# Patient Record
Sex: FEMALE | Race: WHITE | NOT HISPANIC OR LATINO | Employment: UNEMPLOYED | URBAN - METROPOLITAN AREA
[De-identification: names, ages, dates, MRNs, and addresses within clinical notes are randomized per-mention and may not be internally consistent; named-entity substitution may affect disease eponyms.]

---

## 2017-07-17 ENCOUNTER — APPOINTMENT (OUTPATIENT)
Dept: GENERAL RADIOLOGY | Facility: CLINIC | Age: 52
End: 2017-07-17
Attending: NURSE PRACTITIONER
Payer: COMMERCIAL

## 2017-07-17 ENCOUNTER — HOSPITAL ENCOUNTER (EMERGENCY)
Facility: CLINIC | Age: 52
Discharge: HOME | End: 2017-07-17
Attending: NURSE PRACTITIONER | Admitting: NURSE PRACTITIONER
Payer: COMMERCIAL

## 2017-07-17 VITALS
DIASTOLIC BLOOD PRESSURE: 88 MMHG | HEART RATE: 105 BPM | OXYGEN SATURATION: 99 % | SYSTOLIC BLOOD PRESSURE: 157 MMHG | RESPIRATION RATE: 20 BRPM | HEIGHT: 66 IN | BODY MASS INDEX: 20.41 KG/M2 | WEIGHT: 127 LBS | TEMPERATURE: 98.9 F

## 2017-07-17 DIAGNOSIS — T14.8XXA HEMATOMA: ICD-10-CM

## 2017-07-17 DIAGNOSIS — M79.18 BUTTOCK PAIN: ICD-10-CM

## 2017-07-17 PROCEDURE — 25000132 ZZH RX MED GY IP 250 OP 250 PS 637: Performed by: NURSE PRACTITIONER

## 2017-07-17 PROCEDURE — 99283 EMERGENCY DEPT VISIT LOW MDM: CPT

## 2017-07-17 PROCEDURE — 72170 X-RAY EXAM OF PELVIS: CPT

## 2017-07-17 RX ORDER — IBUPROFEN 200 MG
600 TABLET ORAL ONCE
Status: COMPLETED | OUTPATIENT
Start: 2017-07-17 | End: 2017-07-17

## 2017-07-17 RX ADMIN — IBUPROFEN 600 MG: 200 TABLET, FILM COATED ORAL at 16:52

## 2017-07-17 ASSESSMENT — ENCOUNTER SYMPTOMS
WOUND: 1
ARTHRALGIAS: 0
FEVER: 0
COLOR CHANGE: 1
MYALGIAS: 1

## 2017-07-17 NOTE — ED AVS SNAPSHOT
Emergency Department    [address] [place] [address]    Phone:  [phone]    Fax:  [phone]                                       Audelia Comer   MRN: [id number]    Department:   Emergency Department   Date of Visit:  7/17/2017           After Visit Summary Signature Page     I have received my discharge instructions, and my questions have been answered. I have discussed any challenges I see with this plan with the nurse or doctor.    ..........................................................................................................................................  Patient/Patient Representative Signature      ..........................................................................................................................................  Patient Representative Print Name and Relationship to Patient    ..................................................               ................................................  Date                                            Time    ..........................................................................................................................................  Reviewed by Signature/Title    ...................................................              ..............................................  Date                                                            Time

## 2017-07-17 NOTE — ED AVS SNAPSHOT
Emergency Department    [address] [facility] [address]    Phone:  [phone]    Fax:  [phone]                                       Audelia Comer   MRN: [id number]    Department:   Emergency Department   Date of Visit:  7/17/2017           Patient Information     Date Of Birth          1965        Your diagnoses for this visit were:     Buttock pain     Hematoma        You were seen by Evelyn Bautista APRN CNP.      Follow-up Information     Follow up with No Ref-Primary, Physician.    Why:  As needed        Discharge Instructions       If the area becomes red, hot to touch, firm or you develop a fever return to the ER.     Follow up with Surgery as needed if the area does not improve.     Take Ibuprofen as needed.     If you need a clinic, see list below or contact your health insurance for in network providers.       [facility]  2001 [facility] [phone]   [facility]  [address]  [phone]   [facility]     [address] [phone]     [facility]     [address] [phone]      [facility]    Clinic, 1213 [facility]  [phone]       [facility]     [address] [phone]   [facility]    4730 [facility] [phone]   [facility]     2425 [facility] [phone]     [facility]   [address] [phone]     [facility]     4280 [facility] [phone]   [facility]:    3300 [facility] [phone]   [facility]:    1313 [facility] [phone]   [facility]   [address] [place] [address] [phone]     [facility]      860 [facility] [phone]   [facility]     153 [facility] [phone]   [facility]     135 [facility] [phone]   [facility]     409 [facility] [phone]     Family planning and reproductive health centers  Centros de planificación familiar y kahlil reproductiva    [facility] [place]              [phone]  [facility]               [phone]  [facility] [place]   [phone]  [facility] [place]   [phone]  [facility] [place]  [phone]   [facility]                   [phone]      24 Hour Appointment Hotline       To make an appointment at any [facility], call [phone] ([phone]). If you don't have a family doctor or clinic, we will help you find one. [facility] are conveniently located to serve the needs of you and your family.             Review of your medicines      Our records show that you are taking the medicines listed below. If these are incorrect, please call your family doctor or clinic.        Dose / Directions Last dose taken    oxyCODONE-acetaminophen 5-325 MG per tablet   Commonly known as:  PERCOCET   Dose:  1-2 tablet   Quantity:  15 tablet        Take 1-2 tablets by mouth every 4 hours as needed for moderate to severe pain   Refills:  0                Procedures and tests performed during your visit     Pelvis XR, 1-2 views      Orders Needing Specimen Collection     None      Pending Results     Date and Time Order Name Status Description    7/17/2017 1623 Pelvis XR, 1-2 views Preliminary             Pending Culture Results     No orders found from 7/15/2017 to 7/18/2017.            Pending Results Instructions     If you had any lab results that were not finalized at the time of your Discharge, you can call the ED Lab Result RN at [phone]. You will be contacted by this team for any  positive Lab results or changes in treatment. The nurses are available 7 days a week from 10A to 6:30P.  You can leave a message 24 hours per day and they will return your call.        Test Results From Your Hospital Stay        7/17/2017  5:16 PM      Narrative     PELVIS TWO VIEWS   7/17/2017 4:46 PM     HISTORY: Soft tissue, right lower buttock, evaluate for glass.    COMPARISON: None.        Impression     IMPRESSION: No radiopaque foreign body can be identified in the soft  tissues of the right buttock in the regions marked by the arrows on  the radiographs.                Clinical Quality Measure: Blood Pressure Screening     Your blood pressure was checked while you were in the emergency department today. The last reading we obtained was  BP: 157/88 . Please read the guidelines below about what these numbers mean and what you should do about them.  If your systolic blood pressure (the top number) is less than 120 and your diastolic blood pressure (the bottom number) is less than 80, then your blood pressure is normal. There is nothing more that you need to do about it.  If your systolic blood pressure (the top number) is 120-139 or your diastolic blood pressure (the bottom number) is 80-89, your blood pressure may be higher than it should be. You should have your blood pressure rechecked within a year by a primary care provider.  If your systolic blood pressure (the top number) is 140 or greater or your diastolic blood pressure (the bottom number) is 90 or greater, you may have high blood pressure. High blood pressure is treatable, but if left untreated over time it can put you at risk for heart attack, stroke, or kidney failure. You should have your blood pressure rechecked by a primary care provider within the next 4 weeks.  If your provider in the emergency department today gave you specific instructions to follow-up with your doctor or provider even sooner than that, you should follow that instruction and  "not wait for up to 4 weeks for your follow-up visit.        Thank you for choosing [facility]       Thank you for choosing [facility] for your care. Our goal is always to provide you with excellent care. Hearing back from our patients is one way we can continue to improve our services. Please take a few minutes to complete the written survey that you may receive in the mail after you visit with us. Thank you!        [email or web address] Information     [facility] lets you send messages to your doctor, view your test results, renew your prescriptions, schedule appointments and more. To sign up, go to [email or web address] . Click on \"Log in\" on the left side of the screen, which will take you to the Welcome page. Then click on \"Sign up Now\" on the right side of the page.     You will be asked to enter the access code listed below, as well as some personal information. Please follow the directions to create your username and password.     Your access code is: [email or web address]  Expires: 10/15/2017  5:31 PM     Your access code will  in 90 days. If you need help or a new code, please call your [facility] clinic or [phone].        Care EveryWhere ID     This is your Care EveryWhere ID. This could be used by other organizations to access your [facility] medical records  [id number]        Equal Access to Services     JOSSE HART AH: Lonnie Jason, waaxda luqadaha, qaybta kaalmada alexei, samantha wallace . So [facility] [phone].    ATENCIÓN: Si habla español, tiene a harding disposición servicios gratuitos de asistencia lingüística. Llame al [phone].    We comply with applicable federal civil rights laws and [place] laws. We do not discriminate on the basis of race, color, national origin, age, disability sex, sexual orientation or gender identity.            After Visit Summary       This is your record. Keep this with you and show to your community pharmacist(s) and doctor(s) at your next " visit.

## 2017-07-17 NOTE — ED PROVIDER NOTES
"  History     Chief Complaint:  Buttock pain    HPI   Audelia Comer is a 51 year old female who presents to the ED with right buttock pain following a fall 2 weeks ago. The patient states that she fell on a candle and broke the glass that the candle was held in. The area was bleeding profusely and she did not want to call an ambulance so she just controlled the bleeding. With her level of pain still, she is concerned that she may have gotten some glass lodged in the right side of her buttock and thus was seen by Urgent Care earlier today where they started her on antibiotics and referred her to a general physician. She was seen by a general physician just prior to arrival where she was told to come into the ED for further evaluation and workup. She notes the area to be extremely tender and that it used to be extremely bruised. She denies any fever, wounds, or any other pain.    Allergies:  No known drug allergies    Medications:    Percocet     Past Medical History:    SBO  Ileitis    Past Surgical History:    Appendectomy    Breast implants    Family History:    History reviewed. No pertinent family history.     Social History:  Smoking status: Current every day smoker  Alcohol use: Yes  Marital Status:       Review of Systems   Constitutional: Negative for fever.   Musculoskeletal: Positive for myalgias (right buttock). Negative for arthralgias.   Skin: Positive for color change (bruising) and wound (right buttock).   All other systems reviewed and are negative.    Physical Exam   Patient Vitals for the past 24 hrs:   BP Temp Temp src Pulse Resp SpO2 Height Weight   17 1534 157/88 98.9  F (37.2  C) Oral 105 20 99 % 1.676 m (5' 6\") 57.6 kg (127 lb)     Physical Exam  Constitutional:  Sitting in bed, non-toxic appearing.  Head: Atraumatic.  Head moves freely with normal range of motion.   Eyes: Conjunctivae pink. EOMs intact.   Neck: Normal range of motion.   Cardiovascular: Intact distal " pulses: Radial pulse 2+ on the right, 2+ on the left.   Pulmonary/Chest: No respiratory distress.   Musculoskeletal: Distal capillary refill and sensation intact. Tendon function intact.   Neurological: Oriented to person, place, and time. No focal deficits.  Skin: There is a 1cm laceration noted to the right lower medial buttock. Well healed laceration. Skin is warm with some soft tissue edema and tenderness. No erythema or heat to touch.      Emergency Department Course     Imaging:  Radiographic findings were communicated with the patient who voiced understanding of the findings.    X-ray Pelvis, 2 views:  IMPRESSION: No radiopaque foreign body can be identified in the soft  tissues of the right buttock in the regions marked by the arrows on  the radiographs.  Result per radiology.     Interventions:  1652 - Ibuprofen    Emergency Department Course:  Past medical records, nursing notes, and vitals reviewed.  1617: I performed an exam of the patient and obtained history, as documented above.    1729: I rechecked the patient. Findings and plan explained to the Patient. Patient discharged home with instructions regarding supportive care, medications, and reasons to return. The importance of close follow-up was reviewed.     Impression & Plan      Medical Decision Making:  Audelia Comer is a 51 year old female who presents for evaluation of a laceration to the right lower medial buttock from 2 weeks ago, sustained by a large piece of broken glass. She is concerned she has a retained foreign body. We discussed that glass especially a large piece may be seen on XRay, although not seeing a foreign body does not exclude retained glass. Her exam is not concerning for a foreign body as the area is not reddened or firm. XRay is negative. We reviewed reasons to return here and need for follow up. Pt is amenable to plan.      Diagnosis:    ICD-10-CM    1. Buttock pain M79.1    2. Hematoma T14.8         Disposition:  discharged to home    Amanda Sravani  7/17/2017    EMERGENCY DEPARTMENT  I, Amanda Lassiter, am serving as a scribe at 4:17 PM on 7/17/2017 to document services personally performed by Evelyn Bautista CNP based on my observations and the provider's statements to me.        Evelyn Bautista APRN CNP  07/18/17 0011

## 2017-07-17 NOTE — DISCHARGE INSTRUCTIONS
If the area becomes red, hot to touch, firm or you develop a fever return to the ER.     Follow up with Surgery as needed if the area does not improve.     Take Ibuprofen as needed.     If you need a clinic, see list below or contact your health insurance for in network providers.       [facility]  2001 [facility] [phone]   [facility]  [address]  [phone]   [facility]     [address] [phone]     [facility]     [address] [phone]      [facility]    Clinic, 1213 [facility]  [phone]       [facility]     [address] [phone]   [facility]    4730 [facility] [phone]   [facility]     2425 [facility] [phone]     [place] [facility]   [address] [phone]     [facility]     2610 [facility] [phone]   [facility]:    3300 [facility] [phone]   [facility]:    1313 [facility] [phone]   [facility]   [address] [place] [address] [phone]     [facility]     860 [facility] [phone]   [facility]     153 [facility] [phone]   [facility]     135 [facility] [phone]   [facility]     409 [facility] [phone]     Family planning and reproductive health centers  Centros de planificación familiar y kahlil reproductiva    [facility]              [phone]  [facility]               [phone]  [facility] [place]   [phone]  [facility] [place]   [phone]  [facility] [place]  [phone]   [facility]                   [phone]

## 2017-09-28 ENCOUNTER — APPOINTMENT (OUTPATIENT)
Dept: ULTRASOUND IMAGING | Facility: CLINIC | Age: 52
End: 2017-09-28
Attending: EMERGENCY MEDICINE
Payer: COMMERCIAL

## 2017-09-28 ENCOUNTER — HOSPITAL ENCOUNTER (EMERGENCY)
Facility: CLINIC | Age: 52
Discharge: HOME | End: 2017-09-28
Attending: EMERGENCY MEDICINE | Admitting: EMERGENCY MEDICINE
Payer: COMMERCIAL

## 2017-09-28 VITALS
HEART RATE: 107 BPM | TEMPERATURE: 99.1 F | SYSTOLIC BLOOD PRESSURE: 134 MMHG | WEIGHT: 123 LBS | DIASTOLIC BLOOD PRESSURE: 85 MMHG | HEIGHT: 66 IN | OXYGEN SATURATION: 98 % | RESPIRATION RATE: 16 BRPM | BODY MASS INDEX: 19.77 KG/M2

## 2017-09-28 DIAGNOSIS — R79.89 ELEVATED LFTS: ICD-10-CM

## 2017-09-28 DIAGNOSIS — N13.30 HYDRONEPHROSIS, RIGHT: ICD-10-CM

## 2017-09-28 DIAGNOSIS — R11.10 VOMITING AND DIARRHEA: ICD-10-CM

## 2017-09-28 DIAGNOSIS — R19.7 VOMITING AND DIARRHEA: ICD-10-CM

## 2017-09-28 DIAGNOSIS — E86.0 DEHYDRATION: ICD-10-CM

## 2017-09-28 DIAGNOSIS — R10.13 ABDOMINAL PAIN, EPIGASTRIC: ICD-10-CM

## 2017-09-28 LAB
ALBUMIN SERPL-MCNC: 4.2 G/DL (ref 3.4–5)
ALBUMIN UR-MCNC: NEGATIVE MG/DL
ALP SERPL-CCNC: 94 U/L (ref 40–150)
ALT SERPL W P-5'-P-CCNC: 152 U/L (ref 0–50)
ANION GAP SERPL CALCULATED.3IONS-SCNC: 16 MMOL/L (ref 3–14)
APPEARANCE UR: CLEAR
AST SERPL W P-5'-P-CCNC: 182 U/L (ref 0–45)
BASOPHILS # BLD AUTO: 0.1 10E9/L (ref 0–0.2)
BASOPHILS NFR BLD AUTO: 1 %
BILIRUB SERPL-MCNC: 0.8 MG/DL (ref 0.2–1.3)
BILIRUB UR QL STRIP: NEGATIVE
BUN SERPL-MCNC: 6 MG/DL (ref 7–30)
CALCIUM SERPL-MCNC: 9 MG/DL (ref 8.5–10.1)
CHLORIDE SERPL-SCNC: 98 MMOL/L (ref 94–109)
CO2 SERPL-SCNC: 21 MMOL/L (ref 20–32)
COLOR UR AUTO: ABNORMAL
CREAT SERPL-MCNC: 0.47 MG/DL (ref 0.52–1.04)
DIFFERENTIAL METHOD BLD: ABNORMAL
EOSINOPHIL # BLD AUTO: 0.1 10E9/L (ref 0–0.7)
EOSINOPHIL NFR BLD AUTO: 1 %
ERYTHROCYTE [DISTWIDTH] IN BLOOD BY AUTOMATED COUNT: 12.8 % (ref 10–15)
GFR SERPL CREATININE-BSD FRML MDRD: >90 ML/MIN/1.7M2
GLUCOSE SERPL-MCNC: 71 MG/DL (ref 70–99)
GLUCOSE UR STRIP-MCNC: NEGATIVE MG/DL
HCT VFR BLD AUTO: 45.8 % (ref 35–47)
HGB BLD-MCNC: 16.4 G/DL (ref 11.7–15.7)
HGB UR QL STRIP: NEGATIVE
IMM GRANULOCYTES # BLD: 0 10E9/L (ref 0–0.4)
IMM GRANULOCYTES NFR BLD: 0.3 %
KETONES UR STRIP-MCNC: 10 MG/DL
LEUKOCYTE ESTERASE UR QL STRIP: NEGATIVE
LIPASE SERPL-CCNC: 190 U/L (ref 73–393)
LYMPHOCYTES # BLD AUTO: 1.7 10E9/L (ref 0.8–5.3)
LYMPHOCYTES NFR BLD AUTO: 24.1 %
MCH RBC QN AUTO: 34.3 PG (ref 26.5–33)
MCHC RBC AUTO-ENTMCNC: 35.8 G/DL (ref 31.5–36.5)
MCV RBC AUTO: 96 FL (ref 78–100)
MONOCYTES # BLD AUTO: 0.8 10E9/L (ref 0–1.3)
MONOCYTES NFR BLD AUTO: 11.9 %
MUCOUS THREADS #/AREA URNS LPF: PRESENT /LPF
NEUTROPHILS # BLD AUTO: 4.4 10E9/L (ref 1.6–8.3)
NEUTROPHILS NFR BLD AUTO: 61.7 %
NITRATE UR QL: NEGATIVE
NRBC # BLD AUTO: 0 10*3/UL
NRBC BLD AUTO-RTO: 0 /100
PH UR STRIP: 5.5 PH (ref 5–7)
PLATELET # BLD AUTO: 191 10E9/L (ref 150–450)
POTASSIUM SERPL-SCNC: 3.4 MMOL/L (ref 3.4–5.3)
PROT SERPL-MCNC: 8.3 G/DL (ref 6.8–8.8)
RBC # BLD AUTO: 4.78 10E12/L (ref 3.8–5.2)
RBC #/AREA URNS AUTO: <1 /HPF (ref 0–2)
SODIUM SERPL-SCNC: 135 MMOL/L (ref 133–144)
SOURCE: ABNORMAL
SP GR UR STRIP: 1 (ref 1–1.03)
SQUAMOUS #/AREA URNS AUTO: 1 /HPF (ref 0–1)
UROBILINOGEN UR STRIP-MCNC: NORMAL MG/DL (ref 0–2)
WBC # BLD AUTO: 7.1 10E9/L (ref 4–11)
WBC #/AREA URNS AUTO: 1 /HPF (ref 0–2)

## 2017-09-28 PROCEDURE — 25000128 H RX IP 250 OP 636: Performed by: EMERGENCY MEDICINE

## 2017-09-28 PROCEDURE — 96361 HYDRATE IV INFUSION ADD-ON: CPT

## 2017-09-28 PROCEDURE — 96374 THER/PROPH/DIAG INJ IV PUSH: CPT

## 2017-09-28 PROCEDURE — 25000125 ZZHC RX 250: Performed by: EMERGENCY MEDICINE

## 2017-09-28 PROCEDURE — 99285 EMERGENCY DEPT VISIT HI MDM: CPT | Mod: 25

## 2017-09-28 PROCEDURE — 81001 URINALYSIS AUTO W/SCOPE: CPT | Performed by: EMERGENCY MEDICINE

## 2017-09-28 PROCEDURE — 85025 COMPLETE CBC W/AUTO DIFF WBC: CPT | Performed by: EMERGENCY MEDICINE

## 2017-09-28 PROCEDURE — 80053 COMPREHEN METABOLIC PANEL: CPT | Performed by: EMERGENCY MEDICINE

## 2017-09-28 PROCEDURE — 83690 ASSAY OF LIPASE: CPT | Performed by: EMERGENCY MEDICINE

## 2017-09-28 PROCEDURE — 76705 ECHO EXAM OF ABDOMEN: CPT

## 2017-09-28 PROCEDURE — 25000132 ZZH RX MED GY IP 250 OP 250 PS 637: Performed by: EMERGENCY MEDICINE

## 2017-09-28 RX ORDER — ONDANSETRON 4 MG/1
4 TABLET, ORALLY DISINTEGRATING ORAL EVERY 4 HOURS PRN
Qty: 10 TABLET | Refills: 0 | Status: SHIPPED | OUTPATIENT
Start: 2017-09-28 | End: 2019-07-17

## 2017-09-28 RX ORDER — SODIUM CHLORIDE 9 MG/ML
1000 INJECTION, SOLUTION INTRAVENOUS CONTINUOUS
Status: DISCONTINUED | OUTPATIENT
Start: 2017-09-28 | End: 2017-09-28 | Stop reason: HOSPADM

## 2017-09-28 RX ADMIN — SODIUM CHLORIDE 1000 ML: 9 INJECTION, SOLUTION INTRAVENOUS at 10:43

## 2017-09-28 RX ADMIN — LIDOCAINE HYDROCHLORIDE 30 ML: 20 SOLUTION ORAL; TOPICAL at 11:25

## 2017-09-28 RX ADMIN — SODIUM CHLORIDE 1000 ML: 9 INJECTION, SOLUTION INTRAVENOUS at 12:02

## 2017-09-28 RX ADMIN — PANTOPRAZOLE SODIUM 40 MG: 40 INJECTION, POWDER, FOR SOLUTION INTRAVENOUS at 12:02

## 2017-09-28 ASSESSMENT — ENCOUNTER SYMPTOMS
APPETITE CHANGE: 1
DIARRHEA: 1
VOMITING: 1
NAUSEA: 1
ABDOMINAL PAIN: 1

## 2017-09-28 NOTE — ED AVS SNAPSHOT
Emergency Department    [address] [place] [address]    Phone:  [phone]    Fax:  [phone]                                       Audelia Comer   MRN: [id number]    Department:   Emergency Department   Date of Visit:  9/28/2017           After Visit Summary Signature Page     I have received my discharge instructions, and my questions have been answered. I have discussed any challenges I see with this plan with the nurse or doctor.    ..........................................................................................................................................  Patient/Patient Representative Signature      ..........................................................................................................................................  Patient Representative Print Name and Relationship to Patient    ..................................................               ................................................  Date                                            Time    ..........................................................................................................................................  Reviewed by Signature/Title    ...................................................              ..............................................  Date                                                            Time

## 2017-09-28 NOTE — DISCHARGE INSTRUCTIONS
Drink plenty of fluids.    Do not take advil/Ibuprofen/Aleve or other non-steroidal anti-inflammatory medications.    Tylenol is fine to take.

## 2017-09-28 NOTE — ED AVS SNAPSHOT
Emergency Department    [address] [facility] [address]    Phone:  [phone]    Fax:  [phone]                                       Audelia Comer   MRN: [id number]    Department:   Emergency Department   Date of Visit:  9/28/2017           Patient Information     Date Of Birth          1965        Your diagnoses for this visit were:     Abdominal pain, epigastric     Vomiting and diarrhea     Hydronephrosis, right     Elevated LFTs     Dehydration        You were seen by Bridget Gilman MD.      Follow-up Information     Follow up with [facility] [place] [facility].    Why:  for follow-up and an Endoscopy procedure to look in your stomach to check for an ulcer.    Contact information:    [address] [address]  [facility] [address] [phone]          Follow up with  Emergency Department.    Specialty:  EMERGENCY MEDICINE    Why:  As needed, If symptoms worsen    Contact information:    [address] [facility] [address] [phone]        Follow up with No Ref-Primary, Physician.    Why:  Follow-up with your Primary care doctor this week.        Discharge Instructions       Drink plenty of fluids.    Do not take advil/Ibuprofen/Aleve or other non-steroidal anti-inflammatory medications.    Tylenol is fine to take.    Discharge References/Attachments     GASTRITIS OR ULCER (NO ANTIBIOTIC TREATMENT) (ENGLISH)    VOMITING AND DIARRHEA, NONSPECIFIC (ADULT) (ENGLISH)      24 Hour Appointment Hotline       To make an appointment at any [facility], call [phone] ([phone]). If you don't have a family doctor or clinic, we will help you find one. [facility] are conveniently located to serve the needs of you and your family.             Review of your medicines      START taking        Dose / Directions Last dose taken    omeprazole 20 MG CR capsule   Commonly known as:  priLOSEC   Dose:  40 mg   Quantity:  60 capsule        Take 2  capsules (40 mg) by mouth daily   Refills:  0        ondansetron 4 MG ODT tab   Commonly known as:  ZOFRAN ODT   Dose:  4 mg   Quantity:  10 tablet        Take 1 tablet (4 mg) by mouth every 4 hours as needed for nausea   Refills:  0                Prescriptions were sent or printed at these locations (2 Prescriptions)                   Other Prescriptions                Printed at Department/Unit printer (2 of 2)         ondansetron (ZOFRAN ODT) 4 MG ODT tab               omeprazole (PRILOSEC) 20 MG CR capsule                Procedures and tests performed during your visit     Abdomen US, limited (RUQ only)    CBC with platelets differential    Comprehensive metabolic panel    Lipase    Peripheral IV catheter    UA with Microscopic      Orders Needing Specimen Collection     None      Pending Results     No orders found from 9/26/2017 to 9/29/2017.            Pending Culture Results     No orders found from 9/26/2017 to 9/29/2017.            Pending Results Instructions     If you had any lab results that were not finalized at the time of your Discharge, you can call the ED Lab Result RN at [phone]. You will be contacted by this team for any positive Lab results or changes in treatment. The nurses are available 7 days a week from 10A to 6:30P.  You can leave a message 24 hours per day and they will return your call.        Test Results From Your Hospital Stay        9/28/2017 12:14 PM      Component Results     Component Value Ref Range & Units Status    Color Urine Light Yellow  Final    Appearance Urine Clear  Final    Glucose Urine Negative NEG^Negative mg/dL Final    Bilirubin Urine Negative NEG^Negative Final    Ketones Urine 10 (A) NEG^Negative mg/dL Final    Specific Gravity Urine 1.003 1.003 - 1.035 Final    Blood Urine Negative NEG^Negative Final    pH Urine 5.5 5.0 - 7.0 pH Final    Protein Albumin Urine Negative NEG^Negative mg/dL Final    Urobilinogen mg/dL Normal 0.0 - 2.0 mg/dL Final    Nitrite  Urine Negative NEG^Negative Final    Leukocyte Esterase Urine Negative NEG^Negative Final    Source Midstream Urine  Final    WBC Urine 1 0 - 2 /HPF Final    RBC Urine <1 0 - 2 /HPF Final    Squamous Epithelial /HPF Urine 1 0 - 1 /HPF Final    Mucous Urine Present (A) NEG^Negative /LPF Final         9/28/2017 10:51 AM      Component Results     Component Value Ref Range & Units Status    WBC 7.1 4.0 - 11.0 10e9/L Final    RBC Count 4.78 3.8 - 5.2 10e12/L Final    Hemoglobin 16.4 (H) 11.7 - 15.7 g/dL Final    Hematocrit 45.8 35.0 - 47.0 % Final    MCV 96 78 - 100 fl Final    MCH 34.3 (H) 26.5 - 33.0 pg Final    MCHC 35.8 31.5 - 36.5 g/dL Final    RDW 12.8 10.0 - 15.0 % Final    Platelet Count 191 150 - 450 10e9/L Final    Diff Method Automated Method  Final    % Neutrophils 61.7 % Final    % Lymphocytes 24.1 % Final    % Monocytes 11.9 % Final    % Eosinophils 1.0 % Final    % Basophils 1.0 % Final    % Immature Granulocytes 0.3 % Final    Nucleated RBCs 0 0 /100 Final    Absolute Neutrophil 4.4 1.6 - 8.3 10e9/L Final    Absolute Lymphocytes 1.7 0.8 - 5.3 10e9/L Final    Absolute Monocytes 0.8 0.0 - 1.3 10e9/L Final    Absolute Eosinophils 0.1 0.0 - 0.7 10e9/L Final    Absolute Basophils 0.1 0.0 - 0.2 10e9/L Final    Abs Immature Granulocytes 0.0 0 - 0.4 10e9/L Final    Absolute Nucleated RBC 0.0  Final         9/28/2017 11:14 AM      Component Results     Component Value Ref Range & Units Status    Sodium 135 133 - 144 mmol/L Final    Potassium 3.4 3.4 - 5.3 mmol/L Final    Chloride 98 94 - 109 mmol/L Final    Carbon Dioxide 21 20 - 32 mmol/L Final    Anion Gap 16 (H) 3 - 14 mmol/L Final    Glucose 71 70 - 99 mg/dL Final    Urea Nitrogen 6 (L) 7 - 30 mg/dL Final    Creatinine 0.47 (L) 0.52 - 1.04 mg/dL Final    GFR Estimate >90 >60 mL/min/1.7m2 Final    Non  GFR Calc    GFR Estimate If Black >90 >60 mL/min/1.7m2 Final    African American GFR Calc    Calcium 9.0 8.5 - 10.1 mg/dL Final    Bilirubin  Total 0.8 0.2 - 1.3 mg/dL Final    Albumin 4.2 3.4 - 5.0 g/dL Final    Protein Total 8.3 6.8 - 8.8 g/dL Final    Alkaline Phosphatase 94 40 - 150 U/L Final     (H) 0 - 50 U/L Final     (H) 0 - 45 U/L Final         9/28/2017 11:06 AM      Component Results     Component Value Ref Range & Units Status    Lipase 190 73 - 393 U/L Final         9/28/2017 11:55 AM      Narrative     ULTRASOUND ABDOMEN LIMITED  9/28/2017 11:46 AM     HISTORY: Check for cholecystitis or pancreatitis. Upper abdomen pain,  nausea, vomiting, diarrhea.     FINDINGS:  Liver is increased in echogenicity without focal lesions.  The gallbladder is normal without stones or sludge. Common bile duct  is normal in diameter. Pancreas is normal where visualized.  Examination of the right kidney demonstrates mild hydronephrosis. No  obvious renal calculi.        Impression     IMPRESSION:  Mild right hydronephrosis. No gallstones or bile duct  dilatation. Diffuse fatty liver infiltration is noted.    KAITY LOBO MD                Clinical Quality Measure: Blood Pressure Screening     Your blood pressure was checked while you were in the emergency department today. The last reading we obtained was  BP: 134/85 . Please read the guidelines below about what these numbers mean and what you should do about them.  If your systolic blood pressure (the top number) is less than 120 and your diastolic blood pressure (the bottom number) is less than 80, then your blood pressure is normal. There is nothing more that you need to do about it.  If your systolic blood pressure (the top number) is 120-139 or your diastolic blood pressure (the bottom number) is 80-89, your blood pressure may be higher than it should be. You should have your blood pressure rechecked within a year by a primary care provider.  If your systolic blood pressure (the top number) is 140 or greater or your diastolic blood pressure (the bottom number) is 90 or greater, you may have high  "blood pressure. High blood pressure is treatable, but if left untreated over time it can put you at risk for heart attack, stroke, or kidney failure. You should have your blood pressure rechecked by a primary care provider within the next 4 weeks.  If your provider in the emergency department today gave you specific instructions to follow-up with your doctor or provider even sooner than that, you should follow that instruction and not wait for up to 4 weeks for your follow-up visit.        Thank you for choosing [facility]       Thank you for choosing [facility] for your care. Our goal is always to provide you with excellent care. Hearing back from our patients is one way we can continue to improve our services. Please take a few minutes to complete the written survey that you may receive in the mail after you visit with us. Thank you!        [email or web address] Information     [employer] lets you send messages to your doctor, view your test results, renew your prescriptions, schedule appointments and more. To sign up, go to [email or web address] . Click on \"Log in\" on the left side of the screen, which will take you to the Welcome page. Then click on \"Sign up Now\" on the right side of the page.     You will be asked to enter the access code listed below, as well as some personal information. Please follow the directions to create your username and password.     Your access code is: [email or web address]  Expires: 10/15/2017  5:31 PM     Your access code will  in 90 days. If you need help or a new code, please call your [facility] clinic or [phone].        Care EveryWhere ID     This is your Care EveryWhere ID. This could be used by other organizations to access your [facility] medical records  [id number]        Equal Access to Services     JOSSE HART : mani Yoon, samantha santos. So [facility] [phone].    ATENCIÓN: Si jase bello " harding disposición servicios gratuitos de asistencia lingüística. Llame al [phone].    We comply with applicable federal civil rights laws and [place] laws. We do not discriminate on the basis of race, color, national origin, age, disability sex, sexual orientation or gender identity.            After Visit Summary       This is your record. Keep this with you and show to your community pharmacist(s) and doctor(s) at your next visit.

## 2017-09-28 NOTE — LETTER
To Whom it may concern:      Audelia Comer was seen in our Emergency Department today, 09/28/17.  I expect her condition to improve over the next 2 days.  she may return to work/school when improved.    Sincerely,      Bridget Gilman MD

## 2017-09-28 NOTE — ED PROVIDER NOTES
"  History     Chief Complaint:  Abdominal Pain       HPI   Audelia Comer is a 51 year old female with a history of small bowel obstruction who presents to the emergency department today for evaluation of abdominal pain. 5 days ago, the patient developed nausea, appetite loss, diarrhea, and left epigastric pain. She reports history of \"ulcer gastritis\" for many years with occasional flare ups that are triggered by stress. She admits to being stressed out recently. Patient was unable to stay at work today due to increased pain prompting ED visit. Here, she denies any vomiting today. She does not remember when her last bowel movement was. Patient is not sure if eating changes her pain as she has had decreased oral intake. She describes her pain as a pinching sensation. She has used antiacids with some improvement in pain. Pain today is not similar to the pain she had with her previous small bowel obstruction. She has history of kidney stones but no history of gallstones or pancreatitis. She admits to moderate alcohol use.       Allergies:  No Known Drug Allergies    Medications:    Percocet      Past Medical History:    Small Bowel Obstruction   Ulcer gastritis, per pt  Kidney stone    Past Surgical History:    APPENDECTOMY    GYN SURGERY-c section   SOFT TISSUE SURGERY-breast implants     Family History:    History reviewed. No pertinent family history.     Social History:  The patient presents alone.  Smoking Status: Current every day smoker  Smokeless Tobacco: Never used  Alcohol Use: Yes  Marital Status:   [4]     Review of Systems   Constitutional: Positive for appetite change.   Gastrointestinal: Positive for abdominal pain, diarrhea, nausea and vomiting.   All other systems reviewed and are negative.    Physical Exam   First Vitals:  BP: (!) 142/97  Pulse: 107  Temp: 99.1  F (37.3  C)  Resp: 16  Height: 167.6 cm (5' 6\")  Weight: 55.8 kg (123 lb)  SpO2: 95 %    Physical Exam  Nursing note and vitals " reviewed.  Constitutional:  Appears well-developed and well-nourished.   HENT:   Head:    Atraumatic.   Mouth/Throat:   Oropharynx is clear and moist. No oropharyngeal exudate.   Eyes:    Pupils are equal, round, and reactive to light.   Neck:    Normal range of motion. Neck supple.      No tracheal deviation present. No thyromegaly present.   Cardiovascular:  Normal rate, regular rhythm, no murmur   Pulmonary/Chest: Breath sounds are clear and equal without wheezes or crackles.  Abdominal:   Soft. Bowel sounds are normal. Exhibits no distension and      no mass. There is mid to RUQ tenderness with mild guarding but no rebound.      There is no rebound and no guarding.   Musculoskeletal:  Exhibits no edema.   Lymphadenopathy:  No cervical adenopathy.   Neurological:   Alert and oriented to person, place, and time.   Skin:    Skin is warm and dry. No rash noted. No pallor.     Emergency Department Course   Imaging:  Radiology findings were communicated with the patient who voiced understanding of the findings.    Abdomen US, Limited (RUQ only):  Mild right hydronephrosis. No gallstones or bile duct  dilatation. Diffuse fatty liver infiltration is noted.  Reading per radiology      Laboratory:  Laboratory findings were communicated with the patient who voiced understanding of the findings.    CBC: HGB: 16.4(H) o/w WNL. (WBC 7.1, )     CMP: AST: 182(H), BUN: 6(L), Creatinine: 0.47(L), Anion gap: 16(H), ALT: 152(H)    Lipase: 190    UA with Microscopic: Urineketon: 10(A)    Interventions:  1043- NS 1000 mL IV   1125- Mylanta 30 mL Oral  1202- Protonix 40 mg IV  1202- NS 1000 mL IV     Emergency Department Course:  Nursing notes and vitals reviewed.  I performed an exam of the patient as documented above.       IV was inserted and blood was drawn for laboratory testing, results above.    The patient was sent for a US while in the emergency department, results above.     The patient provided a urine sample here in  the emergency department. This was sent for laboratory testing, findings above.    I discussed the treatment plan with the patient. They expressed understanding of this plan and consented to discharge.    I personally reviewed the laboratory results with the Patient and answered all related questions prior to discharge.    Impression & Plan      Medical Decision Making:  I found the patient to have epigastric abdominal pain which mostly likely is due to gastritis or stomach ulcer therefore she was prescribed Prilosec 40 mg daily. Her gallbladder US did not show any signs of gallstones of cholecystitis and her lipase is normal without evidence of pancreatitis. I discussed the incidental finding mild right sided hydronephrosis with her and she was referred to Dr. Cardona for urology follow up for this. She was also told to follow up with [facility] for follow up regarding her elevated liver enzymes and likely to have endoscopy procedure.  She does not have any sign of bleeding ulcer. I discussed with her that her LFTs could be slightly elevated because of the vomiting . I felt she could be safely discharged home since she has benign abdominal exam and I did not feel she had signs of appendicitis or bowel obstruction. She was instructed on signs and symptoms to return for.       Diagnosis:    ICD-10-CM    1. Abdominal pain, epigastric R10.13 UA with Microscopic   2. Vomiting and diarrhea R11.10     R19.7    3. Hydronephrosis, right N13.30    4. Elevated LFTs R79.89    5. Dehydration E86.0          Disposition:   Discharge     Discharge Medications:  New Prescriptions    OMEPRAZOLE (PRILOSEC) 20 MG CR CAPSULE    Take 2 capsules (40 mg) by mouth daily    ONDANSETRON (ZOFRAN ODT) 4 MG ODT TAB    Take 1 tablet (4 mg) by mouth every 4 hours as needed for nausea       Scribe Disclosure:  Yon MADRID, am serving as a scribe at 10:26 AM on 9/28/2017 to document services personally performed by Bridget Gilman  MD Luis, based on my observations and the provider's statements to me.    9/28/2017    EMERGENCY DEPARTMENT       Bridget Gilman MD  09/28/17 [id number]

## 2018-01-26 ENCOUNTER — CARE COORDINATION (OUTPATIENT)
Dept: CARE COORDINATION | Facility: CLINIC | Age: 53
End: 2018-01-26

## 2018-03-14 ENCOUNTER — HOSPITAL ENCOUNTER (INPATIENT)
Facility: CLINIC | Age: 53
LOS: 2 days | Discharge: HOME | DRG: 440 | End: 2018-03-17
Attending: EMERGENCY MEDICINE | Admitting: HOSPITALIST
Payer: COMMERCIAL

## 2018-03-14 DIAGNOSIS — K85.90 ACUTE PANCREATITIS, UNSPECIFIED COMPLICATION STATUS, UNSPECIFIED PANCREATITIS TYPE: ICD-10-CM

## 2018-03-14 DIAGNOSIS — R11.2 INTRACTABLE VOMITING WITH NAUSEA, UNSPECIFIED VOMITING TYPE: ICD-10-CM

## 2018-03-14 DIAGNOSIS — E87.6 HYPOKALEMIA: ICD-10-CM

## 2018-03-14 DIAGNOSIS — F10.939 ALCOHOL WITHDRAWAL SYNDROME WITH COMPLICATION (H): ICD-10-CM

## 2018-03-14 LAB
ALBUMIN SERPL-MCNC: 3.7 G/DL (ref 3.4–5)
ALP SERPL-CCNC: 196 U/L (ref 40–150)
ALT SERPL W P-5'-P-CCNC: 106 U/L (ref 0–50)
ANION GAP SERPL CALCULATED.3IONS-SCNC: 28 MMOL/L (ref 3–14)
AST SERPL W P-5'-P-CCNC: 196 U/L (ref 0–45)
BILIRUB SERPL-MCNC: 1.8 MG/DL (ref 0.2–1.3)
BUN SERPL-MCNC: 7 MG/DL (ref 7–30)
CALCIUM SERPL-MCNC: 9 MG/DL (ref 8.5–10.1)
CHLORIDE SERPL-SCNC: 96 MMOL/L (ref 94–109)
CO2 SERPL-SCNC: 12 MMOL/L (ref 20–32)
CREAT SERPL-MCNC: 0.46 MG/DL (ref 0.52–1.04)
ERYTHROCYTE [DISTWIDTH] IN BLOOD BY AUTOMATED COUNT: 12.7 % (ref 10–15)
GFR SERPL CREATININE-BSD FRML MDRD: >90 ML/MIN/1.7M2
GLUCOSE SERPL-MCNC: 115 MG/DL (ref 70–99)
HCT VFR BLD AUTO: 43.5 % (ref 35–47)
HGB BLD-MCNC: 15.8 G/DL (ref 11.7–15.7)
INTERPRETATION ECG - MUSE: NORMAL
LIPASE SERPL-CCNC: 5647 U/L (ref 73–393)
MAGNESIUM SERPL-MCNC: 1.7 MG/DL (ref 1.6–2.3)
MCH RBC QN AUTO: 35.5 PG (ref 26.5–33)
MCHC RBC AUTO-ENTMCNC: 36.3 G/DL (ref 31.5–36.5)
MCV RBC AUTO: 98 FL (ref 78–100)
PLATELET # BLD AUTO: 157 10E9/L (ref 150–450)
POTASSIUM SERPL-SCNC: 2.7 MMOL/L (ref 3.4–5.3)
PROT SERPL-MCNC: 8.2 G/DL (ref 6.8–8.8)
RBC # BLD AUTO: 4.45 10E12/L (ref 3.8–5.2)
SODIUM SERPL-SCNC: 136 MMOL/L (ref 133–144)
WBC # BLD AUTO: 15 10E9/L (ref 4–11)

## 2018-03-14 PROCEDURE — 96361 HYDRATE IV INFUSION ADD-ON: CPT

## 2018-03-14 PROCEDURE — 25000125 ZZHC RX 250: Performed by: EMERGENCY MEDICINE

## 2018-03-14 PROCEDURE — 96375 TX/PRO/DX INJ NEW DRUG ADDON: CPT

## 2018-03-14 PROCEDURE — 99285 EMERGENCY DEPT VISIT HI MDM: CPT | Mod: 25

## 2018-03-14 PROCEDURE — 83735 ASSAY OF MAGNESIUM: CPT | Performed by: EMERGENCY MEDICINE

## 2018-03-14 PROCEDURE — 85027 COMPLETE CBC AUTOMATED: CPT | Performed by: EMERGENCY MEDICINE

## 2018-03-14 PROCEDURE — 83690 ASSAY OF LIPASE: CPT | Performed by: EMERGENCY MEDICINE

## 2018-03-14 PROCEDURE — 93005 ELECTROCARDIOGRAM TRACING: CPT

## 2018-03-14 PROCEDURE — 25000128 H RX IP 250 OP 636: Performed by: EMERGENCY MEDICINE

## 2018-03-14 PROCEDURE — 25000132 ZZH RX MED GY IP 250 OP 250 PS 637: Performed by: EMERGENCY MEDICINE

## 2018-03-14 PROCEDURE — HZ2ZZZZ DETOXIFICATION SERVICES FOR SUBSTANCE ABUSE TREATMENT: ICD-10-PCS | Performed by: EMERGENCY MEDICINE

## 2018-03-14 PROCEDURE — 96365 THER/PROPH/DIAG IV INF INIT: CPT | Mod: 59

## 2018-03-14 PROCEDURE — 80053 COMPREHEN METABOLIC PANEL: CPT | Performed by: EMERGENCY MEDICINE

## 2018-03-14 PROCEDURE — 96366 THER/PROPH/DIAG IV INF ADDON: CPT

## 2018-03-14 RX ORDER — ONDANSETRON 2 MG/ML
4 INJECTION INTRAMUSCULAR; INTRAVENOUS ONCE
Status: COMPLETED | OUTPATIENT
Start: 2018-03-14 | End: 2018-03-14

## 2018-03-14 RX ORDER — ONDANSETRON 2 MG/ML
4 INJECTION INTRAMUSCULAR; INTRAVENOUS EVERY 30 MIN PRN
Status: DISCONTINUED | OUTPATIENT
Start: 2018-03-14 | End: 2018-03-15

## 2018-03-14 RX ORDER — POTASSIUM CL/LIDO/0.9 % NACL 10MEQ/0.1L
10 INTRAVENOUS SOLUTION, PIGGYBACK (ML) INTRAVENOUS ONCE
Status: COMPLETED | OUTPATIENT
Start: 2018-03-14 | End: 2018-03-15

## 2018-03-14 RX ORDER — HYDROMORPHONE HYDROCHLORIDE 1 MG/ML
0.5 INJECTION, SOLUTION INTRAMUSCULAR; INTRAVENOUS; SUBCUTANEOUS
Status: COMPLETED | OUTPATIENT
Start: 2018-03-14 | End: 2018-03-14

## 2018-03-14 RX ORDER — LORAZEPAM 2 MG/ML
1 INJECTION INTRAMUSCULAR ONCE
Status: COMPLETED | OUTPATIENT
Start: 2018-03-14 | End: 2018-03-14

## 2018-03-14 RX ADMIN — SODIUM CHLORIDE 1000 ML: 9 INJECTION, SOLUTION INTRAVENOUS at 22:34

## 2018-03-14 RX ADMIN — HYDROMORPHONE HYDROCHLORIDE 0.5 MG: 1 INJECTION, SOLUTION INTRAMUSCULAR; INTRAVENOUS; SUBCUTANEOUS at 23:54

## 2018-03-14 RX ADMIN — ONDANSETRON 4 MG: 2 INJECTION INTRAMUSCULAR; INTRAVENOUS at 22:33

## 2018-03-14 RX ADMIN — Medication 10 MEQ: at 23:10

## 2018-03-14 RX ADMIN — LIDOCAINE HYDROCHLORIDE 30 ML: 20 SOLUTION ORAL; TOPICAL at 22:56

## 2018-03-14 RX ADMIN — LORAZEPAM 1 MG: 2 INJECTION INTRAMUSCULAR; INTRAVENOUS at 22:55

## 2018-03-14 RX ADMIN — SODIUM CHLORIDE 1000 ML: 9 INJECTION, SOLUTION INTRAVENOUS at 22:56

## 2018-03-14 ASSESSMENT — ENCOUNTER SYMPTOMS
DIARRHEA: 0
BLOOD IN STOOL: 0
TREMORS: 1
WEAKNESS: 0
CHILLS: 1
ABDOMINAL PAIN: 1
NAUSEA: 1
FEVER: 0
HEMATURIA: 0
HEADACHES: 0
DIZZINESS: 0
VOMITING: 1
DIFFICULTY URINATING: 0
DYSURIA: 0
FATIGUE: 1

## 2018-03-14 NOTE — IP AVS SNAPSHOT
[facility] [address] [facility]    [address] [place] [address]    Phone:  [phone]                                       After Visit Summary   3/14/2018    Audelia Comer    MRN: [id number]           After Visit Summary Signature Page     I have received my discharge instructions, and my questions have been answered. I have discussed any challenges I see with this plan with the nurse or doctor.    ..........................................................................................................................................  Patient/Patient Representative Signature      ..........................................................................................................................................  Patient Representative Print Name and Relationship to Patient    ..................................................               ................................................  Date                                            Time    ..........................................................................................................................................  Reviewed by Signature/Title    ...................................................              ..............................................  Date                                                            Time

## 2018-03-14 NOTE — IP AVS SNAPSHOT
MRN:[id number]                      After Visit Summary   3/14/2018    Audelia Comer    MRN: [id number]           Thank you!     Thank you for choosing [facility] for your care. Our goal is always to provide you with excellent care. Hearing back from our patients is one way we can continue to improve our services. Please take a few minutes to complete the written survey that you may receive in the mail after you visit with us. Thank you!        Patient Information     Date Of Birth          1965        Designated Caregiver       Most Recent Value    Caregiver    Will someone help with your care after discharge? yes    Name of designated caregiver Marilee    Phone number of caregiver [phone]    Caregiver address [place]      About your hospital stay     You were admitted on:  March 15, 2018 You last received care in the:  [facility] [address] [facility]    You were discharged on:  March 17, 2018       Who to Call     For medical emergencies, please call 911.  For non-urgent questions about your medical care, please call your primary care provider or clinic, [phone]          Attending Provider     Provider Specialty    Abby Benz MD Emergency Medicine    Connolly, Yamil Weeks MD Emergency Medicine    [facility] S[facility] Benite[facility] MD Internal Medicine       Primary Care Provider Office Phone # Fax #    Jennifer Lani Schwab, [phone]      After Care Instructions     Activity       Your activity upon discharge: activity as tolerated            Diet       Follow this diet upon discharge: Orders Placed This Encounter      Regular Diet Adult                  Follow-up Appointments     Follow-up and recommended labs and tests        Follow up with primary care provider, Jennifer L. Schwab, within 2 weeks, for hospital follow- up.   F/u ETOH dependence program as recommended                  Pending Results     No orders found from 3/12/2018 to 3/15/2018.  "           Statement of Approval     Ordered          18 1128  I have reviewed and agree with all the recommendations and orders detailed in this document.  EFFECTIVE NOW     Approved and electronically signed by:  Dariela Wilkerson MD             Admission Information     Date & Time Provider Department Dept. Phone    3/14/2018 Sage Springer MD [facility] [address] [facility] [phone]      Your Vitals Were     Blood Pressure Pulse Temperature Respirations Weight Pulse Oximetry    128/92 (BP Location: Left arm) 86 98.5  F (36.9  C) (Oral) 18 53.7 kg (118 lb 6.2 oz) 98%    BMI (Body Mass Index)                   19.11 kg/m2           MyChart Information     [employer] lets you send messages to your doctor, view your test results, renew your prescriptions, schedule appointments and more. To sign up, go to [email or web address] . Click on \"Log in\" on the left side of the screen, which will take you to the Welcome page. Then click on \"Sign up Now\" on the right side of the page.     You will be asked to enter the access code listed below, as well as some personal information. Please follow the directions to create your username and password.     Your access code is: [email or web address]  Expires: 6/15/2018 11:50 AM     Your access code will  in 90 days. If you need help or a new code, please call your [facility] clinic or [phone].        Care EveryWhere ID     This is your Care EveryWhere ID. This could be used by other organizations to access your [facility] medical records  [id number]        Equal Access to Services     [facility]: Hadii nicola lopes Sofely, waaxda luqadaha, qaybta kaalmasamantha whitt. So [facility] [phone].    ATENCIÓN: Si habla español, tiene a harding disposición servicios gratuitos de asistencia lingüística. Llame al [phone].    We comply with applicable federal civil rights laws and [place] laws. We do not discriminate on " the basis of race, color, national origin, age, disability, sex, sexual orientation, or gender identity.               Review of your medicines      CONTINUE these medicines which have NOT CHANGED        Dose / Directions    ondansetron 4 MG ODT tab   Commonly known as:  ZOFRAN ODT        Dose:  4 mg   Take 1 tablet (4 mg) by mouth every 4 hours as needed for nausea   Quantity:  10 tablet   Refills:  0       vitamin D 35955 UNIT capsule   Commonly known as:  ERGOCALCIFEROL        Dose:  94284 Units   Take 50,000 Units by mouth twice a week Tuesday and Thursday   Refills:  0                Protect others around you: Learn how to safely use, store and throw away your medicines at www.disposemymeds.org.             Medication List: This is a list of all your medications and when to take them. Check marks below indicate your daily home schedule. Keep this list as a reference.      Medications           Morning Afternoon Evening Bedtime As Needed    ondansetron 4 MG ODT tab   Commonly known as:  ZOFRAN ODT   Take 1 tablet (4 mg) by mouth every 4 hours as needed for nausea   Last time this was given:  4 mg on 3/15/2018  7:30 AM                                   vitamin D 53916 UNIT capsule   Commonly known as:  ERGOCALCIFEROL   Take 50,000 Units by mouth twice a week Tuesday and Thursday   Next Dose Due:  Resume home schedule

## 2018-03-15 ENCOUNTER — APPOINTMENT (OUTPATIENT)
Dept: ULTRASOUND IMAGING | Facility: CLINIC | Age: 53
DRG: 440 | End: 2018-03-15
Attending: EMERGENCY MEDICINE
Payer: COMMERCIAL

## 2018-03-15 ENCOUNTER — APPOINTMENT (OUTPATIENT)
Dept: CT IMAGING | Facility: CLINIC | Age: 53
DRG: 440 | End: 2018-03-15
Attending: EMERGENCY MEDICINE
Payer: COMMERCIAL

## 2018-03-15 LAB
ALBUMIN SERPL-MCNC: 3 G/DL (ref 3.4–5)
ALP SERPL-CCNC: 145 U/L (ref 40–150)
ALT SERPL W P-5'-P-CCNC: 73 U/L (ref 0–50)
ANION GAP SERPL CALCULATED.3IONS-SCNC: 15 MMOL/L (ref 3–14)
AST SERPL W P-5'-P-CCNC: 120 U/L (ref 0–45)
BILIRUB SERPL-MCNC: 1.4 MG/DL (ref 0.2–1.3)
BUN SERPL-MCNC: 4 MG/DL (ref 7–30)
CALCIUM SERPL-MCNC: 7.8 MG/DL (ref 8.5–10.1)
CHLORIDE SERPL-SCNC: 104 MMOL/L (ref 94–109)
CO2 SERPL-SCNC: 17 MMOL/L (ref 20–32)
CREAT SERPL-MCNC: 0.37 MG/DL (ref 0.52–1.04)
ERYTHROCYTE [DISTWIDTH] IN BLOOD BY AUTOMATED COUNT: 12.7 % (ref 10–15)
GFR SERPL CREATININE-BSD FRML MDRD: >90 ML/MIN/1.7M2
GLUCOSE SERPL-MCNC: 72 MG/DL (ref 70–99)
HCT VFR BLD AUTO: 37.9 % (ref 35–47)
HGB BLD-MCNC: 13.4 G/DL (ref 11.7–15.7)
LACTATE BLD-SCNC: 1.1 MMOL/L (ref 0.7–2)
LIPASE SERPL-CCNC: 2869 U/L (ref 73–393)
MCH RBC QN AUTO: 34.7 PG (ref 26.5–33)
MCHC RBC AUTO-ENTMCNC: 35.4 G/DL (ref 31.5–36.5)
MCV RBC AUTO: 98 FL (ref 78–100)
PLATELET # BLD AUTO: 114 10E9/L (ref 150–450)
POTASSIUM SERPL-SCNC: 2.9 MMOL/L (ref 3.4–5.3)
POTASSIUM SERPL-SCNC: 3.2 MMOL/L (ref 3.4–5.3)
PROT SERPL-MCNC: 6.6 G/DL (ref 6.8–8.8)
RBC # BLD AUTO: 3.86 10E12/L (ref 3.8–5.2)
SODIUM SERPL-SCNC: 136 MMOL/L (ref 133–144)
WBC # BLD AUTO: 10.2 10E9/L (ref 4–11)

## 2018-03-15 PROCEDURE — 25000128 H RX IP 250 OP 636: Performed by: EMERGENCY MEDICINE

## 2018-03-15 PROCEDURE — 25000125 ZZHC RX 250: Performed by: HOSPITALIST

## 2018-03-15 PROCEDURE — 96374 THER/PROPH/DIAG INJ IV PUSH: CPT

## 2018-03-15 PROCEDURE — 83605 ASSAY OF LACTIC ACID: CPT | Performed by: HOSPITALIST

## 2018-03-15 PROCEDURE — 36415 COLL VENOUS BLD VENIPUNCTURE: CPT | Performed by: INTERNAL MEDICINE

## 2018-03-15 PROCEDURE — 83690 ASSAY OF LIPASE: CPT | Performed by: INTERNAL MEDICINE

## 2018-03-15 PROCEDURE — 76705 ECHO EXAM OF ABDOMEN: CPT

## 2018-03-15 PROCEDURE — 25000125 ZZHC RX 250: Performed by: EMERGENCY MEDICINE

## 2018-03-15 PROCEDURE — 80053 COMPREHEN METABOLIC PANEL: CPT | Performed by: INTERNAL MEDICINE

## 2018-03-15 PROCEDURE — 99223 1ST HOSP IP/OBS HIGH 75: CPT | Mod: AI | Performed by: HOSPITALIST

## 2018-03-15 PROCEDURE — 25000132 ZZH RX MED GY IP 250 OP 250 PS 637: Performed by: HOSPITALIST

## 2018-03-15 PROCEDURE — 74177 CT ABD & PELVIS W/CONTRAST: CPT

## 2018-03-15 PROCEDURE — 99222 1ST HOSP IP/OBS MODERATE 55: CPT | Performed by: PSYCHIATRY & NEUROLOGY

## 2018-03-15 PROCEDURE — 84132 ASSAY OF SERUM POTASSIUM: CPT | Performed by: INTERNAL MEDICINE

## 2018-03-15 PROCEDURE — 12000000 ZZH R&B MED SURG/OB

## 2018-03-15 PROCEDURE — 85027 COMPLETE CBC AUTOMATED: CPT | Performed by: INTERNAL MEDICINE

## 2018-03-15 PROCEDURE — 36415 COLL VENOUS BLD VENIPUNCTURE: CPT | Performed by: HOSPITALIST

## 2018-03-15 PROCEDURE — 25000128 H RX IP 250 OP 636: Performed by: HOSPITALIST

## 2018-03-15 RX ORDER — AMOXICILLIN 250 MG
2 CAPSULE ORAL 2 TIMES DAILY PRN
Status: DISCONTINUED | OUTPATIENT
Start: 2018-03-15 | End: 2018-03-17 | Stop reason: HOSPADM

## 2018-03-15 RX ORDER — DIAZEPAM 10 MG/2ML
5-10 INJECTION, SOLUTION INTRAMUSCULAR; INTRAVENOUS EVERY 30 MIN PRN
Status: DISCONTINUED | OUTPATIENT
Start: 2018-03-15 | End: 2018-03-17 | Stop reason: HOSPADM

## 2018-03-15 RX ORDER — FOLIC ACID 1 MG/1
1 TABLET ORAL DAILY
Status: DISCONTINUED | OUTPATIENT
Start: 2018-03-15 | End: 2018-03-17 | Stop reason: HOSPADM

## 2018-03-15 RX ORDER — NICOTINE 21 MG/24HR
1 PATCH, TRANSDERMAL 24 HOURS TRANSDERMAL DAILY
Status: DISCONTINUED | OUTPATIENT
Start: 2018-03-15 | End: 2018-03-15

## 2018-03-15 RX ORDER — POTASSIUM CHLORIDE 1500 MG/1
20-40 TABLET, EXTENDED RELEASE ORAL
Status: DISCONTINUED | OUTPATIENT
Start: 2018-03-15 | End: 2018-03-17 | Stop reason: HOSPADM

## 2018-03-15 RX ORDER — NICOTINE 21 MG/24HR
1 PATCH, TRANSDERMAL 24 HOURS TRANSDERMAL DAILY
Status: DISCONTINUED | OUTPATIENT
Start: 2018-03-15 | End: 2018-03-17 | Stop reason: HOSPADM

## 2018-03-15 RX ORDER — POTASSIUM CHLORIDE 29.8 MG/ML
20 INJECTION INTRAVENOUS
Status: DISCONTINUED | OUTPATIENT
Start: 2018-03-15 | End: 2018-03-17 | Stop reason: HOSPADM

## 2018-03-15 RX ORDER — POTASSIUM CHLORIDE 1.5 G/1.58G
20-40 POWDER, FOR SOLUTION ORAL
Status: DISCONTINUED | OUTPATIENT
Start: 2018-03-15 | End: 2018-03-17 | Stop reason: HOSPADM

## 2018-03-15 RX ORDER — LANOLIN ALCOHOL/MO/W.PET/CERES
100 CREAM (GRAM) TOPICAL DAILY
Status: DISCONTINUED | OUTPATIENT
Start: 2018-03-15 | End: 2018-03-17 | Stop reason: HOSPADM

## 2018-03-15 RX ORDER — IOPAMIDOL 755 MG/ML
62 INJECTION, SOLUTION INTRAVASCULAR ONCE
Status: COMPLETED | OUTPATIENT
Start: 2018-03-15 | End: 2018-03-15

## 2018-03-15 RX ORDER — PROCHLORPERAZINE MALEATE 10 MG
10 TABLET ORAL EVERY 6 HOURS PRN
Status: DISCONTINUED | OUTPATIENT
Start: 2018-03-15 | End: 2018-03-17 | Stop reason: HOSPADM

## 2018-03-15 RX ORDER — NALOXONE HYDROCHLORIDE 0.4 MG/ML
.1-.4 INJECTION, SOLUTION INTRAMUSCULAR; INTRAVENOUS; SUBCUTANEOUS
Status: DISCONTINUED | OUTPATIENT
Start: 2018-03-15 | End: 2018-03-17 | Stop reason: HOSPADM

## 2018-03-15 RX ORDER — MULTIPLE VITAMINS W/ MINERALS TAB 9MG-400MCG
1 TAB ORAL DAILY
Status: DISCONTINUED | OUTPATIENT
Start: 2018-03-15 | End: 2018-03-17 | Stop reason: HOSPADM

## 2018-03-15 RX ORDER — AMOXICILLIN 250 MG
1 CAPSULE ORAL 2 TIMES DAILY PRN
Status: DISCONTINUED | OUTPATIENT
Start: 2018-03-15 | End: 2018-03-17 | Stop reason: HOSPADM

## 2018-03-15 RX ORDER — ERGOCALCIFEROL 1.25 MG/1
50000 CAPSULE, LIQUID FILLED ORAL
COMMUNITY
End: 2019-07-17

## 2018-03-15 RX ORDER — POTASSIUM CL/LIDO/0.9 % NACL 10MEQ/0.1L
10 INTRAVENOUS SOLUTION, PIGGYBACK (ML) INTRAVENOUS
Status: DISCONTINUED | OUTPATIENT
Start: 2018-03-15 | End: 2018-03-17 | Stop reason: HOSPADM

## 2018-03-15 RX ORDER — ONDANSETRON 4 MG/1
4 TABLET, ORALLY DISINTEGRATING ORAL EVERY 6 HOURS PRN
Status: DISCONTINUED | OUTPATIENT
Start: 2018-03-15 | End: 2018-03-17 | Stop reason: HOSPADM

## 2018-03-15 RX ORDER — OXYCODONE HYDROCHLORIDE 5 MG/1
5-10 TABLET ORAL
Status: DISCONTINUED | OUTPATIENT
Start: 2018-03-15 | End: 2018-03-17 | Stop reason: HOSPADM

## 2018-03-15 RX ORDER — SODIUM CHLORIDE 9 MG/ML
INJECTION, SOLUTION INTRAVENOUS CONTINUOUS
Status: DISCONTINUED | OUTPATIENT
Start: 2018-03-15 | End: 2018-03-17 | Stop reason: HOSPADM

## 2018-03-15 RX ORDER — HYDROMORPHONE HYDROCHLORIDE 1 MG/ML
.3-.5 INJECTION, SOLUTION INTRAMUSCULAR; INTRAVENOUS; SUBCUTANEOUS
Status: DISCONTINUED | OUTPATIENT
Start: 2018-03-15 | End: 2018-03-17 | Stop reason: HOSPADM

## 2018-03-15 RX ORDER — PROCHLORPERAZINE 25 MG
25 SUPPOSITORY, RECTAL RECTAL EVERY 12 HOURS PRN
Status: DISCONTINUED | OUTPATIENT
Start: 2018-03-15 | End: 2018-03-17 | Stop reason: HOSPADM

## 2018-03-15 RX ORDER — DIAZEPAM 5 MG
10 TABLET ORAL EVERY 30 MIN PRN
Status: DISCONTINUED | OUTPATIENT
Start: 2018-03-15 | End: 2018-03-17 | Stop reason: HOSPADM

## 2018-03-15 RX ORDER — ONDANSETRON 2 MG/ML
4 INJECTION INTRAMUSCULAR; INTRAVENOUS EVERY 6 HOURS PRN
Status: DISCONTINUED | OUTPATIENT
Start: 2018-03-15 | End: 2018-03-17 | Stop reason: HOSPADM

## 2018-03-15 RX ORDER — POTASSIUM CHLORIDE 7.45 MG/ML
10 INJECTION INTRAVENOUS
Status: DISCONTINUED | OUTPATIENT
Start: 2018-03-15 | End: 2018-03-17 | Stop reason: HOSPADM

## 2018-03-15 RX ORDER — BISACODYL 10 MG
10 SUPPOSITORY, RECTAL RECTAL DAILY PRN
Status: DISCONTINUED | OUTPATIENT
Start: 2018-03-15 | End: 2018-03-17 | Stop reason: HOSPADM

## 2018-03-15 RX ADMIN — OXYCODONE HYDROCHLORIDE 5 MG: 5 TABLET ORAL at 22:43

## 2018-03-15 RX ADMIN — SODIUM CHLORIDE 60 ML: 9 INJECTION, SOLUTION INTRAVENOUS at 01:32

## 2018-03-15 RX ADMIN — MULTIPLE VITAMINS W/ MINERALS TAB 1 TABLET: TAB at 08:59

## 2018-03-15 RX ADMIN — Medication 100 MG: at 08:59

## 2018-03-15 RX ADMIN — FOLIC ACID 1 MG: 1 TABLET ORAL at 08:59

## 2018-03-15 RX ADMIN — OXYCODONE HYDROCHLORIDE 5 MG: 5 TABLET ORAL at 07:30

## 2018-03-15 RX ADMIN — POTASSIUM CHLORIDE 40 MEQ: 1500 TABLET, EXTENDED RELEASE ORAL at 23:53

## 2018-03-15 RX ADMIN — ONDANSETRON 4 MG: 4 TABLET, ORALLY DISINTEGRATING ORAL at 07:30

## 2018-03-15 RX ADMIN — IOPAMIDOL 62 ML: 755 INJECTION, SOLUTION INTRAVENOUS at 01:32

## 2018-03-15 RX ADMIN — Medication 10 MEQ: at 19:37

## 2018-03-15 RX ADMIN — Medication 10 MEQ: at 16:48

## 2018-03-15 RX ADMIN — Medication 10 MEQ: at 14:14

## 2018-03-15 RX ADMIN — SODIUM CHLORIDE 1000 ML: 9 INJECTION, SOLUTION INTRAVENOUS at 02:46

## 2018-03-15 RX ADMIN — OXYCODONE HYDROCHLORIDE 5 MG: 5 TABLET ORAL at 02:45

## 2018-03-15 RX ADMIN — NICOTINE 1 PATCH: 14 PATCH, EXTENDED RELEASE TRANSDERMAL at 21:00

## 2018-03-15 RX ADMIN — ONDANSETRON 4 MG: 2 INJECTION INTRAMUSCULAR; INTRAVENOUS at 01:00

## 2018-03-15 RX ADMIN — Medication 10 MEQ: at 13:13

## 2018-03-15 RX ADMIN — OXYCODONE HYDROCHLORIDE 5 MG: 5 TABLET ORAL at 15:41

## 2018-03-15 RX ADMIN — SODIUM CHLORIDE: 9 INJECTION, SOLUTION INTRAVENOUS at 11:30

## 2018-03-15 RX ADMIN — ONDANSETRON 4 MG: 4 TABLET, ORALLY DISINTEGRATING ORAL at 02:45

## 2018-03-15 RX ADMIN — NICOTINE 1 PATCH: 14 PATCH, EXTENDED RELEASE TRANSDERMAL at 02:46

## 2018-03-15 RX ADMIN — Medication 10 MEQ: at 18:23

## 2018-03-15 RX ADMIN — Medication 10 MEQ: at 15:29

## 2018-03-15 RX ADMIN — SODIUM CHLORIDE: 9 INJECTION, SOLUTION INTRAVENOUS at 02:59

## 2018-03-15 RX ADMIN — OXYCODONE HYDROCHLORIDE 5 MG: 5 TABLET ORAL at 21:00

## 2018-03-15 NOTE — PLAN OF CARE
Problem: Patient Care Overview  Goal: Plan of Care/Patient Progress Review  Outcome: No Change  Pt admitted overnight. VSS on RA, tele NSR. CIWA 5. Zofran used for nausea, heat packs and Roxicodone for abdominal pain- effective. Pain is only in UQs. NPO ex ice chips and meds. + BS. Receiving IVF. K replaced in ED with recheck later this am. Up SBA. To have Psych and CD evals completed while here. Will continue to monitor.

## 2018-03-15 NOTE — PROGRESS NOTES
[facility]    Hospitalist Progress Note    Date of Service (when I saw the patient): 03/15/2018    Assessment & Plan   Ms. Audelia Comer is a 52-year-old female with past medical history significant for alcohol abuse and partial small bowel obstruction who presented to the ER with complaints of pain in abdomen, nausea and vomiting, and noted with acute pancreatitis.     1.  Acute alcoholic pancreatitis.    Vital signs stable. Lipase 5647 on admit. CT abd with mild to moderate pancreatitis, no fluid collections/abscess noted. Notable for fatty liver infiltration. RUQ US without stones.  -  IVF NS at 150 mL per hour  - p.r.n. pain medication and nausea medications.    - WBC normal 3/15 and LFT's improving  - clear liquids for today     2.  Hypokalemia.    2.7 on admission, likely secondary to vomiting and poor p.o. intake.   - K replacement protocol     3.  Active smoker.    - Counseled on smoking cessation.   -Nicotine patch     4.  Alcohol abuse.    No history of withdrawal, but she is at risk for alcohol withdrawal.    - CIWA protocol with Valium p.r.n.   - po thiamine/folate/multivitamin  - Appreciate Psychiatry and Chemical Dependency evaluation.     DVT Prophylaxis: Pneumatic Compression Devices  Code Status: Full Code    Disposition: Expected discharge in 1-2 days once tolerating regular diet, off IVF.    Fanny Candelario MD    Interval History   Feels better today, reduced abd pain, no fevers/chills. Wants to try liquids. Denies hx of alc withdrawal.    -Data reviewed today: I reviewed all new labs and imaging results over the last 24 hours. I personally reviewed no images or EKG's today.    Physical Exam   Temp: 98.6  F (37  C) Temp src: Oral BP: 120/82 Pulse: 97 Heart Rate: 105 Resp: 16 SpO2: 96 % O2 Device: None (Room air)    There were no vitals filed for this visit.  Vital Signs with Ranges  Temp:  [97.7  F (36.5  C)-98.6  F (37  C)] 98.6  F (37  C)  Pulse:  [85-97] 97  Heart Rate:   [105] 105  Resp:  [12-16] 16  BP: (120-135)/(82-87) 120/82  SpO2:  [96 %-98 %] 96 %       Constitutional: Awake, alert, cooperative, no apparent distress  Respiratory: Clear to auscultation bilaterally, no crackles or wheezing  Cardiovascular: Regular rate and rhythm, normal S1 and S2, and no murmur noted  GI: Absent bowel sounds, soft, non-distended, tender epigastric region, no rebound or guarding  Skin/Integumen: No rashes, no cyanosis, no edema  Other: Follows commands.    Medications     sodium chloride 150 mL/hr at 03/15/18 1130       thiamine  100 mg Oral Daily     folic acid  1 mg Oral Daily     multivitamin, therapeutic with minerals  1 tablet Oral Daily     nicotine   Transdermal Q8H     [START ON 3/16/2018] nicotine   Transdermal Daily     nicotine  1 patch Transdermal Daily       Data     Recent Labs  Lab 03/15/18  1153 03/14/18  2225   WBC 10.2 15.0*   HGB 13.4 15.8*   MCV 98 98   * 157    136   POTASSIUM 2.9* 2.7*   CHLORIDE 104 96   CO2 17* 12*   BUN 4* 7   CR 0.37* 0.46*   ANIONGAP 15* 28*   FRANCOISE 7.8* 9.0   GLC 72 115*   ALBUMIN 3.0* 3.7   PROTTOTAL 6.6* 8.2   BILITOTAL 1.4* 1.8*   ALKPHOS 145 196*   ALT 73* 106*   * 196*   LIPASE 2869* 5647*       Recent Results (from the past 24 hour(s))   US Abdomen Limited    Narrative    US ABDOMEN LIMITED  3/15/2018 12:52 AM    CLINICAL INFORMATION: Right upper quadrant pain.    COMPARISON: None.    FINDINGS: Limited right upper quadrant ultrasound demonstrates a  negative gallbladder. No gallstones or gallbladder wall thickening. No  bile duct dilatation. Liver is slightly prominent measuring 20 cm in  length. Increased liver echogenicity consistent with fatty  infiltration. Visualized portions of the pancreas are negative. Mildly  prominent extrarenal pelvis right kidney without dilatation of the  intrarenal collecting system.      Impression    IMPRESSION:  1. No acute sonographic findings in the right upper quadrant.  2. Mildly enlarged  liver with diffuse fatty infiltration.    ERICKA DIAZ MD   CT Abdomen Pelvis w Contrast    Narrative    CT ABDOMEN PELVIS W CONTRAST  3/15/2018 1:34 AM     HISTORY: Pancreatitis and vomiting.    TECHNIQUE: Volumetric acquisition through abdomen and pelvis with IV  contrast. 62 mL Isovue-370. Radiation dose for this scan was reduced  using automated exposure control, adjustment of the mA and/or kV  according to patient size, or iterative reconstruction technique.    COMPARISON: 9/10/2016.    FINDINGS: Prominent, diffuse fatty infiltration of the liver.  Gallbladder and spleen are negative. Moderate fat  stranding/inflammation and a small amount of fluid in the upper  abdomen adjacent to the pancreas, most prominent near the pancreatic  head and body and near the proximal duodenum. This is most consistent  with pancreatitis. No focal areas of pancreatic necrosis. Small amount  of associated fluid extending along the right anterior pararenal  space. No loculated fluid collections. Adrenal glands and kidneys  demonstrate no worrisome findings.    Uterus is present. No suspicious pelvic masses. Slight thick-walled  appearance of the left colon is probably physiologic due to  nondistention. No bowel obstruction, free air or other acute findings.      Impression    IMPRESSION:  1. Mild to moderate pancreatitis. No loculated fluid collections or  abscess.  2. Prominent, diffuse fatty infiltration of the liver.    ERICKA DIAZ MD

## 2018-03-15 NOTE — CONSULTS
"Consult Date:  03/15/2018      REASON FOR CONSULTATION:  Alcohol abuse.      REFERRING PHYSICIAN:  Dr. Springer.       IDENTIFYING DATA:  Audelia is a 52-year-old twice   female admitted with symptoms of alcohol withdrawal, pancreatitis and hepatitis.        CHIEF COMPLAINT:  \"I just started feeling awful.  I could not keep anything down.\"        HISTORY OF PRESENT ILLNESS:  The patient is a 52-year-old twice   female who lives locally.  She is between jobs, has a notable history of alcohol use disorder, 1 DWI, went through treatment years ago at [facility], but really did not maintain much sobriety.  Her last drink was about 2 days ago, and she had been drinking heavily.  She was stressed by the recent death of her brother by heart attack this past weekend which added to her feeling overwhelmed.  She acknowledges heavy use of alcohol on a daily basis.  She started feeling weak, dizzy and lightheaded.  She came to the ED and definitely has signs of alcohol withdrawal, fatty liver, pancreatitis and hepatitis.      On further questioning, the patient describes a history of past depression.  She was treated with multiple medications she cannot remember the names of.  She is situationally depressed and grieving at this point but not reporting thoughts of self-harm.  She denies a history of hypomania, todd, generalized anxiety disorder, panic disorder, obsessive-compulsive disorder, eating disorder, history or trauma history.  She acknowledges she has a drinking problem.  She has had a DWI 10 years ago.  She currently has insurance.  I had a discussion with her this morning about the negative impact of alcohol in her overall physical state.  We discussed options for treatment and the benefits of a chemical dependency assessment.  She tentatively agrees to that plan.      PAST PSYCHIATRIC HISTORY:  As above.      PAST CHEMICAL DEPENDENCY HISTORY:  As above, notable for alcohol use " "disorder with 1 prior treatment at [facility], limited sobriety, one DWI.      PAST MEDICAL HISTORY:  Per chart review includes a history of partial small-bowel obstruction, appendectomy,  and current concerns about alcohol withdrawal, pancreatitis, hepatitis with fatty liver.      MEDICATIONS:  Multivitamin, thiamine, folic acid, Valium per our [facility] protocol, sodium chloride, Zofran, potassium supplements, nicotine patch, p.r.n. Dilaudid, p.r.n. melatonin, p.r.n.   milk of magnesia p.r.n. Roxicodone, potassium supplements, p.r.n. Compazine, p.r.n. Senokot-S.      FAMILY HISTORY:  It sounds like there may be issues with alcohol use within family members.      SOCIAL HISTORY:  The patient is  and  twice, has grown children and lives locally.  She works as a  but recently quit her job.  Sadly, her brother  of a heart attack this past weekend which is adding stress to her life.  She lives by herself locally.      REVIEW OF SYSTEMS:  A 10-point review of systems is unchanged from Dr. Springer's initial H&P dated 3/15/18 at 1:23 a.m. Most recent vital signs:  Temperature 98.6, pulse 97, respiratory rate 16, blood pressure 120/82, oxygen saturation 96%.      MENTAL STATUS EXAMINATION:  Appearance:  The patient is a disheveled woman lying in bed.  She appears somewhat thin, physically ill.  She is a good historian.  Speech is soft in tone, rate and flow.  Normal use of language appropriate.  Motor exam calm.  Coordination, station, gait impaired due to weakness.  Muscle strength and tone are diminished.  Affect is subdued.  Mood is \"stressed.\"  Thought process is logical, coherent and goal directed.  No loosening of associations, no flight of ideas.  No formal thought disorder.  Thought content negative for hallucinations, delusions, paranoia, suicidal or homicidal ideation.  Insight and judgment poor with respect to alcohol use.  Cognitive exam, the patient is alert, " oriented x3.  Concentration fair.  Recent and remote memory intact.  General fund of knowledge average.      IMPRESSION:  The patient is a 52-year-old woman with severe alcohol use disorder and resultant complications, including alcohol withdrawal, pancreatitis and hepatitis.  She needs ongoing medical management and detox.  I ordered a chemical dependency assessment.  There is no basis to force her into treatment, but I certainly talk with her directly about the benefits of treatment.  She would likely do best with an inpatient program, but she resists that idea.  At the very least, she should be offered outpatient treatment.  She has some bereavement, may have some underlying depression, but it is impossible to really assess that fully given the circumstances and I think she needs a period of sobriety before that is looked at.      DIAGNOSES:   1.  Alcohol use disorder, severe.   2.  Alcohol-induced hepatitis.   3.  Alcohol-induced pancreatitis.   4.  Alcohol withdrawal.   5.  Bereavement.   6.  Rule out major depressive disorder versus alcohol-induced mood disorder with depressive features.      PLAN:   1.  Medical detox.   2.  CD assessment.   3.  Could offer outpatient counseling referral.  I do not think I would start antidepressants at this point given the fact she is detoxing and is grieving.  She really needs to move toward sobriety and commit to that before depression can accurately be assessed.         MARSHALL CONDE MD             D: 03/15/2018   T: 03/15/2018   MT: ANDRE      Name:     NICOLA PLAZA   MRN:      [id number]        Account:       [id number]   :      1965           Consult Date:  03/15/2018      Document: [id number]

## 2018-03-15 NOTE — PLAN OF CARE
RECEIVING UNIT ED HANDOFF REVIEW    ED Nurse Handoff Report was reviewed by: Marely Benitez on March 15, 2018 at 1:43 AM

## 2018-03-15 NOTE — ED PROVIDER NOTES
History     Chief Complaint:   Abdominal Pain      HPI   Audelia Comer is a 52 year old female who presents with abdominal pain. The patient reports that she has been feeling ill for the past few weeks and that it worsened approximately 3 days ago. She states that she has been dealing with generalized fatigue and abdominal pain for 3 weeks, and 3 days ago she developed nausea and vomiting. She has not been eating consistently over the past 3 days as well and today her abdominal pain has become worse than before. Her pain is localized to her right side and she describes this as a stabbing pain that pierces through to her back. She admits to daily drinking over the past few years, with consuming approximately 4-5 drinks per day. She is tremulous on exam and states that she has never gone through withdrawal in the past.  Of note, the patient reports that her brother  3 days ago and thus she has been drinking more heavily as well as feeling somewhat down. She denies any stool changes, urinary symptoms, headaches, syncope, lightheadedness, hallucinations, hematemesis, or any other symptoms.    Allergies:  No known drug allergies.    Medications:    ondansetron (ZOFRAN ODT) 4 MG ODT tab     Past Medical History:    Small bowel obstruction     Past Surgical History:    Appendectomy   section  Breast implants    Family History:    No pertinent family history.    Social History:  Smoking status: Current smoker  Alcohol use: Yes  Marital Status:        Review of Systems   Constitutional: Positive for chills and fatigue. Negative for fever.   Eyes: Negative for visual disturbance.   Gastrointestinal: Positive for abdominal pain, nausea and vomiting. Negative for blood in stool and diarrhea.   Genitourinary: Negative for difficulty urinating, dysuria and hematuria.   Neurological: Positive for tremors. Negative for dizziness, syncope, weakness and headaches.   All other systems reviewed and are  negative.    Physical Exam     Patient Vitals for the past 24 hrs:   BP Temp Temp src Heart Rate Resp SpO2   03/14/18 2217 125/87 97.7  F (36.5  C) Oral 105 14 98 %         Physical Exam  General: Patient is alert and uncomfortable appearing.  HEENT: Head atraumatic    Eyes: pupils equal and reactive. Conjunctiva clear   Nares: patent   Oropharynx: no lesions, uvula midline, no palatal draping, normal voice, no trismus  Neck: Supple without lymphadenopathy, no meningismus  Chest: tachycardic but regular   Lungs: Equal clear to auscultation with no wheeze or rales  Abdomen: Soft, epigastric and RUQ abdominal tenderness with voluntary guarding, nondistended, normal bowel sounds  Back: No costovertebral angle tenderness, no midline C, T or L spine tenderness  Neuro: Grossly nonfocal, normal speech, strength equal bilaterally, CN 2-12 intact, tremulous  Extremities: No deformities, equal radial and DP pulses. No clubbing, cyanosis.  No edema  Skin: Warm and dry with no rash.       Emergency Department Course   ECG:  @ 2259  Indication: abdominal pain  Vent. Rate 102 bpm. MS interval 150 ms. QRS duration 82 ms. QT/QTc 396/516 ms. P-R-T axis 71 84 -4.   Sinus tachycardia. ST & T wave abnormality, consider inferior ischemia. Abnormal ECG.    Read @ 2310 by Dr. Benz.    Imaging:  US Abdomen Limited  Pending.     Laboratory:  CBC:  WBC 15.0 (H), HGB 15.8 (H), ,   CMP: Potassium 2.7 (L), Carbon Dioxide 12 (L), ANION Gap 28 (H), Glucose 115 (H), Creatinine 0.46 (L), bilirubin 1.8 (H), ALKPHOS 196 (H),  (H),   (H) otherwise WNL  (2225) Magnesium: 1.7  (2225) Lipase: 5647 (H)    Interventions:  Medications   ondansetron (ZOFRAN) injection 4 mg (not administered)   ondansetron (ZOFRAN) injection 4 mg (4 mg Intravenous Given 3/14/18 2233)   0.9% sodium chloride BOLUS (0 mLs Intravenous Stopped 3/14/18 2311)   0.9% sodium chloride BOLUS (1,000 mLs Intravenous New Bag 3/14/18 2256)   lidocaine (viscous)  (XYLOCAINE) 2 % 15 mL, alum & mag hydroxide-simethicone (MYLANTA ES/MAALOX  ES) 15 mL GI Cocktail (30 mLs Oral Given 3/14/18 2256)   LORazepam (ATIVAN) injection 1 mg (1 mg Intravenous Given 3/14/18 2255)   potassium chloride 10 mEq in 100 mL intermittent infusion with 10 mg lidocaine (10 mEq Intravenous New Bag 3/14/18 2310)   HYDROmorphone (PF) (DILAUDID) injection 0.5 mg (0.5 mg Intravenous Given 3/14/18 2354)   (2333) Zofran, 4 mg, IV injection  () Normal Saline, 1 liter, IV bolus  () GI Cocktail (Maalox/Mylanta and viscous Lidocaine), 30 mL suspension, PO   () Zofran, 4 mg, IV injection  () Normal Saline, 1 liter, IV bolus  () Lorazepam, 1 mg, IV injection  () Potassium chloride 10 mEq intermittent infusion, IV  () Dilaudid, 0.5 mg, IV injection    Emergency Department Course:  Nursing notes and vitals reviewed.  (2344) I performed an exam of the patient as documented above.    Blood was drawn from the patient. This was sent for laboratory testing, findings above.   The patient was sent for a ultrasound while in the emergency department, findings above.   EKG was done, interpretation as above.    Findings and plan explained to the patient who consents to admission.   (1230) I discussed the patient with Dr. Isaac of the hospitalist service, who will admit the patient to a [facility] bed for further monitoring, evaluation, and treatment.    Impression & Plan    Medical Decision Making:  Patient is a 52-year-old female who drinks a significant amount of alcohol on a daily basis who presents the emergency department with complaints of epigastric pain radiating to her back as well as nausea and persistent vomiting.  Patient states she stopped drinking alcohol 2 days ago when her brother was diagnosed with a heart attack and  and since then has developed persistent vomiting and now epigastric pain.  Patient had no history of pancreatitis ever in the past that she tells me of.  She admits  to using some intermittent ibuprofen but no significant use.  She had no history of gallstones ever in the past.  She denies fever or chills.  She denies diarrhea here in the emergency department she is found to have hypokalemia likely related to the degree of her vomiting as well as acute pancreatitis.  Her elevated bilirubin and liver function is likely related to her chronic alcohol intake, but have ordered right upper quadrant ultrasound to ensure there is no choledocholithiasis.  Patient is feeling improved with the treatments provided here in the emergency department.  She is mildly tachycardic and tremulous on arrival and likely has a degree of alcohol withdrawal syndrome although there is no evidence to suggest DTs and certainly no seizure-like activity.  Patient does have elevated white blood cell count is likely related to her pancreatitis, and if her symptoms fail to improve with n.p.o. and pain control CT scan could be obtained to look for complications such as pseudocyst or abscess.  I did consider gastritis, ulcer in the differential as well given the location of her symptoms.  Do not suspect ulcer with perforation or bleeding at this time.  Patient denies any bloody or black stools.  She does use occasional ibuprofen but not to any significant degree.  Patient improvement of symptoms with treatments here and I do not feel CT is warranted initially.  Patient's ultrasound is still pending at this time and I will ask Dr. Connolly on coming [facility] physician to follow-up the results.  Patient is agreeable with plan for admission and all questions and concerns addressed.    Diagnosis:    ICD-10-CM    1. Hypokalemia E87.6    2. Alcohol withdrawal syndrome with complication (H) F10.239    3. Intractable vomiting with nausea, unspecified vomiting type R11.2    4. Acute pancreatitis, unspecified complication status, unspecified pancreatitis type K85.90        Disposition:  Patient is admitted to a med bed under  the care of Dr. Isaac.              I, Otis Sun, am serving as a scribe on 3/14/2018 at 10:44 PM to personally document services performed by Dr. Benz based on my observations and the provider's statements to me.         Otis Sun  3/14/2018    EMERGENCY DEPARTMENT       Abby Benz MD  03/15/18 0036

## 2018-03-15 NOTE — H&P
Admitted:     2018      PRIMARY CARE PHYSICIAN:  Dr. Jennifer Schwab      CHIEF COMPLAINT:  Pain in abdomen, nausea, vomiting.      HISTORY OF PRESENT ILLNESS:  Ms. Audelia Comer a 52-year-old female with past medical history significant for partial small bowel obstruction and history of alcohol abuse who presented to the ER today with complaints of pain in abdomen.  She states she has been undergoing a lot of stress for the past 2 weeks.  Her brother just  of a heart attack and she lost her job about 2 weeks ago.  She states for the past 2 weeks she has been having epigastric abdominal pain along with some nausea and vomiting, which has gotten worse over the past 3-4 days. She drinks daily and for the past 3-4 days has been drinking heavily.  She also reports feeling weak, dizzy and lightheaded, especially when she stands up.  She denies any fever, chills or rigors.  No chest pain or shortness of breath.  Denies any hematemesis or melena, reports normal bowel output but with decreased stool as she has not been taking enough oral intake. Reports normal urine habit.        Here in the ER, she was seen by Dr. Benz. She was noted to be slightly tachycardic and labs with abnormal LFTs and markedly elevated lipase. She was given some IV fluid bolus and Hospitalist was requested for admission for further evaluation.      REVIEW OF SYSTEMS:  A 10-point review of systems was done and negative apart from those mentioned in the history of present illness.      PAST MEDICAL HISTORY:   1.  History of partial small bowel obstruction with ileitis in 2016.   2.  Alcohol abuse.  No history of withdrawal.   3.  History of appendectomy and  section.      MEDICATIONS PRIOR TO ADMISSION:  Prescriptions Prior to Admission   Medication Sig Dispense Refill Last Dose     vitamin D (ERGOCALCIFEROL) 14815 UNIT capsule Take 50,000 Units by mouth twice a week Tuesday and Thursday    3/8/2018     ondansetron (ZOFRAN ODT)  4 MG ODT tab Take 1 tablet (4 mg) by mouth every 4 hours as needed for nausea 10 tablet 0 hasn't had filled yet         ALLERGIES:  NO KNOWN DRUG ALLERGIES.      SOCIAL HISTORY:  She reports smoking 1 pack per day.  She drinks vodka daily.  Denies illicit drug use.      FAMILY HISTORY:  Brother recently  of heart attack.  Family history otherwise not pertinent to current presentation.      PHYSICAL EXAMINATION:   GENERAL:  The patient is conscious, alert, oriented x 3, lying comfortably in bed, does not seem to be in any apparent distress.   VITAL SIGNS:  Temperature 97.7, heart rate of 105, blood pressure 125/87, saturation 98% on room air.   HEENT:  Pupils are equal and reactive to light and accommodation.  Extraocular movements are intact.  Oral mucosa is dry.   NECK:  Supple, no rigidity.    RESPIRATORY:  Lung sounds bilaterally clear to auscultation, no wheezes or crepitation.   CARDIOVASCULAR:  Normal S1 and S2, slightly tachycardic, no murmur.   ABDOMEN:  Soft, moderate epigastric tenderness present.  No guarding, rigidity or rebound tenderness. Bowel sounds present.   LOWER EXTREMITIES:  With no edema.   NEUROLOGIC:  No focal neurological deficits noted.  Cranial nerves II through XII grossly intact.   PSYCHIATRIC:  Normal mood and affect.      LABORATORY AND IMAGING DATA:  CMP notable for potassium of 2.7, anion gap of 28, magnesium 1.7, bilirubin 1.8, AST and ALT at 196 and 106, alkaline phosphatase elevated at 196.  Lipase elevated at 5647. CBC with WBC 15, hemoglobin 15.8, platelets 157.       Ultrasound abdomen and CT abdomen is pending.        EKG reviewed by me shows sinus tachycardia.      ASSESSMENT AND PLAN:  Ms. Audelia Comer is a 52-year-old female with past medical history significant for alcohol abuse and partial small bowel obstruction who presented to the ER today with complaints of pain in abdomen, nausea and vomiting, and noted with acute pancreatitis.     1.  Pain in abdomen, nausea  and vomiting, likely secondary to acute alcoholic pancreatitis.  Will admit her as inpatient, n.p.o., IV hydration with normal saline at 150 mL per hour after another liter fluid bolus. Will have p.r.n. pain medication and nausea medication available.  Will await CT and ultrasound to see if there are any CBD stones.  If that is the case, she will need a GI evaluation for ERCP.  We will repeat LFTs and lipase in a.m.     2.  Hypokalemia.  Likely secondary to vomiting and poor p.o. intake. Will start potassium supplementation protocol.   3.  Active smoker.  Counseled on smoking cessation. Will start her on a nicotine patch.   4.  Alcohol abuse.  No history of withdrawal, but she is at risk for alcohol withdrawal.  We will start her on CIWA protocol with Valium p.r.n. Will have Psychiatry and Chemical Dependency evaluation.   5.  Deep venous thrombosis prophylaxis: Mechanical with PCD boots.      CODE STATUS:  FULL CODE.         DONNA DANG MD             D: 03/15/2018   T: 03/15/2018   MT:       Name:     NICOLA PLAZA   MRN:      [id number]        Account:      [id number]   :      1965        Admitted:     2018                   Document: [id number]

## 2018-03-15 NOTE — PHARMACY-ADMISSION MEDICATION HISTORY
Admission medication history interview status for the 3/14/2018  admission is complete. See EPIC admission navigator for prior to admission medications     Medication history source reliability:Moderate    Actions taken by pharmacist (provider contacted, etc):  Will call [facility] at 9am when they open to verify dose of Vitamin D     Additional medication history information not noted on PTA med list :None    Medication reconciliation/reorder completed by provider prior to medication history? No    Time spent in this activity: 15    Prior to Admission medications    Medication Sig Last Dose Taking? Auth Provider   vitamin D (ERGOCALCIFEROL) 98927 UNIT capsule Take by mouth twice a week Tuesday and Thursday  3/8/2018 Yes Unknown, Entered By History   ondansetron (ZOFRAN ODT) 4 MG ODT tab Take 1 tablet (4 mg) by mouth every 4 hours as needed for nausea hasn't had filled yet Yes Bridget Gilman MD

## 2018-03-15 NOTE — ED NOTES
"[facility]  ED Nurse Handoff Report    ED Chief complaint: Abdominal Pain (feeling ill past few weeks, vomiting 3 days, multiple vomiting today. sharp stabbing epigastric pain, denies diarrhea, denies fever. lots of stress, buried brother yesterday)      ED Diagnosis:   Final diagnoses:   Hypokalemia   Alcohol withdrawal syndrome with complication (H)   Intractable vomiting with nausea, unspecified vomiting type   Acute pancreatitis, unspecified complication status, unspecified pancreatitis type       Code Status: Full Code    Allergies: No Known Allergies    Activity level - Baseline/Home:  Independent    Activity Level - Current:   Independent     Needed?: No    Isolation: No  Infection: Not Applicable    Bariatric?: No    Vital Signs:   Vitals:    18 2217   BP: 125/87   Resp: 14   Temp: 97.7  F (36.5  C)   TempSrc: Oral   SpO2: 98%       Cardiac Rhythm: ,        Pain level: 0-10 Pain Scale: 7    Is this patient confused?: No     Patient Report: Initial Complaint: Abdominal pain  Focused Assessment: Patient comes in with c/o \"feeling ill the past few weeks\", vomiting for the past 3 days with multiple emesis today.  Pt also has c/o sharp stabbing epigastric pain.  Pt has been having a \"rough\" time the past few weeks and had to attend her brothers  yesterday.  Pt has been drinking but for the past two days she has had no alcohol.  Pt appears anxious and has strong abdominal pain.  Pt was given 1mg IV ativan for anxiety.  Tests Performed: labs, abdominal US, UA, EKG  Abnormal Results: see epic lab results tab  Treatments provided: monitoring, IV potassium bump (10meq), IV dilaudid 0.5mg, IV ativan 1mg, IV fluids    Family Comments: no family with patient at this time    OBS brochure/video discussed/provided to patient: N/A    ED Medications:   Medications   ondansetron (ZOFRAN) injection 4 mg (not administered)   ondansetron (ZOFRAN) injection 4 mg (4 mg Intravenous Given " 3/14/18 2233)   0.9% sodium chloride BOLUS (0 mLs Intravenous Stopped 3/14/18 2311)   0.9% sodium chloride BOLUS (1,000 mLs Intravenous New Bag 3/14/18 2256)   lidocaine (viscous) (XYLOCAINE) 2 % 15 mL, alum & mag hydroxide-simethicone (MYLANTA ES/MAALOX  ES) 15 mL GI Cocktail (30 mLs Oral Given 3/14/18 2256)   LORazepam (ATIVAN) injection 1 mg (1 mg Intravenous Given 3/14/18 2255)   potassium chloride 10 mEq in 100 mL intermittent infusion with 10 mg lidocaine (10 mEq Intravenous New Bag 3/14/18 2310)   HYDROmorphone (PF) (DILAUDID) injection 0.5 mg (0.5 mg Intravenous Given 3/14/18 2354)       Drips infusing?:  Yes    For the majority of the shift this patient was Green.   Interventions performed were n/a.    Severe Sepsis OR Septic Shock Diagnosis Present: No      ED NURSE PHONE NUMBER: *[phone]

## 2018-03-15 NOTE — PLAN OF CARE
Problem: Patient Care Overview  Goal: Plan of Care/Patient Progress Review  Outcome: Improving  [03/15/18 1441 Julianne Dubois, RN - Registered Nurse]   Pt alert and oriented. CIWA 2/2. Mild abd discomfort. Lipase improving,starting clear liquids slowly. Up with sba of 1. K+ 2.9,recieving IV replacements. DC 1-2 days pending labs/pain/food tolerance. improvement.    Comments: [03/15/18 1441 Julianne Dubois, RN - Registered Nurse]   Pt alert and oriented. CIWA 2/2. Mild abd discomfort. Lipase improving,starting clear liquids slowly. Up with sba of 1. K+ 2.9,recieving IV replacements. DC 1-2 days pending labs/pain/food tolerance. improvement.

## 2018-03-16 LAB
ALBUMIN SERPL-MCNC: 2.8 G/DL (ref 3.4–5)
ALP SERPL-CCNC: 146 U/L (ref 40–150)
ALT SERPL W P-5'-P-CCNC: 66 U/L (ref 0–50)
ANION GAP SERPL CALCULATED.3IONS-SCNC: 9 MMOL/L (ref 3–14)
AST SERPL W P-5'-P-CCNC: 126 U/L (ref 0–45)
BILIRUB DIRECT SERPL-MCNC: 0.6 MG/DL (ref 0–0.2)
BILIRUB SERPL-MCNC: 1.2 MG/DL (ref 0.2–1.3)
BUN SERPL-MCNC: <1 MG/DL (ref 7–30)
CALCIUM SERPL-MCNC: 8.1 MG/DL (ref 8.5–10.1)
CHLORIDE SERPL-SCNC: 107 MMOL/L (ref 94–109)
CO2 SERPL-SCNC: 23 MMOL/L (ref 20–32)
CREAT SERPL-MCNC: 0.33 MG/DL (ref 0.52–1.04)
ERYTHROCYTE [DISTWIDTH] IN BLOOD BY AUTOMATED COUNT: 12.6 % (ref 10–15)
GFR SERPL CREATININE-BSD FRML MDRD: >90 ML/MIN/1.7M2
GLUCOSE SERPL-MCNC: 112 MG/DL (ref 70–99)
HCT VFR BLD AUTO: 36.9 % (ref 35–47)
HGB BLD-MCNC: 12.9 G/DL (ref 11.7–15.7)
LIPASE SERPL-CCNC: 1444 U/L (ref 73–393)
MCH RBC QN AUTO: 34.4 PG (ref 26.5–33)
MCHC RBC AUTO-ENTMCNC: 35 G/DL (ref 31.5–36.5)
MCV RBC AUTO: 98 FL (ref 78–100)
PLATELET # BLD AUTO: 87 10E9/L (ref 150–450)
POTASSIUM SERPL-SCNC: 3.2 MMOL/L (ref 3.4–5.3)
POTASSIUM SERPL-SCNC: 4 MMOL/L (ref 3.4–5.3)
PROT SERPL-MCNC: 6.2 G/DL (ref 6.8–8.8)
RBC # BLD AUTO: 3.75 10E12/L (ref 3.8–5.2)
SODIUM SERPL-SCNC: 139 MMOL/L (ref 133–144)
WBC # BLD AUTO: 6.6 10E9/L (ref 4–11)

## 2018-03-16 PROCEDURE — 80048 BASIC METABOLIC PNL TOTAL CA: CPT | Performed by: HOSPITALIST

## 2018-03-16 PROCEDURE — 36415 COLL VENOUS BLD VENIPUNCTURE: CPT | Performed by: INTERNAL MEDICINE

## 2018-03-16 PROCEDURE — 25000132 ZZH RX MED GY IP 250 OP 250 PS 637: Performed by: HOSPITALIST

## 2018-03-16 PROCEDURE — 83690 ASSAY OF LIPASE: CPT | Performed by: HOSPITALIST

## 2018-03-16 PROCEDURE — 36415 COLL VENOUS BLD VENIPUNCTURE: CPT | Performed by: HOSPITALIST

## 2018-03-16 PROCEDURE — 85027 COMPLETE CBC AUTOMATED: CPT | Performed by: HOSPITALIST

## 2018-03-16 PROCEDURE — 84132 ASSAY OF SERUM POTASSIUM: CPT | Performed by: INTERNAL MEDICINE

## 2018-03-16 PROCEDURE — 12000000 ZZH R&B MED SURG/OB

## 2018-03-16 PROCEDURE — 25000132 ZZH RX MED GY IP 250 OP 250 PS 637: Performed by: INTERNAL MEDICINE

## 2018-03-16 PROCEDURE — 80076 HEPATIC FUNCTION PANEL: CPT | Performed by: HOSPITALIST

## 2018-03-16 PROCEDURE — 25000128 H RX IP 250 OP 636: Performed by: HOSPITALIST

## 2018-03-16 PROCEDURE — H0001 ALCOHOL AND/OR DRUG ASSESS: HCPCS

## 2018-03-16 PROCEDURE — 99232 SBSQ HOSP IP/OBS MODERATE 35: CPT | Performed by: INTERNAL MEDICINE

## 2018-03-16 RX ORDER — CALCIUM CARBONATE 500 MG/1
500 TABLET, CHEWABLE ORAL DAILY PRN
Status: DISCONTINUED | OUTPATIENT
Start: 2018-03-16 | End: 2018-03-17 | Stop reason: HOSPADM

## 2018-03-16 RX ADMIN — SODIUM CHLORIDE: 9 INJECTION, SOLUTION INTRAVENOUS at 21:44

## 2018-03-16 RX ADMIN — CALCIUM CARBONATE (ANTACID) CHEW TAB 500 MG 500 MG: 500 CHEW TAB at 10:16

## 2018-03-16 RX ADMIN — FOLIC ACID 1 MG: 1 TABLET ORAL at 08:32

## 2018-03-16 RX ADMIN — SODIUM CHLORIDE: 9 INJECTION, SOLUTION INTRAVENOUS at 08:31

## 2018-03-16 RX ADMIN — NICOTINE 1 PATCH: 14 PATCH, EXTENDED RELEASE TRANSDERMAL at 21:44

## 2018-03-16 RX ADMIN — POTASSIUM CHLORIDE 20 MEQ: 1.5 POWDER, FOR SOLUTION ORAL at 11:29

## 2018-03-16 RX ADMIN — SODIUM CHLORIDE: 9 INJECTION, SOLUTION INTRAVENOUS at 15:12

## 2018-03-16 RX ADMIN — POTASSIUM CHLORIDE 40 MEQ: 1500 TABLET, EXTENDED RELEASE ORAL at 09:41

## 2018-03-16 RX ADMIN — Medication 100 MG: at 08:32

## 2018-03-16 RX ADMIN — MULTIPLE VITAMINS W/ MINERALS TAB 1 TABLET: TAB at 08:32

## 2018-03-16 NOTE — CONSULTS
The writer met with the patient introduced herself and her role. The writer discussed the assessment and seeking treatment services which the patient reported she is not interested at this time and reported she was told she could get some resources on other programs or options besides AA/NA sober support group meetings. The patient reported she cannot do an inpatient program at this time and needs to get home as she has a dog at home and also starts a new job on Monday. The writer discussed how she would need to complete an assessment if she was even interested in pursuing an outpatient program and that the assessment would be good for 45 days and she can complete it with the writer today. The patient reported she is not sure as she is starting a new job on Monday and her insurance is supposed to term 3/31/18 and will be getting new insurance through her employer. The writer discussed [facility] outpatient programs (schedule, locations, and assessment). The writer offered the patient the assessment one more time and reiterated it would be good for 45 days to get started in a program. The patient declined stating she was not feeling well and did not want to complete it today. The patient reported she would take a business card and treatment schedule for the [facility] ([facility]) outpatient program at the [place] location. The writer left the patient her business card with central intake number on the back and the [facility] schedule which was reviewed with the patient. The writer told the patient to call central intake if she wanted to complete an assessment and get set up in a treatment program. The writer told the patient to call her if she had questions or wanted more resources.     CLAUDETTE Shaffer, [facility]  [phone]

## 2018-03-16 NOTE — PROGRESS NOTES
[facility]    Hospitalist Progress Note    Date of Service (when I saw the patient): 03/16/2018    Assessment & Plan   Ms. Audelia Comer is a 52-year-old female with past medical history significant for alcohol abuse and partial small bowel obstruction who presented to the ER with complaints of pain in abdomen, nausea and vomiting, and noted with acute pancreatitis.      1.  Acute alcoholic pancreatitis.    Vital signs stable. Lipase 5647 on admit. CT abd with mild to moderate pancreatitis, no fluid collections/abscess noted. Notable for fatty liver infiltration. RUQ US without stones.  - c/w IVF NS at 150 mL per hour  - p.r.n. pain medication and nausea medications.    - WBC normal 3/15 and LFT's improving  - advance to full liquids today  - recheck Lipase and LFT's in AM, if clinically improved advance to regular diet 3/17 morning     2.  Hypokalemia.    2.7 on admission, likely secondary to vomiting and poor p.o. intake.   - K and Mg replacement protocols     3.  Active smoker.    - Counseled on smoking cessation.   - Nicotine patch     4.  Alcohol abuse.    No history of withdrawal, but she is at risk for alcohol withdrawal.    - CIWA protocol with Valium p.r.n.   - po thiamine/folate/multivitamin  - Appreciate Psychiatry and Chemical Dependency evaluation.     5. Thrombocytopenia, likely related to alc abuse.  - no clinical bleeds, monitor  - plts 157 --> 87k    DVT Prophylaxis: Pneumatic Compression Devices and Ambulate every shift  Code Status: Full Code    Disposition: Expected discharge 3/17 once labs continue to improve, on regular diet.    Fanny Candelario MD    Interval History   No new complaints but had difficulty swallowing potassium pills due to size. Abd slightly sore, but tolerating clear liquids. No fevers/chills.    -Data reviewed today: I reviewed all new labs and imaging results over the last 24 hours. I personally reviewed no images or EKG's today.    Physical Exam   Temp:  98.9  F (37.2  C) Temp src: Oral BP: 125/82 Pulse: 97 Heart Rate: 94 Resp: 16 SpO2: 99 % O2 Device: None (Room air)    Vitals:    03/16/18 0617   Weight: 53.7 kg (118 lb 6.2 oz)     Vital Signs with Ranges  Temp:  [97.9  F (36.6  C)-98.9  F (37.2  C)] 98.9  F (37.2  C)  Pulse:  [97] 97  Heart Rate:  [94-97] 94  Resp:  [16] 16  BP: (115-125)/(81-82) 125/82  SpO2:  [97 %-99 %] 99 %  I/O last 3 completed shifts:  In: 3772 [P.O.:700; I.V.:3072]  Out: 3175 [Urine:3175]    Constitutional: Awake, alert, cooperative, no apparent distress  Respiratory: Clear to auscultation bilaterally, no crackles or wheezing  Cardiovascular: Regular rate and rhythm, normal S1 and S2, and no murmur noted  GI: hypoactive bowel sounds, soft, non-distended,tender epigastric region to deep palpation  Skin/Integumen: No rashes, no cyanosis, no edema  Other: Follows commands, moves all 4 ext.    Medications     sodium chloride 150 mL/hr at 03/16/18 0831       thiamine  100 mg Oral Daily     folic acid  1 mg Oral Daily     multivitamin, therapeutic with minerals  1 tablet Oral Daily     nicotine   Transdermal Q8H     nicotine   Transdermal Daily     nicotine  1 patch Transdermal Daily       Data     Recent Labs  Lab 03/16/18  0811 03/15/18  2255 03/15/18  1153 03/14/18  2225   WBC 6.6  --  10.2 15.0*   HGB 12.9  --  13.4 15.8*   MCV 98  --  98 98   PLT 87*  --  114* 157     --  136 136   POTASSIUM 3.2* 3.2* 2.9* 2.7*   CHLORIDE 107  --  104 96   CO2 23  --  17* 12*   BUN <1*  --  4* 7   CR 0.33*  --  0.37* 0.46*   ANIONGAP 9  --  15* 28*   FRANCOISE 8.1*  --  7.8* 9.0   *  --  72 115*   ALBUMIN 2.8*  --  3.0* 3.7   PROTTOTAL 6.2*  --  6.6* 8.2   BILITOTAL 1.2  --  1.4* 1.8*   ALKPHOS 146  --  145 196*   ALT 66*  --  73* 106*   *  --  120* 196*   LIPASE 1444*  --  2869* 5647*       No results found for this or any previous visit (from the past 24 hour(s)).

## 2018-03-16 NOTE — PLAN OF CARE
Problem: Patient Care Overview  Goal: Plan of Care/Patient Progress Review  Outcome: Improving  7p -11p.. A/O, VSS on RA. Endorses HA, CIWA scores 2. Given PRN Oxy for HA. CLD and tolerating well, No N/V, abdomen soft, gas+. Up Independent in room.. Lipase down .. D/C pending      Update 0619..  CIWA scores 0, 0. Still tolerating CLD well. Very pleasant. K+ 3.2 on recheck and given additional PO replacement. Recheck with AM labs today. D/C pending. IVF @150.

## 2018-03-16 NOTE — PLAN OF CARE
"Problem: Patient Care Overview  Goal: Plan of Care/Patient Progress Review  Outcome: Improving  Afebrile, c/o abd tenderness. Declined pain meds. TUMS given once. Lipase 1,444. Tolerating full liquid diet. K 3.2-replaced, recheck was 4.0. Up ad david. CD saw pt today-pt reported to writer she doesn't feel like she needs CD treatment and believes CD only saw her today because \"they seek out the ones who have insurance\"  Patient informed that the MD placed a CD consult and they were required to see her. Tele SR. Possible discharge tomorrow.      "

## 2018-03-16 NOTE — PLAN OF CARE
Problem: Gastrointestinal Condition Comorbidity  Goal: Gastrointestinal Condition  Patient comorbidity will be monitored for signs and symptoms of Gastrointestinal condition.  Problems will be absent, minimized or managed by discharge/transition of care.   Outcome: Improving  Improved

## 2018-03-17 VITALS
RESPIRATION RATE: 18 BRPM | DIASTOLIC BLOOD PRESSURE: 92 MMHG | WEIGHT: 118.39 LBS | TEMPERATURE: 98.5 F | HEART RATE: 86 BPM | OXYGEN SATURATION: 98 % | SYSTOLIC BLOOD PRESSURE: 128 MMHG | BODY MASS INDEX: 19.11 KG/M2

## 2018-03-17 PROCEDURE — 25000132 ZZH RX MED GY IP 250 OP 250 PS 637: Performed by: HOSPITALIST

## 2018-03-17 PROCEDURE — 99239 HOSP IP/OBS DSCHRG MGMT >30: CPT | Performed by: INTERNAL MEDICINE

## 2018-03-17 PROCEDURE — 25000128 H RX IP 250 OP 636: Performed by: HOSPITALIST

## 2018-03-17 RX ADMIN — SODIUM CHLORIDE: 9 INJECTION, SOLUTION INTRAVENOUS at 04:28

## 2018-03-17 RX ADMIN — FOLIC ACID 1 MG: 1 TABLET ORAL at 08:58

## 2018-03-17 RX ADMIN — Medication 100 MG: at 08:59

## 2018-03-17 RX ADMIN — MULTIPLE VITAMINS W/ MINERALS TAB 1 TABLET: TAB at 08:59

## 2018-03-17 NOTE — PLAN OF CARE
Problem: Patient Care Overview  Goal: Plan of Care/Patient Progress Review  Outcome: Adequate for Discharge Date Met: 03/17/18  Discharge instructions reviewed, copy sent with pt. IV removed. Belongings accounted for. No prescriptions needed. Tolerated regular diet.

## 2018-03-17 NOTE — PLAN OF CARE
"Problem: Patient Care Overview  Goal: Plan of Care/Patient Progress Review  Outcome: Improving  7p-11p... A/O, VSS on RA. CIWa 1 for HA, mild, no meds needed. Up Independent in room. , Lipase down to 1,400. Tolerating FLD well, No N/V. K+ 4.0. D/C pending, tomorrow likely..    Update 0552.. CIWa 0,0. C/O slight Lt abdominal/back pain. Relieved with heat, and Pt reports \"feeling relief when moving air\". Voiding in BR. IVF continues at 150. Labs pending. D/C possible today       "

## 2018-03-17 NOTE — DISCHARGE SUMMARY
[place] Hospitalist Discharge Summary    Audelia Comer MRN# [id number]   Age: 52 year old YOB: 1965     Date of Admission:  3/14/2018  Date of Discharge::  3/17/2018  Admitting Physician:  Sage Springer MD  Discharge Physician:  Dariela Wilkerson MD  Primary Physician: Schwab, Jennifer Lani  Transferring Facility: N/A     Home clinic: [address], [place]          Admission Diagnoses:   Hypokalemia [E87.6]  Alcohol withdrawal syndrome with complication (H) [F10.239]  Intractable vomiting with nausea, unspecified vomiting type [R11.2]  Acute pancreatitis, unspecified complication status, unspecified pancreatitis type [K85.90]          Discharge Diagnosis:   Principle diagnosis: acute alcoholic pancreatitis  Secondary diagnoses:  Patient Active Problem List   Diagnosis     Ileitis     Acute pancreatitis          Brief History of Presenting Illness:   As per admit hx  Ms. Audelia Comer a 52-year-old female with past medical history significant for partial small bowel obstruction and history of alcohol abuse who presented to the ER today with complaints of pain in abdomen.  She states she has been undergoing a lot of stress for the past 2 weeks.  Her brother just  of a heart attack and she lost her job about 2 weeks ago.  She states for the past 2 weeks she has been having epigastric abdominal pain along with some nausea and vomiting, which has gotten worse over the past 3-4 days. She drinks daily and for the past 3-4 days has been drinking heavily.  She also reports feeling weak, dizzy and lightheaded, especially when she stands up.  She denies any fever, chills or rigors.  No chest pain or shortness of breath.  Denies any hematemesis or melena, reports normal bowel output but with decreased stool as she has not been taking enough oral intake. Reports normal urine habit.           No results found for this or any previous visit (from the past 24  hour(s)).         Hospital Course:   ACUTE ALCOHOLIC PANCREATITIS  Presented with 3-4 day binge of ETOH with nausea, vomiting and abdominal pain. CT abd as above. Lipase was >5000 on admit. Was kept NPO and hydrated with fluids. Lipase was coming down, pain improved and pt is now tolerating a regular diet.     ALCOHOLIC HEPATITIS  LFTs were elevated consistent with ETOH abuse. Improved with hydration.    ETOH ABUSE  Was on CIWA--did not need any meds. Was seen by psych and CD  -will f/u OP detox    DISPO  Home today            Procedures:   No procedures performed during this admission         Allergies:    No Known Allergies          Medications Prior to Admission:     Prescriptions Prior to Admission   Medication Sig Dispense Refill Last Dose     vitamin D (ERGOCALCIFEROL) 88049 UNIT capsule Take 50,000 Units by mouth twice a week Tuesday and Thursday    3/8/2018     ondansetron (ZOFRAN ODT) 4 MG ODT tab Take 1 tablet (4 mg) by mouth every 4 hours as needed for nausea 10 tablet 0 hasn't had filled yet             Discharge Medications:     Current Discharge Medication List      CONTINUE these medications which have NOT CHANGED    Details   vitamin D (ERGOCALCIFEROL) 18425 UNIT capsule Take 50,000 Units by mouth twice a week Tuesday and Thursday       ondansetron (ZOFRAN ODT) 4 MG ODT tab Take 1 tablet (4 mg) by mouth every 4 hours as needed for nausea  Qty: 10 tablet, Refills: 0                   Consultations:   Consultation during this admission received from psych/ CD            Discharge Exam:   Blood pressure (!) 128/92, pulse 86, temperature 98.5  F (36.9  C), temperature source Oral, resp. rate 18, weight 53.7 kg (118 lb 6.2 oz), SpO2 98 %.  GENERAL APPEARANCE: healthy, alert and no distress  EYES: conjunctiva clear, eyes grossly normal  HENT: external ears and nose normal   NECK: supple, no masses or adenopathy  RESP: lungs clear to auscultation - no rales, rhonchi or wheezes  CV: regular rate and rhythm,  normal S1 S2, no S3 or S4 and no murmur, click or rub   ABDOMEN: soft, nontender, no HSM or masses and bowel sounds normal  MS: no clubbing, cyanosis; no edema  SKIN: clear without significant rashes or lesions  NEURO: Normal strength and tone, sensory exam grossly normal, mentation intact and speech normal    Unresulted Labs Ordered in the Past 30 Days of this Admission     No orders found from 1/13/2018 to 3/15/2018.          No results found for this or any previous visit (from the past 24 hour(s)).         Pending Tests at Discharge:   None         Discharge Instructions and Follow-Up:   Discharge diet: Regular   Discharge activity: Activity as tolerated   Discharge follow-up: Follow up with primary care provider in 2 weeks           Discharge Disposition:   Discharged to home      Attestation:  I have reviewed today's vital signs, notes, medications, labs and imaging.    Time Spent on this Encounter   I, Dariela Wilkerson, personally saw the patient today and spent greater than 30 minutes discharging this patient.    Dariela Wilkerson MD

## 2018-09-06 ENCOUNTER — OFFICE VISIT - HEALTHEAST (OUTPATIENT)
Dept: FAMILY MEDICINE | Facility: CLINIC | Age: 53
End: 2018-09-06

## 2018-09-06 ENCOUNTER — RECORDS - HEALTHEAST (OUTPATIENT)
Dept: GENERAL RADIOLOGY | Facility: CLINIC | Age: 53
End: 2018-09-06

## 2018-09-06 DIAGNOSIS — M54.41 ACUTE RIGHT-SIDED LOW BACK PAIN WITH RIGHT-SIDED SCIATICA: ICD-10-CM

## 2018-09-06 DIAGNOSIS — M54.41 LUMBAGO WITH SCIATICA, RIGHT SIDE: ICD-10-CM

## 2018-09-07 ENCOUNTER — COMMUNICATION - HEALTHEAST (OUTPATIENT)
Dept: FAMILY MEDICINE | Facility: CLINIC | Age: 53
End: 2018-09-07

## 2018-09-16 ENCOUNTER — COMMUNICATION - HEALTHEAST (OUTPATIENT)
Dept: FAMILY MEDICINE | Facility: CLINIC | Age: 53
End: 2018-09-16

## 2018-10-10 ENCOUNTER — COMMUNICATION - HEALTHEAST (OUTPATIENT)
Dept: FAMILY MEDICINE | Facility: CLINIC | Age: 53
End: 2018-10-10

## 2018-10-10 DIAGNOSIS — M54.41 ACUTE RIGHT-SIDED LOW BACK PAIN WITH RIGHT-SIDED SCIATICA: ICD-10-CM

## 2018-10-12 ENCOUNTER — COMMUNICATION - HEALTHEAST (OUTPATIENT)
Dept: FAMILY MEDICINE | Facility: CLINIC | Age: 53
End: 2018-10-12

## 2018-10-12 DIAGNOSIS — M54.41 ACUTE RIGHT-SIDED LOW BACK PAIN WITH RIGHT-SIDED SCIATICA: ICD-10-CM

## 2018-10-25 ENCOUNTER — OFFICE VISIT - HEALTHEAST (OUTPATIENT)
Dept: PHYSICAL THERAPY | Facility: REHABILITATION | Age: 53
End: 2018-10-25

## 2018-10-25 DIAGNOSIS — M62.81 GENERALIZED MUSCLE WEAKNESS: ICD-10-CM

## 2018-10-25 DIAGNOSIS — M54.41 ACUTE MIDLINE LOW BACK PAIN WITH RIGHT-SIDED SCIATICA: ICD-10-CM

## 2018-11-14 ENCOUNTER — COMMUNICATION - HEALTHEAST (OUTPATIENT)
Dept: FAMILY MEDICINE | Facility: CLINIC | Age: 53
End: 2018-11-14

## 2018-11-14 DIAGNOSIS — M54.41 ACUTE RIGHT-SIDED LOW BACK PAIN WITH RIGHT-SIDED SCIATICA: ICD-10-CM

## 2018-11-29 ENCOUNTER — COMMUNICATION - HEALTHEAST (OUTPATIENT)
Dept: FAMILY MEDICINE | Facility: CLINIC | Age: 53
End: 2018-11-29

## 2018-12-04 ENCOUNTER — OFFICE VISIT - HEALTHEAST (OUTPATIENT)
Dept: FAMILY MEDICINE | Facility: CLINIC | Age: 53
End: 2018-12-04

## 2018-12-04 ENCOUNTER — COMMUNICATION - HEALTHEAST (OUTPATIENT)
Dept: SCHEDULING | Facility: CLINIC | Age: 53
End: 2018-12-04

## 2018-12-04 DIAGNOSIS — J01.00 ACUTE MAXILLARY SINUSITIS, RECURRENCE NOT SPECIFIED: ICD-10-CM

## 2018-12-04 DIAGNOSIS — R03.0 ELEVATED BLOOD PRESSURE READING WITHOUT DIAGNOSIS OF HYPERTENSION: ICD-10-CM

## 2018-12-04 DIAGNOSIS — Z12.11 SCREEN FOR COLON CANCER: ICD-10-CM

## 2018-12-18 ENCOUNTER — COMMUNICATION - HEALTHEAST (OUTPATIENT)
Dept: FAMILY MEDICINE | Facility: CLINIC | Age: 53
End: 2018-12-18

## 2018-12-18 DIAGNOSIS — M54.41 ACUTE RIGHT-SIDED LOW BACK PAIN WITH RIGHT-SIDED SCIATICA: ICD-10-CM

## 2018-12-26 ENCOUNTER — COMMUNICATION - HEALTHEAST (OUTPATIENT)
Dept: FAMILY MEDICINE | Facility: CLINIC | Age: 53
End: 2018-12-26

## 2018-12-27 ENCOUNTER — OFFICE VISIT - HEALTHEAST (OUTPATIENT)
Dept: FAMILY MEDICINE | Facility: CLINIC | Age: 53
End: 2018-12-27

## 2018-12-27 DIAGNOSIS — Z12.11 SPECIAL SCREENING FOR MALIGNANT NEOPLASMS, COLON: ICD-10-CM

## 2018-12-27 DIAGNOSIS — J01.00 ACUTE MAXILLARY SINUSITIS, RECURRENCE NOT SPECIFIED: ICD-10-CM

## 2018-12-27 DIAGNOSIS — Z12.31 VISIT FOR SCREENING MAMMOGRAM: ICD-10-CM

## 2018-12-30 ENCOUNTER — COMMUNICATION - HEALTHEAST (OUTPATIENT)
Dept: FAMILY MEDICINE | Facility: CLINIC | Age: 53
End: 2018-12-30

## 2018-12-30 DIAGNOSIS — M54.41 ACUTE RIGHT-SIDED LOW BACK PAIN WITH RIGHT-SIDED SCIATICA: ICD-10-CM

## 2019-07-17 ENCOUNTER — HOSPITAL ENCOUNTER (EMERGENCY)
Facility: CLINIC | Age: 54
Discharge: HOME | End: 2019-07-17
Attending: EMERGENCY MEDICINE | Admitting: EMERGENCY MEDICINE
Payer: COMMERCIAL

## 2019-07-17 VITALS
HEART RATE: 79 BPM | SYSTOLIC BLOOD PRESSURE: 130 MMHG | OXYGEN SATURATION: 99 % | DIASTOLIC BLOOD PRESSURE: 74 MMHG | TEMPERATURE: 97.7 F | RESPIRATION RATE: 16 BRPM

## 2019-07-17 DIAGNOSIS — F10.920 ALCOHOLIC INTOXICATION WITHOUT COMPLICATION (H): ICD-10-CM

## 2019-07-17 DIAGNOSIS — R45.851 SUICIDAL IDEATION: ICD-10-CM

## 2019-07-17 LAB — ALCOHOL BREATH TEST: 0.23 (ref 0–0.01)

## 2019-07-17 PROCEDURE — 99285 EMERGENCY DEPT VISIT HI MDM: CPT | Mod: 25

## 2019-07-17 PROCEDURE — 90791 PSYCH DIAGNOSTIC EVALUATION: CPT

## 2019-07-17 RX ORDER — OMEPRAZOLE 10 MG/1
20 CAPSULE, DELAYED RELEASE ORAL DAILY
Status: ON HOLD | COMMUNITY
End: 2020-08-17

## 2019-07-17 RX ORDER — LORAZEPAM 1 MG/1
1 TABLET ORAL ONCE
Status: DISCONTINUED | OUTPATIENT
Start: 2019-07-17 | End: 2019-07-17

## 2019-07-17 ASSESSMENT — ENCOUNTER SYMPTOMS
HEADACHES: 0
NERVOUS/ANXIOUS: 1
AGITATION: 0

## 2019-07-17 NOTE — ED PROVIDER NOTES
History     Chief Complaint:  Suicidal    HPI   Audelia Comer is a 53 year old female with a history of anxiety and major depression, who presents via EMS with suicidal ideation.  Patient reportedly called EMS herself as she was experiencing some bilateral hand tingling and was concerned that she was having a stroke.  She states that she had a panic attack and the officer who responded first helped her through it.  She states that he informed her that she was hyperventilating at the time.  She states she admitted to him that she has been quite depressed and has contemplated suicide in the form of jumping from the fifth floor of her building.  Now that patient is here, she states that she is not suicidal and that she would never harm herself as she loves her daughter and likes her job.  Patient does admit to some alcohol use tonight.  She denies other complaints.    Allergies:  NKDA     Medications:    Zofran  Vitamin D    Past Medical History:    SBO  Ileitis  Acute pancreatitis  Major depression  Anxiety  Allergic rhinitis    Past Surgical History:    Appendectomy  C section   Breast implants    Family History:    Colon cancer  Breast cancer  Lung cancer    Social History:  Smoking status: current everyday smoker  Alcohol use: yes  Drug use: no  PCP: Jennifer L. Schwab  Marital Status:        Review of Systems   Neurological: Negative for headaches.   Psychiatric/Behavioral: Positive for self-injury and suicidal ideas. Negative for agitation. The patient is nervous/anxious.    All other systems reviewed and are negative.        Physical Exam     Patient Vitals for the past 24 hrs:   BP Temp Temp src Heart Rate Resp SpO2   07/17/19 0400 135/84 -- -- 89 16 98 %   07/17/19 0219 (!) 137/91 97.7  F (36.5  C) Oral 99 -- 94 %        Physical Exam    Nursing note and vitals reviewed.  Constitutional: Cooperative.   HENT:   Mouth/Throat: Moist mucous membranes.   Eyes: EOMI, nonicteric sclera  Cardiovascular:  Normal rate, regular rhythm, no murmurs, rubs, or gallops  Pulmonary/Chest: Effort normal and breath sounds normal. No respiratory distress. No wheezes. No rales.   Abdominal: Soft. Nontender, nondistended, no guarding or rigidity. BS present.   Musculoskeletal: Normal range of motion.   Neurological: Alert. Moves all extremities spontaneously.   Skin: Skin is warm and dry. No rash noted.   Psychiatric: Depressed mood, congruent affect.    Emergency Department Course     Laboratory:  Alcohol breath test POCT: 0.227    Emergency Department Course:  0221: Nursing notes and vitals reviewed. I performed an exam of the patient as documented above.     Alcohol breath test administered. See results above.     I rechecked the patient and discussed the results of her workup thus far.     The patient was placed on an BRITTANY.     0600: The patient was signed out to my colleague, Dr. Reyes.     Impression & Plan      Medical Decision Making:  Patient presents via EMS with depression and suicidal thoughts.  She denies current suicidal ideation, but did explain to police who responded that she has been increasingly depressed and does have a plan for harming herself.  Patient is insistent that she wants to go home on arrival, however it is it is explained that we need to make sure that she is safe first.  An BRITTANY is placed.  I do not believe patient needed a one-to-one sitter.  Patient's alcohol level was found to be elevated.  Patient continues to downplay alcohol use and abuse.  She is not very forthcoming with that history.  I explained to patient that she cannot be cleared from a mental health standpoint until she is sober and given her alcohol level, that is not expected to be until at least 10 AM.  Plan is to have virtual DEC see her at that time.  Notably, patient states that she wants to go to work at 9 AM, and is quite frustrated that she will be unable to.  I explained to patient that she should not be driving and going to  work intoxicated.  She will be signed out to my partner pending sobriety and DEC evaluation.    Diagnosis:    ICD-10-CM    1. Suicidal ideation R45.851        Disposition:  Signed out to my colleague, Dr. Reyes.    I, Britney Nowak, am serving as a scribe at 2:21 AM on 7/17/2019 to document services personally performed by Josh Borrego MD based on my observations and the provider's statements to me.       Britney Nowak  7/17/2019   [facility] EMERGENCY DEPARTMENT       Josh Borrego MD  07/17/19 0706

## 2019-07-17 NOTE — ED TRIAGE NOTES
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

## 2019-07-17 NOTE — ED NOTES
Bed: ED04  Expected date: 7/17/19  Expected time: 2:08 AM  Means of arrival:   Comments:   2: 53 F Suicidal

## 2019-07-17 NOTE — DISCHARGE INSTRUCTIONS
Please stop drinking alcohol.  This is harmful to your health.  Return to the ED if you want help with your alcohol use.  Follow up with your PCP in 1 week.      Discharge Instructions  Alcohol Intoxication    You have been seen today with alcohol intoxication. This means that you have enough alcohol in your system to impair your ability to mentally and physically function, perhaps to the extent that you were unable to care for yourself.    Generally, every Emergency Department visit should have a follow-up clinic visit with either a primary or a specialty clinic/provider. Please follow-up as instructed by your emergency provider today.    You may have come to the Emergency Department because of your intoxication, or for another reason, such as because of an injury. No matter what the case is, this visit is a  red flag  regarding alcohol use, and you should consider whether your drinking pattern is a problem for you.     You may be at risk for alcohol-related problems if:    Men: you drink more than 14 drinks per week, or more than 4 drinks per occasion.    Women: you drink more than 7 drinks per week or more than 3 drinks per occasion.    You have black-outs.  You do things you regret while drinking.  You have legal problems because of drinking.  You have job problems because of drinking (you call in sick to work because of drinking).    CAGE Questions  Have you ever felt you should cut down on your drinking?  Have people annoyed you by criticizing your drinking?  Have you ever felt bad or guilty about your drinking?  Have you ever had a drink first thing in the morning to steady your nerves or get rid of a hangover (eye opener)?    If you answer yes to any of the CAGE questions, you may have a problem with alcohol.      Return to the Emergency Department if:  You become shaky or tremble when you try to stop drinking.   You have severe abdominal pain (belly pain).   You have a seizure or pass out.    You vomit  (throw up) blood or have blood in your stool. This may be bright red or it may look like black coffee grounds.  You become lightheaded or faint.      For further help, contact:   Your caregiver.    Alcoholics Anonymous (AA).    [facility]: [phone]  [facility] Central Office: [phone]   A drug or alcohol rehabilitation program.    You can get information on alcohol resources and groups by calling the number 211 or 1-475.480.9673 on any phone.     Seek medical care if:  You have persistent vomiting.   You have persistent pain in any part of your body.    You do not feel better after a few days.    If you were given a prescription for medicine here today, be sure to read all of the information (including the package insert) that comes with your prescription.  This will include important information about the medicine, its side effects, and any warnings that you need to know about.  The pharmacist who fills the prescription can provide more information and answer questions you may have about the medicine.  If you have questions or concerns that the pharmacist cannot address, please call or return to the Emergency Department.   Remember that you can always come back to the Emergency Department if you are not able to see your regular doctor in the amount of time listed above, if you get any new symptoms, or if there is anything that worries you.

## 2019-07-17 NOTE — ED AVS SNAPSHOT
[facility] Emergency Department  [address]  [facility] [address]  Phone:  [phone]  Fax:  [phone]                                    Audelia Comer   MRN: [id number]    Department:  [facility] Emergency Department   Date of Visit:  7/17/2019           After Visit Summary Signature Page    I have received my discharge instructions, and my questions have been answered. I have discussed any challenges I see with this plan with the nurse or doctor.    ..........................................................................................................................................  Patient/Patient Representative Signature      ..........................................................................................................................................  Patient Representative Print Name and Relationship to Patient    ..................................................               ................................................  Date                                   Time    ..........................................................................................................................................  Reviewed by Signature/Title    ...................................................              ..............................................  Date                                               Time          22EPIC Rev 08/18

## 2019-07-17 NOTE — ED PROVIDER NOTES
53-year-old female signed out to me pending sober reassessment.  Patient was having a panic attack last night.  She had vague thoughts of suicide jumping off a building.  She was quite intoxicated with a low effort below 0.227.  Patient was watched throughout the day.  She finally reached a clinically sober point where she was able to be seen eval by dec.  Please see their note for further details.  Patient was safe for discharge to home.  She is sober ride home.  She was given resources and felt comfortable going home at that time     Kenna Reyes MD  07/17/19 1957

## 2019-10-16 ENCOUNTER — COMMUNICATION - HEALTHEAST (OUTPATIENT)
Dept: SCHEDULING | Facility: CLINIC | Age: 54
End: 2019-10-16

## 2020-04-09 ENCOUNTER — COMMUNICATION - HEALTHEAST (OUTPATIENT)
Dept: FAMILY MEDICINE | Facility: CLINIC | Age: 55
End: 2020-04-09

## 2020-04-09 DIAGNOSIS — M54.41 ACUTE RIGHT-SIDED LOW BACK PAIN WITH RIGHT-SIDED SCIATICA: ICD-10-CM

## 2020-04-10 ENCOUNTER — COMMUNICATION - HEALTHEAST (OUTPATIENT)
Dept: FAMILY MEDICINE | Facility: CLINIC | Age: 55
End: 2020-04-10

## 2020-04-10 DIAGNOSIS — M54.41 ACUTE RIGHT-SIDED LOW BACK PAIN WITH RIGHT-SIDED SCIATICA: ICD-10-CM

## 2020-04-16 ENCOUNTER — APPOINTMENT (OUTPATIENT)
Dept: CT IMAGING | Facility: CLINIC | Age: 55
End: 2020-04-16
Attending: EMERGENCY MEDICINE
Payer: COMMERCIAL

## 2020-04-16 ENCOUNTER — APPOINTMENT (OUTPATIENT)
Dept: GENERAL RADIOLOGY | Facility: CLINIC | Age: 55
End: 2020-04-16
Attending: EMERGENCY MEDICINE
Payer: COMMERCIAL

## 2020-04-16 ENCOUNTER — HOSPITAL ENCOUNTER (EMERGENCY)
Facility: CLINIC | Age: 55
Discharge: HOME | End: 2020-04-16
Attending: EMERGENCY MEDICINE | Admitting: EMERGENCY MEDICINE
Payer: COMMERCIAL

## 2020-04-16 VITALS
RESPIRATION RATE: 18 BRPM | OXYGEN SATURATION: 97 % | TEMPERATURE: 98.8 F | DIASTOLIC BLOOD PRESSURE: 91 MMHG | HEART RATE: 94 BPM | SYSTOLIC BLOOD PRESSURE: 140 MMHG

## 2020-04-16 DIAGNOSIS — S20.212A CHEST WALL CONTUSION, LEFT, INITIAL ENCOUNTER: ICD-10-CM

## 2020-04-16 LAB
ANION GAP SERPL CALCULATED.3IONS-SCNC: 7 MMOL/L (ref 3–14)
BASOPHILS # BLD AUTO: 0.1 10E9/L (ref 0–0.2)
BASOPHILS NFR BLD AUTO: 0.9 %
BUN SERPL-MCNC: 3 MG/DL (ref 7–30)
CALCIUM SERPL-MCNC: 8.6 MG/DL (ref 8.5–10.1)
CHLORIDE SERPL-SCNC: 98 MMOL/L (ref 94–109)
CO2 SERPL-SCNC: 26 MMOL/L (ref 20–32)
CREAT SERPL-MCNC: 0.45 MG/DL (ref 0.52–1.04)
D DIMER PPP FEU-MCNC: 0.8 UG/ML FEU (ref 0–0.5)
DIFFERENTIAL METHOD BLD: ABNORMAL
EOSINOPHIL # BLD AUTO: 0.1 10E9/L (ref 0–0.7)
EOSINOPHIL NFR BLD AUTO: 1 %
ERYTHROCYTE [DISTWIDTH] IN BLOOD BY AUTOMATED COUNT: 12.6 % (ref 10–15)
GFR SERPL CREATININE-BSD FRML MDRD: >90 ML/MIN/{1.73_M2}
GLUCOSE SERPL-MCNC: 86 MG/DL (ref 70–99)
HCT VFR BLD AUTO: 44.6 % (ref 35–47)
HGB BLD-MCNC: 15 G/DL (ref 11.7–15.7)
IMM GRANULOCYTES # BLD: 0 10E9/L (ref 0–0.4)
IMM GRANULOCYTES NFR BLD: 0.3 %
INTERPRETATION ECG - MUSE: NORMAL
LYMPHOCYTES # BLD AUTO: 2.3 10E9/L (ref 0.8–5.3)
LYMPHOCYTES NFR BLD AUTO: 20.7 %
MCH RBC QN AUTO: 33.9 PG (ref 26.5–33)
MCHC RBC AUTO-ENTMCNC: 33.6 G/DL (ref 31.5–36.5)
MCV RBC AUTO: 101 FL (ref 78–100)
MONOCYTES # BLD AUTO: 1.4 10E9/L (ref 0–1.3)
MONOCYTES NFR BLD AUTO: 12.2 %
NEUTROPHILS # BLD AUTO: 7.3 10E9/L (ref 1.6–8.3)
NEUTROPHILS NFR BLD AUTO: 64.9 %
NRBC # BLD AUTO: 0 10*3/UL
NRBC BLD AUTO-RTO: 0 /100
PLATELET # BLD AUTO: 313 10E9/L (ref 150–450)
POTASSIUM SERPL-SCNC: 4.1 MMOL/L (ref 3.4–5.3)
RBC # BLD AUTO: 4.42 10E12/L (ref 3.8–5.2)
SODIUM SERPL-SCNC: 131 MMOL/L (ref 133–144)
TROPONIN I SERPL-MCNC: <0.015 UG/L (ref 0–0.04)
WBC # BLD AUTO: 11.3 10E9/L (ref 4–11)

## 2020-04-16 PROCEDURE — 80048 BASIC METABOLIC PNL TOTAL CA: CPT | Performed by: EMERGENCY MEDICINE

## 2020-04-16 PROCEDURE — 25000128 H RX IP 250 OP 636: Performed by: EMERGENCY MEDICINE

## 2020-04-16 PROCEDURE — 25000125 ZZHC RX 250: Performed by: EMERGENCY MEDICINE

## 2020-04-16 PROCEDURE — 85025 COMPLETE CBC W/AUTO DIFF WBC: CPT | Performed by: EMERGENCY MEDICINE

## 2020-04-16 PROCEDURE — 71045 X-RAY EXAM CHEST 1 VIEW: CPT

## 2020-04-16 PROCEDURE — 93005 ELECTROCARDIOGRAM TRACING: CPT

## 2020-04-16 PROCEDURE — 99285 EMERGENCY DEPT VISIT HI MDM: CPT | Mod: 25

## 2020-04-16 PROCEDURE — 71275 CT ANGIOGRAPHY CHEST: CPT

## 2020-04-16 PROCEDURE — 85379 FIBRIN DEGRADATION QUANT: CPT | Performed by: EMERGENCY MEDICINE

## 2020-04-16 PROCEDURE — 25000132 ZZH RX MED GY IP 250 OP 250 PS 637: Performed by: EMERGENCY MEDICINE

## 2020-04-16 PROCEDURE — 84484 ASSAY OF TROPONIN QUANT: CPT | Performed by: EMERGENCY MEDICINE

## 2020-04-16 RX ORDER — IBUPROFEN 600 MG/1
600 TABLET, FILM COATED ORAL ONCE
Status: COMPLETED | OUTPATIENT
Start: 2020-04-16 | End: 2020-04-16

## 2020-04-16 RX ORDER — IOPAMIDOL 755 MG/ML
500 INJECTION, SOLUTION INTRAVASCULAR ONCE
Status: COMPLETED | OUTPATIENT
Start: 2020-04-16 | End: 2020-04-16

## 2020-04-16 RX ORDER — HYDROCODONE BITARTRATE AND ACETAMINOPHEN 5; 325 MG/1; MG/1
1 TABLET ORAL ONCE
Status: COMPLETED | OUTPATIENT
Start: 2020-04-16 | End: 2020-04-16

## 2020-04-16 RX ADMIN — IBUPROFEN 600 MG: 600 TABLET, FILM COATED ORAL at 12:33

## 2020-04-16 RX ADMIN — HYDROCODONE BITARTRATE AND ACETAMINOPHEN 1 TABLET: 5; 325 TABLET ORAL at 12:33

## 2020-04-16 RX ADMIN — SODIUM CHLORIDE 83 ML: 9 INJECTION, SOLUTION INTRAVENOUS at 13:51

## 2020-04-16 RX ADMIN — IOPAMIDOL 60 ML: 755 INJECTION, SOLUTION INTRAVENOUS at 13:51

## 2020-04-16 ASSESSMENT — ENCOUNTER SYMPTOMS
WHEEZING: 1
COUGH: 0
FEVER: 0
DIZZINESS: 0
ABDOMINAL PAIN: 0
HEADACHES: 0

## 2020-04-16 NOTE — ED AVS SNAPSHOT
[facility] Emergency Department  [address]  [facility] [address]  Phone:  [phone]  Fax:  [phone]                                    Audelia Comer   MRN: [id number]    Department:  [facility] Emergency Department   Date of Visit:  4/16/2020           After Visit Summary Signature Page    I have received my discharge instructions, and my questions have been answered. I have discussed any challenges I see with this plan with the nurse or doctor.    ..........................................................................................................................................  Patient/Patient Representative Signature      ..........................................................................................................................................  Patient Representative Print Name and Relationship to Patient    ..................................................               ................................................  Date                                   Time    ..........................................................................................................................................  Reviewed by Signature/Title    ...................................................              ..............................................  Date                                               Time          22EPIC Rev 08/18
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
## 2020-04-16 NOTE — DISCHARGE INSTRUCTIONS
Your EKG, blood tests, and imaging studies (chest xray and CT scan) are normal.  There are no signs of a collapsed lung or broken ribs or lung infection.    You can alternate Tylenol/ibuprofen every 4-6 hours as needed for pain. Apply ice or heat to the painful area for 20 minutes on, 1 hour off, 4-5 times a day.    Follow up with your primary care doctor in 1 week if symptoms continue.    Return to the ER for any new or worsening symptoms.

## 2020-07-17 ENCOUNTER — HOSPITAL ENCOUNTER (EMERGENCY)
Facility: CLINIC | Age: 55
Discharge: HOME | End: 2020-07-17
Attending: EMERGENCY MEDICINE | Admitting: EMERGENCY MEDICINE
Payer: COMMERCIAL

## 2020-07-17 VITALS
OXYGEN SATURATION: 98 % | TEMPERATURE: 98.5 F | SYSTOLIC BLOOD PRESSURE: 131 MMHG | DIASTOLIC BLOOD PRESSURE: 80 MMHG | HEART RATE: 88 BPM

## 2020-07-17 DIAGNOSIS — E86.0 DEHYDRATION: ICD-10-CM

## 2020-07-17 DIAGNOSIS — R11.10 VOMITING, INTRACTABILITY OF VOMITING NOT SPECIFIED, PRESENCE OF NAUSEA NOT SPECIFIED, UNSPECIFIED VOMITING TYPE: ICD-10-CM

## 2020-07-17 DIAGNOSIS — F41.9 ANXIETY: ICD-10-CM

## 2020-07-17 LAB
ALBUMIN SERPL-MCNC: 3.9 G/DL (ref 3.4–5)
ALP SERPL-CCNC: 174 U/L (ref 40–150)
ALT SERPL W P-5'-P-CCNC: 84 U/L (ref 0–50)
ANION GAP SERPL CALCULATED.3IONS-SCNC: 10 MMOL/L (ref 3–14)
AST SERPL W P-5'-P-CCNC: 165 U/L (ref 0–45)
BASOPHILS # BLD AUTO: 0.1 10E9/L (ref 0–0.2)
BASOPHILS NFR BLD AUTO: 0.8 %
BILIRUB SERPL-MCNC: 0.6 MG/DL (ref 0.2–1.3)
BUN SERPL-MCNC: 2 MG/DL (ref 7–30)
CALCIUM SERPL-MCNC: 9.1 MG/DL (ref 8.5–10.1)
CHLORIDE SERPL-SCNC: 99 MMOL/L (ref 94–109)
CO2 SERPL-SCNC: 24 MMOL/L (ref 20–32)
CREAT SERPL-MCNC: 0.47 MG/DL (ref 0.52–1.04)
DIFFERENTIAL METHOD BLD: ABNORMAL
EOSINOPHIL # BLD AUTO: 0.1 10E9/L (ref 0–0.7)
EOSINOPHIL NFR BLD AUTO: 1 %
ERYTHROCYTE [DISTWIDTH] IN BLOOD BY AUTOMATED COUNT: 13.1 % (ref 10–15)
GFR SERPL CREATININE-BSD FRML MDRD: >90 ML/MIN/{1.73_M2}
GLUCOSE SERPL-MCNC: 85 MG/DL (ref 70–99)
HCT VFR BLD AUTO: 47.5 % (ref 35–47)
HGB BLD-MCNC: 16.2 G/DL (ref 11.7–15.7)
IMM GRANULOCYTES # BLD: 0.1 10E9/L (ref 0–0.4)
IMM GRANULOCYTES NFR BLD: 0.4 %
LYMPHOCYTES # BLD AUTO: 2.3 10E9/L (ref 0.8–5.3)
LYMPHOCYTES NFR BLD AUTO: 20.5 %
MCH RBC QN AUTO: 33.7 PG (ref 26.5–33)
MCHC RBC AUTO-ENTMCNC: 34.1 G/DL (ref 31.5–36.5)
MCV RBC AUTO: 99 FL (ref 78–100)
MONOCYTES # BLD AUTO: 1 10E9/L (ref 0–1.3)
MONOCYTES NFR BLD AUTO: 9 %
NEUTROPHILS # BLD AUTO: 7.6 10E9/L (ref 1.6–8.3)
NEUTROPHILS NFR BLD AUTO: 68.3 %
NRBC # BLD AUTO: 0 10*3/UL
NRBC BLD AUTO-RTO: 0 /100
PLATELET # BLD AUTO: 256 10E9/L (ref 150–450)
POTASSIUM SERPL-SCNC: 3.5 MMOL/L (ref 3.4–5.3)
PROT SERPL-MCNC: 8.4 G/DL (ref 6.8–8.8)
RBC # BLD AUTO: 4.81 10E12/L (ref 3.8–5.2)
SODIUM SERPL-SCNC: 133 MMOL/L (ref 133–144)
WBC # BLD AUTO: 11.1 10E9/L (ref 4–11)

## 2020-07-17 PROCEDURE — 96374 THER/PROPH/DIAG INJ IV PUSH: CPT

## 2020-07-17 PROCEDURE — 96361 HYDRATE IV INFUSION ADD-ON: CPT

## 2020-07-17 PROCEDURE — 25000128 H RX IP 250 OP 636: Performed by: EMERGENCY MEDICINE

## 2020-07-17 PROCEDURE — 85025 COMPLETE CBC W/AUTO DIFF WBC: CPT | Performed by: EMERGENCY MEDICINE

## 2020-07-17 PROCEDURE — 80053 COMPREHEN METABOLIC PANEL: CPT | Performed by: EMERGENCY MEDICINE

## 2020-07-17 PROCEDURE — 25800030 ZZH RX IP 258 OP 636: Performed by: EMERGENCY MEDICINE

## 2020-07-17 PROCEDURE — 99284 EMERGENCY DEPT VISIT MOD MDM: CPT | Mod: 25

## 2020-07-17 RX ORDER — ONDANSETRON 4 MG/1
4 TABLET, ORALLY DISINTEGRATING ORAL EVERY 8 HOURS PRN
Qty: 9 TABLET | Refills: 0 | Status: ON HOLD | OUTPATIENT
Start: 2020-07-17 | End: 2020-08-21

## 2020-07-17 RX ORDER — ONDANSETRON 2 MG/ML
4 INJECTION INTRAMUSCULAR; INTRAVENOUS EVERY 30 MIN PRN
Status: DISCONTINUED | OUTPATIENT
Start: 2020-07-17 | End: 2020-07-17 | Stop reason: HOSPADM

## 2020-07-17 RX ORDER — SODIUM CHLORIDE 9 MG/ML
INJECTION, SOLUTION INTRAVENOUS CONTINUOUS
Status: DISCONTINUED | OUTPATIENT
Start: 2020-07-17 | End: 2020-07-17 | Stop reason: HOSPADM

## 2020-07-17 RX ADMIN — SODIUM CHLORIDE 1000 ML: 9 INJECTION, SOLUTION INTRAVENOUS at 14:42

## 2020-07-17 RX ADMIN — ONDANSETRON 4 MG: 2 INJECTION INTRAMUSCULAR; INTRAVENOUS at 14:56

## 2020-07-17 ASSESSMENT — ENCOUNTER SYMPTOMS
NUMBNESS: 1
DIARRHEA: 1
APPETITE CHANGE: 1
NAUSEA: 1
VOMITING: 1
NERVOUS/ANXIOUS: 1

## 2020-07-17 NOTE — ED TRIAGE NOTES
A&O x4, ABCs intact. Pt presents with nausea, chills, and tingly fingers and toes. Pt states that she hasnt been feeling well for the past 3 weeks. Pt states she had a family crisis and she hasnt felt well since. Pt reports not taking her anxiety meds today.

## 2020-07-17 NOTE — ED NOTES
Patient reports feeling well and would like to go home. MD in to see patient and discuss diagnosis, test results, and discharge plan. Patient meets discharge criteria. Discussed AVS with patient. Questions answered. Patient verbalized understanding. Patient reports being ready to go home. Patient discharged home by car with all necessary information.

## 2020-07-17 NOTE — ED AVS SNAPSHOT
[facility] Emergency Department  [address]  [facility] [address]  Phone:  [phone]  Fax:  [phone]                                    Audelia Comer   MRN: [id number]    Department:  [facility] Emergency Department   Date of Visit:  7/17/2020           After Visit Summary Signature Page    I have received my discharge instructions, and my questions have been answered. I have discussed any challenges I see with this plan with the nurse or doctor.    ..........................................................................................................................................  Patient/Patient Representative Signature      ..........................................................................................................................................  Patient Representative Print Name and Relationship to Patient    ..................................................               ................................................  Date                                   Time    ..........................................................................................................................................  Reviewed by Signature/Title    ...................................................              ..............................................  Date                                               Time          22EPIC Rev 08/18

## 2020-07-17 NOTE — ED PROVIDER NOTES
History     Chief Complaint:  Anxiety     HPI   Audelia Comer is a 54 year old female, with a history of anxiety, who presents to the ED for evaluation of anxiety. The patient reports she began having worsening anxiety 3.5 weeks ago after a family emergency. She would not like to elaborate on what the family crisis is.  Patient states she has been having nausea, vomiting, nonbloody diarrhea, and decreased appetite since. She has lost 8-9lbs over this period. She developed all 4 extremities tingling over the last few days. She has been missing work due to her symptoms, including today. The patient notes she had a telehealth visit 3 days ago, and her Buspar was increased from 7mg to 10mg. She does not have a psychiatrist/psychologist. She did not take her anxiety medications today. No other symptoms/complaints.     Allergies:  No known drug allergies    Medications:    Buspar  Omeprazole     Past Medical History:    Anxiety  Depression  Small bowel obstruction  Ileitis   Pancreatitis     Past Surgical History:    Appendectomy    Breast implants     Family History:    History reviewed. No pertinent family history.     Social History:  Smoking status: Current every day smoker, 1.00ppd  Substance use: No  Alcohol use: Yes    Review of Systems   Constitutional: Positive for appetite change.   Gastrointestinal: Positive for diarrhea, nausea and vomiting.   Neurological: Positive for numbness.   Psychiatric/Behavioral: The patient is nervous/anxious.    All other systems reviewed and are negative.    Physical Exam     Patient Vitals for the past 24 hrs:   BP Temp Temp src Heart Rate SpO2   20 1308 (!) 135/95 98.5  F (36.9  C) Oral 102 98 %     Physical Exam  General: Patient is alert and cooperative.  HENT:  Normal nose, oropharynx. Moist oral mucosa.  Eyes: EOMI. Normal conjunctiva.  Neck:  Normal range of motion and appearance.   Cardiovascular:  Normal rate, regular rhythm and normal heart sounds.    Pulmonary/Chest:  Effort normal. No wheezing or crackles.  Abdominal: Soft. No distension or tenderness.     Musculoskeletal: Normal range of motion. No edema or tenderness.   Neurological: oriented, normal strength, sensation, and coordination.   Skin: Warm and dry. No rash or bruising.   Psychiatric: anxious mood and affect. Normal behavior and judgement.    Emergency Department Course     Laboratory:  Laboratory findings were communicated with the patient who voiced understanding of the findings.    CBC: WBC 11.1(H), HGB 16.2(H), o/w WNL ()  CMP: BUN 2(L), Alkphos 174(H), ALT 84(H), (H), Creatinine 0.47(L), o/w WNL     Interventions:  1442: NS 1L Bolus IV  1456: Zofran 4mg IV    Emergency Department Course:  Past medical records, nursing notes, and vitals reviewed.    1409: I performed an exam of the patient as documented above.     1439: IV was inserted and blood was drawn for laboratory testing, results above.    1603: I rechecked the patient and discussed the results of her workup thus far.     Findings and plan explained to the patient. Patient discharged home with instructions regarding supportive care, medications, and reasons to return. The importance of close follow-up was reviewed. The patient was prescribed Zofran-ODT.     I personally reviewed the laboratory results with the patient and answered all related questions prior to discharge.     Impression & Plan     Medical Decision Makin-year-old afebrile female with a history of anxiety has presented with 3+ week history of feeling generally unwell.  She is describing nausea, early satiety and anorexia, vomiting and episodic loose stools.  She notes that this is related to a family emergency.  Later she concedes that her son has begun reusing heroin which is of course greatly distressing to her.  She is also experiencing extremity paresthesias and has been having difficulty going to work given the symptoms.  She is compliant with  her current medical regimen which does include BuSpar.  She has a normal physical examination.  Screening laboratory tests include a normal CBC and unremarkable CMP.  She has slight elevations of her alk phos ALT and AST of uncertain clinical significance.  There is no right upper quadrant tenderness.  An IV was established she was treated with IV Zofran and fluids with improvement in her symptoms.  I offered her a dec assessment as it seems like the root of her symptoms is in part of her understandable escalated anxiety.  However she declines this at this time.  She has the ability to confer with a psychologist through her work and plans to do so.  She was given a prescription for PRN Zofran and feels comfortable with early outpatient follow-up.    Diagnosis:    ICD-10-CM   1. Dehydration  E86.0   2. Vomiting, intractability of vomiting not specified, presence of nausea not specified, unspecified vomiting type  R11.10   3. Anxiety  F41.9     Disposition: Patient discharged to home     Discharge Medications:  New Prescriptions    ONDANSETRON (ZOFRAN ODT) 4 MG ODT TAB    Take 1 tablet (4 mg) by mouth every 8 hours as needed for nausea     Rosaura Arriaza  7/17/2020   [facility] EMERGENCY DEPARTMENT    Scribe Disclosure:  I, Rosaura Arriaza, am serving as a scribe at 2:09 PM on 7/17/2020 to document services personally performed by Yamil Lewis MD based on my observations and the provider's statements to me.        Yamil Lewis MD  07/18/20 6313

## 2020-07-28 ENCOUNTER — APPOINTMENT (OUTPATIENT)
Dept: GENERAL RADIOLOGY | Facility: CLINIC | Age: 55
End: 2020-07-28
Attending: EMERGENCY MEDICINE
Payer: COMMERCIAL

## 2020-07-28 ENCOUNTER — HOSPITAL ENCOUNTER (EMERGENCY)
Facility: CLINIC | Age: 55
Discharge: HOME | End: 2020-07-28
Attending: EMERGENCY MEDICINE | Admitting: EMERGENCY MEDICINE
Payer: COMMERCIAL

## 2020-07-28 ENCOUNTER — APPOINTMENT (OUTPATIENT)
Dept: ULTRASOUND IMAGING | Facility: CLINIC | Age: 55
End: 2020-07-28
Attending: EMERGENCY MEDICINE
Payer: COMMERCIAL

## 2020-07-28 VITALS
BODY MASS INDEX: 19.32 KG/M2 | HEART RATE: 90 BPM | SYSTOLIC BLOOD PRESSURE: 111 MMHG | RESPIRATION RATE: 18 BRPM | OXYGEN SATURATION: 97 % | WEIGHT: 119.71 LBS | DIASTOLIC BLOOD PRESSURE: 76 MMHG | TEMPERATURE: 98.7 F

## 2020-07-28 DIAGNOSIS — R74.8 ELEVATED LIPASE: ICD-10-CM

## 2020-07-28 DIAGNOSIS — E87.6 HYPOKALEMIA: ICD-10-CM

## 2020-07-28 DIAGNOSIS — R11.2 NAUSEA AND VOMITING, INTRACTABILITY OF VOMITING NOT SPECIFIED, UNSPECIFIED VOMITING TYPE: ICD-10-CM

## 2020-07-28 DIAGNOSIS — R79.89 ABNORMAL LIVER FUNCTION TEST: ICD-10-CM

## 2020-07-28 DIAGNOSIS — F10.920 ALCOHOLIC INTOXICATION WITHOUT COMPLICATION (H): ICD-10-CM

## 2020-07-28 DIAGNOSIS — E87.1 HYPONATREMIA: ICD-10-CM

## 2020-07-28 LAB
ALBUMIN SERPL-MCNC: 4.3 G/DL (ref 3.4–5)
ALBUMIN UR-MCNC: 70 MG/DL
ALP SERPL-CCNC: 187 U/L (ref 40–150)
ALT SERPL W P-5'-P-CCNC: 111 U/L (ref 0–50)
ANION GAP SERPL CALCULATED.3IONS-SCNC: 19 MMOL/L (ref 3–14)
APPEARANCE UR: ABNORMAL
AST SERPL W P-5'-P-CCNC: 212 U/L (ref 0–45)
BASOPHILS # BLD AUTO: 0.1 10E9/L (ref 0–0.2)
BASOPHILS NFR BLD AUTO: 0.4 %
BILIRUB DIRECT SERPL-MCNC: 0.5 MG/DL (ref 0–0.2)
BILIRUB SERPL-MCNC: 1.3 MG/DL (ref 0.2–1.3)
BILIRUB UR QL STRIP: NEGATIVE
BUN SERPL-MCNC: 5 MG/DL (ref 7–30)
CALCIUM SERPL-MCNC: 9.4 MG/DL (ref 8.5–10.1)
CHLORIDE SERPL-SCNC: 92 MMOL/L (ref 94–109)
CO2 SERPL-SCNC: 17 MMOL/L (ref 20–32)
COLOR UR AUTO: YELLOW
CREAT SERPL-MCNC: 0.48 MG/DL (ref 0.52–1.04)
DIFFERENTIAL METHOD BLD: ABNORMAL
EOSINOPHIL # BLD AUTO: 0.1 10E9/L (ref 0–0.7)
EOSINOPHIL NFR BLD AUTO: 0.6 %
ERYTHROCYTE [DISTWIDTH] IN BLOOD BY AUTOMATED COUNT: 12.7 % (ref 10–15)
ETHANOL SERPL-MCNC: 0.07 G/DL
GFR SERPL CREATININE-BSD FRML MDRD: >90 ML/MIN/{1.73_M2}
GLUCOSE BLDC GLUCOMTR-MCNC: 107 MG/DL (ref 70–99)
GLUCOSE SERPL-MCNC: 67 MG/DL (ref 70–99)
GLUCOSE UR STRIP-MCNC: NEGATIVE MG/DL
HCT VFR BLD AUTO: 46.6 % (ref 35–47)
HGB BLD-MCNC: 16 G/DL (ref 11.7–15.7)
HGB UR QL STRIP: NEGATIVE
HYALINE CASTS #/AREA URNS LPF: 6 /LPF (ref 0–2)
IMM GRANULOCYTES # BLD: 0.1 10E9/L (ref 0–0.4)
IMM GRANULOCYTES NFR BLD: 0.4 %
KETONES UR STRIP-MCNC: >150 MG/DL
LACTATE BLD-SCNC: 1.9 MMOL/L (ref 0.7–2)
LEUKOCYTE ESTERASE UR QL STRIP: ABNORMAL
LIPASE SERPL-CCNC: 584 U/L (ref 73–393)
LYMPHOCYTES # BLD AUTO: 1.8 10E9/L (ref 0.8–5.3)
LYMPHOCYTES NFR BLD AUTO: 14 %
MCH RBC QN AUTO: 33.4 PG (ref 26.5–33)
MCHC RBC AUTO-ENTMCNC: 34.3 G/DL (ref 31.5–36.5)
MCV RBC AUTO: 97 FL (ref 78–100)
MONOCYTES # BLD AUTO: 1.3 10E9/L (ref 0–1.3)
MONOCYTES NFR BLD AUTO: 10.4 %
MUCOUS THREADS #/AREA URNS LPF: PRESENT /LPF
NEUTROPHILS # BLD AUTO: 9.4 10E9/L (ref 1.6–8.3)
NEUTROPHILS NFR BLD AUTO: 74.2 %
NITRATE UR QL: NEGATIVE
NRBC # BLD AUTO: 0 10*3/UL
NRBC BLD AUTO-RTO: 0 /100
PH UR STRIP: 6 PH (ref 5–7)
PLATELET # BLD AUTO: 217 10E9/L (ref 150–450)
POTASSIUM SERPL-SCNC: 3 MMOL/L (ref 3.4–5.3)
PROT SERPL-MCNC: 9.1 G/DL (ref 6.8–8.8)
RBC # BLD AUTO: 4.79 10E12/L (ref 3.8–5.2)
RBC #/AREA URNS AUTO: 2 /HPF (ref 0–2)
SODIUM SERPL-SCNC: 128 MMOL/L (ref 133–144)
SOURCE: ABNORMAL
SP GR UR STRIP: 1.02 (ref 1–1.03)
SQUAMOUS #/AREA URNS AUTO: 4 /HPF (ref 0–1)
TRANS CELLS #/AREA URNS HPF: 1 /HPF (ref 0–1)
UROBILINOGEN UR STRIP-MCNC: 2 MG/DL (ref 0–2)
WBC # BLD AUTO: 12.6 10E9/L (ref 4–11)
WBC #/AREA URNS AUTO: 19 /HPF (ref 0–5)

## 2020-07-28 PROCEDURE — 71045 X-RAY EXAM CHEST 1 VIEW: CPT

## 2020-07-28 PROCEDURE — 80320 DRUG SCREEN QUANTALCOHOLS: CPT | Performed by: EMERGENCY MEDICINE

## 2020-07-28 PROCEDURE — 87086 URINE CULTURE/COLONY COUNT: CPT | Performed by: EMERGENCY MEDICINE

## 2020-07-28 PROCEDURE — 99285 EMERGENCY DEPT VISIT HI MDM: CPT | Mod: 25

## 2020-07-28 PROCEDURE — 81001 URINALYSIS AUTO W/SCOPE: CPT | Performed by: EMERGENCY MEDICINE

## 2020-07-28 PROCEDURE — 83605 ASSAY OF LACTIC ACID: CPT | Performed by: EMERGENCY MEDICINE

## 2020-07-28 PROCEDURE — 80048 BASIC METABOLIC PNL TOTAL CA: CPT | Performed by: EMERGENCY MEDICINE

## 2020-07-28 PROCEDURE — 80076 HEPATIC FUNCTION PANEL: CPT | Performed by: EMERGENCY MEDICINE

## 2020-07-28 PROCEDURE — 00000146 ZZHCL STATISTIC GLUCOSE BY METER IP

## 2020-07-28 PROCEDURE — 96361 HYDRATE IV INFUSION ADD-ON: CPT

## 2020-07-28 PROCEDURE — 76705 ECHO EXAM OF ABDOMEN: CPT

## 2020-07-28 PROCEDURE — 96375 TX/PRO/DX INJ NEW DRUG ADDON: CPT

## 2020-07-28 PROCEDURE — 85025 COMPLETE CBC W/AUTO DIFF WBC: CPT | Performed by: EMERGENCY MEDICINE

## 2020-07-28 PROCEDURE — 83690 ASSAY OF LIPASE: CPT | Performed by: EMERGENCY MEDICINE

## 2020-07-28 PROCEDURE — C9113 INJ PANTOPRAZOLE SODIUM, VIA: HCPCS | Performed by: EMERGENCY MEDICINE

## 2020-07-28 PROCEDURE — 25000132 ZZH RX MED GY IP 250 OP 250 PS 637: Performed by: EMERGENCY MEDICINE

## 2020-07-28 PROCEDURE — 25800030 ZZH RX IP 258 OP 636: Performed by: EMERGENCY MEDICINE

## 2020-07-28 PROCEDURE — 25000128 H RX IP 250 OP 636: Performed by: EMERGENCY MEDICINE

## 2020-07-28 PROCEDURE — 96374 THER/PROPH/DIAG INJ IV PUSH: CPT

## 2020-07-28 RX ORDER — ONDANSETRON 2 MG/ML
4 INJECTION INTRAMUSCULAR; INTRAVENOUS EVERY 30 MIN PRN
Status: DISCONTINUED | OUTPATIENT
Start: 2020-07-28 | End: 2020-07-28 | Stop reason: HOSPADM

## 2020-07-28 RX ORDER — METOCLOPRAMIDE 5 MG/1
5 TABLET ORAL 3 TIMES DAILY PRN
Qty: 10 TABLET | Refills: 0 | Status: SHIPPED | OUTPATIENT
Start: 2020-07-28 | End: 2021-10-24

## 2020-07-28 RX ORDER — PANTOPRAZOLE SODIUM 40 MG/1
40 TABLET, DELAYED RELEASE ORAL DAILY
Qty: 30 TABLET | Refills: 0 | Status: ON HOLD | OUTPATIENT
Start: 2020-07-28 | End: 2020-08-17

## 2020-07-28 RX ORDER — POTASSIUM CHLORIDE 1500 MG/1
40 TABLET, EXTENDED RELEASE ORAL ONCE
Status: COMPLETED | OUTPATIENT
Start: 2020-07-28 | End: 2020-07-28

## 2020-07-28 RX ORDER — METOCLOPRAMIDE HYDROCHLORIDE 5 MG/ML
10 INJECTION INTRAMUSCULAR; INTRAVENOUS ONCE
Status: COMPLETED | OUTPATIENT
Start: 2020-07-28 | End: 2020-07-28

## 2020-07-28 RX ORDER — KETOROLAC TROMETHAMINE 15 MG/ML
15 INJECTION, SOLUTION INTRAMUSCULAR; INTRAVENOUS ONCE
Status: COMPLETED | OUTPATIENT
Start: 2020-07-28 | End: 2020-07-28

## 2020-07-28 RX ORDER — ONDANSETRON 2 MG/ML
INJECTION INTRAMUSCULAR; INTRAVENOUS
Status: DISCONTINUED
Start: 2020-07-28 | End: 2020-07-28 | Stop reason: HOSPADM

## 2020-07-28 RX ADMIN — POTASSIUM CHLORIDE 40 MEQ: 1500 TABLET, EXTENDED RELEASE ORAL at 17:42

## 2020-07-28 RX ADMIN — DEXTROSE AND SODIUM CHLORIDE: 5; 900 INJECTION, SOLUTION INTRAVENOUS at 17:35

## 2020-07-28 RX ADMIN — ONDANSETRON 4 MG: 2 INJECTION INTRAMUSCULAR; INTRAVENOUS at 17:41

## 2020-07-28 RX ADMIN — KETOROLAC TROMETHAMINE 15 MG: 15 INJECTION, SOLUTION INTRAMUSCULAR; INTRAVENOUS at 19:20

## 2020-07-28 RX ADMIN — PANTOPRAZOLE SODIUM 40 MG: 40 INJECTION, POWDER, FOR SOLUTION INTRAVENOUS at 17:35

## 2020-07-28 RX ADMIN — SODIUM CHLORIDE 1000 ML: 9 INJECTION, SOLUTION INTRAVENOUS at 16:11

## 2020-07-28 RX ADMIN — METOCLOPRAMIDE HYDROCHLORIDE 10 MG: 5 INJECTION INTRAMUSCULAR; INTRAVENOUS at 19:20

## 2020-07-28 ASSESSMENT — ENCOUNTER SYMPTOMS
FEVER: 0
COUGH: 1
VOMITING: 1
NAUSEA: 1
ABDOMINAL PAIN: 1
SHORTNESS OF BREATH: 0

## 2020-07-28 NOTE — LETTER
July 28, 2020      To Whom It May Concern:      Audelia Comer was seen in our Emergency Department today, 07/28/20.  I expect her condition to improve over the next 2 days.  She may return to work when improved.      Sincerely,        Shantanu Mcwilliams RN

## 2020-07-28 NOTE — ED PROVIDER NOTES
History     Chief Complaint:  Abdominal Pain    HPI   Audelia Comer is a 54 year old female who presents with abdominal pain. For the past 3 weeks the patient reports that she has been experiencing nausea, vomiting, epigastric pain and cough. At the beginning of the 3 weeks she had diarrhea but it has resolved. The main complain is epigastric pain. She states her pain is worse in the AM with gagging and vomiting. She reports personal history of stomach ulcers and occasionally takes PPI's. She denies any fever or shortness of breath. She states that since COVID began, she has been drinking more alcohol in the evening.     Allergies:  No Known Drug Allergies    Medications:    Prilosec     Past Medical History:    Anxiety   Depression   Small bowel obstruction   Acute pancreatitis  Ileitis   Stomach ulcer    Past Surgical History:    Appendectomy   section   Breast implants     Family History:    No past pertinent family history.    Social History:  Smoking Status: Current Every Day Smoker  Smokeless Tobacco: Never Used  Alcohol Use: Positive  Marital Status:        Review of Systems   Constitutional: Negative for fever.   Respiratory: Positive for cough. Negative for shortness of breath.    Gastrointestinal: Positive for abdominal pain, nausea and vomiting.   All other systems reviewed and are negative.    Physical Exam     Patient Vitals for the past 24 hrs:   BP Temp Temp src Pulse Heart Rate Resp SpO2 Weight   20 1930 111/76 -- -- 90 -- -- 97 % --   20 1900 125/81 -- -- 89 -- -- 98 % --   20 1800 -- -- -- -- -- -- 98 % --   20 1745 -- -- -- -- -- -- 98 % --   20 1730 -- -- -- -- -- -- 98 % --   20 1715 -- -- -- -- -- -- 98 % --   20 1700 -- -- -- -- -- -- 98 % --   20 1503 (!) 144/92 98.7  F (37.1  C) Oral -- 103 18 99 % 54.3 kg (119 lb 11.4 oz)     Physical Exam    General: Resting comfortably on the gurney  Eyes:  The pupils are equal and  round    Conjunctivae and sclerae are normal  ENT:    Moist mucous membranes  Neck:  Normal range of motion  CV:  Tachycardic rate, regular rhythm    Skin warm and well perfused   Resp:  Non labored breathing on room air  GI:  Abdomen is soft, there is no rigidity    No distension    No rebound tenderness     Epigastric tenderness  MS:  Normal muscular tone  Skin:  No rash or acute skin lesions noted  Neuro:   Awake, alert.      Speech is normal and fluent.    Face is symmetric.     Moves all extremities equally  Psych: Normal affect.  Appropriate interactions.    Emergency Department Course     Imaging:  Radiology findings were communicated with the patient who voiced understanding of the findings.    XR Chest Port   Old right rib fractures. Breast prostheses. Prominent right nipple shadow. Otherwise negative.     Abdomen US, limited RUQ only   Fatty liver. No gallstones.     Reading per radiology.    Laboratory:  Laboratory findings were communicated with the patient who voiced understanding of the findings.    CBC: WBC 12.6 (H), HGB 16.0 (H),     BMP:  (L), Potassium 3.0 (L), Chloride 92 (L), CO2 17 (L), Anion Gap 19 (H), Glucose 67 (L), BUN 5 (L) o/w WNL (Creatinine 0.48)    Hepatic panel: Bilirubin direct 0.5 (H), Protein total 9.1 (H), AlkPhos 187 (H),  (H),  (H)    Lipase: 584 (H)    EtOH: 0.07    Glucose by meter: 107 (H)    Lactic Acid (Collected 1735): 1.9     UA w Micro: Ketones >150 (A), Albumin 70 (A), Leukocyte esterase moderate (A), WBC/HPF 19 (H), Squamous epithelial/HPF 4 (H), Mucous present (A), Hyaline casts 6 (H)    Urine culture: pending    Interventions:  1611 NS, 1 L, IV   1735 Dextrose 5% and 0.9% NaCl, 1L, IV   1735 Protonix, 40 mg, IV  1741 Zofran, 4 mg, IV   1742 Potassium Chloride, 40 mEq, Oral   1920 Toradol, 15 mg, IV   1920 Reglan, 10 mg, IV     Emergency Department Course:     Nursing notes and vitals reviewed.     I performed an exam of the patient as  documented above.     1610 IV was inserted and blood was drawn for laboratory testing, results above.    1654 The patient provided a urine sample here in the emergency department. This was sent for laboratory testing, findings above.    1735 IV was inserted and blood was drawn for laboratory testing, results above.    1841 The patient was sent for a US while in the emergency department, results above.     1856 The patient was sent for a XR while in the emergency department, results above.     1936 Findings and plan explained to the Patient. Patient discharged home with instructions regarding supportive care, medications, and reasons to return. The importance of close follow-up was reviewed. The patient was prescribed Reglan and Protonix.     Impression & Plan      Medical Decision Making:  Audelia Comer is a 54 year old female who presents to the emergency department today for evaluation of abdominal pain. Patient with epigastric abdominal pain and vomiting. Labs with findings of suspected alcoholic ketoacidosis. Still has alcohol level of 0.07 and she reports last drink was last night. Labs consistent with heavy alcohol use. Has mild elevation of lipase which is likely contributing to her vomiting, epigastric pain. Also may have gastritis/PUD causing symptoms. But I do think her symptoms are likely all worsened from heavy alcohol use. No gallbladder stone and doubt biliary colic, cholecystis as cause of her symptoms. Discussed hospitalization but she declines.Tolerated oral intake in ED. Would like to go home. I recommended liquid diet for next 1-2 days, follow-up with PCP for recheck on labs and to stop alcohol use. Declines resources, detox.     Diagnosis:    ICD-10-CM    1. Hyponatremia  E87.1    2. Hypokalemia  E87.6    3. Elevated lipase  R74.8    4. Abnormal liver function test  R94.5    5. Alcoholic intoxication without complication (H)  F10.920    6. Nausea and vomiting, intractability of vomiting not  specified, unspecified vomiting type  R11.2      Disposition:   Patient is discharged home.     Discharge Medications:  START taking      Dose / Directions   metoclopramide 5 MG tablet  Commonly known as:  REGLAN      Dose:  5 mg  Take 1 tablet (5 mg) by mouth 3 times daily as needed  Quantity:  10 tablet  Refills:  0     pantoprazole 40 MG EC tablet  Commonly known as:  PROTONIX      Dose:  40 mg  Take 1 tablet (40 mg) by mouth daily  Quantity:  30 tablet  Refills:  0       Scribe Disclosure:  I, Olive León, am serving as a scribe at 5:35 PM on 7/28/2020 to document services personally performed by Abby Gonzalez MD based on my observations and the provider's statements to me.  [facility] EMERGENCY DEPARTMENT       Abby Gonzalez MD  07/29/20 0059

## 2020-07-28 NOTE — ED TRIAGE NOTES
"C/O RUQ abdominal pain for \"A few weeks\" Pt reports sx worse in the am with gagging and vomiting. Denies diarrhea currently but reports 3 weeks ago when sx began. ABC in tact. A/OX4  "

## 2020-07-28 NOTE — ED AVS SNAPSHOT
[facility] Emergency Department  [address]  [facility] [address]  Phone:  [phone]  Fax:  [phone]                                    Audelia Comer   MRN: [id number]    Department:  [facility] Emergency Department   Date of Visit:  7/28/2020           After Visit Summary Signature Page    I have received my discharge instructions, and my questions have been answered. I have discussed any challenges I see with this plan with the nurse or doctor.    ..........................................................................................................................................  Patient/Patient Representative Signature      ..........................................................................................................................................  Patient Representative Print Name and Relationship to Patient    ..................................................               ................................................  Date                                   Time    ..........................................................................................................................................  Reviewed by Signature/Title    ...................................................              ..............................................  Date                                               Time          22EPIC Rev 08/18

## 2020-07-29 LAB
BACTERIA SPEC CULT: NORMAL
BACTERIA SPEC CULT: NORMAL
SPECIMEN SOURCE: NORMAL

## 2020-07-29 NOTE — DISCHARGE INSTRUCTIONS
Liquid diet for next 1-2 days   Your pancreas is inflamed  Follow up with primary care provider for repeat labs  Try to eat some potassium rich foods since your potassium was low today  Make sure to eat and drink to stay hydrated

## 2020-07-30 NOTE — RESULT ENCOUNTER NOTE
Final urine culture report is NEGATIVE per [place] ED Lab Result protocol.    If NEGATIVE result, no change in treatment, per [place] ED Lab Result protocol.

## 2020-08-16 ENCOUNTER — APPOINTMENT (OUTPATIENT)
Dept: CT IMAGING | Facility: CLINIC | Age: 55
End: 2020-08-16
Attending: PHYSICIAN ASSISTANT
Payer: COMMERCIAL

## 2020-08-16 ENCOUNTER — HOSPITAL ENCOUNTER (INPATIENT)
Facility: CLINIC | Age: 55
LOS: 5 days | Discharge: HOME | End: 2020-08-21
Attending: PHYSICIAN ASSISTANT | Admitting: INTERNAL MEDICINE
Payer: COMMERCIAL

## 2020-08-16 DIAGNOSIS — K85.20 ALCOHOL-INDUCED ACUTE PANCREATITIS WITHOUT INFECTION OR NECROSIS: Primary | ICD-10-CM

## 2020-08-16 DIAGNOSIS — K85.90 ACUTE PANCREATITIS WITHOUT INFECTION OR NECROSIS, UNSPECIFIED PANCREATITIS TYPE: ICD-10-CM

## 2020-08-16 DIAGNOSIS — R79.89 ELEVATED LFTS: ICD-10-CM

## 2020-08-16 DIAGNOSIS — E87.6 HYPOKALEMIA: ICD-10-CM

## 2020-08-16 DIAGNOSIS — D72.829 LEUKOCYTOSIS: ICD-10-CM

## 2020-08-16 DIAGNOSIS — E87.29 KETOACIDOSIS: ICD-10-CM

## 2020-08-16 DIAGNOSIS — E87.1 HYPONATREMIA: ICD-10-CM

## 2020-08-16 LAB
ALBUMIN SERPL-MCNC: 4.1 G/DL (ref 3.4–5)
ALP SERPL-CCNC: 212 U/L (ref 40–150)
ALT SERPL W P-5'-P-CCNC: 85 U/L (ref 0–50)
ANION GAP SERPL CALCULATED.3IONS-SCNC: 26 MMOL/L (ref 3–14)
AST SERPL W P-5'-P-CCNC: 116 U/L (ref 0–45)
AST SERPL W P-5'-P-CCNC: ABNORMAL U/L (ref 0–45)
BASE DEFICIT BLDV-SCNC: 12.1 MMOL/L
BASOPHILS # BLD AUTO: 0.1 10E9/L (ref 0–0.2)
BASOPHILS NFR BLD AUTO: 0.2 %
BILIRUB SERPL-MCNC: 1.1 MG/DL (ref 0.2–1.3)
BUN SERPL-MCNC: 9 MG/DL (ref 7–30)
CALCIUM SERPL-MCNC: 9.6 MG/DL (ref 8.5–10.1)
CHLORIDE SERPL-SCNC: 93 MMOL/L (ref 94–109)
CO2 SERPL-SCNC: 10 MMOL/L (ref 20–32)
CREAT SERPL-MCNC: 0.57 MG/DL (ref 0.52–1.04)
DIFFERENTIAL METHOD BLD: ABNORMAL
EOSINOPHIL # BLD AUTO: 0 10E9/L (ref 0–0.7)
EOSINOPHIL NFR BLD AUTO: 0 %
ERYTHROCYTE [DISTWIDTH] IN BLOOD BY AUTOMATED COUNT: 12.5 % (ref 10–15)
GFR SERPL CREATININE-BSD FRML MDRD: >90 ML/MIN/{1.73_M2}
GLUCOSE SERPL-MCNC: 122 MG/DL (ref 70–99)
HCO3 BLDV-SCNC: 13 MMOL/L (ref 21–28)
HCT VFR BLD AUTO: 49.3 % (ref 35–47)
HGB BLD-MCNC: 17.2 G/DL (ref 11.7–15.7)
IMM GRANULOCYTES # BLD: 0.2 10E9/L (ref 0–0.4)
IMM GRANULOCYTES NFR BLD: 0.7 %
KETONES BLD-SCNC: 6.1 MMOL/L (ref 0–0.6)
LACTATE BLD-SCNC: 1.3 MMOL/L (ref 0.7–2)
LIPASE SERPL-CCNC: 5013 U/L (ref 73–393)
LYMPHOCYTES # BLD AUTO: 0.6 10E9/L (ref 0.8–5.3)
LYMPHOCYTES NFR BLD AUTO: 2.6 %
MAGNESIUM SERPL-MCNC: 2.1 MG/DL (ref 1.6–2.3)
MCH RBC QN AUTO: 33.9 PG (ref 26.5–33)
MCHC RBC AUTO-ENTMCNC: 34.9 G/DL (ref 31.5–36.5)
MCV RBC AUTO: 97 FL (ref 78–100)
MONOCYTES # BLD AUTO: 1.3 10E9/L (ref 0–1.3)
MONOCYTES NFR BLD AUTO: 5.9 %
NEUTROPHILS # BLD AUTO: 19.8 10E9/L (ref 1.6–8.3)
NEUTROPHILS NFR BLD AUTO: 90.6 %
NRBC # BLD AUTO: 0 10*3/UL
NRBC BLD AUTO-RTO: 0 /100
O2/TOTAL GAS SETTING VFR VENT: ABNORMAL %
PCO2 BLDV: 28 MM HG (ref 40–50)
PH BLDV: 7.27 PH (ref 7.32–7.43)
PHOSPHATE SERPL-MCNC: 2.4 MG/DL (ref 2.5–4.5)
PLATELET # BLD AUTO: 276 10E9/L (ref 150–450)
PO2 BLDV: 39 MM HG (ref 25–47)
POTASSIUM SERPL-SCNC: 2.9 MMOL/L (ref 3.4–5.3)
PROT SERPL-MCNC: 9.6 G/DL (ref 6.8–8.8)
RBC # BLD AUTO: 5.07 10E12/L (ref 3.8–5.2)
SODIUM SERPL-SCNC: 129 MMOL/L (ref 133–144)
WBC # BLD AUTO: 21.9 10E9/L (ref 4–11)

## 2020-08-16 PROCEDURE — 12000000 ZZH R&B MED SURG/OB

## 2020-08-16 PROCEDURE — U0003 INFECTIOUS AGENT DETECTION BY NUCLEIC ACID (DNA OR RNA); SEVERE ACUTE RESPIRATORY SYNDROME CORONAVIRUS 2 (SARS-COV-2) (CORONAVIRUS DISEASE [COVID-19]), AMPLIFIED PROBE TECHNIQUE, MAKING USE OF HIGH THROUGHPUT TECHNOLOGIES AS DESCRIBED BY CMS-2020-01-R: HCPCS | Performed by: PHYSICIAN ASSISTANT

## 2020-08-16 PROCEDURE — 96375 TX/PRO/DX INJ NEW DRUG ADDON: CPT

## 2020-08-16 PROCEDURE — 82803 BLOOD GASES ANY COMBINATION: CPT | Performed by: PHYSICIAN ASSISTANT

## 2020-08-16 PROCEDURE — HZ2ZZZZ DETOXIFICATION SERVICES FOR SUBSTANCE ABUSE TREATMENT: ICD-10-PCS | Performed by: INTERNAL MEDICINE

## 2020-08-16 PROCEDURE — 99223 1ST HOSP IP/OBS HIGH 75: CPT | Mod: AI | Performed by: INTERNAL MEDICINE

## 2020-08-16 PROCEDURE — 25000132 ZZH RX MED GY IP 250 OP 250 PS 637: Performed by: PHYSICIAN ASSISTANT

## 2020-08-16 PROCEDURE — 25000128 H RX IP 250 OP 636: Performed by: PHYSICIAN ASSISTANT

## 2020-08-16 PROCEDURE — 93005 ELECTROCARDIOGRAM TRACING: CPT

## 2020-08-16 PROCEDURE — 96361 HYDRATE IV INFUSION ADD-ON: CPT

## 2020-08-16 PROCEDURE — 36415 COLL VENOUS BLD VENIPUNCTURE: CPT | Performed by: PHYSICIAN ASSISTANT

## 2020-08-16 PROCEDURE — 85025 COMPLETE CBC W/AUTO DIFF WBC: CPT | Performed by: PHYSICIAN ASSISTANT

## 2020-08-16 PROCEDURE — 83690 ASSAY OF LIPASE: CPT | Performed by: PHYSICIAN ASSISTANT

## 2020-08-16 PROCEDURE — 25800030 ZZH RX IP 258 OP 636: Performed by: PHYSICIAN ASSISTANT

## 2020-08-16 PROCEDURE — 25000125 ZZHC RX 250: Performed by: PHYSICIAN ASSISTANT

## 2020-08-16 PROCEDURE — 80053 COMPREHEN METABOLIC PANEL: CPT | Performed by: PHYSICIAN ASSISTANT

## 2020-08-16 PROCEDURE — 25000128 H RX IP 250 OP 636: Performed by: INTERNAL MEDICINE

## 2020-08-16 PROCEDURE — 96365 THER/PROPH/DIAG IV INF INIT: CPT | Mod: 59

## 2020-08-16 PROCEDURE — 74177 CT ABD & PELVIS W/CONTRAST: CPT

## 2020-08-16 PROCEDURE — 83605 ASSAY OF LACTIC ACID: CPT | Performed by: PHYSICIAN ASSISTANT

## 2020-08-16 PROCEDURE — 82010 KETONE BODYS QUAN: CPT | Performed by: PHYSICIAN ASSISTANT

## 2020-08-16 PROCEDURE — 25000132 ZZH RX MED GY IP 250 OP 250 PS 637: Performed by: INTERNAL MEDICINE

## 2020-08-16 PROCEDURE — 84100 ASSAY OF PHOSPHORUS: CPT | Performed by: PHYSICIAN ASSISTANT

## 2020-08-16 PROCEDURE — C9803 HOPD COVID-19 SPEC COLLECT: HCPCS

## 2020-08-16 PROCEDURE — 84450 TRANSFERASE (AST) (SGOT): CPT | Performed by: PHYSICIAN ASSISTANT

## 2020-08-16 PROCEDURE — 99285 EMERGENCY DEPT VISIT HI MDM: CPT | Mod: 25

## 2020-08-16 PROCEDURE — 83735 ASSAY OF MAGNESIUM: CPT | Performed by: PHYSICIAN ASSISTANT

## 2020-08-16 RX ORDER — LORAZEPAM 1 MG/1
1-2 TABLET ORAL EVERY 30 MIN PRN
Status: DISCONTINUED | OUTPATIENT
Start: 2020-08-16 | End: 2020-08-19

## 2020-08-16 RX ORDER — LANOLIN ALCOHOL/MO/W.PET/CERES
100 CREAM (GRAM) TOPICAL DAILY
Status: DISCONTINUED | OUTPATIENT
Start: 2020-08-24 | End: 2020-08-21 | Stop reason: HOSPADM

## 2020-08-16 RX ORDER — IOPAMIDOL 755 MG/ML
500 INJECTION, SOLUTION INTRAVASCULAR ONCE
Status: COMPLETED | OUTPATIENT
Start: 2020-08-16 | End: 2020-08-16

## 2020-08-16 RX ORDER — LORAZEPAM 2 MG/ML
1-2 INJECTION INTRAMUSCULAR EVERY 30 MIN PRN
Status: DISCONTINUED | OUTPATIENT
Start: 2020-08-16 | End: 2020-08-19

## 2020-08-16 RX ORDER — POTASSIUM CHLORIDE 1.5 G/1.58G
20-40 POWDER, FOR SOLUTION ORAL
Status: DISCONTINUED | OUTPATIENT
Start: 2020-08-16 | End: 2020-08-21 | Stop reason: HOSPADM

## 2020-08-16 RX ORDER — POTASSIUM CHLORIDE 7.45 MG/ML
10 INJECTION INTRAVENOUS
Status: DISCONTINUED | OUTPATIENT
Start: 2020-08-16 | End: 2020-08-21 | Stop reason: HOSPADM

## 2020-08-16 RX ORDER — ONDANSETRON 2 MG/ML
4 INJECTION INTRAMUSCULAR; INTRAVENOUS EVERY 6 HOURS PRN
Status: DISCONTINUED | OUTPATIENT
Start: 2020-08-16 | End: 2020-08-21 | Stop reason: HOSPADM

## 2020-08-16 RX ORDER — PROCHLORPERAZINE 25 MG
25 SUPPOSITORY, RECTAL RECTAL EVERY 12 HOURS PRN
Status: DISCONTINUED | OUTPATIENT
Start: 2020-08-16 | End: 2020-08-21 | Stop reason: HOSPADM

## 2020-08-16 RX ORDER — OXYCODONE HYDROCHLORIDE 5 MG/1
5-10 TABLET ORAL
Status: DISCONTINUED | OUTPATIENT
Start: 2020-08-16 | End: 2020-08-19

## 2020-08-16 RX ORDER — ACETAMINOPHEN 325 MG/1
650 TABLET ORAL EVERY 4 HOURS PRN
Status: DISCONTINUED | OUTPATIENT
Start: 2020-08-16 | End: 2020-08-19

## 2020-08-16 RX ORDER — LANOLIN ALCOHOL/MO/W.PET/CERES
100 CREAM (GRAM) TOPICAL 3 TIMES DAILY
Status: DISCONTINUED | OUTPATIENT
Start: 2020-08-19 | End: 2020-08-21 | Stop reason: HOSPADM

## 2020-08-16 RX ORDER — HYDROMORPHONE HYDROCHLORIDE 1 MG/ML
.3-.5 INJECTION, SOLUTION INTRAMUSCULAR; INTRAVENOUS; SUBCUTANEOUS
Status: DISCONTINUED | OUTPATIENT
Start: 2020-08-16 | End: 2020-08-18

## 2020-08-16 RX ORDER — AMOXICILLIN 250 MG
1 CAPSULE ORAL 2 TIMES DAILY PRN
Status: DISCONTINUED | OUTPATIENT
Start: 2020-08-16 | End: 2020-08-21 | Stop reason: HOSPADM

## 2020-08-16 RX ORDER — POTASSIUM CHLORIDE 1500 MG/1
20-40 TABLET, EXTENDED RELEASE ORAL
Status: DISCONTINUED | OUTPATIENT
Start: 2020-08-16 | End: 2020-08-21 | Stop reason: HOSPADM

## 2020-08-16 RX ORDER — POLYETHYLENE GLYCOL 3350 17 G/17G
17 POWDER, FOR SOLUTION ORAL DAILY PRN
Status: DISCONTINUED | OUTPATIENT
Start: 2020-08-16 | End: 2020-08-21 | Stop reason: HOSPADM

## 2020-08-16 RX ORDER — FLUMAZENIL 0.1 MG/ML
0.2 INJECTION, SOLUTION INTRAVENOUS
Status: DISCONTINUED | OUTPATIENT
Start: 2020-08-16 | End: 2020-08-21 | Stop reason: HOSPADM

## 2020-08-16 RX ORDER — POTASSIUM CL/LIDO/0.9 % NACL 10MEQ/0.1L
10 INTRAVENOUS SOLUTION, PIGGYBACK (ML) INTRAVENOUS
Status: COMPLETED | OUTPATIENT
Start: 2020-08-16 | End: 2020-08-16

## 2020-08-16 RX ORDER — MULTIPLE VITAMINS W/ MINERALS TAB 9MG-400MCG
1 TAB ORAL DAILY
Status: DISCONTINUED | OUTPATIENT
Start: 2020-08-17 | End: 2020-08-21 | Stop reason: HOSPADM

## 2020-08-16 RX ORDER — TRAZODONE HYDROCHLORIDE 50 MG/1
50 TABLET, FILM COATED ORAL
Status: DISCONTINUED | OUTPATIENT
Start: 2020-08-16 | End: 2020-08-21 | Stop reason: HOSPADM

## 2020-08-16 RX ORDER — NICOTINE 21 MG/24HR
1 PATCH, TRANSDERMAL 24 HOURS TRANSDERMAL DAILY
Status: DISCONTINUED | OUTPATIENT
Start: 2020-08-16 | End: 2020-08-21 | Stop reason: HOSPADM

## 2020-08-16 RX ORDER — POTASSIUM CL/LIDO/0.9 % NACL 10MEQ/0.1L
10 INTRAVENOUS SOLUTION, PIGGYBACK (ML) INTRAVENOUS
Status: DISCONTINUED | OUTPATIENT
Start: 2020-08-16 | End: 2020-08-21 | Stop reason: HOSPADM

## 2020-08-16 RX ORDER — OLANZAPINE 5 MG/1
5-10 TABLET, ORALLY DISINTEGRATING ORAL EVERY 6 HOURS PRN
Status: DISCONTINUED | OUTPATIENT
Start: 2020-08-16 | End: 2020-08-19

## 2020-08-16 RX ORDER — SUCRALFATE 1 G/1
1 TABLET ORAL ONCE
Status: COMPLETED | OUTPATIENT
Start: 2020-08-16 | End: 2020-08-16

## 2020-08-16 RX ORDER — SODIUM CHLORIDE 9 MG/ML
INJECTION, SOLUTION INTRAVENOUS CONTINUOUS
Status: DISCONTINUED | OUTPATIENT
Start: 2020-08-16 | End: 2020-08-16

## 2020-08-16 RX ORDER — METOCLOPRAMIDE HYDROCHLORIDE 5 MG/ML
10 INJECTION INTRAMUSCULAR; INTRAVENOUS ONCE
Status: COMPLETED | OUTPATIENT
Start: 2020-08-16 | End: 2020-08-16

## 2020-08-16 RX ORDER — ONDANSETRON 2 MG/ML
4 INJECTION INTRAMUSCULAR; INTRAVENOUS EVERY 30 MIN PRN
Status: DISCONTINUED | OUTPATIENT
Start: 2020-08-16 | End: 2020-08-16

## 2020-08-16 RX ORDER — DIPHENHYDRAMINE HYDROCHLORIDE 50 MG/ML
25 INJECTION INTRAMUSCULAR; INTRAVENOUS ONCE
Status: COMPLETED | OUTPATIENT
Start: 2020-08-16 | End: 2020-08-16

## 2020-08-16 RX ORDER — HALOPERIDOL 5 MG/ML
2.5-5 INJECTION INTRAMUSCULAR EVERY 6 HOURS PRN
Status: DISCONTINUED | OUTPATIENT
Start: 2020-08-16 | End: 2020-08-19

## 2020-08-16 RX ORDER — ONDANSETRON 4 MG/1
4 TABLET, ORALLY DISINTEGRATING ORAL EVERY 6 HOURS PRN
Status: DISCONTINUED | OUTPATIENT
Start: 2020-08-16 | End: 2020-08-21 | Stop reason: HOSPADM

## 2020-08-16 RX ORDER — FOLIC ACID 1 MG/1
1 TABLET ORAL DAILY
Status: DISCONTINUED | OUTPATIENT
Start: 2020-08-17 | End: 2020-08-19

## 2020-08-16 RX ORDER — SODIUM CHLORIDE AND POTASSIUM CHLORIDE 150; 900 MG/100ML; MG/100ML
INJECTION, SOLUTION INTRAVENOUS CONTINUOUS
Status: DISCONTINUED | OUTPATIENT
Start: 2020-08-16 | End: 2020-08-19

## 2020-08-16 RX ORDER — LIDOCAINE 40 MG/G
CREAM TOPICAL
Status: DISCONTINUED | OUTPATIENT
Start: 2020-08-16 | End: 2020-08-21 | Stop reason: HOSPADM

## 2020-08-16 RX ORDER — POTASSIUM CHLORIDE 29.8 MG/ML
20 INJECTION INTRAVENOUS
Status: DISCONTINUED | OUTPATIENT
Start: 2020-08-16 | End: 2020-08-16

## 2020-08-16 RX ORDER — MAGNESIUM SULFATE HEPTAHYDRATE 40 MG/ML
4 INJECTION, SOLUTION INTRAVENOUS EVERY 4 HOURS PRN
Status: DISCONTINUED | OUTPATIENT
Start: 2020-08-16 | End: 2020-08-21 | Stop reason: HOSPADM

## 2020-08-16 RX ORDER — LANOLIN ALCOHOL/MO/W.PET/CERES
200 CREAM (GRAM) TOPICAL 3 TIMES DAILY
Status: COMPLETED | OUTPATIENT
Start: 2020-08-17 | End: 2020-08-18

## 2020-08-16 RX ORDER — PROCHLORPERAZINE MALEATE 5 MG
10 TABLET ORAL EVERY 6 HOURS PRN
Status: DISCONTINUED | OUTPATIENT
Start: 2020-08-16 | End: 2020-08-21 | Stop reason: HOSPADM

## 2020-08-16 RX ORDER — AMOXICILLIN 250 MG
2 CAPSULE ORAL 2 TIMES DAILY PRN
Status: DISCONTINUED | OUTPATIENT
Start: 2020-08-16 | End: 2020-08-21 | Stop reason: HOSPADM

## 2020-08-16 RX ORDER — HYDROMORPHONE HYDROCHLORIDE 1 MG/ML
0.5 INJECTION, SOLUTION INTRAMUSCULAR; INTRAVENOUS; SUBCUTANEOUS
Status: DISCONTINUED | OUTPATIENT
Start: 2020-08-16 | End: 2020-08-16

## 2020-08-16 RX ORDER — NALOXONE HYDROCHLORIDE 0.4 MG/ML
.1-.4 INJECTION, SOLUTION INTRAMUSCULAR; INTRAVENOUS; SUBCUTANEOUS
Status: DISCONTINUED | OUTPATIENT
Start: 2020-08-16 | End: 2020-08-21 | Stop reason: HOSPADM

## 2020-08-16 RX ADMIN — ENOXAPARIN SODIUM 40 MG: 40 INJECTION SUBCUTANEOUS at 19:41

## 2020-08-16 RX ADMIN — METOCLOPRAMIDE HYDROCHLORIDE 10 MG: 5 INJECTION INTRAMUSCULAR; INTRAVENOUS at 14:50

## 2020-08-16 RX ADMIN — OXYCODONE HYDROCHLORIDE 5 MG: 5 TABLET ORAL at 21:53

## 2020-08-16 RX ADMIN — SODIUM CHLORIDE 1000 ML: 9 INJECTION, SOLUTION INTRAVENOUS at 14:48

## 2020-08-16 RX ADMIN — NICOTINE 1 PATCH: 21 PATCH, EXTENDED RELEASE TRANSDERMAL at 19:41

## 2020-08-16 RX ADMIN — SODIUM CHLORIDE 55 ML: 9 INJECTION, SOLUTION INTRAVENOUS at 16:35

## 2020-08-16 RX ADMIN — IOPAMIDOL 60 ML: 755 INJECTION, SOLUTION INTRAVENOUS at 16:35

## 2020-08-16 RX ADMIN — HYDROMORPHONE HYDROCHLORIDE 0.5 MG: 1 INJECTION, SOLUTION INTRAMUSCULAR; INTRAVENOUS; SUBCUTANEOUS at 17:19

## 2020-08-16 RX ADMIN — DIPHENHYDRAMINE HYDROCHLORIDE 25 MG: 50 INJECTION, SOLUTION INTRAMUSCULAR; INTRAVENOUS at 14:50

## 2020-08-16 RX ADMIN — ONDANSETRON 4 MG: 4 TABLET, ORALLY DISINTEGRATING ORAL at 19:59

## 2020-08-16 RX ADMIN — Medication 10 MEQ: at 19:33

## 2020-08-16 RX ADMIN — HYDROMORPHONE HYDROCHLORIDE 0.5 MG: 1 INJECTION, SOLUTION INTRAMUSCULAR; INTRAVENOUS; SUBCUTANEOUS at 19:59

## 2020-08-16 RX ADMIN — Medication 10 MEQ: at 18:04

## 2020-08-16 RX ADMIN — Medication 10 MEQ: at 19:32

## 2020-08-16 RX ADMIN — TRAZODONE HYDROCHLORIDE 50 MG: 50 TABLET ORAL at 21:57

## 2020-08-16 RX ADMIN — SUCRALFATE 1 G: 1 TABLET ORAL at 15:13

## 2020-08-16 RX ADMIN — ONDANSETRON 4 MG: 2 INJECTION INTRAMUSCULAR; INTRAVENOUS at 14:50

## 2020-08-16 RX ADMIN — POTASSIUM CHLORIDE: 2 INJECTION, SOLUTION, CONCENTRATE INTRAVENOUS at 17:08

## 2020-08-16 ASSESSMENT — ENCOUNTER SYMPTOMS
DIARRHEA: 0
COUGH: 0
FEVER: 0
ABDOMINAL PAIN: 1
CHILLS: 1
APPETITE CHANGE: 1
NAUSEA: 1
WEAKNESS: 1
SHORTNESS OF BREATH: 0
VOMITING: 1

## 2020-08-16 ASSESSMENT — ACTIVITIES OF DAILY LIVING (ADL): ADLS_ACUITY_SCORE: 13

## 2020-08-16 ASSESSMENT — MIFFLIN-ST. JEOR: SCORE: 1122.04

## 2020-08-16 NOTE — ED TRIAGE NOTES
"Pt presents with epigastric pain and N/V \"for a long time but really bad that last week\". Pt dry heaving in triage. Also c/o of weakness and fatigue. Tachycardic in triage.   " No

## 2020-08-16 NOTE — ED NOTES
[facility]  ED Nurse Handoff Report    Audelia Comer is a 54 year old female   ED Chief complaint: Abdominal Pain and Nausea & Vomiting  . ED Diagnosis:   Final diagnoses:   Acute pancreatitis without infection or necrosis, unspecified pancreatitis type     Allergies: No Known Allergies    Code Status: Full Code  Activity level - Baseline/Home:  Independent. Activity Level - Current:   Stand by Assist. Lift room needed: No. Bariatric: No   Needed: No   Isolation: No. Infection: Not Applicable.     Vital Signs:   Vitals:    08/16/20 1545 08/16/20 1600 08/16/20 1615 08/16/20 1708   BP: (!) 157/102 (!) 140/95 (!) 147/93 (!) 140/91   Pulse: 88 91 88 89   Resp:       Temp:       TempSrc:       SpO2: 94% 92% 93% 100%       Cardiac Rhythm:  ,      Pain level: 0-10 Pain Scale: 10  Patient confused: No. Patient Falls Risk: No.   Elimination Status: Denies need to void during ER stay   Patient Report - Initial Complaint: N/V. Focused Assessment: Pt presents with three weeks of abd pain with nausea and vomiting. Denies urinary issues or issues with bowel movements.    Tests Performed: Labs, CT. Abnormal Results: Lipase elevated, pancreatitis on CT.   Treatments provided: Meds  Family Comments: NA  OBS brochure/video discussed/provided to patient:  N/A  ED Medications:   Medications   ondansetron (ZOFRAN) injection 4 mg (4 mg Intravenous Given 8/16/20 1450)   0.9% sodium chloride BOLUS (0 mLs Intravenous Stopped 8/16/20 1614)     Followed by   sodium chloride 0.9% infusion (has no administration in time range)   sucralfate (CARAFATE) tablet 1 g (1 g Oral Given 8/16/20 1513)   metoclopramide (REGLAN) injection 10 mg (10 mg Intravenous Given 8/16/20 1450)   diphenhydrAMINE (BENADRYL) injection 25 mg (25 mg Intravenous Given 8/16/20 1450)   dextrose 5% and 0.9% NaCl 1,000 mL with Infuvite Adult 10 mL, thiamine 100 mg, folic acid 1 mg, potassium chloride 20 mEq infusion ( Intravenous New Bag 8/16/20  1708)   CT Scan Flush (55 mLs Intravenous Given 8/16/20 1635)   iopamidol (ISOVUE-370) solution 500 mL (60 mLs Intravenous Given 8/16/20 1635)     Drips infusing:  No  For the majority of the shift, the patient's behavior Green. Interventions performed were NA.    Sepsis treatment initiated: No       ED Nurse Name/Phone Number: Berenice Smith RN,   5:11 PM    RECEIVING UNIT ED HANDOFF REVIEW    Above ED Nurse Handoff Report was reviewed: Yes  Reviewed by: Kim Huizar RN on August 16, 2020 at 6:27 PM

## 2020-08-16 NOTE — ED PROVIDER NOTES
History     Chief Complaint:  Abdominal Pain and Nausea & Vomiting       HPI   Audelia Comer is a 54 year old female who presents with abdominal pain, nausea, and vomiting. Per chart review the patient was here on 7/28 for 3 weeks of nausea, vomiting, cough, and epigastric pain. The patient had noted at the time that she had been drinking more since quarantine had started. Imaging and laboratory work obtained, result below. The patient declined admission and was discharged on Reglan and Protonix. The patient says that her nausea and abdominal pain have been worsening over the past week. She says that she is unable to eat anything due to the nausea. The patient says that since yesterday she has been feeling fatigued and weak. She also endorses chills. She denies any fever, cough, chest pain, shortness of breath, or diarrhea.       Imaging and laboratory results from 7/28/2020  XR Chest Port   Old right rib fractures. Breast prostheses. Prominent right nipple shadow. Otherwise negative.      Abdomen US, limited RUQ only   Fatty liver. No gallstones.      CBC: WBC 12.6 (H), HGB 16.0 (H),   BMP:  (L), Potassium 3.0 (L), Chloride 92 (L), CO2 17 (L), Anion Gap 19 (H), Glucose 67 (L), BUN 5 (L) o/w WNL (Creatinine 0.48)  Hepatic panel: Bilirubin direct 0.5 (H), Protein total 9.1 (H), AlkPhos 187 (H),  (H),  (H)  Lipase: 584 (H)  EtOH: 0.07  Glucose by meter: 107 (H)  Lactic Acid (Collected 1735): 1.9      UA w Micro: Ketones >150 (A), Albumin 70 (A), Leukocyte esterase moderate (A), WBC/HPF 19 (H), Squamous epithelial/HPF 4 (H), Mucous present (A), Hyaline casts 6 (H)      Allergies:  No Known Drug Allergies      Medications:    Reglan  Prilosec  Protonix     Past Medical History:    Anxiety  Depressive  disorder  SBO    Past Surgical History:    Appendectomy  C section  Breast implants      Family History:    Family history reviewed. No pertinent family history.      Social  History:  Smoking Status: Current Smoker  Smokeless Tobacco: Never Used  Alcohol Use: Yes  Drug Use: No  PCP: Randee Woods     Review of Systems   Constitutional: Positive for appetite change and chills. Negative for fever.   Respiratory: Negative for cough and shortness of breath.    Cardiovascular: Negative for chest pain.   Gastrointestinal: Positive for abdominal pain, nausea and vomiting. Negative for diarrhea.   Neurological: Positive for weakness.   All other systems reviewed and are negative.      Physical Exam     Patient Vitals for the past 24 hrs:   BP Temp Temp src Pulse Heart Rate Resp SpO2   08/16/20 1800 (!) 145/104 -- -- 86 -- -- 98 %   08/16/20 1745 (!) 145/92 -- -- 86 -- -- 100 %   08/16/20 1730 (!) 146/93 -- -- 88 -- -- 100 %   08/16/20 1715 132/86 -- -- 90 -- -- 100 %   08/16/20 1708 (!) 140/91 -- -- 89 -- -- 100 %   08/16/20 1615 (!) 147/93 -- -- 88 -- -- 93 %   08/16/20 1600 (!) 140/95 -- -- 91 -- -- 92 %   08/16/20 1545 (!) 157/102 -- -- 88 -- -- 94 %   08/16/20 1530 (!) 152/103 -- -- 88 -- -- 92 %   08/16/20 1515 (!) 166/97 -- -- 93 -- -- 93 %   08/16/20 1500 (!) 153/105 -- -- 91 -- -- 91 %   08/16/20 1430 (!) 156/110 -- -- 102 -- -- 96 %   08/16/20 1415 (!) 155/109 -- -- 100 -- -- 97 %   08/16/20 1400 (!) 138/103 -- -- 101 -- -- 98 %   08/16/20 1345 (!) 149/110 -- -- 120 -- -- 98 %   08/16/20 1334 (!) 141/107 97.9  F (36.6  C) Temporal -- 120 18 98 %        Physical Exam  Constitutional: Pleasant. Cooperative.   Eyes: Pupils equally round and reactive  HENT: Head is normal in appearance. Dry mucous membranes.  Cardiovascular: Tachycardic. Regular rhythm, and without murmurs.  Respiratory: Normal respiratory effort, lungs are clear bilaterally.  GI: TTP of upper abdomen. Otherwise non-tender, soft, non-distended. No guarding, rebound, or rigidity.  Musculoskeletal: No asymmetry of the lower extremities, no tenderness to palpation.   Skin: Normal, without rash.  Neurologic: Cranial nerves  grossly intact, normal cognition, no focal deficits. Alert and oriented x 3.   Psychiatric: Normal affect.  Nursing notes and vital signs reviewed.      Emergency Department Course   ECG:  ECG taken at 1803, ECG read at 1810  Normal sinus rhythm  Nonspecific ST and T wave abnormality  Prolonged QT  Abnormal ECG  Rate 84 bpm. NH interval 148 ms. QRS duration 80 ms. QT/QTc 426/503 ms. P-R-T axes 70 75 63.     Imaging:  Radiology findings were communicated with the patient who voiced understanding of the findings.    CT Abdomen/Pelvis w contrast:   1. Acute uncomplicated pancreatitis.   2. Severe hepatic steatosis, question alcoholic steatohepatitis.     Reading per radiology    Laboratory:  Laboratory findings were communicated with the patient who voiced understanding of the findings.    AST: 116(H)  Blood Gas venous: pH 7.27 (L), PCO2 28 (L), PO2 39, Bicarbonate 13 (L), O2 Sat 98%  Ketone quantitative: 6.1 (HH)   CBC: WBC 21.9 (H), HGB 17.2 (H),   CMP:  (L), potasium 2.0 (L), chloride 93 (L), carbon dioxide 10 (LL), anion gap 26 (H),  (H), protein total 9.6 (H), ALKPHOS 212 (H), ALT 85 (H) o/w WNL (Creatinine 0.57)  Lipase: 5013 (H)  Lactic Acid (Resulted: 1458): 1.3     Interventions:  Medications   ondansetron (ZOFRAN) injection 4 mg (4 mg Intravenous Given 8/16/20 1450)   0.9% sodium chloride BOLUS (0 mLs Intravenous Stopped 8/16/20 1614)     Followed by   sodium chloride 0.9% infusion ( Intravenous Not Given 8/16/20 1746)   HYDROmorphone (PF) (DILAUDID) injection 0.5 mg (0.5 mg Intravenous Given 8/16/20 1719)   potassium chloride 10 mEq in 100 mL intermittent infusion with 10 mg lidocaine (10 mEq Intravenous New Bag 8/16/20 1804)   sucralfate (CARAFATE) tablet 1 g (1 g Oral Given 8/16/20 1513)   metoclopramide (REGLAN) injection 10 mg (10 mg Intravenous Given 8/16/20 1450)   diphenhydrAMINE (BENADRYL) injection 25 mg (25 mg Intravenous Given 8/16/20 0850)   dextrose 5% and 0.9% NaCl 1,000 mL  with Infuvite Adult 10 mL, thiamine 100 mg, folic acid 1 mg, potassium chloride 20 mEq infusion ( Intravenous New Bag 8/16/20 1708)   CT Scan Flush (55 mLs Intravenous Given 8/16/20 1635)   iopamidol (ISOVUE-370) solution 500 mL (60 mLs Intravenous Given 8/16/20 1635)       Emergency Department Course:    1358 Nursing notes and vitals reviewed.    1423 I performed an exam of the patient as documented above.     1447 IV was inserted and blood was drawn for laboratory testing, results above.     1543 Patient rechecked and updated.      1554 IV was inserted and blood was drawn for laboratory testing, results above.     1627 The patient was sent for a CT while in the emergency department, results above.      1650 I staffed the patient with Dr. Huang.    1710 Findings and plan explained to the Patient who consents to admission. Discussed the patient with Dr. Deng, who will admit the patient to a medical bed for further monitoring, evaluation, and treatment.      Impression & Plan      Medical Decision Making:  Audelia Comer is a 54 year old female who presents to the emergency department today for evaluation of abdominal pain, nausea, and vomiting. Patient has a history of alcoholic pancreatitis and has been consuming alcohol recently, last drink on Tuesday. Her pain has been present over the past 3 weeks and has been worsening. See HPI as above for additional details. Vitals and physical exam as above. DDx was broad and included pancreatitis, hepatitis, gallbladder pathology, PUD, atypical MI, among others. The above work up was obtained. Patient has evidence for acute uncomplicated pancreatitis. Suspect her ketosis is secondary to alcohol vs starvation. Discussed admission for pain control, electrolyte repletion, resolution of her ketoacidosis. Patient made NPO. Patient in agreement for admission. Patient discussed with hospitalist. Patient admitted in stable condition.    Diagnosis:    ICD-10-CM    1. Acute  pancreatitis without infection or necrosis, unspecified pancreatitis type  K85.90 AST     Asymptomatic COVID-19 Virus (Coronavirus) by PCR   2. Ketoacidosis  E87.2    3. Leukocytosis  D72.829    4. Hyponatremia  E87.1    5. Hypokalemia  E87.6    6. Elevated LFTs  R79.89      Disposition:   The patient is admitted into the care of Dr. Deng.     Scribe Disclosure:  I, Marco A Peterson, am serving as a scribe at 2:05 PM on 8/16/2020 to document services personally performed by Carlos Atkins PA-C based on my observations and the provider's statements to me.     Scribe Disclosure:  I, Fatimah Stout, am serving as a scribe at 5:21 PM on 8/16/2020 to document services personally performed by Carlos Atkins PA-C based on my observations and the provider's statements to me.     This record was created at least in part using electronic voice recognition software, so please excuse any typographical errors.       Carlos Atkins PA-C  08/16/20 [id number]

## 2020-08-16 NOTE — H&P
[facility]  Hospitalist Admission Note  Name: Audelia Comer    MRN: [id number]  YOB: 1965    Age: 54 year old  Date of admission: 8/16/2020  Primary care provider: Randee Woods    Chief Complaint:  Abdominal pain, nausea, emesis, ETOH    Assessment and Plan:     Audelia Comer is a 54 year old female with PMH including anxiety, GERD and alcohol use disorder who was admitted on 8/16/2020 with several week history of abdominal pain, nausea, vomiting and poor oral intake in the setting of daily alcohol use and was found to have acute alcohol induced pancreatitis.    1.  Acute alcohol induced pancreatitis: Patient has been drinking daily, alcohol intake has increased with the stress started COVID-19 pandemic.  She has been dealing with abdominal pain for at least the past 2 weeks which is been progressive in nature and associated with nausea and vomiting.  She has been eating and drinking very little due to her symptoms.  Lipase is elevated approximately 5000.  She has transaminitis which is likely due to her alcohol use.  She did have an abdominal ultrasound on 7/28 when she was seen in the emergency room for similar symptoms which showed fatty liver but no gallstones.  CT today showed acute uncomplicated pancreatitis with severe hepatic steatosis, potential alcohol steatosis.  She will be treated symptomatically for acute pancreatitis.  This is certainly alcohol induced and she is aware of this.  - Normal saline with potassium at 175 cc/h  -CMP tomorrow  -Pain control with IV Dilaudid, oral oxycodone or oral Tylenol  -Antiemetics as needed  -Needs to avoid alcohol use    2.  Acute alcohol transaminitis: Transaminitis consistent with alcohol use pattern.  Patient is aware that she needs to cut back on her drinking.  Repeat tomorrow.    3.  Leukocytosis: Patient has significant leukocytosis with white count of 21.9.  She has not had fevers.  CT showed acute uncomplicated  pancreatitis no other source of infection.  I suspect she is quite dehydrated.  Antibiotics not indicated at this time.  IV fluids for treatment of acute pancreatitis and repeat CBC tomorrow.    4.  Starvation/alcoholic ketosis: Patient has been drinking alcohol daily.  She has been eating very little due to abdominal pain and nausea from her acute pancreatitis.  Ketones are quite elevated 6.1.  For now she is n.p.o. due to her acute pancreatitis but will have IV fluids.  Will check ketones tomorrow.    5.  Anxiety: Restart medications once verified by pharmacy.    6.  GERD: Continue PPI.    7.  Tobacco use disorder: Patient currently smokes approximately 1.5 packs/day.  She is interested in nicotine patch.    8.  Alcohol use disorder: Patient admits that she has been drinking more since a COVID-19 pandemic.  She drinks nightly in order to calm her self down so she go to bed.  She cannot tell me exactly how much she drinks but reports that she drinks less than a bottle of hard alcohol a week.  She reports that she has not experienced alcohol withdrawal in the past.  She did go to outpatient alcohol treatment approximate 14 years ago.  She is aware that she needs to cut back on her drinking.  She declines meeting with  or comp dependency at this time.  No evidence of withdrawal currently but will have CIWA protocol ordered with PRN Ativan.  -Vitamins  -CIWA protocol  -Discussed that she needs to abstain from alcohol  -Patient declines meeting with chemical dependency or     Diet: NPO for Medical/Clinical Reasons Except for: Meds    DVT Prophylaxis: Enoxaparin (Lovenox) SQ  Patino Catheter: not present  Code Status: Full code    Disposition Plan   Expected discharge: Admit inpatient status, recommended to prior living arrangement once Acute pancreatitis resolved.  Entered: Analia Deng MD 08/16/2020, 5:17 PM     The patient's care was discussed with the Patient.    Analia Deng,  MD  [facility]    History of Present Illness:  Audelia Comer is a 54 year old female with PMH including anxiety, GERD and alcohol use disorder who was admitted on 8/16/2020 with several week history of abdominal pain, nausea, vomiting and poor oral intake in the setting of daily alcohol use and was found to have acute alcohol induced pancreatitis. History was obtained through patient interview, chart review and discussion with TIANNA Griffith in the ER.    The patient reports that she has been extremely nervous since her COVID-19 pandemic.  Because of this and her work and difficulty managing her stress she start drinking too much in addition to not eating enough food and not eating the correct foods.  She reports that her supervisor at work recognized that she was suffering from her anxiety.  She reports that she needs to get back on track and cut back on her drinking.  She has been dealing with abdominal pain for at least the past 2 weeks.  The pain is consistent.  Over the past week it has become more intense.    She states that every time she tries to eat or drink she gets horrible epigastric pain.  At times the pain will radiate to her back, right upper quadrant her left upper quadrant.  She can tell she has lost weight because she really is not been able to eat or drink much at all over the past week.  Things have become worse and she knew that she was dehydrated and needed help.  She hardly stand today on her own.  She called her daughter who had to help her to the car and she could barely do that.  She has felt dizzy and lightheaded when she tries to move around but has not had syncope.  She has had chills but no fevers.  She has not had hematemesis, mostly sees bile or dry heaves that she is really not been able eat much or drink anything.    She has not had chest pain, cough, shortness of breath, diarrhea, constipation.    She states that she drinks every night after work.  She hoped  "drinks as much as I need to in order to calm down \"bad\".  She cannot tell me exactly how much she drinks but she drinks less than 1 bottle of hard alcohol per week.  She denies ever having alcohol withdrawal before.  She did go to outpatient alcohol treatment about 14 years ago.  She does not want meet with , dependency.     Past Medical History:  Past Medical History:   Diagnosis Date     Anxiety      Depressive disorder      SBO (small bowel obstruction) (H)      Past Surgical History:  Past Surgical History:   Procedure Laterality Date     APPENDECTOMY       GYN SURGERY      c section      SOFT TISSUE SURGERY      breast implants     Social History:  Social History     Tobacco Use     Smoking status: Current Every Day Smoker     Packs/day: 1.00     Smokeless tobacco: Never Used   Substance Use Topics     Alcohol use: Yes     Comment: daily      Social History     Social History Narrative     Not on file     Family History:  History reviewed. No pertinent family history.  Allergies:  No Known Allergies  Medications:  Current Facility-Administered Medications   Medication     HYDROmorphone (PF) (DILAUDID) injection 0.5 mg     ondansetron (ZOFRAN) injection 4 mg     potassium chloride 10 mEq in 100 mL intermittent infusion with 10 mg lidocaine     sodium chloride 0.9% infusion      Review of Systems:  A Comprehensive greater than 10 system review of systems was carried out.  Pertinent positives and negatives are noted above.  Otherwise negative for contributory information.     Physical Exam:  Blood pressure 132/86, pulse 90, temperature 97.9  F (36.6  C), temperature source Temporal, resp. rate 18, SpO2 100 %.  Wt Readings from Last 1 Encounters:   07/28/20 54.3 kg (119 lb 11.4 oz)     Exam:   General: Alert, awake, no acute distress  HEENT: NC/AT, eyes anicteric and without injection, EOMI, face symmetric.  Dentition WNL, MMM.  Cardiac: RRR, normal S1, S2.  No murmurs/g/r.  No LE edema  Pulmonary: " Normal chest rise, normal work of breathing.  Lungs CTAB  Abdomen: soft, tender in epigatsric region, non-distended.  Normoactive BS.  No guarding or rebound tenderness.  Extremities: no deformities.  Warm, well perfused.  Skin: no rashes or lesions noted.  Warm and Dry.  Neuro: No focal deficits noted.  Speech clear.  Coordination and strength grossly normal.  Psych: Appropriate affect. Alert and oriented x3    Data Reviewed Today:  Imaging:  Results for orders placed or performed during the hospital encounter of 08/16/20   CT Abdomen Pelvis w Contrast    Narrative    CT ABDOMEN PELVIS WITH CONTRAST 8/16/2020 4:44 PM    CLINICAL HISTORY: Upper abdominal pain.    TECHNIQUE: CT scan of the abdomen and pelvis was performed following  injection of IV contrast. Multiplanar reformats were obtained. Dose  reduction techniques were used.  CONTRAST: 60mL Isovue-370    COMPARISON: March 15, 2018    FINDINGS:   LOWER CHEST: Normal.    HEPATOBILIARY: Marked heterogeneous hepatic steatosis. Gallbladder  unremarkable.    PANCREAS: There is inflammatory change and enlargement of the pancreas  consistent with acute pancreatitis. The pancreas enhances throughout.  No definite encapsulated fluid collections to suggest abscess. Free  layering peripancreatic fluid is present.     SPLEEN: Normal size.    ADRENAL GLANDS: No significant nodules.    KIDNEYS/BLADDER: No significant mass, stones, or hydronephrosis.    BOWEL: No obstruction or inflammatory change.    PELVIC ORGANS: No pelvic masses.    ADDITIONAL FINDINGS: No ascites.    MUSCULOSKELETAL: Unremarkable.      Impression    IMPRESSION:  1. Acute uncomplicated pancreatitis.  2. Severe hepatic steatosis, question alcoholic steatohepatitis.    OCTAVIO MOMIN MD     Labs:  Recent Labs   Lab 08/16/20  1447   WBC 21.9*   HGB 17.2*   HCT 49.3*   MCV 97        Recent Labs   Lab 08/16/20  1645 08/16/20  1447   NA  --  129*   POTASSIUM  --  2.9*   CHLORIDE  --  93*   CO2  --  10*    ANIONGAP  --  26*   GLC  --  122*   BUN  --  9   CR  --  0.57   GFRESTIMATED  --  >90   GFRESTBLACK  --  >90   FRANCOISE  --  9.6   PROTTOTAL  --  9.6*   ALBUMIN  --  4.1   BILITOTAL  --  1.1   ALKPHOS  --  212*   * Canceled, Test credited   ALT  --  85*     Recent Labs   Lab 08/16/20  1447   LIPASE 5,013*       Analia Deng MD  Hospitalist  [facility]

## 2020-08-16 NOTE — ED PROVIDER NOTES
Emergency Department Attending Supervision Note  8/16/2020  4:10 PM      I evaluated this patient in conjunction with Carlos Atkins PA-C , please see primary note for full details      Briefly, the patient presented with moderate to severe abdominal pain nausea ongoing for approximately 3 weeks and gradually worsening.  The patient reports that she continues to use alcohol but has been unable to eat or drink significant amounts of fluid due to her abdominal pain and nausea.  She denies fever, changes in urination, chest pain or shortness of breath.  She denies specific alleviating factors.      On my exam, patient is forlorn and nontoxic-appearing, normal respiratory effort, 2+ bilateral radial pulses, RRR, significant epigastric tenderness upon palpation without guarding or rebound    Results:  CT Abdomen Pelvis w Contrast   Final Result   IMPRESSION:   1. Acute uncomplicated pancreatitis.   2. Severe hepatic steatosis, question alcoholic steatohepatitis.      OCTAVIO MOMIN MD        Labs Ordered and Resulted from Time of ED Arrival Up to the Time of Departure from the ED   CBC WITH PLATELETS DIFFERENTIAL - Abnormal; Notable for the following components:       Result Value    WBC 21.9 (*)     Hemoglobin 17.2 (*)     Hematocrit 49.3 (*)     MCH 33.9 (*)     Absolute Neutrophil 19.8 (*)     Absolute Lymphocytes 0.6 (*)     All other components within normal limits   COMPREHENSIVE METABOLIC PANEL - Abnormal; Notable for the following components:    Sodium 129 (*)     Potassium 2.9 (*)     Chloride 93 (*)     Carbon Dioxide 10 (*)     Anion Gap 26 (*)     Glucose 122 (*)     Protein Total 9.6 (*)     Alkaline Phosphatase 212 (*)     ALT 85 (*)     All other components within normal limits   LIPASE - Abnormal; Notable for the following components:    Lipase 5,013 (*)     All other components within normal limits   BLOOD GAS VENOUS - Abnormal; Notable for the following components:    Ph Venous 7.27 (*)     PCO2 Venous 28  (*)     Bicarbonate Venous 13 (*)     All other components within normal limits   KETONE BETA-HYDROXYBUTYRATE QUANTITATIVE - Abnormal; Notable for the following components:    Ketone Quantitative 6.1 (*)     All other components within normal limits   AST - Abnormal; Notable for the following components:     (*)     All other components within normal limits   LACTIC ACID WHOLE BLOOD   COVID-19 VIRUS (CORONAVIRUS) BY PCR       MDM  54-year-old female presenting with abdominal pain, nausea, vomiting with known history of alcohol abuse    Labs as noted above significant for mild metabolic disarray with findings consistent with pancreatitis, likely alcoholic ketoacidosis, leukocytosis, hyponatremia and hypokalemia.  CT demonstrates uncomplicated pancreatitis.  Presentation seems consistent w/ alcoholic ketoacidosis, pancreatitis, hypokalemia.  Pt subsequently admitted to the hospitalist service for further evaluation and mgmt.  Pt counseled on results, diagnosis and disposition.  She is understanding and agreeable to plan.        Diagnosis    ICD-10-CM    1. Acute pancreatitis without infection or necrosis, unspecified pancreatitis type  K85.90 AST     Asymptomatic COVID-19 Virus (Coronavirus) by PCR     Magnesium     Magnesium     Phosphorus     Phosphorus     CANCELED: Magnesium     CANCELED: Phosphorus   2. Ketoacidosis  E87.2    3. Leukocytosis  D72.829    4. Hyponatremia  E87.1    5. Hypokalemia  E87.6    6. Elevated LFTs  R79.89          MD Sal Gan Christopher E, MD  08/17/20 0604

## 2020-08-17 LAB
ALBUMIN SERPL-MCNC: 2.8 G/DL (ref 3.4–5)
ALP SERPL-CCNC: 134 U/L (ref 40–150)
ALT SERPL W P-5'-P-CCNC: 46 U/L (ref 0–50)
ANION GAP SERPL CALCULATED.3IONS-SCNC: 6 MMOL/L (ref 3–14)
AST SERPL W P-5'-P-CCNC: 67 U/L (ref 0–45)
BILIRUB SERPL-MCNC: 0.8 MG/DL (ref 0.2–1.3)
BUN SERPL-MCNC: 4 MG/DL (ref 7–30)
CALCIUM SERPL-MCNC: 7.6 MG/DL (ref 8.5–10.1)
CHLORIDE SERPL-SCNC: 108 MMOL/L (ref 94–109)
CO2 SERPL-SCNC: 20 MMOL/L (ref 20–32)
CREAT SERPL-MCNC: 0.38 MG/DL (ref 0.52–1.04)
ERYTHROCYTE [DISTWIDTH] IN BLOOD BY AUTOMATED COUNT: 12.8 % (ref 10–15)
GFR SERPL CREATININE-BSD FRML MDRD: >90 ML/MIN/{1.73_M2}
GLUCOSE SERPL-MCNC: 90 MG/DL (ref 70–99)
HCT VFR BLD AUTO: 39.7 % (ref 35–47)
HGB BLD-MCNC: 13.3 G/DL (ref 11.7–15.7)
INTERPRETATION ECG - MUSE: NORMAL
KETONES BLD-SCNC: 1.7 MMOL/L (ref 0–0.6)
LACTATE BLD-SCNC: 1 MMOL/L (ref 0.7–2)
MAGNESIUM SERPL-MCNC: 1.9 MG/DL (ref 1.6–2.3)
MCH RBC QN AUTO: 33.3 PG (ref 26.5–33)
MCHC RBC AUTO-ENTMCNC: 33.5 G/DL (ref 31.5–36.5)
MCV RBC AUTO: 99 FL (ref 78–100)
PHOSPHATE SERPL-MCNC: 0.6 MG/DL (ref 2.5–4.5)
PLATELET # BLD AUTO: 192 10E9/L (ref 150–450)
POTASSIUM SERPL-SCNC: 3.2 MMOL/L (ref 3.4–5.3)
POTASSIUM SERPL-SCNC: 3.7 MMOL/L (ref 3.4–5.3)
PROT SERPL-MCNC: 6.5 G/DL (ref 6.8–8.8)
RBC # BLD AUTO: 4 10E12/L (ref 3.8–5.2)
SARS-COV-2 RNA SPEC QL NAA+PROBE: NOT DETECTED
SODIUM SERPL-SCNC: 134 MMOL/L (ref 133–144)
SPECIMEN SOURCE: NORMAL
WBC # BLD AUTO: 14.3 10E9/L (ref 4–11)

## 2020-08-17 PROCEDURE — 83605 ASSAY OF LACTIC ACID: CPT | Performed by: INTERNAL MEDICINE

## 2020-08-17 PROCEDURE — 84132 ASSAY OF SERUM POTASSIUM: CPT | Performed by: INTERNAL MEDICINE

## 2020-08-17 PROCEDURE — 25000125 ZZHC RX 250: Performed by: INTERNAL MEDICINE

## 2020-08-17 PROCEDURE — 12000000 ZZH R&B MED SURG/OB

## 2020-08-17 PROCEDURE — 85027 COMPLETE CBC AUTOMATED: CPT | Performed by: INTERNAL MEDICINE

## 2020-08-17 PROCEDURE — 84100 ASSAY OF PHOSPHORUS: CPT | Performed by: INTERNAL MEDICINE

## 2020-08-17 PROCEDURE — 25000128 H RX IP 250 OP 636: Performed by: INTERNAL MEDICINE

## 2020-08-17 PROCEDURE — 82010 KETONE BODYS QUAN: CPT | Performed by: INTERNAL MEDICINE

## 2020-08-17 PROCEDURE — 99232 SBSQ HOSP IP/OBS MODERATE 35: CPT | Performed by: INTERNAL MEDICINE

## 2020-08-17 PROCEDURE — 25000132 ZZH RX MED GY IP 250 OP 250 PS 637: Performed by: INTERNAL MEDICINE

## 2020-08-17 PROCEDURE — 83735 ASSAY OF MAGNESIUM: CPT | Performed by: INTERNAL MEDICINE

## 2020-08-17 PROCEDURE — 25800030 ZZH RX IP 258 OP 636: Performed by: INTERNAL MEDICINE

## 2020-08-17 PROCEDURE — 36415 COLL VENOUS BLD VENIPUNCTURE: CPT | Performed by: INTERNAL MEDICINE

## 2020-08-17 PROCEDURE — 80053 COMPREHEN METABOLIC PANEL: CPT | Performed by: INTERNAL MEDICINE

## 2020-08-17 RX ORDER — DOCUSATE SODIUM 100 MG/1
100 CAPSULE, LIQUID FILLED ORAL 2 TIMES DAILY
Status: DISCONTINUED | OUTPATIENT
Start: 2020-08-17 | End: 2020-08-21 | Stop reason: HOSPADM

## 2020-08-17 RX ORDER — PANTOPRAZOLE SODIUM 40 MG/1
40 TABLET, DELAYED RELEASE ORAL DAILY PRN
Status: ON HOLD | COMMUNITY
End: 2021-01-19

## 2020-08-17 RX ORDER — AZELASTINE 1 MG/ML
1 SPRAY, METERED NASAL DAILY PRN
Status: DISCONTINUED | OUTPATIENT
Start: 2020-08-17 | End: 2020-08-21 | Stop reason: HOSPADM

## 2020-08-17 RX ORDER — FLUTICASONE PROPIONATE 50 MCG
1 SPRAY, SUSPENSION (ML) NASAL DAILY PRN
COMMUNITY

## 2020-08-17 RX ORDER — HYDROXYZINE HYDROCHLORIDE 25 MG/1
25 TABLET, FILM COATED ORAL 3 TIMES DAILY PRN
Status: DISCONTINUED | OUTPATIENT
Start: 2020-08-17 | End: 2020-08-21 | Stop reason: HOSPADM

## 2020-08-17 RX ORDER — AZELASTINE 1 MG/ML
1 SPRAY, METERED NASAL DAILY PRN
COMMUNITY

## 2020-08-17 RX ORDER — BUSPIRONE HYDROCHLORIDE 10 MG/1
10 TABLET ORAL 2 TIMES DAILY
Status: ON HOLD | COMMUNITY
End: 2021-01-18

## 2020-08-17 RX ORDER — OMEPRAZOLE 40 MG/1
40 CAPSULE, DELAYED RELEASE ORAL DAILY PRN
Status: ON HOLD | COMMUNITY
End: 2021-01-19

## 2020-08-17 RX ORDER — FLUTICASONE PROPIONATE 50 MCG
1 SPRAY, SUSPENSION (ML) NASAL DAILY
Status: DISCONTINUED | OUTPATIENT
Start: 2020-08-17 | End: 2020-08-21 | Stop reason: HOSPADM

## 2020-08-17 RX ORDER — CETIRIZINE HYDROCHLORIDE 10 MG/1
10 TABLET ORAL DAILY PRN
COMMUNITY

## 2020-08-17 RX ORDER — MONTELUKAST SODIUM 10 MG/1
10 TABLET ORAL
COMMUNITY
End: 2021-11-19

## 2020-08-17 RX ORDER — CETIRIZINE HYDROCHLORIDE 10 MG/1
10 TABLET ORAL DAILY
Status: DISCONTINUED | OUTPATIENT
Start: 2020-08-18 | End: 2020-08-19

## 2020-08-17 RX ORDER — BUSPIRONE HYDROCHLORIDE 5 MG/1
10 TABLET ORAL 2 TIMES DAILY
Status: DISCONTINUED | OUTPATIENT
Start: 2020-08-17 | End: 2020-08-21 | Stop reason: HOSPADM

## 2020-08-17 RX ORDER — HYDROXYZINE HYDROCHLORIDE 25 MG/1
25 TABLET, FILM COATED ORAL 3 TIMES DAILY PRN
Status: ON HOLD | COMMUNITY
End: 2021-01-19

## 2020-08-17 RX ADMIN — OXYCODONE HYDROCHLORIDE 10 MG: 5 TABLET ORAL at 08:12

## 2020-08-17 RX ADMIN — OXYCODONE HYDROCHLORIDE 10 MG: 5 TABLET ORAL at 01:03

## 2020-08-17 RX ADMIN — DOCUSATE SODIUM 100 MG: 100 CAPSULE, LIQUID FILLED ORAL at 11:23

## 2020-08-17 RX ADMIN — POTASSIUM CHLORIDE AND SODIUM CHLORIDE: 900; 150 INJECTION, SOLUTION INTRAVENOUS at 00:29

## 2020-08-17 RX ADMIN — OXYCODONE HYDROCHLORIDE 10 MG: 5 TABLET ORAL at 14:56

## 2020-08-17 RX ADMIN — FOLIC ACID 1 MG: 1 TABLET ORAL at 08:12

## 2020-08-17 RX ADMIN — POTASSIUM CHLORIDE 20 MEQ: 1500 TABLET, EXTENDED RELEASE ORAL at 03:11

## 2020-08-17 RX ADMIN — DOCUSATE SODIUM 100 MG: 100 CAPSULE, LIQUID FILLED ORAL at 21:05

## 2020-08-17 RX ADMIN — OXYCODONE HYDROCHLORIDE 10 MG: 5 TABLET ORAL at 11:23

## 2020-08-17 RX ADMIN — POTASSIUM CHLORIDE 40 MEQ: 1500 TABLET, EXTENDED RELEASE ORAL at 00:34

## 2020-08-17 RX ADMIN — THIAMINE HCL TAB 100 MG 200 MG: 100 TAB at 16:20

## 2020-08-17 RX ADMIN — OXYCODONE HYDROCHLORIDE 10 MG: 5 TABLET ORAL at 04:55

## 2020-08-17 RX ADMIN — POTASSIUM CHLORIDE AND SODIUM CHLORIDE: 900; 150 INJECTION, SOLUTION INTRAVENOUS at 11:23

## 2020-08-17 RX ADMIN — THIAMINE HCL TAB 100 MG 200 MG: 100 TAB at 21:05

## 2020-08-17 RX ADMIN — OXYCODONE HYDROCHLORIDE 10 MG: 5 TABLET ORAL at 18:20

## 2020-08-17 RX ADMIN — OXYCODONE HYDROCHLORIDE 10 MG: 5 TABLET ORAL at 21:29

## 2020-08-17 RX ADMIN — POTASSIUM CHLORIDE AND SODIUM CHLORIDE: 900; 150 INJECTION, SOLUTION INTRAVENOUS at 05:32

## 2020-08-17 RX ADMIN — POTASSIUM CHLORIDE AND SODIUM CHLORIDE: 900; 150 INJECTION, SOLUTION INTRAVENOUS at 18:09

## 2020-08-17 RX ADMIN — BUSPIRONE HYDROCHLORIDE 10 MG: 5 TABLET ORAL at 21:05

## 2020-08-17 RX ADMIN — THIAMINE HCL TAB 100 MG 200 MG: 100 TAB at 08:12

## 2020-08-17 RX ADMIN — NICOTINE 1 PATCH: 21 PATCH, EXTENDED RELEASE TRANSDERMAL at 08:14

## 2020-08-17 RX ADMIN — POTASSIUM PHOSPHATE, MONOBASIC AND POTASSIUM PHOSPHATE, DIBASIC 25 MMOL: 224; 236 INJECTION, SOLUTION, CONCENTRATE INTRAVENOUS at 18:10

## 2020-08-17 RX ADMIN — ENOXAPARIN SODIUM 40 MG: 40 INJECTION SUBCUTANEOUS at 21:05

## 2020-08-17 RX ADMIN — MULTIPLE VITAMINS W/ MINERALS TAB 1 TABLET: TAB at 08:12

## 2020-08-17 ASSESSMENT — ACTIVITIES OF DAILY LIVING (ADL)
ADLS_ACUITY_SCORE: 13

## 2020-08-17 NOTE — PHARMACY-ADMISSION MEDICATION HISTORY
Admission medication history interview status for this patient is complete. See [facility] admission navigator for allergy information, prior to admission medications and immunization status.     Medication history interview done via telephone during Covid-19 pandemic, indicate source(s): Patient  Medication history resources (including written lists, pill bottles, clinic record): Ryan fill history  Pharmacy: [facility] [address]    Changes made to PTA medication list:  Added: azelastine, cetirizine, fluticasone, montelukast, sertraline, buspirone, hydroxyzine  Deleted: N/A  Changed: omeprazole dose from 20 mg --> 40 mg, pantoprazole dose from daily --> daily PRN     Actions taken by pharmacist (provider contacted, etc): None     Additional medication history information: Patient is a fair historian, able to recognize names/indications of medications, unsure of frequency/doses. Called [facility] to verify buspirone dose/instructions, as patient was unsure of either. Patient states taking either omeprazole or pantoprazole on a given day (not taking both on the same day).     Medication reconciliation/reorder completed by provider prior to medication history?  Y     For patients on insulin therapy: N      Prior to Admission medications    Medication Sig Last Dose Taking? Auth Provider   azelastine (ASTELIN) 0.1 % nasal spray Spray 1 spray into both nostrils daily Past Month at 2 weeks ago Yes Unknown, Entered By History   busPIRone (BUSPAR) 10 MG tablet Take 10 mg by mouth 2 times daily 8/15/2020 at AM Yes Unknown, Entered By History   cetirizine (ZYRTEC) 10 MG tablet Take 10 mg by mouth daily Past Month at 2 weeks ago Yes Unknown, Entered By History   fluticasone (FLONASE) 50 MCG/ACT nasal spray Spray 1 spray into both nostrils daily Past Week at Unknown time Yes Unknown, Entered By History   hydrOXYzine (ATARAX) 25 MG tablet Take 25 mg by mouth 3 times daily as needed for itching or anxiety Past Month at  Unknown time Yes Unknown, Entered By History   metoclopramide (REGLAN) 5 MG tablet Take 1 tablet (5 mg) by mouth 3 times daily as needed 8/15/2020 at Unknown time Yes Abby Gonzalez MD   montelukast (SINGULAIR) 10 MG tablet Take 10 mg by mouth nightly as needed Past Month at Unknown time Yes Unknown, Entered By History   omeprazole (PRILOSEC) 40 MG DR capsule Take 40 mg by mouth daily as needed 8/13/2020 at Unknown time Yes Unknown, Entered By History   ondansetron (ZOFRAN ODT) 4 MG ODT tab Take 1 tablet (4 mg) by mouth every 8 hours as needed for nausea 8/16/2020 at Unknown time Yes Yamil Lewis MD   pantoprazole (PROTONIX) 40 MG EC tablet Take 40 mg by mouth daily as needed for heartburn 8/14/2020 at Unknown time Yes Unknown, Entered By History   sertraline (ZOLOFT) 50 MG tablet Take 50 mg by mouth every morning 8/15/2020 at AM Yes Unknown, Entered By History       Mini Vernon  PGY-1 Pharmacy Practice Resident

## 2020-08-17 NOTE — PLAN OF CARE
Presentation/Diagnosis: Pancreatitis r/t alcohol use  History: GERD, anxiety, smoker  Labs/Protocols: Ketones 1.7 (improving), K 3.7, ALT/AST elevated, WBC 14.3, COVID pending  Vitals: Stable  Cardiac: WDL.  Telemetry: NSR  Respiratory: WDL  Neuro: CIWAs q 4 hours <6, No ativan required at this time. Minor tremor.   GI/: Adequate I/O. Abdominal pain r/t pancreatitis. PRN 10mg oxycodone given with effect. Stool softener started today d/t concerns of narcotic induced constipation. BS active and audible. Passing gas.  Skin: Intact.   LDAs: PIV L AC with IVF infusing @ 175/hr.  Diet: Clears  Activity: SBA.   Plan: Estimate 2 days til discharge. Need to advance diet, decrease pain meds and not require IVF. Per MD note, declined Chem Dep.

## 2020-08-17 NOTE — PLAN OF CARE
Pt A&Ox4, Bps elevated, has abdominal pain give oxy 10 x1 with relief.  RA, LS CTA, K 3.2 replaced recheck in morning. NPO ex meds and ice chips, SBA, Covid pending.  NS w 20K going at 175.  Sepsis triggered LA 1.0.  CIWA 6.  Continue to monitor    [place]tones critical came back at 1.7  Improvement from 6.1

## 2020-08-17 NOTE — PROGRESS NOTES
[facility]  Hospitalist Progress Note  Naga Landin MD 08/17/20    Reason for Stay (Diagnosis): pancreatitis         Assessment and Plan:      Summary of Stay: Audelia Comer is a 54 year old female with PMH including anxiety, GERD and alcohol use disorder who was admitted on 8/16/2020 with several week history of abdominal pain, nausea, vomiting and poor oral intake in the setting of daily heavy alcohol use and was found to have acute alcohol induced pancreatitis.  Initial lab work-up here was notable for lipase of 5K, mild transaminitis, leukocytosis and CT scan showing acute uncomplicated pancreatitis with severe hepatic steatosis.  She was admitted for aggressive IV hydration and pain control and has clinically improved.  Slowly advancing her diet.     1.  Acute alcohol induced pancreatitis:   - Normal saline with potassium at 175 cc/h, still has relatively dry mucous membranes and feels thirsty.  -Pain control with IV Dilaudid, oral oxycodone or oral Tylenol  -Antiemetics as needed  -Needs to avoid alcohol use, counseled  --Patient declines meeting with CD counselor  for alcohol use.  --Schedule bowel regimen  --Advance to clear liquids.     2.  Acute alcohol transaminitis: Transaminitis consistent with alcohol use pattern.  Patient is aware that she needs to cut back on her drinking.  Repeat shows improvement.     3.  Leukocytosis: Patient has significant leukocytosis with white count of 21.9.  She has not had fevers.  CT showed acute uncomplicated pancreatitis no other source of infection.  Improved on recheck.  Monitor off antibiotics.     4.  Starvation/alcoholic ketosis: Patient had been drinking alcohol daily.  She has been eating very little due to abdominal pain and nausea from her acute pancreatitis.  Ketones were quite elevated at 6.1.  --Recheck shows improvement  --Advancing diet.     5.  Anxiety: Restart medications once verified by pharmacy.     6.  GERD:  "Continue PPI.     7.  Tobacco use disorder: Patient currently smokes approximately 1.5 packs/day. nicotine patch.     8.  Alcohol use disorder: Patient admits that she has been drinking more since a COVID-19 pandemic.  She drinks nightly in order to calm her self down so she go to bed.  She cannot tell me exactly how much she drinks but reports that she drinks less than a bottle of hard alcohol a week.  She reports that she has not experienced alcohol withdrawal in the past.  She did go to outpatient alcohol treatment approximate 14 years ago.  She is aware that she needs to cut back on her drinking.  She declines meeting with  or comp dependency at this time.  No evidence of withdrawal currently but will have CIWA protocol ordered with PRN Ativan.  -Vitamins  -CIWA protocol  -Discussed that she needs to abstain from alcohol  -Patient declines meeting with chemical dependency or      Diet: NPO for Medical/Clinical Reasons Except for: Meds    DVT Prophylaxis: Enoxaparin (Lovenox) SQ  Patino Catheter: not present  Code Status: Full code        Interval History (Subjective):      Admitted last night, feels better today  Feels thirsty, wants to try some small sips of clears  White blood count and transaminases better  No cardiopulmonary complaints  Still has significant epigastric pain which is improved with pain meds                  Physical Exam:      Last Vital Signs:  /83 (BP Location: Left arm)   Pulse 88   Temp 98.5  F (36.9  C) (Oral)   Resp 18   Ht 1.676 m (5' 5.98\")   Wt 51.5 kg (113 lb 9.6 oz)   SpO2 98%   BMI 18.34 kg/m        Intake/Output Summary (Last 24 hours) at 8/17/2020 1035  Last data filed at 8/16/2020 1614  Gross per 24 hour   Intake 1000 ml   Output --   Net 1000 ml       General: Alert, awake, no acute distress.  HEENT: NC/AT, eyes anicteric, external occular movements intact, face symmetric.  Dentition WNL, MM moist.  Cardiac: RRR, S1, S2.  No murmurs " appreciated.  Pulmonary: Normal chest rise, normal work of breathing.  Lungs CTA BL  Abdomen: soft, non-tender, non-distended.  Bowel Sounds Present.  No guarding.  Extremities: no deformities.  Warm, well perfused.  Skin: no rashes or lesions noted.  Warm and Dry.  Neuro: No focal deficits noted.  Speech clear.  Coordination and strength grossly normal.  Psych: Appropriate affect.         Medications:      All current medications were reviewed with changes reflected in problem list.         Data:      All new lab and imaging data was reviewed.   Labs:  Recent Labs   Lab 08/17/20  0622 08/16/20  1447   WBC 14.3* 21.9*   HGB 13.3 17.2*   HCT 39.7 49.3*   MCV 99 97    276     Recent Labs   Lab 08/17/20  0622 08/17/20  0003 08/16/20  1645 08/16/20  1447     --   --  129*   POTASSIUM 3.7 3.2*  --  2.9*   CHLORIDE 108  --   --  93*   CO2 20  --   --  10*   ANIONGAP 6  --   --  26*   GLC 90  --   --  122*   BUN 4*  --   --  9   CR 0.38*  --   --  0.57   GFRESTIMATED >90  --   --  >90   GFRESTBLACK >90  --   --  >90   FRANCOISE 7.6*  --   --  9.6   MAG  --   --  2.1  --    PHOS  --   --  2.4*  --    PROTTOTAL 6.5*  --   --  9.6*   ALBUMIN 2.8*  --   --  4.1   BILITOTAL 0.8  --   --  1.1   ALKPHOS 134  --   --  212*   AST 67*  --  116* Canceled, Test credited   ALT 46  --   --  85*      Imaging:   Results for orders placed or performed during the hospital encounter of 08/16/20   CT Abdomen Pelvis w Contrast    Narrative    CT ABDOMEN PELVIS WITH CONTRAST 8/16/2020 4:44 PM    CLINICAL HISTORY: Upper abdominal pain.    TECHNIQUE: CT scan of the abdomen and pelvis was performed following  injection of IV contrast. Multiplanar reformats were obtained. Dose  reduction techniques were used.  CONTRAST: 60mL Isovue-370    COMPARISON: March 15, 2018    FINDINGS:   LOWER CHEST: Normal.    HEPATOBILIARY: Marked heterogeneous hepatic steatosis. Gallbladder  unremarkable.    PANCREAS: There is inflammatory change and enlargement  of the pancreas  consistent with acute pancreatitis. The pancreas enhances throughout.  No definite encapsulated fluid collections to suggest abscess. Free  layering peripancreatic fluid is present.     SPLEEN: Normal size.    ADRENAL GLANDS: No significant nodules.    KIDNEYS/BLADDER: No significant mass, stones, or hydronephrosis.    BOWEL: No obstruction or inflammatory change.    PELVIC ORGANS: No pelvic masses.    ADDITIONAL FINDINGS: No ascites.    MUSCULOSKELETAL: Unremarkable.      Impression    IMPRESSION:  1. Acute uncomplicated pancreatitis.  2. Severe hepatic steatosis, question alcoholic steatohepatitis.    MD Naga CATHERINE MD , MD.

## 2020-08-17 NOTE — PLAN OF CARE
Presentation/Diagnosis:Acute pancreatitis, CIWA, Alcohol withdrawl  History:alcohol abuse, GERD, anxiety   Labs/Protocols:K 2.9- replaced via IV, Mag 2.1, Phos 2.4, ketoens 6.1,plt 217 CIWA 4 and 7  Vitals:vss complained of 7/10 upper abd,. Pain, PRN IV dilaudid given with relief. Later this shift pt complained of 8/10 abd pain. PRN oxy 5mg given  Respiratory:RA sating 98%  Neuro:A&O  GI/:WNL last BM today   Skin:intact   LDAs:PIV infusing banana bag, once that is done pt will be started on NS with 20k   Diet:NPO except ice chips and meds   Activity:SBA   Teaching:pt educated on plan of care   Plan:monitor alcohol with drawl, monitor and replace electrolytes as needed. Monitor and control pain. PRN Trazadone given for sleep.   Will cont with POC.

## 2020-08-18 LAB
ALBUMIN SERPL-MCNC: 2.5 G/DL (ref 3.4–5)
ALP SERPL-CCNC: 119 U/L (ref 40–150)
ALT SERPL W P-5'-P-CCNC: 47 U/L (ref 0–50)
ANION GAP SERPL CALCULATED.3IONS-SCNC: 5 MMOL/L (ref 3–14)
AST SERPL W P-5'-P-CCNC: 77 U/L (ref 0–45)
BASOPHILS # BLD AUTO: 0.1 10E9/L (ref 0–0.2)
BASOPHILS NFR BLD AUTO: 0.7 %
BILIRUB SERPL-MCNC: 0.6 MG/DL (ref 0.2–1.3)
BUN SERPL-MCNC: 2 MG/DL (ref 7–30)
CALCIUM SERPL-MCNC: 7.6 MG/DL (ref 8.5–10.1)
CHLORIDE SERPL-SCNC: 107 MMOL/L (ref 94–109)
CO2 SERPL-SCNC: 24 MMOL/L (ref 20–32)
CREAT SERPL-MCNC: 0.33 MG/DL (ref 0.52–1.04)
DIFFERENTIAL METHOD BLD: ABNORMAL
EOSINOPHIL # BLD AUTO: 0.1 10E9/L (ref 0–0.7)
EOSINOPHIL NFR BLD AUTO: 1.3 %
ERYTHROCYTE [DISTWIDTH] IN BLOOD BY AUTOMATED COUNT: 12.8 % (ref 10–15)
GFR SERPL CREATININE-BSD FRML MDRD: >90 ML/MIN/{1.73_M2}
GLUCOSE SERPL-MCNC: 98 MG/DL (ref 70–99)
HCT VFR BLD AUTO: 37 % (ref 35–47)
HGB BLD-MCNC: 12.6 G/DL (ref 11.7–15.7)
IMM GRANULOCYTES # BLD: 0 10E9/L (ref 0–0.4)
IMM GRANULOCYTES NFR BLD: 0.5 %
LYMPHOCYTES # BLD AUTO: 1.2 10E9/L (ref 0.8–5.3)
LYMPHOCYTES NFR BLD AUTO: 14 %
MAGNESIUM SERPL-MCNC: 1.6 MG/DL (ref 1.6–2.3)
MCH RBC QN AUTO: 33.2 PG (ref 26.5–33)
MCHC RBC AUTO-ENTMCNC: 34.1 G/DL (ref 31.5–36.5)
MCV RBC AUTO: 98 FL (ref 78–100)
MONOCYTES # BLD AUTO: 0.9 10E9/L (ref 0–1.3)
MONOCYTES NFR BLD AUTO: 11.2 %
NEUTROPHILS # BLD AUTO: 5.9 10E9/L (ref 1.6–8.3)
NEUTROPHILS NFR BLD AUTO: 72.3 %
NRBC # BLD AUTO: 0 10*3/UL
NRBC BLD AUTO-RTO: 0 /100
PHOSPHATE SERPL-MCNC: 1.2 MG/DL (ref 2.5–4.5)
PHOSPHATE SERPL-MCNC: 1.9 MG/DL (ref 2.5–4.5)
PLATELET # BLD AUTO: 177 10E9/L (ref 150–450)
POTASSIUM SERPL-SCNC: 3.6 MMOL/L (ref 3.4–5.3)
PROT SERPL-MCNC: 6.1 G/DL (ref 6.8–8.8)
RBC # BLD AUTO: 3.79 10E12/L (ref 3.8–5.2)
SODIUM SERPL-SCNC: 136 MMOL/L (ref 133–144)
WBC # BLD AUTO: 8.2 10E9/L (ref 4–11)

## 2020-08-18 PROCEDURE — 25800030 ZZH RX IP 258 OP 636: Performed by: INTERNAL MEDICINE

## 2020-08-18 PROCEDURE — 99232 SBSQ HOSP IP/OBS MODERATE 35: CPT | Performed by: INTERNAL MEDICINE

## 2020-08-18 PROCEDURE — 80053 COMPREHEN METABOLIC PANEL: CPT | Performed by: INTERNAL MEDICINE

## 2020-08-18 PROCEDURE — 85025 COMPLETE CBC W/AUTO DIFF WBC: CPT | Performed by: INTERNAL MEDICINE

## 2020-08-18 PROCEDURE — 36415 COLL VENOUS BLD VENIPUNCTURE: CPT | Performed by: INTERNAL MEDICINE

## 2020-08-18 PROCEDURE — 25000132 ZZH RX MED GY IP 250 OP 250 PS 637: Performed by: INTERNAL MEDICINE

## 2020-08-18 PROCEDURE — 12000000 ZZH R&B MED SURG/OB

## 2020-08-18 PROCEDURE — 84100 ASSAY OF PHOSPHORUS: CPT | Performed by: INTERNAL MEDICINE

## 2020-08-18 PROCEDURE — 25000125 ZZHC RX 250: Performed by: INTERNAL MEDICINE

## 2020-08-18 PROCEDURE — 83735 ASSAY OF MAGNESIUM: CPT | Performed by: INTERNAL MEDICINE

## 2020-08-18 PROCEDURE — 25000128 H RX IP 250 OP 636: Performed by: INTERNAL MEDICINE

## 2020-08-18 RX ORDER — BISACODYL 10 MG
10 SUPPOSITORY, RECTAL RECTAL DAILY PRN
Status: DISCONTINUED | OUTPATIENT
Start: 2020-08-18 | End: 2020-08-21 | Stop reason: HOSPADM

## 2020-08-18 RX ADMIN — MULTIPLE VITAMINS W/ MINERALS TAB 1 TABLET: TAB at 09:00

## 2020-08-18 RX ADMIN — TRAZODONE HYDROCHLORIDE 50 MG: 50 TABLET ORAL at 22:34

## 2020-08-18 RX ADMIN — OXYCODONE HYDROCHLORIDE 10 MG: 5 TABLET ORAL at 00:29

## 2020-08-18 RX ADMIN — OXYCODONE HYDROCHLORIDE 10 MG: 5 TABLET ORAL at 09:02

## 2020-08-18 RX ADMIN — OXYCODONE HYDROCHLORIDE 10 MG: 5 TABLET ORAL at 22:34

## 2020-08-18 RX ADMIN — OXYCODONE HYDROCHLORIDE 10 MG: 5 TABLET ORAL at 19:26

## 2020-08-18 RX ADMIN — POTASSIUM CHLORIDE AND SODIUM CHLORIDE: 900; 150 INJECTION, SOLUTION INTRAVENOUS at 09:39

## 2020-08-18 RX ADMIN — DOCUSATE SODIUM 100 MG: 100 CAPSULE, LIQUID FILLED ORAL at 09:01

## 2020-08-18 RX ADMIN — ENOXAPARIN SODIUM 40 MG: 40 INJECTION SUBCUTANEOUS at 19:27

## 2020-08-18 RX ADMIN — BUSPIRONE HYDROCHLORIDE 10 MG: 5 TABLET ORAL at 09:01

## 2020-08-18 RX ADMIN — NICOTINE 1 PATCH: 21 PATCH, EXTENDED RELEASE TRANSDERMAL at 09:00

## 2020-08-18 RX ADMIN — OXYCODONE HYDROCHLORIDE 10 MG: 5 TABLET ORAL at 12:30

## 2020-08-18 RX ADMIN — FLUTICASONE PROPIONATE 1 SPRAY: 50 SPRAY, METERED NASAL at 09:04

## 2020-08-18 RX ADMIN — THIAMINE HCL TAB 100 MG 200 MG: 100 TAB at 20:53

## 2020-08-18 RX ADMIN — HYDROXYZINE HYDROCHLORIDE 25 MG: 25 TABLET ORAL at 22:34

## 2020-08-18 RX ADMIN — DOCUSATE SODIUM AND SENNOSIDES 2 TABLET: 8.6; 5 TABLET, FILM COATED ORAL at 04:44

## 2020-08-18 RX ADMIN — CETIRIZINE HYDROCHLORIDE 10 MG: 10 TABLET, FILM COATED ORAL at 09:00

## 2020-08-18 RX ADMIN — Medication 5 MG: at 00:37

## 2020-08-18 RX ADMIN — BUSPIRONE HYDROCHLORIDE 10 MG: 5 TABLET ORAL at 20:52

## 2020-08-18 RX ADMIN — THIAMINE HCL TAB 100 MG 200 MG: 100 TAB at 09:01

## 2020-08-18 RX ADMIN — ONDANSETRON 4 MG: 4 TABLET, ORALLY DISINTEGRATING ORAL at 04:45

## 2020-08-18 RX ADMIN — DOCUSATE SODIUM 100 MG: 100 CAPSULE, LIQUID FILLED ORAL at 20:52

## 2020-08-18 RX ADMIN — OXYCODONE HYDROCHLORIDE 10 MG: 5 TABLET ORAL at 16:00

## 2020-08-18 RX ADMIN — THIAMINE HCL TAB 100 MG 200 MG: 100 TAB at 16:01

## 2020-08-18 RX ADMIN — FOLIC ACID 1 MG: 1 TABLET ORAL at 09:00

## 2020-08-18 RX ADMIN — OMEPRAZOLE 40 MG: 20 CAPSULE, DELAYED RELEASE ORAL at 09:00

## 2020-08-18 RX ADMIN — HYDROXYZINE HYDROCHLORIDE 25 MG: 25 TABLET ORAL at 16:00

## 2020-08-18 RX ADMIN — OXYCODONE HYDROCHLORIDE 10 MG: 5 TABLET ORAL at 04:44

## 2020-08-18 RX ADMIN — POTASSIUM PHOSPHATE, MONOBASIC AND POTASSIUM PHOSPHATE, DIBASIC 20 MMOL: 224; 236 INJECTION, SOLUTION, CONCENTRATE INTRAVENOUS at 09:54

## 2020-08-18 ASSESSMENT — ACTIVITIES OF DAILY LIVING (ADL)
ADLS_ACUITY_SCORE: 16
ADLS_ACUITY_SCORE: 13
ADLS_ACUITY_SCORE: 15
ADLS_ACUITY_SCORE: 13

## 2020-08-18 NOTE — PLAN OF CARE
"A/O x 4. VSS on RA. C/O 6/10 abd pain, PRN PO oxycodone 10 mg given x 2 per pt request w/ slight relief, states \"it takes the edge off, the burning sensation.\" CIWA 1, 0. Tele SR, denied CP . LS clear/dim, denied SOB. PIV to right intact, infusing w/ phosph replacement now for value of 1.2 (had to slow rate d/t c/o burning sensation, improved w/ slowed rate). Phosph recheck scheduled for 2100. NS + K maint fluid should be resumed after phosph complete @ 100 ml/hr. Up SBA, good UOP. Tolerating full liq diet, denies nausea. Writer encouraged pt to ambulate halls to promote BM, pt agreeable to idea but states, \"I just feel so tired today, I just feel like sleeping.\" PRN enema available if needed. Will continue POC.   "

## 2020-08-18 NOTE — PROGRESS NOTES
[facility]  Hospitalist Progress Note  Naga Landin MD 08/18/2020      Reason for Stay (Diagnosis): pancreatitis         Assessment and Plan:      Summary of Stay: Audelia Comer is a 54 year old female with PMH including anxiety, GERD and alcohol use disorder who was admitted on 8/16/2020 with several week history of abdominal pain, nausea, vomiting and poor oral intake in the setting of daily heavy alcohol use and was found to have acute alcohol induced pancreatitis.  Initial lab work-up here was notable for lipase of 5K, mild transaminitis, leukocytosis and CT scan showing acute uncomplicated pancreatitis with severe hepatic steatosis.  She was admitted for aggressive IV hydration and pain control and has clinically improved.  Slowly advancing her diet.     1.  Acute alcohol induced pancreatitis:   - Normal saline with potassium at 100 cc/h.  Might be able to stop soon.  -Pain control with oral oxycodone, Tylenol  -Antiemetics as needed  -Needs to avoid alcohol use, counseled  --Patient declines meeting with CD counselor  for alcohol use.  --Schedule bowel regimen, prn suppository/enema.  --Advance to low fat full liquids.     2.  Acute alcohol transaminitis: Transaminitis consistent with alcohol use pattern.  Patient is aware that she needs to cut back on her drinking.  Repeat shows improvement.     3.  Leukocytosis: Patient has significant leukocytosis with white count of 21.9.  She has not had fevers.  CT showed acute uncomplicated pancreatitis no other source of infection.  Resolved on recheck.  Monitor off antibiotics.     4.  Starvation/alcoholic ketosis: Patient had been drinking alcohol daily.  She has been eating very little due to abdominal pain and nausea from her acute pancreatitis.  Ketones were quite elevated at 6.1.  --Recheck shows improvement  --Advancing diet.     5.  Anxiety: Restart home meds.     6.  GERD: Continue PPI.     7.  Tobacco use disorder:  "Patient currently smokes approximately 1.5 packs/day. nicotine patch.     8.  Alcohol use disorder: Patient admits that she has been drinking more since a COVID-19 pandemic.  She drinks nightly in order to calm her self down so she go to bed.  She cannot tell me exactly how much she drinks but reports that she drinks less than a bottle of hard alcohol a week.  She reports that she has not experienced alcohol withdrawal in the past.  She did go to outpatient alcohol treatment approximate 14 years ago.  She is aware that she needs to cut back on her drinking.  She declines meeting with  or comp dependency at this time.  No evidence of withdrawal currently but will have CIWA protocol ordered with PRN Ativan.  -Vitamins  -CIWA protocol  -Discussed that she needs to abstain from alcohol  -Patient declines meeting with chemical dependency or      Diet: NPO for Medical/Clinical Reasons Except for: Meds    DVT Prophylaxis: Enoxaparin (Lovenox) SQ  Patino Catheter: not present  Code Status: Full code        Interval History (Subjective):      Tolerated clears, advancing to full liquids (low fat)  Pain better but still requiring some oral narcotics  Increasing bowel regimen for constipation  Reducing IVF rate           Physical Exam:      Last Vital Signs:  /80 (BP Location: Left arm)   Pulse 88   Temp 97.2  F (36.2  C) (Oral)   Resp 20   Ht 1.676 m (5' 5.98\")   Wt 51.5 kg (113 lb 9.6 oz)   SpO2 100%   BMI 18.34 kg/m        Intake/Output Summary (Last 24 hours) at 8/17/2020 1035  Last data filed at 8/16/2020 1614  Gross per 24 hour   Intake 1000 ml   Output --   Net 1000 ml       General: Alert, awake, no acute distress.  HEENT: NC/AT, eyes anicteric, external occular movements intact, face symmetric.  Dentition WNL, MM moist.  Cardiac: RRR, S1, S2.  No murmurs appreciated.  Pulmonary: Normal chest rise, normal work of breathing.  Lungs CTA BL  Abdomen: soft, non-tender, non-distended.  " Bowel Sounds Present.  No guarding.  Extremities: no deformities.  Warm, well perfused.  Skin: no rashes or lesions noted.  Warm and Dry.  Neuro: No focal deficits noted.  Speech clear.  Coordination and strength grossly normal.  Psych: Appropriate affect.         Medications:      All current medications were reviewed with changes reflected in problem list.         Data:      All new lab and imaging data was reviewed.   Labs:  Recent Labs   Lab 08/18/20  0650 08/17/20  0622 08/16/20  1447   WBC 8.2 14.3* 21.9*   HGB 12.6 13.3 17.2*   HCT 37.0 39.7 49.3*   MCV 98 99 97    192 276     Recent Labs   Lab 08/18/20  0650 08/17/20  0622 08/17/20  0003 08/16/20  1645 08/16/20  1447    134  --   --  129*   POTASSIUM 3.6 3.7 3.2*  --  2.9*   CHLORIDE 107 108  --   --  93*   CO2 24 20  --   --  10*   ANIONGAP 5 6  --   --  26*   GLC 98 90  --   --  122*   BUN 2* 4*  --   --  9   CR 0.33* 0.38*  --   --  0.57   GFRESTIMATED >90 >90  --   --  >90   GFRESTBLACK >90 >90  --   --  >90   FRANCOISE 7.6* 7.6*  --   --  9.6   MAG 1.6 1.9  --  2.1  --    PHOS 1.2* 0.6*  --  2.4*  --    PROTTOTAL 6.1* 6.5*  --   --  9.6*   ALBUMIN 2.5* 2.8*  --   --  4.1   BILITOTAL 0.6 0.8  --   --  1.1   ALKPHOS 119 134  --   --  212*   AST 77* 67*  --  116* Canceled, Test credited   ALT 47 46  --   --  85*      Imaging:   Results for orders placed or performed during the hospital encounter of 08/16/20   CT Abdomen Pelvis w Contrast    Narrative    CT ABDOMEN PELVIS WITH CONTRAST 8/16/2020 4:44 PM    CLINICAL HISTORY: Upper abdominal pain.    TECHNIQUE: CT scan of the abdomen and pelvis was performed following  injection of IV contrast. Multiplanar reformats were obtained. Dose  reduction techniques were used.  CONTRAST: 60mL Isovue-370    COMPARISON: March 15, 2018    FINDINGS:   LOWER CHEST: Normal.    HEPATOBILIARY: Marked heterogeneous hepatic steatosis. Gallbladder  unremarkable.    PANCREAS: There is inflammatory change and enlargement of  the pancreas  consistent with acute pancreatitis. The pancreas enhances throughout.  No definite encapsulated fluid collections to suggest abscess. Free  layering peripancreatic fluid is present.     SPLEEN: Normal size.    ADRENAL GLANDS: No significant nodules.    KIDNEYS/BLADDER: No significant mass, stones, or hydronephrosis.    BOWEL: No obstruction or inflammatory change.    PELVIC ORGANS: No pelvic masses.    ADDITIONAL FINDINGS: No ascites.    MUSCULOSKELETAL: Unremarkable.      Impression    IMPRESSION:  1. Acute uncomplicated pancreatitis.  2. Severe hepatic steatosis, question alcoholic steatohepatitis.    MD Naga CATHERINE MD , MD.

## 2020-08-18 NOTE — PLAN OF CARE
Assumed cares [id number]  Pt A/Ox4, up with assist of standby. Pt denies N/V, SOB, lightheadedness, and dizziness. Pt c/o abd pain po oxy given. Pt on CIWA protocol socred 1- no ativan given. Pt on K+, Mag, and Phos protocol - Phos 0.6 currently infusing at slower rate as pt c/o of slight burning. Recheck 2 hr after infusion completed and in AM.  VSS. TELE SR/ST (90-100s). Pt tolerating Clear liquid diet. Continue with POC.

## 2020-08-18 NOTE — PROGRESS NOTES
Cared for pt from [id number]. Abdominal pain controlled with PRN oxycodone 10 and heating pad. Ambulating to the bathroom with SB assist. Scoring low on CIWA did not need interventions. Will keep monitoring.

## 2020-08-18 NOTE — PLAN OF CARE
VSS. Tele: SR. PIV: infusing 0.9% sodium chloride + KCI mEq/L 150ml/hr. Pt c/o pain/tightness in upper abd, oxy given x2 with relief. Pt c/o of nausea, Zofran given with relief. Pt was c/o constipation, Ducolax given on eves, Senna given on noc. Pt was passing gas, but did not have a BM. CIWAs were neg. Diet: Clears, tolerating and having an appetite, hoping to try a bland diet today. Will cont plan of care.

## 2020-08-19 LAB
CAPILLARY BLOOD COLLECTION: NORMAL
CREAT SERPL-MCNC: 0.37 MG/DL (ref 0.52–1.04)
GFR SERPL CREATININE-BSD FRML MDRD: >90 ML/MIN/{1.73_M2}
MAGNESIUM SERPL-MCNC: 1.7 MG/DL (ref 1.6–2.3)
PHOSPHATE SERPL-MCNC: 3 MG/DL (ref 2.5–4.5)
PLATELET # BLD AUTO: 206 10E9/L (ref 150–450)
POTASSIUM SERPL-SCNC: 3.7 MMOL/L (ref 3.4–5.3)

## 2020-08-19 PROCEDURE — 25000125 ZZHC RX 250: Performed by: INTERNAL MEDICINE

## 2020-08-19 PROCEDURE — 25800030 ZZH RX IP 258 OP 636: Performed by: INTERNAL MEDICINE

## 2020-08-19 PROCEDURE — 84100 ASSAY OF PHOSPHORUS: CPT | Performed by: INTERNAL MEDICINE

## 2020-08-19 PROCEDURE — 83735 ASSAY OF MAGNESIUM: CPT | Performed by: INTERNAL MEDICINE

## 2020-08-19 PROCEDURE — 36416 COLLJ CAPILLARY BLOOD SPEC: CPT | Performed by: INTERNAL MEDICINE

## 2020-08-19 PROCEDURE — 36415 COLL VENOUS BLD VENIPUNCTURE: CPT | Performed by: INTERNAL MEDICINE

## 2020-08-19 PROCEDURE — 85049 AUTOMATED PLATELET COUNT: CPT | Performed by: INTERNAL MEDICINE

## 2020-08-19 PROCEDURE — 99232 SBSQ HOSP IP/OBS MODERATE 35: CPT | Performed by: INTERNAL MEDICINE

## 2020-08-19 PROCEDURE — 25000128 H RX IP 250 OP 636: Performed by: INTERNAL MEDICINE

## 2020-08-19 PROCEDURE — 99207 ZZC CDG-MDM COMPONENT: MEETS LOW - DOWN CODED: CPT | Performed by: INTERNAL MEDICINE

## 2020-08-19 PROCEDURE — 25000132 ZZH RX MED GY IP 250 OP 250 PS 637: Performed by: INTERNAL MEDICINE

## 2020-08-19 PROCEDURE — 84132 ASSAY OF SERUM POTASSIUM: CPT | Performed by: INTERNAL MEDICINE

## 2020-08-19 PROCEDURE — 82565 ASSAY OF CREATININE: CPT | Performed by: INTERNAL MEDICINE

## 2020-08-19 PROCEDURE — 12000000 ZZH R&B MED SURG/OB

## 2020-08-19 RX ORDER — CETIRIZINE HYDROCHLORIDE 10 MG/1
10 TABLET ORAL DAILY PRN
Status: DISCONTINUED | OUTPATIENT
Start: 2020-08-19 | End: 2020-08-21 | Stop reason: HOSPADM

## 2020-08-19 RX ORDER — OXYCODONE HYDROCHLORIDE 5 MG/1
5-10 TABLET ORAL
Status: DISCONTINUED | OUTPATIENT
Start: 2020-08-19 | End: 2020-08-21 | Stop reason: HOSPADM

## 2020-08-19 RX ORDER — SODIUM CHLORIDE 9 MG/ML
INJECTION, SOLUTION INTRAVENOUS CONTINUOUS
Status: DISCONTINUED | OUTPATIENT
Start: 2020-08-19 | End: 2020-08-20

## 2020-08-19 RX ORDER — ACETAMINOPHEN 325 MG/1
650 TABLET ORAL 3 TIMES DAILY
Status: DISCONTINUED | OUTPATIENT
Start: 2020-08-19 | End: 2020-08-21 | Stop reason: HOSPADM

## 2020-08-19 RX ORDER — OXYCODONE HYDROCHLORIDE 5 MG/1
5 TABLET ORAL
Status: DISCONTINUED | OUTPATIENT
Start: 2020-08-19 | End: 2020-08-19

## 2020-08-19 RX ORDER — OXYCODONE HYDROCHLORIDE 5 MG/1
10 TABLET ORAL ONCE
Status: DISCONTINUED | OUTPATIENT
Start: 2020-08-19 | End: 2020-08-21 | Stop reason: HOSPADM

## 2020-08-19 RX ADMIN — POTASSIUM PHOSPHATE, MONOBASIC AND POTASSIUM PHOSPHATE, DIBASIC 20 MMOL: 224; 236 INJECTION, SOLUTION, CONCENTRATE INTRAVENOUS at 01:07

## 2020-08-19 RX ADMIN — OXYCODONE HYDROCHLORIDE 10 MG: 5 TABLET ORAL at 01:17

## 2020-08-19 RX ADMIN — OXYCODONE HYDROCHLORIDE 10 MG: 5 TABLET ORAL at 23:50

## 2020-08-19 RX ADMIN — ACETAMINOPHEN 650 MG: 325 TABLET, FILM COATED ORAL at 16:27

## 2020-08-19 RX ADMIN — ENOXAPARIN SODIUM 40 MG: 40 INJECTION SUBCUTANEOUS at 20:48

## 2020-08-19 RX ADMIN — OXYCODONE HYDROCHLORIDE 5 MG: 5 TABLET ORAL at 17:35

## 2020-08-19 RX ADMIN — THIAMINE HCL TAB 100 MG 100 MG: 100 TAB at 16:27

## 2020-08-19 RX ADMIN — NICOTINE 1 PATCH: 21 PATCH, EXTENDED RELEASE TRANSDERMAL at 08:22

## 2020-08-19 RX ADMIN — OXYCODONE HYDROCHLORIDE 10 MG: 5 TABLET ORAL at 05:29

## 2020-08-19 RX ADMIN — OXYCODONE HYDROCHLORIDE 10 MG: 5 TABLET ORAL at 20:54

## 2020-08-19 RX ADMIN — THIAMINE HCL TAB 100 MG 100 MG: 100 TAB at 22:26

## 2020-08-19 RX ADMIN — DOCUSATE SODIUM 100 MG: 100 CAPSULE, LIQUID FILLED ORAL at 20:49

## 2020-08-19 RX ADMIN — CETIRIZINE HYDROCHLORIDE 10 MG: 10 TABLET, FILM COATED ORAL at 08:21

## 2020-08-19 RX ADMIN — ONDANSETRON 4 MG: 4 TABLET, ORALLY DISINTEGRATING ORAL at 08:53

## 2020-08-19 RX ADMIN — FLUTICASONE PROPIONATE 1 SPRAY: 50 SPRAY, METERED NASAL at 08:23

## 2020-08-19 RX ADMIN — ACETAMINOPHEN 650 MG: 325 TABLET, FILM COATED ORAL at 22:26

## 2020-08-19 RX ADMIN — SODIUM CHLORIDE: 9 INJECTION, SOLUTION INTRAVENOUS at 11:23

## 2020-08-19 RX ADMIN — DOCUSATE SODIUM 100 MG: 100 CAPSULE, LIQUID FILLED ORAL at 08:22

## 2020-08-19 RX ADMIN — ACETAMINOPHEN 650 MG: 325 TABLET, FILM COATED ORAL at 12:41

## 2020-08-19 RX ADMIN — OXYCODONE HYDROCHLORIDE 5 MG: 5 TABLET ORAL at 16:32

## 2020-08-19 RX ADMIN — OXYCODONE HYDROCHLORIDE 5 MG: 5 TABLET ORAL at 12:41

## 2020-08-19 RX ADMIN — OMEPRAZOLE 40 MG: 20 CAPSULE, DELAYED RELEASE ORAL at 08:21

## 2020-08-19 RX ADMIN — SODIUM CHLORIDE: 9 INJECTION, SOLUTION INTRAVENOUS at 18:35

## 2020-08-19 RX ADMIN — MULTIPLE VITAMINS W/ MINERALS TAB 1 TABLET: TAB at 08:21

## 2020-08-19 RX ADMIN — BUSPIRONE HYDROCHLORIDE 10 MG: 5 TABLET ORAL at 08:21

## 2020-08-19 RX ADMIN — OXYCODONE HYDROCHLORIDE 10 MG: 5 TABLET ORAL at 08:24

## 2020-08-19 RX ADMIN — THIAMINE HCL TAB 100 MG 100 MG: 100 TAB at 08:22

## 2020-08-19 RX ADMIN — BUSPIRONE HYDROCHLORIDE 10 MG: 5 TABLET ORAL at 20:49

## 2020-08-19 RX ADMIN — FOLIC ACID 1 MG: 1 TABLET ORAL at 08:21

## 2020-08-19 ASSESSMENT — ACTIVITIES OF DAILY LIVING (ADL)
ADLS_ACUITY_SCORE: 15
ADLS_ACUITY_SCORE: 13
ADLS_ACUITY_SCORE: 15
ADLS_ACUITY_SCORE: 13
ADLS_ACUITY_SCORE: 13
ADLS_ACUITY_SCORE: 15

## 2020-08-19 NOTE — PROGRESS NOTES
[facility]  Hospitalist Progress Note  Naga Landin MD 08/19/2020      Reason for Stay (Diagnosis): pancreatitis         Assessment and Plan:      Summary of Stay: Audelia Comer is a 54 year old female with PMH including anxiety, GERD and alcohol use disorder who was admitted on 8/16/2020 with several week history of abdominal pain, nausea, vomiting and poor oral intake in the setting of daily heavy alcohol use and was found to have acute alcohol induced pancreatitis.  Initial lab work-up here was notable for lipase of 5K, mild transaminitis, leukocytosis and CT scan showing acute uncomplicated pancreatitis with severe hepatic steatosis.  She was admitted for aggressive IV hydration and pain control and has clinically improved.  Slowly advancing her diet though she continues to require IV fluids.  Still requiring narcotic pain medications though have transition to orals.     1.  Acute alcohol induced pancreatitis:   - Normal saline with potassium at 100 cc/h.    -Pain control with oral oxycodone, Tylenol  -Antiemetics as needed  -Needs to avoid alcohol use, counseled  --Patient declines meeting with CD counselor  for alcohol use.  --Schedule bowel regimen, prn suppository/enema.  --Backing off to clear liquids as she had increased pain with falls.     2.  Acute alcohol transaminitis: Transaminitis consistent with alcohol use pattern.  Patient is aware that she needs to cut back on her drinking.  Repeat shows improvement.     3.  Leukocytosis: Patient has significant leukocytosis with white count of 21.9.  She has not had fevers.  CT showed acute uncomplicated pancreatitis no other source of infection.  Resolved on recheck.  Monitor off antibiotics.     4.  Starvation/alcoholic ketosis: Patient had been drinking alcohol daily.  She has been eating very little due to abdominal pain and nausea from her acute pancreatitis.  Ketones were quite elevated at 6.1.  --Recheck shows  "improvement  --Advancing diet.     5.  Anxiety: Restart home meds.     6.  GERD: Continue PPI.     7.  Tobacco use disorder: Patient currently smokes approximately 1.5 packs/day. nicotine patch.     8.  Alcohol use disorder: Patient admits that she has been drinking more since a COVID-19 pandemic.  She drinks nightly in order to calm her self down so she go to bed.    -Discontinue CIWA protocol given absence of withdrawal.  -Discussed that she needs to abstain from alcohol  -Patient declines meeting with chemical dependency or      Diet:  Clear liquid diet  DVT Prophylaxis: Enoxaparin (Lovenox) SQ  Patino Catheter: not present  Code Status: Full code        Interval History (Subjective):      Had some increase in pain after advancing to full's, backing off to clears today  Resuming IV fluids  Trying to taper/space out oral narcotics  Stopping CIWA protocol given absence of any significant alcohol withdrawal  Some nausea this morning after taking pills, did vomit once.         Physical Exam:      Last Vital Signs:  /82 (BP Location: Left arm)   Pulse 94   Temp 97.9  F (36.6  C) (Oral)   Resp 18   Ht 1.676 m (5' 5.98\")   Wt 51.5 kg (113 lb 9.6 oz)   SpO2 98%   BMI 18.34 kg/m        Intake/Output Summary (Last 24 hours) at 8/17/2020 1035  Last data filed at 8/16/2020 1614  Gross per 24 hour   Intake 1000 ml   Output --   Net 1000 ml       General: Alert, awake, no acute distress.  HEENT: NC/AT, eyes anicteric, external occular movements intact, face symmetric.   Cardiac: RRR, S1, S2.  No murmurs appreciated.  Pulmonary: Normal chest rise, normal work of breathing.  Lungs CTA BL  Abdomen: Some guarding of her epigastric area.  Extremities: no deformities.  Warm, well perfused.  Skin: no rashes or lesions noted.  Warm and Dry.  Neuro: No focal deficits noted.  Speech clear.  Coordination and strength grossly normal.  Psych: Appropriate affect.         Medications:      All current medications " were reviewed with changes reflected in problem list.         Data:      All new lab and imaging data was reviewed.   Labs:  Recent Labs   Lab 08/19/20 0622 08/18/20  0650 08/17/20 0622 08/16/20  1447   WBC  --  8.2 14.3* 21.9*   HGB  --  12.6 13.3 17.2*   HCT  --  37.0 39.7 49.3*   MCV  --  98 99 97    177 192 276     Recent Labs   Lab 08/19/20 0622 08/18/20  2046 08/18/20  0650 08/17/20 0622 08/17/20  0003 08/16/20  1645  08/16/20  1447   NA  --   --  136 134  --   --   --  129*   POTASSIUM  --   --  3.6 3.7 3.2*  --   --  2.9*   CHLORIDE  --   --  107 108  --   --   --  93*   CO2  --   --  24 20  --   --   --  10*   ANIONGAP  --   --  5 6  --   --   --  26*   GLC  --   --  98 90  --   --   --  122*   BUN  --   --  2* 4*  --   --   --  9   CR 0.37*  --  0.33* 0.38*  --   --   --  0.57   GFRESTIMATED >90  --  >90 >90  --   --   --  >90   GFRESTBLACK >90  --  >90 >90  --   --   --  >90   FRANCOISE  --   --  7.6* 7.6*  --   --   --  9.6   MAG  --   --  1.6 1.9  --  2.1  --   --    PHOS  --  1.9* 1.2* 0.6*  --  2.4*   < >  --    PROTTOTAL  --   --  6.1* 6.5*  --   --   --  9.6*   ALBUMIN  --   --  2.5* 2.8*  --   --   --  4.1   BILITOTAL  --   --  0.6 0.8  --   --   --  1.1   ALKPHOS  --   --  119 134  --   --   --  212*   AST  --   --  77* 67*  --  116*  --  Canceled, Test credited   ALT  --   --  47 46  --   --   --  85*    < > = values in this interval not displayed.      Imaging:   Results for orders placed or performed during the hospital encounter of 08/16/20   CT Abdomen Pelvis w Contrast    Narrative    CT ABDOMEN PELVIS WITH CONTRAST 8/16/2020 4:44 PM    CLINICAL HISTORY: Upper abdominal pain.    TECHNIQUE: CT scan of the abdomen and pelvis was performed following  injection of IV contrast. Multiplanar reformats were obtained. Dose  reduction techniques were used.  CONTRAST: 60mL Isovue-370    COMPARISON: March 15, 2018    FINDINGS:   LOWER CHEST: Normal.    HEPATOBILIARY: Marked heterogeneous hepatic  steatosis. Gallbladder  unremarkable.    PANCREAS: There is inflammatory change and enlargement of the pancreas  consistent with acute pancreatitis. The pancreas enhances throughout.  No definite encapsulated fluid collections to suggest abscess. Free  layering peripancreatic fluid is present.     SPLEEN: Normal size.    ADRENAL GLANDS: No significant nodules.    KIDNEYS/BLADDER: No significant mass, stones, or hydronephrosis.    BOWEL: No obstruction or inflammatory change.    PELVIC ORGANS: No pelvic masses.    ADDITIONAL FINDINGS: No ascites.    MUSCULOSKELETAL: Unremarkable.      Impression    IMPRESSION:  1. Acute uncomplicated pancreatitis.  2. Severe hepatic steatosis, question alcoholic steatohepatitis.    MD Naga CATHERINE MD , MD.

## 2020-08-19 NOTE — PLAN OF CARE
VSS on RA A/O. SBA. Patient complained of nausea and vomiting. Emesis x1, oral Zofan given see MAR. Diet regressed to clear liquid d/t N/V and abdominal pain. Pt c/o epigastric pain radiating to the back, oral Oxycodone given, see MAR. Phos replaced per protocol, rechecks ordered. NS at 125 mL/hr. Will continue to monitor and POC.

## 2020-08-19 NOTE — PLAN OF CARE
Pt alert and oriented.  Not scoring on CIWA.  Ambulates with stand by assist. Lungs are clear.  100%RA.  Tele SR.  Tolerating full liquid diet. Voiding without difficulty.  PIV infusing per orders.  Phos replaced.  Re-check this evening was 1.9  Will continue with replacement.  Continue with pain control, vitamins per orders.

## 2020-08-19 NOTE — PLAN OF CARE
DX: Acute Pancreatitis  Tele: SR  A&O x4  Activity: SBA/Ind  Diet: Full liquid pt tolerating well  VSS LS clear  O2: 96% RA  BG: na  PIV: NS and 20K 100mL/hr paused Phos is infusing 50mL/hr due to burning sensation   Pain: Oxy x2 for abd pain of 5 and 6 out of 10  GI/: continent voiding wo difficulty  Labs: Phos 1.9 currently infusing replacement  Plan: MALLORY's  1,1, Nicotine patch on R-deltoid   Discharge: TBD continue plan of care

## 2020-08-20 LAB
CAPILLARY BLOOD COLLECTION: NORMAL
PHOSPHATE SERPL-MCNC: 2.6 MG/DL (ref 2.5–4.5)

## 2020-08-20 PROCEDURE — 25000132 ZZH RX MED GY IP 250 OP 250 PS 637: Performed by: INTERNAL MEDICINE

## 2020-08-20 PROCEDURE — 36416 COLLJ CAPILLARY BLOOD SPEC: CPT | Performed by: INTERNAL MEDICINE

## 2020-08-20 PROCEDURE — 12000000 ZZH R&B MED SURG/OB

## 2020-08-20 PROCEDURE — 84100 ASSAY OF PHOSPHORUS: CPT | Performed by: INTERNAL MEDICINE

## 2020-08-20 PROCEDURE — 99232 SBSQ HOSP IP/OBS MODERATE 35: CPT | Performed by: INTERNAL MEDICINE

## 2020-08-20 PROCEDURE — 25800030 ZZH RX IP 258 OP 636: Performed by: INTERNAL MEDICINE

## 2020-08-20 PROCEDURE — 25000128 H RX IP 250 OP 636: Performed by: INTERNAL MEDICINE

## 2020-08-20 RX ORDER — POLYETHYLENE GLYCOL 3350 17 G/17G
17 POWDER, FOR SOLUTION ORAL DAILY
Status: DISCONTINUED | OUTPATIENT
Start: 2020-08-20 | End: 2020-08-21 | Stop reason: HOSPADM

## 2020-08-20 RX ADMIN — POLYETHYLENE GLYCOL 3350 17 G: 17 POWDER, FOR SOLUTION ORAL at 09:26

## 2020-08-20 RX ADMIN — DOCUSATE SODIUM 100 MG: 100 CAPSULE, LIQUID FILLED ORAL at 20:38

## 2020-08-20 RX ADMIN — OXYCODONE HYDROCHLORIDE 5 MG: 5 TABLET ORAL at 09:23

## 2020-08-20 RX ADMIN — OXYCODONE HYDROCHLORIDE 5 MG: 5 TABLET ORAL at 13:57

## 2020-08-20 RX ADMIN — ACETAMINOPHEN 650 MG: 325 TABLET, FILM COATED ORAL at 22:19

## 2020-08-20 RX ADMIN — SODIUM CHLORIDE: 9 INJECTION, SOLUTION INTRAVENOUS at 09:22

## 2020-08-20 RX ADMIN — THIAMINE HCL TAB 100 MG 100 MG: 100 TAB at 16:45

## 2020-08-20 RX ADMIN — NICOTINE 1 PATCH: 21 PATCH, EXTENDED RELEASE TRANSDERMAL at 09:28

## 2020-08-20 RX ADMIN — OMEPRAZOLE 40 MG: 20 CAPSULE, DELAYED RELEASE ORAL at 09:33

## 2020-08-20 RX ADMIN — ACETAMINOPHEN 650 MG: 325 TABLET, FILM COATED ORAL at 16:46

## 2020-08-20 RX ADMIN — BUSPIRONE HYDROCHLORIDE 10 MG: 5 TABLET ORAL at 20:38

## 2020-08-20 RX ADMIN — MULTIPLE VITAMINS W/ MINERALS TAB 1 TABLET: TAB at 09:33

## 2020-08-20 RX ADMIN — OXYCODONE HYDROCHLORIDE 5 MG: 5 TABLET ORAL at 20:37

## 2020-08-20 RX ADMIN — OXYCODONE HYDROCHLORIDE 10 MG: 5 TABLET ORAL at 03:01

## 2020-08-20 RX ADMIN — ACETAMINOPHEN 650 MG: 325 TABLET, FILM COATED ORAL at 09:33

## 2020-08-20 RX ADMIN — TRAZODONE HYDROCHLORIDE 50 MG: 50 TABLET ORAL at 22:19

## 2020-08-20 RX ADMIN — THIAMINE HCL TAB 100 MG 100 MG: 100 TAB at 09:33

## 2020-08-20 RX ADMIN — SODIUM CHLORIDE: 9 INJECTION, SOLUTION INTRAVENOUS at 01:53

## 2020-08-20 RX ADMIN — OXYCODONE HYDROCHLORIDE 5 MG: 5 TABLET ORAL at 17:28

## 2020-08-20 RX ADMIN — OXYCODONE HYDROCHLORIDE 5 MG: 5 TABLET ORAL at 06:19

## 2020-08-20 RX ADMIN — THIAMINE HCL TAB 100 MG 100 MG: 100 TAB at 22:19

## 2020-08-20 RX ADMIN — ENOXAPARIN SODIUM 40 MG: 40 INJECTION SUBCUTANEOUS at 20:38

## 2020-08-20 RX ADMIN — ONDANSETRON 4 MG: 4 TABLET, ORALLY DISINTEGRATING ORAL at 03:01

## 2020-08-20 ASSESSMENT — ACTIVITIES OF DAILY LIVING (ADL)
ADLS_ACUITY_SCORE: 13

## 2020-08-20 NOTE — PLAN OF CARE
VSS. Alert and oriented x 4. Up SBA. Ambulated in veras x 1. Pt received PRN oxycodone x 2 for pain which was effective. Tolerating clear liquid diet, fair appetite. Denies n/v. Flatus positive. PIV running NS at 125 ml/ hr. Discharge plan pending.

## 2020-08-20 NOTE — PLAN OF CARE
A&O x4. VSS. LS CTA all fields. BS active, passing flatus. Reports upper abdominal pain on clear liquids, requiring 10mg of oxycodone. Discussed with patient that diet cannot be advanced until able to tolerate pain on lower dose of pain medication and took 5mg this AM. Reports nausea, SL zofran given. Up with SBA. IVF at 125mL/hr. Voiding. Phos recheck in AM. Discharge home when able to tolerated regular diet.

## 2020-08-20 NOTE — PHARMACY - DISCHARGE MEDICATION RECONCILIATION
Pharmacy Discharge Medication Reconciliation  This patient is labeled as  Highly Likely for Discharge  through the [employer] pathfinder engine and the SOC Discharge Team has discussed and formulated a preliminary discharge medication reconciliation assistance summary for this patient under Dr.Matthew Wan. The following includes a summary of medication proposals that can be considered for discharge and that still require final clinical decision by the provider.    Discharge medication orders were written before this note was completed.  ?  Current discharge plan: {dischargeplansocdc: home  Approximate/estimated discharge date: 8/21/20  Patient's preferred pharmacy: [id number]-[place], [place]   ?  Medications still requiring physician discharge medication reconciliation/clinical decision    1. Azelastine 1 spray into both nostrils daily as needed for allergies. Patient's azelastine dose was changed from 1 spray into both nostrils daily to 1 spray into both nostrils as needed for allergies. Please review azelastine dosing upon discharge    2. Cetirizine 10 mg by mouth daily: The patient's cetirizine dosing was changed from cetirizine 10 mg by mouth daily prior to admission to cetirizine 10 mg by mouth as needed for allergies in this hospitalization. Please assess dosing for cetirizine upon discharge.    3. Please assess pain management for this patient. She has received 3 doses of oxycodone 5 mg by mouth every 3 hours as needed for pain on 8/18/20 at 0619, 0923, and 1357.  Patient has received 4 doses of 5mg doses between 0003 and 1138 on 8/21/20.    4. Please assess continued need for acetaminophen 650 mg by mouth 3 times daily: Taking acetaminophen with alcohol increases the risk of liver injury.    5. The patient was on pantoprazole 40 mg by mouth daily as needed for heartburn and omeprazole 40 mg by mouth daily as needed for GERD. The dosing was changed from omeprazole 40 mg by mouth daily  "as needed for GERD prior to admission to scheduled omeprazole 40 mg by mouth every morning in this hospitalization. Please review dosing for PPI upon discharge.    6. Metoclopramide (5 mg) 1 tablet by mouth 3 times daily as needed was dispensed to the patient on 7/28/20. The patient did not take metoclopramide in this hospitalization. Please assess the need to continue metoclopramide upon discharge.    7. Sertraline 50 mg by mouth every morning: sertraline was not administered during this hospitalization. Please assess the continuation of sertraline upon discharge.    8. Ondansetron ( 4 mg) 1 tablet by mouth every 8 hours as needed for nausea prior to admission. The patient has received 3 doses with this hospitalization on 8/18, 8/19, and 8/20. Please assess the need for antiemetic therapy upon discharge.    Medications reconciled as \"PRESCRIBE\" that are NEW or MODIFIED this hospitalization    1. Multivitamins with minerals 1 tablet by mouth daily  2. Thiamine 100 mg by mouth daily.  ?  Medications reconciled as \"RESUME\" at discharge  - All PTA medications were resumed at the same doses and directions unless otherwise noted     Medications reconciled as \"DISCONTINUE\" at discharge  - All protocol medications, IV medications, and PRN medications not in use were discontinued unless otherwise noted    ?  Please note:  *All OTC medications are not E-prescribed unless otherwise noted. Please review and E-prescribe if patient requests.  *Patient's orders were routed to preferred pharmacy (see above). If unable to determine, will route to Hospital Discharge Pharmacy.?  *Inpatient medication use was evaluated for discharge medication reconciliation. PRN medications with infrequent use or no use within the last 48 hours or medications ordered through an orderset or protocol that are not appropriate for discharge were not prescribed.  ?  Thank you for the opportunity to care for this patient.  ?  [facility]  Pharmacy " Student  [phone]  [facility]

## 2020-08-20 NOTE — PROGRESS NOTES
[facility]  Hospitalist Progress Note  Naga Landin MD 08/20/2020      Reason for Stay (Diagnosis): pancreatitis         Assessment and Plan:      Summary of Stay: Audelia Comer is a 54 year old female with PMH including anxiety, GERD and alcohol use disorder who was admitted on 8/16/2020 with several week history of abdominal pain, nausea, vomiting and poor oral intake in the setting of daily heavy alcohol use and was found to have acute alcohol induced pancreatitis.  Initial lab work-up here was notable for lipase of 5K, mild transaminitis, leukocytosis and CT scan showing acute uncomplicated pancreatitis with severe hepatic steatosis.  She was admitted for aggressive IV hydration and pain control and has clinically improved.  Slowly advancing her diet.  Still requiring narcotic pain medications though have transition to orals and encouraging her to start to taper off of these.    At this point she is likely still a day or 2 from discharge.  Trying to space out narcotics further/reduced dose and hold off on IV fluids for a day.     1.  Acute alcohol induced pancreatitis:   - Holding IV fluids for now, encouraging hydration by mouth.  -Pain control with oral oxycodone, Tylenol.  Reduce dose from 5-10 mg every 3 hours as needed to just 5 mg every 3 hours as needed.  -Antiemetics as needed  -Needs to avoid alcohol use, counseled  --Patient declines meeting with CD counselor  for alcohol use.  --Schedule bowel regimen, prn suppository/enema.     2.  Acute alcohol transaminitis: Transaminitis consistent with alcohol use pattern.  Patient is aware that she needs to cut back on her drinking.  Repeat shows improvement.  --Given slow clinical improvement we will repeat CMP tomorrow.     3.  Leukocytosis: Patient had significant leukocytosis with white count of 21.9.  She has not had fevers.  CT showed acute uncomplicated pancreatitis no other source of infection.  Resolved on  "recheck.  Monitor off antibiotics.     4.  Starvation/alcoholic ketosis: Patient had been drinking alcohol daily.  She has been eating very little due to abdominal pain and nausea from her acute pancreatitis.  Ketones were quite elevated at 6.1.  --Recheck shows improvement  --Advancing diet.     5.  Anxiety: Restart home meds.     6.  GERD: Continue PPI.     7.  Tobacco use disorder: Patient currently smokes approximately 1.5 packs/day. nicotine patch.     8.  Alcohol use disorder: Patient admits that she has been drinking more since a COVID-19 pandemic.  She drinks nightly in order to calm her self down so she go to bed.    -Discontinue CIWA protocol given absence of withdrawal.  -Discussed that she needs to abstain from alcohol  -Patient declines meeting with chemical dependency or      Diet:  Advancing to full liquids  DVT Prophylaxis: Enoxaparin (Lovenox) SQ  Patino Catheter: not present  Code Status: Full code  Disposition: Likely home in 1 to 2 days pending ability to advance diet and stay hydrated without fluids.  Encouraging more ambulation and reduction in use of narcotics.        Interval History (Subjective):      Trying to space out narcotics/reduced dose to 5 mg oxycodone from 10 mg  She would like to try to advance her diet to full liquids today  Still not feeling well enough to discharge  She is urinating frequently, will try to stop IV fluids today and repeat labs tomorrow  No bowel movement yet, giving MiraLAX today  Encouraging ambulation as she is only really gone for 1 walk since admission         Physical Exam:      Last Vital Signs:  /84 (BP Location: Left arm)   Pulse 77   Temp 98.4  F (36.9  C) (Oral)   Resp 16   Ht 1.676 m (5' 5.98\")   Wt 51.5 kg (113 lb 9.6 oz)   SpO2 99%   BMI 18.34 kg/m        Intake/Output Summary (Last 24 hours) at 8/17/2020 1035  Last data filed at 8/16/2020 1614  Gross per 24 hour   Intake 1000 ml   Output --   Net 1000 ml       General: " Alert, awake, no acute distress.  HEENT: NC/AT, eyes anicteric, external occular movements intact, face symmetric.   Cardiac: RRR, S1, S2.  No murmurs appreciated.  Pulmonary: Normal chest rise, normal work of breathing.  Lungs CTA BL  Abdomen: Some guarding of her epigastric area, but much less so than before.  Nondistended.  Soft.  Extremities: no deformities.  Warm, well perfused.  Skin: no rashes or lesions noted.  Warm and Dry.  Neuro: No focal deficits noted.  Speech clear.  Coordination and strength grossly normal.  Psych: Appropriate affect.         Medications:      All current medications were reviewed with changes reflected in problem list.         Data:      All new lab and imaging data was reviewed.   Labs:  Recent Labs   Lab 08/19/20  0622 08/18/20  0650 08/17/20 0622 08/16/20  1447   WBC  --  8.2 14.3* 21.9*   HGB  --  12.6 13.3 17.2*   HCT  --  37.0 39.7 49.3*   MCV  --  98 99 97    177 192 276     Recent Labs   Lab 08/20/20  0644 08/19/20  1323 08/19/20  0622 08/18/20  2046 08/18/20  0650 08/17/20  0622  08/16/20  1645  08/16/20  1447   NA  --   --   --   --  136 134  --   --   --  129*   POTASSIUM  --  3.7  --   --  3.6 3.7   < >  --   --  2.9*   CHLORIDE  --   --   --   --  107 108  --   --   --  93*   CO2  --   --   --   --  24 20  --   --   --  10*   ANIONGAP  --   --   --   --  5 6  --   --   --  26*   GLC  --   --   --   --  98 90  --   --   --  122*   BUN  --   --   --   --  2* 4*  --   --   --  9   CR  --   --  0.37*  --  0.33* 0.38*  --   --   --  0.57   GFRESTIMATED  --   --  >90  --  >90 >90  --   --   --  >90   GFRESTBLACK  --   --  >90  --  >90 >90  --   --   --  >90   FRANCOISE  --   --   --   --  7.6* 7.6*  --   --   --  9.6   MAG  --  1.7  --   --  1.6 1.9  --  2.1   < >  --    PHOS 2.6 3.0  --  1.9* 1.2* 0.6*  --  2.4*   < >  --    PROTTOTAL  --   --   --   --  6.1* 6.5*  --   --   --  9.6*   ALBUMIN  --   --   --   --  2.5* 2.8*  --   --   --  4.1   BILITOTAL  --   --   --   --   0.6 0.8  --   --   --  1.1   ALKPHOS  --   --   --   --  119 134  --   --   --  212*   AST  --   --   --   --  77* 67*  --  116*  --  Canceled, Test credited   ALT  --   --   --   --  47 46  --   --   --  85*    < > = values in this interval not displayed.      Imaging:   Results for orders placed or performed during the hospital encounter of 08/16/20   CT Abdomen Pelvis w Contrast    Narrative    CT ABDOMEN PELVIS WITH CONTRAST 8/16/2020 4:44 PM    CLINICAL HISTORY: Upper abdominal pain.    TECHNIQUE: CT scan of the abdomen and pelvis was performed following  injection of IV contrast. Multiplanar reformats were obtained. Dose  reduction techniques were used.  CONTRAST: 60mL Isovue-370    COMPARISON: March 15, 2018    FINDINGS:   LOWER CHEST: Normal.    HEPATOBILIARY: Marked heterogeneous hepatic steatosis. Gallbladder  unremarkable.    PANCREAS: There is inflammatory change and enlargement of the pancreas  consistent with acute pancreatitis. The pancreas enhances throughout.  No definite encapsulated fluid collections to suggest abscess. Free  layering peripancreatic fluid is present.     SPLEEN: Normal size.    ADRENAL GLANDS: No significant nodules.    KIDNEYS/BLADDER: No significant mass, stones, or hydronephrosis.    BOWEL: No obstruction or inflammatory change.    PELVIC ORGANS: No pelvic masses.    ADDITIONAL FINDINGS: No ascites.    MUSCULOSKELETAL: Unremarkable.      Impression    IMPRESSION:  1. Acute uncomplicated pancreatitis.  2. Severe hepatic steatosis, question alcoholic steatohepatitis.    MD Naga CATHERINE MD , MD.

## 2020-08-21 VITALS
BODY MASS INDEX: 18.26 KG/M2 | HEIGHT: 66 IN | HEART RATE: 100 BPM | DIASTOLIC BLOOD PRESSURE: 83 MMHG | TEMPERATURE: 97.4 F | RESPIRATION RATE: 20 BRPM | SYSTOLIC BLOOD PRESSURE: 119 MMHG | WEIGHT: 113.6 LBS | OXYGEN SATURATION: 96 %

## 2020-08-21 LAB
ALBUMIN SERPL-MCNC: 2.7 G/DL (ref 3.4–5)
ALP SERPL-CCNC: 136 U/L (ref 40–150)
ALT SERPL W P-5'-P-CCNC: 59 U/L (ref 0–50)
ANION GAP SERPL CALCULATED.3IONS-SCNC: 4 MMOL/L (ref 3–14)
AST SERPL W P-5'-P-CCNC: 69 U/L (ref 0–45)
BASOPHILS # BLD AUTO: 0 10E9/L (ref 0–0.2)
BASOPHILS NFR BLD AUTO: 0.5 %
BILIRUB SERPL-MCNC: 0.5 MG/DL (ref 0.2–1.3)
BUN SERPL-MCNC: 3 MG/DL (ref 7–30)
CALCIUM SERPL-MCNC: 8.8 MG/DL (ref 8.5–10.1)
CHLORIDE SERPL-SCNC: 106 MMOL/L (ref 94–109)
CO2 SERPL-SCNC: 26 MMOL/L (ref 20–32)
CREAT SERPL-MCNC: 0.41 MG/DL (ref 0.52–1.04)
DIFFERENTIAL METHOD BLD: NORMAL
EOSINOPHIL # BLD AUTO: 0.1 10E9/L (ref 0–0.7)
EOSINOPHIL NFR BLD AUTO: 0.9 %
ERYTHROCYTE [DISTWIDTH] IN BLOOD BY AUTOMATED COUNT: 13.1 % (ref 10–15)
GFR SERPL CREATININE-BSD FRML MDRD: >90 ML/MIN/{1.73_M2}
GLUCOSE SERPL-MCNC: 104 MG/DL (ref 70–99)
HCT VFR BLD AUTO: 39.9 % (ref 35–47)
HGB BLD-MCNC: 13.3 G/DL (ref 11.7–15.7)
IMM GRANULOCYTES # BLD: 0 10E9/L (ref 0–0.4)
IMM GRANULOCYTES NFR BLD: 0.3 %
LIPASE SERPL-CCNC: 872 U/L (ref 73–393)
LYMPHOCYTES # BLD AUTO: 1.6 10E9/L (ref 0.8–5.3)
LYMPHOCYTES NFR BLD AUTO: 18.8 %
MCH RBC QN AUTO: 32.8 PG (ref 26.5–33)
MCHC RBC AUTO-ENTMCNC: 33.3 G/DL (ref 31.5–36.5)
MCV RBC AUTO: 98 FL (ref 78–100)
MONOCYTES # BLD AUTO: 1.1 10E9/L (ref 0–1.3)
MONOCYTES NFR BLD AUTO: 12.6 %
NEUTROPHILS # BLD AUTO: 5.8 10E9/L (ref 1.6–8.3)
NEUTROPHILS NFR BLD AUTO: 66.9 %
NRBC # BLD AUTO: 0 10*3/UL
NRBC BLD AUTO-RTO: 0 /100
PLATELET # BLD AUTO: 292 10E9/L (ref 150–450)
POTASSIUM SERPL-SCNC: 3.4 MMOL/L (ref 3.4–5.3)
PROT SERPL-MCNC: 6.6 G/DL (ref 6.8–8.8)
RBC # BLD AUTO: 4.06 10E12/L (ref 3.8–5.2)
SODIUM SERPL-SCNC: 136 MMOL/L (ref 133–144)
WBC # BLD AUTO: 8.7 10E9/L (ref 4–11)

## 2020-08-21 PROCEDURE — 80053 COMPREHEN METABOLIC PANEL: CPT | Performed by: INTERNAL MEDICINE

## 2020-08-21 PROCEDURE — 25000132 ZZH RX MED GY IP 250 OP 250 PS 637: Performed by: INTERNAL MEDICINE

## 2020-08-21 PROCEDURE — 25000128 H RX IP 250 OP 636: Performed by: INTERNAL MEDICINE

## 2020-08-21 PROCEDURE — 83690 ASSAY OF LIPASE: CPT | Performed by: INTERNAL MEDICINE

## 2020-08-21 PROCEDURE — 99239 HOSP IP/OBS DSCHRG MGMT >30: CPT | Performed by: INTERNAL MEDICINE

## 2020-08-21 PROCEDURE — 36415 COLL VENOUS BLD VENIPUNCTURE: CPT | Performed by: INTERNAL MEDICINE

## 2020-08-21 PROCEDURE — 85025 COMPLETE CBC W/AUTO DIFF WBC: CPT | Performed by: INTERNAL MEDICINE

## 2020-08-21 RX ORDER — ACETAMINOPHEN 325 MG/1
650 TABLET ORAL EVERY 6 HOURS PRN
Status: ON HOLD | COMMUNITY
Start: 2020-08-21 | End: 2021-01-18

## 2020-08-21 RX ORDER — ONDANSETRON 4 MG/1
4 TABLET, ORALLY DISINTEGRATING ORAL EVERY 6 HOURS PRN
Qty: 20 TABLET | Refills: 0 | Status: ON HOLD | OUTPATIENT
Start: 2020-08-21 | End: 2021-11-24

## 2020-08-21 RX ORDER — LANOLIN ALCOHOL/MO/W.PET/CERES
100 CREAM (GRAM) TOPICAL DAILY
Qty: 30 TABLET | Refills: 0 | Status: ON HOLD | OUTPATIENT
Start: 2020-08-24 | End: 2021-01-18

## 2020-08-21 RX ORDER — MULTIPLE VITAMINS W/ MINERALS TAB 9MG-400MCG
1 TAB ORAL DAILY
COMMUNITY
Start: 2020-08-22 | End: 2023-01-27

## 2020-08-21 RX ORDER — LANOLIN ALCOHOL/MO/W.PET/CERES
100 CREAM (GRAM) TOPICAL 3 TIMES DAILY
Qty: 6 TABLET | Refills: 0 | Status: SHIPPED | OUTPATIENT
Start: 2020-08-21 | End: 2020-08-23

## 2020-08-21 RX ADMIN — DOCUSATE SODIUM 100 MG: 100 CAPSULE, LIQUID FILLED ORAL at 10:12

## 2020-08-21 RX ADMIN — NICOTINE 1 PATCH: 21 PATCH, EXTENDED RELEASE TRANSDERMAL at 07:37

## 2020-08-21 RX ADMIN — OXYCODONE HYDROCHLORIDE 5 MG: 5 TABLET ORAL at 15:18

## 2020-08-21 RX ADMIN — POLYETHYLENE GLYCOL 3350 17 G: 17 POWDER, FOR SOLUTION ORAL at 10:13

## 2020-08-21 RX ADMIN — OMEPRAZOLE 40 MG: 20 CAPSULE, DELAYED RELEASE ORAL at 10:12

## 2020-08-21 RX ADMIN — OXYCODONE HYDROCHLORIDE 5 MG: 5 TABLET ORAL at 07:28

## 2020-08-21 RX ADMIN — ONDANSETRON 4 MG: 2 INJECTION INTRAMUSCULAR; INTRAVENOUS at 00:55

## 2020-08-21 RX ADMIN — OXYCODONE HYDROCHLORIDE 5 MG: 5 TABLET ORAL at 04:22

## 2020-08-21 RX ADMIN — ACETAMINOPHEN 650 MG: 325 TABLET, FILM COATED ORAL at 10:13

## 2020-08-21 RX ADMIN — OXYCODONE HYDROCHLORIDE 5 MG: 5 TABLET ORAL at 00:03

## 2020-08-21 RX ADMIN — MULTIPLE VITAMINS W/ MINERALS TAB 1 TABLET: TAB at 12:57

## 2020-08-21 RX ADMIN — OXYCODONE HYDROCHLORIDE 5 MG: 5 TABLET ORAL at 11:38

## 2020-08-21 RX ADMIN — THIAMINE HCL TAB 100 MG 100 MG: 100 TAB at 12:57

## 2020-08-21 ASSESSMENT — ACTIVITIES OF DAILY LIVING (ADL)
ADLS_ACUITY_SCORE: 13

## 2020-08-21 NOTE — PLAN OF CARE
Lipase down. Pain upper abdomen and into back. Controlled with Tylenol scheduled and prn Oxycodone. Advanced to regular diet. At baseline doesn't eat 3 meals per day. Enc to go slow. Anticipate home soon. Reflecting on the COVID restrictions causing some increase in anxiety and seclusion that contributed to alcohol intake.

## 2020-08-21 NOTE — PLAN OF CARE
"/87 (BP Location: Left arm)   Pulse 94   Temp 98.4  F (36.9  C) (Oral)   Resp 16   Ht 1.676 m (5' 5.98\")   Wt 51.5 kg (113 lb 9.6 oz)   SpO2 99%   BMI 18.34 kg/m    ORIENTATION:A/Ox4  PAIN: C/O 6-8/10 LUQ pain- PRN Oxy given x2 and scheduled tylenol given  IV:SL   RESPIRATORY:LS- CTA  GI:+BS, denies nausea  : Voids appropriately  SKIN:Intact  ACTIVITY:Up independently  DIET:Tolerating fulls  PLAN: Pain management, encourage PO intake, discharge plan 1-2 days,continue POC.   "

## 2020-08-21 NOTE — PLAN OF CARE
Discharging to home with daughter picking her up. Will pickup meds at [facility]. Enc to quit smoking and drinking to minimize reoccurrence of Pancreatitis. Handouts on Low fat diet given.

## 2020-08-21 NOTE — PROGRESS NOTES
[facility]    Hospitalist Progress Note      Assessment & Plan   Audelia Comer is a 54 year old female who was admitted on 8/16/2020.    Summary of Stay:   Audelia Comer is a 54 year old female with PMH including anxiety, GERD and alcohol use disorder who was admitted on 8/16/2020 with several week history of abdominal pain, nausea, vomiting and poor oral intake in the setting of daily heavy alcohol use and was found to have acute alcohol induced pancreatitis.  Initial lab work-up here was notable for lipase of 5K, mild transaminitis, leukocytosis and CT scan showing acute uncomplicated pancreatitis with severe hepatic steatosis.  She was admitted for aggressive IV hydration and pain control and has clinically improved.  Slowly advancing her diet.     Was started on a low-fat diet this morning.  Patient is very apprehensive in terms of advancing the diet.  Complains of nausea and stomach bloating after eating small amount of fluid.  Pain is better controlled but still has some upper abdominal pain.    Plan:    Acute alcoholic pancreatitis.  - Slowly advance the diet.  -Pain management.  -Complains of abdominal bloating and nausea after eating.  Recommended small and frequent amounts of eating.  May have underlying alcoholic gastritis contributing towards the symptoms.  On PPI.  - When able to tolerate the diet reasonably well then plan for discharge.  Possibly tomorrow.    Elevated LFTs  -Related to alcohol use.    Anxiety  -BuSpar.    Smoking  - On nicotine patch.    Alcohol use disorder  - Stable now.  No withdrawal symptoms.  Declined chemical dependency consult.      DVT Prophylaxis: Enoxaparin (Lovenox) SQ  Code Status: Full Code  Expected discharge: 1 days    Hong Benitez MD  Text Page (7am - 6pm, M-F)    Interval History   Patient was evaluated with nursing staff. Overnight issues discussed.    Review of systems:  No chest pain/palpitations.  No new cough/shortness of breath.  No  headache/visual disturbance/new weakness.    -Data reviewed today: Labs and medications.    Physical Exam   Temp: 97.4  F (36.3  C) Temp src: Oral BP: 119/83 Pulse: 100   Resp: 20 SpO2: 96 % O2 Device: None (Room air)    Vitals:    08/16/20 1914   Weight: 51.5 kg (113 lb 9.6 oz)     Vital Signs with Ranges  Temp:  [97.4  F (36.3  C)-98.8  F (37.1  C)] 97.4  F (36.3  C)  Pulse:  [] 100  Resp:  [16-20] 20  BP: (117-139)/(74-87) 119/83  SpO2:  [96 %-99 %] 96 %  I/O last 3 completed shifts:  In: 760 [P.O.:300; I.V.:460]  Out: -     Constitutional: Awake, alert, cooperative, no apparent distress  HEENT: Trachea midline, sclera is clear   Respiratory: No crackles. No wheezing. Equal breath sounds bilaterally.  Cardiovascular: Regular rate and rhythm, normal S1 and S2, and no murmur noted  GI: Normal bowel sounds, soft, non-distended, non-tender      Medications       acetaminophen  650 mg Oral TID     busPIRone  10 mg Oral BID     docusate sodium  100 mg Oral BID     enoxaparin ANTICOAGULANT  40 mg Subcutaneous Q24H     fluticasone  1 spray Both Nostrils Daily     multivitamin w/minerals  1 tablet Oral Daily     nicotine  1 patch Transdermal Daily     nicotine   Transdermal Q8H     omeprazole  40 mg Oral QAM     oxyCODONE  10 mg Oral Once     polyethylene glycol  17 g Oral Daily     sodium chloride (PF)  3 mL Intracatheter Q8H     thiamine  100 mg Oral TID    Followed by     [START ON 8/24/2020] thiamine  100 mg Oral Daily       Data   Recent Labs   Lab 08/21/20  0658 08/19/20  1323 08/19/20  0622 08/18/20  0650 08/17/20  0622  08/16/20  1447   WBC 8.7  --   --  8.2 14.3*  --  21.9*   HGB 13.3  --   --  12.6 13.3  --  17.2*   MCV 98  --   --  98 99  --  97     --  206 177 192  --  276     --   --  136 134  --  129*   POTASSIUM 3.4 3.7  --  3.6 3.7   < > 2.9*   CHLORIDE 106  --   --  107 108  --  93*   CO2 26  --   --  24 20  --  10*   BUN 3*  --   --  2* 4*  --  9   CR 0.41*  --  0.37* 0.33* 0.38*  --   0.57   ANIONGAP 4  --   --  5 6  --  26*   FRANCOISE 8.8  --   --  7.6* 7.6*  --  9.6   *  --   --  98 90  --  122*   ALBUMIN 2.7*  --   --  2.5* 2.8*  --  4.1   PROTTOTAL 6.6*  --   --  6.1* 6.5*  --  9.6*   BILITOTAL 0.5  --   --  0.6 0.8  --  1.1   ALKPHOS 136  --   --  119 134  --  212*   ALT 59*  --   --  47 46  --  85*   AST 69*  --   --  77* 67*   < > Canceled, Test credited   LIPASE 872*  --   --   --   --   --  5,013*    < > = values in this interval not displayed.       No results found for this or any previous visit (from the past 24 hour(s)).

## 2020-08-21 NOTE — PLAN OF CARE
Please see flowsheets for detailed vital signs and assessments.   Neuro: AxOx4  Vital Signs: stable  Pain: 5-6/10 in left upper abdomen, described as constant and sharp. Oxycodone given x2  O2: mid 90s RA.  Respiratory: LS clear.  GI: WDL. Last BM 8/17. Full liquid diet maintained. Pt experienced some nausea overnight. IV Zofran given, relief of nausea noted.  : voiding w/o difficulty  Skin: WDL.  Activity: Up ad david.  IVF: saline locked  Plan: Will continue with plan of care.   Discharge: 1-2 days when able to tolerate advanced diet.

## 2020-08-22 NOTE — DISCHARGE SUMMARY
[facility]    Discharge Summary  Hospitalist    Date of Admission:  8/16/2020  Date of Discharge:  8/21/2020  3:30 PM  Discharging Provider: Hong Benitez MD  Date of Service (when I saw the patient): 8/21/20    Discharge Diagnoses   Acute alcoholic pancreatitis.  Alcohol use disorder.  Alcoholic hepatitis.  Anxiety.  Cigarette smoking.  Leukocytosis at admission, no evidence of infection.  Resolved.  Starvation/alcoholic ketosis.  GERD/alcoholic gastritis.      History of Present Illness   Audelia Comer is an 54 year old female who presented with abdominal pain, nausea, vomiting.    She presented with several week history of abdominal pain, nausea, vomiting and poor oral intake in the setting of daily heavy alcohol use and was found to have acute alcohol induced pancreatitis.  Initial lab work-up here was notable for lipase of 5K, mild transaminitis, leukocytosis and CT scan showing acute uncomplicated pancreatitis with severe hepatic steatosis.    Hospital Course     Acute alcoholic pancreatitis.  Treated with IV hydration, pain management and n.p.o. status in the beginning.  Her diet was slowly advanced once the pain was adequately controlled.  Currently she is on a low-fat diet which she has tolerated well this morning and then at noon.  No recurrence of the pain.  Wanting to go home.    I have discussed with her about underlying etiology and the importance of alcohol cessation.  Patient was offered to have a discussion about her alcohol history with  and/CD counselor but patient declined that.  She is planning to quit alcohol altogether.    Patient does have evidence of transaminitis consistent with mild alcoholic hepatitis.    There is also an evidence of her malnutrition.  At admission her ketones were elevated suggestive of starvation ketosis along with alcoholic ketosis.    She also gives a history of symptoms suggestive of GERD/alcoholic gastritis.  Poor appetite with bloating  after eating.  Recommended to have small meals on frequent occasions.  Recommended to talk to her primary care physician if her symptoms are persistent, after staying away from alcohol.  May need an EGD.  Continued on omeprazole.  Once again emphasized importance of quitting drinking.      I personally evaluated and examined the patient on the day of discharge.    Hong Benitez MD      Pending Results   These results will be followed up by PCP  Unresulted Labs Ordered in the Past 30 Days of this Admission     No orders found from 7/17/2020 to 8/17/2020.               Primary Care Physician   Randee Woods        Discharge Disposition   Discharged to home  Condition at discharge: Stable    Consultations This Hospital Stay   None    Time Spent on this Encounter   Discharge time: greater than 30 minutes.    Discharge Orders      Reason for your hospital stay    Acute alcoholic pancreatitis.     Follow-up and recommended labs and tests     Follow up with primary care provider, Randee Woods, within 7 days for hospital follow- up.     Activity    Your activity upon discharge: activity as tolerated     Diet    Follow this diet upon discharge: low fat diet     Discharge Medications   Discharge Medication List as of 8/21/2020  3:04 PM      START taking these medications    Details   acetaminophen (TYLENOL) 325 MG tablet Take 2 tablets (650 mg) by mouth every 6 hours as needed for mild pain, OTC      multivitamin w/minerals (THERA-VIT-M) tablet Take 1 tablet by mouth daily, OTC      !! thiamine (B-1) 100 MG tablet Take 1 tablet (100 mg) by mouth daily, Disp-30 tablet,R-0, E-Prescribe      !! thiamine (B-1) 100 MG tablet Take 1 tablet (100 mg) by mouth 3 times daily for 2 days, Disp-6 tablet,R-0, E-Prescribe       !! - Potential duplicate medications found. Please discuss with provider.      CONTINUE these medications which have CHANGED    Details   ondansetron (ZOFRAN ODT) 4 MG ODT tab Take 1 tablet (4 mg) by mouth every 6  hours as needed for nausea, Disp-20 tablet,R-0, E-Prescribe         CONTINUE these medications which have NOT CHANGED    Details   azelastine (ASTELIN) 0.1 % nasal spray Spray 1 spray into both nostrils daily, Historical      busPIRone (BUSPAR) 10 MG tablet Take 10 mg by mouth 2 times daily, Historical      cetirizine (ZYRTEC) 10 MG tablet Take 10 mg by mouth daily, Historical      fluticasone (FLONASE) 50 MCG/ACT nasal spray Spray 1 spray into both nostrils daily, Historical      hydrOXYzine (ATARAX) 25 MG tablet Take 25 mg by mouth 3 times daily as needed for itching or anxiety, Historical      metoclopramide (REGLAN) 5 MG tablet Take 1 tablet (5 mg) by mouth 3 times daily as needed, Disp-10 tablet,R-0, E-Prescribe      montelukast (SINGULAIR) 10 MG tablet Take 10 mg by mouth nightly as needed, Historical      omeprazole (PRILOSEC) 40 MG DR capsule Take 40 mg by mouth daily as needed, Historical      pantoprazole (PROTONIX) 40 MG EC tablet Take 40 mg by mouth daily as needed for heartburn, Historical      sertraline (ZOLOFT) 50 MG tablet Take 50 mg by mouth every morning, Historical           Allergies   No Known Allergies  Data   Most Recent 3 CBC's:  Recent Labs   Lab Test 08/21/20  0658 08/19/20  0622 08/18/20  0650 08/17/20  0622   WBC 8.7  --  8.2 14.3*   HGB 13.3  --  12.6 13.3   MCV 98  --  98 99    206 177 192      Most Recent 3 BMP's:  Recent Labs   Lab Test 08/21/20  0658 08/19/20  1323 08/19/20  0622 08/18/20  0650 08/17/20  0622     --   --  136 134   POTASSIUM 3.4 3.7  --  3.6 3.7   CHLORIDE 106  --   --  107 108   CO2 26  --   --  24 20   BUN 3*  --   --  2* 4*   CR 0.41*  --  0.37* 0.33* 0.38*   ANIONGAP 4  --   --  5 6   FRANCOISE 8.8  --   --  7.6* 7.6*   *  --   --  98 90     Most Recent 2 LFT's:  Recent Labs   Lab Test 08/21/20  0658 08/18/20  0650   AST 69* 77*   ALT 59* 47   ALKPHOS 136 119   BILITOTAL 0.5 0.6     Most Recent INR's and Anticoagulation Dosing  History:  Anticoagulation Dose History     Recent Dosing and Labs Latest Ref Rng & Units 3/26/2009 6/25/2009 6/26/2009    INR 0.86 - 1.14 1.14 1.14 1.05        Most Recent 3 Troponin's:  Recent Labs   Lab Test 04/16/20  1224   TROPI <0.015     Most Recent Cholesterol Panel:No lab results found.  Most Recent 6 Bacteria Isolates From Any Culture (See EPIC Reports for Culture Details):  Recent Labs   Lab Test 07/28/20  1654   CULT >100,000 colonies/mL  mixed urogenital shaka  Susceptibility testing not routinely done    Multiple morphotypes present with no predominant organism.  Growth consistent with   probable contamination during collection.  Suggest repeat specimen if clinically   indicated.       Most Recent TSH, T4 and A1c Labs:No lab results found.

## 2021-01-18 ENCOUNTER — HOSPITAL ENCOUNTER (OUTPATIENT)
Facility: CLINIC | Age: 56
Setting detail: OBSERVATION
Discharge: HOME | End: 2021-01-19
Attending: EMERGENCY MEDICINE | Admitting: INTERNAL MEDICINE
Payer: COMMERCIAL

## 2021-01-18 ENCOUNTER — APPOINTMENT (OUTPATIENT)
Dept: ULTRASOUND IMAGING | Facility: CLINIC | Age: 56
End: 2021-01-18
Attending: EMERGENCY MEDICINE
Payer: COMMERCIAL

## 2021-01-18 DIAGNOSIS — K75.9 HEPATITIS: ICD-10-CM

## 2021-01-18 DIAGNOSIS — K29.00 ACUTE GASTRITIS WITHOUT HEMORRHAGE, UNSPECIFIED GASTRITIS TYPE: ICD-10-CM

## 2021-01-18 DIAGNOSIS — K85.90 ACUTE PANCREATITIS, UNSPECIFIED COMPLICATION STATUS, UNSPECIFIED PANCREATITIS TYPE: ICD-10-CM

## 2021-01-18 DIAGNOSIS — E87.6 HYPOKALEMIA: ICD-10-CM

## 2021-01-18 LAB
ALBUMIN SERPL-MCNC: 3.4 G/DL (ref 3.4–5)
ALP SERPL-CCNC: 234 U/L (ref 40–150)
ALT SERPL W P-5'-P-CCNC: 127 U/L (ref 0–50)
ANION GAP SERPL CALCULATED.3IONS-SCNC: 14 MMOL/L (ref 3–14)
AST SERPL W P-5'-P-CCNC: 288 U/L (ref 0–45)
BASOPHILS # BLD AUTO: 0.1 10E9/L (ref 0–0.2)
BASOPHILS NFR BLD AUTO: 0.4 %
BILIRUB SERPL-MCNC: 0.8 MG/DL (ref 0.2–1.3)
BUN SERPL-MCNC: 6 MG/DL (ref 7–30)
CALCIUM SERPL-MCNC: 9.4 MG/DL (ref 8.5–10.1)
CHLORIDE SERPL-SCNC: 90 MMOL/L (ref 94–109)
CO2 SERPL-SCNC: 24 MMOL/L (ref 20–32)
CREAT SERPL-MCNC: 0.46 MG/DL (ref 0.52–1.04)
DIFFERENTIAL METHOD BLD: ABNORMAL
EOSINOPHIL # BLD AUTO: 0.1 10E9/L (ref 0–0.7)
EOSINOPHIL NFR BLD AUTO: 0.6 %
ERYTHROCYTE [DISTWIDTH] IN BLOOD BY AUTOMATED COUNT: 11.9 % (ref 10–15)
ETHANOL SERPL-MCNC: 0.02 G/DL
GFR SERPL CREATININE-BSD FRML MDRD: >90 ML/MIN/{1.73_M2}
GLUCOSE SERPL-MCNC: 79 MG/DL (ref 70–99)
HCT VFR BLD AUTO: 40.5 % (ref 35–47)
HGB BLD-MCNC: 14.4 G/DL (ref 11.7–15.7)
IMM GRANULOCYTES # BLD: 0 10E9/L (ref 0–0.4)
IMM GRANULOCYTES NFR BLD: 0.3 %
LABORATORY COMMENT REPORT: NORMAL
LIPASE SERPL-CCNC: 435 U/L (ref 73–393)
LYMPHOCYTES # BLD AUTO: 1.5 10E9/L (ref 0.8–5.3)
LYMPHOCYTES NFR BLD AUTO: 12.7 %
MAGNESIUM SERPL-MCNC: 1.7 MG/DL (ref 1.6–2.3)
MCH RBC QN AUTO: 35 PG (ref 26.5–33)
MCHC RBC AUTO-ENTMCNC: 35.6 G/DL (ref 31.5–36.5)
MCV RBC AUTO: 98 FL (ref 78–100)
MONOCYTES # BLD AUTO: 1.3 10E9/L (ref 0–1.3)
MONOCYTES NFR BLD AUTO: 11.1 %
NEUTROPHILS # BLD AUTO: 8.6 10E9/L (ref 1.6–8.3)
NEUTROPHILS NFR BLD AUTO: 74.9 %
NRBC # BLD AUTO: 0 10*3/UL
NRBC BLD AUTO-RTO: 0 /100
PHOSPHATE SERPL-MCNC: 3 MG/DL (ref 2.5–4.5)
PLATELET # BLD AUTO: 210 10E9/L (ref 150–450)
POTASSIUM SERPL-SCNC: 2.5 MMOL/L (ref 3.4–5.3)
POTASSIUM SERPL-SCNC: 2.6 MMOL/L (ref 3.4–5.3)
PROT SERPL-MCNC: 7.8 G/DL (ref 6.8–8.8)
RBC # BLD AUTO: 4.12 10E12/L (ref 3.8–5.2)
SARS-COV-2 RNA RESP QL NAA+PROBE: NEGATIVE
SODIUM SERPL-SCNC: 128 MMOL/L (ref 133–144)
SPECIMEN SOURCE: NORMAL
TROPONIN I SERPL-MCNC: <0.015 UG/L (ref 0–0.04)
WBC # BLD AUTO: 11.5 10E9/L (ref 4–11)

## 2021-01-18 PROCEDURE — 82077 ASSAY SPEC XCP UR&BREATH IA: CPT | Performed by: EMERGENCY MEDICINE

## 2021-01-18 PROCEDURE — 84132 ASSAY OF SERUM POTASSIUM: CPT | Mod: 91 | Performed by: INTERNAL MEDICINE

## 2021-01-18 PROCEDURE — G0378 HOSPITAL OBSERVATION PER HR: HCPCS

## 2021-01-18 PROCEDURE — 96366 THER/PROPH/DIAG IV INF ADDON: CPT

## 2021-01-18 PROCEDURE — 80053 COMPREHEN METABOLIC PANEL: CPT | Performed by: EMERGENCY MEDICINE

## 2021-01-18 PROCEDURE — 258N000003 HC RX IP 258 OP 636: Performed by: EMERGENCY MEDICINE

## 2021-01-18 PROCEDURE — 76705 ECHO EXAM OF ABDOMEN: CPT

## 2021-01-18 PROCEDURE — 83690 ASSAY OF LIPASE: CPT | Performed by: EMERGENCY MEDICINE

## 2021-01-18 PROCEDURE — 84100 ASSAY OF PHOSPHORUS: CPT | Performed by: EMERGENCY MEDICINE

## 2021-01-18 PROCEDURE — 36415 COLL VENOUS BLD VENIPUNCTURE: CPT | Performed by: INTERNAL MEDICINE

## 2021-01-18 PROCEDURE — 250N000009 HC RX 250: Performed by: EMERGENCY MEDICINE

## 2021-01-18 PROCEDURE — 83735 ASSAY OF MAGNESIUM: CPT | Performed by: EMERGENCY MEDICINE

## 2021-01-18 PROCEDURE — 96375 TX/PRO/DX INJ NEW DRUG ADDON: CPT

## 2021-01-18 PROCEDURE — 85025 COMPLETE CBC W/AUTO DIFF WBC: CPT | Performed by: EMERGENCY MEDICINE

## 2021-01-18 PROCEDURE — 99220 PR INITIAL OBSERVATION CARE,LEVEL III: CPT | Performed by: INTERNAL MEDICINE

## 2021-01-18 PROCEDURE — C9803 HOPD COVID-19 SPEC COLLECT: HCPCS

## 2021-01-18 PROCEDURE — 96365 THER/PROPH/DIAG IV INF INIT: CPT

## 2021-01-18 PROCEDURE — 96361 HYDRATE IV INFUSION ADD-ON: CPT

## 2021-01-18 PROCEDURE — 96376 TX/PRO/DX INJ SAME DRUG ADON: CPT

## 2021-01-18 PROCEDURE — 93005 ELECTROCARDIOGRAM TRACING: CPT

## 2021-01-18 PROCEDURE — 250N000011 HC RX IP 250 OP 636: Performed by: EMERGENCY MEDICINE

## 2021-01-18 PROCEDURE — 87635 SARS-COV-2 COVID-19 AMP PRB: CPT | Performed by: EMERGENCY MEDICINE

## 2021-01-18 PROCEDURE — 83735 ASSAY OF MAGNESIUM: CPT | Mod: 91 | Performed by: INTERNAL MEDICINE

## 2021-01-18 PROCEDURE — 250N000013 HC RX MED GY IP 250 OP 250 PS 637: Performed by: EMERGENCY MEDICINE

## 2021-01-18 PROCEDURE — 84484 ASSAY OF TROPONIN QUANT: CPT | Performed by: EMERGENCY MEDICINE

## 2021-01-18 PROCEDURE — 99285 EMERGENCY DEPT VISIT HI MDM: CPT | Mod: 25

## 2021-01-18 RX ORDER — LANOLIN ALCOHOL/MO/W.PET/CERES
200 CREAM (GRAM) TOPICAL 3 TIMES DAILY
Status: DISCONTINUED | OUTPATIENT
Start: 2021-01-19 | End: 2021-01-19 | Stop reason: HOSPADM

## 2021-01-18 RX ORDER — POLYETHYLENE GLYCOL 3350 17 G/17G
17 POWDER, FOR SOLUTION ORAL DAILY PRN
Status: DISCONTINUED | OUTPATIENT
Start: 2021-01-18 | End: 2021-01-19 | Stop reason: HOSPADM

## 2021-01-18 RX ORDER — NALOXONE HYDROCHLORIDE 0.4 MG/ML
0.4 INJECTION, SOLUTION INTRAMUSCULAR; INTRAVENOUS; SUBCUTANEOUS
Status: DISCONTINUED | OUTPATIENT
Start: 2021-01-18 | End: 2021-01-19 | Stop reason: HOSPADM

## 2021-01-18 RX ORDER — MULTIPLE VITAMINS W/ MINERALS TAB 9MG-400MCG
1 TAB ORAL DAILY
Status: DISCONTINUED | OUTPATIENT
Start: 2021-01-19 | End: 2021-01-19 | Stop reason: HOSPADM

## 2021-01-18 RX ORDER — LANOLIN ALCOHOL/MO/W.PET/CERES
100 CREAM (GRAM) TOPICAL DAILY
Status: DISCONTINUED | OUTPATIENT
Start: 2021-01-26 | End: 2021-01-19 | Stop reason: HOSPADM

## 2021-01-18 RX ORDER — ONDANSETRON 2 MG/ML
4 INJECTION INTRAMUSCULAR; INTRAVENOUS EVERY 30 MIN PRN
Status: DISCONTINUED | OUTPATIENT
Start: 2021-01-18 | End: 2021-01-18

## 2021-01-18 RX ORDER — POTASSIUM CHLORIDE 1500 MG/1
40 TABLET, EXTENDED RELEASE ORAL ONCE
Status: COMPLETED | OUTPATIENT
Start: 2021-01-18 | End: 2021-01-18

## 2021-01-18 RX ORDER — SODIUM CHLORIDE, SODIUM LACTATE, POTASSIUM CHLORIDE, CALCIUM CHLORIDE 600; 310; 30; 20 MG/100ML; MG/100ML; MG/100ML; MG/100ML
INJECTION, SOLUTION INTRAVENOUS CONTINUOUS
Status: DISCONTINUED | OUTPATIENT
Start: 2021-01-18 | End: 2021-01-19 | Stop reason: HOSPADM

## 2021-01-18 RX ORDER — ONDANSETRON 4 MG/1
4 TABLET, ORALLY DISINTEGRATING ORAL EVERY 6 HOURS PRN
Status: DISCONTINUED | OUTPATIENT
Start: 2021-01-18 | End: 2021-01-19 | Stop reason: HOSPADM

## 2021-01-18 RX ORDER — ACETAMINOPHEN 650 MG/1
650 SUPPOSITORY RECTAL EVERY 4 HOURS PRN
Status: DISCONTINUED | OUTPATIENT
Start: 2021-01-18 | End: 2021-01-19 | Stop reason: HOSPADM

## 2021-01-18 RX ORDER — PROCHLORPERAZINE 25 MG
25 SUPPOSITORY, RECTAL RECTAL EVERY 12 HOURS PRN
Status: DISCONTINUED | OUTPATIENT
Start: 2021-01-18 | End: 2021-01-19 | Stop reason: HOSPADM

## 2021-01-18 RX ORDER — OXYCODONE AND ACETAMINOPHEN 5; 325 MG/1; MG/1
1-2 TABLET ORAL EVERY 4 HOURS PRN
Status: DISCONTINUED | OUTPATIENT
Start: 2021-01-18 | End: 2021-01-19 | Stop reason: HOSPADM

## 2021-01-18 RX ORDER — PROCHLORPERAZINE MALEATE 5 MG
10 TABLET ORAL EVERY 6 HOURS PRN
Status: DISCONTINUED | OUTPATIENT
Start: 2021-01-18 | End: 2021-01-19 | Stop reason: HOSPADM

## 2021-01-18 RX ORDER — FOLIC ACID 1 MG/1
1 TABLET ORAL DAILY
Status: DISCONTINUED | OUTPATIENT
Start: 2021-01-19 | End: 2021-01-19 | Stop reason: HOSPADM

## 2021-01-18 RX ORDER — HYDROMORPHONE HYDROCHLORIDE 1 MG/ML
0.5 INJECTION, SOLUTION INTRAMUSCULAR; INTRAVENOUS; SUBCUTANEOUS
Status: COMPLETED | OUTPATIENT
Start: 2021-01-18 | End: 2021-01-18

## 2021-01-18 RX ORDER — ACETAMINOPHEN 325 MG/1
650 TABLET ORAL EVERY 4 HOURS PRN
Status: DISCONTINUED | OUTPATIENT
Start: 2021-01-18 | End: 2021-01-19 | Stop reason: HOSPADM

## 2021-01-18 RX ORDER — LANOLIN ALCOHOL/MO/W.PET/CERES
100 CREAM (GRAM) TOPICAL 3 TIMES DAILY
Status: DISCONTINUED | OUTPATIENT
Start: 2021-01-21 | End: 2021-01-19 | Stop reason: HOSPADM

## 2021-01-18 RX ORDER — HYDROMORPHONE HYDROCHLORIDE 1 MG/ML
0.3 INJECTION, SOLUTION INTRAMUSCULAR; INTRAVENOUS; SUBCUTANEOUS
Status: DISCONTINUED | OUTPATIENT
Start: 2021-01-18 | End: 2021-01-19 | Stop reason: HOSPADM

## 2021-01-18 RX ORDER — NALOXONE HYDROCHLORIDE 0.4 MG/ML
0.2 INJECTION, SOLUTION INTRAMUSCULAR; INTRAVENOUS; SUBCUTANEOUS
Status: DISCONTINUED | OUTPATIENT
Start: 2021-01-18 | End: 2021-01-19 | Stop reason: HOSPADM

## 2021-01-18 RX ORDER — SODIUM CHLORIDE 9 MG/ML
INJECTION, SOLUTION INTRAVENOUS CONTINUOUS
Status: DISCONTINUED | OUTPATIENT
Start: 2021-01-18 | End: 2021-01-18

## 2021-01-18 RX ORDER — ONDANSETRON 2 MG/ML
4 INJECTION INTRAMUSCULAR; INTRAVENOUS EVERY 6 HOURS PRN
Status: DISCONTINUED | OUTPATIENT
Start: 2021-01-18 | End: 2021-01-19 | Stop reason: HOSPADM

## 2021-01-18 RX ORDER — HYDROXYZINE HYDROCHLORIDE 25 MG/1
25 TABLET, FILM COATED ORAL EVERY 6 HOURS PRN
Status: DISCONTINUED | OUTPATIENT
Start: 2021-01-18 | End: 2021-01-19 | Stop reason: HOSPADM

## 2021-01-18 RX ADMIN — SODIUM CHLORIDE 1000 ML: 9 INJECTION, SOLUTION INTRAVENOUS at 19:49

## 2021-01-18 RX ADMIN — ONDANSETRON 4 MG: 2 INJECTION INTRAMUSCULAR; INTRAVENOUS at 19:50

## 2021-01-18 RX ADMIN — THIAMINE HYDROCHLORIDE: 100 INJECTION, SOLUTION INTRAMUSCULAR; INTRAVENOUS at 20:59

## 2021-01-18 RX ADMIN — HYDROMORPHONE HYDROCHLORIDE 0.5 MG: 1 INJECTION, SOLUTION INTRAMUSCULAR; INTRAVENOUS; SUBCUTANEOUS at 20:46

## 2021-01-18 RX ADMIN — HYDROMORPHONE HYDROCHLORIDE 0.5 MG: 1 INJECTION, SOLUTION INTRAMUSCULAR; INTRAVENOUS; SUBCUTANEOUS at 19:52

## 2021-01-18 RX ADMIN — LIDOCAINE HYDROCHLORIDE 30 ML: 20 SOLUTION ORAL; TOPICAL at 19:51

## 2021-01-18 RX ADMIN — HYDROMORPHONE HYDROCHLORIDE 0.5 MG: 1 INJECTION, SOLUTION INTRAMUSCULAR; INTRAVENOUS; SUBCUTANEOUS at 22:49

## 2021-01-18 RX ADMIN — POTASSIUM CHLORIDE 40 MEQ: 1500 TABLET, EXTENDED RELEASE ORAL at 20:50

## 2021-01-18 ASSESSMENT — MIFFLIN-ST. JEOR: SCORE: 1111.14

## 2021-01-18 NOTE — ED TRIAGE NOTES
Pt presents to ED with c/o nausea, vomiting, weakness, abdominal pain that radiates into her back since Thursday. Pt reports that she has lost at least 5 lbs in the last few weeks. ABC intact. A/O x4. Pt also has a cough in triage.

## 2021-01-19 VITALS
DIASTOLIC BLOOD PRESSURE: 71 MMHG | HEIGHT: 66 IN | SYSTOLIC BLOOD PRESSURE: 109 MMHG | HEART RATE: 85 BPM | TEMPERATURE: 97.8 F | OXYGEN SATURATION: 97 % | BODY MASS INDEX: 18.05 KG/M2 | RESPIRATION RATE: 18 BRPM | WEIGHT: 112.3 LBS

## 2021-01-19 LAB
ALBUMIN SERPL-MCNC: 2.7 G/DL (ref 3.4–5)
ALP SERPL-CCNC: 166 U/L (ref 40–150)
ALT SERPL W P-5'-P-CCNC: 85 U/L (ref 0–50)
ANION GAP SERPL CALCULATED.3IONS-SCNC: 6 MMOL/L (ref 3–14)
AST SERPL W P-5'-P-CCNC: 155 U/L (ref 0–45)
BILIRUB SERPL-MCNC: 0.7 MG/DL (ref 0.2–1.3)
BUN SERPL-MCNC: 5 MG/DL (ref 7–30)
CALCIUM SERPL-MCNC: 7.6 MG/DL (ref 8.5–10.1)
CHLORIDE SERPL-SCNC: 107 MMOL/L (ref 94–109)
CO2 SERPL-SCNC: 23 MMOL/L (ref 20–32)
CREAT SERPL-MCNC: 0.38 MG/DL (ref 0.52–1.04)
ERYTHROCYTE [DISTWIDTH] IN BLOOD BY AUTOMATED COUNT: 11.9 % (ref 10–15)
GFR SERPL CREATININE-BSD FRML MDRD: >90 ML/MIN/{1.73_M2}
GLUCOSE SERPL-MCNC: 103 MG/DL (ref 70–99)
HCT VFR BLD AUTO: 33.9 % (ref 35–47)
HGB BLD-MCNC: 11.6 G/DL (ref 11.7–15.7)
INTERPRETATION ECG - MUSE: NORMAL
MAGNESIUM SERPL-MCNC: 1.5 MG/DL (ref 1.6–2.3)
MCH RBC QN AUTO: 35.4 PG (ref 26.5–33)
MCHC RBC AUTO-ENTMCNC: 34.2 G/DL (ref 31.5–36.5)
MCV RBC AUTO: 103 FL (ref 78–100)
PLATELET # BLD AUTO: 171 10E9/L (ref 150–450)
POTASSIUM SERPL-SCNC: 3.2 MMOL/L (ref 3.4–5.3)
POTASSIUM SERPL-SCNC: 3.4 MMOL/L (ref 3.4–5.3)
PROT SERPL-MCNC: 5.9 G/DL (ref 6.8–8.8)
RBC # BLD AUTO: 3.28 10E12/L (ref 3.8–5.2)
SODIUM SERPL-SCNC: 136 MMOL/L (ref 133–144)
WBC # BLD AUTO: 9.7 10E9/L (ref 4–11)

## 2021-01-19 PROCEDURE — 250N000013 HC RX MED GY IP 250 OP 250 PS 637: Performed by: PHYSICIAN ASSISTANT

## 2021-01-19 PROCEDURE — 250N000013 HC RX MED GY IP 250 OP 250 PS 637: Performed by: INTERNAL MEDICINE

## 2021-01-19 PROCEDURE — 99217 PR OBSERVATION CARE DISCHARGE: CPT | Performed by: PHYSICIAN ASSISTANT

## 2021-01-19 PROCEDURE — G0378 HOSPITAL OBSERVATION PER HR: HCPCS

## 2021-01-19 PROCEDURE — 84132 ASSAY OF SERUM POTASSIUM: CPT | Mod: 91 | Performed by: INTERNAL MEDICINE

## 2021-01-19 PROCEDURE — 80053 COMPREHEN METABOLIC PANEL: CPT | Performed by: INTERNAL MEDICINE

## 2021-01-19 PROCEDURE — 36415 COLL VENOUS BLD VENIPUNCTURE: CPT | Performed by: INTERNAL MEDICINE

## 2021-01-19 PROCEDURE — 250N000011 HC RX IP 250 OP 636: Performed by: INTERNAL MEDICINE

## 2021-01-19 PROCEDURE — 96375 TX/PRO/DX INJ NEW DRUG ADDON: CPT

## 2021-01-19 PROCEDURE — 85027 COMPLETE CBC AUTOMATED: CPT | Performed by: INTERNAL MEDICINE

## 2021-01-19 PROCEDURE — 258N000003 HC RX IP 258 OP 636: Performed by: INTERNAL MEDICINE

## 2021-01-19 PROCEDURE — 96376 TX/PRO/DX INJ SAME DRUG ADON: CPT

## 2021-01-19 RX ORDER — SUCRALFATE 1 G/1
1 TABLET ORAL
Qty: 28 TABLET | Refills: 0 | Status: SHIPPED | OUTPATIENT
Start: 2021-01-19 | End: 2021-01-26

## 2021-01-19 RX ORDER — MAGNESIUM OXIDE 400 MG/1
400 TABLET ORAL 2 TIMES DAILY
Status: DISCONTINUED | OUTPATIENT
Start: 2021-01-19 | End: 2021-01-19 | Stop reason: HOSPADM

## 2021-01-19 RX ORDER — PANTOPRAZOLE SODIUM 40 MG/1
40 TABLET, DELAYED RELEASE ORAL DAILY
Qty: 30 TABLET | Refills: 0 | Status: SHIPPED | OUTPATIENT
Start: 2021-01-19 | End: 2021-11-19

## 2021-01-19 RX ORDER — POTASSIUM CHLORIDE 1500 MG/1
20 TABLET, EXTENDED RELEASE ORAL ONCE
Status: COMPLETED | OUTPATIENT
Start: 2021-01-19 | End: 2021-01-19

## 2021-01-19 RX ORDER — HYDROXYZINE HYDROCHLORIDE 25 MG/1
25 TABLET, FILM COATED ORAL 3 TIMES DAILY PRN
Qty: 30 TABLET | Refills: 0 | Status: SHIPPED | OUTPATIENT
Start: 2021-01-19 | End: 2021-10-24

## 2021-01-19 RX ORDER — HYDROXYZINE HYDROCHLORIDE 25 MG/1
25 TABLET, FILM COATED ORAL 3 TIMES DAILY PRN
Status: DISCONTINUED | OUTPATIENT
Start: 2021-01-19 | End: 2021-01-19

## 2021-01-19 RX ORDER — POTASSIUM CHLORIDE 1500 MG/1
40 TABLET, EXTENDED RELEASE ORAL ONCE
Status: COMPLETED | OUTPATIENT
Start: 2021-01-19 | End: 2021-01-19

## 2021-01-19 RX ORDER — SUCRALFATE 1 G/1
1 TABLET ORAL
Status: DISCONTINUED | OUTPATIENT
Start: 2021-01-19 | End: 2021-01-19 | Stop reason: HOSPADM

## 2021-01-19 RX ORDER — MAGNESIUM OXIDE 400 MG/1
400 TABLET ORAL 2 TIMES DAILY
Status: DISCONTINUED | OUTPATIENT
Start: 2021-01-19 | End: 2021-01-19

## 2021-01-19 RX ADMIN — SUCRALFATE 1 G: 1 TABLET ORAL at 16:17

## 2021-01-19 RX ADMIN — PROCHLORPERAZINE EDISYLATE 10 MG: 5 INJECTION INTRAMUSCULAR; INTRAVENOUS at 00:46

## 2021-01-19 RX ADMIN — THIAMINE HCL TAB 100 MG 200 MG: 100 TAB at 08:21

## 2021-01-19 RX ADMIN — Medication 400 MG: at 08:21

## 2021-01-19 RX ADMIN — MULTIPLE VITAMINS W/ MINERALS TAB 1 TABLET: TAB at 08:21

## 2021-01-19 RX ADMIN — OXYCODONE HYDROCHLORIDE AND ACETAMINOPHEN 1 TABLET: 5; 325 TABLET ORAL at 03:15

## 2021-01-19 RX ADMIN — OMEPRAZOLE 20 MG: 20 CAPSULE, DELAYED RELEASE ORAL at 09:42

## 2021-01-19 RX ADMIN — HYDROMORPHONE HYDROCHLORIDE 0.3 MG: 1 INJECTION, SOLUTION INTRAMUSCULAR; INTRAVENOUS; SUBCUTANEOUS at 00:46

## 2021-01-19 RX ADMIN — THIAMINE HCL TAB 100 MG 200 MG: 100 TAB at 13:09

## 2021-01-19 RX ADMIN — POTASSIUM CHLORIDE 20 MEQ: 1500 TABLET, EXTENDED RELEASE ORAL at 05:18

## 2021-01-19 RX ADMIN — SUCRALFATE 1 G: 1 TABLET ORAL at 09:42

## 2021-01-19 RX ADMIN — FOLIC ACID 1 MG: 1 TABLET ORAL at 08:21

## 2021-01-19 RX ADMIN — OXYCODONE HYDROCHLORIDE AND ACETAMINOPHEN 1 TABLET: 5; 325 TABLET ORAL at 08:24

## 2021-01-19 RX ADMIN — SERTRALINE HYDROCHLORIDE 50 MG: 50 TABLET ORAL at 09:42

## 2021-01-19 RX ADMIN — SODIUM CHLORIDE, POTASSIUM CHLORIDE, SODIUM LACTATE AND CALCIUM CHLORIDE: 600; 310; 30; 20 INJECTION, SOLUTION INTRAVENOUS at 00:00

## 2021-01-19 RX ADMIN — POTASSIUM CHLORIDE 40 MEQ: 1500 TABLET, EXTENDED RELEASE ORAL at 00:21

## 2021-01-19 RX ADMIN — SUCRALFATE 1 G: 1 TABLET ORAL at 13:09

## 2021-01-19 RX ADMIN — HYDROXYZINE HYDROCHLORIDE 25 MG: 25 TABLET, FILM COATED ORAL at 00:21

## 2021-01-19 RX ADMIN — Medication 400 MG: at 00:21

## 2021-01-19 RX ADMIN — POTASSIUM CHLORIDE 20 MEQ: 1500 TABLET, EXTENDED RELEASE ORAL at 13:09

## 2021-01-19 NOTE — ED NOTES
DATE:  1/18/2021   TIME OF RECEIPT FROM LAB:  1957  LAB TEST:  potassium  LAB VALUE:  2.5  RESULTS GIVEN WITH READ-BACK TO (PROVIDER):  Carmelo Huang MD  TIME LAB VALUE REPORTED TO PROVIDER:   1957    Awaiting orders

## 2021-01-19 NOTE — ED PROVIDER NOTES
History   Chief Complaint:  Vomiting, abdominal pain     HPI   Audelia Comer is a 55-year-old female with past medical history significant for ileitis, acute pancreatitis who presents to the emergency department with a chief complaint of moderate to severe epigastric and right upper quadrant abdominal pain onset approximately 4 days ago and gradually worsening, worse with eating, alleviated by nothing, radiating to back at times and into chest.  Patient reports that she has had abdominal issues for years.  Patient also reports multiple episodes of nonbloody vomiting.  She denies shortness of breath, fevers, chills, changes in urination, changes in bowel movements, rashes, lower extremity edema.     Patient reports that she smokes approximately 1 pack of cigarettes per day.  She reports approximately 2 EtOH drinks per night.  She denies illegal drugs.    Review of Systems  Full ROS completed and negative other than pertinent positives and negatives noted in HPI    Allergies:  The patient has no known allergies.     Medications:  Buspar  Zyrtec  Flonase  Atarax  Reglan  Singulair  Multivitamin  Prilosec  Zoloft  Thiamine    Past Medical History:    Anxiety  Depressive disorder  SBO  Pancreatitis  Ileitis   Tobacco abuse  Hyperlipidemia  Allergic rhinitis  Alcohol abuse    Eating disorder    Past Surgical History:    Appendectomy  C section  Mammaplasty with prosthetic implants  Unilateral breast augmentation     Family History:    Liver tumor   MI    Social History:  Smoking status: 1 pack of cigarettes per day.  She   Alcohol status: 2 drinks per night.    She denies illegal drugs.    Physical Exam     Patient Vitals for the past 24 hrs:   BP Temp Pulse Resp SpO2   01/18/21 2100 -- -- 89 -- --   01/18/21 2045 115/82 -- -- -- 100 %   01/18/21 1950 117/89 -- 84 -- 100 %   01/18/21 1940 -- -- -- -- 100 %   01/18/21 1755 100/82 97.6  F (36.4  C) 115 16 100 %       Physical Exam  Constitutional: Well developed,  nontox appearance  Head: Atraumatic.   Mouth/Throat: Oropharynx is clear and tacky.   Neck:  no stridor  Eyes: no scleral icterus  Cardiovascular: Reg tachycardia, 2+ bilat radial pulses  Pulmonary/Chest: nml resp effort, Clear BS bilat  Abdominal: ND, soft, epigastric abdominal tenderness, no rebound or guarding   Ext: Warm, well perfused, no edema  Neurological: A&O, symmetric facies, moves ext x4  Skin: Skin is warm and dry.   Psychiatric: Behavior is normal. Thought content normal.   Nursing note and vitals reviewed.    Emergency Department Course   ECG:  ECG taken at 1846, ECG read at 1846  Normal sinus rhythm  Normal ECG  Rate 90 bpm. WY interval 142 ms. QRS duration 80 ms. QT/QTc 388/474 ms. P-R-T axes 70 80 75.    Imaging:  US Abdomen limited RUQ  Pending upon admission    Laboratory:  CBC: WBC 11.5 (H),  o/w WNL. (HGB 14.4, )   CMP: Glucose 79,  (L), potassium 2.5 (L!), BUN 6 (L), ALKPHOS 234 (H),  (H),  (H), o/w WNL (Creatinine: 0.46 (L))    Troponin (Collected 1902): <0.015    Lipase: 435 (H)      Alcohol ethyl: 0.02 (H)    Asymptomatic SARS-CoV-2 COVID-19 Virus by PCR: negative    Emergency Department Course:  Reviewed:  I reviewed nursing notes, vitals, past medical history and care everywhere    Assessments:  1849: I obtained history and examined the patient as noted above.   2026: I rechecked the patient and explained findings.   2034:   I rechecked the patient.     Consults:   2112: I discussed the patient with the admitting hospitalist, Dr. Haji    Interventions:    1949 NS 1L IV Bolus  1950 Zofran 4mg IV injection   1951 GI Cocktail - Maalox 15 mL, Viscous Lidocaine 15 mL, 30 mL suspension PO  1952 Dilaudid 0.5mg IV injection  2046 Dilaudid 0.5mg IV injection  2050 Klor-con, 40 mEq, PO  2059 sodium chloride 0.9% 1,000 mL with infuvite adult 10 mL, thiamine 100 mg, folic acid 1 mg infusion, IV    Disposition:  The patient was admitted to the hospital under the care of  Dr. Haji.     Impression & Plan   Medical Decision Makin year old female presenting w/ abdominal pain, vomiting      DDx includes gastritis, peptic ulcer disease, hepatitis, pancreatitis, biliary pathology such as cholecystitis, choledocholithiasis, irritable bowel syndrome, alcoholic gastritis, electrolyte abnormality, dehydration, atypical ACS although less likely given tenderness on exam..  EKG demonstrated no acute ischemic findings.  Doubt vascular catastrophe given history and physical exam.  Labs significant for elevated LFTs, elevated lipase, hypokalemia.  Right upper quadrant ultrasound ordered for further evaluation pending upon admission.  Potassium repletion ordered per protocol in the emergency department.  Given severe hypokalemia, I think it would be appropriate to admit the patient for repletion and further evaluation.  Pt counseled on all results, disposition and diagnosis.  They are understanding and agreeable to plan. Patient admitted in stable condition.      Covid-19  Audelia Comer was evaluated during a global COVID-19 pandemic, which necessitated consideration that the patient might be at risk for infection with the SARS-CoV-2 virus that causes COVID-19.   Applicable protocols for evaluation were followed during the patient's care.   COVID-19 was considered as part of the patient's evaluation. The plan for testing is:  a test was obtained during this visit.    Diagnosis:    ICD-10-CM    1. Acute gastritis without hemorrhage, unspecified gastritis type  K29.00 Alcohol ethyl     Alcohol ethyl     CANCELED: Alcohol ethyl   2. Acute pancreatitis, unspecified complication status, unspecified pancreatitis type  K85.90    3. Hepatitis  K75.9    4. Hypokalemia  E87.6      Scribe Disclosure:  I, Ana Lilia Miller, am serving as a scribe at 6:28 PM on 2021 to document services personally performed by Carmelo Huang MD based on my observations and the provider's statements to me.               Carmelo Huang MD  01/19/21 0055

## 2021-01-19 NOTE — CONSULTS
"CLINICAL NUTRITION SERVICES  -  ASSESSMENT NOTE    Recommendations Ordered by Registered Dietitian (RD):   Continue diet as ordered   Ordered Boost Plus (chocolate/vanilla) BID   Continue MVI+M as ordered    Malnutrition:   % Weight Loss:  Weight loss does not meet criteria for malnutrition - 6% in 6 months   % Intake:  <75% for >/= 3 months (non-severe malnutrition)  Subcutaneous Fat Loss:  Orbital region moderate depletion and Upper arm region moderate-severe depletion  Muscle Loss:  Temporal region moderate depletion, Clavicle bone region severe depletion, Acromion bone region moderate-severe depletion, Scapular bone region moderate-severe depletion, Anterior thigh region moderate depletion and Posterior calf region moderate-severe depletion  Fluid Retention:  None noted    Malnutrition Diagnosis: Severe malnutrition  In Context of:  Acute illness or injury  Chronic illness or disease  Environmental or social circumstances     REASON FOR ASSESSMENT  Audelia Comer is a 55 year old female seen by Registered Dietitian for Provider Order - \"malnutrition evaluation\"     NUTRITION HISTORY  - Information obtained from patient and chart   - Patient admitted for abdominal pain   - History of alcohol use disorder, alcoholic pancreatitis, anxiety, malnutrition, tobacco use, GERD/alcoholic gastritis  - Typical food/fluid intake PTA: pt states that since COVID started in March of last year her appetite and PO intake have wavered. Poor PO intake is also impacted by increased nausea and vomiting. Mentions that she has never been a \"big eater\". Typically consumes a breakfast bar, lunch meal and sometimes dinner (only if before 6pm, if after she will skip the meal).   - Supplements: none, however is very interested   - Chewing/swallowing difficulty: none   - Food allergies: NKFA    CURRENT NUTRITION ORDERS  Diet Order:     ADAT - regular diet     Current Intake/Tolerance:  No information recorded in the flowsheets to comment " "on     NUTRITION FOCUSED PHYSICAL ASSESSMENT FOR DIAGNOSING MALNUTRITION)  Yes           Observed:    Muscle wasting (refer to documentation in Malnutrition section) and Subcutaneous fat loss (refer to documentation in Malnutrition section)    Obtained from Chart/Interdisciplinary Team:  - Last BM not recorded in the I/O     ANTHROPOMETRICS  Height: 5' 6\"  Weight: 50.9 kg ( 112 lbs 4.8 oz)   Body mass index is 18.13 kg/m .  Weight Status:  Underweight BMI <18.5  Weight History: limited weight history to comment on, weight is down 3.4 kg over the past ~6 months. This equates to a weight loss of 6%. Pt states that her normal body weight is around 129-130 lbs, slowly declining since March. She believes she has lost about 20 lbs during this time.   Wt Readings from Last 10 Encounters:   01/18/21 50.9 kg (112 lb 4.8 oz)   08/16/20 51.5 kg (113 lb 9.6 oz)   07/28/20 54.3 kg (119 lb 11.4 oz)   03/16/18 53.7 kg (118 lb 6.2 oz)   09/28/17 55.8 kg (123 lb)   07/17/17 57.6 kg (127 lb)   09/10/16 52.8 kg (116 lb 6.5 oz)   08/02/16 54.7 kg (120 lb 9.6 oz)     LABS  Labs reviewed    Electrolytes  Potassium (mmol/L)   Date Value   01/19/2021 3.2 (L)   01/18/2021 2.6 (LL)   01/18/2021 2.5 (LL)     Phosphorus (mg/dL)   Date Value   01/18/2021 3.0   08/20/2020 2.6   08/19/2020 3.0   08/18/2020 1.9 (L)   08/18/2020 1.2 (L)    Blood Glucose  Glucose (mg/dL)   Date Value   01/19/2021 103 (H)   01/18/2021 79   08/21/2020 104 (H)   08/18/2020 98   08/17/2020 90    Inflammatory Markers  CRP Inflammation (mg/L)   Date Value   08/03/2016 11.5 (H)     WBC (10e9/L)   Date Value   01/19/2021 9.7   01/18/2021 11.5 (H)   08/21/2020 8.7     Albumin (g/dL)   Date Value   01/19/2021 2.7 (L)   01/18/2021 3.4   08/21/2020 2.7 (L)      Magnesium (mg/dL)   Date Value   01/18/2021 1.5 (L)   01/18/2021 1.7   08/19/2020 1.7     Sodium (mmol/L)   Date Value   01/19/2021 136   01/18/2021 128 (L)   08/21/2020 136    Renal  Urea Nitrogen (mg/dL)   Date Value "   01/19/2021 5 (L)   01/18/2021 6 (L)   08/21/2020 3 (L)     Creatinine (mg/dL)   Date Value   01/19/2021 0.38 (L)   01/18/2021 0.46 (L)   08/21/2020 0.41 (L)     Additional  Ketones Urine (mg/dL)   Date Value   07/28/2020 >150 (A)          MEDICATIONS  Medications reviewed    folic acid  1 mg Oral Daily     magnesium oxide  400 mg Oral BID     multivitamin w/minerals  1 tablet Oral Daily     thiamine  200 mg Oral TID    Followed by     [START ON 1/21/2021] thiamine  100 mg Oral TID    Followed by     [START ON 1/26/2021] thiamine  100 mg Oral Daily        lactated ringers 100 mL/hr at 01/19/21 0000      acetaminophen, acetaminophen, HYDROmorphone, hydrOXYzine, melatonin, naloxone **OR** naloxone **OR** naloxone **OR** naloxone, ondansetron **OR** ondansetron, oxyCODONE-acetaminophen, polyethylene glycol, prochlorperazine **OR** prochlorperazine **OR** prochlorperazine     ASSESSED NUTRITION NEEDS PER APPROVED PRACTICE GUIDELINES:    Dosing Weight 50.9 kg   Estimated Energy Needs:3924-9140+ kcals (30-35+ Kcal/Kg)  Justification: underweight  Estimated Protein Needs: 61-76+ grams protein (1.2-1.5+ g pro/Kg)  Justification: Repletion and preservation of lean body mass  Estimated Fluid Needs: per MD     MALNUTRITION:  % Weight Loss:  Weight loss does not meet criteria for malnutrition - 6% in 6 months   % Intake:  <75% for >/= 3 months (non-severe malnutrition)  Subcutaneous Fat Loss:  Orbital region moderate depletion and Upper arm region moderate-severe depletion  Muscle Loss:  Temporal region moderate depletion, Clavicle bone region severe depletion, Acromion bone region moderate-severe depletion, Scapular bone region moderate-severe depletion, Anterior thigh region moderate depletion and Posterior calf region moderate-severe depletion  Fluid Retention:  None noted    Malnutrition Diagnosis: Severe malnutrition  In Context of:  Acute illness or injury  Chronic illness or disease  Environmental or social  circumstances    NUTRITION DIAGNOSIS:  Inadequate oral intake related to poor appetite and increased nausea/vomiting evidenced by pt likely consuming <75% or less of nutritional needs over the past >3 months, noted severe muscle/fat loss.     NUTRITION INTERVENTIONS  Recommendations / Nutrition Prescription  Continue diet as ordered   Ordered Boost Plus (chocolate/vanilla) BID   Continue MVI+M as ordered     Implementation  Nutrition education: Provided education on the importance of consuming meals TID focusing on protein and calorie consumption. Included examples of high calorie/high protein items. Also discussed the different oral nutritional supplements available here during admission.   Medical Food Supplement: as above   Multivitamin/Mineral: as above     Nutrition Goals  Pt to consume >/=75% of meals ordered TID     MONITORING AND EVALUATION:  Progress towards goals will be monitored and evaluated per protocol and Practice Guidelines    Sharon Horton RD, LD  Clinical Dietitian

## 2021-01-19 NOTE — DISCHARGE SUMMARY
[facility] [place]  Hospitalist Discharge Summary      Date of Admission:  1/18/2021  Date of Discharge:  1/19/2021  Discharging Provider: Jackie Ball PA-C      Discharge Diagnoses   Acute abdominal pain likely related to acute alcohol gastritis  Elevated LFTs - improved   Hyponatremia - resolved   Hypokalemia - resolved     Follow-ups Needed After Discharge   Follow-up Appointments     Follow-up and recommended labs and tests       Follow up with primary care provider, Randee Woods, within 7 days for   hospital follow- up.  No follow up labs or test are needed.             Unresulted Labs Ordered in the Past 30 Days of this Admission     No orders found for last 31 day(s).      These results will be followed up by PCP    Discharge Disposition   Discharged to home  Condition at discharge: Stable      Hospital Course   Audelia Comer is a 55 year old female with a history of alcohol use disorder, alcoholic pancreatitis, anxiety, malnutrition, tobacco use, GERD/alcoholic gastritis, who presented to the ED for evaluation of abdominal pain and vomiting. Patient describes severe discomfort in the right upper quadrant which radiates to most of the abdomen as well as to her back and to her right shoulder blade.  Denies exacerbating or alleviating factors.  Also reported the associated nonbloody emesis.  Labs in the ED--Alk phos 234, ,  and lipase 435.  White count mildly elevated at 11.5. Patient's discomfort improved significantly with IV Dilaudid. RUQ US done which showed gallbladder sludge without stones, negative Ribera's sign, visualized pancreas appears normal. Symptoms could be some low-level pancreatic inflammation, but the lipase is not elevated to criteria for pancreatitis.  She does have a history of alcoholic pancreatitis and admission for that in August and states her sx are somewhat similar but not the same. She does endorse daily alcohol use, denies hx of withdrawal. Pt  was admitted for further symptom management. She was started on IVFs, PPI and carafate, as well as prn pain medications. Potassium replaced per protocol. Her symptoms improved and diet was advanced without worsening of sx. Medication changes discussed and follow up with PCP.    Consultations This Hospital Stay   NUTRITION SERVICES ADULT IP CONSULT    Code Status   Full Code    Time Spent on this Encounter   I, Jackie Ball PA-C, personally saw the patient today and spent less than or equal to 30 minutes discharging this patient.       Jackie Ball PA-C  [facility] OBSERVATION DEPT  [address] [place] [address]  Phone: [phone]  ______________________________________________________________________    Physical Exam   Vital Signs: Temp: 96.5  F (35.8  C) Temp src: Oral BP: 99/68 Pulse: 82   Resp: 18 SpO2: 98 % O2 Device: None (Room air)    Weight: 112 lbs 4.8 oz    GENERAL:  Comfortable.  PSYCH: pleasant, oriented, No acute distress.  HEART:  Normal S1, S2 with no murmur, no pericardial rub, gallops or S3 or S4.  LUNGS:  Clear to auscultation, normal Respiratory effort. No wheezing, rales or ronchi.  GI:  Soft, normal bowel sounds. Epigastric tenderness, non distended.   EXTREMITIES:  No pedal edema, +2 pulses bilateral and equal.  SKIN:  Dry to touch, No rash, wound or ulcerations.  NEUROLOGIC:  CN 2-12 intact, BL 5/5 symmetric upper and lower extremity strength, sensation is intact with no focal deficits.         Primary Care Physician   Randee Woods    Discharge Orders      Reason for your hospital stay    Epigastric abdominal pain with vomiting. LFTs were elevated on admission and improved overnight with IV fluids. Lipase was mildly elevated but pancreas appeared normal on US so favor alcohol gastritis over acute pancreatitis. Symptoms improved with conservative management and you were able to tolerate oral intake without worsening of symptoms. You were encouraged  to abstain from alcohol. You should take Protonix daily and carafate prior to eating for symptom control.     Follow-up and recommended labs and tests     Follow up with primary care provider, Randee Woods, within 7 days for hospital follow- up.  No follow up labs or test are needed.     Activity    Your activity upon discharge: activity as tolerated     When to contact your care team    Call your primary doctor if you have any of the following: temperature greater than 101, increased pain or intractable nausea and/or vomiting.     Full Code     Diet    Follow this diet upon discharge: Regular       Significant Results and Procedures   Most Recent 3 CBC's:  Recent Labs   Lab Test 01/19/21  0415 01/18/21 1902 08/21/20  0658   WBC 9.7 11.5* 8.7   HGB 11.6* 14.4 13.3   * 98 98    210 292     Most Recent 3 BMP's:  Recent Labs   Lab Test 01/19/21  1011 01/19/21  0415 01/18/21  2315 01/18/21 1902 08/21/20  0658   NA  --  136  --  128* 136   POTASSIUM 3.4 3.2* 2.6* 2.5* 3.4   CHLORIDE  --  107  --  90* 106   CO2  --  23  --  24 26   BUN  --  5*  --  6* 3*   CR  --  0.38*  --  0.46* 0.41*   ANIONGAP  --  6  --  14 4   FRANCOISE  --  7.6*  --  9.4 8.8   GLC  --  103*  --  79 104*     Most Recent 2 LFT's:  Recent Labs   Lab Test 01/19/21 0415 01/18/21 1902   * 288*   ALT 85* 127*   ALKPHOS 166* 234*   BILITOTAL 0.7 0.8     Most Recent 3 INR's:No lab results found.  Most Recent 3 Troponin's:  Recent Labs   Lab Test 01/18/21 1902 04/16/20  1224   TROPI <0.015 <0.015   ,   Results for orders placed or performed during the hospital encounter of 01/18/21   US Abdomen Limited (RUQ)    Narrative    EXAM: US ABDOMEN LIMITED  LOCATION: [facility]  DATE/TIME: 1/18/2021 9:46 PM    INDICATION: Abdominal pain. Elevated liver function tests.  COMPARISON: None.  TECHNIQUE: Limited abdominal ultrasound.    FINDINGS:    GALLBLADDER: Small amount of sludge in the gallbladder. No gallstones. No sonographic  Ribera sign.    BILE DUCTS: No biliary dilatation. The common duct measures 5 mm.    LIVER: Diffuse fatty infiltration. No focal mass.    RIGHT KIDNEY: No hydronephrosis.    PANCREAS: The visualized portions are normal.    No ascites.      Impression    IMPRESSION:  1.  Small amount of sludge in the gallbladder. No gallstone or biliary dilatation.  2.  Fatty infiltration of the liver.       Discharge Medications   Current Discharge Medication List      START taking these medications    Details   sucralfate (CARAFATE) 1 GM tablet Take 1 tablet (1 g) by mouth 4 times daily (before meals and nightly) for 7 days  Qty: 28 tablet, Refills: 0    Associated Diagnoses: Acute gastritis without hemorrhage, unspecified gastritis type         CONTINUE these medications which have CHANGED    Details   hydrOXYzine (ATARAX) 25 MG tablet Take 1 tablet (25 mg) by mouth 3 times daily as needed for itching or anxiety  Qty: 30 tablet, Refills: 0    Associated Diagnoses: Acute gastritis without hemorrhage, unspecified gastritis type      pantoprazole (PROTONIX) 40 MG EC tablet Take 1 tablet (40 mg) by mouth daily  Qty: 30 tablet, Refills: 0    Associated Diagnoses: Acute gastritis without hemorrhage, unspecified gastritis type         CONTINUE these medications which have NOT CHANGED    Details   azelastine (ASTELIN) 0.1 % nasal spray Spray 1 spray into both nostrils daily as needed       cetirizine (ZYRTEC) 10 MG tablet Take 10 mg by mouth daily as needed       fluticasone (FLONASE) 50 MCG/ACT nasal spray Spray 1 spray into both nostrils daily as needed       metoclopramide (REGLAN) 5 MG tablet Take 1 tablet (5 mg) by mouth 3 times daily as needed  Qty: 10 tablet, Refills: 0      montelukast (SINGULAIR) 10 MG tablet Take 10 mg by mouth nightly as needed      multivitamin w/minerals (THERA-VIT-M) tablet Take 1 tablet by mouth daily  Qty:      Associated Diagnoses: Alcohol-induced acute pancreatitis without infection or necrosis       ondansetron (ZOFRAN ODT) 4 MG ODT tab Take 1 tablet (4 mg) by mouth every 6 hours as needed for nausea  Qty: 20 tablet, Refills: 0    Associated Diagnoses: Alcohol-induced acute pancreatitis without infection or necrosis      sertraline (ZOLOFT) 50 MG tablet Take 50 mg by mouth every morning         STOP taking these medications       omeprazole (PRILOSEC) 40 MG DR capsule Comments:   Reason for Stopping:             Allergies   No Known Allergies

## 2021-01-19 NOTE — PHARMACY-ADMISSION MEDICATION HISTORY
Admission medication history interview status for this patient is complete. See [facility] admission navigator for allergy information, prior to admission medications and immunization status.     Medication history interview done via telephone during Covid-19 pandemic, indicate source(s): Patient  Medication history resources (including written lists, pill bottles, clinic record):None    Changes made to PTA medication list:  Added: none  Deleted: APAP, thiamine, buspar  Changed: astelin to prn, zyrtec to prn, flonase to prn    Actions taken by pharmacist (provider contacted, etc):None     Additional medication history information:None    Medication reconciliation/reorder completed by provider prior to medication history?  n   (Y/N)          Prior to Admission medications    Medication Sig Last Dose Taking? Auth Provider   azelastine (ASTELIN) 0.1 % nasal spray Spray 1 spray into both nostrils daily as needed  Past Week at Unknown time Yes Unknown, Entered By History   cetirizine (ZYRTEC) 10 MG tablet Take 10 mg by mouth daily as needed  prn Yes Unknown, Entered By History   fluticasone (FLONASE) 50 MCG/ACT nasal spray Spray 1 spray into both nostrils daily as needed  prn Yes Unknown, Entered By History   hydrOXYzine (ATARAX) 25 MG tablet Take 25 mg by mouth 3 times daily as needed for itching or anxiety Past Week at Unknown time Yes Unknown, Entered By History   metoclopramide (REGLAN) 5 MG tablet Take 1 tablet (5 mg) by mouth 3 times daily as needed 1/17/2021 at Unknown time Yes Abby Gonzalez MD   montelukast (SINGULAIR) 10 MG tablet Take 10 mg by mouth nightly as needed Past Week at Unknown time Yes Unknown, Entered By History   multivitamin w/minerals (THERA-VIT-M) tablet Take 1 tablet by mouth daily Past Week at Unknown time Yes Hong Benitez MD   omeprazole (PRILOSEC) 40 MG DR capsule Take 40 mg by mouth daily as needed 1/18/2021 at Unknown time Yes Unknown, Entered By History   ondansetron (ZOFRAN ODT) 4  MG ODT tab Take 1 tablet (4 mg) by mouth every 6 hours as needed for nausea Past Week at Unknown time Yes Hong Benitez MD   pantoprazole (PROTONIX) 40 MG EC tablet Take 40 mg by mouth daily as needed for heartburn prn Yes Unknown, Entered By History   sertraline (ZOLOFT) 50 MG tablet Take 50 mg by mouth every morning 1/17/2021 at Unknown time Yes Unknown, Entered By History

## 2021-01-19 NOTE — H&P
[facility]    History and Physical - Hospitalist Service       Date of Admission:  1/18/2021     Assessment & Plan   Audelia Comer is a 55 year old female with a history of alcohol use disorder, alcoholic pancreatitis, anxiety, malnutrition, tobacco use, GERD/alcoholic gastritis, who presented to the ED for evaluation of abdominal pain and vomiting.     Abdominal pain  - Patient describes severe discomfort in the right upper quadrant which radiates to most of the abdomen as well as to her back and to her right shoulder blade.  Denies exacerbating or alleviating factors.  Also reported the associated nonbloody emesis.  Labs in the ED--Alk phos 234, ,  and lipase 435.  White count mildly elevated at 11.5. Patient's discomfort improved significantly with IV Dilaudid.  - Differential diagnosis includes alcoholic gastritis, though patient says that she has been taking PPI.  Could be gallbladder pathology, though ultrasound in July showed no gallstones.  Could be some low-level pancreatic inflammation, as the lipase is not elevated to criteria for pancreatitis.  She does have a history of alcoholic pancreatitis and admission for that in August.   - Overall most likely gastritis, as she has had that before.  - Treat symptomatically for now with IV analgesia and antiemetics.  Patient is allowed to eat starting with clears and ADAT.  - Await final right upper quadrant ultrasound report.    Alcohol use disorder  - Patient reports drinking 2 or more drinks per night to treat her anxiety.  She denies history of alcohol withdrawal here.  When she was admitted here in August, she did not have any significant clinical withdrawal.  - Supplement thiamine/folate.  Check CIWA scale but have not ordered any gabapentin or Ativan as she denied hx of withdrawal  - Patient declined chemical dependency consult.  - She was instructed to avoid alcohol entirely.    Elevated transaminases  - Most likely due to  alcohol use, AST elevated greater than ALT.  - Right upper quadrant ultrasound pending.    Hypokalemia  - Likely related to vomiting.  Potassium was critically low in the ED at 2.5.  Recheck and will replace per protocol.    Probable severe malnutrition  - Patient reports 10 pound weight loss recently. Has had malnutrition in the past.  RD to assess, appreciate assistance.    Anxiety  - Recently exacerbated by COVID-19 pandemic and stresses at work.  Patient requested to be discharged with some hydroxyzine, as she has not been able to fill this at her normal pharmacy.     Tobacco abuse  - Currently smoking 1-1.5 ppd  - Encourage tobacco cessation    Diet: Clears and ADAT  DVT Prophylaxis: Low Risk/Ambulatory with no VTE prophylaxis indicated  Patino Catheter: No  Code Status: Full Code    Disposition Plan   Admitted to observation.  Can discharge to home when potassium is replete and able to take p.o. consistently.    Lucio Hsieh MD  [facility]    ______________________________________________________________________    Chief Complaint   Vomiting; abdominal pain    History of Present Illness   Audelia Comer is a 55 year old female with a history of alcohol use disorder, alcoholic pancreatitis, anxiety, malnutrition, tobacco use, GERD/alcoholic gastritis, who presented to the ED for evaluation of abdominal pain and vomiting.  Patient relates that she has been under increased stress lately related to the COVID-19 pandemic.  She had to miss a couple weeks of work recently and this increased her stress.  She says when she gets stressed out she tends to drink more.  She says she usually does not drink all that much, just a couple of drinks at a time.  Since she has been more anxious lately, she has tended to drink a bit more than that. She says that over the past 4 days she has been having basically constant severe right upper quadrant pain.  She describes a severe pain that radiates to her  back into her right shoulder blade.  It is always present but sometimes gets worse.  She does not point any exacerbating or alleviating factors.  She also reports persistent nausea/vomiting over the past 4 days and for these reasons decided to present to the ED.    In the ED, her vital signs were markable.  Her labs showed hyponatremia of 128, potassium 2.5.  Alk phos 234, ,  and lipase 435.  White count mildly elevated 11.5.  Alcohol level was detectable at 0.02.  SARS-CoV-2 PCR was negative.    Patient says that her abdominal pain is much better after receiving IV Dilaudid.  She said she has felt a little bit down the dumps with all the stress that has been going on.  She takes sertraline for depression and has a doctor that she follows with for that.  She says that she had an episode of constipation but that it resolved with stool softeners.  She also complains that she has been losing weight.    Review of Systems    The 10 point Review of Systems is negative other than noted in the HPI or here.     Physical Exam   /79   Pulse 84   Temp 97.6  F (36.4  C)   Resp 16   SpO2 93%        General: No acute distress.    HEENT: No scleral icterus. Oropharynx moist.     Neck: Supple.    Pulmonary: Normal work of breathing. Clear to auscultation bilaterally.    Cardiovascular: Regular rate and rhythm without murmur or extra heart sounds.    Abdomen: Soft and non-tender.    Extremities: No peripheral edema. No clubbing or cyanosis.     Neurologic: Awake, alert, appropriate.    Skin: Warm and dry.    Psychiatric: Normal affect and mood.     Data   Data reviewed today: I have reviewed all labs and imaging results.    Recent Labs   Lab 01/18/21  1902   WBC 11.5*   HGB 14.4   MCV 98      *   POTASSIUM 2.5*   CHLORIDE 90*   CO2 24   BUN 6*   CR 0.46*   ANIONGAP 14   FRANCOISE 9.4   GLC 79   ALBUMIN 3.4   PROTTOTAL 7.8   BILITOTAL 0.8   ALKPHOS 234*   *   *   LIPASE 435*   TROPI <0.015      No results found for this or any previous visit (from the past 24 hour(s)).    Past Medical History    I have reviewed this patient's medical history and updated it with pertinent information if needed.   Past Medical History:   Diagnosis Date     Anxiety      Depressive disorder      SBO (small bowel obstruction) (H)         Past Surgical History    I have reviewed this patient's surgical history and updated it with pertinent information if needed.  Past Surgical History:   Procedure Laterality Date     APPENDECTOMY       GYN SURGERY      c section      SOFT TISSUE SURGERY      breast implants        Social History    I have reviewed this patient's social history and updated it with pertinent information if needed.  Social History     Tobacco Use     Smoking status: Current Every Day Smoker     Packs/day: 1.00     Smokeless tobacco: Never Used   Substance Use Topics     Alcohol use: Yes     Comment: daily      Drug use: No          Family History    I have reviewed this patient's family history and updated it with pertinent information if needed.   No family history on file.       Prior to Admission Medications    Prior to Admission Medications   Prescriptions Last Dose Informant Patient Reported? Taking?   acetaminophen (TYLENOL) 325 MG tablet   No No   Sig: Take 2 tablets (650 mg) by mouth every 6 hours as needed for mild pain   azelastine (ASTELIN) 0.1 % nasal spray   Yes No   Sig: Spray 1 spray into both nostrils daily   busPIRone (BUSPAR) 10 MG tablet   Yes No   Sig: Take 10 mg by mouth 2 times daily   cetirizine (ZYRTEC) 10 MG tablet   Yes No   Sig: Take 10 mg by mouth daily   fluticasone (FLONASE) 50 MCG/ACT nasal spray   Yes No   Sig: Spray 1 spray into both nostrils daily   hydrOXYzine (ATARAX) 25 MG tablet   Yes No   Sig: Take 25 mg by mouth 3 times daily as needed for itching or anxiety   metoclopramide (REGLAN) 5 MG tablet   No No   Sig: Take 1 tablet (5 mg) by mouth 3 times daily as needed    montelukast (SINGULAIR) 10 MG tablet   Yes No   Sig: Take 10 mg by mouth nightly as needed   multivitamin w/minerals (THERA-VIT-M) tablet   No No   Sig: Take 1 tablet by mouth daily   omeprazole (PRILOSEC) 40 MG DR capsule   Yes No   Sig: Take 40 mg by mouth daily as needed   ondansetron (ZOFRAN ODT) 4 MG ODT tab   No No   Sig: Take 1 tablet (4 mg) by mouth every 6 hours as needed for nausea   pantoprazole (PROTONIX) 40 MG EC tablet   Yes No   Sig: Take 40 mg by mouth daily as needed for heartburn   sertraline (ZOLOFT) 50 MG tablet   Yes No   Sig: Take 50 mg by mouth every morning   thiamine (B-1) 100 MG tablet   No No   Sig: Take 1 tablet (100 mg) by mouth daily      Facility-Administered Medications: None        Allergies    No Known Allergies

## 2021-01-19 NOTE — ED NOTES
[facility]  ED Nurse Handoff Report    Audelia Comer is a 55 year old female   ED Chief complaint: No chief complaint on file.  . ED Diagnosis:   Final diagnoses:   Acute gastritis without hemorrhage, unspecified gastritis type   Acute pancreatitis, unspecified complication status, unspecified pancreatitis type   Hepatitis   Hypokalemia     Allergies: No Known Allergies    Code Status: full  Activity level - Baseline/Home:  Independent. Activity Level - Current:   Stand by Assist. Lift room needed: No. Bariatric: No   Needed: No   Isolation: No. Infection: Not Applicable.     Vital Signs:   Vitals:    01/18/21 2100 01/18/21 2115 01/18/21 2130 01/18/21 2145   BP: 110/80  113/79    Pulse: 89 90 87 84   Resp:       Temp:       SpO2: 93%          Cardiac Rhythm:  ,      Pain level: 0-10 Pain Scale: 2  Patient confused: No. Patient Falls Risk: Yes.   Elimination Status: Has voided   Patient Report - Initial Complaint: Pt presents to ED with c/o nausea, vomiting, weakness, abdominal pain that radiates into her back since Thursday. Pt reports that she has lost at least 5 lbs in the last few weeks. ABC intact. A/O x4. Pt also has a cough in triage.  . Focused Assessment: generalized complaints, abdominal pain with nausea and vomiting. Has been drinking for her stress but denies depression or thoughts of suicide. Has been out of her anxiety medication   Tests Performed:   US Abdomen Limited (RUQ)    (Results Pending)     . Abnormal Results:   Labs Ordered and Resulted from Time of ED Arrival Up to the Time of Departure from the ED   CBC WITH PLATELETS DIFFERENTIAL - Abnormal; Notable for the following components:       Result Value    WBC 11.5 (*)     MCH 35.0 (*)     Absolute Neutrophil 8.6 (*)     All other components within normal limits   COMPREHENSIVE METABOLIC PANEL - Abnormal; Notable for the following components:    Sodium 128 (*)     Potassium 2.5 (*)     Chloride 90 (*)     Urea Nitrogen  6 (*)     Creatinine 0.46 (*)     Alkaline Phosphatase 234 (*)      (*)      (*)     All other components within normal limits   LIPASE - Abnormal; Notable for the following components:    Lipase 435 (*)     All other components within normal limits   ALCOHOL ETHYL - Abnormal; Notable for the following components:    Ethanol g/dL 0.02 (*)     All other components within normal limits   TROPONIN I   SARS-COV-2 (COVID-19) VIRUS RT-PCR   POTASSIUM   MAGNESIUM   PHOSPHORUS     Treatments provided: see MAR  Family Comments: none  OBS brochure/video discussed/provided to patient:  YES  ED Medications:   Medications   0.9% sodium chloride BOLUS (0 mLs Intravenous Stopped 1/18/21 2101)     Followed by   sodium chloride 0.9% infusion (has no administration in time range)   ondansetron (ZOFRAN) injection 4 mg (4 mg Intravenous Given 1/18/21 1950)   HYDROmorphone (PF) (DILAUDID) injection 0.5 mg (0.5 mg Intravenous Given 1/18/21 2046)   lidocaine (XYLOCAINE) 2 % 15 mL, alum & mag hydroxide-simethicone (MAALOX) 15 mL GI Cocktail (30 mLs Oral Given 1/18/21 1951)   potassium chloride ER (KLOR-CON M) CR tablet 40 mEq (40 mEq Oral Given 1/18/21 2050)   sodium chloride 0.9 % 1,000 mL with Infuvite Adult 10 mL, thiamine 100 mg, folic acid 1 mg infusion ( Intravenous New Bag 1/18/21 2059)     Drips infusing:  No  For the majority of the shift, the patient's behavior Green. Interventions performed were none.    Sepsis treatment initiated: No     Patient tested for COVID 19 prior to admission: YES    ED Nurse Name/Phone Number: Tanika Kay RN,   10:17 PM    RECEIVING UNIT ED HANDOFF REVIEW    Above ED Nurse Handoff Report was reviewed: Yes  Reviewed by: Rafael Martinez on January 18, 2021 at 10:34 PM

## 2021-01-20 NOTE — PLAN OF CARE
DATE:  1/18/2021   TIME OF RECEIPT FROM LAB:  6723  LAB TEST:  Potassium  LAB VALUE: 2.6  RESULTS GIVEN WITH READ-BACK TO (PROVIDER):  Hospitalist paged   TIME LAB VALUE REPORTED TO PROVIDER:  0229    
PRIMARY DIAGNOSIS: ABDOMINAL PAIN, NAUSEA, VOMITTING    OUTPATIENT/OBSERVATION GOALS TO BE MET BEFORE DISCHARGE  1. Orthostatic performed: N/A    2. Tolerating PO fluid and/or antibiotics (if applicable):  Tolerating clears.    3. Nausea/Vomiting/Diarrhea symptoms improved: Yes    4. Pain status: Improved-controlled with oral pain medications.    5. Return to near baseline physical activity: Yes    Discharge Planner Nurse   Safe discharge environment identified: Yes  Barriers to discharge: Yes; ADAT       Entered by: Traci Webster 01/19/2021      Please review provider order for any additional goals.   Nurse to notify provider when observation goals have been met and patient is ready for discharge.  
PRIMARY DIAGNOSIS: ABDOMINAL PAIN, NAUSEA, VOMITTING    OUTPATIENT/OBSERVATION GOALS TO BE MET BEFORE DISCHARGE  1. Orthostatic performed: N/A    2. Tolerating PO fluid and/or antibiotics (if applicable): Yes    3. Nausea/Vomiting/Diarrhea symptoms improved: Yes    4. Pain status: Improved-controlled with oral pain medications.    5. Return to near baseline physical activity: Yes    Discharge Planner Nurse   Safe discharge environment identified: Yes  Barriers to discharge: Yes; ADAT       Entered by: Traci Webster 01/19/2021          VSS. Pain controlled with percocet. Denies nausea. Tolerating regular diet. SL. Up ind; steady gait observed. K+ 3.4, 20mEq given. PO Magnesium replacement in progress. Will continue to monitor and assess.     Please review provider order for any additional goals.   Nurse to notify provider when observation goals have been met and patient is ready for discharge.  
PRIMARY DIAGNOSIS: Abdominal Pain, N/V  OUTPATIENT/OBSERVATION GOALS TO BE MET BEFORE DISCHARGE:  1. Pain Status: Improved but still requiring IV narcotics.    2. Return to near baseline physical activity: Yes    3. Cleared for discharge by consultants (if involved): No    Discharge Planner Nurse   Safe discharge environment identified: Yes  Barriers to discharge: Yes       Entered by: Rafael Martinez 01/19/2021 5:10 AM    Vitals are Temp: 97.4  F (36.3  C) Temp src: Oral BP: 92/56 Pulse: 87   Resp: 18 SpO2: 98 %.  Patient is A/Ox4. Up SBA. Tolerating clears. Had chicken broth x2 and popsicle x2. Dilaudid x1 and percocet x1 for abdominal pain. PIV infusing LR at 100 mL/hr. Denies dizziness. Compazine x1 for nausea with improvement. On tele- SR  per tele tech. Replacing magnesium and potassium. CIWA protocol. Nutrition consulted.        Please review provider order for any additional goals.   Nurse to notify provider when observation goals have been met and patient is ready for discharge.  
Patient's After Visit Summary was reviewed with patient.  Patient verbalized understanding of After Visit Summary, recommended follow up and was given an opportunity to ask questions.   Discharge medications sent home with patient/family: YES.    Discharged with other: [employer].       OBSERVATION patient END time: 18:00.   
ROOM # 207    Living Situation (if not independent, order SW consult): Alone in apt  Facility name:  : Marilee, daughter  Verenice, friend    Activity level at baseline: Ind  Activity level on admit: SBA      Patient registered to observation; given Patient Bill of Rights; given the opportunity to ask questions about observation status and their plan of care.  Patient has been oriented to the observation room, bathroom and call light is in place.    Discussed discharge goals and expectations with patient/family.           
soft/nontender/no distention

## 2021-01-25 ENCOUNTER — TELEPHONE (OUTPATIENT)
Dept: EMERGENCY MEDICINE | Facility: CLINIC | Age: 56
End: 2021-01-25

## 2021-01-25 NOTE — TELEPHONE ENCOUNTER
Coronavirus (COVID-19) Notification    Lab Result   Lab test 2019-nCoV rRt-PCR OR SARS-COV-2 PCR    Nasopharyngeal AND/OR Oropharyngeal swab is NEGATIVE for 2019-nCoV RNA [OR] SARS-COV-2 RNA (COVID-19) RNA    Your result was negative. This means that we didn't find the virus that causes COVID-19 in your sample. A test may show negative when you do actually have the virus. This can happen when the virus is in the early stages of infection, before you feel illness symptoms.    If you have symptoms   Stay home and away from others (self-isolate) until you meet ALL of the guidelines below:    You've had no fever--and no medicine that reduces fever--for 1 full day (24 hours). And      Your other symptoms have gotten better. For example, your cough or breathing has improved. And   ; At least 10 days have passed since your symptoms started. (If you've been told by a doctor that you have a weak immune system, wait 20 days.)         During this time:    Stay home. Don't go to work, school or anywhere else.     Stay in your own room, including for meals. Use your own bathroom if you can.    Stay away from others in your home. No hugging, kissing or shaking hands. No visitors.    Clean  high touch  surfaces often (doorknobs, counters, handles, etc.). Use a household cleaning spray or wipes. You can find a full list on the EPA website at www.epa.gov/pesticide-registration/list-n-disinfectants-use-against-sars-cov-2.    Cover your mouth and nose with a mask, tissue or other face covering to avoid spreading germs.    Wash your hands and face often with soap and water.    Going back to work  Check with your employer for any guidelines to follow for going back to work.  You are sent a letter for your Employer which will serve as formal document notice that you, the employee, tested negative for COVID-19, as of the testing date shown above.    If your symptoms worsen or other concerning symptoms, contact PCP, oncare or consider  returning to Emergency Dept.    Where can I get more information?    [facility]: [email or web address]    Coronavirus Basics: [email or web address]/coronavirus/basics.html    [facility] Hotline ([phone])    Ana Cristina Callaway RN

## 2021-06-02 VITALS — BODY MASS INDEX: 20.98 KG/M2 | WEIGHT: 130 LBS

## 2021-06-02 VITALS — BODY MASS INDEX: 20.34 KG/M2 | WEIGHT: 126 LBS

## 2021-06-02 VITALS — WEIGHT: 121.25 LBS | BODY MASS INDEX: 19.57 KG/M2

## 2021-06-02 NOTE — TELEPHONE ENCOUNTER
"Patient calling - says she saw Dr. Davila for sciatica last year.  Was prescribed three different medications. Then went to a chiropractor neurologist.  Has support belt prescribed by that provider.  Her problems with sciatica are not as severe now.  Support belt does work but \"it acts up if she stands too long, sit too long or walk too long.\"    Patient is requesting documentation of diagnosis, related medications and restrictions for her job.  Requesting note to be faxed or emailed to her.    Email:  [email or web address]   Fax:  [phone]    Emeli Kan RN  Triage Nurse Advisor        "

## 2021-06-02 NOTE — TELEPHONE ENCOUNTER
Called and spoke with patient she stated this is old new it was 2 weeks ago she does not need this.

## 2021-06-02 NOTE — TELEPHONE ENCOUNTER
Pt will need to be seen to update restrictions for her job.  If she just wants medical records she can request this.

## 2021-06-07 NOTE — TELEPHONE ENCOUNTER
Patient Returning Call  Reason for call:  Patient returning call from clinic staff  Information relayed to patient:  Writer relayed provider encounter message as written  Patient has additional questions:  Yes  If YES, what are your questions/concerns:  Patient states understanding. Patient then states she has moved and now see a provider near her current home. That provider is now prescribing her medications.  Patient advised to contact pharmacy to have pharmacy send request to correct provider  Okay to leave a detailed message?: No need to call back

## 2021-06-07 NOTE — TELEPHONE ENCOUNTER
"Called and left message for pt to return call.# 1  \" Okay to relay message\" Upon return call, please schedule patient for a phone visit.    "

## 2021-06-07 NOTE — TELEPHONE ENCOUNTER
FYI---Patient declined to schedule an appointment, per the message below patient had already change clinic and no longer coming to [facility]. Completing task.

## 2021-06-20 NOTE — PROGRESS NOTES
[facility] Office Visit    Chief Complaint:  Chief Complaint   Patient presents with     Back Pain     x1 week     Leg Pain     right side         Assessment/Plan:  1. Acute right-sided low back pain with right-sided sciatica  Patient is over 48 and a smoker but x-ray does not show any evidence of red flags or concerning pathology.  She certainly does have degenerative changes.  Likely related to a pinched nerve at this time.  With her history of GI issues will start Prilosec 40 mg daily as outlined below.  5 days of 40 mg prednisone daily.  30 days of Celebrex 200 mg daily to help with inflammation.  PT OT to start in order to help with strengthening, stretching and prevention of future problems.  Flexeril short-term for sleep and comfort at night.  - XR Lumbar Spine 2 or 3 VWS; Future  - predniSONE (DELTASONE) 20 MG tablet; Take 2 tablets (40 mg total) by mouth daily for 5 days.  Dispense: 10 tablet; Refill: 0  - celecoxib (CELEBREX) 200 MG capsule; Take 1 capsule (200 mg total) by mouth daily.  Dispense: 30 capsule; Refill: 0  - omeprazole (PRILOSEC) 40 MG capsule; Take 1 capsule (40 mg total) by mouth daily.  Dispense: 90 capsule; Refill: 0  - cyclobenzaprine (FLEXERIL) 10 MG tablet; Take 1 tablet (10 mg total) by mouth at bedtime as needed for muscle spasms.  Dispense: 10 tablet; Refill: 0  - Ambulatory referral to PT/OT    Return if symptoms worsen or fail to improve.    Patient Education/AVS:  Patient Instructions   Omeprazole 30 minutes before you eat    Eat some food    Prednisone 2 tablets every morning after eating    Celebrex in evening with food    Flexeril at bedtime       HPI:   Audelia Comer is a 52 y.o. female c/o sharp pain in right butt that raidates down groin and inner thigh. Worse with sitting.  Never had this pain before.  Works at sitting job.  Hurts to walk and lay down.  Started 8/25/18 while she was sitting and got up off the couch and suddenly had this pain. rosendo  not helping.  Tried icy/hot cream and sprays.  Has chiropractic care set up next week.  Not able to work yesterday and sent home early today.  Wondering what she can do to help and not make things worse.  Icy hot patches seems to increase the sensitivity and feels worse.  Exhausted and no appetite with constant pain.      Does drink alcohol and has had times where she drinks too much.  Okay now.  Tends to have sensitive stomach.  H/o ulcer as a child.      Did run  report and no h/o of controlled rx in past 12 months found.     ROS:  Constitutional, CV, Resp, GI, , MSK, skin, neuro, psych all negative except as outlined in the HPI above.    History summarized from1-2: Reviewed recent hospitalization March of this year for acute alcohol withdrawal and pancreatitis.  Patient notes this is related to the death of her brother and an alcohol binge.  Old Records-1:Care Everywhere consent signed and records obtained  Radiology tests reviewed-1: 2018  Lab tests reviewed-1: 2018  Medicine tests reviewed-1: EKG from 2018    Physical Exam:  /74 (Patient Site: Right Arm, Patient Position: Sitting, Cuff Size: Adult Regular)  Pulse (!) 108  Wt 121 lb 4 oz (55 kg)  SpO2 96%  Breastfeeding? No There is no height or weight on file to calculate BMI. No LMP recorded. Patient is postmenopausal.  Vital signs reviewed  Wt Readings from Last 3 Encounters:   09/06/18 121 lb 4 oz (55 kg)     History   Smoking Status     Current Every Day Smoker     Packs/day: 1.00     Types: Cigarettes   Smokeless Tobacco     Never Used     History   Sexual Activity     Sexual activity: No     No Data Recorded  No Data Recorded  No Data Recorded  No Data Recorded    All normal as below except abnormalities include: Patient appears uncomfortable.  She is difficulty sitting and is frequently shifting in her chair.  She does occasionally stand up holding her right buttocks.  Straight leg raise seems apparent on the right side compared to the  left.  Normal strength in both lower extremities.  Normal reflexes in both lower extremities.  Able to heel and toe walk without difficulty no apparent weakness in the lower extremities noted.  Equal range of motion and the hip and knee distribution bilaterally.  No skin changes in the right lower back.  There is tenderness over the right SI joint more than the left.  No point tenderness over the spinal processes of the lumbar spine.  The right lower back perimuscular is diffusely tender and sensitive.  General is a  52 y.o. female sitting comfortably in no apparent distress.   CV: Regular rate and rhythm S1S2 without rubs, murmurs or gallops,   Lungs: Clear to auscultation bilaterally  Abd:  +BS, soft NT/ND,  No masses or organomegally  Extremities: Warm, No Edema, 2+ Pedal and radial pulses bilaterally  Skin: No lesions or rashes noted  Neuro/MSK: Able to ambulate around the exam room with equal movement, strength and normal coordination of the upper and lower extremeties symmetrically    No results found for this or any previous visit.    Med list and active problem list reviewed and updated as part of this encounter    No current outpatient prescriptions on file prior to visit.     No current facility-administered medications on file prior to visit.          Angie Davila MD

## 2021-06-21 NOTE — PROGRESS NOTES
[facility] Discharge Summary  Patient Name: Audelia Comer  Date: 1/14/2019  Referral Diagnosis: Lower back pain  Referring provider: Angie Davila MD  Visit Diagnosis:   1. Acute midline low back pain with right-sided sciatica     2. Generalized muscle weakness         Goals:  Pt. will demonstrate/verbalize independence in self-management of condition in : 2 weeks  Pt. will be independent with home exercise program in : 2 weeks    Pt will: stand >15 minutes before breaking due to pain to improve work duties in 8 weeks  Pt will: sit >60 minutes with breaks as needed without increased LBP to [place] driving and sitting tolerance in 8 weeks  Pt will: ascend/descend 1 flight of stairs without increased pain to improve stair climbing in 8 weeks      Patient was seen for initial evaluation and did not return.     Therapy will be discontinued at this time.  The patient will need a new referral to resume.    Thank you for your referral.  Jessica Kraig  1/14/2019  9:14 AM    [facility]   [facility]-Pelvic Initial Evaluation    Patient Name: Audelia Comer  Date of evaluation: 10/25/2018  Referral Diagnosis: Low back pain   Referring provider: Angie Davila MD  Visit Diagnosis:     ICD-10-CM    1. Acute midline low back pain with right-sided sciatica M54.41    2. Generalized muscle weakness M62.81        Assessment:   Audelia Comer is a 53 y.o. female who presents to therapy today with chief complaints of acute lower back pain with R LE radiation since August 2018. Patient noticed onset of pain in right buttock after standing up from the couch. She estimates her symptoms have improved 80% since first occurring but continues to c/o R sided gluteal pain with anterior R thigh numbness/tingling. Sx are aggravated by standing and sitting for long periods, stairs, transfers. Evaluation today reveals: restrictions in lumbar AROM, neural tension R LE, weakness, postural deficits. S/s appear  consistent with lumbar radiculopathy likely due to disc origin. Patient will benefit from 1:1 skilled physical therapy services to address the above limitations.       Goals: (goals established in collaboration with patient today):  Pt. will demonstrate/verbalize independence in self-management of condition in : 2 weeks  Pt. will be independent with home exercise program in : 2 weeks  Pt will: stand >15 minutes before breaking due to pain to improve work duties in 8 weeks  Pt will: sit >60 minutes with breaks as needed without increased LBP to [place] driving and sitting tolerance in 8 weeks  Pt will: ascend/descend 1 flight of stairs without increased pain to improve stair climbing in 8 weeks    Patient's expectations/goals are realistic.    Barriers to Learning or Achieving Goals:  No Barriers.       Plan / Patient Instructions:        Plan of Care:   Authorization / Certification Start Date: 10/25/18  Authorization / Certification Number of Visits: up to 6-8 visits   Communication with: Referral Source  Patient Related Instruction: Nature of Condition;Treatment plan and rationale;Basis of treatment;Body mechanics;Posture;Precautions;Next steps;Expected outcome  Times per Week: 1-2x/week  Number of Weeks: up to 12 weeks  Number of Visits: up to 6-8 visits   Therapeutic Exercise: ROM;Stretching;Strengthening  Neuromuscular Reeducation: kinesio tape;posture;balance/proprioception;TNE;postural restoration;core  Manual Therapy: soft tissue mobilization;myofascial release;joint mobilization;muscle energy  Other Plan #1: therapeutic activity     Plan for next visit: MMR next. Progress HEP. Consider manual therapy- lumbar mobilizations, etc. Gluteal strengthening.      Subjective:          Audelia Comer is a 54 y/o female who presents today with chief c/o acute lower back pain with R LE radiation since August 2018. Patient noticed onset of pain in right buttock after standing up from the couch. She then noticed  "radiation into anterior groin and anterior right thigh develop. This has has improved, but she continues to feel anterior thigh \"achy\", and tingling on the top and bottom of her foot. She noticed R foot weakness but this is also improving. Pt saw a chiropractor for 3 weeks and she was given a back support brace. She reports her brace is uncomfortable. 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 She does feel increased anterior thigh pain. Estimates she can sit >30 minutes before pain worsens. Estimates she can tolerate up to 10 minutes of standing. Walking does not aggravate pain. Pt is avoiding stairs.  Overall, Sx have improved 80%.      Patient goals: \"I want to know what I can and cannot do\".       Social information:   Living Situation:single family home   Occupation:     Pain Rating:3  Pain rating at best: 2  Pain rating at worst: 10  Pain description: aching and sharp    Patient reports benefit from:  rest           Objective:      Note: Items left blank indicates the item was not performed or not indicated at the time of the evaluation.    Patient Outcome Measures :    Modified Oswestry Low Back Pain Disablity Questionnaire  in %: 36   Scores range from 0-100%, where a score of 0% represents minimal pain and maximal function. The minimal clinically important difference is a score reduction of 12%.    Examination  1. Acute midline low back pain with right-sided sciatica     2. Generalized muscle weakness         Precautions/Restrictions: None  Involved side: Right    Posture Observation:   Standing: PSIS level non tender   Sitting:        Lumbar ROM:    Date:      *Indicate scale AROM AROM AROM   Lumbar Flexion To mid shin no increased pain      Lumbar Extension Full no increased pain       Right Left Right Left Right Left   Lumbar Sidebending Full no pain Full no pain        Lumbar Rotation 20% loss R thigh pulling 30% loss increased LBP        Thoracic Flexion    "   Thoracic Extension      Thoracic Sidebending         Thoracic Rotation           Lower Extremity Strength:     Date:      LE strength/5 Right Left Right Left Right Left   Hip Flexion (L1-3)         Hip Extension (L5-S1)         Hip Abduction (L4-5)         Hip Adduction (L2-3)         Hip External Rotation         Hip Internal Rotation         Knee Extension (L3-4)         Knee Flexion         Ankle Dorsiflexion (L4-5)         Great Toe Extension (L5)         Ankle Plantar flexion (S1)         Abdominals                       Reflex Testing:  Patellar (L3-4):  Achilles (S1-2)     Sensation:      Hip PROM with overpressure:     L Hip:  IR/ER WNL no pain  Flexion: no pain WNL  + VAN R sided pulling, tightness present   SLR to 80 mm tightness         R Hip:  IR/ER WNL slight pain with OP  Flexion: slight tightness no increased pain   SLR to approx 70 + cross over response  + VAN tightness no pain       Lumbar Special Tests:    Lumbar Special Tests Right Left SI Tests Right  Left   Quadrant test   SI Compression - -   Straight leg raise   SI Distraction - -   Crossover response   Thigh Thrust + -   Slump   Sacral Thrust + +     VAN     Trunk extensor endurance test  Resisted Abduction     Prone instability test  Other:         Palpation:   R sided gluteal pain and piriformis pain       Repeated Motion Testing:      Passive Mobility - Joint Integrity:  PA's: pain with PA's to L5.>L4>L3 R TP      Treatment Today     TREATMENT MINUTES COMMENTS   Evaluation 30 Lumbar evaluation      Self-care/ Home management     Manual therapy     Neuromuscular Re-education     Therapeutic Activity     Therapeutic Exercises 25 Exercises added to HEP:   Exercise #1: Prone  Comment #1: HEP AGNIESZKA 2 minutes/day   Exercise #2: Prone femoral nerve sliders  Comment #2: HEP x 15- reps   Exercise #3: Piriformis stretch  Comment #3: HEP x 30 second x 2-3 reps B  Exercise #4: TA   Comment #4: HEP x 10 seconds x 20 reps       Educated patient on  general exercise recommendations from [employer] includin minutes of moderate intensity exercise per week including at least 30 minutes of exercise 5 days/week.   OR  75 minutes of vigorous intensity exercise per week including at least 25 minutes of exercise 3 days/week.     Educated pt on walking outside of work for exercise as tolerated.    Gait training     Modality__________________                Total 55    Blank areas are intentional and mean the treatment did not include these items.            PT Evaluation Code: (Please list factors)  Patient History/Comorbidities: none reported by pt   Examination: see above  Clinical Presentation: stabl  Clinical Decision Making: low    Patient History/  Comorbidities Examination  (body structures and functions, activity limitations, and/or participation restrictions) Clinical Presentation Clinical Decision Making (Complexity)   No documented Comorbidities or personal factors 1-2 Elements Stable and/or uncomplicated Low   1-2 documented comorbidities or personal factor 3 Elements Evolving clinical presentation with changing characteristics Moderate   3-4 documented comorbidities or personal factors 4 or more Unstable and unpredictable High         Jessica Cornell  10/25/2018  2:03 PM

## 2021-06-22 NOTE — PROGRESS NOTES
Subjective:  53 y.o. female here with concerns of possible sinus infection.  Appears like she has been sick for 2 months.  Notes green drainage and facial pain and fever over the last week.  She had improved a little bit, but then had somewhat of a second worsening phenomenon..  Is using antihistamine but not decongestants.  Denies any dyspnea.  Cough is somewhat productive of white to clear sputum.  Has some headache associated with the sinus pressure.  Denies tooth pain.  Had nausea and vomiting last week and feels tired.  Nausea and vomiting has resolved.  Now has some decrease in appetite.    Objective:  /80 (Patient Site: Right Arm, Patient Position: Sitting, Cuff Size: Adult Small)   Pulse (!) 112   Temp 99.7  F (37.6  C) (Oral)   Wt 126 lb (57.2 kg)   SpO2 97%   GENERAL: alert, not distressed  EARS: tympanic membranes normal, external auditory canals normal  NOSE: clear to yellow rhinorrhea, nasal mucosa irritated  PHARYNX: no erythema or exudates  FRONTAL and MAXILLARY SINUSES: mild maxillary tenderness  NECK: no lymphadenopathy, nodules, or rigidity.  CHEST: clear, no rales, rhonchi, or wheezes  CARDIAC: regular without murmur    Assessment and Plan:  1. Acute maxillary sinusitis, recurrence not specified  Discussed the uncertainty of treating sinus infections with antibiotics.  With second worsening phenomenon and length of illness we discussed that it would be reasonable to attempt treatment.  She wanted to pursue that.  We discussed risks of antibiotics.  - amoxicillin (AMOXIL) 500 MG tablet; Take 1 tablet (500 mg total) by mouth 2 (two) times a day for 10 days.  Dispense: 20 tablet; Refill: 0    2. Screen for colon cancer  - Nasir    3.  Elevated blood pressure reading.  Discussed the need to follow-up on this.  Hopefully after treatment for her current illness this will improve.  A 2-week follow-up was suggested.    Breast cancer screening was also discussed.  She was not willing to  commit to that yet today.

## 2021-06-22 NOTE — PROGRESS NOTES
Subjective:  53 y.o. female with concerns of follow up on sinusitis.  Thought she got a little bit better with amoxicillin but then slid backward again.  Wondered about allergies of started using Allegra-D.  Had some relief from that.  Notices a puffiness medial zygoma on both sides, near the nasal fold.  Has some headache.  Has some dental tenderness.    Blood pressure has been better.  She feels less stressed than in her previous visit.    Outpatient Medications Prior to Visit   Medication Sig Dispense Refill     multivitamin therapeutic tablet Take 1 tablet by mouth daily.       omeprazole (PRILOSEC) 40 MG capsule TAKE ONE CAPSULE BY MOUTH DAILY 90 capsule 2     celecoxib (CELEBREX) 200 MG capsule TAKE 1 CAPSULE BY MOUTH DAILY 30 capsule 0     No facility-administered medications prior to visit.       Social History     Tobacco Use   Smoking Status Current Every Day Smoker     Packs/day: 1.00     Types: Cigarettes   Smokeless Tobacco Never Used      Objective:  /78 (Patient Site: Left Arm, Patient Position: Sitting, Cuff Size: Adult Regular)   Pulse 92   Temp 98.6  F (37  C) (Oral)   Wt 130 lb (59 kg)   GENERAL: alert, not distressed  EYES: PERRL/EOMI, no scleral icterus, no conjunctival injection   EARS: normal tympanic membranes and external auditory canals bilaterally  PHARYNX: no erythema or exudates  SINUSES: Some tenderness to percussion over maxillary and frontal.  NOSE: Mildly inflamed and edematous mucosa.  MOUTH: well hydrated mucosa, no lesions  NECK: no lymphadenopathy or thyroid nodules  CHEST: clear, no rales, rhonchi, or wheezes  CARDIAC: regular without murmur, gallop, or rub    Assessment and Plan:   Acute maxillary sinusitis, recurrence not specified  Still think there is little value to be gained with further antibiotic treatment.  Patient willing to try topical corticosteroids and see if she can have some improvement from that can continue antihistamine and decongestant now the blood  pressure is better.    Visit for screening mammogram  - Mammo Screening Bilateral; Future    Special screening for malignant neoplasms, colon  - Cologuard

## 2021-10-24 ENCOUNTER — APPOINTMENT (OUTPATIENT)
Dept: ULTRASOUND IMAGING | Facility: CLINIC | Age: 56
End: 2021-10-24
Attending: EMERGENCY MEDICINE
Payer: COMMERCIAL

## 2021-10-24 ENCOUNTER — HOSPITAL ENCOUNTER (INPATIENT)
Facility: CLINIC | Age: 56
LOS: 5 days | Discharge: HOME | End: 2021-10-29
Attending: EMERGENCY MEDICINE | Admitting: INTERNAL MEDICINE
Payer: COMMERCIAL

## 2021-10-24 DIAGNOSIS — K85.90 ACUTE PANCREATITIS, UNSPECIFIED COMPLICATION STATUS, UNSPECIFIED PANCREATITIS TYPE: ICD-10-CM

## 2021-10-24 DIAGNOSIS — F32.1 CURRENT MODERATE EPISODE OF MAJOR DEPRESSIVE DISORDER WITHOUT PRIOR EPISODE (H): Primary | ICD-10-CM

## 2021-10-24 LAB
ALBUMIN SERPL-MCNC: 2.8 G/DL (ref 3.4–5)
ALP SERPL-CCNC: 156 U/L (ref 40–150)
ALT SERPL W P-5'-P-CCNC: 46 U/L (ref 0–50)
ANION GAP SERPL CALCULATED.3IONS-SCNC: 14 MMOL/L (ref 3–14)
AST SERPL W P-5'-P-CCNC: 57 U/L (ref 0–45)
BASOPHILS # BLD AUTO: 0 10E3/UL (ref 0–0.2)
BASOPHILS NFR BLD AUTO: 0 %
BILIRUB SERPL-MCNC: 0.6 MG/DL (ref 0.2–1.3)
BUN SERPL-MCNC: 3 MG/DL (ref 7–30)
CALCIUM SERPL-MCNC: 7.1 MG/DL (ref 8.5–10.1)
CHLORIDE BLD-SCNC: 100 MMOL/L (ref 94–109)
CO2 SERPL-SCNC: 19 MMOL/L (ref 20–32)
CREAT SERPL-MCNC: 0.39 MG/DL (ref 0.52–1.04)
EOSINOPHIL # BLD AUTO: 0 10E3/UL (ref 0–0.7)
EOSINOPHIL NFR BLD AUTO: 0 %
ERYTHROCYTE [DISTWIDTH] IN BLOOD BY AUTOMATED COUNT: 12.3 % (ref 10–15)
GFR SERPL CREATININE-BSD FRML MDRD: >90 ML/MIN/1.73M2
GLUCOSE BLD-MCNC: 63 MG/DL (ref 70–99)
HCT VFR BLD AUTO: 35.4 % (ref 35–47)
HGB BLD-MCNC: 12.2 G/DL (ref 11.7–15.7)
IMM GRANULOCYTES # BLD: 0 10E3/UL
IMM GRANULOCYTES NFR BLD: 0 %
LACTATE SERPL-SCNC: 1.6 MMOL/L (ref 0.7–2)
LIPASE SERPL-CCNC: 1015 U/L (ref 73–393)
LYMPHOCYTES # BLD AUTO: 1.2 10E3/UL (ref 0.8–5.3)
LYMPHOCYTES NFR BLD AUTO: 11 %
MAGNESIUM SERPL-MCNC: 1.3 MG/DL (ref 1.6–2.3)
MAGNESIUM SERPL-MCNC: 1.5 MG/DL (ref 1.6–2.3)
MCH RBC QN AUTO: 33.1 PG (ref 26.5–33)
MCHC RBC AUTO-ENTMCNC: 34.5 G/DL (ref 31.5–36.5)
MCV RBC AUTO: 96 FL (ref 78–100)
MONOCYTES # BLD AUTO: 1.1 10E3/UL (ref 0–1.3)
MONOCYTES NFR BLD AUTO: 10 %
NEUTROPHILS # BLD AUTO: 8.7 10E3/UL (ref 1.6–8.3)
NEUTROPHILS NFR BLD AUTO: 79 %
NRBC # BLD AUTO: 0 10E3/UL
NRBC BLD AUTO-RTO: 0 /100
PHOSPHATE SERPL-MCNC: 2.4 MG/DL (ref 2.5–4.5)
PLATELET # BLD AUTO: 162 10E3/UL (ref 150–450)
POTASSIUM BLD-SCNC: 2.4 MMOL/L (ref 3.4–5.3)
POTASSIUM BLD-SCNC: 3 MMOL/L (ref 3.4–5.3)
POTASSIUM BLD-SCNC: 3.2 MMOL/L (ref 3.4–5.3)
PROT SERPL-MCNC: 5.9 G/DL (ref 6.8–8.8)
RBC # BLD AUTO: 3.69 10E6/UL (ref 3.8–5.2)
SARS-COV-2 RNA RESP QL NAA+PROBE: NEGATIVE
SODIUM SERPL-SCNC: 133 MMOL/L (ref 133–144)
TROPONIN I SERPL-MCNC: <0.015 UG/L (ref 0–0.04)
WBC # BLD AUTO: 11 10E3/UL (ref 4–11)

## 2021-10-24 PROCEDURE — 83735 ASSAY OF MAGNESIUM: CPT | Performed by: EMERGENCY MEDICINE

## 2021-10-24 PROCEDURE — 250N000009 HC RX 250: Performed by: EMERGENCY MEDICINE

## 2021-10-24 PROCEDURE — 85025 COMPLETE CBC W/AUTO DIFF WBC: CPT | Performed by: EMERGENCY MEDICINE

## 2021-10-24 PROCEDURE — 87635 SARS-COV-2 COVID-19 AMP PRB: CPT | Performed by: EMERGENCY MEDICINE

## 2021-10-24 PROCEDURE — 250N000011 HC RX IP 250 OP 636: Performed by: EMERGENCY MEDICINE

## 2021-10-24 PROCEDURE — 84484 ASSAY OF TROPONIN QUANT: CPT | Performed by: EMERGENCY MEDICINE

## 2021-10-24 PROCEDURE — 84132 ASSAY OF SERUM POTASSIUM: CPT | Performed by: INTERNAL MEDICINE

## 2021-10-24 PROCEDURE — 96375 TX/PRO/DX INJ NEW DRUG ADDON: CPT

## 2021-10-24 PROCEDURE — 84100 ASSAY OF PHOSPHORUS: CPT | Performed by: INTERNAL MEDICINE

## 2021-10-24 PROCEDURE — 258N000003 HC RX IP 258 OP 636: Performed by: INTERNAL MEDICINE

## 2021-10-24 PROCEDURE — 99285 EMERGENCY DEPT VISIT HI MDM: CPT | Mod: 25

## 2021-10-24 PROCEDURE — 99223 1ST HOSP IP/OBS HIGH 75: CPT | Mod: AI | Performed by: INTERNAL MEDICINE

## 2021-10-24 PROCEDURE — 250N000009 HC RX 250: Performed by: INTERNAL MEDICINE

## 2021-10-24 PROCEDURE — 83605 ASSAY OF LACTIC ACID: CPT | Performed by: EMERGENCY MEDICINE

## 2021-10-24 PROCEDURE — 93005 ELECTROCARDIOGRAM TRACING: CPT

## 2021-10-24 PROCEDURE — 120N000001 HC R&B MED SURG/OB

## 2021-10-24 PROCEDURE — 258N000003 HC RX IP 258 OP 636: Performed by: EMERGENCY MEDICINE

## 2021-10-24 PROCEDURE — 83690 ASSAY OF LIPASE: CPT | Performed by: EMERGENCY MEDICINE

## 2021-10-24 PROCEDURE — 96365 THER/PROPH/DIAG IV INF INIT: CPT

## 2021-10-24 PROCEDURE — 83735 ASSAY OF MAGNESIUM: CPT | Performed by: INTERNAL MEDICINE

## 2021-10-24 PROCEDURE — 36415 COLL VENOUS BLD VENIPUNCTURE: CPT | Performed by: EMERGENCY MEDICINE

## 2021-10-24 PROCEDURE — 96361 HYDRATE IV INFUSION ADD-ON: CPT

## 2021-10-24 PROCEDURE — 80053 COMPREHEN METABOLIC PANEL: CPT | Performed by: EMERGENCY MEDICINE

## 2021-10-24 PROCEDURE — 250N000013 HC RX MED GY IP 250 OP 250 PS 637: Performed by: INTERNAL MEDICINE

## 2021-10-24 PROCEDURE — 76705 ECHO EXAM OF ABDOMEN: CPT

## 2021-10-24 PROCEDURE — C9803 HOPD COVID-19 SPEC COLLECT: HCPCS

## 2021-10-24 PROCEDURE — 36415 COLL VENOUS BLD VENIPUNCTURE: CPT | Performed by: INTERNAL MEDICINE

## 2021-10-24 PROCEDURE — 96376 TX/PRO/DX INJ SAME DRUG ADON: CPT

## 2021-10-24 PROCEDURE — 250N000011 HC RX IP 250 OP 636: Performed by: INTERNAL MEDICINE

## 2021-10-24 RX ORDER — MAGNESIUM SULFATE HEPTAHYDRATE 40 MG/ML
2 INJECTION, SOLUTION INTRAVENOUS ONCE
Status: DISCONTINUED | OUTPATIENT
Start: 2021-10-24 | End: 2021-10-24 | Stop reason: CLARIF

## 2021-10-24 RX ORDER — LORAZEPAM 0.5 MG/1
0.5 TABLET ORAL EVERY 4 HOURS PRN
Status: DISCONTINUED | OUTPATIENT
Start: 2021-10-24 | End: 2021-10-29 | Stop reason: HOSPADM

## 2021-10-24 RX ORDER — LORAZEPAM 2 MG/ML
1-2 INJECTION INTRAMUSCULAR EVERY 30 MIN PRN
Status: DISCONTINUED | OUTPATIENT
Start: 2021-10-24 | End: 2021-10-26

## 2021-10-24 RX ORDER — ONDANSETRON 4 MG/1
4 TABLET, ORALLY DISINTEGRATING ORAL EVERY 6 HOURS PRN
Status: DISCONTINUED | OUTPATIENT
Start: 2021-10-24 | End: 2021-10-29 | Stop reason: HOSPADM

## 2021-10-24 RX ORDER — POTASSIUM CHLORIDE 1500 MG/1
20 TABLET, EXTENDED RELEASE ORAL
COMMUNITY
End: 2023-01-27

## 2021-10-24 RX ORDER — CLONIDINE HYDROCHLORIDE 0.1 MG/1
0.1 TABLET ORAL EVERY 8 HOURS
Status: DISCONTINUED | OUTPATIENT
Start: 2021-10-24 | End: 2021-10-25

## 2021-10-24 RX ORDER — ESCITALOPRAM OXALATE 5 MG/1
5 TABLET ORAL DAILY
Status: DISCONTINUED | OUTPATIENT
Start: 2021-10-24 | End: 2021-10-29 | Stop reason: HOSPADM

## 2021-10-24 RX ORDER — LIDOCAINE 40 MG/G
CREAM TOPICAL
Status: DISCONTINUED | OUTPATIENT
Start: 2021-10-24 | End: 2021-10-29 | Stop reason: HOSPADM

## 2021-10-24 RX ORDER — FOLIC ACID 1 MG/1
1 TABLET ORAL DAILY
Status: DISCONTINUED | OUTPATIENT
Start: 2021-10-25 | End: 2021-10-29 | Stop reason: HOSPADM

## 2021-10-24 RX ORDER — ONDANSETRON 2 MG/ML
4 INJECTION INTRAMUSCULAR; INTRAVENOUS EVERY 6 HOURS PRN
Status: DISCONTINUED | OUTPATIENT
Start: 2021-10-24 | End: 2021-10-29 | Stop reason: HOSPADM

## 2021-10-24 RX ORDER — THIAMINE HYDROCHLORIDE 100 MG/ML
100 INJECTION, SOLUTION INTRAMUSCULAR; INTRAVENOUS ONCE
Status: DISCONTINUED | OUTPATIENT
Start: 2021-10-24 | End: 2021-10-24 | Stop reason: CLARIF

## 2021-10-24 RX ORDER — OMEPRAZOLE 40 MG/1
40 CAPSULE, DELAYED RELEASE ORAL
COMMUNITY
Start: 2020-04-10 | End: 2021-10-24

## 2021-10-24 RX ORDER — ONDANSETRON 4 MG/1
4 TABLET, FILM COATED ORAL EVERY 8 HOURS PRN
COMMUNITY
End: 2021-10-24

## 2021-10-24 RX ORDER — MAGNESIUM OXIDE 400 MG/1
400 TABLET ORAL 2 TIMES DAILY
Status: COMPLETED | OUTPATIENT
Start: 2021-10-24 | End: 2021-10-26

## 2021-10-24 RX ORDER — SODIUM CHLORIDE 9 MG/ML
INJECTION, SOLUTION INTRAVENOUS CONTINUOUS
Status: DISCONTINUED | OUTPATIENT
Start: 2021-10-24 | End: 2021-10-25

## 2021-10-24 RX ORDER — GABAPENTIN 100 MG/1
100 CAPSULE ORAL EVERY 8 HOURS
Status: DISCONTINUED | OUTPATIENT
Start: 2021-10-26 | End: 2021-10-29 | Stop reason: HOSPADM

## 2021-10-24 RX ORDER — GABAPENTIN 300 MG/1
300 CAPSULE ORAL EVERY 8 HOURS
Status: COMPLETED | OUTPATIENT
Start: 2021-10-24 | End: 2021-10-26

## 2021-10-24 RX ORDER — ONDANSETRON 4 MG/1
TABLET, FILM COATED ORAL
COMMUNITY
Start: 2021-10-19 | End: 2021-10-24

## 2021-10-24 RX ORDER — NALOXONE HYDROCHLORIDE 0.4 MG/ML
0.2 INJECTION, SOLUTION INTRAMUSCULAR; INTRAVENOUS; SUBCUTANEOUS
Status: DISCONTINUED | OUTPATIENT
Start: 2021-10-24 | End: 2021-10-29 | Stop reason: HOSPADM

## 2021-10-24 RX ORDER — POTASSIUM CHLORIDE 1.5 G/1.58G
20 POWDER, FOR SOLUTION ORAL ONCE
Status: DISCONTINUED | OUTPATIENT
Start: 2021-10-24 | End: 2021-10-24

## 2021-10-24 RX ORDER — METOCLOPRAMIDE HYDROCHLORIDE 5 MG/ML
10 INJECTION INTRAMUSCULAR; INTRAVENOUS ONCE
Status: COMPLETED | OUTPATIENT
Start: 2021-10-24 | End: 2021-10-24

## 2021-10-24 RX ORDER — LORAZEPAM 0.5 MG/1
.5-1 TABLET ORAL 2 TIMES DAILY PRN
Status: ON HOLD | COMMUNITY
End: 2022-06-28

## 2021-10-24 RX ORDER — NALOXONE HYDROCHLORIDE 0.4 MG/ML
0.4 INJECTION, SOLUTION INTRAMUSCULAR; INTRAVENOUS; SUBCUTANEOUS
Status: DISCONTINUED | OUTPATIENT
Start: 2021-10-24 | End: 2021-10-29 | Stop reason: HOSPADM

## 2021-10-24 RX ORDER — HALOPERIDOL 5 MG/ML
2.5-5 INJECTION INTRAMUSCULAR EVERY 6 HOURS PRN
Status: DISCONTINUED | OUTPATIENT
Start: 2021-10-24 | End: 2021-10-26

## 2021-10-24 RX ORDER — HYDROMORPHONE HYDROCHLORIDE 1 MG/ML
0.5 INJECTION, SOLUTION INTRAMUSCULAR; INTRAVENOUS; SUBCUTANEOUS
Status: DISCONTINUED | OUTPATIENT
Start: 2021-10-24 | End: 2021-10-25

## 2021-10-24 RX ORDER — LORAZEPAM 1 MG/1
1-2 TABLET ORAL EVERY 30 MIN PRN
Status: DISCONTINUED | OUTPATIENT
Start: 2021-10-24 | End: 2021-10-26

## 2021-10-24 RX ORDER — CETIRIZINE HYDROCHLORIDE 10 MG/1
10 TABLET ORAL DAILY
Status: DISCONTINUED | OUTPATIENT
Start: 2021-10-24 | End: 2021-10-29 | Stop reason: HOSPADM

## 2021-10-24 RX ORDER — LANOLIN ALCOHOL/MO/W.PET/CERES
100 CREAM (GRAM) TOPICAL DAILY
Status: COMPLETED | OUTPATIENT
Start: 2021-10-25 | End: 2021-10-28

## 2021-10-24 RX ORDER — OLANZAPINE 5 MG/1
5-10 TABLET, ORALLY DISINTEGRATING ORAL EVERY 6 HOURS PRN
Status: DISCONTINUED | OUTPATIENT
Start: 2021-10-24 | End: 2021-10-26

## 2021-10-24 RX ORDER — POTASSIUM CHLORIDE 7.45 MG/ML
10 INJECTION INTRAVENOUS ONCE
Status: COMPLETED | OUTPATIENT
Start: 2021-10-24 | End: 2021-10-24

## 2021-10-24 RX ORDER — NICOTINE 21 MG/24HR
1 PATCH, TRANSDERMAL 24 HOURS TRANSDERMAL DAILY
Status: DISCONTINUED | OUTPATIENT
Start: 2021-10-24 | End: 2021-10-29 | Stop reason: HOSPADM

## 2021-10-24 RX ORDER — POTASSIUM CHLORIDE 1.5 G/1.58G
40 POWDER, FOR SOLUTION ORAL ONCE
Status: COMPLETED | OUTPATIENT
Start: 2021-10-24 | End: 2021-10-24

## 2021-10-24 RX ORDER — FLUMAZENIL 0.1 MG/ML
0.2 INJECTION, SOLUTION INTRAVENOUS
Status: DISCONTINUED | OUTPATIENT
Start: 2021-10-24 | End: 2021-10-29 | Stop reason: HOSPADM

## 2021-10-24 RX ORDER — DEXTROSE MONOHYDRATE, SODIUM CHLORIDE, AND POTASSIUM CHLORIDE 50; 1.49; 4.5 G/1000ML; G/1000ML; G/1000ML
INJECTION, SOLUTION INTRAVENOUS CONTINUOUS
Status: DISCONTINUED | OUTPATIENT
Start: 2021-10-24 | End: 2021-10-27

## 2021-10-24 RX ORDER — MULTIPLE VITAMINS W/ MINERALS TAB 9MG-400MCG
1 TAB ORAL DAILY
Status: DISCONTINUED | OUTPATIENT
Start: 2021-10-25 | End: 2021-10-29 | Stop reason: HOSPADM

## 2021-10-24 RX ORDER — ONDANSETRON 4 MG/1
4 TABLET, FILM COATED ORAL
COMMUNITY
Start: 2021-10-19 | End: 2021-10-24

## 2021-10-24 RX ORDER — HYDROMORPHONE HYDROCHLORIDE 1 MG/ML
0.5 INJECTION, SOLUTION INTRAMUSCULAR; INTRAVENOUS; SUBCUTANEOUS
Status: DISCONTINUED | OUTPATIENT
Start: 2021-10-24 | End: 2021-10-24 | Stop reason: CLARIF

## 2021-10-24 RX ADMIN — HYDROMORPHONE HYDROCHLORIDE 0.5 MG: 1 INJECTION, SOLUTION INTRAMUSCULAR; INTRAVENOUS; SUBCUTANEOUS at 12:36

## 2021-10-24 RX ADMIN — HYDROMORPHONE HYDROCHLORIDE 0.5 MG: 1 INJECTION, SOLUTION INTRAMUSCULAR; INTRAVENOUS; SUBCUTANEOUS at 13:45

## 2021-10-24 RX ADMIN — POTASSIUM & SODIUM PHOSPHATES POWDER PACK 280-160-250 MG 1 PACKET: 280-160-250 PACK at 18:34

## 2021-10-24 RX ADMIN — NICOTINE 1 PATCH: 21 PATCH, EXTENDED RELEASE TRANSDERMAL at 18:00

## 2021-10-24 RX ADMIN — SODIUM CHLORIDE 1000 ML: 9 INJECTION, SOLUTION INTRAVENOUS at 12:34

## 2021-10-24 RX ADMIN — FAMOTIDINE 20 MG: 10 INJECTION, SOLUTION INTRAVENOUS at 12:37

## 2021-10-24 RX ADMIN — SODIUM CHLORIDE 1000 ML: 9 INJECTION, SOLUTION INTRAVENOUS at 15:33

## 2021-10-24 RX ADMIN — POTASSIUM & SODIUM PHOSPHATES POWDER PACK 280-160-250 MG 1 PACKET: 280-160-250 PACK at 21:08

## 2021-10-24 RX ADMIN — POTASSIUM CHLORIDE 10 MEQ: 7.46 INJECTION, SOLUTION INTRAVENOUS at 15:33

## 2021-10-24 RX ADMIN — FOLIC ACID: 5 INJECTION, SOLUTION INTRAMUSCULAR; INTRAVENOUS; SUBCUTANEOUS at 18:30

## 2021-10-24 RX ADMIN — ONDANSETRON 4 MG: 4 TABLET, ORALLY DISINTEGRATING ORAL at 17:52

## 2021-10-24 RX ADMIN — METOCLOPRAMIDE HYDROCHLORIDE 10 MG: 5 INJECTION INTRAMUSCULAR; INTRAVENOUS at 12:37

## 2021-10-24 RX ADMIN — POTASSIUM CHLORIDE 40 MEQ: 1.5 POWDER, FOR SOLUTION ORAL at 21:08

## 2021-10-24 RX ADMIN — HYDROMORPHONE HYDROCHLORIDE 0.5 MG: 1 INJECTION, SOLUTION INTRAMUSCULAR; INTRAVENOUS; SUBCUTANEOUS at 17:53

## 2021-10-24 RX ADMIN — CLONIDINE HYDROCHLORIDE 0.1 MG: 0.1 TABLET ORAL at 18:31

## 2021-10-24 RX ADMIN — Medication 400 MG: at 21:08

## 2021-10-24 RX ADMIN — HYDROMORPHONE HYDROCHLORIDE 0.5 MG: 1 INJECTION, SOLUTION INTRAMUSCULAR; INTRAVENOUS; SUBCUTANEOUS at 21:08

## 2021-10-24 RX ADMIN — GABAPENTIN 300 MG: 300 CAPSULE ORAL at 18:31

## 2021-10-24 RX ADMIN — CETIRIZINE HYDROCHLORIDE 10 MG: 10 TABLET, FILM COATED ORAL at 18:31

## 2021-10-24 RX ADMIN — ESCITALOPRAM 5 MG: 5 TABLET, FILM COATED ORAL at 17:53

## 2021-10-24 ASSESSMENT — ENCOUNTER SYMPTOMS
VOMITING: 1
NAUSEA: 1
APPETITE CHANGE: 1
DIARRHEA: 1
ABDOMINAL PAIN: 1

## 2021-10-24 ASSESSMENT — ACTIVITIES OF DAILY LIVING (ADL)
ADLS_ACUITY_SCORE: 8
ADLS_ACUITY_SCORE: 9
ADLS_ACUITY_SCORE: 8
ADLS_ACUITY_SCORE: 7
ADLS_ACUITY_SCORE: 9

## 2021-10-24 ASSESSMENT — MIFFLIN-ST. JEOR: SCORE: 1092.87

## 2021-10-24 NOTE — ED TRIAGE NOTES
Endorses nausea and vomiting since dx of breast cancer but much worse the last month since her daughter passed away. Pt endorsing abdominal pain and anorexia since. Hx double mastectomy in August. Received 4 mg Zofran in route. Reports nausea at this time as well. Hypertensive upon arrival otherwise VSS on RA.

## 2021-10-24 NOTE — PHARMACY-ADMISSION MEDICATION HISTORY
Admission medication history interview status for this patient is complete. See [facility] admission navigator for allergy information, prior to admission medications and immunization status.     Medication history interview done, indicate source(s): Patient  Medication history resources (including written lists, pill bottles, clinic record):SureScripts and Care Everywhere  Pharmacy: [facility]    Changes made to PTA medication list:  Added: lorazepam  Changed: None  Reported as Not Taking: zofran ODT 4mg; pantoprazole, zoloft (last filled: Sept 2021 for 90 days supply)  Removed: reglan    Actions taken by pharmacist (provider contacted, etc): Spoke with pt to verify med list.     Additional medication history information: Per pt anastrozole is on hold until she gets a bone density test.  Pt did not seem to know her meds very well. Per pt taking two heartburn meds. She thought she was taking prevacid that was prescribed by her doctor, but recent fill history is omeprazole. Pt also was unsure of which anti-nausea medication she was taking. Per records zofran tablet was most recently prescribed and filled.     Medication reconciliation/reorder completed by provider prior to medication history?  Y   (Y/N)     For patients on insulin therapy: N  (Y/N)    Prior to Admission medications    Medication Sig Last Dose Taking? Auth Provider   azelastine (ASTELIN) 0.1 % nasal spray Spray 1 spray into both nostrils daily as needed  Unknown at Unknown time Yes Unknown, Entered By History   cetirizine (ZYRTEC) 10 MG tablet Take 10 mg by mouth daily as needed  More than a month at Unknown time Yes Unknown, Entered By History   fluticasone (FLONASE) 50 MCG/ACT nasal spray Spray 1 spray into both nostrils daily as needed  Unknown at Unknown time Yes Unknown, Entered By History   LORazepam (ATIVAN) 0.5 MG tablet Take 0.5-1 mg by mouth 2 times daily as needed for anxiety 10/24/2021 at Unknown time Yes Unknown, Entered By History    montelukast (SINGULAIR) 10 MG tablet Take 10 mg by mouth nightly as needed Unknown at Unknown time Yes Unknown, Entered By History   multivitamin w/minerals (THERA-VIT-M) tablet Take 1 tablet by mouth daily Past Week at Unknown time Yes Hong Benitez MD   omeprazole (PRILOSEC) 20 MG DR capsule Take 20 mg by mouth daily  10/24/2021 at am Yes Reported, Patient   ondansetron (ZOFRAN) 4 MG tablet Take 4 mg by mouth every 8 hours as needed for nausea 10/24/2021 at 0900 Yes Unknown, Entered By History   potassium chloride ER (K-TAB) 20 MEQ CR tablet Take 20 mEq by mouth daily Past Week at Unknown time Yes Unknown, Entered By History   pantoprazole (PROTONIX) 40 MG EC tablet Take 1 tablet (40 mg) by mouth daily  Patient not taking: Reported on 10/24/2021 Not Taking  No Jackie Ball, PA-C

## 2021-10-24 NOTE — H&P
[facility]    History and Physical  Hospitalist       Date of Admission:  10/24/2021    Assessment & Plan   Audelia Comer is a 56 year old female who came to attention today in the emergency department with nausea, vomiting and abdominal pain.    Recent medical psychosocial history has been extremely complex for Ms. Comer, who reports that her daughter  suddenly couple of months ago.  She also has been having a hard time through the pandemic, lost her job last year and was diagnosed with breast cancer.  She underwent bilateral mastectomy and breast reconstruction surgery in 2020 for what is thought to have been a curative procedure.  She follows closely with her oncologist, Dr. Hendricks from [facility].      Review of the PDMP in the past couple of months indicates a relative increase in lorazepam use.  She tells me that she drinks but is very vague about how much she drinks.  She chart review indicates that she clearly has had alcoholic pancreatitis in the past.  Additionally, chart review indicates significant weight loss over the course of about the last 2 years when she drifted down from 1 3212 as of 2021.  I suspect she is much lower than that now.    On presentation the emergency department, VS: /124, , RR unlabored and oxygen saturation 99% breathing room air.  She is afebrile.  Most notably, Ms. Comer has markedly diminished muscular bulk universally as well as significant decrease in subcutaneous fat compatible with malnutrition.  She is complaining of some back pain more than abdominal pain.  Abdominal exam reveals tenderness without rebound.  Labs: Creatinine 0.39 with BUN of 3.  Sodium 133, potassium 2.4, chloride 100, carbon dioxide 19 magnesium 1.3, calcium 7.1, albumin 2.8.  Alkaline phosphatase 156, AST 57/ALT 46, lipase 1015.  Glucose 63.  WBC 11,000 with a left shift, Hgb 12.2, .    Diagnoses:   1.  Acute recurrent  alcoholic pancreatitis, possible gastritis contributing to abdominal pain and vomiting.  Patient has previously had what was suspected to be alcoholic pancreatitis and it makes sense that this would be alcohol related again.  2.  Malnutrition: Patient describes decreased intake related to anorexia, nausea and vomiting.  She also is drinking alcohol rather than eating more nutrient rich foods.  It sounds as though this discomfort has been going on for months at least.    - Level of malnutrition: Severe   - Based on: weight loss, reduced intake, moderate/severe subcutaneous fat loss, moderate/severe muscle loss   - I believe the patient is at risk for refeeding syndrome.  3.  Anion gap metabolic acidosis.  I suspect the patient has alcoholic ketosis though labs in the emergency department were not completed to confirm that.  She certainly seems to have electrolyte disturbances compatible with that.  4.  Ongoing alcohol abuse.  This is undoubtedly connected to her overall stress and poor coping but suspect there is a component of true anxiety disorder here as well.  5.  Breast cancer.  As far as I understand, the patient is not currently on any type of chemotherapy (even anastrozole).  Her oncologist was planning on obtaining a bone density scan before initiating this therapy.    Plan:  1.  Admit to inpatient.  2.  I have asked for the patient be on telemetry while she has significant electrolyte disturbances.  That can be stopped in about 48 hours.  3.  N.p.o. at this time.  I asked that the patient be given thiamine in the emergency department if we can start her on dextrose containing fluids.  Potassium, magnesium and phosphorus replacement protocols were ordered.  Monitor for refeeding syndrome.  4.  CIWA protocol.  In view of her being underweight by substantial degree, I reduced the dosing of gabapentin.  Lorazepam is available through the CIWA protocol as well as at the nurses discretion for anxiety.  5.  I will  empirically request initiation of Lexapro.  Consider psychiatric consultation inpatient if needed.  The patient should have psychiatric follow-up.  Chemical dependency also needs to be involved.  6.  Radiology is recommending CT follow-up of the hypoechoic area identified in the liver on ultrasound.    DVT Prophylaxis: Pneumatic Compression Devices  Code Status: Full Code  Expected discharge: Anticipate more than 3days for this hospitalization.  She will need psychiatric follow-up and probably some type of chemical treatment program if 1 can be identified.    Matheus Kemp MD    Primary Care Physician   Randee Woods    Chief Complaint   Abdominal pain, nausea/vomiting that has been going on for weeks if not months.    History is obtained from the patient emergency room physician and electronic medical record.    History of Present Illness   Audelia Comer is an obviously chronically ill woman who is severely underweight.  She indicates that she really has become so weak at this point that she is unable to remain stable, getting around her home.  She came to attention today due to worsening back and abdominal pain.  She endorses significant vomiting as well.    Review of systems indicates she has not had fevers, sweats or chills.  She does not have any difficulty with breathing.  She does complain of pain that is worst on the left mid back as well as the abdominal left upper quadrant.  She indicates she has been having diarrhea recently and really has not been eating solids well.  Denies urinary change.    Past Medical History    I have reviewed this patient's medical history and updated it with pertinent information if needed.   Past Medical History:   Diagnosis Date     Anxiety      Depressive disorder      Invasive ductal carcinoma of breast, right (H)      SBO (small bowel obstruction) (H)        Past Surgical History   I have reviewed this patient's surgical history and updated it with pertinent information  if needed.  Past Surgical History:   Procedure Laterality Date     APPENDECTOMY       APPENDECTOMY  1989      SECTION  1984     GYN SURGERY      c section      MASTECTOMY, BILATERAL Bilateral     With reconstructive surgery     SOFT TISSUE SURGERY      breast implants       Prior to Admission Medications   Prior to Admission Medications   Prescriptions Last Dose Informant Patient Reported? Taking?   LORazepam (ATIVAN) 0.5 MG tablet 10/24/2021 at Unknown time  Yes Yes   Sig: Take 0.5-1 mg by mouth 2 times daily as needed for anxiety   azelastine (ASTELIN) 0.1 % nasal spray Unknown at Unknown time  Yes Yes   Sig: Spray 1 spray into both nostrils daily as needed    cetirizine (ZYRTEC) 10 MG tablet More than a month at Unknown time  Yes Yes   Sig: Take 10 mg by mouth daily as needed    fluticasone (FLONASE) 50 MCG/ACT nasal spray Unknown at Unknown time  Yes Yes   Sig: Spray 1 spray into both nostrils daily as needed    montelukast (SINGULAIR) 10 MG tablet Unknown at Unknown time  Yes Yes   Sig: Take 10 mg by mouth nightly as needed   multivitamin w/minerals (THERA-VIT-M) tablet Past Week at Unknown time  No Yes   Sig: Take 1 tablet by mouth daily   omeprazole (PRILOSEC) 20 MG DR capsule 10/24/2021 at am  Yes Yes   Sig: Take 20 mg by mouth daily    ondansetron (ZOFRAN ODT) 4 MG ODT tab 10/24/2021 at 9am  No Yes   Sig: Take 1 tablet (4 mg) by mouth every 6 hours as needed for nausea   pantoprazole (PROTONIX) 40 MG EC tablet Not Taking at Unknown time  No No   Sig: Take 1 tablet (40 mg) by mouth daily   Patient not taking: Reported on 10/24/2021   potassium chloride ER (K-TAB) 20 MEQ CR tablet Past Week at Unknown time  Yes Yes   Sig: Take 20 mEq by mouth daily      Facility-Administered Medications: None     Allergies   No Known Allergies    Social History   I have reviewed this patient's social history and updated it with pertinent information if needed. Audelia Comer  reports that she has been  smoking. She has been smoking about 1.00 pack per day. She has never used smokeless tobacco. She reports current alcohol use. She reports that she does not use drugs.    Family History   I have reviewed this patient's family history and updated it with pertinent information if needed.   Family History   Problem Relation Age of Onset     Colon Cancer Maternal Grandmother      Lung Cancer Paternal Grandmother      Breast Cancer Paternal Grandmother        Review of Systems   The 10 point Review of Systems is negative other than noted in the HPI or here.     Physical Exam   Temp: 98.7  F (37.1  C) Temp src: Oral BP: (!) 154/124 Pulse: 103   Resp: 19 SpO2: 99 % O2 Device: None (Room air)    Vital Signs with Ranges  Temp:  [98.7  F (37.1  C)] 98.7  F (37.1  C)  Pulse:  [103] 103  Resp:  [19] 19  BP: (154)/(124) 154/124  SpO2:  [99 %] 99 %  0 lbs 0 oz    Constitutional: Severely underweight with decreased muscle mass, bitemporal muscle wasting and a marked decrease in subcutaneous fat.  He otherwise is comfortable and appropriate.  Eyes: Pupils are equal and round.  Extraocular motions are conjugate.  No scleral icterus or conjunctival injection.  HEENT: No facial muscular asymmetry.  Oropharynx is very dry without lesions.  Respiratory: Clear to auscultation all fields.  Cardiovascular: Regular rate and rhythm, tachycardic, no murmur.  GI: Soft, very tender in the left upper quadrant.  Less tender in the right upper quadrant and nontender in the lower abdomen.  Bowel sounds are normal and active.  Lymph/Hematologic: No cervical or supraclavicular lymphadenopathy.  Genitourinary: Deferred by me  Skin: No obvious lesions noted.  I did not identify spider hemangiomas or other stigmata of severe liver disease or portal hypertension.  Musculoskeletal: As noted, decreased subcutaneous fat, decreased muscle mass.  No obvious asymmetry.  Neurologic: No lateralizing weakness.  Psychiatric: Appears appropriate to the situation  though seems to have very poor insight into her drinking.      Data   Data reviewed today:  I personally reviewed the right upper quadrant ultrasound image(s) showing evidence of pancreatitis, fatty liver and possible area of fatty change in the right hepatic lobe.  Follow-up is recommended..  Recent Labs   Lab 10/24/21  1231   WBC 11.0   HGB 12.2   MCV 96         POTASSIUM 2.4*   CHLORIDE 100   CO2 19*   BUN 3*   CR 0.39*   ANIONGAP 14   FRANCOISE 7.1*   GLC 63*   ALBUMIN 2.8*   PROTTOTAL 5.9*   BILITOTAL 0.6   ALKPHOS 156*   ALT 46   AST 57*   LIPASE 1,015*   TROPONIN <0.015       Recent Results (from the past 24 hour(s))   Abdomen US, limited (RUQ only)    Narrative    EXAM: US ABDOMEN LIMITED  LOCATION: [facility]  DATE/TIME: 10/24/2021 2:19 PM    INDICATION: pancreatitis, abd pain, eval biliary tree  COMPARISON: Ultrasound 1/18/2021  TECHNIQUE: Limited abdominal ultrasound.    FINDINGS:    GALLBLADDER: Normal. No gallstones, wall thickening, or pericholecystic fluid. Negative sonographic Ribera's sign.    BILE DUCTS: No biliary dilatation. The common duct measures 4 mm.    LIVER: Persistent diffusely coarsened echotexture. Mildly enlarged with a normal surface contour. Geographic hyperechoic area in the right lobe measuring 9.4 cm. No focal mass.    RIGHT KIDNEY: No hydronephrosis.    PANCREAS: Slightly heterogeneous. No epigastric fluid collection.    No ascites.      Impression    IMPRESSION:  1.  Slightly heterogeneous pancreas likely correlating with known pancreatitis. No perinephric fluid collection.  2.  Mildly enlarged fatty liver.  3.  Geographic 9.4 cm hypoechoic area in the anterior right hepatic lobe, likely related to fatty change. As a precaution, recommend attention on short-term follow-up versus nonemergent contrast-enhanced CT correlation.  4.  No cholelithiasis or biliary ductal dilatation.

## 2021-10-24 NOTE — ED NOTES
"[facility]  ED Nurse Handoff Report    Audelia Comer is a 56 year old female   ED Chief complaint: Nausea & Vomiting and Poor Appetite  . ED Diagnosis:   Final diagnoses:   Acute pancreatitis, unspecified complication status, unspecified pancreatitis type     Allergies: No Known Allergies    Code Status: Full Code  Activity level - Baseline/Home:  Independent. Activity Level - Current:   Stand by Assist. Lift room needed: No. Bariatric: No   Needed: No   Isolation: No. Infection: Not Applicable.     Vital Signs:   Vitals:    10/24/21 1219 10/24/21 1313   BP: (!) 154/124    Pulse: 103    Resp: 19    Temp:  98.7  F (37.1  C)   TempSrc:  Oral   SpO2: 99%        Cardiac Rhythm:  ,      Pain level:    Patient confused: Yes. Patient Falls Risk: No.   Elimination Status: Has voided   Patient Report - Initial Complaint: Abdominal Pain/Poor Appetite. Focused Assessment: 18g lt AC, A&Ox4, Responds to pain management with Dilauded.  Pleasant and compliant with cares.      HPI   Audelia Comer is a 56 year old female with history of hyperlipidemia, pancreatitis, and breast cancer who presents with abdominal pain, nausea and vomit. A few months ago, patient was diagnosed with breast cancer and had a double mastectomy. Then, one month ago she lost her daughter. Since her cancer diagnosis, patient has had intermittent nausea, loss of appetite, and abdominal pain. She says that pain \"jumps around\" to different regions of her abdomen. She also reports feeling constipated very often but ends up having to rush to the bathroom and has diarrhea. She had tried taking anti-nausea medications at home but is unable to keep them down. Today, her abdominal pain became much more severe so she called EMS. They found her to be hypertensive but other wise vitals were stable and her blood sugar was normal. She was given 4 mg of Zofran en route.  Tests Performed: Labs/US. Abnormal Results:   Abnormal Labs Reviewed "   COMPREHENSIVE METABOLIC PANEL - Abnormal; Notable for the following components:       Result Value    Potassium 2.4 (*)     Carbon Dioxide (CO2) 19 (*)     Urea Nitrogen 3 (*)     Creatinine 0.39 (*)     Calcium 7.1 (*)     Glucose 63 (*)     Alkaline Phosphatase 156 (*)     AST 57 (*)     Protein Total 5.9 (*)     Albumin 2.8 (*)     All other components within normal limits   LIPASE - Abnormal; Notable for the following components:    Lipase 1,015 (*)     All other components within normal limits   CBC WITH PLATELETS AND DIFFERENTIAL - Abnormal; Notable for the following components:    RBC Count 3.69 (*)     MCH 33.1 (*)     Absolute Neutrophils 8.7 (*)     All other components within normal limits   MAGNESIUM - Abnormal; Notable for the following components:    Magnesium 1.3 (*)     All other components within normal limits      Treatments provided: Dilaudid, Pepcid, Reglan, and Potassium   Family Comments: Pt has cell phone  OBS brochure/video discussed/provided to patient:  Yes  ED Medications:   Medications   0.9% sodium chloride BOLUS (0 mLs Intravenous Stopped 10/24/21 1352)     Followed by   sodium chloride 0.9% infusion (has no administration in time range)   HYDROmorphone (PF) (DILAUDID) injection 0.5 mg (0.5 mg Intravenous Given 10/24/21 1345)   0.9% sodium chloride BOLUS (has no administration in time range)   potassium chloride 10 mEq in 100 mL sterile water intermittent infusion (premix) (has no administration in time range)   magnesium sulfate 2 g in water intermittent infusion (has no administration in time range)   thiamine (B-1) injection 100 mg (has no administration in time range)   metoclopramide (REGLAN) injection 10 mg (10 mg Intravenous Given 10/24/21 1237)   famotidine (PEPCID) injection 20 mg (20 mg Intravenous Given 10/24/21 1237)     Drips infusing:  Yes  For the majority of the shift, the patient's behavior Green. Interventions performed were N/A.    Sepsis treatment initiated: No      Patient tested for COVID 19 prior to admission: YES    ED Nurse Name/Phone Number: Charles Garcia RN,   3:12 PM    RECEIVING UNIT ED HANDOFF REVIEW    Above ED Nurse Handoff Report was reviewed: Yes  Reviewed by: Lady Padilla RN on October 24, 2021 at 3:58 PM

## 2021-10-24 NOTE — ED PROVIDER NOTES
"  History   Chief Complaint:  Nausea & Vomiting and Poor Appetite       HPI   Audelia Comer is a 56 year old female with history of hyperlipidemia, pancreatitis, and breast cancer who presents with abdominal pain, nausea and vomiting. A few months ago, patient was diagnosed with breast cancer and had a double mastectomy. Then, one month ago she lost her daughter. Since her cancer diagnosis, patient has had intermittent nausea, loss of appetite, and abdominal pain. She says that pain \"jumps around\" to different regions of her abdomen. She also reports feeling constipated very often but ends up having to rush to the bathroom and has diarrhea. She had tried taking anti-nausea medications at home but is unable to keep them down. Today, her abdominal pain became much more severe so she called EMS. She was given 4 mg of Zofran en route.      Review of Systems   Constitutional: Positive for appetite change.   Gastrointestinal: Positive for abdominal pain, diarrhea, nausea and vomiting.   All other systems reviewed and are negative.    Allergies:  The patient has no known allergies.      Medications:  Buspar  Zyrtec  Flonase  Atarax  Reglan  Singulair  Multivitamin  Prilosec  Zoloft  Thiamine     Past Medical History:    Anxiety  Depressive disorder  SBO  Pancreatitis  Ileitis   Tobacco abuse  Hyperlipidemia  Breast cancer  Allergic rhinitis  Alcohol abuse    Eating disorder     Past Surgical History:    Appendectomy  C section  Double mastectomy  Mammaplasty with prosthetic implants  Unilateral breast augmentation      Social History:  Patient presents to the ED alone.  Smoking status: 1 pack of cigarettes per day.     Physical Exam     Patient Vitals for the past 24 hrs:   BP Temp Temp src Pulse Resp SpO2   10/24/21 1313 -- 98.7  F (37.1  C) Oral -- -- --   10/24/21 1219 (!) 154/124 -- -- 103 19 99 %       Physical Exam  VS: Reviewed per above  HENT: Mucous membranes dry  EYES: sclera anicteric  CV: Rate as noted, " regular rhythm.   RESP: Effort normal. Breath sounds are normal bilaterally.  GI: moderate tenderness in the epigastrium without rebound/guarding, not distended.  NEURO: Alert, moving all extremities  MSK: No deformity of the extremities  SKIN: Warm and dry    Emergency Department Course     ECG:  ECG taken at 1353, ECG read at 1402  Sinus tachycardia.  No significant change as compared to prior, dated 1/18/21.  Rate 103 bpm. ME interval 154 ms. QRS duration 78 ms. QT/QTc 370/484 ms. P-R-T axes 74 79 80.     Imaging:  Abdomen US, limited (RUQ only)   Final Result   IMPRESSION:   1.  Slightly heterogeneous pancreas likely correlating with known pancreatitis. No perinephric fluid collection.   2.  Mildly enlarged fatty liver.   3.  Geographic 9.4 cm hypoechoic area in the anterior right hepatic lobe, likely related to fatty change. As a precaution, recommend attention on short-term follow-up versus nonemergent contrast-enhanced CT correlation.   4.  No cholelithiasis or biliary ductal dilatation.              Laboratory:  CBC: WBC: 11.0, HGB: 12.2, PLT: 162  CMP: Glucose 63 (low), Potassium: 2.4 (low), Carbon Dioxide: 19 (low), Urea Nitrogen: 3 (low), Calcium: 7.1 (low), Albumin: 2.8 (low), Protein Total: 5.9 (low), Alkaline Phosphatase: 156 (H), AST: 57 (H), Creatinine: 0.39 (low) o/w WNL   Troponin (Collected 1231): pending  Lactic acid (Resulted 1238): 1.6  Lipase: 1015 (H)    Asymptomatic COVID-19 PCR: Pending     Emergency Department Course:  Reviewed:  I reviewed nursing notes, vitals, past medical history and Care Everywhere    Assessments:    1220 I obtained history and examined the patient as noted above.     1430 I rechecked the patient and explained findings.     Consults:    1402 I spoke with the hospitalist Dr. Kemp, who accepts the patient for admission.    Interventions:  1234 NS 1L IV Bolus   1236 Dilaudid 0.5mg IV   1237 Pepcid 20 mg IV  1234 Reglan 10 mg IV  1345 Dilaudid 0.5mg IV      Disposition:  The patient was admitted to the hospital under the care of Dr. Kemp.     Impression & Plan     Medical Decision Making:  Patient presents to the ER for evaluation of intractable nausea and vomiting, epigastric pain.  On arrival vital signs are reassuring.  On exam she has moderate epigastric tenderness without peritoneal signs.  Labs show evidence of pancreatitis, mild transaminitis without obstructive pattern.  No evidence of gallstone pancreatitis on ultrasound.  Patient has history per chart review of alcoholic pancreatitis in the past.  No leukocytosis or lactic acidosis to suggest other sinister intra-abdominal process.  She does have evidence of hypokalemia and hypomagnesemia and electrolyte replacement was ordered.  IV fluids were ordered as well.  Patient was given antiemetics and pain medicines and admitted to the hospital for further symptom control.    Diagnosis:    ICD-10-CM    1. Acute pancreatitis, unspecified complication status, unspecified pancreatitis type  K85.90      Scribe Disclosure:  I, Charles Adair, am serving as a scribe at 12:16 PM on 10/24/2021 to document services personally performed by Bradley Morgan MD based on my observations and the provider's statements to me.            Bradley Morgan MD  10/24/21 8911

## 2021-10-25 LAB
ALBUMIN SERPL-MCNC: 3.2 G/DL (ref 3.4–5)
ALP SERPL-CCNC: 164 U/L (ref 40–150)
ALT SERPL W P-5'-P-CCNC: 51 U/L (ref 0–50)
ANION GAP SERPL CALCULATED.3IONS-SCNC: 9 MMOL/L (ref 3–14)
AST SERPL W P-5'-P-CCNC: 53 U/L (ref 0–45)
ATRIAL RATE - MUSE: 103 BPM
BILIRUB SERPL-MCNC: 0.9 MG/DL (ref 0.2–1.3)
BUN SERPL-MCNC: 1 MG/DL (ref 7–30)
CALCIUM SERPL-MCNC: 8.4 MG/DL (ref 8.5–10.1)
CHLORIDE BLD-SCNC: 96 MMOL/L (ref 94–109)
CO2 SERPL-SCNC: 25 MMOL/L (ref 20–32)
CREAT SERPL-MCNC: 0.39 MG/DL (ref 0.52–1.04)
DIASTOLIC BLOOD PRESSURE - MUSE: NORMAL MMHG
ERYTHROCYTE [DISTWIDTH] IN BLOOD BY AUTOMATED COUNT: 12.2 % (ref 10–15)
GFR SERPL CREATININE-BSD FRML MDRD: >90 ML/MIN/1.73M2
GLUCOSE BLD-MCNC: 101 MG/DL (ref 70–99)
HCT VFR BLD AUTO: 36 % (ref 35–47)
HGB BLD-MCNC: 12.5 G/DL (ref 11.7–15.7)
INTERPRETATION ECG - MUSE: NORMAL
MAGNESIUM SERPL-MCNC: 1.6 MG/DL (ref 1.6–2.3)
MCH RBC QN AUTO: 34 PG (ref 26.5–33)
MCHC RBC AUTO-ENTMCNC: 34.7 G/DL (ref 31.5–36.5)
MCV RBC AUTO: 98 FL (ref 78–100)
P AXIS - MUSE: 74 DEGREES
PHOSPHATE SERPL-MCNC: 1.6 MG/DL (ref 2.5–4.5)
PLATELET # BLD AUTO: 156 10E3/UL (ref 150–450)
POTASSIUM BLD-SCNC: 3.2 MMOL/L (ref 3.4–5.3)
POTASSIUM BLD-SCNC: 3.7 MMOL/L (ref 3.4–5.3)
PR INTERVAL - MUSE: 154 MS
PROT SERPL-MCNC: 6.8 G/DL (ref 6.8–8.8)
QRS DURATION - MUSE: 78 MS
QT - MUSE: 370 MS
QTC - MUSE: 484 MS
R AXIS - MUSE: 79 DEGREES
RBC # BLD AUTO: 3.68 10E6/UL (ref 3.8–5.2)
SODIUM SERPL-SCNC: 130 MMOL/L (ref 133–144)
SYSTOLIC BLOOD PRESSURE - MUSE: NORMAL MMHG
T AXIS - MUSE: 80 DEGREES
VENTRICULAR RATE- MUSE: 103 BPM
WBC # BLD AUTO: 11.6 10E3/UL (ref 4–11)

## 2021-10-25 PROCEDURE — 120N000001 HC R&B MED SURG/OB

## 2021-10-25 PROCEDURE — 250N000013 HC RX MED GY IP 250 OP 250 PS 637: Performed by: INTERNAL MEDICINE

## 2021-10-25 PROCEDURE — 250N000011 HC RX IP 250 OP 636: Performed by: INTERNAL MEDICINE

## 2021-10-25 PROCEDURE — 84100 ASSAY OF PHOSPHORUS: CPT | Performed by: INTERNAL MEDICINE

## 2021-10-25 PROCEDURE — 84132 ASSAY OF SERUM POTASSIUM: CPT | Performed by: INTERNAL MEDICINE

## 2021-10-25 PROCEDURE — 85027 COMPLETE CBC AUTOMATED: CPT | Performed by: INTERNAL MEDICINE

## 2021-10-25 PROCEDURE — 36415 COLL VENOUS BLD VENIPUNCTURE: CPT | Performed by: INTERNAL MEDICINE

## 2021-10-25 PROCEDURE — 83735 ASSAY OF MAGNESIUM: CPT | Performed by: INTERNAL MEDICINE

## 2021-10-25 PROCEDURE — 99233 SBSQ HOSP IP/OBS HIGH 50: CPT | Performed by: INTERNAL MEDICINE

## 2021-10-25 PROCEDURE — 258N000003 HC RX IP 258 OP 636: Performed by: INTERNAL MEDICINE

## 2021-10-25 PROCEDURE — 80053 COMPREHEN METABOLIC PANEL: CPT | Performed by: INTERNAL MEDICINE

## 2021-10-25 RX ORDER — HYDROMORPHONE HCL IN WATER/PF 6 MG/30 ML
.2-.4 PATIENT CONTROLLED ANALGESIA SYRINGE INTRAVENOUS
Status: DISCONTINUED | OUTPATIENT
Start: 2021-10-25 | End: 2021-10-27

## 2021-10-25 RX ORDER — OXYCODONE HYDROCHLORIDE 5 MG/1
5 TABLET ORAL EVERY 4 HOURS PRN
Status: DISCONTINUED | OUTPATIENT
Start: 2021-10-25 | End: 2021-10-27

## 2021-10-25 RX ORDER — POTASSIUM CHLORIDE 1500 MG/1
20 TABLET, EXTENDED RELEASE ORAL ONCE
Status: COMPLETED | OUTPATIENT
Start: 2021-10-25 | End: 2021-10-25

## 2021-10-25 RX ADMIN — THIAMINE HCL TAB 100 MG 100 MG: 100 TAB at 12:45

## 2021-10-25 RX ADMIN — POTASSIUM CHLORIDE, DEXTROSE MONOHYDRATE AND SODIUM CHLORIDE: 150; 5; 450 INJECTION, SOLUTION INTRAVENOUS at 23:16

## 2021-10-25 RX ADMIN — HYDROMORPHONE HYDROCHLORIDE 0.5 MG: 1 INJECTION, SOLUTION INTRAMUSCULAR; INTRAVENOUS; SUBCUTANEOUS at 06:35

## 2021-10-25 RX ADMIN — FOLIC ACID 1 MG: 1 TABLET ORAL at 12:45

## 2021-10-25 RX ADMIN — POTASSIUM CHLORIDE, DEXTROSE MONOHYDRATE AND SODIUM CHLORIDE: 150; 5; 450 INJECTION, SOLUTION INTRAVENOUS at 15:00

## 2021-10-25 RX ADMIN — GABAPENTIN 300 MG: 300 CAPSULE ORAL at 00:30

## 2021-10-25 RX ADMIN — CLONIDINE HYDROCHLORIDE 0.1 MG: 0.1 TABLET ORAL at 08:22

## 2021-10-25 RX ADMIN — Medication 400 MG: at 19:51

## 2021-10-25 RX ADMIN — POTASSIUM CHLORIDE 20 MEQ: 1500 TABLET, EXTENDED RELEASE ORAL at 03:15

## 2021-10-25 RX ADMIN — HYDROMORPHONE HYDROCHLORIDE 0.5 MG: 1 INJECTION, SOLUTION INTRAMUSCULAR; INTRAVENOUS; SUBCUTANEOUS at 00:30

## 2021-10-25 RX ADMIN — NICOTINE 1 PATCH: 21 PATCH, EXTENDED RELEASE TRANSDERMAL at 08:22

## 2021-10-25 RX ADMIN — HYDROMORPHONE HYDROCHLORIDE 0.4 MG: 0.2 INJECTION, SOLUTION INTRAMUSCULAR; INTRAVENOUS; SUBCUTANEOUS at 23:13

## 2021-10-25 RX ADMIN — GABAPENTIN 300 MG: 300 CAPSULE ORAL at 16:24

## 2021-10-25 RX ADMIN — ONDANSETRON 4 MG: 2 INJECTION INTRAMUSCULAR; INTRAVENOUS at 00:42

## 2021-10-25 RX ADMIN — OMEPRAZOLE 20 MG: 20 CAPSULE, DELAYED RELEASE ORAL at 08:20

## 2021-10-25 RX ADMIN — HYDROMORPHONE HYDROCHLORIDE 0.4 MG: 0.2 INJECTION, SOLUTION INTRAMUSCULAR; INTRAVENOUS; SUBCUTANEOUS at 13:22

## 2021-10-25 RX ADMIN — ESCITALOPRAM 5 MG: 5 TABLET, FILM COATED ORAL at 08:19

## 2021-10-25 RX ADMIN — Medication 5 MG: at 23:18

## 2021-10-25 RX ADMIN — HYDROMORPHONE HYDROCHLORIDE 0.4 MG: 0.2 INJECTION, SOLUTION INTRAMUSCULAR; INTRAVENOUS; SUBCUTANEOUS at 17:00

## 2021-10-25 RX ADMIN — POTASSIUM & SODIUM PHOSPHATES POWDER PACK 280-160-250 MG 1 PACKET: 280-160-250 PACK at 08:22

## 2021-10-25 RX ADMIN — CETIRIZINE HYDROCHLORIDE 10 MG: 10 TABLET, FILM COATED ORAL at 08:21

## 2021-10-25 RX ADMIN — MULTIPLE VITAMINS W/ MINERALS TAB 1 TABLET: TAB at 12:44

## 2021-10-25 RX ADMIN — GABAPENTIN 300 MG: 300 CAPSULE ORAL at 23:14

## 2021-10-25 RX ADMIN — Medication 400 MG: at 08:20

## 2021-10-25 RX ADMIN — HYDROMORPHONE HYDROCHLORIDE 0.4 MG: 0.2 INJECTION, SOLUTION INTRAMUSCULAR; INTRAVENOUS; SUBCUTANEOUS at 19:51

## 2021-10-25 RX ADMIN — LORAZEPAM 1 MG: 1 TABLET ORAL at 00:41

## 2021-10-25 RX ADMIN — HYDROMORPHONE HYDROCHLORIDE 0.5 MG: 1 INJECTION, SOLUTION INTRAMUSCULAR; INTRAVENOUS; SUBCUTANEOUS at 08:30

## 2021-10-25 RX ADMIN — GABAPENTIN 300 MG: 300 CAPSULE ORAL at 08:21

## 2021-10-25 RX ADMIN — HYDROMORPHONE HYDROCHLORIDE 0.5 MG: 1 INJECTION, SOLUTION INTRAMUSCULAR; INTRAVENOUS; SUBCUTANEOUS at 03:20

## 2021-10-25 RX ADMIN — POTASSIUM CHLORIDE, DEXTROSE MONOHYDRATE AND SODIUM CHLORIDE: 150; 5; 450 INJECTION, SOLUTION INTRAVENOUS at 05:01

## 2021-10-25 RX ADMIN — CLONIDINE HYDROCHLORIDE 0.1 MG: 0.1 TABLET ORAL at 00:30

## 2021-10-25 RX ADMIN — LORAZEPAM 0.5 MG: 0.5 TABLET ORAL at 19:51

## 2021-10-25 ASSESSMENT — ACTIVITIES OF DAILY LIVING (ADL)
ADLS_ACUITY_SCORE: 9

## 2021-10-25 NOTE — CONSULTS
CLINICAL NUTRITION SERVICES  -  ASSESSMENT NOTE      MALNUTRITION:  % Weight Loss:  None noted --> underweight for months  % Intake:  </= 50% for >/= 1 month (severe malnutrition)  Subcutaneous Fat Loss:  Orbital region moderate depletion and Upper arm region severe depletion   Muscle Loss:  Temporal region moderate depletion, Clavicle bone region severe depletion, Acromion bone region severe depletion, Patellar region severe depletion, Anterior thigh region severe depletion and Posterior calf region severe depletion  Fluid Retention:  None noted or documented    Malnutrition Diagnosis: Severe malnutrition  In Context of:  Acute illness or injury with underlying chronic illness or disease and environmental or social circumstances        REASON FOR ASSESSMENT  Audelia Comer is a 56 year old female seen by Registered Dietitian for Admission Nutrition Risk Screen for positive.    PMH of: Breast CA, etoh intake, severe malnutrition.    Admit 2/2: N/V, abdominal pain, found to have alcoholic pancreatitis.    NUTRITION HISTORY  - Information obtained from patient and chart.    - Known to this service with RD assessment completed in 1/2021.  Met malnutrition criteria at that time with decreased appetite/intake, likely in the setting of etoh abuse and admitted for pancreatitis.  She was underweight/had a low BMI at that time as well.    - Diet at home: Regular, does not verbalize etoh intake with this writer.    - Usual intakes: 1 meal/day is baseline per report.    - Barriers to PO intakes: Overall decreased appetite.  Describes abdominal pain and nausea at times.  Obviously a component of etoh continuing to impact nutritional status.  Patient does mention her recent loss of daughter, impact of COVID on her life in general.  Per review of H+P and spiritual health notes, patient has many social/environmental stressors including loss of daughter and loss of job w/ COVID.  - Use of oral supplements: Has Ensure at home,  "uses sometimes.    - Allergies: NKFA.      CURRENT NUTRITION ORDERS  Diet Order:     Clears    Current Intake/Tolerance:  Abdominal pain, nausea with vomiting yesterday.  Reports clears are going well today, no N/V, wants diet advanced to fulls.        NUTRITION FOCUSED PHYSICAL ASSESSMENT FOR DIAGNOSING MALNUTRITION)  Yes     Obtained from Chart/Interdisciplinary Team:  - No documentation of PI  - Stooling patterns reviewed    ANTHROPOMETRICS  Height: 5' 6\"  Weight: 109 lbs 6 oz  Body mass index is 17.65 kg/m .  Weight Status:  Underweight BMI <18.5  Weight History:  Wt Readings from Last 10 Encounters:   10/24/21 49.6 kg (109 lb 6 oz)   01/18/21 50.9 kg (112 lb 4.8 oz)   08/16/20 51.5 kg (113 lb 9.6 oz)   07/28/20 54.3 kg (119 lb 11.4 oz)   12/27/18 59 kg (130 lb)   12/04/18 57.2 kg (126 lb)   09/06/18 55 kg (121 lb 4 oz)   03/16/18 53.7 kg (118 lb 6.2 oz)   09/28/17 55.8 kg (123 lb)   07/17/17 57.6 kg (127 lb)     - Low BMI and underweight for the past year.      LABS  Labs reviewed:  Electrolytes  Potassium (mmol/L)   Date Value   10/25/2021 3.7   10/25/2021 3.2 (L)   10/24/2021 3.0 (L)   01/19/2021 3.4   01/19/2021 3.2 (L)   01/18/2021 2.6 (LL)     Phosphorus (mg/dL)   Date Value   10/25/2021 1.6 (L)   10/24/2021 2.4 (L)   01/18/2021 3.0   08/20/2020 2.6   08/19/2020 3.0   08/18/2020 1.9 (L)   08/18/2020 1.2 (L)    Blood Glucose  Glucose (mg/dL)   Date Value   10/25/2021 101 (H)   10/24/2021 63 (L)   01/19/2021 103 (H)   01/18/2021 79   08/21/2020 104 (H)   08/18/2020 98   08/17/2020 90    Inflammatory Markers  CRP Inflammation (mg/L)   Date Value   08/03/2016 11.5 (H)     WBC (10e9/L)   Date Value   01/19/2021 9.7   01/18/2021 11.5 (H)   08/21/2020 8.7     WBC Count (10e3/uL)   Date Value   10/25/2021 11.6 (H)   10/24/2021 11.0     Albumin (g/dL)   Date Value   10/25/2021 3.2 (L)   10/24/2021 2.8 (L)   01/19/2021 2.7 (L)   01/18/2021 3.4   08/21/2020 2.7 (L)      Magnesium (mg/dL)   Date Value   10/25/2021 1.6 "   10/24/2021 1.5 (L)   10/24/2021 1.3 (L)   01/18/2021 1.5 (L)   01/18/2021 1.7   08/19/2020 1.7     Sodium (mmol/L)   Date Value   10/25/2021 130 (L)   10/24/2021 133   01/19/2021 136   01/18/2021 128 (L)   08/21/2020 136    Renal  Urea Nitrogen (mg/dL)   Date Value   10/25/2021 1 (L)   10/24/2021 3 (L)   01/19/2021 5 (L)   01/18/2021 6 (L)   08/21/2020 3 (L)     Creatinine (mg/dL)   Date Value   10/25/2021 0.39 (L)   10/24/2021 0.39 (L)   01/19/2021 0.38 (L)   01/18/2021 0.46 (L)   08/21/2020 0.41 (L)     Additional  Ketones Urine (mg/dL)   Date Value   07/28/2020 >150 (A)        B/P: 111/71, T: 97.7, P: 104, R: 18      MEDICATIONS  Medications reviewed:    cetirizine  10 mg Oral Daily     escitalopram  5 mg Oral Daily     folic acid  1 mg Oral Daily     [START ON 10/26/2021] gabapentin  100 mg Oral Q8H     gabapentin  300 mg Oral Q8H     magnesium oxide  400 mg Oral BID     multivitamin w/minerals  1 tablet Oral Daily     nicotine  1 patch Transdermal Daily     nicotine   Transdermal Q8H     omeprazole  20 mg Oral QAM     sodium chloride (PF)  3 mL Intracatheter Q8H     thiamine  100 mg Oral Daily        dextrose 5% and 0.45% NaCl + KCl 20 mEq/L 100 mL/hr at 10/25/21 0501          ASSESSED NUTRITION NEEDS PER APPROVED PRACTICE GUIDELINES:    Dosing Weight 50 kg   Estimated Energy Needs: 30-35+ Kcal/Kg  Justification: underweight  Estimated Protein Needs: 1.5-2 g pro/Kg  Justification: Repletion and preservation of lean body mass  Estimated Fluid Needs: per MD      NUTRITION DIAGNOSIS:  Malnutrition related to chronic etoh abuse leading to decreased intake, recently exacerbated by socia/environmental circumstances as evidenced by BMI <18 for period of months, <50% needs met during this time, overt fat/muscle loss.    NUTRITION INTERVENTIONS  Recommendations / Nutrition Prescription  Diet per MD.  Will eventually need etoh cessation to improve nutritional status and high calorie/high protein diet.     Ok for any  diet appropriate supplement prn.    Continue daily MVI/M.      Implementation  Nutrition education: Provided education on above.  Reviewed supplements available and how to order.    Medical Food Supplement: As above.     Collaboration and Referral of Nutrition care: Discussed POC with team during rounds.      Nutrition Goals  Diet advancement to solids w/in 48 hrs.       MONITORING AND EVALUATION:  Progress towards goals will be monitored and evaluated per protocol and Practice Guidelines          Rosaura Valladares RDN, LD  Clinical Dietitian  3rd floor/ICU: [phone]  All other floors: [phone]  Weekend/holiday: [phone]

## 2021-10-25 NOTE — PROGRESS NOTES
"SPIRITUAL HEALTH SERVICES Progress Note  [facility] Med. Surg. 5    Saw pt Audelia Comer per staff referral, requesting emotional support for pt.  Offered reflective conversation to facilitate the processing of thoughts and feelings.      Distress - Audelia shared the following distresses:  o She lost her job of six years in March  o Audelia underwent treatment for breast cancer this summer, including bilateral mastectomy in August.  o Her daughter Marilee  suddenly and unexpectedly in September.  Provided emotional and grief support.  Audelia narrated the events surrounding her daughter's death and talked about her cremation and celebration of life.  She reported that she has had good support from family and friends, including a couple of friends who have lost a child.  However, Audelia acknowledged \"I've not been eating... I've just been surviving.\"  She acknowledged that she has been angry with God and reflected later, \"I don't like being angry with anyone.\"  Audelia expressed interest in the [employer] support network.        Coping -   o Audelia named her surviving son and daughter, mother, sister, son-in-law, and a [place] of friends among her support network.  Audelia has a 5-year old granddaughter (Marilee's daughter) who brings her much delores and comfort.    o Audelia's [employer] evan plays an important role in her self-care.  She welcomed prayer during our conversation and engaged in a few moments of guided contemplative breathing before praying.       Meaning-Making - Audelia processed aspects of her grief, named significant people and resources within her support system, and reflected briefly on her evan in the context of her loss.    Provided emotional support through reflective listening, validation of feelings, and affirmation of her support network.      Plan - Will see pt 1-2 times per week, during her admission, to provide ongoing emotional/spiritual support.    Neal Willoughby, " [place], [facility]  Staff   Phone [phone]

## 2021-10-25 NOTE — PLAN OF CARE
Pertinent assessments: A&Ox4, tachycardic. Up SBA. C/o abd pain, dilaudid given x2. C/o nausea, zofran given with no relief. Vomited x2. CIWAs 4, and 4. Nicotine patch to L shoulder    Major Shift Events: admitted to floor around 1615    Treatment Plan: CIWAs, electrolyte replacement

## 2021-10-25 NOTE — PLAN OF CARE
Pertinent assessments: A&Ox4. VSS on room air, HR slightly tachy. Pt c/o abdominal pain that radiates to back. IV dilaudid given x3. Reports intermittent nausea, IV zofran given with relief. No emesis. CIWA 7 and 3. 1mg ativan given. 0200 potassium recheck 3.2, PO potassium chloride given, recheck in am. Up SBA.     Treatment Plan: CIWAs, electrolyte replacement

## 2021-10-25 NOTE — PLAN OF CARE
To Do:  End of Shift Summary  For vital signs and complete assessments, please see documentation flowsheets.     Pertinent assessments: A&O---up in room--co abd pain radiating to back dilaudid given x3--- denies nausea --- tolerating small amounts of clear liquid ---CIWA  4---   Major Shift Events Dilaudid 0.4mg  Treatment Plan: monitor  Bedside Nurse: Jeanie Hameed

## 2021-10-25 NOTE — PROGRESS NOTES
[facility]  Hospitalist Progress Note  Naga Landin MD 10/25/2021    Reason for Stay (Diagnosis): Alcohol abuse, pancreatitis.         Assessment and Plan:      Summary of Stay: Audelia Comer is a 56 year old female with past medical history including alcohol abuse with prior alcoholic pancreatitis, breast cancer with history of bilateral mastectomy and reconstruction in August 2020 felt to have been curative though not currently yet started on any adjuvant treatments, extensive recent psychosocial stressors admitted on 10/24/2021 with abdominal pain and vomiting found to have suspected recurrent alcohol induced pancreatitis and gastritis as well as severe malnutrition and anion gap metabolic acidosis felt to be ketotic/alcohol induced.    She was admitted for symptom management and IV hydration.    Problem List/Assessment and Plan:   1. Acute recurrent alcohol induced pancreatitis and gastritis: Continue IV hydration, symptom control with narcotics as needed today, reassess tomorrow.    2.   Severe protein calorie malnutrition, starvation ketosis, alcohol-induced ketosis: Monitor for evidence of refeeding syndrome.  Continue IV hydration, needs to abstain from alcohol.    3.   Alcohol abuse, possible withdrawal: Anticipate CD consultation when clinically appropriate.  --Continue CIWA protocol today    4.   History of breast cancer status post bilateral mastectomy and reconstruction in August 2020: Follows in the [facility] [employer] system.  Needs to follow-up for a bone scan prior to starting anastrozole.    5.   Incidental liver finding: Radiology recommending CT follow-up of a hypoechoic region in the liver noted on ultrasound.    6.  Severe hypokalemia: improving.  Recheck.  Monitor for refeeding syndrome.    DVT Prophylaxis: Pneumatic Compression Devices  Code Status: Full Code  Discharge Dispo/Date: Likely 2 or 3 more days        Interval History (Subjective):      Patient admitted  "last night, I assumed care today  Feeling a bit better, still needing narcotics  Keeping on clears until she has less pain                    Physical Exam:      Last Vital Signs:  /71 (BP Location: Right arm)   Pulse 104   Temp 97.7  F (36.5  C) (Axillary)   Resp 18   Ht 1.676 m (5' 6\")   Wt 49.6 kg (109 lb 6 oz)   SpO2 98%   BMI 17.65 kg/m      No intake/output data recorded.    General: Alert, awake, no acute distress.  HEENT: NC/AT, eyes anicteric, external occular movements intact, face symmetric.    Cardiac: RRR, S1, S2.    Pulmonary: Normal chest rise, normal work of breathing.  Lungs CTA BL  Abdomen: ttp in epigastrium and luq.  Otherwise soft, non-tender, non-distended.  Bowel Sounds Present.  No guarding.  Extremities: no deformities.  Warm, well perfused.  Skin: no rashes or lesions noted.  Warm and Dry.  Neuro: No focal deficits noted.  Speech clear.  Coordination and strength grossly normal.  Psych: Appropriate affect.         Medications:      All current medications were reviewed with changes reflected in problem list.         Data:      All new lab and imaging data was reviewed.   Labs:  Recent Labs   Lab 10/25/21  0211 10/24/21  1635 10/24/21  1231   NA  --   --  133   POTASSIUM 3.2*   < > 2.4*   CHLORIDE  --   --  100   CO2  --   --  19*   ANIONGAP  --   --  14   GLC  --   --  63*   BUN  --   --  3*   CR  --   --  0.39*   GFRESTIMATED  --   --  >90   FRANCOISE  --   --  7.1*    < > = values in this interval not displayed.     Recent Labs   Lab 10/24/21  1231   WBC 11.0   HGB 12.2   HCT 35.4   MCV 96         Imaging:   Recent Results (from the past 48 hour(s))   Abdomen US, limited (RUQ only)    Narrative    EXAM: US ABDOMEN LIMITED  LOCATION: [facility]  DATE/TIME: 10/24/2021 2:19 PM    INDICATION: pancreatitis, abd pain, eval biliary tree  COMPARISON: Ultrasound 1/18/2021  TECHNIQUE: Limited abdominal ultrasound.    FINDINGS:    GALLBLADDER: Normal. No " gallstones, wall thickening, or pericholecystic fluid. Negative sonographic Ribera's sign.    BILE DUCTS: No biliary dilatation. The common duct measures 4 mm.    LIVER: Persistent diffusely coarsened echotexture. Mildly enlarged with a normal surface contour. Geographic hyperechoic area in the right lobe measuring 9.4 cm. No focal mass.    RIGHT KIDNEY: No hydronephrosis.    PANCREAS: Slightly heterogeneous. No epigastric fluid collection.    No ascites.      Impression    IMPRESSION:  1.  Slightly heterogeneous pancreas likely correlating with known pancreatitis. No perinephric fluid collection.  2.  Mildly enlarged fatty liver.  3.  Geographic 9.4 cm hypoechoic area in the anterior right hepatic lobe, likely related to fatty change. As a precaution, recommend attention on short-term follow-up versus nonemergent contrast-enhanced CT correlation.  4.  No cholelithiasis or biliary ductal dilatation.               Naga Landin MD , MD.

## 2021-10-26 LAB
ALBUMIN SERPL-MCNC: 2.7 G/DL (ref 3.4–5)
ALP SERPL-CCNC: 163 U/L (ref 40–150)
ALT SERPL W P-5'-P-CCNC: 50 U/L (ref 0–50)
ANION GAP SERPL CALCULATED.3IONS-SCNC: 6 MMOL/L (ref 3–14)
AST SERPL W P-5'-P-CCNC: 81 U/L (ref 0–45)
BILIRUB SERPL-MCNC: 0.7 MG/DL (ref 0.2–1.3)
BUN SERPL-MCNC: 1 MG/DL (ref 7–30)
CALCIUM SERPL-MCNC: 8.6 MG/DL (ref 8.5–10.1)
CHLORIDE BLD-SCNC: 102 MMOL/L (ref 94–109)
CO2 SERPL-SCNC: 26 MMOL/L (ref 20–32)
CREAT SERPL-MCNC: 0.32 MG/DL (ref 0.52–1.04)
GFR SERPL CREATININE-BSD FRML MDRD: >90 ML/MIN/1.73M2
GLUCOSE BLD-MCNC: 120 MG/DL (ref 70–99)
POTASSIUM BLD-SCNC: 3.6 MMOL/L (ref 3.4–5.3)
PROT SERPL-MCNC: 6.2 G/DL (ref 6.8–8.8)
SODIUM SERPL-SCNC: 134 MMOL/L (ref 133–144)

## 2021-10-26 PROCEDURE — 99233 SBSQ HOSP IP/OBS HIGH 50: CPT | Performed by: INTERNAL MEDICINE

## 2021-10-26 PROCEDURE — 250N000013 HC RX MED GY IP 250 OP 250 PS 637: Performed by: INTERNAL MEDICINE

## 2021-10-26 PROCEDURE — 258N000003 HC RX IP 258 OP 636: Performed by: INTERNAL MEDICINE

## 2021-10-26 PROCEDURE — 36415 COLL VENOUS BLD VENIPUNCTURE: CPT | Performed by: INTERNAL MEDICINE

## 2021-10-26 PROCEDURE — 82040 ASSAY OF SERUM ALBUMIN: CPT | Performed by: INTERNAL MEDICINE

## 2021-10-26 PROCEDURE — C9113 INJ PANTOPRAZOLE SODIUM, VIA: HCPCS | Performed by: INTERNAL MEDICINE

## 2021-10-26 PROCEDURE — 250N000011 HC RX IP 250 OP 636: Performed by: INTERNAL MEDICINE

## 2021-10-26 PROCEDURE — 120N000001 HC R&B MED SURG/OB

## 2021-10-26 PROCEDURE — 82247 BILIRUBIN TOTAL: CPT | Performed by: INTERNAL MEDICINE

## 2021-10-26 RX ORDER — DOCUSATE SODIUM 100 MG/1
100 CAPSULE, LIQUID FILLED ORAL 2 TIMES DAILY
Status: DISCONTINUED | OUTPATIENT
Start: 2021-10-26 | End: 2021-10-29 | Stop reason: HOSPADM

## 2021-10-26 RX ORDER — HYDROXYZINE HYDROCHLORIDE 25 MG/1
25 TABLET, FILM COATED ORAL EVERY 6 HOURS PRN
Status: DISCONTINUED | OUTPATIENT
Start: 2021-10-26 | End: 2021-10-29 | Stop reason: HOSPADM

## 2021-10-26 RX ORDER — HYDROXYZINE HYDROCHLORIDE 50 MG/1
50 TABLET, FILM COATED ORAL EVERY 6 HOURS PRN
Status: DISCONTINUED | OUTPATIENT
Start: 2021-10-26 | End: 2021-10-29 | Stop reason: HOSPADM

## 2021-10-26 RX ORDER — SUCRALFATE ORAL 1 G/10ML
1 SUSPENSION ORAL
Status: DISCONTINUED | OUTPATIENT
Start: 2021-10-26 | End: 2021-10-29 | Stop reason: HOSPADM

## 2021-10-26 RX ORDER — KETOROLAC TROMETHAMINE 15 MG/ML
15 INJECTION, SOLUTION INTRAMUSCULAR; INTRAVENOUS EVERY 6 HOURS PRN
Status: DISCONTINUED | OUTPATIENT
Start: 2021-10-26 | End: 2021-10-29 | Stop reason: HOSPADM

## 2021-10-26 RX ORDER — POLYETHYLENE GLYCOL 3350 17 G/17G
17 POWDER, FOR SOLUTION ORAL DAILY
Status: DISCONTINUED | OUTPATIENT
Start: 2021-10-26 | End: 2021-10-29 | Stop reason: HOSPADM

## 2021-10-26 RX ORDER — LIDOCAINE 4 G/G
1 PATCH TOPICAL
Status: DISCONTINUED | OUTPATIENT
Start: 2021-10-26 | End: 2021-10-29 | Stop reason: HOSPADM

## 2021-10-26 RX ORDER — POLYETHYLENE GLYCOL 3350 17 G/17G
17 POWDER, FOR SOLUTION ORAL 2 TIMES DAILY PRN
Status: DISCONTINUED | OUTPATIENT
Start: 2021-10-26 | End: 2021-10-29 | Stop reason: HOSPADM

## 2021-10-26 RX ADMIN — ESCITALOPRAM 5 MG: 5 TABLET, FILM COATED ORAL at 07:45

## 2021-10-26 RX ADMIN — POTASSIUM CHLORIDE, DEXTROSE MONOHYDRATE AND SODIUM CHLORIDE: 150; 5; 450 INJECTION, SOLUTION INTRAVENOUS at 20:17

## 2021-10-26 RX ADMIN — NICOTINE 1 PATCH: 21 PATCH, EXTENDED RELEASE TRANSDERMAL at 10:14

## 2021-10-26 RX ADMIN — DOCUSATE SODIUM 100 MG: 100 CAPSULE, LIQUID FILLED ORAL at 10:12

## 2021-10-26 RX ADMIN — GABAPENTIN 100 MG: 100 CAPSULE ORAL at 15:56

## 2021-10-26 RX ADMIN — LORAZEPAM 0.5 MG: 0.5 TABLET ORAL at 05:08

## 2021-10-26 RX ADMIN — HYDROMORPHONE HYDROCHLORIDE 0.2 MG: 0.2 INJECTION, SOLUTION INTRAMUSCULAR; INTRAVENOUS; SUBCUTANEOUS at 07:46

## 2021-10-26 RX ADMIN — KETOROLAC TROMETHAMINE 15 MG: 15 INJECTION, SOLUTION INTRAMUSCULAR; INTRAVENOUS at 17:15

## 2021-10-26 RX ADMIN — LIDOCAINE 1 PATCH: 246 PATCH TOPICAL at 20:14

## 2021-10-26 RX ADMIN — Medication 400 MG: at 10:14

## 2021-10-26 RX ADMIN — SUCRALFATE 1 G: 1 SUSPENSION ORAL at 15:57

## 2021-10-26 RX ADMIN — DOCUSATE SODIUM 100 MG: 100 CAPSULE, LIQUID FILLED ORAL at 20:15

## 2021-10-26 RX ADMIN — THIAMINE HCL TAB 100 MG 100 MG: 100 TAB at 10:13

## 2021-10-26 RX ADMIN — OXYCODONE HYDROCHLORIDE 5 MG: 5 TABLET ORAL at 21:34

## 2021-10-26 RX ADMIN — LORAZEPAM 0.5 MG: 0.5 TABLET ORAL at 10:20

## 2021-10-26 RX ADMIN — OXYCODONE HYDROCHLORIDE 5 MG: 5 TABLET ORAL at 14:52

## 2021-10-26 RX ADMIN — SUCRALFATE 1 G: 1 SUSPENSION ORAL at 21:34

## 2021-10-26 RX ADMIN — GABAPENTIN 300 MG: 300 CAPSULE ORAL at 07:45

## 2021-10-26 RX ADMIN — POTASSIUM CHLORIDE, DEXTROSE MONOHYDRATE AND SODIUM CHLORIDE: 150; 5; 450 INJECTION, SOLUTION INTRAVENOUS at 20:14

## 2021-10-26 RX ADMIN — PANTOPRAZOLE SODIUM 40 MG: 40 INJECTION, POWDER, FOR SOLUTION INTRAVENOUS at 10:09

## 2021-10-26 RX ADMIN — POTASSIUM CHLORIDE, DEXTROSE MONOHYDRATE AND SODIUM CHLORIDE: 150; 5; 450 INJECTION, SOLUTION INTRAVENOUS at 10:08

## 2021-10-26 RX ADMIN — MULTIPLE VITAMINS W/ MINERALS TAB 1 TABLET: TAB at 10:12

## 2021-10-26 RX ADMIN — FOLIC ACID 1 MG: 1 TABLET ORAL at 10:12

## 2021-10-26 RX ADMIN — HYDROMORPHONE HYDROCHLORIDE 0.4 MG: 0.2 INJECTION, SOLUTION INTRAMUSCULAR; INTRAVENOUS; SUBCUTANEOUS at 04:41

## 2021-10-26 RX ADMIN — POLYETHYLENE GLYCOL 3350 17 G: 17 POWDER, FOR SOLUTION ORAL at 10:13

## 2021-10-26 RX ADMIN — CETIRIZINE HYDROCHLORIDE 10 MG: 10 TABLET, FILM COATED ORAL at 10:12

## 2021-10-26 RX ADMIN — LORAZEPAM 0.5 MG: 0.5 TABLET ORAL at 21:34

## 2021-10-26 RX ADMIN — HYDROXYZINE HYDROCHLORIDE 25 MG: 25 TABLET, FILM COATED ORAL at 15:57

## 2021-10-26 ASSESSMENT — ACTIVITIES OF DAILY LIVING (ADL)
ADLS_ACUITY_SCORE: 9

## 2021-10-26 NOTE — PLAN OF CARE
End of Shift Summary  For vital signs and complete assessments, please see documentation flowsheets.     Pertinent assessments: A&O, dilaudid given for abdominal pain, also using heat pad, c/o anxiety due to stress at home, ativan given, CIWA score 2 and 1. Up independent in room.     Major Shift Events: Uneventful    Treatment Plan:  MALLORY, pain management    Bedside Nurse: Soraya CAT RN

## 2021-10-26 NOTE — PLAN OF CARE
To Do:  End of Shift Summary  For vital signs and complete assessments, please see documentation flowsheets.     Pertinent assessments: up in room and halls --- A&O--  co pain mid upper abd radiating to back   meds given per MAR--- tolerating small amounts of clear liquid -- no BM meds given voiding without problems   Major Shift Events  showered   Treatment Plan:  monitor  Bedside Nurse: Jeanie Hameed RN

## 2021-10-26 NOTE — PROGRESS NOTES
[facility]  Hospitalist Progress Note  Naga Landin MD 10/26/2021    Reason for Stay (Diagnosis): Alcohol abuse, pancreatitis.         Assessment and Plan:      Summary of Stay: Audelia Comer is a 56 year old female with past medical history including alcohol abuse with prior alcoholic pancreatitis, breast cancer with history of bilateral mastectomy and reconstruction in August 2020 felt to have been curative though not currently yet started on any adjuvant treatments, extensive recent psychosocial stressors admitted on 10/24/2021 with abdominal pain and vomiting found to have suspected recurrent alcohol induced pancreatitis and gastritis as well as severe malnutrition and anion gap metabolic acidosis felt to be ketotic/alcohol induced.    She was admitted for symptom management and IV hydration.    Problem List/Assessment and Plan:   1. Acute recurrent alcohol induced pancreatitis and gastritis:   --Continue IV hydration, symptom control with narcotics as needed today, reassess tomorrow.    --Still requesting dilaudid frequently for pain control, encouraging non-narcotics/PO options  Adding prn toradol, hydroxyzine, scheduled carafate    2.   Severe protein calorie malnutrition, starvation ketosis, alcohol-induced ketosis: Monitor for evidence of refeeding syndrome.  Continue IV hydration, needs to abstain from alcohol.    3.   Alcohol abuse, possible withdrawal: Anticipate CD consultation when clinically appropriate.  Mild withdrawal seems resolved.  --Discontinued CIWA protocol     4.   History of breast cancer status post bilateral mastectomy and reconstruction in August 2020: Follows in the [facility] system.  Needs to follow-up for a bone scan prior to starting anastrozole.    5.   Incidental liver finding: Radiology recommending CT follow-up of a hypoechoic region in the liver noted on ultrasound.    6.  Severe hypokalemia: improving.  Recheck.  Monitor for refeeding  "syndrome.    DVT Prophylaxis: Pneumatic Compression Devices  Code Status: Full Code  Discharge Dispo/Date: Likely 1-3 more days then to home.        Interval History (Subjective):      Stopping ciwa, primary symptoms are anxiety and abd pain  Still requesting dilaudid frequently for pain control, encouraging non-narcotics/PO options  Adding prn toradol, hydroxyzine, scheduled carafate                  Physical Exam:      Last Vital Signs:  BP (!) 134/90 (BP Location: Right arm)   Pulse 97   Temp 99.1  F (37.3  C) (Oral)   Resp 16   Ht 1.676 m (5' 6\")   Wt 49.6 kg (109 lb 6 oz)   SpO2 98%   BMI 17.65 kg/m      I/O last 3 completed shifts:  In: 480 [P.O.:480]  Out: -     General: Alert, awake, no acute distress.  HEENT: NC/AT, eyes anicteric, external occular movements intact, face symmetric.    Cardiac: RRR, S1, S2.    Pulmonary: Normal chest rise, normal work of breathing.  Lungs CTA BL  Abdomen: ttp in epigastrium and luq.  Otherwise soft, non-tender, non-distended.  Bowel Sounds Present.  No guarding.  Extremities: no deformities.  Warm, well perfused.  Skin: no rashes or lesions noted.  Warm and Dry.  Neuro: No focal deficits noted.  Speech clear.  Coordination and strength grossly normal.  Psych: Appropriate affect.         Medications:      All current medications were reviewed with changes reflected in problem list.         Data:      All new lab and imaging data was reviewed.   Labs:  Recent Labs   Lab 10/26/21  0714      POTASSIUM 3.6   CHLORIDE 102   CO2 26   ANIONGAP 6   *   BUN 1*   CR 0.32*   GFRESTIMATED >90   FRANCOISE 8.6     Recent Labs   Lab 10/25/21  0805   WBC 11.6*   HGB 12.5   HCT 36.0   MCV 98         Imaging:   Recent Results (from the past 48 hour(s))   Abdomen US, limited (RUQ only)    Narrative    EXAM: US ABDOMEN LIMITED  LOCATION: [facility]  DATE/TIME: 10/24/2021 2:19 PM    INDICATION: pancreatitis, abd pain, eval biliary tree  COMPARISON: " Ultrasound 1/18/2021  TECHNIQUE: Limited abdominal ultrasound.    FINDINGS:    GALLBLADDER: Normal. No gallstones, wall thickening, or pericholecystic fluid. Negative sonographic Ribera's sign.    BILE DUCTS: No biliary dilatation. The common duct measures 4 mm.    LIVER: Persistent diffusely coarsened echotexture. Mildly enlarged with a normal surface contour. Geographic hyperechoic area in the right lobe measuring 9.4 cm. No focal mass.    RIGHT KIDNEY: No hydronephrosis.    PANCREAS: Slightly heterogeneous. No epigastric fluid collection.    No ascites.      Impression    IMPRESSION:  1.  Slightly heterogeneous pancreas likely correlating with known pancreatitis. No perinephric fluid collection.  2.  Mildly enlarged fatty liver.  3.  Geographic 9.4 cm hypoechoic area in the anterior right hepatic lobe, likely related to fatty change. As a precaution, recommend attention on short-term follow-up versus nonemergent contrast-enhanced CT correlation.  4.  No cholelithiasis or biliary ductal dilatation.               Naga Landin MD , MD.

## 2021-10-27 PROCEDURE — 250N000013 HC RX MED GY IP 250 OP 250 PS 637: Performed by: INTERNAL MEDICINE

## 2021-10-27 PROCEDURE — 99233 SBSQ HOSP IP/OBS HIGH 50: CPT | Performed by: INTERNAL MEDICINE

## 2021-10-27 PROCEDURE — 250N000011 HC RX IP 250 OP 636: Performed by: INTERNAL MEDICINE

## 2021-10-27 PROCEDURE — 120N000001 HC R&B MED SURG/OB

## 2021-10-27 RX ORDER — OXYCODONE HYDROCHLORIDE 5 MG/1
5-10 TABLET ORAL EVERY 4 HOURS PRN
Status: DISCONTINUED | OUTPATIENT
Start: 2021-10-27 | End: 2021-10-29 | Stop reason: HOSPADM

## 2021-10-27 RX ORDER — SENNOSIDES 8.6 MG
1 TABLET ORAL DAILY
Status: DISCONTINUED | OUTPATIENT
Start: 2021-10-27 | End: 2021-10-29 | Stop reason: HOSPADM

## 2021-10-27 RX ADMIN — ESCITALOPRAM 5 MG: 5 TABLET, FILM COATED ORAL at 08:10

## 2021-10-27 RX ADMIN — SUCRALFATE 1 G: 1 SUSPENSION ORAL at 12:17

## 2021-10-27 RX ADMIN — HYDROXYZINE HYDROCHLORIDE 50 MG: 50 TABLET, FILM COATED ORAL at 00:24

## 2021-10-27 RX ADMIN — SUCRALFATE 1 G: 1 SUSPENSION ORAL at 21:01

## 2021-10-27 RX ADMIN — LORAZEPAM 0.5 MG: 0.5 TABLET ORAL at 08:07

## 2021-10-27 RX ADMIN — FOLIC ACID 1 MG: 1 TABLET ORAL at 08:10

## 2021-10-27 RX ADMIN — LIDOCAINE 1 PATCH: 246 PATCH TOPICAL at 21:02

## 2021-10-27 RX ADMIN — POLYETHYLENE GLYCOL 3350 17 G: 17 POWDER, FOR SOLUTION ORAL at 08:08

## 2021-10-27 RX ADMIN — SUCRALFATE 1 G: 1 SUSPENSION ORAL at 15:54

## 2021-10-27 RX ADMIN — GABAPENTIN 100 MG: 100 CAPSULE ORAL at 00:19

## 2021-10-27 RX ADMIN — DOCUSATE SODIUM 100 MG: 100 CAPSULE, LIQUID FILLED ORAL at 21:02

## 2021-10-27 RX ADMIN — HYDROXYZINE HYDROCHLORIDE 50 MG: 50 TABLET, FILM COATED ORAL at 21:05

## 2021-10-27 RX ADMIN — OXYCODONE HYDROCHLORIDE 10 MG: 5 TABLET ORAL at 15:54

## 2021-10-27 RX ADMIN — THIAMINE HCL TAB 100 MG 100 MG: 100 TAB at 08:10

## 2021-10-27 RX ADMIN — STANDARDIZED SENNA CONCENTRATE 1 TABLET: 8.6 TABLET ORAL at 12:16

## 2021-10-27 RX ADMIN — CETIRIZINE HYDROCHLORIDE 10 MG: 10 TABLET, FILM COATED ORAL at 08:10

## 2021-10-27 RX ADMIN — SUCRALFATE 1 G: 1 SUSPENSION ORAL at 08:09

## 2021-10-27 RX ADMIN — MULTIPLE VITAMINS W/ MINERALS TAB 1 TABLET: TAB at 08:11

## 2021-10-27 RX ADMIN — DOCUSATE SODIUM 100 MG: 100 CAPSULE, LIQUID FILLED ORAL at 08:10

## 2021-10-27 RX ADMIN — OXYCODONE HYDROCHLORIDE 10 MG: 5 TABLET ORAL at 12:16

## 2021-10-27 RX ADMIN — OXYCODONE HYDROCHLORIDE 5 MG: 5 TABLET ORAL at 03:52

## 2021-10-27 RX ADMIN — GABAPENTIN 100 MG: 100 CAPSULE ORAL at 08:10

## 2021-10-27 RX ADMIN — GABAPENTIN 100 MG: 100 CAPSULE ORAL at 15:53

## 2021-10-27 RX ADMIN — OXYCODONE HYDROCHLORIDE 10 MG: 5 TABLET ORAL at 21:02

## 2021-10-27 RX ADMIN — KETOROLAC TROMETHAMINE 15 MG: 15 INJECTION, SOLUTION INTRAMUSCULAR; INTRAVENOUS at 00:24

## 2021-10-27 ASSESSMENT — ACTIVITIES OF DAILY LIVING (ADL)
ADLS_ACUITY_SCORE: 9

## 2021-10-27 ASSESSMENT — MIFFLIN-ST. JEOR: SCORE: 1085.27

## 2021-10-27 NOTE — PLAN OF CARE
End of Shift Summary  For vital signs and complete assessments, please see documentation flowsheets.     Pertinent assessments: Pt C/O left side abdominal pain, multiple meds given with some relief, heat applied. Up independent in room. IV failed, MD discontinued IVF, pt tolerating po well.      Major Shift Events : Uneventful    Treatment Plan: Pain management.     Bedside Nurse: Soraya GOSS

## 2021-10-27 NOTE — PLAN OF CARE
To Do:  End of Shift Summary  For vital signs and complete assessments, please see documentation flowsheets.     Pertinent assessments:  A&O-- Up in room --- co pain in middle abd radiating to back -- medicated per MAR-- tolerating clear liquids with co of increased nausea   Major Shift Events rested well  Treatment Plan:  encourage activity  Bedside Nurse: Jeanie Hameed RN

## 2021-10-27 NOTE — PROGRESS NOTES
SPIRITUAL HEALTH SERVICES Progress Note  [facility] Med. Surg. 5    Saw pt Audelia for an emotional support check-in.  Provided contact information for [employer].  Audelia reported having physical pain and confirmed that her RN is aware of the pain.  She denied having any concerns that she wanted to talk about but welcomed prayer.      Plan: Pt anticipated that she might discharge in a day or two.  Reviewed how she can request further  support.  [facility] remains available per pt's request.      Neal Willoughby M.Div., [facility]  Staff   Phone [phone]

## 2021-10-27 NOTE — PROGRESS NOTES
[facility]  Hospitalist Progress Note  Naga Landin MD 10/27/2021    Reason for Stay (Diagnosis): Alcohol abuse, pancreatitis.         Assessment and Plan:      Summary of Stay: Audelia Comer is a 56 year old female with past medical history including alcohol abuse with prior alcoholic pancreatitis, breast cancer with history of bilateral mastectomy and reconstruction in August 2020 felt to have been curative though not currently yet started on any adjuvant treatments, extensive recent psychosocial stressors admitted on 10/24/2021 with abdominal pain and vomiting found to have suspected recurrent alcohol induced pancreatitis and gastritis as well as severe malnutrition and anion gap metabolic acidosis felt to be ketotic/alcohol induced.    She was admitted for symptom management and IV hydration.    At this point she outwardly appears to be improving.  IV access was lost and she prefers not to have this replaced.  Will need to monitor oral intake, encourage hydration and transitioned to PO medications.    Now gradually advancing her diet.    Problem List/Assessment and Plan:   1. Acute recurrent alcohol induced pancreatitis and gastritis:   --Continue oral hydration, symptom control with PO narcotics as needed today, reassess tomorrow.    --encouraging non-narcotics/PO options  --Added hydroxyzine, scheduled carafate    2.   Severe protein calorie malnutrition, starvation ketosis, alcohol-induced ketosis: Monitor for evidence of refeeding syndrome.  --monitor electrolytes  --dietician consult    3.   Alcohol abuse, possible withdrawal: Anticipate CD consultation when clinically appropriate.  Mild withdrawal seems resolved.  --Discontinued CIWA protocol     4.   History of breast cancer status post bilateral mastectomy and reconstruction in August 2020: Follows in the [facility] system.  Needs to follow-up for a bone scan prior to starting anastrozole.    5.   Incidental liver  "finding: Radiology recommending CT follow-up of a hypoechoic region in the liver noted on ultrasound.    6.  Severe hypokalemia: improving.  Recheck.  Monitor for refeeding syndrome.    7.  Hypomagnesemia: due to poor oral intake.  Replacing.      8 numerous psychosocial stressors including the loss of her daughter in the last year, loss of her job etc.  --Recommended that she establish care with a psychologist in the outpatient setting.    DVT Prophylaxis: Pneumatic Compression Devices  Code Status: Full Code  Discharge Dispo/Date: Likely 1-3 more days then to home.        Interval History (Subjective):      PO intake improving, stopped IV dilaudid.  Continue to encourage non-narcotic therapies  At this point she outwardly appears to be improving.  IV access was lost and she prefers not to have this replaced.  Will need to monitor oral intake, encourage hydration and transitioned to PO medications.  Increasing bowel regimen as constipation is playing a role in her pain complex.                  Physical Exam:      Last Vital Signs:  /84   Pulse 101   Temp 99  F (37.2  C) (Oral)   Resp 20   Ht 1.676 m (5' 6\")   Wt 48.9 kg (107 lb 11.2 oz)   SpO2 98%   BMI 17.38 kg/m      I/O last 3 completed shifts:  In: 1300 [P.O.:300; I.V.:1000]  Out: -     General: Alert, awake, looks relatively comfortable and calm.  HEENT: NC/AT, eyes anicteric, external occular movements intact, face symmetric.    Cardiac: RRR, S1, S2.    Pulmonary: Normal chest rise, normal work of breathing.  Lungs CTA BL  Abdomen: ttp in epigastrium and luq.  Otherwise soft, non-tender, non-distended.  Bowel Sounds Present.  No guarding.  Extremities: no deformities.  Warm, well perfused.  Skin: no rashes or lesions noted.  Warm and Dry.  Neuro: No focal deficits noted.  Speech clear.  Coordination and strength grossly normal.  Psych: Appropriate affect.         Medications:      All current medications were reviewed with changes reflected in " problem list.         Data:      All new lab and imaging data was reviewed.   Labs:  Recent Labs   Lab 10/26/21  0714      POTASSIUM 3.6   CHLORIDE 102   CO2 26   ANIONGAP 6   *   BUN 1*   CR 0.32*   GFRESTIMATED >90   FRANCOISE 8.6     Recent Labs   Lab 10/25/21  0805   WBC 11.6*   HGB 12.5   HCT 36.0   MCV 98         Imaging:   Recent Results (from the past 48 hour(s))   Abdomen US, limited (RUQ only)    Narrative    EXAM: US ABDOMEN LIMITED  LOCATION: [facility]  DATE/TIME: 10/24/2021 2:19 PM    INDICATION: pancreatitis, abd pain, eval biliary tree  COMPARISON: Ultrasound 1/18/2021  TECHNIQUE: Limited abdominal ultrasound.    FINDINGS:    GALLBLADDER: Normal. No gallstones, wall thickening, or pericholecystic fluid. Negative sonographic Ribera's sign.    BILE DUCTS: No biliary dilatation. The common duct measures 4 mm.    LIVER: Persistent diffusely coarsened echotexture. Mildly enlarged with a normal surface contour. Geographic hyperechoic area in the right lobe measuring 9.4 cm. No focal mass.    RIGHT KIDNEY: No hydronephrosis.    PANCREAS: Slightly heterogeneous. No epigastric fluid collection.    No ascites.      Impression    IMPRESSION:  1.  Slightly heterogeneous pancreas likely correlating with known pancreatitis. No perinephric fluid collection.  2.  Mildly enlarged fatty liver.  3.  Geographic 9.4 cm hypoechoic area in the anterior right hepatic lobe, likely related to fatty change. As a precaution, recommend attention on short-term follow-up versus nonemergent contrast-enhanced CT correlation.  4.  No cholelithiasis or biliary ductal dilatation.               Naga Landin MD , MD.

## 2021-10-28 ENCOUNTER — APPOINTMENT (OUTPATIENT)
Dept: CT IMAGING | Facility: CLINIC | Age: 56
End: 2021-10-28
Attending: INTERNAL MEDICINE
Payer: COMMERCIAL

## 2021-10-28 PROCEDURE — 99232 SBSQ HOSP IP/OBS MODERATE 35: CPT | Performed by: INTERNAL MEDICINE

## 2021-10-28 PROCEDURE — 250N000011 HC RX IP 250 OP 636: Performed by: INTERNAL MEDICINE

## 2021-10-28 PROCEDURE — 250N000013 HC RX MED GY IP 250 OP 250 PS 637: Performed by: INTERNAL MEDICINE

## 2021-10-28 PROCEDURE — 74178 CT ABD&PLV WO CNTR FLWD CNTR: CPT

## 2021-10-28 PROCEDURE — 120N000001 HC R&B MED SURG/OB

## 2021-10-28 PROCEDURE — 250N000009 HC RX 250: Performed by: INTERNAL MEDICINE

## 2021-10-28 RX ORDER — IOPAMIDOL 755 MG/ML
500 INJECTION, SOLUTION INTRAVASCULAR ONCE
Status: COMPLETED | OUTPATIENT
Start: 2021-10-28 | End: 2021-10-28

## 2021-10-28 RX ORDER — PANTOPRAZOLE SODIUM 40 MG/1
40 TABLET, DELAYED RELEASE ORAL
Status: DISCONTINUED | OUTPATIENT
Start: 2021-10-28 | End: 2021-10-29 | Stop reason: HOSPADM

## 2021-10-28 RX ADMIN — FOLIC ACID 1 MG: 1 TABLET ORAL at 09:20

## 2021-10-28 RX ADMIN — OXYCODONE HYDROCHLORIDE 10 MG: 5 TABLET ORAL at 09:31

## 2021-10-28 RX ADMIN — SUCRALFATE 1 G: 1 SUSPENSION ORAL at 11:20

## 2021-10-28 RX ADMIN — SUCRALFATE 1 G: 1 SUSPENSION ORAL at 09:18

## 2021-10-28 RX ADMIN — OXYCODONE HYDROCHLORIDE 10 MG: 5 TABLET ORAL at 19:43

## 2021-10-28 RX ADMIN — SUCRALFATE 1 G: 1 SUSPENSION ORAL at 21:40

## 2021-10-28 RX ADMIN — DOCUSATE SODIUM 100 MG: 100 CAPSULE, LIQUID FILLED ORAL at 20:05

## 2021-10-28 RX ADMIN — SODIUM CHLORIDE 59 ML: 9 INJECTION, SOLUTION INTRAVENOUS at 18:22

## 2021-10-28 RX ADMIN — STANDARDIZED SENNA CONCENTRATE 1 TABLET: 8.6 TABLET ORAL at 09:20

## 2021-10-28 RX ADMIN — CETIRIZINE HYDROCHLORIDE 10 MG: 10 TABLET, FILM COATED ORAL at 09:19

## 2021-10-28 RX ADMIN — NICOTINE 1 PATCH: 21 PATCH, EXTENDED RELEASE TRANSDERMAL at 09:23

## 2021-10-28 RX ADMIN — MULTIPLE VITAMINS W/ MINERALS TAB 1 TABLET: TAB at 09:19

## 2021-10-28 RX ADMIN — GABAPENTIN 100 MG: 100 CAPSULE ORAL at 17:18

## 2021-10-28 RX ADMIN — LORAZEPAM 0.5 MG: 0.5 TABLET ORAL at 17:18

## 2021-10-28 RX ADMIN — LIDOCAINE 1 PATCH: 246 PATCH TOPICAL at 20:06

## 2021-10-28 RX ADMIN — POLYETHYLENE GLYCOL 3350 17 G: 17 POWDER, FOR SOLUTION ORAL at 09:20

## 2021-10-28 RX ADMIN — OXYCODONE HYDROCHLORIDE 10 MG: 5 TABLET ORAL at 01:15

## 2021-10-28 RX ADMIN — HYDROXYZINE HYDROCHLORIDE 25 MG: 25 TABLET, FILM COATED ORAL at 05:20

## 2021-10-28 RX ADMIN — ESCITALOPRAM 5 MG: 5 TABLET, FILM COATED ORAL at 09:20

## 2021-10-28 RX ADMIN — DOCUSATE SODIUM 100 MG: 100 CAPSULE, LIQUID FILLED ORAL at 09:19

## 2021-10-28 RX ADMIN — OXYCODONE HYDROCHLORIDE 10 MG: 5 TABLET ORAL at 14:46

## 2021-10-28 RX ADMIN — SUCRALFATE 1 G: 1 SUSPENSION ORAL at 17:19

## 2021-10-28 RX ADMIN — THIAMINE HCL TAB 100 MG 100 MG: 100 TAB at 09:20

## 2021-10-28 RX ADMIN — PANTOPRAZOLE SODIUM 40 MG: 40 TABLET, DELAYED RELEASE ORAL at 11:20

## 2021-10-28 RX ADMIN — HYDROXYZINE HYDROCHLORIDE 50 MG: 50 TABLET, FILM COATED ORAL at 20:18

## 2021-10-28 RX ADMIN — HYDROXYZINE HYDROCHLORIDE 50 MG: 50 TABLET, FILM COATED ORAL at 12:30

## 2021-10-28 RX ADMIN — OXYCODONE HYDROCHLORIDE 10 MG: 5 TABLET ORAL at 05:20

## 2021-10-28 RX ADMIN — ONDANSETRON 4 MG: 4 TABLET, ORALLY DISINTEGRATING ORAL at 15:35

## 2021-10-28 RX ADMIN — IOPAMIDOL 55 ML: 755 INJECTION, SOLUTION INTRAVENOUS at 18:22

## 2021-10-28 RX ADMIN — GABAPENTIN 100 MG: 100 CAPSULE ORAL at 00:15

## 2021-10-28 RX ADMIN — GABAPENTIN 100 MG: 100 CAPSULE ORAL at 09:19

## 2021-10-28 ASSESSMENT — ACTIVITIES OF DAILY LIVING (ADL)
ADLS_ACUITY_SCORE: 9

## 2021-10-28 ASSESSMENT — MIFFLIN-ST. JEOR: SCORE: 1100.24

## 2021-10-28 NOTE — PLAN OF CARE
End of Shift Summary  For vital signs and complete assessments, please see documentation flowsheets.     Pertinent assessments:  A&O, abdominal pain managed with po oxycodone, atarax given for C/O anxiety. Ambulated in halls independently.     Major Shift Events: Uneventful    Treatment Plan:  encourage activity    Bedside Nurse: Soraya CAT RN

## 2021-10-28 NOTE — PROGRESS NOTES
[facility]    Medicine Progress Note - Hospitalist Service       Date of Admission:  10/24/2021    Assessment & Plan           Audelia Comer is a 56 year old female with past medical history including alcohol abuse with prior alcoholic pancreatitis, breast cancer with history of bilateral mastectomy and reconstruction in August 2020 felt to have been curative though not currently yet started on any adjuvant treatments, extensive recent psychosocial stressors admitted on 10/24/2021 with abdominal pain and vomiting found to have suspected recurrent alcohol induced pancreatitis and gastritis as well as severe malnutrition and anion gap metabolic acidosis felt to be ketotic/alcohol induced.     She was admitted for symptom management and IV hydration.    1.  Acute pancreatitis.  Symptoms slowly improving.  Did advance diet to low-fat earlier today.  She did not tolerate her first low-fat diet very well.  -Continue low-fat diet.  -Pain medications as needed.  -Needs long-term alcohol cessation.    2.  Severe malnutrition.  -Registered dietitian following.  -Advance diet to low-fat diet.  -Continue diet supplements.    3.  Alcohol dependence with ongoing abuse.  -Needs long-term alcohol cessation.  -Continue multivitamin, folic acid, thiamine.    4.  Depression.    -Continue Lexapro.  -Can follow-up with psychiatrist in the outpatient setting.    5.  GERD.  -Pantoprazole 40 mg a day.    6.  Hypokalemia.  Potassium replacement protocol.    7.  Hypomagnesemia.  Magnesium replacement protocol.    8.  Hypophosphatemia.  Phosphorus replacement protocol.    9.  Incidental liver finding.  Hypoechoic region noted on ultrasound.  -Check abdominal CT.    10.  Hyponatremia.  Resolved.     Diet: Snacks/Supplements Pediatric: Ensure Enlive; With Meals  Low Fat Diet    DVT Prophylaxis: Pneumatic Compression Devices  Patino Catheter: Not present  Central Lines: None  Code Status: Full Code          Angel CHARLES  DO Adithya  Hospitalist Service  [facility]  Securely message with the [employer] Web Console (learn more here)  Text page via [email or web address] Paging/Directory        Clinically Significant Risk Factors Present on Admission                ______________________________________________________________________    Interval History   Continues having some abdominal pain.  Improved from previous.  Some nausea.  Denies chest pain, shortness of breath, fevers, chills.  Did have a loose bowel movement this morning.    Data reviewed today: I reviewed all medications, new labs and imaging results over the last 24 hours.    Physical Exam   Vital Signs: Temp: 98.4  F (36.9  C) Temp src: Oral BP: 112/79 Pulse: 105   Resp: 16 SpO2: 97 % O2 Device: None (Room air)    Weight: 111 lbs 0 oz  Gen:  NAD, A&Ox3.  Eyes:  PERRL, sclera anicteric.  OP:  MMM, no lesions.  Neck:  Supple.  CV:  Regular, no murmurs.  Lung:  CTA b/l, normal effort.  Ab:  +BS, soft.  Skin:  Warm, dry to touch.  No rash.  Ext:  No pitting edema LE b/l.      Data   Recent Labs   Lab 10/26/21  0714 10/25/21  0805 10/25/21  0211 10/24/21  1635 10/24/21  1231 10/24/21  1231   WBC  --  11.6*  --   --   --  11.0   HGB  --  12.5  --   --   --  12.2   MCV  --  98  --   --   --  96   PLT  --  156  --   --   --  162    130*  --   --   --  133   POTASSIUM 3.6 3.7 3.2*   < >  --  2.4*   CHLORIDE 102 96  --   --   --  100   CO2 26 25  --   --   --  19*   BUN 1* 1*  --   --   --  3*   CR 0.32* 0.39*  --   --   --  0.39*   ANIONGAP 6 9  --   --   --  14   FRANCOISE 8.6 8.4*  --   --   --  7.1*   * 101*  --   --   --  63*   ALBUMIN 2.7* 3.2*  --   --    < > 2.8*   PROTTOTAL 6.2* 6.8  --   --    < > 5.9*   BILITOTAL 0.7 0.9  --   --    < > 0.6   ALKPHOS 163* 164*  --   --    < > 156*   ALT 50 51*  --   --    < > 46   AST 81* 53*  --   --    < > 57*   LIPASE  --   --   --   --   --  1,015*   TROPONIN  --   --   --   --   --  <0.015    < > = values in this  interval not displayed.

## 2021-10-29 VITALS
RESPIRATION RATE: 12 BRPM | BODY MASS INDEX: 16.95 KG/M2 | DIASTOLIC BLOOD PRESSURE: 78 MMHG | HEIGHT: 66 IN | HEART RATE: 92 BPM | TEMPERATURE: 98.2 F | SYSTOLIC BLOOD PRESSURE: 109 MMHG | WEIGHT: 105.5 LBS | OXYGEN SATURATION: 97 %

## 2021-10-29 LAB
ALBUMIN SERPL-MCNC: 3 G/DL (ref 3.4–5)
ALP SERPL-CCNC: 163 U/L (ref 40–150)
ALT SERPL W P-5'-P-CCNC: 132 U/L (ref 0–50)
ANION GAP SERPL CALCULATED.3IONS-SCNC: 7 MMOL/L (ref 3–14)
AST SERPL W P-5'-P-CCNC: 180 U/L (ref 0–45)
BILIRUB SERPL-MCNC: 0.4 MG/DL (ref 0.2–1.3)
BUN SERPL-MCNC: 3 MG/DL (ref 7–30)
CALCIUM SERPL-MCNC: 9.1 MG/DL (ref 8.5–10.1)
CHLORIDE BLD-SCNC: 98 MMOL/L (ref 94–109)
CO2 SERPL-SCNC: 28 MMOL/L (ref 20–32)
CREAT SERPL-MCNC: 0.55 MG/DL (ref 0.52–1.04)
GFR SERPL CREATININE-BSD FRML MDRD: >90 ML/MIN/1.73M2
GLUCOSE BLD-MCNC: 127 MG/DL (ref 70–99)
LIPASE SERPL-CCNC: 217 U/L (ref 73–393)
MAGNESIUM SERPL-MCNC: 1.7 MG/DL (ref 1.6–2.3)
PHOSPHATE SERPL-MCNC: 2.1 MG/DL (ref 2.5–4.5)
POTASSIUM BLD-SCNC: 3.2 MMOL/L (ref 3.4–5.3)
PROT SERPL-MCNC: 7 G/DL (ref 6.8–8.8)
SODIUM SERPL-SCNC: 133 MMOL/L (ref 133–144)

## 2021-10-29 PROCEDURE — 250N000013 HC RX MED GY IP 250 OP 250 PS 637: Performed by: INTERNAL MEDICINE

## 2021-10-29 PROCEDURE — 250N000009 HC RX 250: Performed by: INTERNAL MEDICINE

## 2021-10-29 PROCEDURE — 99239 HOSP IP/OBS DSCHRG MGMT >30: CPT | Performed by: INTERNAL MEDICINE

## 2021-10-29 PROCEDURE — 36415 COLL VENOUS BLD VENIPUNCTURE: CPT | Performed by: INTERNAL MEDICINE

## 2021-10-29 PROCEDURE — 84100 ASSAY OF PHOSPHORUS: CPT | Performed by: INTERNAL MEDICINE

## 2021-10-29 PROCEDURE — 83735 ASSAY OF MAGNESIUM: CPT | Performed by: INTERNAL MEDICINE

## 2021-10-29 PROCEDURE — 258N000003 HC RX IP 258 OP 636: Performed by: INTERNAL MEDICINE

## 2021-10-29 PROCEDURE — 83690 ASSAY OF LIPASE: CPT | Performed by: INTERNAL MEDICINE

## 2021-10-29 PROCEDURE — 84075 ASSAY ALKALINE PHOSPHATASE: CPT | Performed by: INTERNAL MEDICINE

## 2021-10-29 RX ORDER — POTASSIUM CHLORIDE 1500 MG/1
20 TABLET, EXTENDED RELEASE ORAL ONCE
Status: COMPLETED | OUTPATIENT
Start: 2021-10-29 | End: 2021-10-29

## 2021-10-29 RX ORDER — ESCITALOPRAM OXALATE 5 MG/1
5 TABLET ORAL DAILY
Qty: 30 TABLET | Refills: 1 | Status: ON HOLD | OUTPATIENT
Start: 2021-10-30 | End: 2022-06-28

## 2021-10-29 RX ADMIN — PANTOPRAZOLE SODIUM 40 MG: 40 TABLET, DELAYED RELEASE ORAL at 07:00

## 2021-10-29 RX ADMIN — CETIRIZINE HYDROCHLORIDE 10 MG: 10 TABLET, FILM COATED ORAL at 09:19

## 2021-10-29 RX ADMIN — LORAZEPAM 0.5 MG: 0.5 TABLET ORAL at 04:53

## 2021-10-29 RX ADMIN — MULTIPLE VITAMINS W/ MINERALS TAB 1 TABLET: TAB at 09:19

## 2021-10-29 RX ADMIN — STANDARDIZED SENNA CONCENTRATE 1 TABLET: 8.6 TABLET ORAL at 09:18

## 2021-10-29 RX ADMIN — GABAPENTIN 100 MG: 100 CAPSULE ORAL at 00:13

## 2021-10-29 RX ADMIN — OXYCODONE HYDROCHLORIDE 10 MG: 5 TABLET ORAL at 00:15

## 2021-10-29 RX ADMIN — SUCRALFATE 1 G: 1 SUSPENSION ORAL at 09:18

## 2021-10-29 RX ADMIN — GABAPENTIN 100 MG: 100 CAPSULE ORAL at 09:18

## 2021-10-29 RX ADMIN — OXYCODONE HYDROCHLORIDE 10 MG: 5 TABLET ORAL at 04:18

## 2021-10-29 RX ADMIN — OXYCODONE HYDROCHLORIDE 10 MG: 5 TABLET ORAL at 09:18

## 2021-10-29 RX ADMIN — POTASSIUM CHLORIDE 20 MEQ: 1500 TABLET, EXTENDED RELEASE ORAL at 11:41

## 2021-10-29 RX ADMIN — OXYCODONE HYDROCHLORIDE 10 MG: 5 TABLET ORAL at 13:57

## 2021-10-29 RX ADMIN — SODIUM PHOSPHATE, MONOBASIC, MONOHYDRATE 9 MMOL: 276; 142 INJECTION, SOLUTION INTRAVENOUS at 11:40

## 2021-10-29 RX ADMIN — SUCRALFATE 1 G: 1 SUSPENSION ORAL at 11:41

## 2021-10-29 RX ADMIN — NICOTINE 1 PATCH: 21 PATCH, EXTENDED RELEASE TRANSDERMAL at 09:20

## 2021-10-29 RX ADMIN — FOLIC ACID 1 MG: 1 TABLET ORAL at 09:19

## 2021-10-29 RX ADMIN — DOCUSATE SODIUM 100 MG: 100 CAPSULE, LIQUID FILLED ORAL at 09:18

## 2021-10-29 RX ADMIN — ESCITALOPRAM 5 MG: 5 TABLET, FILM COATED ORAL at 09:19

## 2021-10-29 ASSESSMENT — ACTIVITIES OF DAILY LIVING (ADL)
ADLS_ACUITY_SCORE: 9

## 2021-10-29 ASSESSMENT — MIFFLIN-ST. JEOR: SCORE: 1075.29

## 2021-10-29 NOTE — PLAN OF CARE
End of Shift Summary  For vital signs and complete assessments, please see documentation flowsheets.     Pertinent assessments: Reports LUQ pain & L lower back pain, oxy given. Reports feeling anxious, atrax & ativan given. Denies nausea. BS hypo, reports small stool, bowel med given. Up independently.     Treatment Plan: Symptom management. Encourage activity

## 2021-10-29 NOTE — PROGRESS NOTES
Pt to D/C to home.  Pt provided with d/c instructions, including new medications, when medications were last given, and when to take them again.  Pt also informed to f/u with PCP in 7 days as well as GI.  Pt verbalized understanding of all d/c and f/u instructions.  All questions were answered at this time.  Copy of paperwork sent with pt. Friend to provide transport.  All personal belongings sent with pt.

## 2021-10-29 NOTE — DISCHARGE SUMMARY
[facility]    Discharge Summary  Hospitalist    Date of Admission:  10/24/2021  Date of Discharge:  10/29/2021  Discharging Provider: Hong Benitez MD  Date of Service (when I saw the patient): 10/29/21    Discharge Diagnoses     Recurrent pancreatitis.  Likely alcoholic in nature.  Possible small cyst in the pancreatic head.  IPMN not ruled out.  Outpatient GI follow-up.  Severe malnutrition.  Alcohol use disorder.  Major depression.  Anxiety.  History of breast cancer, status post bilateral mastectomy and reconstruction in 2020.  Incidental hypodensity in the liver, outpatient follow-up.  Electrolyte abnormalities, in the setting of malnutrition.      History of Present Illness     Audelia Comer is a 56 year old female with past medical history including alcohol abuse with prior alcoholic pancreatitis, breast cancer with history of bilateral mastectomy and reconstruction in August 2020 felt to have been curative though not currently yet started on any adjuvant treatments, extensive recent psychosocial stressors admitted on 10/24/2021 with abdominal pain and vomiting found to have suspected recurrent alcohol induced pancreatitis and gastritis as well as severe malnutrition and anion gap metabolic acidosis felt to be ketotic/alcohol induced.     She was admitted for symptom management and IV hydration.    Hospital Course     Acute alcoholic pancreatitis.  Treated conservatively with IV fluids, pain meds, n.p.o. status.  Improving every day.  Since yesterday, tolerating a low-fat diet.  Denies any significant active pain.  Eager to go home.    I discussed with her at length about the discharge plan.  We discussed the importance of alcohol cessation.    Also recommended outpatient GI follow-up for her recurrent pancreatitis.  The CT scan also shows a small cyst in the pancreatic head, significance unclear.  IPMN not ruled out.  For that reason recommended outpatient GI follow-up.    Major  depression, anxiety.  Significant psychosocial stressors including loss of her daughter, loss of her job, breast cancer diagnosis.  Patient was started on escitalopram at the time of admission.  Continue 5 mg daily.  Slowly increase as tolerated.    Otherwise, patient's home medications continued as before.    I personally evaluated and examined the patient on the day of discharge.    Hong Benitez MD      Pending Results   These results will be followed up by PCP  Unresulted Labs Ordered in the Past 30 Days of this Admission     No orders found from 9/24/2021 to 10/25/2021.               Primary Care Physician   Randee Woods        Discharge Disposition   Discharged to home  Condition at discharge: Stable    Consultations This Hospital Stay   None    Time Spent on this Encounter   Discharge time: greater than 30 minutes.    Discharge Orders      Reason for your hospital stay    Acute recurrent pancreatitis- likely due to alcohol use.  small cyst in the pancreatic head.   Depression/anxiety.     Follow-up and recommended labs and tests     Follow up with primary care provider, Randee Woods, within 7 days for hospital follow- up.    Follow up with gastroenterology recommended for acute pancreatitis, pancreatic cyst, possible IPMN.     Activity    Your activity upon discharge: activity as tolerated     Diet    Follow this diet upon discharge: Orders Placed This Encounter      Snacks/Supplements Pediatric: Ensure Enlive; With Meals      Low Fat Diet     Discharge Medications   Current Discharge Medication List      START taking these medications    Details   escitalopram (LEXAPRO) 5 MG tablet Take 1 tablet (5 mg) by mouth daily  Qty: 30 tablet, Refills: 1    Associated Diagnoses: Current moderate episode of major depressive disorder without prior episode (H)         CONTINUE these medications which have NOT CHANGED    Details   azelastine (ASTELIN) 0.1 % nasal spray Spray 1 spray into both nostrils daily as needed        cetirizine (ZYRTEC) 10 MG tablet Take 10 mg by mouth daily as needed       fluticasone (FLONASE) 50 MCG/ACT nasal spray Spray 1 spray into both nostrils daily as needed       LORazepam (ATIVAN) 0.5 MG tablet Take 0.5-1 mg by mouth 2 times daily as needed for anxiety      montelukast (SINGULAIR) 10 MG tablet Take 10 mg by mouth nightly as needed      multivitamin w/minerals (THERA-VIT-M) tablet Take 1 tablet by mouth daily  Qty:      Associated Diagnoses: Alcohol-induced acute pancreatitis without infection or necrosis      omeprazole (PRILOSEC) 20 MG DR capsule Take 20 mg by mouth daily       ondansetron (ZOFRAN ODT) 4 MG ODT tab Take 1 tablet (4 mg) by mouth every 6 hours as needed for nausea  Qty: 20 tablet, Refills: 0    Associated Diagnoses: Alcohol-induced acute pancreatitis without infection or necrosis      potassium chloride ER (K-TAB) 20 MEQ CR tablet Take 20 mEq by mouth daily      pantoprazole (PROTONIX) 40 MG EC tablet Take 1 tablet (40 mg) by mouth daily  Qty: 30 tablet, Refills: 0    Associated Diagnoses: Acute gastritis without hemorrhage, unspecified gastritis type           Allergies   No Known Allergies  Data   Most Recent 3 CBC's:Recent Labs   Lab Test 10/25/21  0805 10/24/21  1231 01/19/21  0415   WBC 11.6* 11.0 9.7   HGB 12.5 12.2 11.6*   MCV 98 96 103*    162 171      Most Recent 3 BMP's:  Recent Labs   Lab Test 10/29/21  0754 10/26/21  0714 10/25/21  0805    134 130*   POTASSIUM 3.2* 3.6 3.7   CHLORIDE 98 102 96   CO2 28 26 25   BUN 3* 1* 1*   CR 0.55 0.32* 0.39*   ANIONGAP 7 6 9   FRANCOISE 9.1 8.6 8.4*   * 120* 101*     Most Recent 2 LFT's:  Recent Labs   Lab Test 10/29/21  0754 10/26/21  0714   * 81*   * 50   ALKPHOS 163* 163*   BILITOTAL 0.4 0.7     Most Recent INR's and Anticoagulation Dosing History:  Anticoagulation Dose History     Recent Dosing and Labs Latest Ref Rng & Units 3/26/2009 6/25/2009 6/26/2009    INR 0.86 - 1.14 1.14 1.14 1.05        Most  Recent 3 Troponin's:  Recent Labs   Lab Test 10/24/21  1231 01/18/21  1902 04/16/20  1224   TROPI  --  <0.015 <0.015   TROPONIN <0.015  --   --      Most Recent Cholesterol Panel:No lab results found.  Most Recent 6 Bacteria Isolates From Any Culture (See EPIC Reports for Culture Details):  Recent Labs   Lab Test 07/28/20  1654   CULT >100,000 colonies/mL  mixed urogenital shaka  Susceptibility testing not routinely done    Multiple morphotypes present with no predominant organism.  Growth consistent with   probable contamination during collection.  Suggest repeat specimen if clinically   indicated.       Most Recent TSH, T4 and A1c Labs:No lab results found.

## 2021-10-29 NOTE — PLAN OF CARE
End of Shift Summary  For vital signs and complete assessments, please see documentation flowsheets.     Pertinent assessments:  A&O, abdominal pain managed with po oxycodone, atarax, lorazepam given for C/O anxiety. Ambulated in halls independently.     Major Shift Events: Diet advanced, emesis x1, CT scan    Treatment Plan:  encourage activity    Bedside Nurse: Cordelia SEE RN

## 2021-11-19 ENCOUNTER — HOSPITAL ENCOUNTER (INPATIENT)
Facility: CLINIC | Age: 56
LOS: 5 days | Discharge: HOME | End: 2021-11-24
Attending: EMERGENCY MEDICINE | Admitting: INTERNAL MEDICINE
Payer: COMMERCIAL

## 2021-11-19 DIAGNOSIS — R11.2 NAUSEA AND VOMITING, INTRACTABILITY OF VOMITING NOT SPECIFIED, UNSPECIFIED VOMITING TYPE: ICD-10-CM

## 2021-11-19 DIAGNOSIS — K85.90 ACUTE PANCREATITIS, UNSPECIFIED COMPLICATION STATUS, UNSPECIFIED PANCREATITIS TYPE: ICD-10-CM

## 2021-11-19 DIAGNOSIS — K85.20 ALCOHOL-INDUCED ACUTE PANCREATITIS WITHOUT INFECTION OR NECROSIS: ICD-10-CM

## 2021-11-19 DIAGNOSIS — E83.42 HYPOMAGNESEMIA: ICD-10-CM

## 2021-11-19 DIAGNOSIS — E87.6 HYPOKALEMIA: ICD-10-CM

## 2021-11-19 LAB
ALBUMIN SERPL-MCNC: 3.1 G/DL (ref 3.4–5)
ALP SERPL-CCNC: 201 U/L (ref 40–150)
ALT SERPL W P-5'-P-CCNC: 83 U/L (ref 0–50)
ANION GAP SERPL CALCULATED.3IONS-SCNC: 16 MMOL/L (ref 3–14)
AST SERPL W P-5'-P-CCNC: 157 U/L (ref 0–45)
ATRIAL RATE - MUSE: 94 BPM
BASOPHILS # BLD AUTO: 0 10E3/UL (ref 0–0.2)
BASOPHILS NFR BLD AUTO: 0 %
BILIRUB SERPL-MCNC: 1 MG/DL (ref 0.2–1.3)
BUN SERPL-MCNC: 4 MG/DL (ref 7–30)
CALCIUM SERPL-MCNC: 8.2 MG/DL (ref 8.5–10.1)
CHLORIDE BLD-SCNC: 97 MMOL/L (ref 94–109)
CO2 SERPL-SCNC: 18 MMOL/L (ref 20–32)
CREAT SERPL-MCNC: 0.46 MG/DL (ref 0.52–1.04)
DIASTOLIC BLOOD PRESSURE - MUSE: NORMAL MMHG
EOSINOPHIL # BLD AUTO: 0.1 10E3/UL (ref 0–0.7)
EOSINOPHIL NFR BLD AUTO: 1 %
ERYTHROCYTE [DISTWIDTH] IN BLOOD BY AUTOMATED COUNT: 13.4 % (ref 10–15)
ETHANOL SERPL-MCNC: 0.06 G/DL
GFR SERPL CREATININE-BSD FRML MDRD: >90 ML/MIN/1.73M2
GLUCOSE BLD-MCNC: 57 MG/DL (ref 70–99)
GLUCOSE BLDC GLUCOMTR-MCNC: 160 MG/DL (ref 70–99)
GLUCOSE BLDC GLUCOMTR-MCNC: 51 MG/DL (ref 70–99)
HCT VFR BLD AUTO: 33.5 % (ref 35–47)
HGB BLD-MCNC: 11.9 G/DL (ref 11.7–15.7)
HOLD SPECIMEN: NORMAL
HOLD SPECIMEN: NORMAL
IMM GRANULOCYTES # BLD: 0 10E3/UL
IMM GRANULOCYTES NFR BLD: 0 %
INTERPRETATION ECG - MUSE: NORMAL
LIPASE SERPL-CCNC: 1369 U/L (ref 73–393)
LYMPHOCYTES # BLD AUTO: 1 10E3/UL (ref 0.8–5.3)
LYMPHOCYTES NFR BLD AUTO: 11 %
MAGNESIUM SERPL-MCNC: 1.5 MG/DL (ref 1.6–2.3)
MCH RBC QN AUTO: 33.4 PG (ref 26.5–33)
MCHC RBC AUTO-ENTMCNC: 35.5 G/DL (ref 31.5–36.5)
MCV RBC AUTO: 94 FL (ref 78–100)
MONOCYTES # BLD AUTO: 1 10E3/UL (ref 0–1.3)
MONOCYTES NFR BLD AUTO: 10 %
NEUTROPHILS # BLD AUTO: 7.3 10E3/UL (ref 1.6–8.3)
NEUTROPHILS NFR BLD AUTO: 78 %
NRBC # BLD AUTO: 0 10E3/UL
NRBC BLD AUTO-RTO: 0 /100
P AXIS - MUSE: 76 DEGREES
PLATELET # BLD AUTO: 143 10E3/UL (ref 150–450)
POTASSIUM BLD-SCNC: 2.7 MMOL/L (ref 3.4–5.3)
PR INTERVAL - MUSE: 138 MS
PROT SERPL-MCNC: 6.9 G/DL (ref 6.8–8.8)
QRS DURATION - MUSE: 82 MS
QT - MUSE: 400 MS
QTC - MUSE: 500 MS
R AXIS - MUSE: 82 DEGREES
RBC # BLD AUTO: 3.56 10E6/UL (ref 3.8–5.2)
SARS-COV-2 RNA RESP QL NAA+PROBE: NEGATIVE
SODIUM SERPL-SCNC: 131 MMOL/L (ref 133–144)
SYSTOLIC BLOOD PRESSURE - MUSE: NORMAL MMHG
T AXIS - MUSE: 78 DEGREES
VENTRICULAR RATE- MUSE: 94 BPM
WBC # BLD AUTO: 9.4 10E3/UL (ref 4–11)

## 2021-11-19 PROCEDURE — 83690 ASSAY OF LIPASE: CPT | Performed by: EMERGENCY MEDICINE

## 2021-11-19 PROCEDURE — 250N000011 HC RX IP 250 OP 636: Performed by: INTERNAL MEDICINE

## 2021-11-19 PROCEDURE — 90791 PSYCH DIAGNOSTIC EVALUATION: CPT

## 2021-11-19 PROCEDURE — 82040 ASSAY OF SERUM ALBUMIN: CPT | Performed by: EMERGENCY MEDICINE

## 2021-11-19 PROCEDURE — C9113 INJ PANTOPRAZOLE SODIUM, VIA: HCPCS | Performed by: INTERNAL MEDICINE

## 2021-11-19 PROCEDURE — 120N000001 HC R&B MED SURG/OB

## 2021-11-19 PROCEDURE — 36415 COLL VENOUS BLD VENIPUNCTURE: CPT | Performed by: EMERGENCY MEDICINE

## 2021-11-19 PROCEDURE — 84132 ASSAY OF SERUM POTASSIUM: CPT | Performed by: INTERNAL MEDICINE

## 2021-11-19 PROCEDURE — 258N000003 HC RX IP 258 OP 636: Performed by: EMERGENCY MEDICINE

## 2021-11-19 PROCEDURE — C9803 HOPD COVID-19 SPEC COLLECT: HCPCS

## 2021-11-19 PROCEDURE — 96375 TX/PRO/DX INJ NEW DRUG ADDON: CPT

## 2021-11-19 PROCEDURE — 96367 TX/PROPH/DG ADDL SEQ IV INF: CPT

## 2021-11-19 PROCEDURE — 84100 ASSAY OF PHOSPHORUS: CPT | Performed by: INTERNAL MEDICINE

## 2021-11-19 PROCEDURE — 87635 SARS-COV-2 COVID-19 AMP PRB: CPT | Performed by: EMERGENCY MEDICINE

## 2021-11-19 PROCEDURE — 250N000011 HC RX IP 250 OP 636: Performed by: EMERGENCY MEDICINE

## 2021-11-19 PROCEDURE — 85025 COMPLETE CBC W/AUTO DIFF WBC: CPT | Performed by: EMERGENCY MEDICINE

## 2021-11-19 PROCEDURE — 82077 ASSAY SPEC XCP UR&BREATH IA: CPT | Performed by: EMERGENCY MEDICINE

## 2021-11-19 PROCEDURE — 83735 ASSAY OF MAGNESIUM: CPT | Performed by: EMERGENCY MEDICINE

## 2021-11-19 PROCEDURE — 258N000003 HC RX IP 258 OP 636: Performed by: INTERNAL MEDICINE

## 2021-11-19 PROCEDURE — 258N000001 HC RX 258

## 2021-11-19 PROCEDURE — 36415 COLL VENOUS BLD VENIPUNCTURE: CPT | Performed by: INTERNAL MEDICINE

## 2021-11-19 PROCEDURE — 83735 ASSAY OF MAGNESIUM: CPT | Performed by: INTERNAL MEDICINE

## 2021-11-19 PROCEDURE — 96365 THER/PROPH/DIAG IV INF INIT: CPT

## 2021-11-19 PROCEDURE — 99285 EMERGENCY DEPT VISIT HI MDM: CPT | Mod: 25

## 2021-11-19 PROCEDURE — 93005 ELECTROCARDIOGRAM TRACING: CPT

## 2021-11-19 PROCEDURE — 99223 1ST HOSP IP/OBS HIGH 75: CPT | Mod: AI | Performed by: INTERNAL MEDICINE

## 2021-11-19 PROCEDURE — 250N000009 HC RX 250: Performed by: INTERNAL MEDICINE

## 2021-11-19 RX ORDER — MAGNESIUM SULFATE HEPTAHYDRATE 40 MG/ML
2 INJECTION, SOLUTION INTRAVENOUS ONCE
Status: COMPLETED | OUTPATIENT
Start: 2021-11-19 | End: 2021-11-19

## 2021-11-19 RX ORDER — OLANZAPINE 5 MG/1
5-10 TABLET, ORALLY DISINTEGRATING ORAL EVERY 6 HOURS PRN
Status: DISCONTINUED | OUTPATIENT
Start: 2021-11-19 | End: 2021-11-20

## 2021-11-19 RX ORDER — NALOXONE HYDROCHLORIDE 0.4 MG/ML
0.2 INJECTION, SOLUTION INTRAMUSCULAR; INTRAVENOUS; SUBCUTANEOUS
Status: DISCONTINUED | OUTPATIENT
Start: 2021-11-19 | End: 2021-11-24 | Stop reason: HOSPADM

## 2021-11-19 RX ORDER — PROCHLORPERAZINE 25 MG
25 SUPPOSITORY, RECTAL RECTAL EVERY 12 HOURS PRN
Status: DISCONTINUED | OUTPATIENT
Start: 2021-11-19 | End: 2021-11-24 | Stop reason: HOSPADM

## 2021-11-19 RX ORDER — FOLIC ACID 5 MG/ML
1 INJECTION, SOLUTION INTRAMUSCULAR; INTRAVENOUS; SUBCUTANEOUS DAILY
Status: DISCONTINUED | OUTPATIENT
Start: 2021-11-20 | End: 2021-11-21

## 2021-11-19 RX ORDER — PROCHLORPERAZINE MALEATE 10 MG
10 TABLET ORAL EVERY 6 HOURS PRN
Status: DISCONTINUED | OUTPATIENT
Start: 2021-11-19 | End: 2021-11-24 | Stop reason: HOSPADM

## 2021-11-19 RX ORDER — LIDOCAINE 40 MG/G
CREAM TOPICAL
Status: DISCONTINUED | OUTPATIENT
Start: 2021-11-19 | End: 2021-11-24 | Stop reason: HOSPADM

## 2021-11-19 RX ORDER — FLUMAZENIL 0.1 MG/ML
0.2 INJECTION, SOLUTION INTRAVENOUS
Status: DISCONTINUED | OUTPATIENT
Start: 2021-11-19 | End: 2021-11-22

## 2021-11-19 RX ORDER — POTASSIUM CHLORIDE 7.45 MG/ML
10 INJECTION INTRAVENOUS ONCE
Status: COMPLETED | OUTPATIENT
Start: 2021-11-19 | End: 2021-11-19

## 2021-11-19 RX ORDER — DEXTROSE MONOHYDRATE 25 G/50ML
25-50 INJECTION, SOLUTION INTRAVENOUS
Status: DISCONTINUED | OUTPATIENT
Start: 2021-11-19 | End: 2021-11-24 | Stop reason: HOSPADM

## 2021-11-19 RX ORDER — NICOTINE POLACRILEX 4 MG
15-30 LOZENGE BUCCAL
Status: DISCONTINUED | OUTPATIENT
Start: 2021-11-19 | End: 2021-11-24 | Stop reason: HOSPADM

## 2021-11-19 RX ORDER — NALOXONE HYDROCHLORIDE 0.4 MG/ML
0.4 INJECTION, SOLUTION INTRAMUSCULAR; INTRAVENOUS; SUBCUTANEOUS
Status: DISCONTINUED | OUTPATIENT
Start: 2021-11-19 | End: 2021-11-24 | Stop reason: HOSPADM

## 2021-11-19 RX ORDER — AMOXICILLIN 250 MG
2 CAPSULE ORAL 2 TIMES DAILY PRN
Status: DISCONTINUED | OUTPATIENT
Start: 2021-11-19 | End: 2021-11-24 | Stop reason: HOSPADM

## 2021-11-19 RX ORDER — HYDROMORPHONE HYDROCHLORIDE 1 MG/ML
0.5 INJECTION, SOLUTION INTRAMUSCULAR; INTRAVENOUS; SUBCUTANEOUS
Status: DISCONTINUED | OUTPATIENT
Start: 2021-11-19 | End: 2021-11-24 | Stop reason: HOSPADM

## 2021-11-19 RX ORDER — NICOTINE 21 MG/24HR
1 PATCH, TRANSDERMAL 24 HOURS TRANSDERMAL DAILY
Status: DISCONTINUED | OUTPATIENT
Start: 2021-11-20 | End: 2021-11-24 | Stop reason: HOSPADM

## 2021-11-19 RX ORDER — AMOXICILLIN 250 MG
1 CAPSULE ORAL 2 TIMES DAILY PRN
Status: DISCONTINUED | OUTPATIENT
Start: 2021-11-19 | End: 2021-11-24 | Stop reason: HOSPADM

## 2021-11-19 RX ORDER — HYDROMORPHONE HYDROCHLORIDE 1 MG/ML
0.5 INJECTION, SOLUTION INTRAMUSCULAR; INTRAVENOUS; SUBCUTANEOUS ONCE
Status: COMPLETED | OUTPATIENT
Start: 2021-11-19 | End: 2021-11-19

## 2021-11-19 RX ORDER — ONDANSETRON 2 MG/ML
4 INJECTION INTRAMUSCULAR; INTRAVENOUS ONCE
Status: COMPLETED | OUTPATIENT
Start: 2021-11-19 | End: 2021-11-19

## 2021-11-19 RX ORDER — DEXTROSE MONOHYDRATE 25 G/50ML
50 INJECTION, SOLUTION INTRAVENOUS ONCE
Status: COMPLETED | OUTPATIENT
Start: 2021-11-19 | End: 2021-11-19

## 2021-11-19 RX ORDER — LORAZEPAM 1 MG/1
1-2 TABLET ORAL EVERY 30 MIN PRN
Status: DISCONTINUED | OUTPATIENT
Start: 2021-11-19 | End: 2021-11-22

## 2021-11-19 RX ORDER — HALOPERIDOL 5 MG/ML
2.5-5 INJECTION INTRAMUSCULAR EVERY 6 HOURS PRN
Status: DISCONTINUED | OUTPATIENT
Start: 2021-11-19 | End: 2021-11-20

## 2021-11-19 RX ORDER — LORAZEPAM 0.5 MG/1
.5-1 TABLET ORAL EVERY 6 HOURS
Status: DISCONTINUED | OUTPATIENT
Start: 2021-11-20 | End: 2021-11-20

## 2021-11-19 RX ORDER — LORAZEPAM 2 MG/ML
1-2 INJECTION INTRAMUSCULAR EVERY 30 MIN PRN
Status: DISCONTINUED | OUTPATIENT
Start: 2021-11-19 | End: 2021-11-22

## 2021-11-19 RX ORDER — HYDROXYZINE HYDROCHLORIDE 25 MG/1
25 TABLET, FILM COATED ORAL EVERY 6 HOURS PRN
Status: ON HOLD | COMMUNITY
End: 2022-06-28

## 2021-11-19 RX ORDER — THIAMINE HYDROCHLORIDE 100 MG/ML
100 INJECTION, SOLUTION INTRAMUSCULAR; INTRAVENOUS DAILY
Status: DISCONTINUED | OUTPATIENT
Start: 2021-11-20 | End: 2021-11-21

## 2021-11-19 RX ORDER — DEXTROSE MONOHYDRATE 25 G/50ML
INJECTION, SOLUTION INTRAVENOUS
Status: COMPLETED
Start: 2021-11-19 | End: 2021-11-19

## 2021-11-19 RX ORDER — DEXTROSE MONOHYDRATE, SODIUM CHLORIDE, AND POTASSIUM CHLORIDE 50; 1.49; 9 G/1000ML; G/1000ML; G/1000ML
100 INJECTION, SOLUTION INTRAVENOUS CONTINUOUS
Status: DISCONTINUED | OUTPATIENT
Start: 2021-11-20 | End: 2021-11-20

## 2021-11-19 RX ADMIN — PANTOPRAZOLE SODIUM 40 MG: 40 INJECTION, POWDER, FOR SOLUTION INTRAVENOUS at 23:57

## 2021-11-19 RX ADMIN — FOLIC ACID: 5 INJECTION, SOLUTION INTRAMUSCULAR; INTRAVENOUS; SUBCUTANEOUS at 22:56

## 2021-11-19 RX ADMIN — SODIUM CHLORIDE 1000 ML: 9 INJECTION, SOLUTION INTRAVENOUS at 20:57

## 2021-11-19 RX ADMIN — DEXTROSE MONOHYDRATE 50 ML: 25 INJECTION, SOLUTION INTRAVENOUS at 22:25

## 2021-11-19 RX ADMIN — MAGNESIUM SULFATE HEPTAHYDRATE 2 G: 40 INJECTION, SOLUTION INTRAVENOUS at 22:15

## 2021-11-19 RX ADMIN — POTASSIUM CHLORIDE 10 MEQ: 7.46 INJECTION, SOLUTION INTRAVENOUS at 20:59

## 2021-11-19 RX ADMIN — HYDROMORPHONE HYDROCHLORIDE 0.5 MG: 1 INJECTION, SOLUTION INTRAMUSCULAR; INTRAVENOUS; SUBCUTANEOUS at 20:58

## 2021-11-19 RX ADMIN — ONDANSETRON 4 MG: 2 INJECTION INTRAMUSCULAR; INTRAVENOUS at 20:57

## 2021-11-19 RX ADMIN — SODIUM CHLORIDE 1000 ML: 9 INJECTION, SOLUTION INTRAVENOUS at 22:15

## 2021-11-19 ASSESSMENT — ENCOUNTER SYMPTOMS
COUGH: 0
FEVER: 0
NAUSEA: 1
VOMITING: 1
ABDOMINAL PAIN: 1
DIARRHEA: 1
DYSURIA: 0

## 2021-11-19 ASSESSMENT — ACTIVITIES OF DAILY LIVING (ADL)
ADLS_ACUITY_SCORE: 5
ADLS_ACUITY_SCORE: 5

## 2021-11-20 LAB
ALBUMIN SERPL-MCNC: 2.9 G/DL (ref 3.4–5)
ALBUMIN UR-MCNC: NEGATIVE MG/DL
ALP SERPL-CCNC: 185 U/L (ref 40–150)
ALT SERPL W P-5'-P-CCNC: 72 U/L (ref 0–50)
ANION GAP SERPL CALCULATED.3IONS-SCNC: 9 MMOL/L (ref 3–14)
APPEARANCE UR: CLEAR
AST SERPL W P-5'-P-CCNC: 121 U/L (ref 0–45)
BILIRUB SERPL-MCNC: 1 MG/DL (ref 0.2–1.3)
BILIRUB UR QL STRIP: NEGATIVE
BUN SERPL-MCNC: 3 MG/DL (ref 7–30)
CA-I BLD-MCNC: 4.1 MG/DL (ref 4.4–5.2)
CALCIUM SERPL-MCNC: 7.1 MG/DL (ref 8.5–10.1)
CHLORIDE BLD-SCNC: 107 MMOL/L (ref 94–109)
CO2 SERPL-SCNC: 20 MMOL/L (ref 20–32)
COLOR UR AUTO: ABNORMAL
CREAT SERPL-MCNC: 0.43 MG/DL (ref 0.52–1.04)
ERYTHROCYTE [DISTWIDTH] IN BLOOD BY AUTOMATED COUNT: 13.9 % (ref 10–15)
GFR SERPL CREATININE-BSD FRML MDRD: >90 ML/MIN/1.73M2
GLUCOSE BLD-MCNC: 87 MG/DL (ref 70–99)
GLUCOSE BLDC GLUCOMTR-MCNC: 108 MG/DL (ref 70–99)
GLUCOSE BLDC GLUCOMTR-MCNC: 108 MG/DL (ref 70–99)
GLUCOSE BLDC GLUCOMTR-MCNC: 112 MG/DL (ref 70–99)
GLUCOSE BLDC GLUCOMTR-MCNC: 84 MG/DL (ref 70–99)
GLUCOSE BLDC GLUCOMTR-MCNC: 92 MG/DL (ref 70–99)
GLUCOSE BLDC GLUCOMTR-MCNC: 96 MG/DL (ref 70–99)
GLUCOSE UR STRIP-MCNC: NEGATIVE MG/DL
HCT VFR BLD AUTO: 33.6 % (ref 35–47)
HGB BLD-MCNC: 11.7 G/DL (ref 11.7–15.7)
HGB UR QL STRIP: NEGATIVE
KETONES UR STRIP-MCNC: 40 MG/DL
LEUKOCYTE ESTERASE UR QL STRIP: ABNORMAL
MAGNESIUM SERPL-MCNC: 2.4 MG/DL (ref 1.6–2.3)
MCH RBC QN AUTO: 33.4 PG (ref 26.5–33)
MCHC RBC AUTO-ENTMCNC: 34.8 G/DL (ref 31.5–36.5)
MCV RBC AUTO: 96 FL (ref 78–100)
NITRATE UR QL: NEGATIVE
PH UR STRIP: 5.5 [PH] (ref 5–7)
PHOSPHATE SERPL-MCNC: 1.1 MG/DL (ref 2.5–4.5)
PHOSPHATE SERPL-MCNC: 1.2 MG/DL (ref 2.5–4.5)
PLATELET # BLD AUTO: 155 10E3/UL (ref 150–450)
POTASSIUM BLD-SCNC: 2.9 MMOL/L (ref 3.4–5.3)
POTASSIUM BLD-SCNC: 3.5 MMOL/L (ref 3.4–5.3)
PROT SERPL-MCNC: 6.7 G/DL (ref 6.8–8.8)
RBC # BLD AUTO: 3.5 10E6/UL (ref 3.8–5.2)
RBC URINE: 0 /HPF
SODIUM SERPL-SCNC: 136 MMOL/L (ref 133–144)
SP GR UR STRIP: 1.01 (ref 1–1.03)
SQUAMOUS EPITHELIAL: <1 /HPF
UROBILINOGEN UR STRIP-MCNC: NORMAL MG/DL
WBC # BLD AUTO: 10.5 10E3/UL (ref 4–11)
WBC URINE: 3 /HPF

## 2021-11-20 PROCEDURE — 120N000001 HC R&B MED SURG/OB

## 2021-11-20 PROCEDURE — 80053 COMPREHEN METABOLIC PANEL: CPT | Performed by: INTERNAL MEDICINE

## 2021-11-20 PROCEDURE — 250N000011 HC RX IP 250 OP 636: Performed by: INTERNAL MEDICINE

## 2021-11-20 PROCEDURE — HZ2ZZZZ DETOXIFICATION SERVICES FOR SUBSTANCE ABUSE TREATMENT: ICD-10-PCS | Performed by: HOSPITALIST

## 2021-11-20 PROCEDURE — 81003 URINALYSIS AUTO W/O SCOPE: CPT | Performed by: INTERNAL MEDICINE

## 2021-11-20 PROCEDURE — 36415 COLL VENOUS BLD VENIPUNCTURE: CPT | Performed by: HOSPITALIST

## 2021-11-20 PROCEDURE — 258N000003 HC RX IP 258 OP 636: Performed by: INTERNAL MEDICINE

## 2021-11-20 PROCEDURE — 82330 ASSAY OF CALCIUM: CPT | Performed by: HOSPITALIST

## 2021-11-20 PROCEDURE — 99233 SBSQ HOSP IP/OBS HIGH 50: CPT | Performed by: HOSPITALIST

## 2021-11-20 PROCEDURE — 85048 AUTOMATED LEUKOCYTE COUNT: CPT | Performed by: INTERNAL MEDICINE

## 2021-11-20 PROCEDURE — C9113 INJ PANTOPRAZOLE SODIUM, VIA: HCPCS | Performed by: INTERNAL MEDICINE

## 2021-11-20 PROCEDURE — 250N000009 HC RX 250: Performed by: INTERNAL MEDICINE

## 2021-11-20 PROCEDURE — 84100 ASSAY OF PHOSPHORUS: CPT | Performed by: STUDENT IN AN ORGANIZED HEALTH CARE EDUCATION/TRAINING PROGRAM

## 2021-11-20 PROCEDURE — 258N000001 HC RX 258: Performed by: INTERNAL MEDICINE

## 2021-11-20 PROCEDURE — 250N000013 HC RX MED GY IP 250 OP 250 PS 637: Performed by: INTERNAL MEDICINE

## 2021-11-20 PROCEDURE — 250N000013 HC RX MED GY IP 250 OP 250 PS 637: Performed by: STUDENT IN AN ORGANIZED HEALTH CARE EDUCATION/TRAINING PROGRAM

## 2021-11-20 PROCEDURE — 36415 COLL VENOUS BLD VENIPUNCTURE: CPT | Performed by: INTERNAL MEDICINE

## 2021-11-20 PROCEDURE — 250N000013 HC RX MED GY IP 250 OP 250 PS 637: Performed by: HOSPITALIST

## 2021-11-20 RX ORDER — QUETIAPINE FUMARATE 25 MG/1
25 TABLET, FILM COATED ORAL 2 TIMES DAILY PRN
Status: DISCONTINUED | OUTPATIENT
Start: 2021-11-20 | End: 2021-11-22

## 2021-11-20 RX ORDER — POTASSIUM CHLORIDE 1.5 G/1.58G
40 POWDER, FOR SOLUTION ORAL ONCE
Status: COMPLETED | OUTPATIENT
Start: 2021-11-20 | End: 2021-11-20

## 2021-11-20 RX ORDER — SODIUM CHLORIDE, SODIUM LACTATE, POTASSIUM CHLORIDE, CALCIUM CHLORIDE 600; 310; 30; 20 MG/100ML; MG/100ML; MG/100ML; MG/100ML
INJECTION, SOLUTION INTRAVENOUS CONTINUOUS
Status: DISCONTINUED | OUTPATIENT
Start: 2021-11-20 | End: 2021-11-24 | Stop reason: HOSPADM

## 2021-11-20 RX ORDER — NICOTINE 21 MG/24HR
1 PATCH, TRANSDERMAL 24 HOURS TRANSDERMAL ONCE
Status: COMPLETED | OUTPATIENT
Start: 2021-11-20 | End: 2021-11-21

## 2021-11-20 RX ORDER — MIRTAZAPINE 15 MG/1
15 TABLET, FILM COATED ORAL AT BEDTIME
Status: DISCONTINUED | OUTPATIENT
Start: 2021-11-20 | End: 2021-11-24 | Stop reason: HOSPADM

## 2021-11-20 RX ORDER — ESCITALOPRAM OXALATE 5 MG/1
5 TABLET ORAL DAILY
Status: DISCONTINUED | OUTPATIENT
Start: 2021-11-20 | End: 2021-11-22

## 2021-11-20 RX ADMIN — HYDROMORPHONE HYDROCHLORIDE 0.5 MG: 1 INJECTION, SOLUTION INTRAMUSCULAR; INTRAVENOUS; SUBCUTANEOUS at 23:10

## 2021-11-20 RX ADMIN — HYDROMORPHONE HYDROCHLORIDE 0.5 MG: 1 INJECTION, SOLUTION INTRAMUSCULAR; INTRAVENOUS; SUBCUTANEOUS at 02:14

## 2021-11-20 RX ADMIN — HYDROMORPHONE HYDROCHLORIDE 0.5 MG: 1 INJECTION, SOLUTION INTRAMUSCULAR; INTRAVENOUS; SUBCUTANEOUS at 20:57

## 2021-11-20 RX ADMIN — SODIUM CHLORIDE, POTASSIUM CHLORIDE, SODIUM LACTATE AND CALCIUM CHLORIDE: 600; 310; 30; 20 INJECTION, SOLUTION INTRAVENOUS at 20:56

## 2021-11-20 RX ADMIN — SODIUM CHLORIDE, POTASSIUM CHLORIDE, SODIUM LACTATE AND CALCIUM CHLORIDE: 600; 310; 30; 20 INJECTION, SOLUTION INTRAVENOUS at 16:01

## 2021-11-20 RX ADMIN — FOLIC ACID 1 MG: 5 INJECTION, SOLUTION INTRAMUSCULAR; INTRAVENOUS; SUBCUTANEOUS at 09:43

## 2021-11-20 RX ADMIN — QUETIAPINE FUMARATE 25 MG: 25 TABLET ORAL at 22:22

## 2021-11-20 RX ADMIN — PROCHLORPERAZINE MALEATE 10 MG: 10 TABLET ORAL at 20:57

## 2021-11-20 RX ADMIN — HYDROMORPHONE HYDROCHLORIDE 0.5 MG: 1 INJECTION, SOLUTION INTRAMUSCULAR; INTRAVENOUS; SUBCUTANEOUS at 14:50

## 2021-11-20 RX ADMIN — POTASSIUM & SODIUM PHOSPHATES POWDER PACK 280-160-250 MG 2 PACKET: 280-160-250 PACK at 09:47

## 2021-11-20 RX ADMIN — ESCITALOPRAM 5 MG: 5 TABLET, FILM COATED ORAL at 17:56

## 2021-11-20 RX ADMIN — POTASSIUM CHLORIDE 40 MEQ: 1.5 POWDER, FOR SOLUTION ORAL at 02:06

## 2021-11-20 RX ADMIN — POTASSIUM CHLORIDE, DEXTROSE MONOHYDRATE AND SODIUM CHLORIDE 100 ML/HR: 150; 5; 900 INJECTION, SOLUTION INTRAVENOUS at 00:11

## 2021-11-20 RX ADMIN — HYDROMORPHONE HYDROCHLORIDE 0.5 MG: 1 INJECTION, SOLUTION INTRAMUSCULAR; INTRAVENOUS; SUBCUTANEOUS at 00:06

## 2021-11-20 RX ADMIN — PROCHLORPERAZINE MALEATE 10 MG: 10 TABLET ORAL at 12:36

## 2021-11-20 RX ADMIN — PANTOPRAZOLE SODIUM 40 MG: 40 INJECTION, POWDER, FOR SOLUTION INTRAVENOUS at 20:58

## 2021-11-20 RX ADMIN — HYDROMORPHONE HYDROCHLORIDE 0.5 MG: 1 INJECTION, SOLUTION INTRAMUSCULAR; INTRAVENOUS; SUBCUTANEOUS at 06:29

## 2021-11-20 RX ADMIN — LORAZEPAM 0.5 MG: 0.5 TABLET ORAL at 12:36

## 2021-11-20 RX ADMIN — PANTOPRAZOLE SODIUM 40 MG: 40 INJECTION, POWDER, FOR SOLUTION INTRAVENOUS at 09:27

## 2021-11-20 RX ADMIN — PROCHLORPERAZINE EDISYLATE 10 MG: 5 INJECTION INTRAMUSCULAR; INTRAVENOUS at 06:29

## 2021-11-20 RX ADMIN — POTASSIUM & SODIUM PHOSPHATES POWDER PACK 280-160-250 MG 2 PACKET: 280-160-250 PACK at 16:12

## 2021-11-20 RX ADMIN — LORAZEPAM 0.5 MG: 0.5 TABLET ORAL at 00:07

## 2021-11-20 RX ADMIN — POTASSIUM CHLORIDE, DEXTROSE MONOHYDRATE AND SODIUM CHLORIDE 100 ML/HR: 150; 5; 900 INJECTION, SOLUTION INTRAVENOUS at 09:33

## 2021-11-20 RX ADMIN — NICOTINE 1 PATCH: 21 PATCH, EXTENDED RELEASE TRANSDERMAL at 09:31

## 2021-11-20 RX ADMIN — HYDROMORPHONE HYDROCHLORIDE 0.5 MG: 1 INJECTION, SOLUTION INTRAMUSCULAR; INTRAVENOUS; SUBCUTANEOUS at 04:51

## 2021-11-20 RX ADMIN — NICOTINE 1 PATCH: 21 PATCH, EXTENDED RELEASE TRANSDERMAL at 02:06

## 2021-11-20 RX ADMIN — LORAZEPAM 0.5 MG: 0.5 TABLET ORAL at 06:27

## 2021-11-20 RX ADMIN — DOCUSATE SODIUM 50 MG AND SENNOSIDES 8.6 MG 1 TABLET: 8.6; 5 TABLET, FILM COATED ORAL at 12:36

## 2021-11-20 RX ADMIN — THIAMINE HYDROCHLORIDE 100 MG: 100 INJECTION, SOLUTION INTRAMUSCULAR; INTRAVENOUS at 09:15

## 2021-11-20 RX ADMIN — HYDROMORPHONE HYDROCHLORIDE 0.5 MG: 1 INJECTION, SOLUTION INTRAMUSCULAR; INTRAVENOUS; SUBCUTANEOUS at 09:09

## 2021-11-20 RX ADMIN — HYDROMORPHONE HYDROCHLORIDE 0.5 MG: 1 INJECTION, SOLUTION INTRAMUSCULAR; INTRAVENOUS; SUBCUTANEOUS at 12:27

## 2021-11-20 RX ADMIN — SODIUM CHLORIDE, POTASSIUM CHLORIDE, SODIUM LACTATE AND CALCIUM CHLORIDE: 600; 310; 30; 20 INJECTION, SOLUTION INTRAVENOUS at 10:54

## 2021-11-20 RX ADMIN — MIRTAZAPINE 15 MG: 15 TABLET, FILM COATED ORAL at 21:11

## 2021-11-20 RX ADMIN — HYDROMORPHONE HYDROCHLORIDE 0.5 MG: 1 INJECTION, SOLUTION INTRAMUSCULAR; INTRAVENOUS; SUBCUTANEOUS at 17:46

## 2021-11-20 ASSESSMENT — ACTIVITIES OF DAILY LIVING (ADL)
ADLS_ACUITY_SCORE: 9
ADLS_ACUITY_SCORE: 7
ADLS_ACUITY_SCORE: 5
ADLS_ACUITY_SCORE: 9
ADLS_ACUITY_SCORE: 7
ADLS_ACUITY_SCORE: 9
ADLS_ACUITY_SCORE: 7
ADLS_ACUITY_SCORE: 5
ADLS_ACUITY_SCORE: 7
ADLS_ACUITY_SCORE: 9
ADLS_ACUITY_SCORE: 7
ADLS_ACUITY_SCORE: 7
ADLS_ACUITY_SCORE: 9
ADLS_ACUITY_SCORE: 7
ADLS_ACUITY_SCORE: 7

## 2021-11-20 NOTE — ED NOTES
[facility]  ED Nurse Handoff Report    ED Chief complaint: Nausea & Vomiting (n/v/d x couple weeks. unable to keep much down. lost 10-15 pounds within past couple weeks. )      ED Diagnosis:   Final diagnoses:   Hypokalemia   Nausea and vomiting, intractability of vomiting not specified, unspecified vomiting type   Acute pancreatitis, unspecified complication status, unspecified pancreatitis type       Code Status: Full Code    Allergies: No Known Allergies    Patient Story: Patient comes in with several weeks of nausea and vomiting.  She reports drinking alcohol daily, her 28yo daughter recently unexpectedly passed away.  She also reports having some falls at home.    Focused Assessment:  Alert and oriented.  Lipase elevated as well as some electrolyte imbalances.  Patient complains of abdominal pain.      Treatments and/or interventions provided: IV, labs, meds   Patient's response to treatments and/or interventions: tolerated well     To be done/followed up on inpatient unit:  see in patient orders     Does this patient have any cognitive concerns?: none     Activity level - Baseline/Home:  Independent  Activity Level - Current:   Independent    Patient's Preferred language: English   Needed?: No    Isolation: None  Infection: Not Applicable  Patient tested for COVID 19 prior to admission: YES  Bariatric?: No    Vital Signs:   Vitals:    11/19/21 2005 11/19/21 2011 11/19/21 2100   BP:  (!) 143/91 (!) 123/96   BP Location:  Right arm    Pulse: 113  103   Resp: 16     Temp: 98.4  F (36.9  C)     TempSrc: Oral     SpO2: 99%  100%   Weight: 44.5 kg (98 lb)         Cardiac Rhythm:     Was the PSS-3 completed:   Yes  What interventions are required if any?               Family Comments: none here   OBS brochure/video discussed/provided to patient/family: N/A              Name of person given brochure if not patient:               Relationship to patient:     For the majority of the shift  this patient's behavior was Green.   Behavioral interventions performed were .    ED NURSE PHONE NUMBER: *[phone]

## 2021-11-20 NOTE — DISCHARGE INSTRUCTIONS
Highly recommend Chemical Health Assessment, Consider Dual Intensive Outpatient program, work with psychiatry on medication and weekly therapy, consider grief and loss support along with breast cancer support groups.  Audelia could benefit from having a  and someone to assist with services in the home like a home health aid.        If I am feeling unsafe or I am in a crisis, I will:  Contact my established care providers  Call the National Suicide Prevention Lifeline: 606.108.2408  Go to the nearest emergency room  Call [phone]     Warning signs that I or other people might notice when a crisis is developing for me: Helplessness, not good at asking for help - needs to work on allowing others to help, doesn't want to be a burden  Things I am able to do on my own to cope or help me feel better:  Watch TV, wake up and feel hungry, and that sounds good right now, walk, cards on phone, reading books, inspiring books, journal, taking notes and checklist   Things that I am able to do with others to cope or help me better:  Go out to lunch, walk, start singing again, get a part-time job, commit to something and something to look forward    Things I can use or do for distraction:  Singing, hanging out with friends and family,   Changes I can make to support my mental health and wellness: Nutritionist, talking with a therapist, being on medication and taking as prescribed, help with things around the house, reduce or eliminate drinking   People in my life that I can ask for help:  Friends and family   Your [facility] has a mental health crisis team you can call 24/7: [facility] ([facility])            [phone], Fax:  [phone]  Other things that are important when I m in crisis:   Additional resources and information: Grief and loss, [facility], [facility] and Transition Adult Loss Therapy Groups   [facility] offers therapy groups designed to provide a safe and supportive setting for people  "to share their loss journey with others. These groups are facilitated by professional grief therapists and provide an opportunity for participants to explore the impact that the loss has had and continues to have on their lives.   Groups available include such topics as these:  Professional Caregiver Group   When: Call for availability.  Where: [facility], [address] [place] [address]  Contact: Call [phone]. Email [email or web address].    Crisis Lines  Crisis Text Line  Text 163962  You will be connected with a trained live crisis counselor to provide support.    Gambling Hotline  1.800.333.hope [4673]    National Hope Line  1.800.SUICIDE [1421160]    National Suicide Prevention Lifeline  Free and confidential support  1.800.085.TALK [1897]  http://suicidepreventionlifeline.org    The [employer] Project (LGBTQ Youth Crisis Line)  9.221.007.6647  text START to 967-622    's Crisis Line  9.038.812.0923 (Press 1)  or text 454254    [facility] Mental Health Crisis Response  Within [place], call **CRISIS [**833043] to be connected to a mental health professional who can assist you.      [facility] Crisis  [phone]    [facility] Mobile Crisis  [phone]    [facility] Crisis  [phone]    [facility] Mobile Crisis  [phone] (adults)  [phone] (children)    [facility] Mobile Crisis  [phone] (adults)  [phone] (children)    [facility] Mobile Crisis  [phone]    [facility] Mobile Crisis  [phone]    Community Resources  Fast Tracker  Linking people to mental health and substance use disorder resources  fasttrackermn.org     [place] Mental Health Warm Line  Peer to peer support  Monday thru Saturday, 12 pm to 10 pm  551.380.3197 or 9.774.875.3453  Text \"Support\" to 32284    National Amb[employer] on Mental Illness (SHERRELL)  276.871.4458 or 1.888.SHERRELL.HELPS    [facility] for Adult Mental Health  [facility] " residents only   [address] [facility]  [phone]    Walk-in Counseling Center  Free mental health counseling  [address] [place]  [phone]    Mental Health Apps  My3  https://myRetailTowerpp.org/    VirtualHopeBox  https://"[employer]/apps/virtual-hope-box/    Suicide Safety Plan ([employer])    Calm Harm    For Free DBT Skills go to https://dialecticalbehaviortheraBrainloop/      After Your Surgery  Preoperative Assessment Center  Pain control  After surgery, you may have pain. Our goal is to help you manage your pain.    We may give you a pain control pump that will:  ? Deliver the medicine through a tube placed in your vein.  ? Control the amount of medicine you receive.  ? Allow you to push a button to deliver a dose of pain medicine.    Pain medicine will help you feel comfortable enough to do activities that will help you heal. These may include breathing exercises, walking and physical therapy.  Pneumo boots  You may need to wear pneumo boots on your legs and feet. These are wraps connected to a machine that pumps in air and releases it. The repeated pumping helps prevent blood clots from forming.  Breathing exercises  Breathing exercises help you recover faster. Take deep breaths and let the air out slowly. This will:    Help you relax.    Fill your lungs with air and get them working.    Help prevent complications like pneumonia.  We will give you a breathing device (incentive spirometer) to help you breathe deeply. Please see the handout on the incentive spirometer.  Nausea and vomiting  You may feel sick to your stomach from the surgery or from the medicine that put you to sleep. If so, let your nurse know. We can give you medicine to help you feel better.  For informational purposes only. Not to replace the advice of your health care provider.  Copyright   June 2010 [employer] [facility]. All rights reserved. [employer]ighbor[employer] 892186 - REV 03/16.      Discharge  Instructions for Acute Pancreatitis   You have been diagnosed with acute pancreatitis. The pancreas is an organ that makes digestive juices and hormones. Yours is inflamed or swollen. Gallstones are a common cause of pancreatitis. These hard stones form in the gallbladder. The gallbladder shares a tube with the pancreas into the small intestine. If gallstones block this tube, fluid can t leave the pancreas. The fluid backs up and causes redness and swelling (inflammation). Alcohol use is another very common cause of pancreatitis. There are other causes. Make sure you understand the cause of your pancreatitis. Then you can try to stop it from happening again.  Immediate home care    Find someone to drive you to appointments. Acute pancreatitis is a serious condition, and you should never drive if you have symptoms.    Stop drinking if your illness was caused by alcohol.  ? Ask your healthcare provider about alcohol abuse programs and support groups such as Alcoholics Anonymous.  ? Ask your provider about prescription medicines that can help you stop drinking.  ? Tell your provider about the alcohol withdrawal symptoms you have when you stop drinking. This is very important. You may need close medical supervision and special medicines when you stop drinking. This will depend on your alcohol withdrawal history.     Take your medicines exactly as directed. Don t skip doses.    Eat a low-fat diet. Ask your provider for menus and other diet information.    Learn to take your own pulse. Keep a record of your results. Ask your provider which readings mean that you need medical attention.    Ongoing care    Tell your provider about any medicines you are taking. Some medicines can cause this condition.    Before starting any new medicine, ask your provider if it will harm your pancreas. This includes any new over-the-counter medicines, vitamins, or herbal supplements.      Tell your provider if you lose weight without  dieting.    Be aware of symptoms that may mean your pancreatitis has come back. These symptoms include belly pain, nausea and vomiting, and fever.    Keep all follow-up appointments with your provider. Problems can often show up later.    If your pancreatitis was caused by gallstones, gallbladder removal will likely be advised.    Follow-up  Follow up with your healthcare provider, or as advised.  When to call your healthcare provider  Call your healthcare provider right away if you have any of the following:    Fever of  100.4  F ( 38.0 C) or higher, or as advised by your provider    Chills    Severe pain from your upper belly to your back    Nausea and vomiting    Feely dizzy or lightheaded    Yellowing of your skin or eyes (jaundice)    Bruises on your belly or back    Belly swelling and tenderness    Rapid pulse    Shallow, fast breathing  Germin[employer] last reviewed this educational content on 6/1/2019 2000-2021 [employer]Well [employer]. All rights reserved. This information is not intended as a substitute for professional medical care. Always follow your healthcare professional's instructions.          Controlling Your Potassium Intake  For Patients with Kidney Disease  Potassium  Potassium is a mineral that helps your heart and muscles work properly. It is found in your body and in the foods you eat.  Healthy kidneys control the amount of potassium in your body. They remove any extra potassium that is not needed. If you have kidney disease, this extra potassium can build up in your blood, causing an irregular heartbeat (your heart does not beat the way it should).  You can limit the amount of potassium you eat to prevent stress to your heart and muscles.  Most doctors and dietitians will recommend that you try to eat less than 2000 to 3000 mg potassium a day. To stay in this range, you may eat one serving of high-potassium food. It may be better to choose low-potassium foods instead. (See the following  charts.)  Salt substitutes  Salt substitutes are sold in the grocery store. In these products, the salt (sodium) is often replaced with potassium salt.  You should avoid all salt substitutes unless your doctor says they are safe for you to eat. Examples include Kerr Salt Substitute, No Salt, Salt Substitute, Cardia and Lite Salt.  High-potassium foods (over 200 mg per serving)--limit to one serving per day  Unless listed differently below, one serving is the same as a 1/2 cup, or one medium piece.  Vegetables    Artichoke    Bamboo shoots    Beets, cooked    Bok tram    Broccoli, cooked    [place] sprouts (1 cup)    Carrots, raw    Mushrooms, canned    Potato, sweet potato or yam    Potato chips or french fries (10)    Squash or pumpkin    Spinach and other greens, cooked    Tomatoes and tomato products    Vegetable juice (1 cup or 8 ounces)  Fruits    Apricots, (2 medium fresh or 5 1/2 dried)    Avocado    Banana    Cantaloupe or honeydew (1 cup cubed)    Dates and figs (5 whole)    Dried fruit    Grapefruit juice    Kiwi (1 large)    Luis    Nectarine, fresh    Orange, orange juice    Papaya (1/2 of a whole)    Pomegranate juice    Prunes or prune juice    Raisins, seedless  Low-potassium foods  Eating more than one serving can make a low-potassium food into a high-potassium food. Unless listed differently below, one serving is the same as a 1/2 cup, or one medium piece.  Fruits    Apricot, canned ( 1/2 cup, drained)    Apple (medium), applesauce, apple juice    Blueberries, fresh    Cherries, fresh    Cranberries, fresh    Fruit cocktail, canned (1 cup, drained)    Grapefruit    Grapes, grape juice    Mandarin orange    Peach (1 medium fresh or 1 cup halves, drained)    Pear (1 medium fresh or 1 cup halves, drained)    Plums    Pineapple (canned) or pineapple juice    Raspberries, fresh    Rhubarb    Strawberries, fresh    Tangerine    Watermelon (1 cup)  Vegetables    Asparagus (6 barraza)    Beets (canned or  pickled)    Broccoli, raw    Cabbage    Carrot, cooked    Cauliflower    Celery (1 stalk)    Corn, fresh    Cucumber    Eggplant    Green beans or waxed beans    Lettuce, iceberg    Mixed vegetables    Mushrooms, fresh    Onion    Peas    Peppers    Spinach, raw ( 1/2 cup)    Zucchini  For informational purposes only. Not to replace the advice of your health care provider.  Copyright   2004 [facility]. All rights reserved. [employer] [id number] - REV 12/15.        Controlling Your Potassium Intake  For Patients with Kidney Disease  What is potassium?  Potassium is a mineral that helps your heart and muscles work properly. It is found in your body and in the foods you eat.  Healthy kidneys control the amount of potassium in your body. They remove any extra potassium that is not needed. If you have kidney disease, this extra potassium can build up in your blood, causing an irregular heartbeat (your heart does not beat the way it should).  Food choices can help lower your potassium and prevent stress on your heart and muscles.  Most doctors and dietitians will recommend that you try to eat less than 2000 to 3000 mg potassium a day. To stay in this range, you may eat one serving of high-potassium food. It may be better to choose low-potassium foods instead. (See the following charts.)  Salt substitutes  Salt substitutes are sold in the grocery store. In these products, the salt (sodium) is often replaced with potassium salt.  You should avoid all salt substitutes unless your doctor says they are safe for you to eat. Examples include Kerr Salt Substitute, No Salt, Salt Substitute, Cardia and Lite Salt  Potassium chloride is used to relpace salt in many packaged foods, such as in canned soups and tomato products. Check the ingredients list and limit foods that have potassium chloride.  High-potassium foods (over 200 mg per serving)--limit to one serving per day  Unless listed differently below, one serving is  the same as a 1/2 cup, or one medium piece.  Vegetables    [place] or butternut squash    Artichoke    Beans (except green or waxed)    Beets, cooked    Bok tram    Broccoli, cooked    [place] sprouts (1 cup)    Carrots, raw    Chard    Chili peppers    Lentils    Mushrooms, cooked    Potato, sweet potato or yam    Potato chips or french fries (10)    Pumpkin    Spinach and other greens, cooked    Split peas    Tomatoes and tomato products    Vegetable juice (1 cup or 8 ounces)  Fruits    Apricots, (2 medium fresh or 5 1/2 dried)    Avocado    Banana    Cantaloupe or honeydew (1 cup cubed)    Dates and figs (5 whole)    Dried fruit    Grapefruit juice    Kiwi (1 large)    Benitez    Nectarine, fresh    Orange, orange juice    Papaya ( 1/2 of a whole)    Pomegranate juice    Prunes or prune juice    Raisins, seedless  Low-potassium foods  Eating more than one serving can make a low-potassium food into a high-potassium food. Unless listed differently below, one serving is the same as a  1/2 cup, or one medium piece.  Vegetables    Asparagus (6 barraza)    Bamboo shoots, canned    Beans, green or waxed    Beets, canned or pickled    Bell peppers    Broccoli, raw    Cabbage    Carrot, cooked    Cauliflower    Celery (1 stalk)    Corn, fresh    Cucumber    Eggplant    Kale    Lettuce, iceberg    Mixed vegetables    Mushrooms, fresh    Onion    Peas    Peppers    Spinach, raw ( 1/2 cup)    Summer squash, cooked    Zucchini  Fruits    Apricots, canned   ( 1/2 cup, drained)    Apple (medium), applesauce,   apple juice    Blueberries, fresh    Cherries, fresh    Cranberries, fresh    Fruit cocktail, canned (1 cup, drained)    Grapefruit    Honeydew melon    Cindy    Limes    Peach (1 medium fresh or 1 cup halves, drained)    Pear (1 medium fresh or 1 cup halves, drained)    Pineapple (canned) or pineapple juice    Plums    Raspberries, fresh    Rhubarb    Strawberries, fresh    Tangerine    Watermelon (1 cup)  For informational  purposes only. Not to replace the advice of your health care provider.  Copyright   2004 [place] [employer] [facility]. All rights reserved. Clinically reviewed by [employer].  En[employer] [id number] - REV 02/19      Cancer Care: Controlling Nausea and Vomiting  Nausea and vomiting are common side effects of chemotherapy and radiation therapy. Side effects occur when treatment changes some normal cells as well as cancer cells. In this case, it affects the cells lining your stomach. And it affects the part of your brain that controls vomiting.   Nausea is feeling that you need to throw up. Vomiting is when you do throw up. This is when your body forces food that is in your stomach out through your mouth.   Nausea and vomiting are common. They can be caused by many things, such as:     Stomach virus    Food poisoning    Stomach pain    Blockages in the digestive system    Constipation    Infection    Anxiety and stress  Other common causes are:    Head injury    Infection in the brain or inside the ear    Migraines    Brain tumor    Brain bruise or injury    Motion sickness    Alcohol    Pain medicines such as morphine    Certain treatments, such as chemotherapy and radiation therapy    Poisonous things (toxins), such as plants or liquids that you swallow by accident    Advanced types of cancer    Movement problems    Extra pressure in the fluid that surrounds the brain and the spinal cord  Sometimes belly pain and cramps happen along with nausea and vomiting. The symptoms can be mild and go away on their own. Other times they can be severe. They may need to be treated.   Nausea and vomiting with cancer  Nausea and vomiting can happen before, during, or after cancer treatments. But it can be controlled. Don t think it is a normal part of cancer and cancer treatment. If not treated, it can become serious. It can change the fluid and chemical balance in your body. It could even keep you from getting cancer treatment. Call  your healthcare provider right away if any of these occur:     You have nausea or vomiting that lasts 24 hours or more.    You can t take your antiemetics or they are not working. These medicines help ease or stop nausea or vomiting.    You have trouble keeping fluids down.    You become dizzy, lightheaded, or confused.    You have very dark urine or you stop urinating.  Talk with your healthcare provider about your treatment and the best way to handle any nausea and vomiting. Be sure you know how and when to use antiemetics. Also know when to call your healthcare provider.   Medicines can help  Nausea or vomiting can often be treated with medicines called antiemetics. You may take these before or after your cancer treatment. You may have to try different kinds or combinations of these medicines to feel better. But in most cases, nausea and vomiting can be eased.      Taken before meals, medicines can help ease nausea.      Eating tips    If you have medicines to control nausea, take them before meals as directed.    Don't eat fatty or greasy foods if you have nausea.    Eat small meals throughout the day.    Ask someone to sit with you while you eat. This can help to keep you from thinking about nausea.    Eat foods at room temperature or colder to limit strong smells.    Eat dry foods, such as toast, crackers, or pretzels. Also eat cool, light foods, such as applesauce. [place] foods, such as oatmeal or skinned chicken, are good, too.    Try to keep taking in clear fluids in small sips, or as ice chips, gelatin, or ice pops.    Other ways to feel better    Get a little fresh air. Take a short walk.    Talk to a friend, listen to music, or watch TV.    Take a few deep, slow breaths.    Eat by candlelight or in surroundings that you find relaxing.    Use a method to help you relax, such as guided imagery. Imagine yourself in a beautiful, restful scene. Or daydream about the place you d most like to be.    StayWell  last reviewed this educational content on 5/1/2019 2000-2021 The StayWell Company, [employer]. All rights reserved. This information is not intended as a substitute for professional medical care. Always follow your healthcare professional's instructions.          Hypokalemia  Hypokalemia means a low level of potassium in the blood. This most often occurs in people who take water pills (diuretics). It can also result from severe vomiting or diarrhea. You may also have it if you take laxatives for long periods of time. It sometimes happens if you have low magnesium (hypomagnesemia). If you have this, your healthcare provider will treat the low magnesium first.   A mild case of hypokalemia often causes no symptoms. It is only found with blood testing. More severe potassium loss causes:     Overall weakness    Muscle or stomach cramps    Rapid or irregular heartbeats (heart palpitations)    Low blood pressure    Muscle weakness    Short-term paralysis in some people  Home care    Take any potassium supplements as prescribed.    Eat foods rich in potassium. High amounts of potassium are found in baked potatoes, baked sweet potatoes, spinach, cantaloupe, cod, halibut, salmon, and scallops. White, red, or fernandez beans are also very good sources. So, too, are avocados, orange juice, bananas, carrots, and tomato juice.    If you take certain types of diuretics, you will also need to take potassium supplements. Talk with your healthcare provider.    Follow-up care  Follow up with your healthcare provider for a repeat blood test within the next week, or as advised by our staff.   When to seek medical advice  Call your healthcare provider right away if you have:     Increased weakness, fatigue, or muscle cramps    Dizziness  Call 911  Call 911 if you have:     Irregular heartbeat, extra beats, or very fast heart rate    Loss of consciousness  CheckBonus last reviewed this educational content on 6/1/2020 2000-2021 [employer]  [employer]. All rights reserved. This information is not intended as a substitute for professional medical care. Always follow your healthcare professional's instructions.

## 2021-11-20 NOTE — CONSULTS
[facility]    Gastroenterology Consultation    Date of Admission:  11/19/2021    History of Present Illness   Audelia Comer is a 56 year old female who presents with N/V and abd pain. She reports that she has had a very stressful couple of years with the Covid pandemic, losing her job, breast cancer diagnosis and more recently the loss of her daughter from unknown cardiac condition. She reports that she has been unable to sleep and has had increased anxiety which result in alcohol abuse to help with her anxiety and sleep.      She states she has had minimal p.o. intake and has had issues with nausea and vomiting.  She also has abdominal pain in her epigastric, left flank and back pain as well.  She says this comes and goes unclear if it is any worse recently. She estimates her alcohol use at about 3 drinks of vodka cranberry a night.  She recently was admitted to [facility] for the same reason which she was found to have possible IPMN during CT scan.      Assessment & Plan   Audelia Comer is a 56 year old female who was admitted on 11/19/2021. I was asked to see the patient for Etoh induced pancreatitis.  -Tbili normal w/ most elevation w/ AST>ALT which makes Etoh hepatitis and pancreatitis most likely  -recommend NPO today and possible clear liquid diet tmr unless pt has good appetite then can try clear liquid diet for dinner  -recommend IV PPI BID while NPO and PO BID once diet starts  -daily CRP, no need to follow up lipase  -large fluid resuscitation w/  ml/hr  -?IPMN 10 mm, no reported PD duct size likely due to limitation of CT, pt likely will need 1 baseline MRI to get all the characters and follow up per guideline afterwards, this is non-urgent given even if worst case as IPMN still relatively small size    [address] level 2 consult       Active Problems:    Acute pancreatitis    Hypokalemia    Nausea and vomiting, intractability of vomiting not specified, unspecified vomiting  type      Spencer Ospina MD  (Marco A)  [facility]  Office: [phone]  Cell: [phone], please feel free to call the cell    Code Status    Full Code      Primary Care Physician   Randee Ospina MD  (Marco A)  [facility]  Office: [phone]  Cell: [phone], please feel free to call the cell      Past Medical History   I have reviewed this patient's medical history and updated it with pertinent information if needed.   Past Medical History:   Diagnosis Date     Anxiety      Depressive disorder      Invasive ductal carcinoma of breast, right (H)      SBO (small bowel obstruction) (H)        Past Surgical History   I have reviewed this patient's surgical history and updated it with pertinent information if needed.  Past Surgical History:   Procedure Laterality Date     APPENDECTOMY       APPENDECTOMY  1989      SECTION  1984     GYN SURGERY      c section      MASTECTOMY, BILATERAL Bilateral     With reconstructive surgery     SOFT TISSUE SURGERY      breast implants       Prior to Admission Medications   Prior to Admission Medications   Prescriptions Last Dose Informant Patient Reported? Taking?   LORazepam (ATIVAN) 0.5 MG tablet 2021 at Unknown time  Yes Yes   Sig: Take 0.5-1 mg by mouth 2 times daily as needed for anxiety   azelastine (ASTELIN) 0.1 % nasal spray  at PRN  Yes Yes   Sig: Spray 1 spray into both nostrils daily as needed    cetirizine (ZYRTEC) 10 MG tablet  at PRN  Yes Yes   Sig: Take 10 mg by mouth daily as needed    escitalopram (LEXAPRO) 5 MG tablet  at PRN  No Yes   Sig: Take 1 tablet (5 mg) by mouth daily   fluticasone (FLONASE) 50 MCG/ACT nasal spray  at PRN  Yes Yes   Sig: Spray 1 spray into both nostrils daily as needed    hydrOXYzine (ATARAX) 25 MG tablet  at PRN  Yes Yes   Sig: Take 25 mg by mouth every 6 hours as needed for itching   multivitamin w/minerals (THERA-VIT-M) tablet Past  Week at Unknown time  No Yes   Sig: Take 1 tablet by mouth daily   omeprazole (PRILOSEC) 20 MG DR capsule  at PRN  Yes Yes   Sig: Take 20 mg by mouth daily    ondansetron (ZOFRAN ODT) 4 MG ODT tab  at PRN  No Yes   Sig: Take 1 tablet (4 mg) by mouth every 6 hours as needed for nausea   potassium chloride ER (K-TAB) 20 MEQ CR tablet Past Week at Unknown time  Yes Yes   Sig: Take 20 mEq by mouth daily      Facility-Administered Medications: None     Allergies   No Known Allergies    Social History   I have reviewed this patient's social history and updated it with pertinent information if needed. Audelia Comer  reports that she has been smoking. She has been smoking about 1.00 pack per day. She has never used smokeless tobacco. She reports current alcohol use. She reports that she does not use drugs.    Family History   I have reviewed this patient's family history and updated it with pertinent information if needed.   Family History   Problem Relation Age of Onset     Colon Cancer Maternal Grandmother      Lung Cancer Paternal Grandmother      Breast Cancer Paternal Grandmother        Review of Systems   The 10 point Review of Systems is negative other than noted in the HPI or here.     Physical Exam   Temp: 97.8  F (36.6  C) Temp src: Oral BP: 120/82 Pulse: 96   Resp: 16 SpO2: 96 % O2 Device: None (Room air)    Vital Signs with Ranges  Temp:  [97.8  F (36.6  C)-98.4  F (36.9  C)] 97.8  F (36.6  C)  Pulse:  [] 96  Resp:  [16-18] 16  BP: (116-143)/(82-96) 120/82  SpO2:  [96 %-100 %] 96 %  98 lbs 0 oz    Exam:  Constitutional: healthy, alert and no distress  Head: Normocephalic. No masses, lesions, tenderness or abnormalities  Neck: Neck supple. No adenopathy. Thyroid symmetric, normal size,, Carotids without bruits.  ENT: ENT exam normal, no neck nodes or sinus tenderness  Cardiovascular: negative, PMI normal. No lifts, heaves, or thrills. RRR. No murmurs, clicks gallops or rub  Respiratory: negative,  Percussion normal. Good diaphragmatic excursion. Lungs clear  Gastrointestinal: Abdomen soft, tender at epigastric area and LUQ. BS normal. No masses, organomegaly  : Deferred  Musculoskeletal: extremities normal- no gross deformities noted, gait normal and normal muscle tone  Skin: no suspicious lesions or rashes  Neurologic: Gait normal. Reflexes normal and symmetric. Sensation grossly WNL.  Psychiatric: mentation appears normal and affect normal/bright  Hematologic/Lymphatic/Immunologic: Normal cervical lymph nodes     Data   Results for orders placed or performed during the hospital encounter of 11/19/21 (from the past 24 hour(s))   CBC with platelets + differential    Narrative    The following orders were created for panel order CBC with platelets + differential.  Procedure                               Abnormality         Status                     ---------                               -----------         ------                     CBC with platelets and d...[[id number]]  Abnormal            Final result                 Please view results for these tests on the individual orders.   Comprehensive metabolic panel   Result Value Ref Range    Sodium 131 (L) 133 - 144 mmol/L    Potassium 2.7 (L) 3.4 - 5.3 mmol/L    Chloride 97 94 - 109 mmol/L    Carbon Dioxide (CO2) 18 (L) 20 - 32 mmol/L    Anion Gap 16 (H) 3 - 14 mmol/L    Urea Nitrogen 4 (L) 7 - 30 mg/dL    Creatinine 0.46 (L) 0.52 - 1.04 mg/dL    Calcium 8.2 (L) 8.5 - 10.1 mg/dL    Glucose 57 (L) 70 - 99 mg/dL    Alkaline Phosphatase 201 (H) 40 - 150 U/L     (H) 0 - 45 U/L    ALT 83 (H) 0 - 50 U/L    Protein Total 6.9 6.8 - 8.8 g/dL    Albumin 3.1 (L) 3.4 - 5.0 g/dL    Bilirubin Total 1.0 0.2 - 1.3 mg/dL    GFR Estimate >90 >60 mL/min/1.73m2   Lipase   Result Value Ref Range    Lipase 1,369 (H) 73 - 393 U/L   Alcohol level blood   Result Value Ref Range    Alcohol ethyl 0.06 (H) <=0.01 g/dL   CBC with platelets and differential   Result Value Ref  Range    WBC Count 9.4 4.0 - 11.0 10e3/uL    RBC Count 3.56 (L) 3.80 - 5.20 10e6/uL    Hemoglobin 11.9 11.7 - 15.7 g/dL    Hematocrit 33.5 (L) 35.0 - 47.0 %    MCV 94 78 - 100 fL    MCH 33.4 (H) 26.5 - 33.0 pg    MCHC 35.5 31.5 - 36.5 g/dL    RDW 13.4 10.0 - 15.0 %    Platelet Count 143 (L) 150 - 450 10e3/uL    % Neutrophils 78 %    % Lymphocytes 11 %    % Monocytes 10 %    % Eosinophils 1 %    % Basophils 0 %    % Immature Granulocytes 0 %    NRBCs per 100 WBC 0 <1 /100    Absolute Neutrophils 7.3 1.6 - 8.3 10e3/uL    Absolute Lymphocytes 1.0 0.8 - 5.3 10e3/uL    Absolute Monocytes 1.0 0.0 - 1.3 10e3/uL    Absolute Eosinophils 0.1 0.0 - 0.7 10e3/uL    Absolute Basophils 0.0 0.0 - 0.2 10e3/uL    Absolute Immature Granulocytes 0.0 <=0.0 10e3/uL    Absolute NRBCs 0.0 10e3/uL   [place] Draw    Narrative    The following orders were created for panel order [place] Draw.  Procedure                               Abnormality         Status                     ---------                               -----------         ------                     Extra Red Top Tube[[id number]]                               Final result               Extra Green Top (Lithium...[[id number]]                      Final result                 Please view results for these tests on the individual orders.   Extra Red Top Tube   Result Value Ref Range    Hold Specimen JIC    Extra Green Top (Lithium Heparin) Tube   Result Value Ref Range    Hold Specimen JIC    Magnesium   Result Value Ref Range    Magnesium 1.5 (L) 1.6 - 2.3 mg/dL   EKG 12 lead   Result Value Ref Range    Systolic Blood Pressure  mmHg    Diastolic Blood Pressure  mmHg    Ventricular Rate 94 BPM    Atrial Rate 94 BPM    CA Interval 138 ms    QRS Duration 82 ms     ms    QTc 500 ms    P Axis 76 degrees    R AXIS 82 degrees    T Axis 78 degrees    Interpretation ECG       Sinus rhythm  Prolonged QT  Abnormal ECG  When compared with ECG of 24-OCT-2021 13:53,  No significant change  was found  Confirmed by GENERATED REPORT, COMPUTER ([id number]),  JUANY BATEMAN ([id number]) on 11/19/2021 8:50:46 PM     Asymptomatic COVID-19 Virus (Coronavirus) by PCR Nasopharyngeal    Specimen: Nasopharyngeal; Swab   Result Value Ref Range    SARS CoV2 PCR Negative Negative    Narrative    Testing was performed using the xiao  SARS-CoV-2 & Influenza A/B Assay on the xiao  Renay  System.  This test should be ordered for the detection of SARS-COV-2 in individuals who meet SARS-CoV-2 clinical and/or epidemiological criteria. Test performance is unknown in asymptomatic patients.  This test is for in vitro diagnostic use under the FDA EUA for laboratories certified under CLIA to perform moderate and/or high complexity testing. This test has not been FDA cleared or approved.  A negative test does not rule out the presence of PCR inhibitors in the specimen or target RNA in concentration below the limit of detection for the assay. The possibility of a false negative should be considered if the patient's recent exposure or clinical presentation suggests COVID-19.  [facility] are certified under the Clinical Laboratory Improvement Amendments of 1988 (CLIA-88) as qualified to perform moderate and/or high complexity laboratory testing.   Glucose by meter   Result Value Ref Range    GLUCOSE BY METER POCT 51 (L) 70 - 99 mg/dL   Glucose by meter   Result Value Ref Range    GLUCOSE BY METER POCT 160 (H) 70 - 99 mg/dL   Potassium   Result Value Ref Range    Potassium 2.9 (L) 3.4 - 5.3 mmol/L   Magnesium   Result Value Ref Range    Magnesium 2.4 (H) 1.6 - 2.3 mg/dL   Phosphorus   Result Value Ref Range    Phosphorus 1.2 (L) 2.5 - 4.5 mg/dL   Glucose by meter   Result Value Ref Range    GLUCOSE BY METER POCT 84 70 - 99 mg/dL   Comprehensive metabolic panel   Result Value Ref Range    Sodium 136 133 - 144 mmol/L    Potassium 3.5 3.4 - 5.3 mmol/L    Chloride 107 94 - 109 mmol/L    Carbon Dioxide (CO2) 20 20 - 32 mmol/L     Anion Gap 9 3 - 14 mmol/L    Urea Nitrogen 3 (L) 7 - 30 mg/dL    Creatinine 0.43 (L) 0.52 - 1.04 mg/dL    Calcium 7.1 (L) 8.5 - 10.1 mg/dL    Glucose 87 70 - 99 mg/dL    Alkaline Phosphatase 185 (H) 40 - 150 U/L     (H) 0 - 45 U/L    ALT 72 (H) 0 - 50 U/L    Protein Total 6.7 (L) 6.8 - 8.8 g/dL    Albumin 2.9 (L) 3.4 - 5.0 g/dL    Bilirubin Total 1.0 0.2 - 1.3 mg/dL    GFR Estimate >90 >60 mL/min/1.73m2   CBC with platelets   Result Value Ref Range    WBC Count 10.5 4.0 - 11.0 10e3/uL    RBC Count 3.50 (L) 3.80 - 5.20 10e6/uL    Hemoglobin 11.7 11.7 - 15.7 g/dL    Hematocrit 33.6 (L) 35.0 - 47.0 %    MCV 96 78 - 100 fL    MCH 33.4 (H) 26.5 - 33.0 pg    MCHC 34.8 31.5 - 36.5 g/dL    RDW 13.9 10.0 - 15.0 %    Platelet Count 155 150 - 450 10e3/uL   Phosphorus   Result Value Ref Range    Phosphorus 1.1 (L) 2.5 - 4.5 mg/dL   UA with Microscopic reflex to Culture    Specimen: Urine, Clean Catch   Result Value Ref Range    Color Urine Light Yellow Colorless, Straw, Light Yellow, Yellow    Appearance Urine Clear Clear    Glucose Urine Negative Negative mg/dL    Bilirubin Urine Negative Negative    Ketones Urine 40  (A) Negative mg/dL    Specific Gravity Urine 1.007 1.003 - 1.035    Blood Urine Negative Negative    pH Urine 5.5 5.0 - 7.0    Protein Albumin Urine Negative Negative mg/dL    Urobilinogen Urine Normal Normal, 2.0 mg/dL    Nitrite Urine Negative Negative    Leukocyte Esterase Urine Trace (A) Negative    RBC Urine 0 <=2 /HPF    WBC Urine 3 <=5 /HPF    Squamous Epithelials Urine <1 <=1 /HPF    Narrative    Urine Culture not indicated   Glucose by meter   Result Value Ref Range    GLUCOSE BY METER POCT 96 70 - 99 mg/dL   Glucose by meter   Result Value Ref Range    GLUCOSE BY METER POCT 112 (H) 70 - 99 mg/dL

## 2021-11-20 NOTE — CONSULTS
Care Transitions SW note  Received a referral from MH professional Aida Farfan, who completed a consult with patient today.  Aida reports patient is requesting home care assistance as she is so weak from weight loss and lack of activity.  Aida would also like a case management referral made.  Aida believes patient should qualify due to patient's major depression and anxiety.    Patient's insurance is [employer].  On Monday, writer can make calls to [facility] services and also ask [facility] to determine if patient will qualify for  case management thru either agency.    Aida also has recommended a CD evaluation and recommended to patient enrollment into an Intensive Out Patient Dual program, though patient did not agree to this.  Aida is hopeful if CD counselor recommends this plan patient will start to consider.   The CD consult can be completed on Monday.  Aida confired with a  MH NP who made medication recommendations for treatment of patient's depression and anxiety. Aida passed on these recommendations for CD evaluation and medications to Dr Boudreaux  It is noted patient is on Ativan which is not recommended for use of anxiety in a patient with alcohol abuse.   Plan;  Will meet with patient and assist with discharge planning.

## 2021-11-20 NOTE — PROGRESS NOTES
[facility]    Medicine Progress Note - Hospitalist Service       Date of Admission:  2021    Assessment & Plan         Acute alcoholic pancreatitis  Alcoholic hepatitis  Possible small pancreatic head cyst  Recent admit 10/24- with pancreatitis thought 2/2 etoh use. US without biliary pathology. With ongoing poor PO, n/v, abdominal pain. Called 911 when she weighed herself and was 98.8 lbs (previous hospital at 105 lbs). Has had some falls, lightheadedness. Mildly tachy in ED, other vitals stable. Lipase 1,369. AST//83. Alk phos 201, bili ok.     Continue intravenous fluid with LR    Analgesics as needed     Clear liquids     Proton pump inhibitor     GI following      Hypoglycemia  Recent Labs   Lab 21  0913 21  0603 21  0324 21  0152 21  2249 21  2217   * 96 87 84 160* 51*   Glucose 57 on presentation. Suspect related to malnutrition. Not on antihyperglycemics.     Monitor      AG acidosis  Suspect starvation, alcoholic ketoacidosis.   No serum ketones or lactic acid but serum HCO3 is improving so will not pursue at this time.  The patient is clinically stable.    Monitor AG    Prolonged QT  Noted on EKG on admission at 500. Likely 2/2 electrolyte disorders.     Repeat EKG tomorrow     Avoid QT prolonging agents as able      Alcohol use disorder  History  alcohol use, admitted several times for abdominal pain and Etoh use. Etoh level 0.06 in ED.   - CIWA  - Vitamins  - She had a mental health evaluation today- see below     Hypokalemia  Hypomagnesemia  Hyponatremia  Hypocalcemia   Hypophosphatemia    Hypokalemia severe, 2.7 on admission. HypoMg at 1.5. Hyponatremia mild at 131, likely 2/2 dehydration.     Monitor electrolytes and replace as needed     Depression   Anxiety  Alcohol use disorder  [escitalopram 5 mg daily, lorazepam 0.5-1.0 BID prn]  Hx depression. Daughter  a couple of months ago. States uses Etoh and lorazepam  at HS so she can sleep.   She was seen by MH today and Aida Farfan discussed the case with the psychiatric DANIELLA on-call.   Recommendations:    Increase escitalopram     Add mirtazapine at bedtime and quetiapine bid prn       Breast cancer  S/p bilateral mastectomy 8/2021, T1bN0 disease. Follows with oncology through [facility].       Malnutrition, severe  Dx recent admission 10/2021. Albumin 3.1 on presentation.     Nutrition consult      Tobacco use disorder  Hx tobacco use 40+ years at 1 ppd. Has cut back to 1/2 ppd as has been largely bedbound.      Possible small pancreatic head cyst     Diet: NPO for Medical/Clinical Reasons Except for: Ice Chips    DVT Prophylaxis: Pneumatic Compression Devices  Patino Catheter: Not present  Central Lines  : None  Code Status: Full Code      Disposition Plan   Expected discharge: 11/22/2021   recommended to prior living arrangement once pancreatitis improves.     The patient's care was discussed with the Patient.    Carmelo Boudreaux MD  Hospitalist Service  [facility]  Securely message with the Vocera Web Console (learn more here)  Text page via [employer] Paging/Directory    Clinically Significant Risk Factors Present on Admission                   ______________________________________________________________________    Interval History   Still having pain but no nausea or vomiting- she is hungry.      Data reviewed today: I reviewed all medications, new labs and imaging results over the last 24 hours. I personally reviewed no images or EKG's today.    Physical Exam   Vital Signs: Temp: 97.8  F (36.6  C) Temp src: Oral BP: 120/82 Pulse: 96   Resp: 16 SpO2: 96 % O2 Device: None (Room air)    Weight: 98 lbs 0 oz  Constitutional: awake, alert, cooperative, no apparent distress  Respiratory: No increased work of breathing, good air exchange, clear to auscultation bilaterally, no crackles or wheezing  Cardiovascular: regular rate and rhythm, normal  S1 and S2, no S3 or S4, and no murmur noted  GI:normal bowel sounds, soft, non-distended, mildly tender throughout  Skin: no rashes  Neuropsychiatric: General: normal, calm and normal eye contact    Data   Recent Labs   Lab 11/20/21  0913 11/20/21  0603 11/20/21  0324 11/20/21  0152 11/19/21  2354 11/19/21  2217 11/19/21 2021   WBC  --   --  10.5  --   --   --  9.4   HGB  --   --  11.7  --   --   --  11.9   MCV  --   --  96  --   --   --  94   PLT  --   --  155  --   --   --  143*   NA  --   --  136  --   --   --  131*   POTASSIUM  --   --  3.5  --  2.9*  --  2.7*   CHLORIDE  --   --  107  --   --   --  97   CO2  --   --  20  --   --   --  18*   BUN  --   --  3*  --   --   --  4*   CR  --   --  0.43*  --   --   --  0.46*   ANIONGAP  --   --  9  --   --   --  16*   FRANCOISE  --   --  7.1*  --   --   --  8.2*   * 96 87   < >  --    < > 57*   ALBUMIN  --   --  2.9*  --   --   --  3.1*   PROTTOTAL  --   --  6.7*  --   --   --  6.9   BILITOTAL  --   --  1.0  --   --   --  1.0   ALKPHOS  --   --  185*  --   --   --  201*   ALT  --   --  72*  --   --   --  83*   AST  --   --  121*  --   --   --  157*   LIPASE  --   --   --   --   --   --  1,369*    < > = values in this interval not displayed.     No results found for this or any previous visit (from the past 24 hour(s)).  Medications     lactated ringers 200 mL/hr at 11/20/21 1054       folic acid  1 mg Intravenous Daily     LORazepam  0.5-1 mg Oral Q6H     nicotine  1 patch Transdermal Once     nicotine  1 patch Transdermal Daily     nicotine   Transdermal Q8H     pantoprazole (PROTONIX) IV  40 mg Intravenous BID     potassium & sodium phosphates  2 packet Oral or Feeding Tube TID     sodium chloride (PF)  3 mL Intracatheter Q8H     thiamine  100 mg Intravenous Daily

## 2021-11-20 NOTE — PLAN OF CARE
RECEIVING UNIT ED HANDOFF REVIEW    ED Nurse Handoff Report was reviewed by: Hamlet Hansen RN on November 19, 2021 at 11:01 PM

## 2021-11-20 NOTE — PHARMACY-ADMISSION MEDICATION HISTORY
Pharmacy Medication History  Admission medication history interview status for the 11/19/2021 admission is complete. See EPIC admission navigator for prior to admission medications     Location of Interview: Patient room  Medication history sources: Patient and Surescripts    Significant changes made to the medication list:  None    In the past week, patient estimated taking medication this percent of the time: Unknown - patient reports taking almost all of her medications PRN    Additional medication history information:   Patient does not know her medications well and does not seem to take all of them daily except lorazepam and Zofran.    Medication reconciliation completed by provider prior to medication history? No    Time spent in this activity: 10 minutes    Prior to Admission medications    Medication Sig Last Dose Taking? Auth Provider   azelastine (ASTELIN) 0.1 % nasal spray Spray 1 spray into both nostrils daily as needed   at PRN Yes Unknown, Entered By History   cetirizine (ZYRTEC) 10 MG tablet Take 10 mg by mouth daily as needed   at PRN Yes Unknown, Entered By History   escitalopram (LEXAPRO) 5 MG tablet Take 1 tablet (5 mg) by mouth daily  at PRN Yes Hong Benitez MD   fluticasone (FLONASE) 50 MCG/ACT nasal spray Spray 1 spray into both nostrils daily as needed   at PRN Yes Unknown, Entered By History   hydrOXYzine (ATARAX) 25 MG tablet Take 25 mg by mouth every 6 hours as needed for itching  at PRN Yes Unknown, Entered By History   LORazepam (ATIVAN) 0.5 MG tablet Take 0.5-1 mg by mouth 2 times daily as needed for anxiety 11/18/2021 at Unknown time Yes Unknown, Entered By History   multivitamin w/minerals (THERA-VIT-M) tablet Take 1 tablet by mouth daily Past Week at Unknown time Yes Hong Benitez MD   omeprazole (PRILOSEC) 20 MG DR capsule Take 20 mg by mouth daily   at PRN Yes Reported, Patient   ondansetron (ZOFRAN ODT) 4 MG ODT tab Take 1 tablet (4 mg) by mouth every 6 hours as needed for nausea   at PRN Yes Hong Benitez MD   potassium chloride ER (K-TAB) 20 MEQ CR tablet Take 20 mEq by mouth daily Past Week at Unknown time Yes Unknown, Entered By History       The information provided in this note is only as accurate as the sources available at the time of update(s)

## 2021-11-20 NOTE — ED NOTES
Pt reports her daughter passed away two months ago - has been drinking nightly just to be able to sleep. Also reporting two falls within the last week - bruising noted to right inner wrist and right medial hand.

## 2021-11-20 NOTE — PROGRESS NOTES
MD Notification    Notified Person: MD    Notified Person Name: Janusz     Notification Date/Time: 11/20/2021, 0900, 1130     Notification Interaction: web-based paging     Purpose of Notification:    Hello, pt. NPO except ice chips. Needs phos replacement. Is PO okay or would you prefer IV replacement? Thank you!    Hi, pt. has two continuous IV fluid orders. Lactated ringers just added. Wondering which one you want to have running. Thanks!    Orders Received:  -can have replacement PO   -Stop first order of IV fluids, continue LR       Comments:

## 2021-11-20 NOTE — H&P
[facility]    History and Physical  Hospitalist       Date of Admission:  2021  Date of Service (when I saw the patient): 21    Assessment & Plan   Audelia Comer is a 56 year old female who presents with n/v, weight loss    Acute alcoholic pancreatitis  Alcoholic hepatitis  Recent admit 10/24- with pancreatitis thought 2/2 etoh use. US without biliary pathology. With ongoing poor PO, n/v, abdominal pain. Called 911 when she weighed herself and was 98.8 lbs (previous hospital at 105 lbs). Has had some falls, lightheadedness. Mildly tachy in ED, other vitals stable. Lipase 1,369. AST//83. Alk phos 201, bili ok.   - NPO, IV fluids  - GI consult  - prn analgesics, antiemetics  - BID pantoprazole 40 mg IV    Hypoglycemia  Glucose 57 on presentation. Suspect related to malnutrition. Not on antihyperglycemics.   - D5 in IV fluids  - prn D50 injections  - q4 hour blood sugars x 48 hours    AG acidosis  Suspect starvation, alcoholic ketoacidosis.   - D5 in fluids  - monitor    Prolonged QT  Noted on EKG on admission at 500. Likely 2/2 electrolyte disorders.   - repeat EKG when 'lytes corrected  - avoid QT prolonging agents    Alcohol use disorder  Hx alcohol use, admitted several times for abdominal pain and Etoh use. Etoh level 0.06 in ED.   - CIWA  - vitamins  - prn lorazepam    Hypokalemia  Hypomagnesemia  Hyponatremia  Hypokalemia severe, 2.7 on admission. HypoMg at 1.5. Hyponatremia mild at 131, likely 2/2 dehydration.   - check Phos  - replace K, Mg per protocol  - monitor Na with hydration    Depression   Anxiety  [escitalopram 5 mg daily, lorazepam 0.5-1.0 BID prn]  Hx depression. Daughter  a couple of months ago. States uses Etoh and lorazepam at HS so she can sleep.   - psychiatry consult  - prn lorazepam (in addition to above) to avoid withdrawal    Breast cancer  S/p bilateral mastectomy 2021, T1bN0 disease. Follows with oncology through [facility].    -  defer to outpatient    Malnutrition, severe  Dx recent admission 10/2021. Albumin 3.1 on presentation.   - nutrition consult once taking PO    Tobacco use disorder  Hx tobacco use 40+ years at 1 ppd. Has cut back to 1/2 ppd as has been largely bedbound.     Possible small pancreatic head cyst  Previous admission 10/2021, IPMN not ruled out  - recommended outpt GI follow up    COVID-19 negative    DVT Prophylaxis: Pneumatic Compression Devices and Ambulate every shift  Code Status: Full Code    Disposition: Expected discharge in 3-4 days     Mt Kelley MD  [phone] (P)  Text Page     Primary Care Physician   Randee Woods    Chief Complaint   Abdominal pain, poor po, n/v    History is obtained from the patient and medical records    History of Present Illness   Audelia Comer is a 56 year old female who presents with nausea vomiting, poor p.o. intake and abdominal pain.  She was recently hospitalized at [facility] for pancreatitis likely secondary to alcohol, severe malnutrition, depression, and anxiety.  She reports that she has had a very stressful couple of years with the Covid pandemic, losing her job, breast cancer diagnosis and more recently the loss of her daughter from unknown cardiac condition.  She reports that she has been unable to sleep and has had increased anxiety.  She admits that she uses alcohol to help with her anxiety and sleep.  She notes that her mind keeps racing making it difficult to sleep.  She states she has had minimal p.o. intake and has had issues with nausea and vomiting.  She also has abdominal pain in her epigastric, left flank and back pain as well.  She says this comes and goes unclear if it is any worse recently.  She denies fevers but does have chills and hot flashes.  She does endorse diarrhea.  Denies any chest pain or shortness of breath.  She estimates her alcohol use at about 3 drinks of vodka cranberry a night.  She started seeing counseling for  the loss of her daughter.    Past Medical History    I have reviewed this patient's medical history and updated it with pertinent information if needed.   Past Medical History:   Diagnosis Date     Anxiety      Depressive disorder      Invasive ductal carcinoma of breast, right (H)      SBO (small bowel obstruction) (H)        Past Surgical History   I have reviewed this patient's surgical history and updated it with pertinent information if needed.  Past Surgical History:   Procedure Laterality Date     APPENDECTOMY       APPENDECTOMY  1989      SECTION  1984     GYN SURGERY      c section      MASTECTOMY, BILATERAL Bilateral     With reconstructive surgery     SOFT TISSUE SURGERY      breast implants       Prior to Admission Medications   Prior to Admission Medications   Prescriptions Last Dose Informant Patient Reported? Taking?   LORazepam (ATIVAN) 0.5 MG tablet 2021 at Unknown time  Yes Yes   Sig: Take 0.5-1 mg by mouth 2 times daily as needed for anxiety   azelastine (ASTELIN) 0.1 % nasal spray  at PRN  Yes Yes   Sig: Spray 1 spray into both nostrils daily as needed    cetirizine (ZYRTEC) 10 MG tablet  at PRN  Yes Yes   Sig: Take 10 mg by mouth daily as needed    escitalopram (LEXAPRO) 5 MG tablet  at PRN  No Yes   Sig: Take 1 tablet (5 mg) by mouth daily   fluticasone (FLONASE) 50 MCG/ACT nasal spray  at PRN  Yes Yes   Sig: Spray 1 spray into both nostrils daily as needed    hydrOXYzine (ATARAX) 25 MG tablet  at PRN  Yes Yes   Sig: Take 25 mg by mouth every 6 hours as needed for itching   multivitamin w/minerals (THERA-VIT-M) tablet   No Yes   Sig: Take 1 tablet by mouth daily   omeprazole (PRILOSEC) 20 MG DR capsule  at PRN  Yes Yes   Sig: Take 20 mg by mouth daily    ondansetron (ZOFRAN ODT) 4 MG ODT tab  at PRN  No Yes   Sig: Take 1 tablet (4 mg) by mouth every 6 hours as needed for nausea   potassium chloride ER (K-TAB) 20 MEQ CR tablet   Yes Yes   Sig: Take 20 mEq by mouth daily       Facility-Administered Medications: None     Allergies   No Known Allergies    Social History   I have reviewed this patient's social history and updated it with pertinent information if needed. Audelia Comer  reports that she has been smoking. She has been smoking about 1.00 pack per day. She has never used smokeless tobacco. She reports current alcohol use. She reports that she does not use drugs.    Family History   I have reviewed this patient's family history and updated it with pertinent information if needed.   Family History   Problem Relation Age of Onset     Colon Cancer Maternal Grandmother      Lung Cancer Paternal Grandmother      Breast Cancer Paternal Grandmother        Review of Systems   The 10 point Review of Systems is negative other than noted in the HPI or here.     Physical Exam   Temp: 98.4  F (36.9  C) Temp src: Oral BP: (!) 123/96 Pulse: 103   Resp: 16 SpO2: 100 % O2 Device: None (Room air)    Vital Signs with Ranges  98 lbs 0 oz    Constitutional: alert, oriented. cachectic  Eyes: EOMI, PERRL  HEENT: OP clear  Respiratory: CTA B without w/c  Cardiovascular: RRR no murmur. no edema.  GI: soft, tenderness to light exam in upper abdomen. BS present  Lymph/Hematologic: no cervical LAD  Genitourinary: deferred  Skin: no rashes or lesions grossly  Musculoskeletal: no deformities or arthritis  Neurologic: CN II-XII, CARTAGENA  Psychiatric: anxious, tearful    Data   Data reviewed today:  I personally reviewed the EKG tracing showing prolonged QT.  Recent Labs   Lab 11/19/21 2021   WBC 9.4   HGB 11.9   MCV 94   *   *   POTASSIUM 2.7*   CHLORIDE 97   CO2 18*   BUN 4*   CR 0.46*   ANIONGAP 16*   FRANCOISE 8.2*   GLC 57*   ALBUMIN 3.1*   PROTTOTAL 6.9   BILITOTAL 1.0   ALKPHOS 201*   ALT 83*   *   LIPASE 1,369*       No results found for this or any previous visit (from the past 24 hour(s)).

## 2021-11-20 NOTE — PROVIDER NOTIFICATION
MD Notification    Notified Person: MD    Notified Person Name: Dr. Wells    Notification Date/Time:11/20/21 1am    Notification Interaction: [employer]com    Purpose of Notification:  Phosphorous 1.2  Patient does not have replacement protocol or orders for nurse to replace.  Requesting order to replace.    Orders Received:    Comments: order received

## 2021-11-20 NOTE — ED NOTES
Bed: ED21  Expected date:   Expected time:   Means of arrival:   Comments:  Alla 56F nausea/vomiting- to triage eta 2001

## 2021-11-20 NOTE — ED PROVIDER NOTES
History     Chief Complaint:  Nausea & Vomiting (n/v/d x couple weeks. unable to keep much down. lost 10-15 pounds within past couple weeks. )       SARA Comer is a 56 year old female who was admitted to hospital from 10 24-10 29 with pancreatitis that was thought to be due to alcohol use.  She also had abdominal pain.  The patient has been more depressed and listless since her daughter  a couple of months ago.  She states that she has had nausea and vomiting and intermittent diarrhea for months.  She called 911 tonight when she weighed herself and found her to be 98  pounds.  On her previous hospitalization she was 105.  She states she really does not have an appetite.  She tries to eat things but will throw up most of the times.  She complains of pain in the same region upper to left upper quadrant abdomen.  She states she drinks about 2 drinks a night with her lorazepam just so she can sleep.  She does not think that it is more than 2 shots and does not drink every night.  She still a smoke tobacco and being that she is in bed so much she is cut down to about a half a pack a day.  She has fallen recently and she thinks that this is due to her generalized weak and malnourished attrition state.  She does feel lightheaded at times when she stands up.  She has not hit her head or loss consciousness.  She denies these episodes as occurring after she is drinks and takes her lorazepam.  Of note she did have a bilateral mastectomy earlier this year.  Follows with oncology for this is not getting any further treatment at this time..    ROS:  Review of Systems   Constitutional: Negative for fever.   Respiratory: Negative for cough.    Gastrointestinal: Positive for abdominal pain, diarrhea, nausea and vomiting.   Genitourinary: Negative for dysuria.   All other systems reviewed and are negative.       Allergies:  No Known Allergies     Medications:    azelastine (ASTELIN) 0.1 % nasal spray  cetirizine  (ZYRTEC) 10 MG tablet  escitalopram (LEXAPRO) 5 MG tablet  fluticasone (FLONASE) 50 MCG/ACT nasal spray  hydrOXYzine (ATARAX) 25 MG tablet  LORazepam (ATIVAN) 0.5 MG tablet  multivitamin w/minerals (THERA-VIT-M) tablet  omeprazole (PRILOSEC) 20 MG DR capsule  ondansetron (ZOFRAN ODT) 4 MG ODT tab  potassium chloride ER (K-TAB) 20 MEQ CR tablet        Past Medical History:    Past Medical History:   Diagnosis Date     Anxiety      Depressive disorder      Invasive ductal carcinoma of breast, right (H)      SBO (small bowel obstruction) (H)      Patient Active Problem List   Diagnosis     Ileitis     Acute pancreatitis     Hepatitis     Acute gastritis without hemorrhage, unspecified gastritis type     Hypokalemia     Nausea and vomiting, intractability of vomiting not specified, unspecified vomiting type        Past Surgical History:    Past Surgical History:   Procedure Laterality Date     APPENDECTOMY       APPENDECTOMY  1989      SECTION  1984     GYN SURGERY      c section      MASTECTOMY, BILATERAL Bilateral     With reconstructive surgery     SOFT TISSUE SURGERY      breast implants        Family History:    family history includes Breast Cancer in her paternal grandmother; Colon Cancer in her maternal grandmother; Lung Cancer in her paternal grandmother.    Social History:   reports that she has been smoking. She has been smoking about 1.00 pack per day. She has never used smokeless tobacco. She reports current alcohol use. She reports that she does not use drugs.  PCP: Randee Woods     Physical Exam     Patient Vitals for the past 24 hrs:   BP Temp Temp src Pulse Resp SpO2 Weight   21 (!) 123/96 -- -- 103 -- 100 % --   21 124/84 -- -- -- -- 99 % --   21 (!) 143/91 -- -- -- -- -- --   21 -- 98.4  F (36.9  C) Oral 113 16 99 % 44.5 kg (98 lb)        Physical Exam    Physical Exam   Constitutional:  Patient is oriented to person, place, and time.  They appear well-developed and well-nourished. Mild distress secondary to abdominal pain  HENT:   Mouth/Throat:   Oropharynx is clear and moist.   Eyes:    Conjunctivae normal and EOM are normal. Pupils are equal, round, and reactive to light.   Neck:    Normal range of motion.   Cardiovascular: Mildly tachycardic with regular rhythm and normal heart sounds.  Exam reveals no gallop and no friction rub.  No murmur heard.  Pulmonary/Chest:  Effort normal and breath sounds normal. Patient has no wheezes. Patient has no rales.   Abdominal:   Soft. Bowel sounds are normal. Patient exhibits no mass. There is tenderness in the upper abdomen from epigastric to left upper quadrant.. There is no rebound and no guarding.   Musculoskeletal:  Normal range of motion. Patient exhibits no edema.   Neurological:   Patient is alert and oriented to person, place, and time. Patient has normal strength. No cranial nerve deficit or sensory deficit. GCS 15  Skin:   Skin is warm and dry. No rash noted. No erythema.   Psychiatric:   Patient has a normal mood and affect. Patient's behavior is normal. Judgment and thought content normal.         Emergency Department Course   ECG:  EKG shows a rate of 98 bpm.  Normal sinus rhythm.  MN interval 138 ms.  QRS duration 82 ms.  QT/QTc 400/500 ms.  PRT axes 76 82 78.  No STEMI      Laboratory:  Labs Ordered and Resulted from Time of ED Arrival to Time of ED Departure   COMPREHENSIVE METABOLIC PANEL - Abnormal       Result Value    Sodium 131 (*)     Potassium 2.7 (*)     Chloride 97      Carbon Dioxide (CO2) 18 (*)     Anion Gap 16 (*)     Urea Nitrogen 4 (*)     Creatinine 0.46 (*)     Calcium 8.2 (*)     Glucose 57 (*)     Alkaline Phosphatase 201 (*)      (*)     ALT 83 (*)     Protein Total 6.9      Albumin 3.1 (*)     Bilirubin Total 1.0      GFR Estimate >90     LIPASE - Abnormal    Lipase 1,369 (*)    ETHYL ALCOHOL LEVEL - Abnormal    Alcohol ethyl 0.06 (*)    CBC WITH PLATELETS AND  DIFFERENTIAL - Abnormal    WBC Count 9.4      RBC Count 3.56 (*)     Hemoglobin 11.9      Hematocrit 33.5 (*)     MCV 94      MCH 33.4 (*)     MCHC 35.5      RDW 13.4      Platelet Count 143 (*)     % Neutrophils 78      % Lymphocytes 11      % Monocytes 10      % Eosinophils 1      % Basophils 0      % Immature Granulocytes 0      NRBCs per 100 WBC 0      Absolute Neutrophils 7.3      Absolute Lymphocytes 1.0      Absolute Monocytes 1.0      Absolute Eosinophils 0.1      Absolute Basophils 0.0      Absolute Immature Granulocytes 0.0      Absolute NRBCs 0.0     MAGNESIUM - Abnormal    Magnesium 1.5 (*)    COVID-19 VIRUS (CORONAVIRUS) BY PCR - Normal    SARS CoV2 PCR Negative     ROUTINE UA WITH MICROSCOPIC REFLEX TO CULTURE        Emergency Department Course:  Reviewed:  I reviewed nursing notes, vitals and past medical history    Assessments:  2024 I obtained history and examined the patient as noted above.   2150 I rechecked the patient and explained findings.       Consults:   2127  Spoke with Dr. Kelley admitting hospitalist    Interventions:  Medications   magnesium sulfate 2 g in water intermittent infusion (has no administration in time range)   0.9% sodium chloride BOLUS (has no administration in time range)   0.9% sodium chloride BOLUS (0 mLs Intravenous Stopped 11/19/21 2131)   HYDROmorphone (PF) (DILAUDID) injection 0.5 mg (0.5 mg Intravenous Given 11/19/21 2058)   potassium chloride 10 mEq in 100 mL sterile water intermittent infusion (premix) (10 mEq Intravenous New Bag 11/19/21 2059)   ondansetron (ZOFRAN) injection 4 mg (4 mg Intravenous Given 11/19/21 2057)   2203   NS 1 L bolus     Disposition:  The patient was admitted to the hospital under the care of Dr. Kelley.     Impression & Plan      Covid-19  Audelia Comer was evaluated during a global COVID-19 pandemic, which necessitated consideration that the patient might be at risk for infection with the SARS-CoV-2 virus that causes COVID-19.    Applicable protocols for evaluation were followed during the patient's care.   COVID-19 was considered as part of the patient's evaluation. The plan for testing is:  a test was obtained during this visit.    Medical Decision Making:  Audelia Comer is a 56-year-old female presenting with persistent upper abdominal pain nausea and vomiting for the last couple months.  She was alarmed as she had continued to lose weight and weighed herself and she was 98 pounds tonight.  She states she has felt lightheaded and fallen at times.  She states she is laying in bed all day with no energy.  She denies any infectious symptoms.  She does state that she drinks alcohol but not every night.  She states that with her nausea and vomiting is not appeal to her.  She has diarrhea at times.  Her physical exam was consistent with pancreatitis.  I did review her last admission on 1024 and she did have CT of her abdomen as well as ultrasound at that time.  Findings were consistent with acute pancreatitis with a very small cyst in the head of the pancreas as well as a fatty liver.    At this time the patient's lipase is elevated.  Her liver function is also elevated but improved from previous.  Her alcohol level is low at 0.06.  Her potassium and magnesium are both low so she is receiving oral replacement.  At this time she is receiving IV hydration, antiemetics, pain medication.  I will admit her to hospital.  She is showing no signs of withdrawal at this point.    Diagnosis:    ICD-10-CM    1. Hypokalemia  E87.6    2. Nausea and vomiting, intractability of vomiting not specified, unspecified vomiting type  R11.2    3. Acute pancreatitis, unspecified complication status, unspecified pancreatitis type  K85.90    4. Hypomagnesemia  E83.42           11/19/2021   Rosaura Rodriges MD Bochert, Michelle Ann, MD  11/19/21 7092

## 2021-11-20 NOTE — CONSULTS
[facility] Consult & Liaison   11/19/2021  Audelia Comer 1965     [facility] Consult Crisis Assessment:    Started at: 0929  Completed at: 1030  Patient was assessed via Ipad.  Duration of face to face time with patient in minutes: 1.0 hrs    CPT code(s) utilized: 53012 - Psychotherapy for Crisis - 60 (30-74*) min    Chief Complaint and History of Presenting Problem  Patient is a  56 year old year old  female who is currently at [facility] for pancreatitis thought 2/2 etoh use. US without biliary pathology. With ongoing poor PO, n/v, abdominal pain. Called 911 when she weighed herself and was 98.8 lbs (previous hospital at 105 lbs). Has had some falls, lightheadedness. Mildly tachy in ED. Patient was referred for crisis consult today due to loss of daughter 2 months ago, depression and anxiety.     History of presenting problem:  From MD note on 11/19/21 by Dr. Mt Kelley:   Audelia Comer is a 56 year old female who presents with nausea vomiting, poor p.o. intake and abdominal pain.  She was recently hospitalized at [facility] for pancreatitis likely secondary to alcohol, severe malnutrition, depression, and anxiety.  She reports that she has had a very stressful couple of years with the Covid pandemic, losing her job, breast cancer diagnosis and more recently the loss of her daughter from unknown cardiac condition.  She reports that she has been unable to sleep and has had increased anxiety.  She admits that she uses alcohol to help with her anxiety and sleep.  She notes that her mind keeps racing making it difficult to sleep.  She states she has had minimal p.o. intake and has had issues with nausea and vomiting.  She also has abdominal pain in her epigastric, left flank and back pain as well.  She says this comes and goes unclear if it is any worse recently.  She denies fevers but does have chills and hot flashes.  She does endorse diarrhea.  Denies any chest pain or shortness of  breath.  She estimates her alcohol use at about 3 drinks of vodka cranberry a night.  She started seeing counseling for the loss of her daughter.  Pt reports COVID started weight loss, diarrhea, lost job in March, April breast cancer, August reconstructive surgery, and daughter (29) loss in September.  Talks to oncologist, and referred to psychologist, eating, sleeping, vomiting, diarrhea and no energy.  She weighted herself Thursday, 98.   She reports being an anxious person, depression medication as needed, she is fearful of taking medication because of her low weight.  Taking nausea medication, pain medication, lorazepam and was able to sleep for 2 hours.  She was really nervous about surgery and then daughter dying, her head constantly spinning.  She reports falling, getting dizzy.  She reports feeling helpless and doesn't want to die.  She has a son 36, and step-daughter 37, has step-son 38.      Relevant collateral information: Audelia could not decide who could give collateral information and didn't want writer to call without her talking with someone.      Demographics, Social, and Environmental Conditions   Patient lives alone and is their own legal guardian.  Her best friend has her little dog, and will keep her.  She feels she has a lot of support family and friends.   Patient receives income from Other: Rent assistance and saved her unemployment.  Her son sent her some money the other day.  . Patient is currently a student. Patient is a  member or . Patient identifies significant interpersonal relationships which include family and friends .     Legal History  Patient has a history of or current involvement with the legal system (I.e probation, parole, arrests, incarceration): No. There is not a history of civil commitment.     Spiritual and Cultural Influences  Patient reports important Gnosticist or spiritual beliefs which include Oriental orthodox. Patient reports important cultural influences  which include none.     Mental Health Symptoms and Substance Use  Patient identifies historical diagnoses of depression and anxiety. Patient describes current mental health symptoms as: She doesn't feel her symptoms are significant.  She reports just wanting to sleep and try to eat.  She watches TV and play cards on her phone.  She feels its a chore to get ready to go any place and just doesn't have energy to do much.  Her granddaughter just turned 6, and lost her mother and felt she     Attention, Hyperactivity, and Impulsivity: No   Anxiety:Yes: Panic attacks and Generalized Symptoms: Cognitive anxiety - feelings of doom, racing thoughts, difficulty concentrating , Excessive worry and Somatic symptoms - abdominal pain, headache, or tension    Behavioral Difficulties: No   Mood Symptoms: Yes: Appetite change/weight change , Crying or feels like crying, Decreased libido , Feelings of helplessness , Feelings of hopelessness , Feelings of worthlessness , Flight of ideas, Impaired concentration, Loss of interest / Anhedonia , Sad, depressed mood  and Sleep disturbance    Appetite: Yes: Loss of Appetite and Recent Weight Loss   Feeding and Eating: No  Interpersonal Functioning: No  Learning Disabilities/Cognitive/Developmental Disorders: No   General Cognitive Impairments: No  If yes, see completed Mini-Cog Assessment below.  Sleep: Yes: Difficulty falling asleep , Difficulty staying sleep  and Excessive sleep    Psychosis: No    Trauma: Yes: Intrusions: Recurrent memories of the trauma     Trauma History   Physical, Emotional, or Sexual abuse: No  Loss of a friend or family member to suicide: No, yes a couple of friends   Other identified traumatic event or significant stressor: Yes    Patient reported trauma related symptoms? Yes: Intrusions: Recurrent distressing dreams, Avoidance: Avoidance of memories, thoughts, or feelings  and Avoidance of external reminders and Negative Cognitions/Mood: Persistent negative  beliefs about oneself, others, or the world, Persistent distorted cognitions about cause/consequences of the trauma (e.g., self-blame), Persistent negative emotional state (e.g., fear, anger, shame) and Diminished activity interest/participation       Substance Use  Is there a history of, or current, substance use?  Drinks - a couple drinks to help her relax before she goes to bed, and helps with sleep.  She doesn't feel she doesn't drink too much.  She drinks daily as part of her life style     Have you been to chemical dependency treatment or detox before? Yes: About 11 years and help with childhood issues and has become a habit.       CAGE-AID    Have you felt you ought to cut down on your drinking or drug use? Yes     Have people annoyed you by criticizing your drinking or drug use? No   Have you felt bad or guilty about your drinking or drug use? Yes  Have you ever had a drink or used drugs first thing in the morning to steady your nerves or to get rid of a hangover? No   CAGE-AID Score: 2/4    Drug screen completed? No   BAL/Breathalyzer completed? No       History of Suicidal Ideation, Suicide Attempts, and Risk Formulation:   Suicide and Self-Harm    ESS-6  1.a. Over the past 2 weeks, have you had thoughts of killing yourself? No   1.b. Have you ever attempted to kill yourself and, if yes, when did this last happen? Yes a long time ago - dad passed, and lots of things again, didn't feel worth  2. Recent or current suicide plan? No  3. Recent or current intent to act on ideation? No  4. Lifetime psychiatric hospitalization? Yes, was on a 72 hour hold 12-13 years ago   5. Pattern of excessive substance use? No  6. Current irritability, agitation, or aggression? No  ESS-6 Score: 2/6    SI: N/A  Plan: No  Intent: No   Prior Attempts: Yes 12-13 years      Protective Factors: Couldn't do it to her family and friends     Hopes and goals for the future: Just wants to feel better, and be herself again     Coping  Skills: What helps and doesn't help? She use to sing professionally, TV and reading     Additional Risk Factors Related to Safety and Suicide: Yes: Active substance abuse, Depressive symptoms, Family member or friend completed suicide, Health stressors, Chronic pain and Recent loss    Is the patient engaged in self injurious behaviors? No     Risk to Others    Aggressive/Assaultive/Homicidal Risk Factors: No     Duty to Warn? No     Was a Child Protection Report Made? No       Was a Adult Protection Report Made? No        Sexually inappropriate behavior? No        Vulnerability to sexual exploitation? No     Current Providers  Primary Care Provider: Yes Alla Urrutia video calls, put her on the medication   Psychiatrist: Yes She doesn't know just starting    Therapist: Yes Mahnaz De La Paz, helping with meals,  prescriptions and help with medication   She missed a call on Tuesday,   : No   ARMHS: No   ACT Team: No   Other: No    Current psychotropic medications? Yes     Medications Prior to Admission   Medication Sig Dispense Refill Last Dose     azelastine (ASTELIN) 0.1 % nasal spray Spray 1 spray into both nostrils daily as needed     at PRN     cetirizine (ZYRTEC) 10 MG tablet Take 10 mg by mouth daily as needed     at PRN     escitalopram (LEXAPRO) 5 MG tablet Take 1 tablet (5 mg) by mouth daily 30 tablet 1  at PRN     fluticasone (FLONASE) 50 MCG/ACT nasal spray Spray 1 spray into both nostrils daily as needed     at PRN     hydrOXYzine (ATARAX) 25 MG tablet Take 25 mg by mouth every 6 hours as needed for itching    at PRN     LORazepam (ATIVAN) 0.5 MG tablet Take 0.5-1 mg by mouth 2 times daily as needed for anxiety   11/18/2021 at Unknown time     multivitamin w/minerals (THERA-VIT-M) tablet Take 1 tablet by mouth daily   Past Week at Unknown time     omeprazole (PRILOSEC) 20 MG DR capsule Take 20 mg by mouth daily     at PRN     ondansetron (ZOFRAN ODT) 4 MG ODT tab Take 1 tablet (4 mg) by  mouth every 6 hours as needed for nausea 20 tablet 0  at PRN     potassium chloride ER (K-TAB) 20 MEQ CR tablet Take 20 mEq by mouth daily   Past Week at Unknown time       Medication Compliant? No  Recent medication changes? No  History of Commitment? No  History of Psychiatric Hospitalizations? Yes after her suicide attempt she was on a 72 hold    History of programmatic care? No    Relevant Medical Concerns  Patient identifies concerns with completing ADLs? No  Patient can ambulate independently? Yes  Other medical health concerns? Yes  History of concussion or TBI? No     Family Mental Health History   Family History of Mental Health or Chemical Dependency Issues? Yes Brother - drinking and MH, passed away a lot, Dad - Drinks     Mental Status Exam:  Affect: Blunted and Flat  Appearance: Appropriate   Attention Span/Concentration: Attentive    Eye Contact: Engaged  Fund of Knowledge: Appropriate   Language /Speech Content: Fluent  Language /Speech Volume: Normal   Language /Speech Rate/Productions: Normal   Recent Memory: Intact  Remote Memory: Intact  Mood: Anxious, Depressed and Sad   Orientation:   Person: Yes   Place: Yes  Time of Day: Yes   Date: Yes   Situation (Do they understand why they are here?): Yes   Psychomotor Behavior: Underactive   Thought Content: Clear  Thought Form: Intact    Therapeutic Methodologies Utilized in Assessment    Psychotherapy techniques and/or interventions used: Establishing rapport, Active listening, Assess dimensions of crisis, Apply solution-focused therapy to address current crisis, Identify additional supports and alternative coping skills and Establish a discharge plan    Clinical Summary and Recommendation  Clinical summary: Audelia appears to have some significant depression and anxiety.  She lost her job in March, was diagnosed with breast cancer in April and had a mastectomy on both breast and then in August had reconstructive surgery, her daughter passed away in  September.  Audelia appears to have not be able to cope and manage her feelings.  She doesn't like to talk to other about what she feels or ask for help.  She is becoming more aware she needs to do this.  She has been a daily drinker for years and uses this as a way to get sleep and rest.  She could benefit from a Chemical Health Assessment.  She could benefit from grief support when ready, and consider possible a Dual IOP to assist with coping and alcohol use.  She could use some additional support in her home such as a home health worker and consider assistance with a  to make sure she is taking her medication as prescribed.  She reports not taking her depression medication as prescribed because she has not been eating.    Patient's strengths, protective factors, and community resources include lots of family and friends are supportive. Patient's coping skills include reading, TV, singing, having a dog and playing games on her phone. Areas of vulnerability for this patient are grief and loss, drinking, feeling she is a burden, not having an organized home, and no current employment.   Provider's current assessment of risk includes low risk of suicide, high risk for continued drinking and helplessness, due to malnutrition, anxiety, depression and willing to ask for help.     Diagnosis:    296.32 (F33.1) Major Depressive Disorder, Recurrent Episode, Moderate _, With anxious distress and With melancholic features, by history;    300.02 (F41.1) Generalized Anxiety Disorder, by history;     Substance-Related & Addictive Disorders Alcohol Use Disorder   303.90 (F10.20) Moderate continued use, by history;        Recommendation/Plan:   1. Continue coordination with nurse, MD and psychiatry   2. Medication management and therapy, consider Dual IOP and grief counseling   3. Re-consult psychiatry as needed.  4. Safety plan:  Has been completed      If I am feeling unsafe or I am in a crisis, I will:  Contact my  established care providers  Call the National Suicide Prevention Lifeline: [phone]  Go to the nearest emergency room  Call 911    Warning signs that I or other people might notice when a crisis is developing for me: Helplessness, not good at asking for help - needs to work on allowing others to help, doesn't want to be a burden  Things I am able to do on my own to cope or help me feel better:  Watch TV, wake up and feel hungry, and that sounds good right now, walk, cards on phone, reading books, inspiring books, journal, taking notes and checklist   Things that I am able to do with others to cope or help me better:  Go out to lunch, walk, start singing again, get a part-time job, commit to something and something to look forward    Things I can use or do for distraction:  Singing, hanging out with friends and family,   Changes I can make to support my mental health and wellness: Nutritionist, talking with a therapist, being on medication and taking as prescribed, help with things around the house, reduce or eliminate drinking   People in my life that I can ask for help:  Friends and family   Your [facility] has a mental health crisis team you can call 24/7: [facility] ([facility]) [phone], Fax:  [phone]  Other things that are important when I m in crisis:   Additional resources and information: Grief and loss, [facility] and Transition Adult Loss Therapy Groups   [facility] offers therapy groups designed to provide a safe and supportive setting for people to share their loss journey with others. These groups are facilitated by professional grief therapists and provide an opportunity for participants to explore the impact that the loss has had and continues to have on their lives.   Groups available include such topics as these:  Professional Caregiver Group   When: Call for availability.  Where: [facility], [address] [place] [id number]  Contact: Call  [phone]. Email [email or web address].      DEB Ramsay  [facility], Consult and Liaison  [phone]    Spoke with SHREYAS Caldera at [facility], about medication:  Recommendation increase Lexapro from 5 to 10 mg if tolerable  Add Remeron 15 mg at bedtime to increase appetite   Add PRN of Seroquel to increase appetite and help with anxiety 25 mg.

## 2021-11-20 NOTE — PLAN OF CARE
Cognitive Concerns/ Orientation : A&Ox4, anxious   BEHAVIOR & AGGRESSION TOOL COLOR: Green  CIWA SCORE: 3(anxiety)   ABNL VS/O2: VSS on RA  MOBILITY: SBA  PAIN MANAGMENT: PRN Dilaudid for abdominal pain  DIET: NPO  BOWEL/BLADDER: Continent  ABNL LAB/BG: K 2.9;replaced recheck at 3.5. Phosphorus 1.1 replacement scheduled Elevated LFTs  DRAIN/DEVICES: PIV  TELEMETRY RHYTHM: NA  SKIN: Bruised  TESTS/PROCEDURES:   D/C DAY/GOALS/PLACE: Pending 3-4 days  OTHER IMPORTANT INFO: PRN Zofran given for nausea. Psychiatry and GI consult

## 2021-11-20 NOTE — PLAN OF CARE
Summary: Abdominal pain, Nausea, vomiting, intermittent diarrhea for months  DATE & TIME: 11/20/21, 8523-1037   Cognitive Concerns/ Orientation : A&Ox4, anxious   BEHAVIOR & AGGRESSION TOOL COLOR: Green  CIWA SCORE: 0   ABNL VS/O2: VSS on RA  MOBILITY: SBA, steady, generalized weakness  PAIN MANAGMENT: reporting abdominal pain radiating to back 8/10, managed well with PRN Dilaudid and heat packs  DIET: NPO except ice chips, can have electrolyte replacement drink  BOWEL/BLADDER: BS active x 4, Continent, abdomin full/distended, feels bloated, no BM- senna given  ABNL LAB/BG: Phosphorus 1.1 (replacement started),  Elevated LFTs, BG= 112, 92  DRAIN/DEVICES: PIV infusing LR @ 200 mL/hr  TELEMETRY RHYTHM: NA  SKIN: Bruised R. Forearm, scab to R. Thigh  TESTS/PROCEDURES: NA   D/C DAY/GOALS/PLACE: Pending 3-4 days  OTHER IMPORTANT INFO: on scheduled ativan for anxiety; compazine PO given for mild nausea; Psychiatry and GI consult

## 2021-11-21 LAB
ALBUMIN SERPL-MCNC: 2.6 G/DL (ref 3.4–5)
ALP SERPL-CCNC: 162 U/L (ref 40–150)
ALT SERPL W P-5'-P-CCNC: 64 U/L (ref 0–50)
ANION GAP SERPL CALCULATED.3IONS-SCNC: 8 MMOL/L (ref 3–14)
AST SERPL W P-5'-P-CCNC: 115 U/L (ref 0–45)
BASOPHILS # BLD AUTO: 0 10E3/UL (ref 0–0.2)
BASOPHILS NFR BLD AUTO: 1 %
BILIRUB SERPL-MCNC: 1 MG/DL (ref 0.2–1.3)
BUN SERPL-MCNC: <1 MG/DL (ref 7–30)
CA-I BLD-MCNC: 4.4 MG/DL (ref 4.4–5.2)
CALCIUM SERPL-MCNC: 7.8 MG/DL (ref 8.5–10.1)
CHLORIDE BLD-SCNC: 105 MMOL/L (ref 94–109)
CO2 SERPL-SCNC: 25 MMOL/L (ref 20–32)
CREAT SERPL-MCNC: 0.33 MG/DL (ref 0.52–1.04)
CRP SERPL-MCNC: 14.4 MG/L (ref 0–8)
EOSINOPHIL # BLD AUTO: 0.2 10E3/UL (ref 0–0.7)
EOSINOPHIL NFR BLD AUTO: 2 %
ERYTHROCYTE [DISTWIDTH] IN BLOOD BY AUTOMATED COUNT: 14.1 % (ref 10–15)
GFR SERPL CREATININE-BSD FRML MDRD: >90 ML/MIN/1.73M2
GLUCOSE BLD-MCNC: 90 MG/DL (ref 70–99)
GLUCOSE BLDC GLUCOMTR-MCNC: 100 MG/DL (ref 70–99)
GLUCOSE BLDC GLUCOMTR-MCNC: 120 MG/DL (ref 70–99)
GLUCOSE BLDC GLUCOMTR-MCNC: 127 MG/DL (ref 70–99)
GLUCOSE BLDC GLUCOMTR-MCNC: 74 MG/DL (ref 70–99)
GLUCOSE BLDC GLUCOMTR-MCNC: 79 MG/DL (ref 70–99)
GLUCOSE BLDC GLUCOMTR-MCNC: 83 MG/DL (ref 70–99)
HCT VFR BLD AUTO: 29.5 % (ref 35–47)
HGB BLD-MCNC: 10.2 G/DL (ref 11.7–15.7)
IMM GRANULOCYTES # BLD: 0 10E3/UL
IMM GRANULOCYTES NFR BLD: 0 %
LYMPHOCYTES # BLD AUTO: 1.8 10E3/UL (ref 0.8–5.3)
LYMPHOCYTES NFR BLD AUTO: 26 %
MAGNESIUM SERPL-MCNC: 1.3 MG/DL (ref 1.6–2.3)
MCH RBC QN AUTO: 32.7 PG (ref 26.5–33)
MCHC RBC AUTO-ENTMCNC: 34.6 G/DL (ref 31.5–36.5)
MCV RBC AUTO: 95 FL (ref 78–100)
MONOCYTES # BLD AUTO: 0.7 10E3/UL (ref 0–1.3)
MONOCYTES NFR BLD AUTO: 11 %
NEUTROPHILS # BLD AUTO: 4 10E3/UL (ref 1.6–8.3)
NEUTROPHILS NFR BLD AUTO: 60 %
NRBC # BLD AUTO: 0 10E3/UL
NRBC BLD AUTO-RTO: 0 /100
PHOSPHATE SERPL-MCNC: 2 MG/DL (ref 2.5–4.5)
PLATELET # BLD AUTO: 134 10E3/UL (ref 150–450)
POTASSIUM BLD-SCNC: 2.9 MMOL/L (ref 3.4–5.3)
POTASSIUM BLD-SCNC: 3.3 MMOL/L (ref 3.4–5.3)
POTASSIUM BLD-SCNC: 3.7 MMOL/L (ref 3.4–5.3)
PROT SERPL-MCNC: 5.7 G/DL (ref 6.8–8.8)
RBC # BLD AUTO: 3.12 10E6/UL (ref 3.8–5.2)
SODIUM SERPL-SCNC: 138 MMOL/L (ref 133–144)
WBC # BLD AUTO: 6.7 10E3/UL (ref 4–11)

## 2021-11-21 PROCEDURE — 250N000011 HC RX IP 250 OP 636: Performed by: INTERNAL MEDICINE

## 2021-11-21 PROCEDURE — 85004 AUTOMATED DIFF WBC COUNT: CPT | Performed by: HOSPITALIST

## 2021-11-21 PROCEDURE — 36415 COLL VENOUS BLD VENIPUNCTURE: CPT | Performed by: HOSPITALIST

## 2021-11-21 PROCEDURE — 84100 ASSAY OF PHOSPHORUS: CPT | Performed by: HOSPITALIST

## 2021-11-21 PROCEDURE — 250N000009 HC RX 250: Performed by: INTERNAL MEDICINE

## 2021-11-21 PROCEDURE — 120N000001 HC R&B MED SURG/OB

## 2021-11-21 PROCEDURE — 250N000013 HC RX MED GY IP 250 OP 250 PS 637: Performed by: HOSPITALIST

## 2021-11-21 PROCEDURE — 80053 COMPREHEN METABOLIC PANEL: CPT | Performed by: HOSPITALIST

## 2021-11-21 PROCEDURE — 250N000013 HC RX MED GY IP 250 OP 250 PS 637: Performed by: INTERNAL MEDICINE

## 2021-11-21 PROCEDURE — 86140 C-REACTIVE PROTEIN: CPT | Performed by: HOSPITALIST

## 2021-11-21 PROCEDURE — 258N000003 HC RX IP 258 OP 636: Performed by: INTERNAL MEDICINE

## 2021-11-21 PROCEDURE — 83735 ASSAY OF MAGNESIUM: CPT | Performed by: HOSPITALIST

## 2021-11-21 PROCEDURE — 99233 SBSQ HOSP IP/OBS HIGH 50: CPT | Performed by: HOSPITALIST

## 2021-11-21 PROCEDURE — 82330 ASSAY OF CALCIUM: CPT | Performed by: HOSPITALIST

## 2021-11-21 PROCEDURE — C9113 INJ PANTOPRAZOLE SODIUM, VIA: HCPCS | Performed by: INTERNAL MEDICINE

## 2021-11-21 PROCEDURE — 93005 ELECTROCARDIOGRAM TRACING: CPT

## 2021-11-21 PROCEDURE — 84132 ASSAY OF SERUM POTASSIUM: CPT | Performed by: HOSPITALIST

## 2021-11-21 RX ORDER — POTASSIUM CHLORIDE 1.5 G/1.58G
20 POWDER, FOR SOLUTION ORAL ONCE
Status: COMPLETED | OUTPATIENT
Start: 2021-11-21 | End: 2021-11-21

## 2021-11-21 RX ORDER — PANTOPRAZOLE SODIUM 40 MG/1
40 TABLET, DELAYED RELEASE ORAL
Status: DISCONTINUED | OUTPATIENT
Start: 2021-11-21 | End: 2021-11-24 | Stop reason: HOSPADM

## 2021-11-21 RX ORDER — MAGNESIUM OXIDE 400 MG/1
400 TABLET ORAL 2 TIMES DAILY
Status: COMPLETED | OUTPATIENT
Start: 2021-11-21 | End: 2021-11-22

## 2021-11-21 RX ORDER — FOLIC ACID 1 MG/1
1 TABLET ORAL DAILY
Status: COMPLETED | OUTPATIENT
Start: 2021-11-22 | End: 2021-11-23

## 2021-11-21 RX ORDER — POTASSIUM CHLORIDE 1.5 G/1.58G
40 POWDER, FOR SOLUTION ORAL ONCE
Status: COMPLETED | OUTPATIENT
Start: 2021-11-21 | End: 2021-11-21

## 2021-11-21 RX ADMIN — POTASSIUM CHLORIDE 20 MEQ: 1.5 POWDER, FOR SOLUTION ORAL at 14:23

## 2021-11-21 RX ADMIN — SODIUM CHLORIDE, POTASSIUM CHLORIDE, SODIUM LACTATE AND CALCIUM CHLORIDE: 600; 310; 30; 20 INJECTION, SOLUTION INTRAVENOUS at 01:51

## 2021-11-21 RX ADMIN — FOLIC ACID 1 MG: 5 INJECTION, SOLUTION INTRAMUSCULAR; INTRAVENOUS; SUBCUTANEOUS at 08:29

## 2021-11-21 RX ADMIN — PANTOPRAZOLE SODIUM 40 MG: 40 INJECTION, POWDER, FOR SOLUTION INTRAVENOUS at 08:30

## 2021-11-21 RX ADMIN — HYDROMORPHONE HYDROCHLORIDE 0.5 MG: 1 INJECTION, SOLUTION INTRAMUSCULAR; INTRAVENOUS; SUBCUTANEOUS at 21:12

## 2021-11-21 RX ADMIN — HYDROMORPHONE HYDROCHLORIDE 0.5 MG: 1 INJECTION, SOLUTION INTRAMUSCULAR; INTRAVENOUS; SUBCUTANEOUS at 08:24

## 2021-11-21 RX ADMIN — QUETIAPINE FUMARATE 25 MG: 25 TABLET ORAL at 21:20

## 2021-11-21 RX ADMIN — POTASSIUM CHLORIDE 40 MEQ: 1.5 POWDER, FOR SOLUTION ORAL at 08:21

## 2021-11-21 RX ADMIN — HYDROMORPHONE HYDROCHLORIDE 0.5 MG: 1 INJECTION, SOLUTION INTRAMUSCULAR; INTRAVENOUS; SUBCUTANEOUS at 15:06

## 2021-11-21 RX ADMIN — POTASSIUM & SODIUM PHOSPHATES POWDER PACK 280-160-250 MG 1 PACKET: 280-160-250 PACK at 16:01

## 2021-11-21 RX ADMIN — HYDROMORPHONE HYDROCHLORIDE 0.5 MG: 1 INJECTION, SOLUTION INTRAMUSCULAR; INTRAVENOUS; SUBCUTANEOUS at 19:00

## 2021-11-21 RX ADMIN — NICOTINE 1 PATCH: 21 PATCH, EXTENDED RELEASE TRANSDERMAL at 08:24

## 2021-11-21 RX ADMIN — ESCITALOPRAM 5 MG: 5 TABLET, FILM COATED ORAL at 08:20

## 2021-11-21 RX ADMIN — Medication 400 MG: at 08:20

## 2021-11-21 RX ADMIN — PANTOPRAZOLE SODIUM 40 MG: 40 TABLET, DELAYED RELEASE ORAL at 16:01

## 2021-11-21 RX ADMIN — POTASSIUM & SODIUM PHOSPHATES POWDER PACK 280-160-250 MG 1 PACKET: 280-160-250 PACK at 21:21

## 2021-11-21 RX ADMIN — Medication 400 MG: at 21:20

## 2021-11-21 RX ADMIN — SODIUM CHLORIDE, POTASSIUM CHLORIDE, SODIUM LACTATE AND CALCIUM CHLORIDE: 600; 310; 30; 20 INJECTION, SOLUTION INTRAVENOUS at 08:11

## 2021-11-21 RX ADMIN — HYDROMORPHONE HYDROCHLORIDE 0.5 MG: 1 INJECTION, SOLUTION INTRAMUSCULAR; INTRAVENOUS; SUBCUTANEOUS at 01:42

## 2021-11-21 RX ADMIN — POTASSIUM & SODIUM PHOSPHATES POWDER PACK 280-160-250 MG 1 PACKET: 280-160-250 PACK at 08:20

## 2021-11-21 RX ADMIN — QUETIAPINE FUMARATE 25 MG: 25 TABLET ORAL at 11:00

## 2021-11-21 RX ADMIN — HYDROMORPHONE HYDROCHLORIDE 0.5 MG: 1 INJECTION, SOLUTION INTRAMUSCULAR; INTRAVENOUS; SUBCUTANEOUS at 04:06

## 2021-11-21 RX ADMIN — MIRTAZAPINE 15 MG: 15 TABLET, FILM COATED ORAL at 21:20

## 2021-11-21 RX ADMIN — SODIUM CHLORIDE, POTASSIUM CHLORIDE, SODIUM LACTATE AND CALCIUM CHLORIDE: 600; 310; 30; 20 INJECTION, SOLUTION INTRAVENOUS at 19:00

## 2021-11-21 RX ADMIN — HYDROMORPHONE HYDROCHLORIDE 0.5 MG: 1 INJECTION, SOLUTION INTRAMUSCULAR; INTRAVENOUS; SUBCUTANEOUS at 13:01

## 2021-11-21 RX ADMIN — HYDROMORPHONE HYDROCHLORIDE 0.5 MG: 1 INJECTION, SOLUTION INTRAMUSCULAR; INTRAVENOUS; SUBCUTANEOUS at 17:02

## 2021-11-21 RX ADMIN — THIAMINE HYDROCHLORIDE 100 MG: 100 INJECTION, SOLUTION INTRAMUSCULAR; INTRAVENOUS at 08:30

## 2021-11-21 RX ADMIN — HYDROMORPHONE HYDROCHLORIDE 0.5 MG: 1 INJECTION, SOLUTION INTRAMUSCULAR; INTRAVENOUS; SUBCUTANEOUS at 11:00

## 2021-11-21 RX ADMIN — HYDROMORPHONE HYDROCHLORIDE 0.5 MG: 1 INJECTION, SOLUTION INTRAMUSCULAR; INTRAVENOUS; SUBCUTANEOUS at 05:59

## 2021-11-21 ASSESSMENT — ACTIVITIES OF DAILY LIVING (ADL)
ADLS_ACUITY_SCORE: 9
ADLS_ACUITY_SCORE: 7
ADLS_ACUITY_SCORE: 9
ADLS_ACUITY_SCORE: 7
ADLS_ACUITY_SCORE: 9
ADLS_ACUITY_SCORE: 7
ADLS_ACUITY_SCORE: 9
ADLS_ACUITY_SCORE: 7
ADLS_ACUITY_SCORE: 7

## 2021-11-21 NOTE — PROGRESS NOTES
SPIRITUAL HEALTH SERVICES  SPIRITUAL ASSESSMENT Progress Note  [facility]     REFERRAL SOURCE: Request at Admission    I visited Audelia today per spiritual health request. Audelia shares she would like a visit but ask we stop by another time, naming her anxiety is quite high this morning and she hopes to try to sleep.     PLAN: [facility] will plan to visit Audelia tomorrow, will refer to unit  for follow up.     Gemma Melton  Associate    Phone: [phone]  Pager: [phone]

## 2021-11-21 NOTE — PROGRESS NOTES
[facility]    Medicine Progress Note - Hospitalist Service       Date of Admission:  11/19/2021    Assessment & Plan         Acute alcoholic pancreatitis  Alcoholic hepatitis  Possible small pancreatic head cyst  Recent admit 10/24-29 with pancreatitis thought 2/2 etoh use. US without biliary pathology. With ongoing poor PO, n/v, abdominal pain. Called 911 when she weighed herself and was 98.8 lbs (previous hospital at 105 lbs). Has had some falls, lightheadedness. Mildly tachy in ED, other vitals stable. Lipase 1,369. AST//83. Alk phos 201, bili ok.   Improving but still having pain.      Continue intravenous fluid with LR    Analgesics as needed     Can try cream of wheat today along with clears, Ensure Clear added     Proton pump inhibitor     Seen by GI and can follow up in clinic      Hypoglycemia  Recent Labs   Lab 11/21/21  1119 11/21/21  0757 11/21/21  0638 11/21/21  0400 11/20/21  2118 11/20/21  1800   GLC 83 74 90 79 108* 108*   Glucose 57 on presentation. Suspect related to malnutrition. Not on antihyperglycemics.     Monitor      AG acidosis, resolved  Suspect starvation, alcoholic ketoacidosis.   No serum ketones or lactic acid but serum HCO3 is improving so will not pursue at this time.  The patient is clinically stable.    Prolonged QT  Noted on EKG on admission at 500. Likely 2/2 electrolyte disorders.   Today on EKG = 462ms    Avoid QT prolonging agents as able      Alcohol use disorder  History  alcohol use, admitted several times for abdominal pain and Etoh use. Etoh level 0.06 in ED.     CIWA    Vitamins    She had a mental health evaluation- see below     Hypokalemia  Hypomagnesemia  Hyponatremia  Hypocalcemia - resolved   Hypophosphatemia    Hypokalemia severe, 2.7 on admission. HypoMg at 1.5. Hyponatremia mild at 131, likely 2/2 dehydration.     Monitor electrolytes and replace as needed     Depression   Anxiety  Alcohol use disorder  [escitalopram 5 mg daily,  lorazepam 0.5-1.0 BID prn]  Hx depression. Daughter  a couple of months ago. States uses Etoh and lorazepam at HS so she can sleep.   She was seen by MH and Aida Farfan discussed the case with the psychiatric DANIELLA on-call.   Recommendations:    Increase escitalopram     Add mirtazapine at bedtime and quetiapine bid prn    Will consult the psychiatrist Monday      Breast cancer  S/p bilateral mastectomy 2021, T1bN0 disease. Follows with oncology through [facility].       Malnutrition, severe  Dx recent admission 10/2021. Albumin 3.1 on presentation.     Nutrition consulted     Tobacco use disorder  Hx tobacco use 40+ years at 1 ppd. Has cut back to 1/2 ppd as has been largely bedbound.      Diet: Snacks/Supplements Pediatric: Ensure Clear; Between Meals  Full Liquid Diet  Snacks/Supplements Adult: Ensure Clear; Between Meals    DVT Prophylaxis: Pneumatic Compression Devices  Patino Catheter: Not present  Central Lines  : None  Code Status: Full Code      Disposition Plan   Expected discharge: 2021   recommended to prior living arrangement once pancreatitis improves.     The patient's care was discussed with the Patient.    Carmelo Boudreaux MD  Hospitalist Service  [facility]  Securely message with the Vocera Web Console (learn more here)  Text page via Flypaper Paging/Directory    Clinically Significant Risk Factors Present on Admission            # Severe Malnutrition, POA: based on Weight loss;Reduced intake;Subcutaneous fat loss;Muscle loss (21 1000)     ______________________________________________________________________    Interval History   Same as yesterday- having pain but hungry.    Data reviewed today: I reviewed all medications, new labs and imaging results over the last 24 hours. I personally reviewed no images or EKG's today.    Physical Exam   Vital Signs: Temp: 97.7  F (36.5  C) Temp src: Oral BP: 126/87 Pulse: 95   Resp: 18 SpO2: 98 % O2 Device:  None (Room air)    Weight: 98 lbs 0 oz  Constitutional: awake, alert, cooperative, no apparent distress  Respiratory: No increased work of breathing, good air exchange, clear to auscultation bilaterally, no crackles or wheezing  Cardiovascular: regular rate and rhythm, normal S1 and S2, no S3 or S4, and no murmur noted  GI:normal bowel sounds, soft, non-distended, mildly tender throughout  Skin: no rashes  Neuropsychiatric: General: normal, calm and normal eye contact    Data   Recent Labs   Lab 11/21/21  1212 11/21/21  1119 11/21/21  0757 11/21/21  0638 11/20/21  0603 11/20/21  0324 11/19/21  2217 11/19/21 2021   WBC  --   --   --  6.7  --  10.5  --  9.4   HGB  --   --   --  10.2*  --  11.7  --  11.9   MCV  --   --   --  95  --  96  --  94   PLT  --   --   --  134*  --  155  --  143*   NA  --   --   --  138  --  136  --  131*   POTASSIUM 3.3*  --   --  2.9*  --  3.5   < > 2.7*   CHLORIDE  --   --   --  105  --  107  --  97   CO2  --   --   --  25  --  20  --  18*   BUN  --   --   --  <1*  --  3*  --  4*   CR  --   --   --  0.33*  --  0.43*  --  0.46*   ANIONGAP  --   --   --  8  --  9  --  16*   FRANCOISE  --   --   --  7.8*  --  7.1*  --  8.2*   GLC  --  83 74 90   < > 87   < > 57*   ALBUMIN  --   --   --  2.6*  --  2.9*  --  3.1*   PROTTOTAL  --   --   --  5.7*  --  6.7*  --  6.9   BILITOTAL  --   --   --  1.0  --  1.0  --  1.0   ALKPHOS  --   --   --  162*  --  185*  --  201*   ALT  --   --   --  64*  --  72*  --  83*   AST  --   --   --  115*  --  121*  --  157*   LIPASE  --   --   --   --   --   --   --  1,369*    < > = values in this interval not displayed.     No results found for this or any previous visit (from the past 24 hour(s)).  Medications     lactated ringers 200 mL/hr at 11/21/21 0811       escitalopram  5 mg Oral Daily     [START ON 11/22/2021] folic acid  1 mg Oral Daily     magnesium oxide  400 mg Oral BID     mirtazapine  15 mg Oral At Bedtime     nicotine  1 patch Transdermal Daily     nicotine    Transdermal Q8H     pantoprazole  40 mg Oral BID AC     potassium & sodium phosphates  1 packet Oral TID     sodium chloride (PF)  3 mL Intracatheter Q8H     [START ON 11/22/2021] thiamine  100 mg Oral Daily

## 2021-11-21 NOTE — CONSULTS
"CLINICAL NUTRITION SERVICES  -  ASSESSMENT NOTE      Recommendations Ordered by Registered Dietitian (RD): Ensure Clear at 2pm    Malnutrition: % Weight Loss:  > 5% in 1 month (severe malnutrition)  % Intake:  </= 50% for >/= 5 days (severe malnutrition)  Subcutaneous Fat Loss:  Orbital region severe depletion and Upper arm region severe depletion  Muscle Loss:  Temporal region severe depletion, Clavicle bone region severe depletion, Acromion bone region severe depletion and Scapular bone region severe depletion  Fluid Retention:  None noted    Malnutrition Diagnosis: Severe malnutrition  In Context of:  Environmental or social circumstances        REASON FOR ASSESSMENT  Audelia Comer is a 56 year old female seen by Registered Dietitian for Admission Nutrition Risk Screen for Have you recently lost weight without trying? - Yes 24-33#  Have you eaten poorly because of a decreased appetite? - Yes     - Admitted with ETOH pancreatitis, ETOH hepatitis, and major depressive disorder       NUTRITION HISTORY  - Information obtained from patient, although quite abbreviated as she was hoping to get some sleep (I did wake her up).  \"I feel bad, I just sent that nice  away\".  She notes that she has been having issues with decreased appetite, nausea, and vomiting over the last few months.  Noted patient with + ETOH intake (admitted with acute alcoholic pancreatitis and alcoholic hepatitis) and also with major depressive disorder which is likely contributing to her reduced intake as well.  She notes she does not typically tolerate nutritional supplements but willing to try them - \"I need to gain some weight\".      CURRENT NUTRITION ORDERS  Diet Order:     Clear Liquid     Current Intake/Tolerance:  Patient took some apple juice, tea, and jello this morning.  She would like some Cream of Wheat -- indicated that she would need to talk to GI or Hospitalist to get diet advanced.       NUTRITION FOCUSED PHYSICAL ASSESSMENT " "FOR DIAGNOSING MALNUTRITION)  Yes (visual only - patient wanting to rest, room completely dark)               Observed:    Muscle wasting (refer to documentation in Malnutrition section) and Subcutaneous fat loss (refer to documentation in Malnutrition section)    Obtained from Chart/Interdisciplinary Team:  None     ANTHROPOMETRICS  Height: 5'6\"  Weight: 44.5 kg (98#)(11/19)  Body mass index is 15.82 kg/m   Weight Status:  Underweight BMI <18.5  IBW: 59.1 kg   % IBW: 75%  Weight History:   Wt Readings from Last 10 Encounters:   11/19/21 44.5 kg (98 lb)   10/29/21 47.9 kg (105 lb 8 oz)   01/18/21 50.9 kg (112 lb 4.8 oz)   08/16/20 51.5 kg (113 lb 9.6 oz)   07/28/20 54.3 kg (119 lb 11.4 oz)   12/27/18 59 kg (130 lb)   12/04/18 57.2 kg (126 lb)   09/06/18 55 kg (121 lb 4 oz)   03/16/18 53.7 kg (118 lb 6.2 oz)   09/28/17 55.8 kg (123 lb)     Down 8# in < 1 month (7.5%) and down 14# in 10 months (12.5%)    LABS  K 2.9 (L), Mg 1.3 (L), Phos 2.0 (L) - Refeeding labs, ETOH with very low BMI and recent weight loss    LFTs elevated (Hepatitis)    MEDICATIONS  K, Mg, and Phos replacement   Folic acid and Thiamine for ETOH reliance       ASSESSED NUTRITION NEEDS PER APPROVED PRACTICE GUIDELINES:    Dosing Weight 44.5 kg   Estimated Energy Needs: 1349-4377 kcals (35-40 Kcal/Kg)  Justification: repletion and underweight  Estimated Protein Needs: 65-90 grams protein (1.5-2 g pro/Kg)  Justification: repletion  Estimated Fluid Needs: 0996-7712 mL (1 mL/Kcal)  Justification: maintenance    MALNUTRITION:  % Weight Loss:  > 5% in 1 month (severe malnutrition)  % Intake:  </= 50% for >/= 5 days (severe malnutrition)  Subcutaneous Fat Loss:  Orbital region severe depletion and Upper arm region severe depletion  Muscle Loss:  Temporal region severe depletion, Clavicle bone region severe depletion, Acromion bone region severe depletion and Scapular bone region severe depletion  Fluid Retention:  None noted    Malnutrition Diagnosis: " Severe malnutrition  In Context of:  Environmental or social circumstances    NUTRITION DIAGNOSIS:  Inadequate oral intake related to clear liquid diet in patient with pancreatitis and hepatitits as evidenced by taking sips of liquid only, need for oral nutritional supplement       NUTRITION INTERVENTIONS  Recommendations / Nutrition Prescription  ADAT per MD discretion   Ensure Clear at 2pm     Implementation  Nutrition education: Not appropriate at this time due to patient condition  Medical Food Supplement:  Ordered as above     Nutrition Goals  Patient will advance diet beyond clear liquid in the next 24-48 hours and consume at least 75% of meals and supplement     MONITORING AND EVALUATION:  Progress towards goals will be monitored and evaluated per protocol and Practice Guidelines    Isa Kan RD, LD, CNSC   Clinical Dietitian - [facility]

## 2021-11-21 NOTE — PLAN OF CARE
Summary: ETOH pancreatitis  DATE & TIME: 11/21/2021, 2744-8114   Cognitive Concerns/ Orientation : A&Ox4  BEHAVIOR & AGGRESSION TOOL COLOR: Green  CIWA SCORE: 3 ( anxious)  ABNL VS/O2: VSS on RA  MOBILITY: SBA, steady  PAIN MANAGMENT: reporting abdominal pain PRN Dilaudid given with relief and heat packs offered  DIET: advanced to fulls, tolerating  BOWEL/BLADDER: Continent  ABNL LAB/BG: Bg=74, 83, 100;   -K+=2.9->replaced->3.3->replaced->re-check at 1830 this evening  -phos=2.0 (replacement initiated, in progress)  -mag=1.3 (replacement initiated, in progress),   Cr 0.33, CRP=14.4, hgb=10.2, ALT=64, KRL=664  DRAIN/DEVICES: PIV infusing LR @ 200 mL/hr  TELEMETRY RHYTHM: EKG complete  SKIN: Bruised R. Forearm, scab to R. Thigh  TESTS/PROCEDURES: NA   D/C DAY/GOALS/PLACE: Pending  OTHER IMPORTANT INFO:Psychiatry, CD, and GI following

## 2021-11-21 NOTE — PLAN OF CARE
DATE & TIME: 11/20/21 3-11pm  Cognitive Concerns/ Orientation : A&Ox4  BEHAVIOR & AGGRESSION TOOL COLOR: Green  CIWA SCORE: 0&0  ABNL VS/O2: VSS on RA  MOBILITY: SBA   PAIN MANAGMENT: abdominal pain 8/10, PRN Dilaudid and heat packs  DIET: Clears  BOWEL/BLADDER: Continent  ABNL LAB/BG: Phosphorus 1.1 (replaced, recheck in am), elevated LFTs  DRAIN/DEVICES: PIV infusing LR @ 200 mL/hr  TELEMETRY RHYTHM: NA  SKIN: Bruised R. Forearm, scab to R. Thigh  TESTS/PROCEDURES: NA   D/C DAY/GOALS/PLACE: Pending  OTHER IMPORTANT INFO: compazine PO given for mild nausea; Psychiatry and GI consult

## 2021-11-21 NOTE — PLAN OF CARE
Cognitive Concerns/ Orientation : A&Ox4  BEHAVIOR & AGGRESSION TOOL COLOR: Green  CIWA SCORE: 0&0  ABNL VS/O2: VSS on RA  MOBILITY: SBA   PAIN MANAGMENT: c/o abdominal pain PRN Dilaudid given, helpful  DIET: Clears  BOWEL/BLADDER: Continent  ABNL LAB/BG: Phosphorus 1.1 (replaced, recheck in am), elevated LFTs  DRAIN/DEVICES: PIV infusing LR @ 200 mL/hr  TELEMETRY RHYTHM: NA  SKIN: Bruised R. Forearm, scab to R. Thigh  TESTS/PROCEDURES: NA   D/C DAY/GOALS/PLACE: Pending  OTHER IMPORTANT INFO:Psychiatry and GI following

## 2021-11-21 NOTE — PROGRESS NOTES
MD Notification    Notified Person: MD    Notified Person Name: Janusz     Notification Date/Time: 11/21/2021, 1100     Notification Interaction: web-based paging     Purpose of Notification: Hi, pt. was wondering if diet could be advanced? Currently on clears. Thank you!    Orders Received: Full liquids, can have cream of wheat    Comments:

## 2021-11-22 LAB
BASOPHILS # BLD AUTO: 0 10E3/UL (ref 0–0.2)
BASOPHILS NFR BLD AUTO: 1 %
CRP SERPL-MCNC: 25.4 MG/L (ref 0–8)
EOSINOPHIL # BLD AUTO: 0.1 10E3/UL (ref 0–0.7)
EOSINOPHIL NFR BLD AUTO: 2 %
ERYTHROCYTE [DISTWIDTH] IN BLOOD BY AUTOMATED COUNT: 14.3 % (ref 10–15)
GLUCOSE BLDC GLUCOMTR-MCNC: 102 MG/DL (ref 70–99)
HCT VFR BLD AUTO: 31.1 % (ref 35–47)
HGB BLD-MCNC: 10.6 G/DL (ref 11.7–15.7)
IMM GRANULOCYTES # BLD: 0 10E3/UL
IMM GRANULOCYTES NFR BLD: 0 %
LYMPHOCYTES # BLD AUTO: 2 10E3/UL (ref 0.8–5.3)
LYMPHOCYTES NFR BLD AUTO: 31 %
MAGNESIUM SERPL-MCNC: 1.1 MG/DL (ref 1.6–2.3)
MCH RBC QN AUTO: 33.2 PG (ref 26.5–33)
MCHC RBC AUTO-ENTMCNC: 34.1 G/DL (ref 31.5–36.5)
MCV RBC AUTO: 98 FL (ref 78–100)
MONOCYTES # BLD AUTO: 0.9 10E3/UL (ref 0–1.3)
MONOCYTES NFR BLD AUTO: 14 %
NEUTROPHILS # BLD AUTO: 3.3 10E3/UL (ref 1.6–8.3)
NEUTROPHILS NFR BLD AUTO: 52 %
NRBC # BLD AUTO: 0 10E3/UL
NRBC BLD AUTO-RTO: 0 /100
PHOSPHATE SERPL-MCNC: 2.2 MG/DL (ref 2.5–4.5)
PLATELET # BLD AUTO: 143 10E3/UL (ref 150–450)
POTASSIUM BLD-SCNC: 3.4 MMOL/L (ref 3.4–5.3)
POTASSIUM BLD-SCNC: 3.7 MMOL/L (ref 3.4–5.3)
RBC # BLD AUTO: 3.19 10E6/UL (ref 3.8–5.2)
WBC # BLD AUTO: 6.4 10E3/UL (ref 4–11)

## 2021-11-22 PROCEDURE — 86140 C-REACTIVE PROTEIN: CPT | Performed by: HOSPITALIST

## 2021-11-22 PROCEDURE — 84100 ASSAY OF PHOSPHORUS: CPT | Performed by: HOSPITALIST

## 2021-11-22 PROCEDURE — 85004 AUTOMATED DIFF WBC COUNT: CPT | Performed by: HOSPITALIST

## 2021-11-22 PROCEDURE — 250N000013 HC RX MED GY IP 250 OP 250 PS 637: Performed by: INTERNAL MEDICINE

## 2021-11-22 PROCEDURE — 120N000001 HC R&B MED SURG/OB

## 2021-11-22 PROCEDURE — 36415 COLL VENOUS BLD VENIPUNCTURE: CPT | Performed by: HOSPITALIST

## 2021-11-22 PROCEDURE — 84132 ASSAY OF SERUM POTASSIUM: CPT | Performed by: HOSPITALIST

## 2021-11-22 PROCEDURE — 999N000216 HC STATISTIC ADULT CD FACE TO FACE-NO CHRG

## 2021-11-22 PROCEDURE — 83735 ASSAY OF MAGNESIUM: CPT | Performed by: HOSPITALIST

## 2021-11-22 PROCEDURE — 258N000003 HC RX IP 258 OP 636: Performed by: HOSPITALIST

## 2021-11-22 PROCEDURE — 99232 SBSQ HOSP IP/OBS MODERATE 35: CPT | Performed by: HOSPITALIST

## 2021-11-22 PROCEDURE — 250N000013 HC RX MED GY IP 250 OP 250 PS 637: Performed by: HOSPITALIST

## 2021-11-22 PROCEDURE — 99254 IP/OBS CNSLTJ NEW/EST MOD 60: CPT | Performed by: PSYCHIATRY & NEUROLOGY

## 2021-11-22 PROCEDURE — 250N000013 HC RX MED GY IP 250 OP 250 PS 637: Performed by: PSYCHIATRY & NEUROLOGY

## 2021-11-22 PROCEDURE — 258N000003 HC RX IP 258 OP 636: Performed by: INTERNAL MEDICINE

## 2021-11-22 PROCEDURE — 250N000011 HC RX IP 250 OP 636: Performed by: INTERNAL MEDICINE

## 2021-11-22 PROCEDURE — 82306 VITAMIN D 25 HYDROXY: CPT | Performed by: HOSPITALIST

## 2021-11-22 RX ORDER — ESCITALOPRAM OXALATE 10 MG/1
10 TABLET ORAL DAILY
Status: DISCONTINUED | OUTPATIENT
Start: 2021-11-22 | End: 2021-11-24 | Stop reason: HOSPADM

## 2021-11-22 RX ORDER — MULTIVITAMIN,THERAPEUTIC
1 TABLET ORAL DAILY
Status: DISCONTINUED | OUTPATIENT
Start: 2021-11-22 | End: 2021-11-24 | Stop reason: HOSPADM

## 2021-11-22 RX ORDER — QUETIAPINE FUMARATE 25 MG/1
25 TABLET, FILM COATED ORAL 3 TIMES DAILY PRN
Status: DISCONTINUED | OUTPATIENT
Start: 2021-11-22 | End: 2021-11-24 | Stop reason: HOSPADM

## 2021-11-22 RX ORDER — POTASSIUM CHLORIDE 1.5 G/1.58G
20 POWDER, FOR SOLUTION ORAL ONCE
Status: COMPLETED | OUTPATIENT
Start: 2021-11-22 | End: 2021-11-22

## 2021-11-22 RX ORDER — HYDROMORPHONE HYDROCHLORIDE 2 MG/1
2 TABLET ORAL EVERY 4 HOURS PRN
Status: DISCONTINUED | OUTPATIENT
Start: 2021-11-22 | End: 2021-11-24 | Stop reason: HOSPADM

## 2021-11-22 RX ORDER — OXYCODONE HYDROCHLORIDE 5 MG/1
5-10 TABLET ORAL EVERY 4 HOURS PRN
Status: DISCONTINUED | OUTPATIENT
Start: 2021-11-22 | End: 2021-11-22

## 2021-11-22 RX ADMIN — POTASSIUM & SODIUM PHOSPHATES POWDER PACK 280-160-250 MG 1 PACKET: 280-160-250 PACK at 22:13

## 2021-11-22 RX ADMIN — HYDROMORPHONE HYDROCHLORIDE 0.5 MG: 1 INJECTION, SOLUTION INTRAMUSCULAR; INTRAVENOUS; SUBCUTANEOUS at 01:33

## 2021-11-22 RX ADMIN — THIAMINE HCL TAB 100 MG 100 MG: 100 TAB at 08:59

## 2021-11-22 RX ADMIN — PROCHLORPERAZINE MALEATE 10 MG: 10 TABLET ORAL at 01:39

## 2021-11-22 RX ADMIN — FOLIC ACID 1 MG: 1 TABLET ORAL at 08:59

## 2021-11-22 RX ADMIN — QUETIAPINE FUMARATE 25 MG: 25 TABLET ORAL at 22:13

## 2021-11-22 RX ADMIN — HYDROMORPHONE HYDROCHLORIDE 0.5 MG: 1 INJECTION, SOLUTION INTRAMUSCULAR; INTRAVENOUS; SUBCUTANEOUS at 03:42

## 2021-11-22 RX ADMIN — NICOTINE 1 PATCH: 21 PATCH, EXTENDED RELEASE TRANSDERMAL at 08:59

## 2021-11-22 RX ADMIN — Medication 400 MG: at 20:26

## 2021-11-22 RX ADMIN — HYDROMORPHONE HYDROCHLORIDE 0.5 MG: 1 INJECTION, SOLUTION INTRAMUSCULAR; INTRAVENOUS; SUBCUTANEOUS at 06:26

## 2021-11-22 RX ADMIN — PANTOPRAZOLE SODIUM 40 MG: 40 TABLET, DELAYED RELEASE ORAL at 16:13

## 2021-11-22 RX ADMIN — POTASSIUM CHLORIDE 20 MEQ: 1.5 POWDER, FOR SOLUTION ORAL at 11:46

## 2021-11-22 RX ADMIN — PANTOPRAZOLE SODIUM 40 MG: 40 TABLET, DELAYED RELEASE ORAL at 06:26

## 2021-11-22 RX ADMIN — QUETIAPINE FUMARATE 25 MG: 25 TABLET ORAL at 16:13

## 2021-11-22 RX ADMIN — POTASSIUM & SODIUM PHOSPHATES POWDER PACK 280-160-250 MG 1 PACKET: 280-160-250 PACK at 16:13

## 2021-11-22 RX ADMIN — THERA TABS 1 TABLET: TAB at 16:13

## 2021-11-22 RX ADMIN — Medication 400 MG: at 08:59

## 2021-11-22 RX ADMIN — HYDROMORPHONE HYDROCHLORIDE 0.5 MG: 1 INJECTION, SOLUTION INTRAMUSCULAR; INTRAVENOUS; SUBCUTANEOUS at 13:58

## 2021-11-22 RX ADMIN — SODIUM CHLORIDE, POTASSIUM CHLORIDE, SODIUM LACTATE AND CALCIUM CHLORIDE: 600; 310; 30; 20 INJECTION, SOLUTION INTRAVENOUS at 10:48

## 2021-11-22 RX ADMIN — SODIUM CHLORIDE, POTASSIUM CHLORIDE, SODIUM LACTATE AND CALCIUM CHLORIDE: 600; 310; 30; 20 INJECTION, SOLUTION INTRAVENOUS at 00:00

## 2021-11-22 RX ADMIN — ESCITALOPRAM OXALATE 10 MG: 10 TABLET ORAL at 08:59

## 2021-11-22 RX ADMIN — HYDROMORPHONE HYDROCHLORIDE 2 MG: 2 TABLET ORAL at 16:13

## 2021-11-22 RX ADMIN — HYDROMORPHONE HYDROCHLORIDE 0.5 MG: 1 INJECTION, SOLUTION INTRAMUSCULAR; INTRAVENOUS; SUBCUTANEOUS at 08:58

## 2021-11-22 RX ADMIN — HYDROMORPHONE HYDROCHLORIDE 2 MG: 2 TABLET ORAL at 20:26

## 2021-11-22 RX ADMIN — SODIUM CHLORIDE, POTASSIUM CHLORIDE, SODIUM LACTATE AND CALCIUM CHLORIDE: 600; 310; 30; 20 INJECTION, SOLUTION INTRAVENOUS at 20:27

## 2021-11-22 RX ADMIN — SODIUM CHLORIDE, POTASSIUM CHLORIDE, SODIUM LACTATE AND CALCIUM CHLORIDE: 600; 310; 30; 20 INJECTION, SOLUTION INTRAVENOUS at 05:00

## 2021-11-22 RX ADMIN — OXYCODONE HYDROCHLORIDE 5 MG: 5 TABLET ORAL at 11:46

## 2021-11-22 RX ADMIN — OXYCODONE HYDROCHLORIDE 5 MG: 5 TABLET ORAL at 10:54

## 2021-11-22 RX ADMIN — MIRTAZAPINE 15 MG: 15 TABLET, FILM COATED ORAL at 22:13

## 2021-11-22 ASSESSMENT — ACTIVITIES OF DAILY LIVING (ADL)
ADLS_ACUITY_SCORE: 7

## 2021-11-22 NOTE — PROGRESS NOTES
[facility]  Gastroenterology Progress Note     Audelia Comer MRN# [id number]   YOB: 1965 Age: 56 year old          Assessment and Plan:   Audelia Comer is a 56 year old female who was admitted on 11/19/2021. GI was asked to see the patient for Etoh induced pancreatitis.    Acute pancreatitis  Pancreatic cyst/lesion  Nausea and vomiting, intractability of vomiting not specified, unspecified vomiting type  Tbili normal w/ most elevation w/ AST>ALT which makes Etoh hepatitis and pancreatitis most likely.  CRP 44->25  ->115. ALT 83->64, Alk Phos 201->162    -- oral PPI twice daily  -- Daily CRP  -- Advance to lowfat diet  -?IPMN 10 mm, no reported PD duct size likely due to limitation of CT, pt likely will need 1 baseline MRI vs EUS to get all the characters and follow up per guideline afterwards          Interval History:   Pain improved. Tolerating diet. Has n/c. Nausea resolved.              Review of Systems:   C: NEGATIVE for fever, chills, change in weight  E/M: NEGATIVE for ear, mouth and throat problems  R: NEGATIVE for significant cough or SOB  CV: NEGATIVE for chest pain, palpitations or peripheral edema             Medications:   I have reviewed this patient's current medications    escitalopram  10 mg Oral Daily     folic acid  1 mg Oral Daily     magnesium oxide  400 mg Oral BID     mirtazapine  15 mg Oral At Bedtime     nicotine  1 patch Transdermal Daily     nicotine   Transdermal Q8H     pantoprazole  40 mg Oral BID AC     sodium chloride (PF)  3 mL Intracatheter Q8H     thiamine  100 mg Oral Daily                  Physical Exam:   Vitals were reviewed  Vital Signs with Ranges  Temp:  [98  F (36.7  C)-98.5  F (36.9  C)] 98  F (36.7  C)  Pulse:  [90] 90  Resp:  [16-18] 18  BP: (120-145)/(84-91) 140/84  SpO2:  [96 %-98 %] 98 %  I/O last 3 completed shifts:  In: 4247 [I.V.:4247]  Out: -   Constitutional: healthy, alert and no distress   Cardiovascular:  negative, PMI normal. No lifts, heaves, or thrills. RRR. No murmurs, clicks gallops or rub  Respiratory: negative, Percussion normal. Good diaphragmatic excursion. Lungs clear  Abdomen: Abdomen soft, tender. BS normal. No masses, organomegaly, positive findings: tenderness mild epigastric and RUQ           Data:   I reviewed the patient's new clinical lab test results.   Recent Labs   Lab Test 11/22/21  0843 11/21/21  0638 11/20/21  0324   WBC 6.4 6.7 10.5   HGB 10.6* 10.2* 11.7   MCV 98 95 96   * 134* 155     Recent Labs   Lab Test 11/22/21  0843 11/21/21  1821 11/21/21  1212 11/21/21  0638 11/20/21  0324 11/19/21  2354 11/19/21 2021   POTASSIUM 3.4 3.7 3.3* 2.9* 3.5   < > 2.7*   CHLORIDE  --   --   --  105 107  --  97   CO2  --   --   --  25 20  --  18*   BUN  --   --   --  <1* 3*  --  4*   ANIONGAP  --   --   --  8 9  --  16*    < > = values in this interval not displayed.     Recent Labs   Lab Test 11/21/21  0638 11/20/21  0445 11/20/21  0324 11/19/21  2021 10/29/21  0754 10/25/21  0805 10/24/21  1231 08/16/20  1447 07/28/20  1654 03/14/18  2225 09/28/17  1148   ALBUMIN 2.6*  --  2.9* 3.1* 3.0*   < > 2.8*   < >  --    < >  --    BILITOTAL 1.0  --  1.0 1.0 0.4   < > 0.6   < >  --    < >  --    ALT 64*  --  72* 83* 132*   < > 46   < >  --    < >  --    *  --  121* 157* 180*   < > 57*   < >  --    < >  --    PROTEIN  --  Negative  --   --   --   --   --   --  70*  --  Negative   LIPASE  --   --   --  1,369* 217  --  1,015*   < >  --    < >  --     < > = values in this interval not displayed.       I reviewed the patient's new imaging results.    All laboratory data reviewed  All imaging studies reviewed by me.    Keely Watts PA-C,  11/22/2021  [facility]  Office : [phone]  Cell: [phone] (Dr. Dickinson)  Cell: [phone] (Keely Watts PA-C)

## 2021-11-22 NOTE — CONSULTS
"Consult Date: 2021    PSYCHIATRY CONSULTATION    REQUESTING PHYSICIAN:  Dr. Boudreaux    REASON FOR CONSULTATION:  Alcohol dependence and anxiety.    IDENTIFYING DATA:  The patient is a 56-year-old   female admitted with alcohol-induced pancreatitis, currently convalescing on station 66.  She has been struggling with depression, anxiety, weight loss and bereavement.    CHIEF COMPLAINT:  \"My daughter  in her sleep.\"    HISTORY OF PRESENT ILLNESS:  The patient is a 56-year-old woman with the above history.  She is currently convalescing on station 66.  She presents with symptoms of alcohol-induced pancreatitis and hepatitis with a recent admission late last month.  She has a known alcohol use disorder, alcohol level 0.06 in the Emergency Room.  She has been struggling because of the pandemic, the loss of her daughter, who  unexpectedly in her sleep in the last year.  She has been depressed, overwhelmed, anxious, not sleeping well.  She has had some weight loss and has been in treatment through [facility] a number of years ago.  She lost her job because of the pandemic.  Since coming into the hospital, they put her on some Remeron to help sleep, p.r.n. Seroquel, and it looks like she may have been taking Lexapro prior to coming to the hospital, but could not remember if this was, in fact, the case.  She denies thoughts of self-harm, but acknowledges neurovegetative symptoms of depression.  She presents pleasant and cooperative during the visit.    On further questioning, she does not endorse hypomania, todd, panic, OCD, eating disorder history, ADHD or trauma history.  She acknowledges significant grief because of her daughter's passing, and has always been a bit of an anxious worrier.  She recalls being on some antidepressants on and off over the years, but never hospitalized, never had a suicide attempt.  She thinks her mom had depression.  She has been trying to seek out counseling and " "notes that she was diagnosed with breast cancer and ended up having a bilateral mastectomy at her choosing.    PAST PSYCHIATRIC HISTORY:  As above.    PAST CHEMICAL DEPENDENCY HISTORY:  Notable for an alcohol use disorder.  She has had one previous treatment, and this is her second admission with alcohol-induced pancreatitis.    PAST MEDICAL HISTORY:  Includes alcohol-induced pancreatitis, alcohol-induced hepatitis, weight loss/malnutrition possibly related to alcohol use, history of breast cancer, status post bilateral mastectomy in 08/2021, tobacco use disorder.    PRIOR TO ADMISSION MEDICATIONS:  Lexapro 5 mg daily, Astelin, Zyrtec, Flonase, Atarax, Ativan 0.5-1 mg b.i.d. p.r.n., multivitamin, Prilosec, Zofran, potassium supplements.    FAMILY HISTORY:  Notable for depression in her mother.  Her brother and father had alcohol use problems.    SOCIAL HISTORY:  The patient is .  She has grown children.  One of her daughters passed unexpectedly in her sleep because of \"an enlarged heart.\"  She lives in [place].  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  She has been isolated because of the pandemic.  She has several siblings from a blended family.  One of her brothers has passed on.  She denies specific trauma history, legal history or  history.    REVIEW OF SYSTEMS:  A 10-point review of systems is notable for abdominal pain on gastrointestinal exam secondary to pancreatitis, otherwise negative.    MOST RECENT VITAL SIGNS:  Blood pressure 140/84, temperature 98, pulse 90, respiratory rate 18, oxygen saturation 98%.    MENTAL STATUS EXAMINATION:  Appearance:  The patient is a slender, bespectacled woman.  She is sitting in bed.  She is alert and cooperative, judged to be a reliable historian.  Speech is soft.  Rate and flow normal.  Use of language appropriate.  Motor exam is calm.  Coordination, station, gait not tested.  Muscle strength and tone adequate.  Affect is constricted, " "but pleasant.  Mood \"depressed and anxious.\"  Thought process logical, coherent and goal-directed.  No loosening of associations, flight of ideas or formal thought disorder.  Thought content negative for hallucinations, delusions, paranoia, suicidal or homicidal ideation.  Insight and judgment fair with respect to alcohol use.  Cognitive:  The patient is alert and oriented x3.  Concentration intact.  Recent and remote memory intact.  General fund of knowledge average.    IMPRESSION:  The patient is a 56-year-old woman presenting with an alcohol use disorder, significant pancreatitis and hepatitis related to this.  She has superimposed major depressive disorder and bereavement with some anxious undertones.  We will optimize Lexapro 10 mg daily, continue Remeron 15 mg at bedtime, which was started here in the hospital, along with p.r.n. Seroquel.  She would benefit from a chemical dependency assessment.  She would also benefit from referral for Psychiatry and Therapy Services upon discharge.  I stressed with her that sobriety is absolutely critical if she is going to maintain good health and a positive prognosis.    DIAGNOSES:     1.  Alcohol use disorder, severe.  2.  Major depressive disorder, recurrent, moderate severity.  3.  Unspecified anxiety disorder.  4.  Bereavement.  5.  Alcohol-induced pancreatitis/hepatitis.    PLAN:     1.  Medical management as you are.  2.  Offer chemical dependency assessment.  3.  Offer Social Service consult for Psychiatry and Therapy referrals.  She lives in [facility], so [facility] or [facility] might be options.  4.  We will adjust Lexapro to 10 mg daily, continue Remeron 15 mg at bedtime, and adjust Seroquel to 25 mg t.i.d. p.r.n. for anxiety, which she finds helpful.  5.  I would avoid using benzodiazepines for anxiety, though I do see that she is on a CIWA protocol, which can probably be discontinued.  6.  Provide a 30-day supply of " psych medications at discharge.    Abdirizak Al MD        D: 2021   T: 2021   MT: JAY    Name:     NICOLA PLAZA  MRN:      [id number]        Account:      [id number]   :      1965           Consult Date: 2021     Document: [id number]

## 2021-11-22 NOTE — PLAN OF CARE
A&Ox4. VSS on RA. CIWA: 3. C/o abdominal pain, controlled with IV dilaudid. C/o R wrist pain from previous fall, controlled with ice pack. Tolerating full liquid diet, denies nausea. SBA to BR, voiding adequately. PIV infusing LR @200ml/hr. Phos & Mag replacement protocol started. Potassium recheck @18:30. GI following, discharge pending improvement.

## 2021-11-22 NOTE — PLAN OF CARE
Summary: ETOH pancreatitis  DATE & TIME: 11/22/2021  [id number]  Cognitive Concerns/ Orientation : A&Ox4  BEHAVIOR & AGGRESSION TOOL COLOR: Green  CIWA SCORE: 1  ABNL VS/O2: VSS on RA  MOBILITY: IND, calls appropriately  PAIN MANAGMENT: Reporting abdominal and back pain, PRN IV dilaudid administered x 2 along with 10 mg hydromorphone (with no relief).  DIET: Low fat diet  BOWEL/BLADDER: Continent  ABNL LAB/BG: MG 1.1, PH 2.2, K 3.4, CRP 25.4  DRAIN/DEVICES: PIV infusing LR @ 200 mL/hr  TELEMETRY RHYTHM: NA  SKIN: Bruised R forearm  TESTS/PROCEDURES: NA   D/C DAY/GOALS/PLACE: Pending  OTHER IMPORTANT INFO:Psychiatry, CD, and GI following. Ph/K replacement protocol initiated - currently on Mg replacement protocol. Provider ordered oral dilaudid for pain management.

## 2021-11-22 NOTE — PROGRESS NOTES
"SPIRITUAL HEALTH SERVICES  SPIRITUAL ASSESSMENT Progress Note  [facility]    REFERRAL SOURCE: Weekend on call  referral    Bedside visit with Audelia while she was enjoying her breakfast today. Audelia reported feeling \"much better today...physically, emotionally, spiritually.\" She expressed appreciation for an earlier visit with a psychiatrist, noting that she anticipates having a routine of talking to someone regularly will be helpful to manage both her bereavement process and her alcohol addiction, and requested grief support resources, ideally remote ones as those will be easier for her to consistently attend. Per Audelia, she is already connected with a support group for those with breast cancer.    Audelia shared that prayer is a meaningful spiritual resource. She is not formally connected with a evan community, but will periodically watching Gnosticism services on TV or online.    I provided empathetic listening and exploration of resources. Spiritual Health Services remains available for patient, family, and staff support.     Please page the on call  by placing a STAT or ASAP Epic referral for Spiritual Health (which will roll to the on call pager).        PLAN: I will follow up with list of grief resources for Audelia. Spiritual Health Services remains available for patient, family, and staff support.     Please page the on call  by placing a STAT or ASAP Epic referral for Spiritual Health (which will roll to the on call pager).        PANCHITO Mera.   Associate   Pager [phone]      "

## 2021-11-22 NOTE — PLAN OF CARE
Summary: ETOH pancreatitis  DATE & TIME: 11/21/2021 1900- 11/22/21 0730  Cognitive Concerns/ Orientation : A&Ox4  BEHAVIOR & AGGRESSION TOOL COLOR: Green  CIWA SCORE: 2 and 3  ABNL VS/O2: VSS on RA  MOBILITY: IND, calls appropriately  PAIN MANAGMENT: Reporting abdominal pain, PRN Dilaudid given x2 with relief  DIET: Full liquids, tolerating  BOWEL/BLADDER: Continent  ABNL LAB/BG:  and 102  DRAIN/DEVICES: PIV infusing LR @ 200 mL/hr  TELEMETRY RHYTHM: NA  SKIN: Bruised R forearm, scab to R thigh  TESTS/PROCEDURES: NA   D/C DAY/GOALS/PLACE: Pending  OTHER IMPORTANT INFO:Psychiatry, CD, and GI following.  PRN Zofran given x1 for nausea.

## 2021-11-22 NOTE — CONSULTS
11/22/2021      Met with pt via [employer] for CD consult. Pt reports she would not be interested in having an evaluation or attending inpatient CD treatment. Pt reports she knows what she needs to do. Pt declined CD resources. Pt is able to call Mental Health and Addiction Services Line: [phone] and make an appt through [employer]  [email or web address].[employer]ream    Supportive Services   SMART Recovery  https://www.smartrecovery.org      Alcoholics Anonymous  http://www.aa.org  Community Drug & Alcohol Support Resources   Alcoholics Anonymous   24/7 Phone Line: [phone]   https://[place].org/   https://a[place].org/   For additional list of online meetings: http://aa-intergroup.org     GRETTA Wu  Phone: [phone]  Email: [email or web address]

## 2021-11-22 NOTE — PROGRESS NOTES
[facility]    Medicine Progress Note - Hospitalist Service       Date of Admission:  11/19/2021    Assessment & Plan         Acute alcoholic pancreatitis  Alcoholic hepatitis  Possible small pancreatic head cyst  Recent admit 10/24-29 with pancreatitis thought 2/2 etoh use. US without biliary pathology. With ongoing poor PO, n/v, abdominal pain. Called 911 when she weighed herself and was 98.8 lbs (previous hospital at 105 lbs). Has had some falls, lightheadedness. Mildly tachy in ED, other vitals stable. Lipase 1,369. AST//83. Alk phos 201, bili ok.   Improving but still having pain.  Hungry. C-reactive protein decreasing. She wants her diet advanced.      Continue intravenous fluid with LR but decrease rate     Analgesics as needed - add oral     Can try low fat diet     Proton pump inhibitor     Seen by GI and can follow up in clinic for pancreatic lesion      Hypoglycemia  Recent Labs   Lab 11/22/21  0130 11/21/21  2134 11/21/21  1711 11/21/21  1429 11/21/21  1119 11/21/21  0757   * 127* 120* 100* 83 74   Glucose 57 on presentation. Suspect related to malnutrition. Not on antihyperglycemics.     Monitor      AG acidosis, resolved  Suspect starvation, alcoholic ketoacidosis.   No serum ketones or lactic acid but serum HCO3 is improving so will not pursue at this time.  The patient is clinically stable.    Prolonged QT  Noted on EKG on admission at 500. Likely 2/2 electrolyte disorders.   Today on EKG = 462ms    Avoid QT prolonging agents as able      Alcohol use disorder  History  alcohol use, admitted several times for abdominal pain and Etoh use. Etoh level 0.06 in ED.   Has low CIWA scores.      Stop CIWA scoring     Continue vitamins     Hypokalemia  Hypomagnesemia  Hyponatremia  Hypocalcemia - resolved   Hypophosphatemia    Hypokalemia severe, 2.7 on admission. HypoMg at 1.5. Hyponatremia mild at 131, likely 2/2 dehydration.     Monitor electrolytes and replace as  needed     Depression   Anxiety  Alcohol use disorder  [escitalopram 5 mg daily, lorazepam 0.5-1.0 BID prn]  Hx depression. Daughter  a couple of months ago. States uses Etoh and lorazepam at HS so she can sleep.   She was seen by MH and Aida Farfan discussed the case with the psychiatric DANIELLA on-call.     Psychiatry consultation today    Breast cancer  S/p bilateral mastectomy 2021, T1bN0 disease. Follows with oncology through [facility].       Malnutrition, severe  Dx recent admission 10/2021. Albumin 3.1 on presentation.     Nutrition consulted- continue supplement     Tobacco use disorder  Hx tobacco use 40+ years at 1 ppd. Has cut back to  ppd as has been largely bedbound.      Diet: Snacks/Supplements Pediatric: Ensure Clear; Between Meals  Snacks/Supplements Adult: Ensure Clear; Between Meals  Combination Diet Regular Diet Adult; Low Fat Diet    DVT Prophylaxis: Pneumatic Compression Devices  Patino Catheter: Not present  Central Lines  : None  Code Status: Full Code      Disposition Plan   Expected discharge: 2021   recommended to prior living arrangement once pancreatitis improves.     The patient's care was discussed with the Patient/nurse    Carmelo Boudreaux MD  Hospitalist Service  [facility]  Securely message with the [employer] Web Console (learn more here)  Text page via [employer] Paging/Directory    Clinically Significant Risk Factors Present on Admission            # Severe Malnutrition, POA: based on Weight loss;Reduced intake;Subcutaneous fat loss;Muscle loss (21 1000)     ______________________________________________________________________    Interval History   Improving slowly.  Still having pain.  Hungry.  No nausea or vomiting.    Data reviewed today: I reviewed all medications, new labs and imaging results over the last 24 hours. I personally reviewed no images or EKG's today.    Physical Exam   Vital Signs: Temp: 98.8  F (37.1  C) Temp src:  Oral BP: 135/87 Pulse: 90   Resp: 16 SpO2: 97 % O2 Device: None (Room air)    Weight: 98 lbs 0 oz  Constitutional: awake, alert, cooperative, no apparent distress  Respiratory: No increased work of breathing, good air exchange, clear to auscultation bilaterally, no crackles or wheezing  Cardiovascular: regular rate and rhythm, normal S1 and S2, no S3 or S4, and no murmur noted  GI:normal bowel sounds, soft, non-distended, mildly tender throughout  Skin: no rashes  Neuropsychiatric: General: normal, calm and normal eye contact    Data   Recent Labs   Lab 11/22/21  0843 11/22/21  0130 11/21/21  2134 11/21/21  1821 11/21/21  1711 11/21/21  1429 11/21/21  1212 11/21/21  0757 11/21/21  0638 11/20/21  0603 11/20/21  0324 11/19/21  2217 11/19/21 2021   WBC 6.4  --   --   --   --   --   --   --  6.7  --  10.5  --  9.4   HGB 10.6*  --   --   --   --   --   --   --  10.2*  --  11.7  --  11.9   MCV 98  --   --   --   --   --   --   --  95  --  96  --  94   *  --   --   --   --   --   --   --  134*  --  155  --  143*   NA  --   --   --   --   --   --   --   --  138  --  136  --  131*   POTASSIUM 3.4  --   --  3.7  --   --  3.3*  --  2.9*  --  3.5   < > 2.7*   CHLORIDE  --   --   --   --   --   --   --   --  105  --  107  --  97   CO2  --   --   --   --   --   --   --   --  25  --  20  --  18*   BUN  --   --   --   --   --   --   --   --  <1*  --  3*  --  4*   CR  --   --   --   --   --   --   --   --  0.33*  --  0.43*  --  0.46*   ANIONGAP  --   --   --   --   --   --   --   --  8  --  9  --  16*   FRANCOISE  --   --   --   --   --   --   --   --  7.8*  --  7.1*  --  8.2*   GLC  --  102* 127*  --  120*   < >  --    < > 90   < > 87   < > 57*   ALBUMIN  --   --   --   --   --   --   --   --  2.6*  --  2.9*  --  3.1*   PROTTOTAL  --   --   --   --   --   --   --   --  5.7*  --  6.7*  --  6.9   BILITOTAL  --   --   --   --   --   --   --   --  1.0  --  1.0  --  1.0   ALKPHOS  --   --   --   --   --   --   --   --  162*  --  185*   --  201*   ALT  --   --   --   --   --   --   --   --  64*  --  72*  --  83*   AST  --   --   --   --   --   --   --   --  115*  --  121*  --  157*   LIPASE  --   --   --   --   --   --   --   --   --   --   --   --  1,369*    < > = values in this interval not displayed.     No results found for this or any previous visit (from the past 24 hour(s)).  Medications     lactated ringers 100 mL/hr at 11/22/21 1048       escitalopram  10 mg Oral Daily     folic acid  1 mg Oral Daily     magnesium oxide  400 mg Oral BID     mirtazapine  15 mg Oral At Bedtime     nicotine  1 patch Transdermal Daily     nicotine   Transdermal Q8H     pantoprazole  40 mg Oral BID AC     potassium & sodium phosphates  1 packet Oral TID     sodium chloride (PF)  3 mL Intracatheter Q8H     thiamine  100 mg Oral Daily

## 2021-11-23 LAB
ALBUMIN SERPL-MCNC: 2.5 G/DL (ref 3.4–5)
ALP SERPL-CCNC: 162 U/L (ref 40–150)
ALT SERPL W P-5'-P-CCNC: 69 U/L (ref 0–50)
ANION GAP SERPL CALCULATED.3IONS-SCNC: 5 MMOL/L (ref 3–14)
AST SERPL W P-5'-P-CCNC: 103 U/L (ref 0–45)
ATRIAL RATE - MUSE: 91 BPM
BILIRUB SERPL-MCNC: 0.6 MG/DL (ref 0.2–1.3)
BUN SERPL-MCNC: 2 MG/DL (ref 7–30)
CALCIUM SERPL-MCNC: 9.1 MG/DL (ref 8.5–10.1)
CHLORIDE BLD-SCNC: 104 MMOL/L (ref 94–109)
CO2 SERPL-SCNC: 29 MMOL/L (ref 20–32)
CREAT SERPL-MCNC: 0.45 MG/DL (ref 0.52–1.04)
CRP SERPL-MCNC: 17.5 MG/L (ref 0–8)
DEPRECATED CALCIDIOL+CALCIFEROL SERPL-MC: 32 UG/L (ref 20–75)
DIASTOLIC BLOOD PRESSURE - MUSE: NORMAL MMHG
GFR SERPL CREATININE-BSD FRML MDRD: >90 ML/MIN/1.73M2
GLUCOSE BLD-MCNC: 92 MG/DL (ref 70–99)
INTERPRETATION ECG - MUSE: NORMAL
MAGNESIUM SERPL-MCNC: 1.2 MG/DL (ref 1.6–2.3)
P AXIS - MUSE: 49 DEGREES
POTASSIUM BLD-SCNC: 3.4 MMOL/L (ref 3.4–5.3)
POTASSIUM BLD-SCNC: 4 MMOL/L (ref 3.4–5.3)
PR INTERVAL - MUSE: 110 MS
PROT SERPL-MCNC: 6.1 G/DL (ref 6.8–8.8)
QRS DURATION - MUSE: 72 MS
QT - MUSE: 376 MS
QTC - MUSE: 462 MS
R AXIS - MUSE: 61 DEGREES
SODIUM SERPL-SCNC: 138 MMOL/L (ref 133–144)
SYSTOLIC BLOOD PRESSURE - MUSE: NORMAL MMHG
T AXIS - MUSE: 70 DEGREES
VENTRICULAR RATE- MUSE: 91 BPM

## 2021-11-23 PROCEDURE — 99232 SBSQ HOSP IP/OBS MODERATE 35: CPT | Performed by: HOSPITALIST

## 2021-11-23 PROCEDURE — 83735 ASSAY OF MAGNESIUM: CPT | Performed by: HOSPITALIST

## 2021-11-23 PROCEDURE — 36415 COLL VENOUS BLD VENIPUNCTURE: CPT | Performed by: HOSPITALIST

## 2021-11-23 PROCEDURE — 86140 C-REACTIVE PROTEIN: CPT | Performed by: HOSPITALIST

## 2021-11-23 PROCEDURE — 250N000013 HC RX MED GY IP 250 OP 250 PS 637: Performed by: HOSPITALIST

## 2021-11-23 PROCEDURE — 84132 ASSAY OF SERUM POTASSIUM: CPT | Performed by: HOSPITALIST

## 2021-11-23 PROCEDURE — 120N000001 HC R&B MED SURG/OB

## 2021-11-23 PROCEDURE — 250N000013 HC RX MED GY IP 250 OP 250 PS 637: Performed by: PSYCHIATRY & NEUROLOGY

## 2021-11-23 PROCEDURE — 80053 COMPREHEN METABOLIC PANEL: CPT | Performed by: HOSPITALIST

## 2021-11-23 PROCEDURE — 250N000013 HC RX MED GY IP 250 OP 250 PS 637: Performed by: INTERNAL MEDICINE

## 2021-11-23 PROCEDURE — 258N000003 HC RX IP 258 OP 636: Performed by: HOSPITALIST

## 2021-11-23 RX ORDER — POTASSIUM CHLORIDE 1.5 G/1.58G
20 POWDER, FOR SOLUTION ORAL ONCE
Status: COMPLETED | OUTPATIENT
Start: 2021-11-23 | End: 2021-11-23

## 2021-11-23 RX ORDER — MAGNESIUM OXIDE 400 MG/1
400 TABLET ORAL 2 TIMES DAILY
Status: DISCONTINUED | OUTPATIENT
Start: 2021-11-23 | End: 2021-11-24 | Stop reason: HOSPADM

## 2021-11-23 RX ORDER — POTASSIUM CHLORIDE 1500 MG/1
20 TABLET, EXTENDED RELEASE ORAL ONCE
Status: DISCONTINUED | OUTPATIENT
Start: 2021-11-23 | End: 2021-11-24 | Stop reason: HOSPADM

## 2021-11-23 RX ADMIN — QUETIAPINE FUMARATE 25 MG: 25 TABLET ORAL at 22:08

## 2021-11-23 RX ADMIN — Medication 400 MG: at 20:42

## 2021-11-23 RX ADMIN — HYDROMORPHONE HYDROCHLORIDE 2 MG: 2 TABLET ORAL at 09:38

## 2021-11-23 RX ADMIN — FOLIC ACID 1 MG: 1 TABLET ORAL at 08:06

## 2021-11-23 RX ADMIN — Medication 400 MG: at 13:39

## 2021-11-23 RX ADMIN — SODIUM CHLORIDE, POTASSIUM CHLORIDE, SODIUM LACTATE AND CALCIUM CHLORIDE: 600; 310; 30; 20 INJECTION, SOLUTION INTRAVENOUS at 16:06

## 2021-11-23 RX ADMIN — ESCITALOPRAM OXALATE 10 MG: 10 TABLET ORAL at 08:07

## 2021-11-23 RX ADMIN — POTASSIUM & SODIUM PHOSPHATES POWDER PACK 280-160-250 MG 1 PACKET: 280-160-250 PACK at 08:06

## 2021-11-23 RX ADMIN — POTASSIUM CHLORIDE 20 MEQ: 1.5 POWDER, FOR SOLUTION ORAL at 16:41

## 2021-11-23 RX ADMIN — SODIUM CHLORIDE, POTASSIUM CHLORIDE, SODIUM LACTATE AND CALCIUM CHLORIDE: 600; 310; 30; 20 INJECTION, SOLUTION INTRAVENOUS at 06:16

## 2021-11-23 RX ADMIN — THERA TABS 1 TABLET: TAB at 08:07

## 2021-11-23 RX ADMIN — QUETIAPINE FUMARATE 25 MG: 25 TABLET ORAL at 13:40

## 2021-11-23 RX ADMIN — HYDROMORPHONE HYDROCHLORIDE 2 MG: 2 TABLET ORAL at 01:18

## 2021-11-23 RX ADMIN — NICOTINE 1 PATCH: 21 PATCH, EXTENDED RELEASE TRANSDERMAL at 08:06

## 2021-11-23 RX ADMIN — PANTOPRAZOLE SODIUM 40 MG: 40 TABLET, DELAYED RELEASE ORAL at 06:26

## 2021-11-23 RX ADMIN — HYDROMORPHONE HYDROCHLORIDE 2 MG: 2 TABLET ORAL at 21:50

## 2021-11-23 RX ADMIN — MIRTAZAPINE 15 MG: 15 TABLET, FILM COATED ORAL at 21:33

## 2021-11-23 RX ADMIN — THIAMINE HCL TAB 100 MG 100 MG: 100 TAB at 08:06

## 2021-11-23 RX ADMIN — PANTOPRAZOLE SODIUM 40 MG: 40 TABLET, DELAYED RELEASE ORAL at 15:59

## 2021-11-23 RX ADMIN — HYDROMORPHONE HYDROCHLORIDE 2 MG: 2 TABLET ORAL at 13:40

## 2021-11-23 RX ADMIN — HYDROMORPHONE HYDROCHLORIDE 2 MG: 2 TABLET ORAL at 05:23

## 2021-11-23 RX ADMIN — HYDROMORPHONE HYDROCHLORIDE 2 MG: 2 TABLET ORAL at 17:41

## 2021-11-23 ASSESSMENT — ACTIVITIES OF DAILY LIVING (ADL)
ADLS_ACUITY_SCORE: 7

## 2021-11-23 NOTE — PLAN OF CARE
Summary: ETOH pancreatitis  DATE & TIME: 11/22/2021-11/23/21  [id number]  Cognitive Concerns/ Orientation : A&Ox4  BEHAVIOR & AGGRESSION TOOL COLOR: Green  CIWA SCORE: na  ABNL VS/O2: VSS on RA  MOBILITY: IND, calls appropriately  PAIN MANAGMENT: Reporting abdominal and back pain, PRN oral dilaudid given with decrease in pain  DIET: Low fat diet  BOWEL/BLADDER: Continent  ABNL LAB/BG: MG 1.1 (replaced, recheck in AM), PH 2.2 (last replacement dose at 9am), K 3.4, CRP 25.4  DRAIN/DEVICES: PIV infusing LR @ 100 mL/hr  TELEMETRY RHYTHM: NA  SKIN: Bruised R forearm  TESTS/PROCEDURES: NA   D/C DAY/GOALS/PLACE: Possibly today pending pancreatitis improvement  OTHER IMPORTANT INFO:Psychiatry, CD, and GI following. Nicotine patch R-arm. Gave PRN Seroquel for anxiety.

## 2021-11-23 NOTE — PLAN OF CARE
Summary: ETOH pancreatitis  DATE & TIME: 11/22/2021  [id number]  Cognitive Concerns/ Orientation : A&Ox4  BEHAVIOR & AGGRESSION TOOL COLOR: Green  CIWA SCORE: na  ABNL VS/O2: VSS on RA  MOBILITY: IND, calls appropriately  PAIN MANAGMENT: Reporting abdominal and back pain, PRN oral dilaudid given with decrease in pain  DIET: Low fat diet  BOWEL/BLADDER: Continent  ABNL LAB/BG: MG 1.1, PH 2.2, K 3.4, CRP 25.4  DRAIN/DEVICES: PIV infusing LR @ 100 mL/hr  TELEMETRY RHYTHM: NA  SKIN: Bruised R forearm  TESTS/PROCEDURES: NA   D/C DAY/GOALS/PLACE: Possibly 11/23  OTHER IMPORTANT INFO:Psychiatry, CD, and GI following. Ph/K replacement protocol initiated - currently on Mg replacement protocol.

## 2021-11-23 NOTE — PROGRESS NOTES
SPIRITUAL HEALTH SERVICES  SPIRITUAL ASSESSMENT Progress Note  [facility]    REFERRAL SOURCE: Follow up on resource request    Delivered [facility] grief resources information to Audelia, per her request. At this time she wanted to take a nap, as she wasn't feeling well.    I provided connection with community resources.     PLAN: I will continue to follow. Spiritual Health Services remains available for patient, family, and staff support.     Please page the on call  by placing a STAT or ASAP Epic referral for Spiritual Health (which will roll to the on call pager).        PANCHITO Mera.   Associate   Pager [phone]

## 2021-11-23 NOTE — PLAN OF CARE
Summary: ETOH pancreatitis  DATE & TIME: 11/23/21 7-3  Cognitive Concerns/ Orientation : A&Ox4  BEHAVIOR & AGGRESSION TOOL COLOR: Green  CIWA SCORE: na  ABNL VS/O2: VSS on RA  MOBILITY: IND, calls appropriately  PAIN MANAGMENT: Reporting abdominal and back pain, PRN oral dilaudid given x2   DIET: Low fat diet  BOWEL/BLADDER: Continent  ABNL LAB/BG: ALT-69, AST-103, CRP-17.5, Mg 1.2 (replaced per protocol)  DRAIN/DEVICES: PIV infusing LR @ 100 mL/hr  TELEMETRY RHYTHM: NA  SKIN: Bruised R forearm  TESTS/PROCEDURES: NA   D/C DAY/GOALS/PLACE: Pending pain improvement  OTHER IMPORTANT INFO:Psychiatry, CD, and GI following. PRN seroquel given x1.

## 2021-11-23 NOTE — PROGRESS NOTES
[facility]  Gastroenterology Progress Note     Audelia Comer MRN# [id number]   YOB: 1965 Age: 56 year old          Assessment and Plan:   Audelia Comer is a 56 year old female who was admitted on 11/19/2021. GI was asked to see the patient for Etoh induced pancreatitis.     Acute pancreatitis  Pancreatic cyst/lesion  Nausea and vomiting   H/o significant alcohol use. AST greater than ALT consistent with alcoholic hepatitis.  CRP 44->25->17.5  ->115->103. ALT 83->64->69, Alk Phos 201->162->162     -- oral PPI twice daily  -- Daily CRP  -- Continue lowfat diet  -?IPMN 10 mm, no reported PD duct size likely due to limitation of CT, pt likely will need 1 baseline MRI vs EUS to get all the characters and follow up per guideline afterwards   -- Ok with GI to discharge patient with plans for outpatient f/u in 1-2 weeks with [facility]                Interval History:   no new complaints, doing well and doing well; no cp, sob, n/v/d, or abd pain.              Review of Systems:   C: NEGATIVE for fever, chills, change in weight  E/M: NEGATIVE for ear, mouth and throat problems  R: NEGATIVE for significant cough or SOB  CV: NEGATIVE for chest pain, palpitations or peripheral edema             Medications:   I have reviewed this patient's current medications    escitalopram  10 mg Oral Daily     mirtazapine  15 mg Oral At Bedtime     multivitamin, therapeutic  1 tablet Oral Daily     nicotine  1 patch Transdermal Daily     nicotine   Transdermal Q8H     pantoprazole  40 mg Oral BID AC     sodium chloride (PF)  3 mL Intracatheter Q8H                  Physical Exam:   Vitals were reviewed  Vital Signs with Ranges  Temp:  [98.9  F (37.2  C)-99.2  F (37.3  C)] 99.1  F (37.3  C)  Pulse:  [94-96] 95  Resp:  [16-18] 16  BP: (119-136)/(66-87) 136/85  SpO2:  [96 %-97 %] 96 %  No intake/output data recorded.  Constitutional: healthy, alert and no distress   Cardiovascular: negative,  PMI normal. No lifts, heaves, or thrills. RRR. No murmurs, clicks gallops or rub  Respiratory: negative, Percussion normal. Good diaphragmatic excursion. Lungs clear  Abdomen: Abdomen soft, non-tender. BS normal. No masses, organomegaly           Data:   I reviewed the patient's new clinical lab test results.   Recent Labs   Lab Test 11/22/21  0843 11/21/21  0638 11/20/21  0324   WBC 6.4 6.7 10.5   HGB 10.6* 10.2* 11.7   MCV 98 95 96   * 134* 155     Recent Labs   Lab Test 11/23/21  0829 11/22/21  2053 11/22/21  0843 11/21/21  1212 11/21/21  0638 11/20/21  0324   POTASSIUM 3.4 3.7 3.4   < > 2.9* 3.5   CHLORIDE 104  --   --   --  105 107   CO2 29  --   --   --  25 20   BUN 2*  --   --   --  <1* 3*   ANIONGAP 5  --   --   --  8 9    < > = values in this interval not displayed.     Recent Labs   Lab Test 11/23/21  0829 11/21/21  0638 11/20/21  0445 11/20/21  0324 11/19/21  2021 10/29/21  0754 10/25/21  0805 10/24/21  1231 08/16/20  1447 07/28/20  1654 03/14/18  2225 09/28/17  1148   ALBUMIN 2.5* 2.6*  --  2.9* 3.1* 3.0*   < > 2.8*   < >  --    < >  --    BILITOTAL 0.6 1.0  --  1.0 1.0 0.4   < > 0.6   < >  --    < >  --    ALT 69* 64*  --  72* 83* 132*   < > 46   < >  --    < >  --    * 115*  --  121* 157* 180*   < > 57*   < >  --    < >  --    PROTEIN  --   --  Negative  --   --   --   --   --   --  70*  --  Negative   LIPASE  --   --   --   --  1,369* 217  --  1,015*   < >  --    < >  --     < > = values in this interval not displayed.       I reviewed the patient's new imaging results.    All laboratory data reviewed  All imaging studies reviewed by me.    Keely Watts PA-C,  11/23/2021  [facility]  Office : [phone]  Cell: [phone] (Dr. Dickinson)  Cell: [phone] (Keely Watts PA-C)

## 2021-11-23 NOTE — PROGRESS NOTES
[facility]    Medicine Progress Note - Hospitalist Service       Date of Admission:  11/19/2021    Assessment & Plan         Acute alcoholic pancreatitis  Alcoholic hepatitis  Possible small pancreatic head cyst  Recent admit 10/24-29 with pancreatitis thought 2/2 etoh use. US without biliary pathology. With ongoing poor PO, n/v, abdominal pain. Called 911 when she weighed herself and was 98.8 lbs (previous hospital at 105 lbs). Has had some falls, lightheadedness. Mildly tachy in ED, other vitals stable. Lipase 1,369. AST//83. Alk phos 201, bili ok.   Improving but still having pain.  She says that she had pain with eating yesterday so was afraid to eat breakfast.  She will try to eat something for lunch.    Continue intravenous fluid with LR( rate decreased yesterday)    Analgesics as needed - added oral     Continue low fat diet     Continue proton pump inhibitor     Seen by GI and can follow up in clinic for pancreatic lesion      Hypoglycemia  Recent Labs   Lab 11/23/21  0829 11/22/21  0130 11/21/21  2134 11/21/21  1711 11/21/21  1429 11/21/21  1119   GLC 92 102* 127* 120* 100* 83   Glucose 57 on presentation. Suspect related to malnutrition. Not on antihyperglycemics.     Monitor      AG acidosis, resolved  Suspect starvation, alcoholic ketoacidosis.   No serum ketones or lactic acid but serum HCO3 is improving so will not pursue at this time.  The patient is clinically stable.    Prolonged QT  Noted on EKG on admission at 500. Likely 2/2 electrolyte disorders.   Today on EKG = 462ms    Avoid QT prolonging agents as able      Alcohol use disorder  History  alcohol use, admitted several times for abdominal pain and Etoh use. Etoh level 0.06 in ED.   Has low CIWA scores.      Stopped CIWA scoring     Continue vitamins     Hypokalemia  Hypomagnesemia  Hyponatremia  Hypocalcemia - resolved   Hypophosphatemia    Hypokalemia severe, 2.7 on admission. HypoMg at 1.5. Hyponatremia mild  at 131, likely 2/2 dehydration.     Monitor electrolytes and replace as needed     Depression   Anxiety  Alcohol use disorder  [escitalopram 5 mg daily, lorazepam 0.5-1.0 BID prn]  Hx depression. Daughter  a couple of months ago. States uses Etoh and lorazepam at HS so she can sleep.   She was seen by MH and Aida Farfan discussed the case with the psychiatric DANIELLA on-call.     Psychiatrist consulted- see recommendations     Breast cancer  S/p bilateral mastectomy 2021, T1bN0 disease. Follows with oncology through [facility].       Malnutrition, severe  Dx recent admission 10/2021. Albumin 3.1 on presentation.     Nutrition consulted- continue supplement     Tobacco use disorder  Hx tobacco use 40+ years at 1 ppd. Has cut back to 1/ ppd as has been largely bedbound.      Diet: Snacks/Supplements Pediatric: Ensure Clear; Between Meals  Snacks/Supplements Adult: Ensure Clear; Between Meals  Combination Diet Regular Diet Adult; Low Fat Diet    DVT Prophylaxis: Pneumatic Compression Devices  Patino Catheter: Not present  Central Lines  : None  Code Status: Full Code      Disposition Plan   Expected discharge: 2021   recommended to prior living arrangement once pancreatitis improves. 1-2 days.     The patient's care was discussed with the Patient    Carmelo Boudreaux MD  Hospitalist Service  [facility]  Securely message with the [employer] Web Console (learn more here)  Text page via [employer] Paging/Directory    Clinically Significant Risk Factors Present on Admission            # Severe Malnutrition, POA: based on Weight loss;Reduced intake;Subcutaneous fat loss;Muscle loss (21 1000)     ______________________________________________________________________    Interval History   Had pain with eating yesterday so she did not order breakfast.  No vomiting but some nausea.  She will try to eat lunch.    Data reviewed today: I reviewed all medications, new labs and imaging  results over the last 24 hours. I personally reviewed no images or EKG's today.    Physical Exam   Vital Signs: Temp: 99.4  F (37.4  C) Temp src: Oral BP: 127/81 Pulse: 96   Resp: 16 SpO2: 94 % O2 Device: None (Room air)    Weight: 98 lbs 0 oz  Constitutional: awake, alert, cooperative, no apparent distress  Respiratory: No increased work of breathing, good air exchange, clear to auscultation bilaterally, no crackles or wheezing  Cardiovascular: regular rate and rhythm, normal S1 and S2, no S3 or S4, and no murmur noted  GI:normal bowel sounds, soft, non-distended, non tender   Skin: no rashes  Neuropsychiatric: General: normal, calm and normal eye contact    Data   Recent Labs   Lab 11/23/21  0829 11/22/21  2053 11/22/21  0843 11/22/21  0130 11/21/21  2134 11/21/21  0757 11/21/21  0638 11/20/21  0603 11/20/21  0324 11/19/21  2217 11/19/21 2021   WBC  --   --  6.4  --   --   --  6.7  --  10.5  --  9.4   HGB  --   --  10.6*  --   --   --  10.2*  --  11.7  --  11.9   MCV  --   --  98  --   --   --  95  --  96  --  94   PLT  --   --  143*  --   --   --  134*  --  155  --  143*     --   --   --   --   --  138  --  136  --  131*   POTASSIUM 3.4 3.7 3.4  --   --    < > 2.9*  --  3.5   < > 2.7*   CHLORIDE 104  --   --   --   --   --  105  --  107  --  97   CO2 29  --   --   --   --   --  25  --  20  --  18*   BUN 2*  --   --   --   --   --  <1*  --  3*  --  4*   CR 0.45*  --   --   --   --   --  0.33*  --  0.43*  --  0.46*   ANIONGAP 5  --   --   --   --   --  8  --  9  --  16*   FRANCOISE 9.1  --   --   --   --   --  7.8*  --  7.1*  --  8.2*   GLC 92  --   --  102* 127*   < > 90   < > 87   < > 57*   ALBUMIN 2.5*  --   --   --   --   --  2.6*  --  2.9*  --  3.1*   PROTTOTAL 6.1*  --   --   --   --   --  5.7*  --  6.7*  --  6.9   BILITOTAL 0.6  --   --   --   --   --  1.0  --  1.0  --  1.0   ALKPHOS 162*  --   --   --   --   --  162*  --  185*  --  201*   ALT 69*  --   --   --   --   --  64*  --  72*  --  83*   *   --   --   --   --   --  115*  --  121*  --  157*   LIPASE  --   --   --   --   --   --   --   --   --   --  1,369*    < > = values in this interval not displayed.     No results found for this or any previous visit (from the past 24 hour(s)).  Medications     lactated ringers 100 mL/hr at 11/23/21 0616       escitalopram  10 mg Oral Daily     mirtazapine  15 mg Oral At Bedtime     multivitamin, therapeutic  1 tablet Oral Daily     nicotine  1 patch Transdermal Daily     nicotine   Transdermal Q8H     pantoprazole  40 mg Oral BID AC     sodium chloride (PF)  3 mL Intracatheter Q8H

## 2021-11-24 VITALS
SYSTOLIC BLOOD PRESSURE: 118 MMHG | OXYGEN SATURATION: 95 % | RESPIRATION RATE: 16 BRPM | DIASTOLIC BLOOD PRESSURE: 79 MMHG | WEIGHT: 98 LBS | HEART RATE: 86 BPM | BODY MASS INDEX: 15.82 KG/M2 | TEMPERATURE: 99 F

## 2021-11-24 LAB
CRP SERPL-MCNC: 11.2 MG/L (ref 0–8)
PHOSPHATE SERPL-MCNC: 3 MG/DL (ref 2.5–4.5)

## 2021-11-24 PROCEDURE — 86140 C-REACTIVE PROTEIN: CPT | Performed by: HOSPITALIST

## 2021-11-24 PROCEDURE — 250N000013 HC RX MED GY IP 250 OP 250 PS 637: Performed by: HOSPITALIST

## 2021-11-24 PROCEDURE — 250N000013 HC RX MED GY IP 250 OP 250 PS 637: Performed by: PSYCHIATRY & NEUROLOGY

## 2021-11-24 PROCEDURE — 84100 ASSAY OF PHOSPHORUS: CPT | Performed by: HOSPITALIST

## 2021-11-24 PROCEDURE — 250N000013 HC RX MED GY IP 250 OP 250 PS 637: Performed by: INTERNAL MEDICINE

## 2021-11-24 PROCEDURE — 99239 HOSP IP/OBS DSCHRG MGMT >30: CPT | Performed by: STUDENT IN AN ORGANIZED HEALTH CARE EDUCATION/TRAINING PROGRAM

## 2021-11-24 PROCEDURE — 258N000003 HC RX IP 258 OP 636: Performed by: HOSPITALIST

## 2021-11-24 PROCEDURE — 36415 COLL VENOUS BLD VENIPUNCTURE: CPT | Performed by: HOSPITALIST

## 2021-11-24 RX ORDER — ONDANSETRON 4 MG/1
4 TABLET, ORALLY DISINTEGRATING ORAL EVERY 6 HOURS PRN
Qty: 20 TABLET | Refills: 0 | Status: ON HOLD | OUTPATIENT
Start: 2021-11-24 | End: 2023-01-04

## 2021-11-24 RX ORDER — QUETIAPINE FUMARATE 25 MG/1
25 TABLET, FILM COATED ORAL DAILY PRN
Qty: 12 TABLET | Refills: 0 | Status: ON HOLD | OUTPATIENT
Start: 2021-11-24 | End: 2022-07-06

## 2021-11-24 RX ORDER — HYDROMORPHONE HYDROCHLORIDE 2 MG/1
2 TABLET ORAL EVERY 4 HOURS PRN
Qty: 12 TABLET | Refills: 0 | Status: SHIPPED | OUTPATIENT
Start: 2021-11-24 | End: 2021-11-27

## 2021-11-24 RX ORDER — MIRTAZAPINE 15 MG/1
15 TABLET, FILM COATED ORAL AT BEDTIME
Qty: 14 TABLET | Refills: 0 | Status: ON HOLD | OUTPATIENT
Start: 2021-11-24 | End: 2022-06-28

## 2021-11-24 RX ORDER — PANTOPRAZOLE SODIUM 40 MG/1
40 TABLET, DELAYED RELEASE ORAL
Qty: 60 TABLET | Refills: 0 | Status: SHIPPED | OUTPATIENT
Start: 2021-11-24 | End: 2021-12-24

## 2021-11-24 RX ADMIN — QUETIAPINE FUMARATE 25 MG: 25 TABLET ORAL at 11:54

## 2021-11-24 RX ADMIN — HYDROMORPHONE HYDROCHLORIDE 2 MG: 2 TABLET ORAL at 10:19

## 2021-11-24 RX ADMIN — ESCITALOPRAM OXALATE 10 MG: 10 TABLET ORAL at 08:38

## 2021-11-24 RX ADMIN — HYDROMORPHONE HYDROCHLORIDE 2 MG: 2 TABLET ORAL at 06:15

## 2021-11-24 RX ADMIN — SODIUM CHLORIDE, POTASSIUM CHLORIDE, SODIUM LACTATE AND CALCIUM CHLORIDE: 600; 310; 30; 20 INJECTION, SOLUTION INTRAVENOUS at 01:43

## 2021-11-24 RX ADMIN — Medication 400 MG: at 08:38

## 2021-11-24 RX ADMIN — PANTOPRAZOLE SODIUM 40 MG: 40 TABLET, DELAYED RELEASE ORAL at 06:33

## 2021-11-24 RX ADMIN — HYDROMORPHONE HYDROCHLORIDE 2 MG: 2 TABLET ORAL at 02:09

## 2021-11-24 RX ADMIN — THERA TABS 1 TABLET: TAB at 08:38

## 2021-11-24 RX ADMIN — QUETIAPINE FUMARATE 25 MG: 25 TABLET ORAL at 06:19

## 2021-11-24 RX ADMIN — NICOTINE 1 PATCH: 21 PATCH, EXTENDED RELEASE TRANSDERMAL at 08:37

## 2021-11-24 ASSESSMENT — ACTIVITIES OF DAILY LIVING (ADL)
ADLS_ACUITY_SCORE: 7
DEPENDENT_IADLS:: INDEPENDENT
ADLS_ACUITY_SCORE: 7

## 2021-11-24 NOTE — PLAN OF CARE
Summary: ETOH pancreatitis  DATE & TIME: 11/23/21 (6021-8181)  Cognitive Concerns/ Orientation : A&Ox4  BEHAVIOR & AGGRESSION TOOL COLOR: Green  CIWA SCORE: na  ABNL VS/O2: VSS on RA  MOBILITY: IND, calls appropriately  PAIN MANAGMENT: Reporting abdominal and back pain, PRN oral dilaudid given x1  DIET: Low fat diet, tolerating, denies nausea  BOWEL/BLADDER: Continent  ABNL LAB/BG: ALT-69, AST-103, CRP-17.5, Mg 1.2 (replaced per protocol oral replacement), Phosphorus 2.2 (replaced oral protocol, recheck level in am), K 3.4 replaced per protocol, recheck ordered for 1900   DRAIN/DEVICES: PIV infusing LR @ 100 mL/hr  TELEMETRY RHYTHM: NA  SKIN: Bruised R forearm  TESTS/PROCEDURES: NA   D/C DAY/GOALS/PLACE: Pending pain improvement possibly tomorrow 11/24  OTHER IMPORTANT INFO:Psychiatry, CD, and GI following.

## 2021-11-24 NOTE — PLAN OF CARE
Summary: ETOH pancreatitis  DATE & TIME: 11/24/21 (4777-2736)  Cognitive Concerns/ Orientation : A&Ox4  BEHAVIOR & AGGRESSION TOOL COLOR: Green  CIWA SCORE: na  ABNL VS/O2: VSS on RA  MOBILITY: IND, calls appropriately  PAIN MANAGMENT: Dilaudid PO given x1 for abdominal pain   DIET: Low fat diet, tolerating, denies nausea  BOWEL/BLADDER: Continent  ABNL LAB/BG: K 4.0  DRAIN/DEVICES: none   TELEMETRY RHYTHM: NA  SKIN: Bruised R forearm  TESTS/PROCEDURES: NA   D/C DAY/GOALS/PLACE: Discharge home today 11/24  OTHER IMPORTANT INFO:Seroquel given x1 for anxiety

## 2021-11-24 NOTE — PROGRESS NOTES
CLINICAL NUTRITION SERVICES - REASSESSMENT NOTE    Recommendations Ordered by Registered Dietitian (RD):   Low fat diet  Ensure Enlive @ 2pm    Recommendations for home:   - Small/frequent meals  - Monitor fat portions (~50 g fat/day or less)  - Use liquid supplements   - Continue Remeron (may take ~2 weeks to notice appetite stimulating effect).    Malnutrition: (11/21)   % Weight Loss:  > 5% in 1 month (severe malnutrition)  % Intake:  </= 50% for >/= 5 days (severe malnutrition)  Subcutaneous Fat Loss:  Orbital region severe depletion and Upper arm region severe depletion  Muscle Loss:  Temporal region severe depletion, Clavicle bone region severe depletion, Acromion bone region severe depletion and Scapular bone region severe depletion  Fluid Retention:  None noted     Malnutrition Diagnosis: Severe malnutrition  In Context of:  Environmental or social circumstances     EVALUATION OF PROGRESS TOWARD GOALS   Diet: Regular diet + Ensure clear   Intake/Tolerance:   - Pt states yesterday and the day prior were difficult - had more pain with eating. Today she had some breakfast (eggs, cheese, english muffin - 1/2) and is experiencing more tolerable pain.   - Dislikes the ensure clear, would like to try ensure enlive vanilla this afternoon.   - Interested in recommendations for home.     - No new wt on file   - Stooling x1 on 11/21    ASSESSED NUTRITION NEEDS:  Dosing Weight 44.5 kg   Estimated Energy Needs: 8341-1447 kcals (35-40 Kcal/Kg)  Justification: repletion and underweight  Estimated Protein Needs: 65-90 grams protein (1.5-2 g pro/Kg)  Justification: repletion    NEW FINDINGS:   - Remeron 15 mg q HS started on 11/20. Pt hopes this will help improve appetite.     Previous Goals:   Patient will advance diet beyond clear liquid in the next 24-48 hours and consume at least 75% of meals and supplement   Evaluation: Met for diet advancement , not met for 75% of meals     Previous Nutrition Diagnosis:   Inadequate  oral intake related to clear liquid diet in patient with pancreatitis and hepatitits as evidenced by taking sips of liquid only, need for oral nutritional supplement   Evaluation: Slight improvement, continues below     CURRENT NUTRITION DIAGNOSIS  Inadequate oral intake related to altered GI function w/ post prandial pain as evidenced by poor PO intakes x4 day admission, did tolerate 50% of one meal today.     INTERVENTIONS  Recommendations / Nutrition Prescription  Low fat diet  Ensure Enlive @ 2pm    Recommendations for home:   - Small/frequent meals  - Monitor fat portions (~50 g fat/day or less)  - Use liquid supplements   - Continue Remeron (may take ~2 weeks to notice appetite stimulating effect).     Implementation  Nutrition Education: see above  Medical Food Supplement: Changed.     Goals  Pt to tolerate at least 50% of 2 meals/day + 1 supplement.       MONITORING AND EVALUATION:  Progress towards goals will be monitored and evaluated per protocol and Practice Guidelines    Mercedez Ray RD, LD  [facility], [facility], [facility]  Pager: [phone]  Weekend Pager: [phone]

## 2021-11-24 NOTE — PLAN OF CARE
Summary: ETOH pancreatitis  DATE & TIME: 11/23/21-11/24/21 5984-6873  Cognitive Concerns/ Orientation : A&Ox4  BEHAVIOR & AGGRESSION TOOL COLOR: Green  CIWA SCORE: na  ABNL VS/O2: VSS on RA  MOBILITY: IND, calls appropriately  PAIN MANAGMENT: Reporting abdominal and back pain, PRN oral dilaudid given x3  DIET: Low fat diet, tolerating, denies nausea  BOWEL/BLADDER: Continent  ABNL LAB/BG: ALT-69, AST-103, CRP-17.5, Mg 1.2 (replaced per protocol oral replacement), Phosphorus 2.2 (replaced oral protocol, recheck level in am), K 4.0  DRAIN/DEVICES: PIV infusing LR @ 100 mL/hr  TELEMETRY RHYTHM: NA  SKIN: Bruised R forearm  TESTS/PROCEDURES: NA   D/C DAY/GOALS/PLACE: Pending pain improvement possibly tomorrow 11/24  OTHER IMPORTANT INFO:Psychiatry, CD, and GI following. Gave Seroquel x2 for anxiety.

## 2021-11-24 NOTE — DISCHARGE SUMMARY
[facility]  Hospitalist Discharge Summary      Date of Admission:  11/19/2021  Date of Discharge:  11/24/2021  Discharging Provider: Darion Reed MD      Discharge Diagnoses     Acute alcoholic pancreatitis  Alcoholic hepatitis    Follow-ups Needed After Discharge   Follow-up Appointments     Follow-up and recommended labs and tests       Follow up with primary care provider, Randee Woods, within 7 days for   hospital follow- up.  The following labs/tests are recommended: CMP.           Unresulted Labs Ordered in the Past 30 Days of this Admission     No orders found from 10/20/2021 to 11/20/2021.        Discharge Disposition     Discharged to home  Condition at discharge: Stable    Hospital Course            Acute alcoholic pancreatitis  Alcoholic hepatitis  Possible small pancreatic head cyst  Recent admit 10/24-29 with pancreatitis thought 2/2 etoh use. US without biliary pathology. With ongoing poor PO, n/v, abdominal pain. Called 911 when she weighed herself and was 98.8 lbs (previous hospital at 105 lbs). Has had some falls, lightheadedness. Mildly tachy in ED, other vitals stable. Lipase 1,369. AST//83. Alk phos 201, bili ok.     Improving overall and pain controlled.     Continue low fat diet     Continue proton pump inhibitor     Seen by GI and can follow up in clinic for pancreatic lesion      Hypoglycemia  Recent Labs   Lab 11/23/21  0829 11/22/21  0130 11/21/21  2134 11/21/21  1711 11/21/21  1429 11/21/21  1119   GLC 92 102* 127* 120* 100* 83   Glucose 57 on presentation. Suspect related to malnutrition. Not on antihyperglycemics.      AG acidosis, resolved  Suspect starvation, alcoholic ketoacidosis.   No serum ketones or lactic acid but serum HCO3 is improving so will not pursue at this time.  The patient is clinically stable.    Prolonged QT  Noted on EKG on admission at 500. Likely 2/2 electrolyte disorders.  EKG = 462ms last checked     Alcohol use disorder  History   alcohol use, admitted several times for abdominal pain and Etoh use. Etoh level 0.06 in ED.   Has low CIWA scores.      Stopped CIWA scoring      Hypokalemia  Hypomagnesemia  Hyponatremia  Hypocalcemia - resolved   Hypophosphatemia    Hypokalemia severe, 2.7 on admission. HypoMg at 1.5. Hyponatremia mild at 131, likely 2/2 dehydration. Replaced per protocol     Depression   Anxiety  Alcohol use disorder  [escitalopram 5 mg daily, lorazepam 0.5-1.0 BID prn]  Hx depression. Daughter  a couple of months ago. States uses Etoh and lorazepam at HS so she can sleep.   She was seen by  and Aida Farfan discussed the case with the psychiatric DANIELLA on-call.     Psychiatrist consulted- see recommendations     Breast cancer  S/p bilateral mastectomy 2021, T1bN0 disease. Follows [facility].       Malnutrition, severe  Dx recent admission 10/2021. Albumin 3.1 on presentation.     Nutrition consulted- continue supplement     Tobacco use disorder  Hx tobacco use 40+ years at 1 ppd. Has cut back to 1/2 ppd as has been largely bedbound.     Consultations This Hospital Stay   GASTROENTEROLOGY IP CONSULT  PSYCHIATRY IP CONSULT  PSYCHIATRY IP CONSULT  CHEMICAL DEPENDENCY IP CONSULT  SOCIAL WORK IP CONSULT    Code Status   Full Code    Time Spent on this Encounter   I, Darion Reed MD, personally saw the patient today and spent greater than 30 minutes discharging this patient.       Darion Reed MD  [place] [address] [facility]  [address] [place] [address]  Phone: [phone]  ______________________________________________________________________    Physical Exam   Vital Signs: Temp: 99  F (37.2  C) Temp src: Oral BP: 118/79 Pulse: 86   Resp: 16 SpO2: 95 % O2 Device: None (Room air)    Weight: 98 lbs 0 oz       Primary Care Physician   Randee Woods    Discharge Orders      Reason for your hospital stay    You had abdominal pain from pancreatitis     Follow-up and  recommended labs and tests     Follow up with primary care provider, Randee Woods, within 7 days for hospital follow- up.  The following labs/tests are recommended: CMP.     Activity    Your activity upon discharge: activity as tolerated     Diet    Follow this diet upon discharge: Orders Placed This Encounter      Snacks/Supplements Adult: Ensure Enlive; Between Meals      Combination Diet Regular Diet Adult; Low Fat Diet       Significant Results and Procedures   Most Recent 3 CBC's:  Recent Labs   Lab Test 11/22/21  0843 11/21/21  0638 11/20/21  0324   WBC 6.4 6.7 10.5   HGB 10.6* 10.2* 11.7   MCV 98 95 96   * 134* 155     Most Recent 3 BMP's:  Recent Labs   Lab Test 11/23/21 1957 11/23/21 0829 11/22/21 2053 11/22/21  0843 11/22/21  0130 11/21/21  2134 11/21/21  0757 11/21/21  0638 11/20/21  0603 11/20/21  0324   NA  --  138  --   --   --   --   --  138  --  136   POTASSIUM 4.0 3.4 3.7   < >  --   --    < > 2.9*  --  3.5   CHLORIDE  --  104  --   --   --   --   --  105  --  107   CO2  --  29  --   --   --   --   --  25  --  20   BUN  --  2*  --   --   --   --   --  <1*  --  3*   CR  --  0.45*  --   --   --   --   --  0.33*  --  0.43*   ANIONGAP  --  5  --   --   --   --   --  8  --  9   FRANCOISE  --  9.1  --   --   --   --   --  7.8*  --  7.1*   GLC  --  92  --   --  102* 127*   < > 90   < > 87    < > = values in this interval not displayed.     Most Recent 2 LFT's:  Recent Labs   Lab Test 11/23/21 0829 11/21/21  0638   * 115*   ALT 69* 64*   ALKPHOS 162* 162*   BILITOTAL 0.6 1.0     Most Recent 3 INR's:No lab results found.,   Results for orders placed or performed during the hospital encounter of 10/24/21   Abdomen US, limited (RUQ only)    Narrative    EXAM: US ABDOMEN LIMITED  LOCATION: [facility]  DATE/TIME: 10/24/2021 2:19 PM    INDICATION: pancreatitis, abd pain, eval biliary tree  COMPARISON: Ultrasound 1/18/2021  TECHNIQUE: Limited abdominal  ultrasound.    FINDINGS:    GALLBLADDER: Normal. No gallstones, wall thickening, or pericholecystic fluid. Negative sonographic Ribera's sign.    BILE DUCTS: No biliary dilatation. The common duct measures 4 mm.    LIVER: Persistent diffusely coarsened echotexture. Mildly enlarged with a normal surface contour. Geographic hyperechoic area in the right lobe measuring 9.4 cm. No focal mass.    RIGHT KIDNEY: No hydronephrosis.    PANCREAS: Slightly heterogeneous. No epigastric fluid collection.    No ascites.      Impression    IMPRESSION:  1.  Slightly heterogeneous pancreas likely correlating with known pancreatitis. No perinephric fluid collection.  2.  Mildly enlarged fatty liver.  3.  Geographic 9.4 cm hypoechoic area in the anterior right hepatic lobe, likely related to fatty change. As a precaution, recommend attention on short-term follow-up versus nonemergent contrast-enhanced CT correlation.  4.  No cholelithiasis or biliary ductal dilatation.       CT Abdomen wo & w & Pelvis w Contrast    Narrative    EXAM: CT ABDOMEN WO and W and PELVIS W CONTRAST  LOCATION: [facility]  DATE/TIME: 10/28/2021 6:19 PM    INDICATION: Incidental liver lesion seen on ultrasound.  COMPARISON: Ultrasound on 10/24/2021 and CT on 08/16/2020  TECHNIQUE: CT scan of the abdomen using renal mass protocol with pre contrast, arterial, portal venous, and delayed images. The pelvis was imaged during the portal venous phase. Multiplanar reformats were obtained. Dose reduction techniques were used.  CONTRAST: 55mL Isovue-370    FINDINGS:    LOWER CHEST: The visualized lung bases are normal.    HEPATOBILIARY: Patchy wedge-shaped/geographic areas of hypodensity in the left and right hepatic lobe have slightly progressed since the CT on 08/16/2020. There are intermixed areas of nodular hyperdensity most predominant in the anterior right hepatic   lobe (series 11, image 73), that have also progressed since 2020. No focal  areas of washout. No discrete mass. No calcified gallstones or biliary ductal dilatation.    PANCREAS: Mild inflammatory stranding surrounding the pancreatic body and tail, consistent with acute pancreatitis. No peripancreatic fluid collections. Cyst in the pancreatic head measuring 10 mm (series 11, image 64) is new since 08/16/2020.    SPLEEN: Normal.    ADRENAL GLANDS: Normal.    RIGHT KIDNEY/URETER: Normal.    LEFT KIDNEY/URETER: Normal.    BLADDER: Normal.    BOWEL: Normal caliber loops of small bowel and colon. Decompressed stomach, limits further evaluation stomach No inflammatory changes.    LYMPH NODES: Stable prominent reactive portacaval lymph nodes. No new or enlarging lymph nodes in the abdomen and pelvis.    VASCULATURE: No abdominal aortic aneurysm.    PELVIC ORGANS: Hysterectomy. Small amount of free fluid in the pelvis. No fluid collections or extraluminal air.    MUSCULOSKELETAL: Bilateral mastectomies. Mild degenerative changes in the spine. No suspicious lesions in the bones.      Impression    IMPRESSION:  1.  Wedge-shaped/radiographic areas of fat infiltration in the liver have slightly progressed since 2020, compatible with fat infiltration. Intermixed nodular hyperdense foci have also increased, likely focal fat sparing. Consider a follow-up liver MRI   on a nonemergent basis for more definitive characterization.  2.  Acute pancreatitis. No peripancreatic fluid collections. A small cyst in the pancreatic head measuring 1.0 cm is new since 08/16/2020 and may be a side branch intraductal papillary mucinous neoplasm, acute pancreatic fluid collection or pancreatitis   pseudocyst. See follow-up guidelines.    REFERENCE:  Revisions of international consensus [place] guidelines for the management of IPMN of the pancreas. Pancreatology 2017;17(5):738-753.    Largest cyst between 10-20 mm: CT or MRI/MRCP every 6 months for 1 year and then yearly for 2 years and then every 2 years if no change.             Discharge Medications   Current Discharge Medication List      START taking these medications    Details   HYDROmorphone (DILAUDID) 2 MG tablet Take 1 tablet (2 mg) by mouth every 4 hours as needed for moderate to severe pain  Qty: 12 tablet, Refills: 0    Associated Diagnoses: Alcohol-induced acute pancreatitis without infection or necrosis      mirtazapine (REMERON) 15 MG tablet Take 1 tablet (15 mg) by mouth At Bedtime for 14 days  Qty: 14 tablet, Refills: 0    Associated Diagnoses: Alcohol-induced acute pancreatitis without infection or necrosis      pantoprazole (PROTONIX) 40 MG EC tablet Take 1 tablet (40 mg) by mouth 2 times daily (before meals)  Qty: 60 tablet, Refills: 0    Associated Diagnoses: Alcohol-induced acute pancreatitis without infection or necrosis      QUEtiapine (SEROQUEL) 25 MG tablet Take 1 tablet (25 mg) by mouth daily as needed (anxiety)  Qty: 12 tablet, Refills: 0    Associated Diagnoses: Alcohol-induced acute pancreatitis without infection or necrosis         CONTINUE these medications which have CHANGED    Details   ondansetron (ZOFRAN ODT) 4 MG ODT tab Take 1 tablet (4 mg) by mouth every 6 hours as needed for nausea  Qty: 20 tablet, Refills: 0    Associated Diagnoses: Alcohol-induced acute pancreatitis without infection or necrosis         CONTINUE these medications which have NOT CHANGED    Details   azelastine (ASTELIN) 0.1 % nasal spray Spray 1 spray into both nostrils daily as needed       cetirizine (ZYRTEC) 10 MG tablet Take 10 mg by mouth daily as needed       escitalopram (LEXAPRO) 5 MG tablet Take 1 tablet (5 mg) by mouth daily  Qty: 30 tablet, Refills: 1    Associated Diagnoses: Current moderate episode of major depressive disorder without prior episode (H)      fluticasone (FLONASE) 50 MCG/ACT nasal spray Spray 1 spray into both nostrils daily as needed       hydrOXYzine (ATARAX) 25 MG tablet Take 25 mg by mouth every 6 hours as needed for itching      LORazepam  (ATIVAN) 0.5 MG tablet Take 0.5-1 mg by mouth 2 times daily as needed for anxiety      multivitamin w/minerals (THERA-VIT-M) tablet Take 1 tablet by mouth daily  Qty:      Associated Diagnoses: Alcohol-induced acute pancreatitis without infection or necrosis      potassium chloride ER (K-TAB) 20 MEQ CR tablet Take 20 mEq by mouth daily         STOP taking these medications       omeprazole (PRILOSEC) 20 MG DR capsule Comments:   Reason for Stopping:             Allergies   No Known Allergies

## 2021-11-24 NOTE — CONSULTS
Care Management Initial Consult    General Information  Assessment completed with: Patient,    Type of CM/SW Visit: Offer D/C Planning    Primary Care Provider verified and updated as needed: Yes   Readmission within the last 30 days: no previous admission in last 30 days      Reason for Consult: discharge planning  Advance Care Planning: Advance Care Planning Reviewed: no concerns identified          Communication Assessment  Patient's communication style: spoken language (English or Bilingual)    Hearing Difficulty or Deaf: no   Wear Glasses or Blind: no    Cognitive  Cognitive/Neuro/Behavioral: WDL                      Living Environment:   People in home: alone     Current living Arrangements:        Able to return to prior arrangements:         Family/Social Support:  Care provided by:    Provides care for:    Marital Status: Single  Neighbor          Description of Support System: Supportive         Current Resources:   Patient receiving home care services: No     Community Resources:    Equipment currently used at home: none  Supplies currently used at home:      Employment/Financial:  Employment Status: employed part-time        Financial Concerns: No concerns identified           Lifestyle & Psychosocial Needs:  Social Determinants of Health     Tobacco Use: High Risk     Smoking Tobacco Use: Current Every Day Smoker     Smokeless Tobacco Use: Never Used   Alcohol Use: Not on file   Financial Resource Strain: Not on file   Food Insecurity: Not on file   Transportation Needs: Not on file   Physical Activity: Not on file   Stress: Not on file   Social Connections: Not on file   Intimate Partner Violence: Not on file   Depression: Not on file   Housing Stability: Not on file       Functional Status:  Prior to admission patient needed assistance:   Dependent ADLs:: Independent  Dependent IADLs:: Independent       Mental Health Status:  Mental Health Status: No Current Concerns       Chemical Dependency  Status:  Chemical Dependency Status: Current Concern  Chemical Dependency Management: Previous treatment          Values/Beliefs:  Spiritual, Cultural Beliefs, [employer] Practices, Values that affect care: no               Care Management Discharge Note    Discharge Date: 11/24/2021       Discharge Disposition: home     Discharge Services: none    Discharge DME:  none    Discharge Transportation:  family    Private pay costs discussed: Not applicable    PAS Confirmation Code:    Patient/family educated on Medicare website which has current facility and service quality ratings:      Education Provided on the Discharge Plan:  yes  Persons Notified of Discharge Plans: NA  Patient/Family in Agreement with the Plan:  yes    Additional Information:  Discharging home today. Patient states she is feeling better and does not have any needs,states family and friends are supportive. Information on CD resources are in her discharge instruction sheet.         Jeronimo Garvey RN  Inpatient Care Coordinator  [facility]  # [phone]

## 2021-11-24 NOTE — PROGRESS NOTES
.Discharge    Patient discharged to home via car with friend  Care plan note  completed    Listed belongings gathered and given to patient (including from security/pharmacy). Yes  Care Plan and Patient education resolved: Yes  Prescriptions if needed, hard copies sent with patient  Yes  Medication Bin checked and emptied on discharge Yes  SW/care coordinator/charge RN aware of discharge: Yes

## 2022-06-28 ENCOUNTER — APPOINTMENT (OUTPATIENT)
Dept: ULTRASOUND IMAGING | Facility: CLINIC | Age: 57
End: 2022-06-28
Attending: STUDENT IN AN ORGANIZED HEALTH CARE EDUCATION/TRAINING PROGRAM
Payer: COMMERCIAL

## 2022-06-28 ENCOUNTER — HOSPITAL ENCOUNTER (INPATIENT)
Facility: CLINIC | Age: 57
LOS: 8 days | Discharge: HOME | End: 2022-07-06
Attending: EMERGENCY MEDICINE | Admitting: STUDENT IN AN ORGANIZED HEALTH CARE EDUCATION/TRAINING PROGRAM
Payer: COMMERCIAL

## 2022-06-28 ENCOUNTER — APPOINTMENT (OUTPATIENT)
Dept: CT IMAGING | Facility: CLINIC | Age: 57
End: 2022-06-28
Attending: EMERGENCY MEDICINE
Payer: COMMERCIAL

## 2022-06-28 ENCOUNTER — MEDICAL CORRESPONDENCE (OUTPATIENT)
Dept: MEDSURG UNIT | Facility: CLINIC | Age: 57
End: 2022-06-28

## 2022-06-28 DIAGNOSIS — K08.89 TOOTHACHE: ICD-10-CM

## 2022-06-28 DIAGNOSIS — E87.6 HYPOKALEMIA: ICD-10-CM

## 2022-06-28 DIAGNOSIS — K85.20 ALCOHOL-INDUCED ACUTE PANCREATITIS, UNSPECIFIED COMPLICATION STATUS: ICD-10-CM

## 2022-06-28 DIAGNOSIS — R63.4 UNINTENDED WEIGHT LOSS: ICD-10-CM

## 2022-06-28 DIAGNOSIS — R10.9 NONSPECIFIC ABDOMINAL PAIN: ICD-10-CM

## 2022-06-28 DIAGNOSIS — F41.9 ANXIETY: Primary | ICD-10-CM

## 2022-06-28 DIAGNOSIS — F10.21 ALCOHOL USE DISORDER, SEVERE, IN EARLY REMISSION (H): ICD-10-CM

## 2022-06-28 DIAGNOSIS — F33.2 SEVERE EPISODE OF RECURRENT MAJOR DEPRESSIVE DISORDER, WITHOUT PSYCHOTIC FEATURES (H): ICD-10-CM

## 2022-06-28 LAB
ALBUMIN SERPL-MCNC: 3.4 G/DL (ref 3.4–5)
ALP SERPL-CCNC: 340 U/L (ref 40–150)
ALT SERPL W P-5'-P-CCNC: 27 U/L (ref 0–50)
ANION GAP SERPL CALCULATED.3IONS-SCNC: 17 MMOL/L (ref 3–14)
AST SERPL W P-5'-P-CCNC: 137 U/L (ref 0–45)
ATRIAL RATE - MUSE: 105 BPM
BASOPHILS # BLD AUTO: 0.1 10E3/UL (ref 0–0.2)
BASOPHILS NFR BLD AUTO: 1 %
BILIRUB SERPL-MCNC: 1.3 MG/DL (ref 0.2–1.3)
BUN SERPL-MCNC: 5 MG/DL (ref 7–30)
CALCIUM SERPL-MCNC: 9.8 MG/DL (ref 8.5–10.1)
CHLORIDE BLD-SCNC: 91 MMOL/L (ref 94–109)
CO2 SERPL-SCNC: 20 MMOL/L (ref 20–32)
CREAT SERPL-MCNC: 0.32 MG/DL (ref 0.52–1.04)
CREAT SERPL-MCNC: 0.4 MG/DL (ref 0.52–1.04)
DIASTOLIC BLOOD PRESSURE - MUSE: NORMAL MMHG
EOSINOPHIL # BLD AUTO: 0 10E3/UL (ref 0–0.7)
EOSINOPHIL NFR BLD AUTO: 1 %
ERYTHROCYTE [DISTWIDTH] IN BLOOD BY AUTOMATED COUNT: 11.7 % (ref 10–15)
ETHANOL SERPL-MCNC: <0.01 G/DL
GFR SERPL CREATININE-BSD FRML MDRD: >90 ML/MIN/1.73M2
GFR SERPL CREATININE-BSD FRML MDRD: >90 ML/MIN/1.73M2
GLUCOSE BLD-MCNC: 89 MG/DL (ref 70–99)
HCT VFR BLD AUTO: 29.4 % (ref 35–47)
HGB BLD-MCNC: 10.4 G/DL (ref 11.7–15.7)
HOLD SPECIMEN: NORMAL
IMM GRANULOCYTES # BLD: 0 10E3/UL
IMM GRANULOCYTES NFR BLD: 0 %
INTERPRETATION ECG - MUSE: NORMAL
LIPASE SERPL-CCNC: 543 U/L (ref 73–393)
LYMPHOCYTES # BLD AUTO: 1.5 10E3/UL (ref 0.8–5.3)
LYMPHOCYTES NFR BLD AUTO: 18 %
MAGNESIUM SERPL-MCNC: 1.2 MG/DL (ref 1.6–2.3)
MAGNESIUM SERPL-MCNC: 1.4 MG/DL (ref 1.6–2.3)
MCH RBC QN AUTO: 34.4 PG (ref 26.5–33)
MCHC RBC AUTO-ENTMCNC: 35.4 G/DL (ref 31.5–36.5)
MCV RBC AUTO: 97 FL (ref 78–100)
MONOCYTES # BLD AUTO: 0.8 10E3/UL (ref 0–1.3)
MONOCYTES NFR BLD AUTO: 9 %
NEUTROPHILS # BLD AUTO: 6.1 10E3/UL (ref 1.6–8.3)
NEUTROPHILS NFR BLD AUTO: 71 %
NRBC # BLD AUTO: 0 10E3/UL
NRBC BLD AUTO-RTO: 0 /100
P AXIS - MUSE: 70 DEGREES
PHOSPHATE SERPL-MCNC: 1.8 MG/DL (ref 2.5–4.5)
PLATELET # BLD AUTO: 256 10E3/UL (ref 150–450)
POTASSIUM BLD-SCNC: 2.9 MMOL/L (ref 3.4–5.3)
POTASSIUM BLD-SCNC: 3.8 MMOL/L (ref 3.4–5.3)
PR INTERVAL - MUSE: 138 MS
PROT SERPL-MCNC: 7.9 G/DL (ref 6.8–8.8)
QRS DURATION - MUSE: 82 MS
QT - MUSE: 364 MS
QTC - MUSE: 481 MS
R AXIS - MUSE: 78 DEGREES
RBC # BLD AUTO: 3.02 10E6/UL (ref 3.8–5.2)
SARS-COV-2 RNA RESP QL NAA+PROBE: NEGATIVE
SODIUM SERPL-SCNC: 128 MMOL/L (ref 133–144)
SYSTOLIC BLOOD PRESSURE - MUSE: NORMAL MMHG
T AXIS - MUSE: 80 DEGREES
VENTRICULAR RATE- MUSE: 105 BPM
WBC # BLD AUTO: 8.5 10E3/UL (ref 4–11)

## 2022-06-28 PROCEDURE — 99285 EMERGENCY DEPT VISIT HI MDM: CPT | Mod: 25

## 2022-06-28 PROCEDURE — 90791 PSYCH DIAGNOSTIC EVALUATION: CPT

## 2022-06-28 PROCEDURE — HZ2ZZZZ DETOXIFICATION SERVICES FOR SUBSTANCE ABUSE TREATMENT: ICD-10-PCS | Performed by: STUDENT IN AN ORGANIZED HEALTH CARE EDUCATION/TRAINING PROGRAM

## 2022-06-28 PROCEDURE — 96375 TX/PRO/DX INJ NEW DRUG ADDON: CPT

## 2022-06-28 PROCEDURE — 82565 ASSAY OF CREATININE: CPT | Performed by: STUDENT IN AN ORGANIZED HEALTH CARE EDUCATION/TRAINING PROGRAM

## 2022-06-28 PROCEDURE — 250N000013 HC RX MED GY IP 250 OP 250 PS 637: Performed by: EMERGENCY MEDICINE

## 2022-06-28 PROCEDURE — 74177 CT ABD & PELVIS W/CONTRAST: CPT

## 2022-06-28 PROCEDURE — 83690 ASSAY OF LIPASE: CPT | Performed by: EMERGENCY MEDICINE

## 2022-06-28 PROCEDURE — 96365 THER/PROPH/DIAG IV INF INIT: CPT

## 2022-06-28 PROCEDURE — C9803 HOPD COVID-19 SPEC COLLECT: HCPCS

## 2022-06-28 PROCEDURE — 258N000003 HC RX IP 258 OP 636: Performed by: STUDENT IN AN ORGANIZED HEALTH CARE EDUCATION/TRAINING PROGRAM

## 2022-06-28 PROCEDURE — 85025 COMPLETE CBC W/AUTO DIFF WBC: CPT | Performed by: EMERGENCY MEDICINE

## 2022-06-28 PROCEDURE — 250N000011 HC RX IP 250 OP 636: Performed by: STUDENT IN AN ORGANIZED HEALTH CARE EDUCATION/TRAINING PROGRAM

## 2022-06-28 PROCEDURE — 96366 THER/PROPH/DIAG IV INF ADDON: CPT

## 2022-06-28 PROCEDURE — 250N000013 HC RX MED GY IP 250 OP 250 PS 637: Performed by: HOSPITALIST

## 2022-06-28 PROCEDURE — 258N000003 HC RX IP 258 OP 636: Performed by: EMERGENCY MEDICINE

## 2022-06-28 PROCEDURE — 99223 1ST HOSP IP/OBS HIGH 75: CPT | Mod: AI | Performed by: STUDENT IN AN ORGANIZED HEALTH CARE EDUCATION/TRAINING PROGRAM

## 2022-06-28 PROCEDURE — 250N000011 HC RX IP 250 OP 636: Performed by: EMERGENCY MEDICINE

## 2022-06-28 PROCEDURE — 120N000001 HC R&B MED SURG/OB

## 2022-06-28 PROCEDURE — 84132 ASSAY OF SERUM POTASSIUM: CPT | Performed by: EMERGENCY MEDICINE

## 2022-06-28 PROCEDURE — 76700 US EXAM ABDOM COMPLETE: CPT

## 2022-06-28 PROCEDURE — 93005 ELECTROCARDIOGRAM TRACING: CPT

## 2022-06-28 PROCEDURE — 83735 ASSAY OF MAGNESIUM: CPT | Performed by: STUDENT IN AN ORGANIZED HEALTH CARE EDUCATION/TRAINING PROGRAM

## 2022-06-28 PROCEDURE — U0003 INFECTIOUS AGENT DETECTION BY NUCLEIC ACID (DNA OR RNA); SEVERE ACUTE RESPIRATORY SYNDROME CORONAVIRUS 2 (SARS-COV-2) (CORONAVIRUS DISEASE [COVID-19]), AMPLIFIED PROBE TECHNIQUE, MAKING USE OF HIGH THROUGHPUT TECHNOLOGIES AS DESCRIBED BY CMS-2020-01-R: HCPCS | Performed by: EMERGENCY MEDICINE

## 2022-06-28 PROCEDURE — 82310 ASSAY OF CALCIUM: CPT | Performed by: EMERGENCY MEDICINE

## 2022-06-28 PROCEDURE — 250N000009 HC RX 250: Performed by: EMERGENCY MEDICINE

## 2022-06-28 PROCEDURE — 82077 ASSAY SPEC XCP UR&BREATH IA: CPT | Performed by: EMERGENCY MEDICINE

## 2022-06-28 PROCEDURE — 84100 ASSAY OF PHOSPHORUS: CPT | Performed by: STUDENT IN AN ORGANIZED HEALTH CARE EDUCATION/TRAINING PROGRAM

## 2022-06-28 PROCEDURE — 36415 COLL VENOUS BLD VENIPUNCTURE: CPT | Performed by: EMERGENCY MEDICINE

## 2022-06-28 PROCEDURE — 36415 COLL VENOUS BLD VENIPUNCTURE: CPT | Performed by: STUDENT IN AN ORGANIZED HEALTH CARE EDUCATION/TRAINING PROGRAM

## 2022-06-28 PROCEDURE — 250N000013 HC RX MED GY IP 250 OP 250 PS 637: Performed by: STUDENT IN AN ORGANIZED HEALTH CARE EDUCATION/TRAINING PROGRAM

## 2022-06-28 RX ORDER — MULTIPLE VITAMINS W/ MINERALS TAB 9MG-400MCG
1 TAB ORAL DAILY
Status: DISCONTINUED | OUTPATIENT
Start: 2022-06-28 | End: 2022-07-06 | Stop reason: HOSPADM

## 2022-06-28 RX ORDER — POLYETHYLENE GLYCOL 3350 17 G
2 POWDER IN PACKET (EA) ORAL
Status: DISCONTINUED | OUTPATIENT
Start: 2022-06-28 | End: 2022-07-06 | Stop reason: HOSPADM

## 2022-06-28 RX ORDER — PROCHLORPERAZINE 25 MG
25 SUPPOSITORY, RECTAL RECTAL EVERY 12 HOURS PRN
Status: DISCONTINUED | OUTPATIENT
Start: 2022-06-28 | End: 2022-07-06 | Stop reason: HOSPADM

## 2022-06-28 RX ORDER — HYDROMORPHONE HCL IN WATER/PF 6 MG/30 ML
0.2 PATIENT CONTROLLED ANALGESIA SYRINGE INTRAVENOUS
Status: DISCONTINUED | OUTPATIENT
Start: 2022-06-28 | End: 2022-07-03

## 2022-06-28 RX ORDER — POTASSIUM CHLORIDE 7.45 MG/ML
10 INJECTION INTRAVENOUS ONCE
Status: COMPLETED | OUTPATIENT
Start: 2022-06-28 | End: 2022-06-28

## 2022-06-28 RX ORDER — MIRTAZAPINE 15 MG/1
30 TABLET, FILM COATED ORAL AT BEDTIME
COMMUNITY
Start: 2022-04-19 | End: 2023-04-19

## 2022-06-28 RX ORDER — POLYETHYLENE GLYCOL 3350 17 G/17G
17 POWDER, FOR SOLUTION ORAL DAILY PRN
Status: DISCONTINUED | OUTPATIENT
Start: 2022-06-28 | End: 2022-07-06 | Stop reason: HOSPADM

## 2022-06-28 RX ORDER — FLUMAZENIL 0.1 MG/ML
0.2 INJECTION, SOLUTION INTRAVENOUS
Status: DISCONTINUED | OUTPATIENT
Start: 2022-06-28 | End: 2022-07-06 | Stop reason: HOSPADM

## 2022-06-28 RX ORDER — MAGNESIUM OXIDE 400 MG/1
400 TABLET ORAL ONCE
Status: COMPLETED | OUTPATIENT
Start: 2022-06-28 | End: 2022-06-28

## 2022-06-28 RX ORDER — MAGNESIUM SULFATE HEPTAHYDRATE 40 MG/ML
2 INJECTION, SOLUTION INTRAVENOUS ONCE
Status: COMPLETED | OUTPATIENT
Start: 2022-06-28 | End: 2022-06-28

## 2022-06-28 RX ORDER — MIRTAZAPINE 15 MG/1
30 TABLET, FILM COATED ORAL AT BEDTIME
Status: DISCONTINUED | OUTPATIENT
Start: 2022-06-28 | End: 2022-07-06 | Stop reason: HOSPADM

## 2022-06-28 RX ORDER — QUETIAPINE FUMARATE 25 MG/1
25 TABLET, FILM COATED ORAL DAILY PRN
Status: DISCONTINUED | OUTPATIENT
Start: 2022-06-28 | End: 2022-06-30

## 2022-06-28 RX ORDER — LORAZEPAM 2 MG/ML
1-2 INJECTION INTRAMUSCULAR EVERY 30 MIN PRN
Status: DISCONTINUED | OUTPATIENT
Start: 2022-06-28 | End: 2022-07-02

## 2022-06-28 RX ORDER — NICOTINE 21 MG/24HR
1 PATCH, TRANSDERMAL 24 HOURS TRANSDERMAL DAILY
Status: DISCONTINUED | OUTPATIENT
Start: 2022-08-09 | End: 2022-07-06 | Stop reason: HOSPADM

## 2022-06-28 RX ORDER — HYDROXYZINE HYDROCHLORIDE 25 MG/1
25 TABLET, FILM COATED ORAL EVERY 6 HOURS PRN
Status: DISCONTINUED | OUTPATIENT
Start: 2022-06-28 | End: 2022-06-28 | Stop reason: CLARIF

## 2022-06-28 RX ORDER — HALOPERIDOL 5 MG/ML
2.5-5 INJECTION INTRAMUSCULAR EVERY 6 HOURS PRN
Status: DISCONTINUED | OUTPATIENT
Start: 2022-06-28 | End: 2022-07-06 | Stop reason: HOSPADM

## 2022-06-28 RX ORDER — CETIRIZINE HYDROCHLORIDE 10 MG/1
10 TABLET ORAL DAILY PRN
Status: DISCONTINUED | OUTPATIENT
Start: 2022-06-28 | End: 2022-07-06 | Stop reason: HOSPADM

## 2022-06-28 RX ORDER — LORAZEPAM 0.5 MG/1
.5-1 TABLET ORAL 2 TIMES DAILY PRN
Status: DISCONTINUED | OUTPATIENT
Start: 2022-06-28 | End: 2022-06-28 | Stop reason: CLARIF

## 2022-06-28 RX ORDER — ONDANSETRON 2 MG/ML
4 INJECTION INTRAMUSCULAR; INTRAVENOUS EVERY 6 HOURS PRN
Status: DISCONTINUED | OUTPATIENT
Start: 2022-06-28 | End: 2022-07-06 | Stop reason: HOSPADM

## 2022-06-28 RX ORDER — NICOTINE 21 MG/24HR
1 PATCH, TRANSDERMAL 24 HOURS TRANSDERMAL DAILY
Status: DISCONTINUED | OUTPATIENT
Start: 2022-06-28 | End: 2022-07-06 | Stop reason: HOSPADM

## 2022-06-28 RX ORDER — ACETAMINOPHEN 650 MG/1
650 SUPPOSITORY RECTAL EVERY 6 HOURS PRN
Status: DISCONTINUED | OUTPATIENT
Start: 2022-06-28 | End: 2022-07-06 | Stop reason: HOSPADM

## 2022-06-28 RX ORDER — NALOXONE HYDROCHLORIDE 0.4 MG/ML
0.2 INJECTION, SOLUTION INTRAMUSCULAR; INTRAVENOUS; SUBCUTANEOUS
Status: DISCONTINUED | OUTPATIENT
Start: 2022-06-28 | End: 2022-07-06 | Stop reason: HOSPADM

## 2022-06-28 RX ORDER — LORAZEPAM 0.5 MG/1
.5-1 TABLET ORAL 2 TIMES DAILY PRN
Status: DISCONTINUED | OUTPATIENT
Start: 2022-06-28 | End: 2022-06-28

## 2022-06-28 RX ORDER — PANTOPRAZOLE SODIUM 40 MG/1
40 TABLET, DELAYED RELEASE ORAL DAILY PRN
Status: ON HOLD | COMMUNITY
Start: 2022-04-19 | End: 2022-10-05

## 2022-06-28 RX ORDER — HYDROMORPHONE HYDROCHLORIDE 1 MG/ML
0.3 INJECTION, SOLUTION INTRAMUSCULAR; INTRAVENOUS; SUBCUTANEOUS ONCE
Status: COMPLETED | OUTPATIENT
Start: 2022-06-28 | End: 2022-06-28

## 2022-06-28 RX ORDER — MIRTAZAPINE 15 MG/1
15 TABLET, FILM COATED ORAL AT BEDTIME
Status: DISCONTINUED | OUTPATIENT
Start: 2022-06-28 | End: 2022-06-28 | Stop reason: DRUGHIGH

## 2022-06-28 RX ORDER — NALOXONE HYDROCHLORIDE 0.4 MG/ML
0.4 INJECTION, SOLUTION INTRAMUSCULAR; INTRAVENOUS; SUBCUTANEOUS
Status: DISCONTINUED | OUTPATIENT
Start: 2022-06-28 | End: 2022-07-06 | Stop reason: HOSPADM

## 2022-06-28 RX ORDER — HYDROMORPHONE HCL IN WATER/PF 6 MG/30 ML
0.2 PATIENT CONTROLLED ANALGESIA SYRINGE INTRAVENOUS
Status: COMPLETED | OUTPATIENT
Start: 2022-06-28 | End: 2022-06-28

## 2022-06-28 RX ORDER — QUETIAPINE FUMARATE 25 MG/1
50 TABLET, FILM COATED ORAL 2 TIMES DAILY
Status: DISCONTINUED | OUTPATIENT
Start: 2022-06-28 | End: 2022-07-03

## 2022-06-28 RX ORDER — ONDANSETRON 2 MG/ML
4 INJECTION INTRAMUSCULAR; INTRAVENOUS EVERY 30 MIN PRN
Status: DISCONTINUED | OUTPATIENT
Start: 2022-06-28 | End: 2022-06-28

## 2022-06-28 RX ORDER — POTASSIUM CHLORIDE 1500 MG/1
40 TABLET, EXTENDED RELEASE ORAL ONCE
Status: DISCONTINUED | OUTPATIENT
Start: 2022-06-28 | End: 2022-06-28

## 2022-06-28 RX ORDER — HYDROMORPHONE HCL IN WATER/PF 6 MG/30 ML
0.2 PATIENT CONTROLLED ANALGESIA SYRINGE INTRAVENOUS
Status: DISCONTINUED | OUTPATIENT
Start: 2022-06-28 | End: 2022-06-28

## 2022-06-28 RX ORDER — ONDANSETRON 4 MG/1
4 TABLET, ORALLY DISINTEGRATING ORAL EVERY 6 HOURS PRN
Status: DISCONTINUED | OUTPATIENT
Start: 2022-06-28 | End: 2022-07-06 | Stop reason: HOSPADM

## 2022-06-28 RX ORDER — FLUTICASONE PROPIONATE 50 MCG
1 SPRAY, SUSPENSION (ML) NASAL DAILY PRN
Status: DISCONTINUED | OUTPATIENT
Start: 2022-06-28 | End: 2022-07-06 | Stop reason: HOSPADM

## 2022-06-28 RX ORDER — POTASSIUM CHLORIDE 20MEQ/15ML
40 LIQUID (ML) ORAL ONCE
Status: COMPLETED | OUTPATIENT
Start: 2022-06-28 | End: 2022-06-28

## 2022-06-28 RX ORDER — ENOXAPARIN SODIUM 100 MG/ML
30 INJECTION SUBCUTANEOUS EVERY 24 HOURS
Status: DISCONTINUED | OUTPATIENT
Start: 2022-06-28 | End: 2022-07-06 | Stop reason: HOSPADM

## 2022-06-28 RX ORDER — OLANZAPINE 5 MG/1
5-10 TABLET, ORALLY DISINTEGRATING ORAL EVERY 6 HOURS PRN
Status: DISCONTINUED | OUTPATIENT
Start: 2022-06-28 | End: 2022-07-06 | Stop reason: HOSPADM

## 2022-06-28 RX ORDER — LORAZEPAM 1 MG/1
1-2 TABLET ORAL EVERY 30 MIN PRN
Status: DISCONTINUED | OUTPATIENT
Start: 2022-06-28 | End: 2022-07-02

## 2022-06-28 RX ORDER — QUETIAPINE FUMARATE 25 MG/1
50 TABLET, FILM COATED ORAL 2 TIMES DAILY
Status: ON HOLD | COMMUNITY
End: 2022-07-06

## 2022-06-28 RX ORDER — PROCHLORPERAZINE MALEATE 10 MG
10 TABLET ORAL EVERY 6 HOURS PRN
Status: DISCONTINUED | OUTPATIENT
Start: 2022-06-28 | End: 2022-07-06 | Stop reason: HOSPADM

## 2022-06-28 RX ORDER — LIDOCAINE 40 MG/G
CREAM TOPICAL
Status: DISCONTINUED | OUTPATIENT
Start: 2022-06-28 | End: 2022-07-06 | Stop reason: HOSPADM

## 2022-06-28 RX ORDER — IOPAMIDOL 755 MG/ML
500 INJECTION, SOLUTION INTRAVASCULAR ONCE
Status: COMPLETED | OUTPATIENT
Start: 2022-06-28 | End: 2022-06-28

## 2022-06-28 RX ORDER — SODIUM CHLORIDE, SODIUM LACTATE, POTASSIUM CHLORIDE, CALCIUM CHLORIDE 600; 310; 30; 20 MG/100ML; MG/100ML; MG/100ML; MG/100ML
INJECTION, SOLUTION INTRAVENOUS CONTINUOUS
Status: DISCONTINUED | OUTPATIENT
Start: 2022-06-28 | End: 2022-07-01

## 2022-06-28 RX ORDER — ACETAMINOPHEN 325 MG/1
650 TABLET ORAL EVERY 6 HOURS PRN
Status: DISCONTINUED | OUTPATIENT
Start: 2022-06-28 | End: 2022-07-06 | Stop reason: HOSPADM

## 2022-06-28 RX ADMIN — Medication 400 MG: at 15:28

## 2022-06-28 RX ADMIN — MIRTAZAPINE 15 MG: 15 TABLET, FILM COATED ORAL at 22:07

## 2022-06-28 RX ADMIN — POTASSIUM CHLORIDE 10 MEQ: 7.46 INJECTION, SOLUTION INTRAVENOUS at 16:19

## 2022-06-28 RX ADMIN — FAMOTIDINE 20 MG: 10 INJECTION INTRAVENOUS at 13:42

## 2022-06-28 RX ADMIN — HYDROMORPHONE HYDROCHLORIDE 0.2 MG: 0.2 INJECTION, SOLUTION INTRAMUSCULAR; INTRAVENOUS; SUBCUTANEOUS at 13:38

## 2022-06-28 RX ADMIN — IOPAMIDOL 49 ML: 755 INJECTION, SOLUTION INTRAVENOUS at 15:57

## 2022-06-28 RX ADMIN — QUETIAPINE FUMARATE 50 MG: 25 TABLET ORAL at 22:07

## 2022-06-28 RX ADMIN — ONDANSETRON 4 MG: 2 INJECTION INTRAMUSCULAR; INTRAVENOUS at 13:42

## 2022-06-28 RX ADMIN — MULTIPLE VITAMINS W/ MINERALS TAB 1 TABLET: TAB at 21:25

## 2022-06-28 RX ADMIN — SODIUM CHLORIDE 52 ML: 9 INJECTION, SOLUTION INTRAVENOUS at 15:58

## 2022-06-28 RX ADMIN — MAGNESIUM SULFATE HEPTAHYDRATE 2 G: 40 INJECTION, SOLUTION INTRAVENOUS at 21:29

## 2022-06-28 RX ADMIN — SODIUM CHLORIDE 1000 ML: 9 INJECTION, SOLUTION INTRAVENOUS at 13:41

## 2022-06-28 RX ADMIN — POTASSIUM CHLORIDE 40 MEQ: 1.5 SOLUTION ORAL at 15:27

## 2022-06-28 RX ADMIN — SODIUM CHLORIDE, POTASSIUM CHLORIDE, SODIUM LACTATE AND CALCIUM CHLORIDE 2000 ML: 600; 310; 30; 20 INJECTION, SOLUTION INTRAVENOUS at 18:43

## 2022-06-28 RX ADMIN — MIRTAZAPINE 15 MG: 15 TABLET, FILM COATED ORAL at 21:25

## 2022-06-28 RX ADMIN — NICOTINE 1 PATCH: 21 PATCH, EXTENDED RELEASE TRANSDERMAL at 19:55

## 2022-06-28 RX ADMIN — HYDROMORPHONE HYDROCHLORIDE 0.3 MG: 1 INJECTION, SOLUTION INTRAMUSCULAR; INTRAVENOUS; SUBCUTANEOUS at 17:22

## 2022-06-28 RX ADMIN — HYDROMORPHONE HYDROCHLORIDE 0.2 MG: 0.2 INJECTION, SOLUTION INTRAMUSCULAR; INTRAVENOUS; SUBCUTANEOUS at 21:33

## 2022-06-28 RX ADMIN — FAMOTIDINE 20 MG: 10 INJECTION, SOLUTION INTRAVENOUS at 21:26

## 2022-06-28 RX ADMIN — ENOXAPARIN SODIUM 30 MG: 30 INJECTION SUBCUTANEOUS at 19:59

## 2022-06-28 ASSESSMENT — ENCOUNTER SYMPTOMS
CONSTIPATION: 1
NUMBNESS: 1
VOMITING: 1
ABDOMINAL PAIN: 1
DIARRHEA: 1
SLEEP DISTURBANCE: 1
APPETITE CHANGE: 1
NAUSEA: 1
UNEXPECTED WEIGHT CHANGE: 1
FATIGUE: 1

## 2022-06-28 ASSESSMENT — ACTIVITIES OF DAILY LIVING (ADL)
HEARING_DIFFICULTY_OR_DEAF: NO
TRANSFERRING: 0-->INDEPENDENT
WEAR_GLASSES_OR_BLIND: YES
EATING/SWALLOWING: EATING
TRANSFERRING: 0-->INDEPENDENT
FALL_HISTORY_WITHIN_LAST_SIX_MONTHS: YES
DIFFICULTY_EATING/SWALLOWING: YES
SWALLOWING: 0-->SWALLOWS FOODS/LIQUIDS WITHOUT DIFFICULTY
ADLS_ACUITY_SCORE: 22
WALKING_OR_CLIMBING_STAIRS_DIFFICULTY: NO
DIFFICULTY_COMMUNICATING: NO
NUMBER_OF_TIMES_PATIENT_HAS_FALLEN_WITHIN_LAST_SIX_MONTHS: 3
CHANGE_IN_FUNCTIONAL_STATUS_SINCE_ONSET_OF_CURRENT_ILLNESS/INJURY: YES
CONCENTRATING,_REMEMBERING_OR_MAKING_DECISIONS_DIFFICULTY: NO
ADLS_ACUITY_SCORE: 24
EATING: 0-->INDEPENDENT
DRESSING/BATHING_DIFFICULTY: NO
ADLS_ACUITY_SCORE: 24
DOING_ERRANDS_INDEPENDENTLY_DIFFICULTY: NO
EATING: 0-->INDEPENDENT
SWALLOWING: 0-->SWALLOWS FOODS/LIQUIDS WITHOUT DIFFICULTY (DEVELOPMENTALLY APPROPRIATE)
TOILETING_ISSUES: NO
ADLS_ACUITY_SCORE: 37
EQUIPMENT_CURRENTLY_USED_AT_HOME: WALKER, STANDARD
WALKING_OR_CLIMBING_STAIRS: STAIR CLIMBING DIFFICULTY, DEPENDENT
VISION_MANAGEMENT: GLASSES

## 2022-06-28 NOTE — ED PROVIDER NOTES
I received patient in signout from Dr. Carlisle.  Please refer to their complete H&P for further information.  Briefly, patient is a 57 yo female presenting with abdominal pain and mental health problems.  Noted electrolyte derangements on labs mostly hypoNa and hypoK; DEC evated patient during time in the ED. At time of signout, CT abdomen pending.     3:47 PM   Spoke to DEC.  No indication for mental health admission at this time though they will arrange outpatient services for next week. Patient denies suicidal ideations or response to internal stimuli.     4:43 PM  CT resulted concerns for possible edematous pancreatitis.  Patient to benefit from electrolyte replacement and pain control for pancreatitis at this time; accepted by hospitalist for admission.     CT Abdomen Pelvis w Contrast   Final Result   IMPRESSION:    1.  Small fluid posterior to the pancreas tail is slightly increased   in the interval. This could represent edematous pancreatitis. No   intraparenchymal pancreas fluid collection identified.   2.  Heterogeneous appearance of the liver suggesting prominent fatty   infiltration with presumed sparing at the central liver. The central   hepatic enhancement is increased compared to 10/28/2021 and could   represent evolution of fatty sparing. Correlate with evidence of   steatohepatitis. Nonurgent liver MRI characterization should be   considered for further assessment.      TAYLOR SUAREZ MD            SYSTEM ID:  [id number]        Labs Ordered and Resulted from Time of ED Arrival to Time of ED Departure   COMPREHENSIVE METABOLIC PANEL - Abnormal       Result Value    Sodium 128 (*)     Potassium 2.9 (*)     Chloride 91 (*)     Carbon Dioxide (CO2) 20      Anion Gap 17 (*)     Urea Nitrogen 5 (*)     Creatinine 0.40 (*)     Calcium 9.8      Glucose 89      Alkaline Phosphatase 340 (*)      (*)     ALT 27      Protein Total 7.9      Albumin 3.4      Bilirubin Total 1.3      GFR Estimate >90      LIPASE - Abnormal    Lipase 543 (*)    CBC WITH PLATELETS AND DIFFERENTIAL - Abnormal    WBC Count 8.5      RBC Count 3.02 (*)     Hemoglobin 10.4 (*)     Hematocrit 29.4 (*)     MCV 97      MCH 34.4 (*)     MCHC 35.4      RDW 11.7      Platelet Count 256      % Neutrophils 71      % Lymphocytes 18      % Monocytes 9      % Eosinophils 1      % Basophils 1      % Immature Granulocytes 0      NRBCs per 100 WBC 0      Absolute Neutrophils 6.1      Absolute Lymphocytes 1.5      Absolute Monocytes 0.8      Absolute Eosinophils 0.0      Absolute Basophils 0.1      Absolute Immature Granulocytes 0.0      Absolute NRBCs 0.0     ETHYL ALCOHOL LEVEL - Normal    Alcohol ethyl <0.01     ROUTINE UA WITH MICROSCOPIC REFLEX TO CULTURE          Nena Jeffrey, DO  06/28/22 [id number]

## 2022-06-28 NOTE — SAFE
Audelia M Souleymane  June 28, 2022  SAFE Note    Critical Safety Issues: Pt is a 56 year old White female with a history of depression, anxiety, alcohol abuse and suicidal ideations.  Pt was brought to the ER today by EMS due to self-neglect and abdominal pain.  Pt denied having suicidal ideations, intent and plan.  Pt also denied having HI, SIB and access to firearms.  Pt reported a history of suicide attempt by overdosing pills about 13 or 14 years ago.  Pt has no acute psychosis and todd.      Current Suicidal Ideation/Self-Injurious Concerns/Methods: None - N/A      Current or Historical Inappropriate Sexual Behavior: Unknown      Current or Historical Aggression/Homicidal Ideation: None - N/A      Triggers: Pt identified grieving over her daughter who passed away last year, loss of employment in March, and being diagnosed with breast cancer and recent eviction notice as triggers.    Guardianship Status: is her own guardian.. Guardianship paperwork is not required.    This patient is a child/adolescent: No    This patient has additional special visitor precautions: No    Updated care team: Yes: Nena Jeffrey,     For additional details see full LMHP assessment.       NYDIA Chen

## 2022-06-28 NOTE — ED PROVIDER NOTES
History   Chief Complaint:  Mental Health Problem    HPI   Audelia Comer is a 56 year old female with history of alcohol abuse, depression, and anxiety who presents with mental health problems and abdominal pain. The pain she says is in the upper abdomen along with throwing up brown fluid in the last 3 days. Other symptoms include constipation, diarrhea, lack of appetite and difficulty sleeping. There has recently been some tingling and numbness below the knees recently. Last year, following the loss of her job, breast cancer diagnosis, and sudden passing of her daughter she was admitted to the hospital for mental health concerns. Since COVID-19 started, she has lost about 30 pounds. There has been regular drinking, but she reports none in the last several days and no history of withdrawal.  Her sister called  for a welfare check on patient today because of concerns she was not taking care of herself. [facility] put an BRITTANY hold on her prior to arrival.      Review of Systems   Constitutional: Positive for appetite change, fatigue and unexpected weight change.   Gastrointestinal: Positive for abdominal pain, constipation, diarrhea, nausea and vomiting.   Neurological: Positive for numbness (below knees both extremities).   Psychiatric/Behavioral: Positive for sleep disturbance.   All other systems reviewed and are negative.        Allergies:  The patient has no known allergies.     Medications:  cetirizine  escitalopram  hydroxyzine  lorazepam  mirtazapine  ondansetron  quetiapine  Anastrozole  Cephalexin  Mirtazapine  Nitrofurantoin  omeprazole  Pantoprazole  Tramadol    Past Medical History:     Ileitis  Acute pancreatitis  Hepatitis  Acute gastritis without hemorrhage, unspecified gastritis type  Hypokalemia  Nausea and vomiting, intractability of vomiting not specified, unspecified vomiting type  Acute right-sided low back pain with right-sided sciatica  Alcohol-induced acute  pancreatitis  Allergic rhinitis  Anxiety  Hyperlipidemia  Depression  Malignant neoplasm of overlapping sites of right breast in female  Pancreatic cyst  Tobacco abuse  Fatty liver  Eczema, dyshidrotic  Urinary incontinence    Past Surgical History:    Appendectomy   section  Mastectomy, bilateral with reconstructive surgery  Soft tissue surgery, breast implants    Social History:  Patient arrived to ED via EMS.  She regularly uses alcohol.    Physical Exam     Patient Vitals for the past 24 hrs:   BP Temp Temp src Pulse Resp SpO2 Weight   22 1345 118/86 -- -- 105 -- 99 % --   22 1320 -- -- -- -- -- -- 44.5 kg (98 lb)   22 1300 -- 98.5  F (36.9  C) Oral -- -- -- --   22 1154 (!) 129/94 -- -- 114 18 100 % --       Physical Exam  Vitals and nursing note reviewed.   Constitutional:       Appearance: She is cachectic. She is ill-appearing. She is not toxic-appearing.   HENT:      Head: Normocephalic and atraumatic.      Right Ear: External ear normal.      Left Ear: External ear normal.      Nose: Nose normal.      Mouth/Throat:      Mouth: Mucous membranes are moist.   Eyes:      Extraocular Movements: Extraocular movements intact.      Conjunctiva/sclera: Conjunctivae normal.   Cardiovascular:      Rate and Rhythm: Regular rhythm. Tachycardia present.      Heart sounds: No murmur heard.  Pulmonary:      Effort: Pulmonary effort is normal. No respiratory distress.      Breath sounds: Normal breath sounds. No wheezing, rhonchi or rales.   Abdominal:      General: Abdomen is flat. Bowel sounds are normal. There is no distension.      Palpations: Abdomen is soft.      Tenderness: There is abdominal tenderness (mild generalized). There is no guarding or rebound.   Musculoskeletal:         General: No deformity or signs of injury.      Cervical back: Normal range of motion and neck supple.   Skin:     General: Skin is warm and dry.      Findings: No rash.   Neurological:      Mental Status:  She is alert and oriented to person, place, and time.   Psychiatric:         Mood and Affect: Mood is depressed.         Behavior: Behavior normal.         Thought Content: Thought content does not include suicidal ideation.           Emergency Department Course   ECG  ECG results from 06/28/22   EKG 12-lead, tracing only     Value    Systolic Blood Pressure     Diastolic Blood Pressure     Ventricular Rate 105    Atrial Rate 105    OH Interval 138    QRS Duration 82        QTc 481    P Axis 70    R AXIS 78    T Axis 80    Interpretation ECG 1321     Sinus tachycardia  Otherwise normal ECG  When compared with ECG of 21-NOV-2021 08:11,  Nonspecific T wave abnormality no longer evident in Anterolateral leads         Imaging:  CT Abdomen Pelvis w Contrast    (Results Pending)         Laboratory:  Labs Ordered and Resulted from Time of ED Arrival to Time of ED Departure   COMPREHENSIVE METABOLIC PANEL - Abnormal       Result Value    Sodium 128 (*)     Potassium 2.9 (*)     Chloride 91 (*)     Carbon Dioxide (CO2) 20      Anion Gap 17 (*)     Urea Nitrogen 5 (*)     Creatinine 0.40 (*)     Calcium 9.8      Glucose 89      Alkaline Phosphatase 340 (*)      (*)     ALT 27      Protein Total 7.9      Albumin 3.4      Bilirubin Total 1.3      GFR Estimate >90     LIPASE - Abnormal    Lipase 543 (*)    CBC WITH PLATELETS AND DIFFERENTIAL - Abnormal    WBC Count 8.5      RBC Count 3.02 (*)     Hemoglobin 10.4 (*)     Hematocrit 29.4 (*)     MCV 97      MCH 34.4 (*)     MCHC 35.4      RDW 11.7      Platelet Count 256      % Neutrophils 71      % Lymphocytes 18      % Monocytes 9      % Eosinophils 1      % Basophils 1      % Immature Granulocytes 0      NRBCs per 100 WBC 0      Absolute Neutrophils 6.1      Absolute Lymphocytes 1.5      Absolute Monocytes 0.8      Absolute Eosinophils 0.0      Absolute Basophils 0.1      Absolute Immature Granulocytes 0.0      Absolute NRBCs 0.0     ETHYL ALCOHOL LEVEL - Normal     Alcohol ethyl <0.01     ROUTINE UA WITH MICROSCOPIC REFLEX TO CULTURE            Emergency Department Course:             Reviewed:  I reviewed nursing notes, vitals, past medical history and Care Everywhere    Assessments:  1226 I obtained history and examined the patient as noted above.   1500 I rechecked the patient and explained findings.       Interventions:  Medications   ondansetron (ZOFRAN) injection 4 mg (4 mg Intravenous Given 22 1342)   potassium chloride 10 mEq in 100 mL sterile water intermittent infusion (premix) (has no administration in time range)   0.9% sodium chloride BOLUS (1,000 mLs Intravenous New Bag 22 1341)   HYDROmorphone (DILAUDID) injection 0.2 mg (0.2 mg Intravenous Given 22 1338)   famotidine (PEPCID) injection 20 mg (20 mg Intravenous Given 22 1342)   magnesium oxide (MAG-OX) tablet 400 mg (400 mg Oral Given 22 1528)   potassium chloride (KAYCIEL) solution 40 mEq (40 mEq Oral Given 22 1527)     Disposition:  Care of the patient was transferred to my colleague Dr. Jeffrey pending CT results and DEC assessement.     Impression & Plan     CMS Diagnoses: None      Medical Decision Makin yo F brought in for concerns patient not taking care of herself, not eating, losing weight, depressed.  Pt reports upper abd pain.  Not suicidal.  DDx includes severe depression, PUD, pancreatitis, biliary colic, GI malignancy, obstruction, etc.  Will check labs and likely imaging to further assess the abd pain, poor PO intake, nausea, etc.  Pt not suicidal but likely severely depressed, will have DEC assess as well.     1500 Pt is hypokalemic and hyponatremic.  Will replace these.  CT pending.  DEC assessment pending. If mental health admit not indicated, pt likely warrants a medical admit for IVF and electrolyte replacement.  Pt endorsed to Dr Jeffrey at end of shift, will f/u with DEC and on CT results.         Diagnosis:    ICD-10-CM    1. Nonspecific abdominal pain   R10.9    2. Hypokalemia  E87.6    3. Unintended weight loss  R63.4        Discharge Medications:  New Prescriptions    No medications on file       Scribe Disclosure:  I, Ilir Perry, am serving as a scribe at 11:56 AM on 6/28/2022 to document services personally performed by Ismael Carlisle MD based on my observations and the provider's statements to me.            Ismael Carlisle MD  06/28/22 1536

## 2022-06-28 NOTE — ED NOTES
BRITTANY information given to patient. Patient verbalized understanding. Patient searched. Purse placed in belongings bag and placed in DEC office.

## 2022-06-28 NOTE — ED NOTES
[facility]  ED Nurse Handoff Report    Audelia Comer is a 56 year old female   ED Chief complaint: Mental Health Problem  . ED Diagnosis:   Final diagnoses:   Nonspecific abdominal pain   Hypokalemia   Unintended weight loss   Alcohol-induced acute pancreatitis, unspecified complication status     Allergies: No Known Allergies    Code Status: Full Code  Activity level - Baseline/Home:  Independent. Activity Level - Current:   Stand by Assist. Lift room needed: No. Bariatric: No   Needed: No   Isolation: No. Infection: Not Applicable.     Vital Signs:   Vitals:    06/28/22 1154 06/28/22 1300 06/28/22 1320 06/28/22 1345   BP: (!) 129/94   118/86   Pulse: 114   105   Resp: 18      Temp:  98.5  F (36.9  C)     TempSrc:  Oral     SpO2: 100%   99%   Weight:   44.5 kg (98 lb)        Cardiac Rhythm:  ,      Pain level:    Patient confused: No. Patient Falls Risk: Yes.   Elimination Status: Has voided   Patient Report - Initial Complaint: Mental health problem. Focused Assessment:   Pt presents via EMS. EMS reports a call was made to make a vulnerable adult report. Pt has a history of depression and anxiety. Pt has not been eating or caring for herself. Per EMS apt is very messy and is to be evicted. Pt reports being increasingly depressed due to cancer diagnosis and loss of a child. Since COVID, has had over 30 Lb weight loss. Admits to feeling depressed and not wanting to eat/having no appetite, but has been drinking ensure and lots of water. Feels weak and reports mid upper abdominal discomfort with an episode of brown emesis 2 days ago.  Reports daily alcohol use, though denies any in the past couple days due to abdominal pain. Denies drug use. Denies suicidal ideation, hx of suicide attempt over 10 years ago.    Tests Performed:   CT Abdomen Pelvis w Contrast   Final Result   IMPRESSION:    1.  Small fluid posterior to the pancreas tail is slightly increased   in the interval. This could  represent edematous pancreatitis. No   intraparenchymal pancreas fluid collection identified.   2.  Heterogeneous appearance of the liver suggesting prominent fatty   infiltration with presumed sparing at the central liver. The central   hepatic enhancement is increased compared to 10/28/2021 and could   represent evolution of fatty sparing. Correlate with evidence of   steatohepatitis. Nonurgent liver MRI characterization should be   considered for further assessment.      TAYLOR SUAREZ MD            SYSTEM ID:  [id number]        . Abnormal Results:   Labs Ordered and Resulted from Time of ED Arrival to Time of ED Departure   COMPREHENSIVE METABOLIC PANEL - Abnormal       Result Value    Sodium 128 (*)     Potassium 2.9 (*)     Chloride 91 (*)     Carbon Dioxide (CO2) 20      Anion Gap 17 (*)     Urea Nitrogen 5 (*)     Creatinine 0.40 (*)     Calcium 9.8      Glucose 89      Alkaline Phosphatase 340 (*)      (*)     ALT 27      Protein Total 7.9      Albumin 3.4      Bilirubin Total 1.3      GFR Estimate >90     LIPASE - Abnormal    Lipase 543 (*)    CBC WITH PLATELETS AND DIFFERENTIAL - Abnormal    WBC Count 8.5      RBC Count 3.02 (*)     Hemoglobin 10.4 (*)     Hematocrit 29.4 (*)     MCV 97      MCH 34.4 (*)     MCHC 35.4      RDW 11.7      Platelet Count 256      % Neutrophils 71      % Lymphocytes 18      % Monocytes 9      % Eosinophils 1      % Basophils 1      % Immature Granulocytes 0      NRBCs per 100 WBC 0      Absolute Neutrophils 6.1      Absolute Lymphocytes 1.5      Absolute Monocytes 0.8      Absolute Eosinophils 0.0      Absolute Basophils 0.1      Absolute Immature Granulocytes 0.0      Absolute NRBCs 0.0     ETHYL ALCOHOL LEVEL - Normal    Alcohol ethyl <0.01     ROUTINE UA WITH MICROSCOPIC REFLEX TO CULTURE     .   Treatments provided: See pt's chart.  Family Comments: None at bedside.  OBS brochure/video discussed/provided to patient:  No  ED Medications:   Medications   ondansetron  (ZOFRAN) injection 4 mg (4 mg Intravenous Given 6/28/22 1342)   potassium chloride 10 mEq in 100 mL sterile water intermittent infusion (premix) (10 mEq Intravenous New Bag 6/28/22 1619)   HYDROmorphone (PF) (DILAUDID) injection 0.3 mg (has no administration in time range)   0.9% sodium chloride BOLUS (1,000 mLs Intravenous New Bag 6/28/22 1341)   HYDROmorphone (DILAUDID) injection 0.2 mg (0.2 mg Intravenous Given 6/28/22 1338)   famotidine (PEPCID) injection 20 mg (20 mg Intravenous Given 6/28/22 1342)   magnesium oxide (MAG-OX) tablet 400 mg (400 mg Oral Given 6/28/22 1528)   potassium chloride (KAYCIEL) solution 40 mEq (40 mEq Oral Given 6/28/22 1527)   Saline CT scan flush (52 mLs Intravenous Given 6/28/22 1558)   iopamidol (ISOVUE-370) solution 500 mL (49 mLs Intravenous Given 6/28/22 1557)     Drips infusing:  No  For the majority of the shift, the patient's behavior Green. Interventions performed were NA.    Sepsis treatment initiated: No     Patient tested for COVID 19 prior to admission: YES    ED Nurse Name/Phone Number: Cruzito Hamlin RN,   5:18 PM  RECEIVING UNIT ED HANDOFF REVIEW    Above ED Nurse Handoff Report was reviewed: Yes  Reviewed by: Xi Major RN on June 28, 2022 at 5:46 PM

## 2022-06-28 NOTE — ED TRIAGE NOTES
Pt presents via EMS. EMS reports a call was made to make a vulnerable adult report. Pt has a history of depression and anxiety. Pt has not been eating or caring for herself. Per EMS apt is very messy and is to be evicted. Pt reports being increasingly depressed due to cancer diagnosis and loss of a child. Since COVID, has had over 30 Lb weight loss. Admits to feeling depressed and not wanting to eat/having no appetite, but has been drinking ensure and lots of water. Feels weak and reports mid upper abdominal discomfort with an episode of brown emesis 2 days ago.  Reports daily alcohol use, though denies any in the past couple days due to abdominal pain. Denies drug use. Denies suicidal ideation, hx of suicide attempt over 10 years ago.      Triage Assessment     Row Name 06/28/22 1154       Triage Assessment (Adult)    Airway WDL WDL

## 2022-06-28 NOTE — CONSULTS
Diagnostic Evaluation Consultation  Crisis Assessment    Patient was assessed: remote  Patient location: [place]  Was a release of information signed: Yes. Providers included on the release: outpatient service providers      Referral Data and Chief Complaint  Audelia Comer is a 56 year old, who uses she/her pronouns, and presents to the ED via EMS. Patient is referred to the ED by community provider(s). Patient is presenting to the ED for the following concerns: self-neglect and abdominal pain.  Pt is a 56 year old White female with a history of depression, anxiety, alcohol abuse and suicidal ideations. Pt was brought to the ER today by EMS due to abdominal pain and self-neglect.  Per Epic record, a concerned family member called [facility] crisis line to do welfare check on Pt today.  The [facility] crisis worker assessed Pt and referred her to the ER for further evaluation as Pt has not been eating properly since August, 2021 and lost 15lb due to severe depression.  Pt endorsed increased depression, isolation, worry, racing thoughts and anxiety. Pt reported having poor sleep and loss of appetite. Pt denied having acute psychosis and todd. Pt denied having suicidal and homicidal ideations. Pt identified loss of her adult daughter, loss of employment, being diagnosed with breast cancer and recent eviction issue as triggers leading to her current mental health crisis.     Informed Consent and Assessment Methods     Patient is her own guardian. Writer met with patient and explained the crisis assessment process, including applicable information disclosures and limits to confidentiality, assessed understanding of the process, and obtained consent to proceed with the assessment. Patient was observed to be able to participate in the assessment as evidenced by calm, alert, oriented, engaged and cooperative.. Assessment methods included conducting a formal interview with patient, review of medical records,  "collaboration with medical staff, and obtaining relevant collateral information from family and community providers when available..     Over the course of this crisis assessment provided reassurance, offered validation, engaged patient in problem solving and disposition planning, assisted in processing patient's thoughts and feeling relating to grief and loss issues, health issues and stress related to recent eviction notice, provided psychoeducation and facilitated family communication. Patient's response to interventions was receptive     Summary of Patient Situation  Pt exchanged greetings and made appropriate eye contact with this writer.  Pt was calm, alert, oriented, engaged and cooperative in her DEC Assessment.  Pt presented with coherent, normal rate of speech, constricted, depressed and anxious affect during her assessment.  Pt stated, \"I have not been feeling good for quite a while, having a hard time eating, sleeping and severe depression, when I get upset I have no appetite and last week I faced eviction.\" as her reason for visiting the ER today.  Pt shared the [facility] was able to resolve her eviction as they helped her to pay the rent.    Brief Psychosocial History  Pt reported her parents were  and her dad was .  Pt shared having one brother, 2 sisters and one brother who was .  Pt shared she was  2x, had two adult children.  Pt noted her daughter suddenly  in her sleep in  and she was only 29 years old.  Pt reported she has one adult son and one grandchild.  Pt reported she worked as a  but lost her job in  and she was also diagnosed with a breast cancer in May, 2022.  Pt reported she lived alone with her dog.  Pt reported a history of probation for DWI 3 years ago but currently not on probation.       Significant Clinical History  Pt has a history of depression, anxiety, alcohol abuse and suicidal ideations.  Pt has " a history of psychiatric hospitalization and reported a history of CD Treatment.  Pt reported she drink alcohol 3x to 5x weekly and her last drink was last night.  Pt denied using other illicit substances.  Pt identified Remeron and Seroquel as her current psychiatric medications as she has been taking them consistently.  Pt reported she used to see a therapist but stopped as she did not feel it was a good fit.  Pt has no current established outpatient psychiatry and therapy services.     Collateral Information    Writer called and spoke to Pt's son, Sharmila [phone].  Shukri expressed his concern about Pt's ongoing depression, anxiety and poor appetite.  Shukri shared he has been helping Pt with grocery shopping and encouraging her to eat.  Shukri reviewed and agreed with medical admission recommendation for Pt.     Risk Assessment     ESS-6  1.a. Over the past 2 weeks, have you had thoughts of killing yourself? No  1.b. Have you ever attempted to kill yourself and, if yes, when did this last happen? Yes Pt reported a history of suicide attempt by overdosing pills about 13 years ago.   2. Recent or current suicide plan? No   3. Recent or current intent to act on ideation? No  4. Lifetime psychiatric hospitalization? Yes  5. Pattern of excessive substance use? Yes  6. Current irritability, agitation, or aggression? No  Scoring note: BOTH 1a and 1b must be yes for it to score 1 point, if both are not yes it is zero. All others are 1 point per number. If all questions 1a/1b - 6 are no, risk is negligible. If one of 1a/1b is yes, then risk is mild. If either question 2 or 3, but not both, is yes, then risk is automatically moderate regardless of total score. If both 2 and 3 are yes, risk is automatically high regardless of total score.      Score: 2, moderate risk      Does the patient have access to lethal means? No     Does the patient engage in non-suicidal self-injurious behavior (NSSI/SIB)? no     Does the patient  have thoughts of harming others? No     Is the patient engaging in sexually inappropriate behavior?  no      Current Substance Abuse     Is there recent substance abuse? Substance type(s): alcohol Frequency: 3 to 5x weekly Quantity: Unknown Method: drinking Duration: Unknown Last use: last night.     Was a urine drug screen or blood alcohol level obtained: Yes JEISON result was negative.     Mental Status Exam     Affect: Constricted   Appearance: Appropriate and Disheveled    Attention Span/Concentration: Attentive?    Eye Contact: Engaged   Fund of Knowledge: Appropriate    Language /Speech Content: Fluent   Language /Speech Volume: Soft and Normal    Language /Speech Rate/Productions: Normal    Recent Memory: Variable   Remote Memory: Variable   Mood: Anxious, Apathetic, Depressed and Sad    Orientation to Person: Yes    Orientation to Place: Yes   Orientation to Time of Day: Yes    Orientation to Date: Yes    Situation (Do they understand why they are here?): Yes    Psychomotor Behavior: Normal    Thought Content: Clear   Thought Form: Intact      History of commitment: No       Medication    Psychotropic medications: Yes. Pt is currently taking Remeron and Seroquel. Medication compliant: Yes. Recent medication changes: No  Medication changes made in the last two weeks: No     Current Care Team    Primary Care Provider: Yes. Name: Randee Woods, APRN, CNP  . Location: [facility]  . Date of last visit: Unknown. Frequency: Unknown. Perceived helpfulness: Unknown.  Psychiatrist: No  Therapist: No  : No     CTSS or ARMHS: No  ACT Team: No  Other: No    Diagnosis    296.33 (F33.2) Major Depressive Disorder, Recurrent Episode, Severe _ and With mixed features   300.02 (F41.1) Generalized Anxiety Disorder - primary   Substance-Related & Addictive Disorders Alcohol Use Disorder   303.90 (F10.20) Severe In a controlled environment - primary     Clinical Summary and Substantiation of  "Recommendations    Pt is a 56 year old White female with a history of depression, anxiety, alcohol abuse and suicidal ideations. Pt was brought to the ER today by EMS due to abdominal pain and self-neglect.  Per Epic record, a concerned family member called [facility] crisis line to do welfare check on Pt today.  The [facility] crisis worker assessed Pt and referred her to the ER for further evaluation as Pt has not been eating properly since August, 2021 and lost 15lb due to severe depression.  Pt has not been functioning at home and not eating well due to self-neglect as she has been losing some weight.  Pt identified stressors, including grieving over her daughter's death, loss of employment, recently being diagnosed with breast cancer and facing eviction from her apartment.  Pt endorsed increased depression and anxiety symptoms.  Pt reported having poor sleep and loss of appetite.  Pt denied having suicidal ideations, intent and plan.  Pt denied having HI, SIB and access to firearms.  Pt reported a history of suicide attempt by overdosing pills about 13 years ago.  Pt has no acute psychosis and todd.  Pt reported she would benefit from medical admission to get more fluids and help her to eat some food.  Pt reported she would feel comfortable going back home once she was medically stabilized and wanted to follow up with her new outpatient psychiatry for medication management and individual therapy service.  Per Nena Jeffrey, , Pt will need medical admission.  Per [facility] note, \"CT resulted concerns for possible edematous pancreatitis.  Patient to benefit from electrolyte replacement and pain control for pancreatitis at this time; accepted by hospitalist for admission.\"  Pt will need DEC re-assessment once she was medically cleared and set up her new outpatient mental health services if no changes in her mental health status.    Disposition    Recommended disposition: Individual Therapy, Medication " Management and Medical Admission: Per Dr. Kerns, Pt will need medical admission.       Reviewed case and recommendations with attending provider. Attending Name: Nena Jeffrey DO,     Attending concurs with disposition: Yes       Patient concurs with disposition: Yes       Guardian concurs with disposition: NA      Final disposition: Individual therapy , Medication management and Medical admission: Per Dr. Jeffrey, Pt will need medical admission, then follow up with outpatient mental health services. .       Assessment Details    Patient interview started at: 2:30pm and completed at: 3:20pm.     Total duration spent on the patient case in minutes: 2.50 hrs      CPT code(s) utilized: 87719 - Psychotherapy for Crisis - 60 (30-74*) min       NYDIA Chen, MA, LMFT  DEC - Triage & Transition Services

## 2022-06-29 LAB
ALBUMIN SERPL-MCNC: 2.7 G/DL (ref 3.4–5)
ALBUMIN UR-MCNC: NEGATIVE MG/DL
ALP SERPL-CCNC: 251 U/L (ref 40–150)
ALT SERPL W P-5'-P-CCNC: 25 U/L (ref 0–50)
ANION GAP SERPL CALCULATED.3IONS-SCNC: ABNORMAL MMOL/L
APPEARANCE UR: CLEAR
AST SERPL W P-5'-P-CCNC: 120 U/L (ref 0–45)
BILIRUB SERPL-MCNC: 1.2 MG/DL (ref 0.2–1.3)
BILIRUB UR QL STRIP: NEGATIVE
BUN SERPL-MCNC: 3 MG/DL (ref 7–30)
CALCIUM SERPL-MCNC: ABNORMAL MG/DL
CHLORIDE BLD-SCNC: ABNORMAL MMOL/L
CO2 SERPL-SCNC: 22 MMOL/L (ref 20–32)
COLOR UR AUTO: ABNORMAL
CREAT SERPL-MCNC: 0.27 MG/DL (ref 0.52–1.04)
ERYTHROCYTE [DISTWIDTH] IN BLOOD BY AUTOMATED COUNT: 11.7 % (ref 10–15)
GFR SERPL CREATININE-BSD FRML MDRD: >90 ML/MIN/1.73M2
GLUCOSE BLD-MCNC: 80 MG/DL (ref 70–99)
GLUCOSE UR STRIP-MCNC: NEGATIVE MG/DL
HCT VFR BLD AUTO: 25.1 % (ref 35–47)
HGB BLD-MCNC: 8.9 G/DL (ref 11.7–15.7)
HGB UR QL STRIP: NEGATIVE
IRON SATN MFR SERPL: 63 % (ref 15–46)
IRON SERPL-MCNC: 85 UG/DL (ref 35–180)
KETONES UR STRIP-MCNC: 20 MG/DL
LEUKOCYTE ESTERASE UR QL STRIP: ABNORMAL
MAGNESIUM SERPL-MCNC: 2.1 MG/DL (ref 1.6–2.3)
MCH RBC QN AUTO: 34.4 PG (ref 26.5–33)
MCHC RBC AUTO-ENTMCNC: 35.5 G/DL (ref 31.5–36.5)
MCV RBC AUTO: 97 FL (ref 78–100)
MUCOUS THREADS #/AREA URNS LPF: PRESENT /LPF
NITRATE UR QL: NEGATIVE
PH UR STRIP: 6 [PH] (ref 5–7)
PHOSPHATE SERPL-MCNC: 1.2 MG/DL (ref 2.5–4.5)
PLATELET # BLD AUTO: 156 10E3/UL (ref 150–450)
POTASSIUM BLD-SCNC: 3.5 MMOL/L (ref 3.4–5.3)
PROT SERPL-MCNC: 6.1 G/DL (ref 6.8–8.8)
RBC # BLD AUTO: 2.59 10E6/UL (ref 3.8–5.2)
RBC URINE: 1 /HPF
SODIUM SERPL-SCNC: 135 MMOL/L (ref 133–144)
SP GR UR STRIP: 1.01 (ref 1–1.03)
TIBC SERPL-MCNC: 136 UG/DL (ref 240–430)
UROBILINOGEN UR STRIP-MCNC: NORMAL MG/DL
WBC # BLD AUTO: 5.9 10E3/UL (ref 4–11)
WBC URINE: 8 /HPF

## 2022-06-29 PROCEDURE — 82947 ASSAY GLUCOSE BLOOD QUANT: CPT | Performed by: STUDENT IN AN ORGANIZED HEALTH CARE EDUCATION/TRAINING PROGRAM

## 2022-06-29 PROCEDURE — 81001 URINALYSIS AUTO W/SCOPE: CPT | Performed by: STUDENT IN AN ORGANIZED HEALTH CARE EDUCATION/TRAINING PROGRAM

## 2022-06-29 PROCEDURE — 250N000013 HC RX MED GY IP 250 OP 250 PS 637: Performed by: STUDENT IN AN ORGANIZED HEALTH CARE EDUCATION/TRAINING PROGRAM

## 2022-06-29 PROCEDURE — 84100 ASSAY OF PHOSPHORUS: CPT | Performed by: STUDENT IN AN ORGANIZED HEALTH CARE EDUCATION/TRAINING PROGRAM

## 2022-06-29 PROCEDURE — 250N000013 HC RX MED GY IP 250 OP 250 PS 637: Performed by: HOSPITALIST

## 2022-06-29 PROCEDURE — 250N000013 HC RX MED GY IP 250 OP 250 PS 637: Performed by: INTERNAL MEDICINE

## 2022-06-29 PROCEDURE — 250N000009 HC RX 250: Performed by: STUDENT IN AN ORGANIZED HEALTH CARE EDUCATION/TRAINING PROGRAM

## 2022-06-29 PROCEDURE — 83735 ASSAY OF MAGNESIUM: CPT | Performed by: EMERGENCY MEDICINE

## 2022-06-29 PROCEDURE — 99233 SBSQ HOSP IP/OBS HIGH 50: CPT | Performed by: INTERNAL MEDICINE

## 2022-06-29 PROCEDURE — 85041 AUTOMATED RBC COUNT: CPT | Performed by: STUDENT IN AN ORGANIZED HEALTH CARE EDUCATION/TRAINING PROGRAM

## 2022-06-29 PROCEDURE — 250N000011 HC RX IP 250 OP 636: Performed by: STUDENT IN AN ORGANIZED HEALTH CARE EDUCATION/TRAINING PROGRAM

## 2022-06-29 PROCEDURE — 36415 COLL VENOUS BLD VENIPUNCTURE: CPT | Performed by: STUDENT IN AN ORGANIZED HEALTH CARE EDUCATION/TRAINING PROGRAM

## 2022-06-29 PROCEDURE — 83550 IRON BINDING TEST: CPT | Performed by: STUDENT IN AN ORGANIZED HEALTH CARE EDUCATION/TRAINING PROGRAM

## 2022-06-29 PROCEDURE — 36416 COLLJ CAPILLARY BLOOD SPEC: CPT | Performed by: EMERGENCY MEDICINE

## 2022-06-29 PROCEDURE — 258N000003 HC RX IP 258 OP 636: Performed by: INTERNAL MEDICINE

## 2022-06-29 PROCEDURE — 120N000001 HC R&B MED SURG/OB

## 2022-06-29 PROCEDURE — 85027 COMPLETE CBC AUTOMATED: CPT | Performed by: STUDENT IN AN ORGANIZED HEALTH CARE EDUCATION/TRAINING PROGRAM

## 2022-06-29 PROCEDURE — 84155 ASSAY OF PROTEIN SERUM: CPT | Performed by: STUDENT IN AN ORGANIZED HEALTH CARE EDUCATION/TRAINING PROGRAM

## 2022-06-29 PROCEDURE — 82374 ASSAY BLOOD CARBON DIOXIDE: CPT | Performed by: STUDENT IN AN ORGANIZED HEALTH CARE EDUCATION/TRAINING PROGRAM

## 2022-06-29 PROCEDURE — 258N000003 HC RX IP 258 OP 636: Performed by: STUDENT IN AN ORGANIZED HEALTH CARE EDUCATION/TRAINING PROGRAM

## 2022-06-29 RX ORDER — QUETIAPINE FUMARATE 25 MG/1
25 TABLET, FILM COATED ORAL DAILY PRN
Status: DISCONTINUED | OUTPATIENT
Start: 2022-06-29 | End: 2022-06-29

## 2022-06-29 RX ORDER — OXYCODONE HYDROCHLORIDE 5 MG/1
5 TABLET ORAL EVERY 4 HOURS PRN
Status: DISCONTINUED | OUTPATIENT
Start: 2022-06-29 | End: 2022-07-03

## 2022-06-29 RX ORDER — PANTOPRAZOLE SODIUM 40 MG/1
40 TABLET, DELAYED RELEASE ORAL DAILY PRN
Status: DISCONTINUED | OUTPATIENT
Start: 2022-06-29 | End: 2022-07-06 | Stop reason: HOSPADM

## 2022-06-29 RX ADMIN — FOLIC ACID: 5 INJECTION, SOLUTION INTRAMUSCULAR; INTRAVENOUS; SUBCUTANEOUS at 00:14

## 2022-06-29 RX ADMIN — HYDROMORPHONE HYDROCHLORIDE 0.2 MG: 0.2 INJECTION, SOLUTION INTRAMUSCULAR; INTRAVENOUS; SUBCUTANEOUS at 06:15

## 2022-06-29 RX ADMIN — SODIUM CHLORIDE, POTASSIUM CHLORIDE, SODIUM LACTATE AND CALCIUM CHLORIDE: 600; 310; 30; 20 INJECTION, SOLUTION INTRAVENOUS at 19:25

## 2022-06-29 RX ADMIN — OXYCODONE HYDROCHLORIDE 5 MG: 5 TABLET ORAL at 20:31

## 2022-06-29 RX ADMIN — OXYCODONE HYDROCHLORIDE 5 MG: 5 TABLET ORAL at 15:22

## 2022-06-29 RX ADMIN — QUETIAPINE FUMARATE 50 MG: 25 TABLET ORAL at 20:31

## 2022-06-29 RX ADMIN — QUETIAPINE FUMARATE 50 MG: 25 TABLET ORAL at 08:08

## 2022-06-29 RX ADMIN — POTASSIUM & SODIUM PHOSPHATES POWDER PACK 280-160-250 MG 2 PACKET: 280-160-250 PACK at 12:09

## 2022-06-29 RX ADMIN — FAMOTIDINE 20 MG: 10 INJECTION, SOLUTION INTRAVENOUS at 22:35

## 2022-06-29 RX ADMIN — POTASSIUM & SODIUM PHOSPHATES POWDER PACK 280-160-250 MG 2 PACKET: 280-160-250 PACK at 04:08

## 2022-06-29 RX ADMIN — MULTIPLE VITAMINS W/ MINERALS TAB 1 TABLET: TAB at 08:08

## 2022-06-29 RX ADMIN — MIRTAZAPINE 30 MG: 15 TABLET, FILM COATED ORAL at 22:35

## 2022-06-29 RX ADMIN — ENOXAPARIN SODIUM 30 MG: 30 INJECTION SUBCUTANEOUS at 18:18

## 2022-06-29 RX ADMIN — POTASSIUM & SODIUM PHOSPHATES POWDER PACK 280-160-250 MG 2 PACKET: 280-160-250 PACK at 08:07

## 2022-06-29 RX ADMIN — CETIRIZINE HYDROCHLORIDE 10 MG: 10 TABLET, FILM COATED ORAL at 08:08

## 2022-06-29 RX ADMIN — FAMOTIDINE 20 MG: 10 INJECTION, SOLUTION INTRAVENOUS at 10:01

## 2022-06-29 RX ADMIN — POTASSIUM & SODIUM PHOSPHATES POWDER PACK 280-160-250 MG 2 PACKET: 280-160-250 PACK at 18:17

## 2022-06-29 RX ADMIN — QUETIAPINE FUMARATE 25 MG: 25 TABLET ORAL at 03:46

## 2022-06-29 RX ADMIN — NICOTINE 1 PATCH: 21 PATCH, EXTENDED RELEASE TRANSDERMAL at 08:10

## 2022-06-29 RX ADMIN — SODIUM CHLORIDE, POTASSIUM CHLORIDE, SODIUM LACTATE AND CALCIUM CHLORIDE: 600; 310; 30; 20 INJECTION, SOLUTION INTRAVENOUS at 10:08

## 2022-06-29 RX ADMIN — POTASSIUM & SODIUM PHOSPHATES POWDER PACK 280-160-250 MG 2 PACKET: 280-160-250 PACK at 20:32

## 2022-06-29 RX ADMIN — HYDROMORPHONE HYDROCHLORIDE 0.2 MG: 0.2 INJECTION, SOLUTION INTRAMUSCULAR; INTRAVENOUS; SUBCUTANEOUS at 10:01

## 2022-06-29 RX ADMIN — QUETIAPINE FUMARATE 25 MG: 25 TABLET ORAL at 12:56

## 2022-06-29 RX ADMIN — HYDROMORPHONE HYDROCHLORIDE 0.2 MG: 0.2 INJECTION, SOLUTION INTRAMUSCULAR; INTRAVENOUS; SUBCUTANEOUS at 00:18

## 2022-06-29 RX ADMIN — ONDANSETRON 4 MG: 4 TABLET, ORALLY DISINTEGRATING ORAL at 19:24

## 2022-06-29 ASSESSMENT — ACTIVITIES OF DAILY LIVING (ADL)
ADLS_ACUITY_SCORE: 24
DEPENDENT_IADLS:: INDEPENDENT
ADLS_ACUITY_SCORE: 24

## 2022-06-29 ASSESSMENT — MIFFLIN-ST. JEOR: SCORE: 1046.75

## 2022-06-29 NOTE — DISCHARGE INSTRUCTIONS
[facility] [employer] Wheels: [phone]      For outpatient community support for mental health services you can call [facility]: [phone]    Recommendations:      Abstain from all non-prescribed mood-altering substances  Take all medications as prescribed  Enter and complete an IOP  treatment program  Increase sober support, attend support meetings at least one time weekly  Follow up with attending grief support group and scheduling a therapy appointment        6.   Follow all recommendations of your medical providers    Referrals were sent to:    [facility] -  Phone: [phone]  Fax: [phone]     May[facility]  Phone: [phone]  Fax: [phone]     [facility] waitlist: [phone]  [facility] Admissions: [phone]    Set-up an outpatient psychiatry appointment from the list provided.

## 2022-06-29 NOTE — PHARMACY-ADMISSION MEDICATION HISTORY
Admission medication history interview status for this patient is complete. See [facility] admission navigator for allergy information, prior to admission medications and immunization status.     Medication history interview done, indicate source(s): Patient  Medication history resources (including written lists, pill bottles, clinic record):EPIC    Changes made to PTA medication list:  Added: Protonix prn  Changed: Seroquel, Remeron  Reported as Not Taking: Escitalopram, Hydroxyzine prn, Lorazepam prn  Removed: Escitalopram, Hydroxyzine prn, Lorazepam prn    Medication reconciliation/reorder completed by provider prior to medication history?  N    Prior to Admission medications    Medication Sig Last Dose Taking? Auth Provider Long Term End Date   azelastine (ASTELIN) 0.1 % nasal spray Spray 1 spray into both nostrils daily as needed   Yes Unknown, Entered By History     cetirizine (ZYRTEC) 10 MG tablet Take 10 mg by mouth daily as needed   Yes Unknown, Entered By History     fluticasone (FLONASE) 50 MCG/ACT nasal spray Spray 1 spray into both nostrils daily as needed   Yes Unknown, Entered By History     mirtazapine (REMERON) 15 MG tablet Take 30 mg by mouth At Bedtime 6/27/2022 at Unknown time Yes Unknown, Entered By History Yes 4/19/23   multivitamin w/minerals (THERA-VIT-M) tablet Take 1 tablet by mouth daily Past Week at Unknown time Yes Hong Benitez MD     ondansetron (ZOFRAN ODT) 4 MG ODT tab Take 1 tablet (4 mg) by mouth every 6 hours as needed for nausea  Yes Darion Reed MD     pantoprazole (PROTONIX) 40 MG EC tablet Take 40 mg by mouth daily as needed  Yes Unknown, Entered By History  4/19/23   potassium chloride ER (K-TAB) 20 MEQ CR tablet Take 20 mEq by mouth daily (with dinner) 6/27/2022 at Unknown time Yes Unknown, Entered By History     QUEtiapine (SEROQUEL) 25 MG tablet Take 50 mg by mouth 2 times daily AM, PM  Yes Unknown, Entered By History Yes    QUEtiapine (SEROQUEL) 25 MG tablet Take 1 tablet (25  mg) by mouth daily as needed (anxiety)  Yes Darion Reed MD Yes

## 2022-06-29 NOTE — H&P
[facility]    History and Physical - Hospitalist Service       Date of Admission:  6/28/2022    Assessment & Plan      Audelia Comer is a 56 year old female admitted on 6/28/2022.     A welfare check was called on her by someone, she suspects by her sister as she was concerned about her health.  Apparently the patient has been drinking every day and was losing weight (loss 30 pounds) since last year after she lost her daughter unexpectedly and later lost her job this year.  She had financial difficulties and was fearing eviction but apparently [facility] has been helping her with that.  She was also diagnosed with breast cancer last year needing bilateral mastectomy.  All of these have been very stressful for her.    She has been having abdominal pain for the last several days with occasional nausea and vomiting.  She has not been able to eat or sleep well.    Upon presentation her temperature is 98.5  F, heart rate of 140s, blood pressure of 129/94 and oxygen saturation of 100% on room air.  Her CT scan showed changes consistent with pancreatitis and lipase was 543.  AST/ALT of 137/27.  Total bilirubin was 1.3 but alkaline phosphatase was 340.  Had multiple electrolyte abnormalities including sodium 128, potassium 2.9 and magnesium 1.2.  Tested negative for COVID-19.  She was initially placed on a health officer hold but was assessed by mental health counselor and not thought to be a suicide risk.  Health officer hold was discontinued.    Plan    1. Acute alcoholic pancreatitis.    Keep n.p.o. except for sips of water and medications.  Give another 2 L of LR bolus and start on LR at 125 mill per hour.    Symptomatic pain control with IV Dilaudid.    Check ultrasound abdomen as there is elevated alkaline phosphatase, even though presentation is typical of alcoholic pancreatitis with AST/ALT of 137/27.    2. Alcohol abuse.    Monitor on [facility] protocol.    Chemical dependency  "consult.    3. Multiple electrolyte abnormalities due to alcohol use    Hyponatremia.  Expect to improve with IV fluids.    Hypokalemia.  Added potassium to IV fluid and protocol started.    Hypomagnesemia.  Replace per protocol.    Monitor hypophosphatemia    4. Depression and anxiety.    This is worsened by multiple unfortunate events in her life including loss of her daughter last year, diagnosis of breast cancer requiring bilateral mastectomy, losing her job and fear of eviction.  Social work consulted.    Continue on Remeron and Seroquel.  Resume escitalopram once reconciled.  Okay to use as needed Ativan.    She does not have any suicidal or homicidal ideations or thoughts of self-harm, health officer hold Discontinued.    5. Anemia.    Hemoglobin 10.4 with MCV of 97.  Check iron studies    6. Nicotine dependence with    Started on nicotine replacement    7. Breast cancer    S/p mastectomy, considered cured and is not on any medications right now.    Encouraged her to follow-up with her oncologist as an outpatient.    8. Severe malnutrition.    Secondary to poor p.o. intake due to depression    Monitor for refeeding syndrome.    Nutrition consult.  Continue vitamin supplementation.         Diet: NPO for Medical/Clinical Reasons Except for: Meds, Ice Chips, Other; Specify: sips of water allowed    DVT Prophylaxis: Enoxaparin (Lovenox) SQ  Patino Catheter: Not present  Central Lines: None  Cardiac Monitoring: None  Code Status: Full Code      Clinically Significant Risk Factors Present on Admission        # Hypokalemia: K = 2.9 mmol/L (Ref range: 3.4 - 5.3 mmol/L) on admission, will replace as needed  # Hyponatremia: Na = 128 mmol/L (Ref range: 133 - 144 mmol/L) on admission, will monitor as appropriate            # Cachexia: Estimated body mass index is 15.82 kg/m  as calculated from the following:    Height as of this encounter: 1.676 m (5' 6\").    Weight as of this encounter: 44.5 kg (98 lb).    "     Disposition Plan    6/30     The patient's care was discussed with the Patient.    Daniel Barrios MD  Hospitalist Service  [facility]  Securely message with the "[employer] Web Console (learn more here)  Text page via [employer] Paging/Directory         ______________________________________________________________________    Chief Complaint   Abdominal pain, nausea, vomiting, weight loss.    History is obtained from the patient    History of Present Illness   Audelia Comer is a 56 year old female admitted on 6/28/2022.     A welfare check was called on her by someone, she suspects by her sister as she was concerned about her health.  Apparently the patient has been drinking every day and was losing weight (loss 30 pounds) since last year after she lost her daughter unexpectedly and later lost her job this year.  She had financial difficulties and was fearing eviction but apparently [facility] has been helping her with that.  She was also diagnosed with breast cancer last year needing bilateral mastectomy.  All of these have been very stressful for her.    She has been having abdominal pain for the last several days with occasional nausea and vomiting.  She has not been able to eat or sleep well.    Upon presentation her temperature is 98.5  F, heart rate of 140s, blood pressure of 129/94 and oxygen saturation of 100% on room air.  Her CT scan showed changes consistent with pancreatitis and lipase was 543.  AST/ALT of 137/27.  Total bilirubin was 1.3 but alkaline phosphatase was 340.  Had multiple electrolyte abnormalities including sodium 128, potassium 2.9 and magnesium 1.2.  Tested negative for COVID-19.  She was initially placed on a health officer hold but was assessed by mental health counselor and not thought to be a suicide risk.  Health officer hold was discontinued.      Review of Systems    The 10 point Review of Systems is negative other than noted in the HPI or here.     Past  Medical History    I have reviewed this patient's medical history and updated it with pertinent information if needed.   Past Medical History:   Diagnosis Date     Anxiety      Depressive disorder      Invasive ductal carcinoma of breast, right (H)      SBO (small bowel obstruction) (H)        Past Surgical History   I have reviewed this patient's surgical history and updated it with pertinent information if needed.  Past Surgical History:   Procedure Laterality Date     APPENDECTOMY       APPENDECTOMY  1989      SECTION  1984     GYN SURGERY      c section      MASTECTOMY, BILATERAL Bilateral     With reconstructive surgery     SOFT TISSUE SURGERY      breast implants       Social History   I have reviewed this patient's social history and updated it with pertinent information if needed.  Social History     Tobacco Use     Smoking status: Current Every Day Smoker     Packs/day: 1.00     Smokeless tobacco: Never Used   Substance Use Topics     Alcohol use: Yes     Comment: Vague about intake.     Drug use: No       Family History   I have reviewed this patient's family history and updated it with pertinent information if needed.  Family History   Problem Relation Age of Onset     Colon Cancer Maternal Grandmother      Lung Cancer Paternal Grandmother      Breast Cancer Paternal Grandmother        Prior to Admission Medications   Prior to Admission Medications   Prescriptions Last Dose Informant Patient Reported? Taking?   LORazepam (ATIVAN) 0.5 MG tablet   Yes No   Sig: Take 0.5-1 mg by mouth 2 times daily as needed for anxiety   QUEtiapine (SEROQUEL) 25 MG tablet   No No   Sig: Take 1 tablet (25 mg) by mouth daily as needed (anxiety)   azelastine (ASTELIN) 0.1 % nasal spray   Yes No   Sig: Spray 1 spray into both nostrils daily as needed    cetirizine (ZYRTEC) 10 MG tablet   Yes No   Sig: Take 10 mg by mouth daily as needed    escitalopram (LEXAPRO) 5 MG tablet   No No   Sig: Take 1 tablet (5  mg) by mouth daily   fluticasone (FLONASE) 50 MCG/ACT nasal spray   Yes No   Sig: Spray 1 spray into both nostrils daily as needed    hydrOXYzine (ATARAX) 25 MG tablet   Yes No   Sig: Take 25 mg by mouth every 6 hours as needed for itching   mirtazapine (REMERON) 15 MG tablet   No No   Sig: Take 1 tablet (15 mg) by mouth At Bedtime for 14 days   multivitamin w/minerals (THERA-VIT-M) tablet   No No   Sig: Take 1 tablet by mouth daily   ondansetron (ZOFRAN ODT) 4 MG ODT tab   No No   Sig: Take 1 tablet (4 mg) by mouth every 6 hours as needed for nausea   potassium chloride ER (K-TAB) 20 MEQ CR tablet   Yes No   Sig: Take 20 mEq by mouth daily      Facility-Administered Medications: None     Allergies   No Known Allergies    Physical Exam   Vital Signs: Temp: 98.6  F (37  C) Temp src: Oral BP: 112/74 Pulse: 93   Resp: 16 SpO2: 100 % O2 Device: None (Room air)    Weight: 98 lbs 0 oz    General Appearance: Underweight appearing female in no acute distress.  Eyes: No icterus  HEENT: Dry mucosa  Respiratory: Clear to auscultation  Cardiovascular: S1 and S2 is normal  GI: Epigastric tenderness, no guarding or rebound  Lymph/Hematologic: Not examined  Genitourinary: Not examined  Skin: No rash  Musculoskeletal: Muscle wasting  Neurologic: Nonfocal  Psychiatric: Normal mood    Data   Data reviewed today: I reviewed all medications, new labs and imaging results over the last 24 hours.     Recent Labs   Lab 06/28/22  1915 06/28/22  1307   WBC  --  8.5   HGB  --  10.4*   MCV  --  97   PLT  --  256   NA  --  128*   POTASSIUM 3.8 2.9*   CHLORIDE  --  91*   CO2  --  20   BUN  --  5*   CR 0.32* 0.40*   ANIONGAP  --  17*   FRANCOISE  --  9.8   GLC  --  89   ALBUMIN  --  3.4   PROTTOTAL  --  7.9   BILITOTAL  --  1.3   ALKPHOS  --  340*   ALT  --  27   AST  --  137*   LIPASE  --  543*     Recent Results (from the past 24 hour(s))   CT Abdomen Pelvis w Contrast    Narrative    CT ABDOMEN AND PELVIS WITH CONTRAST 6/28/2022 4:05 PM    CLINICAL  HISTORY: Abdominal pain, nausea/vomiting, weight loss.    TECHNIQUE: CT scan of the abdomen and pelvis was performed following  injection of IV contrast. Multiplanar reformats were obtained. Dose  reduction techniques were used.    CONTRAST:  49mL Isovue-370    COMPARISON: CT abdomen and pelvis 10/28/2021. CT abdomen and pelvis  8/2/2016.    FINDINGS:   LOWER CHEST: Stable left lower lobe 0.2 cm nodule compared to an older  CT from 8/2/2016 indicating a nonaggressive etiology, series 3 image  18.    HEPATOBILIARY: Prominent geographic regions of fatty infiltration and  presumed central hepatic sparing again noted. The distribution of this  finding has changed over time with increasing areas of enhancement at  the central liver. Gallbladder appears unremarkable.    PANCREAS: There is some small fluid posterior to the pancreas tail,  minimally increased. No focal pancreas lesion. No pancreas focal  fluid.    SPLEEN: Normal.    ADRENAL GLANDS: Normal.    KIDNEYS/BLADDER: No hydronephrosis. No stone. No acute renal  abnormality. The bladder is distended but otherwise unremarkable.    BOWEL: No obstruction. No acute inflammatory change of the bowel.  Moderate stool. No abscess or free air. Appendix not seen. No signs of  appendicitis.    PELVIC ORGANS: Trace pelvic fluid. No acute abnormality otherwise  seen.    ADDITIONAL FINDINGS: Mild vascular calcifications. No enlarged lymph  nodes.    MUSCULOSKELETAL: Unremarkable.      Impression    IMPRESSION:   1.  Small fluid posterior to the pancreas tail is slightly increased  in the interval. This could represent edematous pancreatitis. No  intraparenchymal pancreas fluid collection identified.  2.  Heterogeneous appearance of the liver suggesting prominent fatty  infiltration with presumed sparing at the central liver. The central  hepatic enhancement is increased compared to 10/28/2021 and could  represent evolution of fatty sparing. Correlate with evidence  of  steatohepatitis. Nonurgent liver MRI characterization should be  considered for further assessment.    TAYLOR SUAREZ MD         SYSTEM ID:  [id number]

## 2022-06-29 NOTE — PROGRESS NOTES
[facility]    Hospitalist Progress Note  Name: Audelia Comer    MRN: [id number]  Provider:  Charles Hylton DO  Date of Service: 06/29/2022    Summary of Stay: Audelia Comer is a 56 year old female with a history of alcohol abuse, depression, breast cancer status post bilateral mastectomy in remission, active nicotine dependence with cigarettes admitted on 6/28/2022 after a welfare check was placed on the patient and she was found by [facility] crisis worker to have feces on the floor (possibly from the dog) and too weak to walk.  The patient was brought to the emergency department for evaluation.  The patient admitted to drinking daily and not eating or drinking for the past few days.  In the emergency department, the patient was found to have a blood pressure of 129/94, respiratory rate 18, heart rate 114, SPO2 100% on room air.  Initial lab work showed sodium 128, potassium 2.9, chloride 91, BUN 5, creatinine 0.40, anion gap 17, magnesium 1.4, phosphorus 1.8, alkaline phosphatase 340, AST//27, lipase 543, hemoglobin 10.4, blood alcohol level less than 0.01.  The patient had a CT abdomen and pelvis in the emergency department that showed small fluid posterior to the pancreas tail is slightly increased in the interval possibly representing edematous pancreatitis, heterogenous appearance of the liver suggesting prominent fatty infiltration.  Right upper quadrant ultrasound was obtained that showed hepatic steatosis without evidence of cholelithiasis or biliary obstruction.  The patient was started on IV fluids, made n.p.o., and provided with pain control for the treatment of acute alcoholic pancreatitis.  Given concern for the patient's wellbeing at home as well as her severe depression, psychiatry was consulted to see the patient.  Nutrition was also consulted to see the patient.    TODAY'S PLAN:  Consult Psychiatry.  Per report, pt has been committed previously.  Appreciate  Psychiatry recommendations.  Will advance to clear liquid diet with plan to advance diet as tolerated to regular diet.  No signs of alcohol withdrawal.  Continue IVF today, anticipate stopping tomorrow.  Appreciate Nutrition recommendations.    Problem List:   Acute Alcoholic Pancreatitis  - Advance diet as tolerated - clear liquid now  - IVF  - Pain Control    Alcohol Abuse  - Reports drinking several glasses of wine daily, but has not drank in several days due to her weakness  - Continue Banana bag infusions daily  - MV, FA, Thiamine  - CIWA  - Appreciate CD recommendations    Failure to Thrive  Severe Malnutrition  Generalized Weakness  Hyponatremia  Hypokalemia  Hypophosphatemia  Hypomagnesemia  - Appreciate Nutrition recommendations  - Electrolyte replacement protocol  - IVF  - Daily BMP    Depression  - Worsened in the setting of the passing of her daughter last year, diagnosis of breast cancer requiring bilateral mastectomy, job loss, and fear of eviction  - Appreciate Psychiatry recommendations  - Appreciate SW recommendations  - Continue Remeron and Seroquel  - Pt seems open and ready for treatment as indicated by psychiatry    Normocytic Anemia  - Likely secondary to alcohol abuse and hemodilution  - Daily CBC    Nicotine Dependence with Cigarettes  - Smokes 1 pck per day  - Nicotine patch    Breast Cancer  - s/p bilateral mastectomy and now considered cured  - Recommend continued follow up with Oncology    Hepatic Steatosis  Elevated LFTs  - Likely secondary to etoh abuse  - Daily Hepatic Panel    DVT Prophylaxis: Pneumatic Compression Devices  Code Status: Full Code  Diet: Clear Liquid Diet    Patino Catheter: Not present  Disposition: Expected discharge in 2-3 days to home vs inpatient psych. Goals prior to discharge include psychiatry evaluation complete, VSS, electrolyte improved.   Family updated today: No     Interval History   Pt seen and examined.  Pt reports improvement in her abdominal  pain.    -Data reviewed today: I personally reviewed all new labs and imaging results over the last 24 hours.     Physical Exam   Temp: 98.4  F (36.9  C) Temp src: Oral BP: 104/73 Pulse: 101   Resp: 16 SpO2: 99 % O2 Device: None (Room air)    Vitals:    06/28/22 1320   Weight: 44.5 kg (98 lb)     Vital Signs with Ranges  Temp:  [98.3  F (36.8  C)-98.6  F (37  C)] 98.4  F (36.9  C)  Pulse:  [] 101  Resp:  [16-20] 16  BP: (104-129)/(73-94) 104/73  SpO2:  [99 %-100 %] 99 %  No intake/output data recorded.    GENERAL: No apparent distress. Awake, alert, and fully oriented, thin.  HEENT: Normocephalic, atraumatic. Extraocular movements intact.  CARDIOVASCULAR: Regular rate and rhythm without murmurs or rubs. No S3.  PULMONARY: Clear bilaterally.  GASTROINTESTINAL: Soft, non-tender, non-distended. Bowel sounds normoactive.   EXTREMITIES: No cyanosis or clubbing. No edema.  NEUROLOGICAL: CN 2-12 grossly intact, no focal neurological deficits.  DERMATOLOGICAL: No rash, ulcer, bruising, nor jaundice.    Medications     lactated ringers 125 mL/hr at 06/29/22 1008       enoxaparin ANTICOAGULANT  30 mg Subcutaneous Q24H     famotidine  20 mg Intravenous Q12H     mirtazapine  30 mg Oral At Bedtime     multivitamin w/minerals  1 tablet Oral Daily     nicotine  1 patch Transdermal Daily    Followed by     [START ON 8/9/2022] nicotine  1 patch Transdermal Daily    Followed by     [START ON 9/6/2022] nicotine  1 patch Transdermal Daily     nicotine   Transdermal Q8H     potassium & sodium phosphates  2 packet Oral or Feeding Tube Q4H     QUEtiapine  50 mg Oral BID     sodium chloride (PF)  3 mL Intracatheter Q8H     IV Fluid with vitamins   Intravenous Q24H     Data     Laboratory:  Recent Labs   Lab 06/29/22  0932 06/28/22  1307   WBC 5.9 8.5   HGB 8.9* 10.4*   HCT 25.1* 29.4*   MCV 97 97    256     Recent Labs   Lab 06/28/22  1915 06/28/22  1307   NA  --  128*   POTASSIUM 3.8 2.9*   CHLORIDE  --  91*   CO2  --  20    ANIONGAP  --  17*   GLC  --  89   BUN  --  5*   CR 0.32* 0.40*   GFRESTIMATED >90 >90   FRANCOISE  --  9.8     No results for input(s): CULT in the last 168 hours.    Imaging:  Recent Results (from the past 24 hour(s))   CT Abdomen Pelvis w Contrast    Narrative    CT ABDOMEN AND PELVIS WITH CONTRAST 6/28/2022 4:05 PM    CLINICAL HISTORY: Abdominal pain, nausea/vomiting, weight loss.    TECHNIQUE: CT scan of the abdomen and pelvis was performed following  injection of IV contrast. Multiplanar reformats were obtained. Dose  reduction techniques were used.    CONTRAST:  49mL Isovue-370    COMPARISON: CT abdomen and pelvis 10/28/2021. CT abdomen and pelvis  8/2/2016.    FINDINGS:   LOWER CHEST: Stable left lower lobe 0.2 cm nodule compared to an older  CT from 8/2/2016 indicating a nonaggressive etiology, series 3 image  18.    HEPATOBILIARY: Prominent geographic regions of fatty infiltration and  presumed central hepatic sparing again noted. The distribution of this  finding has changed over time with increasing areas of enhancement at  the central liver. Gallbladder appears unremarkable.    PANCREAS: There is some small fluid posterior to the pancreas tail,  minimally increased. No focal pancreas lesion. No pancreas focal  fluid.    SPLEEN: Normal.    ADRENAL GLANDS: Normal.    KIDNEYS/BLADDER: No hydronephrosis. No stone. No acute renal  abnormality. The bladder is distended but otherwise unremarkable.    BOWEL: No obstruction. No acute inflammatory change of the bowel.  Moderate stool. No abscess or free air. Appendix not seen. No signs of  appendicitis.    PELVIC ORGANS: Trace pelvic fluid. No acute abnormality otherwise  seen.    ADDITIONAL FINDINGS: Mild vascular calcifications. No enlarged lymph  nodes.    MUSCULOSKELETAL: Unremarkable.      Impression    IMPRESSION:   1.  Small fluid posterior to the pancreas tail is slightly increased  in the interval. This could represent edematous pancreatitis.  No  intraparenchymal pancreas fluid collection identified.  2.  Heterogeneous appearance of the liver suggesting prominent fatty  infiltration with presumed sparing at the central liver. The central  hepatic enhancement is increased compared to 10/28/2021 and could  represent evolution of fatty sparing. Correlate with evidence of  steatohepatitis. Nonurgent liver MRI characterization should be  considered for further assessment.    TAYLOR SUAREZ MD         SYSTEM ID:  [id number]   US Abdomen Complete    Narrative    EXAM: US ABDOMEN COMPLETE  LOCATION: [facility]  DATE/TIME: 6/28/2022 8:18 PM    INDICATION: Pancreatitis, elevated alkaline phosphatase.  COMPARISON: CT 10/28/2021  TECHNIQUE: Complete abdominal ultrasound.    FINDINGS:    GALLBLADDER: Normal. No gallstones, wall thickening, or pericholecystic fluid. Negative sonographic Ribera's sign.    BILE DUCTS: No biliary dilatation. The common duct measures 5 mm.    LIVER: Large areas of geographic steatosis, similar to prior CT. No focal mass visualized.    RIGHT KIDNEY: Normal size. Normal echogenicity with no hydronephrosis or mass.     LEFT KIDNEY: Normal size. Normal echogenicity with no hydronephrosis or mass.     SPLEEN: Normal.    PANCREAS: Mostly obscured by overlying bowel gas.    AORTA: Normal in caliber.     IVC: Normal where visualized.    No ascites.      Impression    IMPRESSION:  1.  Geographic hepatic steatosis, similar to prior CT.  2.  No evidence of cholelithiasis or biliary obstruction.             Charles Hylton DO  [facility] Hospitalist  [address] [place] [address]  06/29/2022
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
## 2022-06-29 NOTE — PLAN OF CARE
End of Shift Summary  For vital signs and complete assessments, please see documentation flowsheets.     Cared for patient from [id number]  Pertinent assessments: A&Ox4, VSS. Abdominal pain controlled with oxycodone x1. BS active, passing gas. No nausea reported. 1 BM reported today. Diet advanced to regular, tolerating well. Up SBA. CIWA 3.     Major Shift Events Diet advanced to regular    Treatment Plan: Pain management. SW following. Monitor for signs of withdrawal.

## 2022-06-29 NOTE — PLAN OF CARE
End of Shift Summary  For vital signs and complete assessments, please see documentation flowsheets.     Pertinent assessments: IV dilaudid given for abdominal pain with some relief. NPO X ice and meds. CIWA score 2, 1. Anxious at times.    Major Shift Events Uneventful    Treatment Plan: Monitor for withdrawal, pain management    Bedside Nurse: Soraya Diane RN

## 2022-06-29 NOTE — CONSULTS
"CLINICAL NUTRITION SERVICES - ASSESSMENT NOTE     Nutrition Prescription    Recommendations:  - Diet per MD.  Encouraged small/frequent meals and use of oral supplements as needed.     MALNUTRITION:  % Weight Loss:  Weight loss does not meet criteria for malnutrition   % Intake:  </= 50% for >/= 5 days (severe malnutrition)  Subcutaneous Fat Loss:  Orbital region moderate depletion and Upper arm region severe depletion  Muscle Loss:  Temporal region moderate to severe depletion, Clavicle bone region severe depletion and Acromion bone region severe depletion  Fluid Retention: None documented    Malnutrition Diagnosis: Severe malnutrition  In Context of:  Acute illness or injury  Chronic illness or disease  Environmental or social circumstances       REASON FOR ASSESSMENT  Audelia Comer is a/an 56 year old female assessed by the dietitian for Reason For Assessment: Nutrition risk monitoring, Provider order    See \"Adult Nutrition Assessment\" flowsheet for additional details.    - Information obtained from patient and chart.  - Meal/Snack Patterns: Aiming for smaller/more frequent meals as tolerated.  - Factors Affecting Nutritional Intake: Appetite, nausea with vomiting at times.  - Supplemental Drinks/Foods/Additives: Taking Ensure as able at home for weeks to months.  - Additional Information: Reports many life stressors for >1 year including loss of job, breast CA, unexpected loss of daughter, overall financial concerns, now pancreatitis but does not verbalize etoh intake with this writer.  At first lead to decreased appetite which she describes progressed to the point where she was eating <50% of usual for a period of days to weeks.    - She describes her primary thinks she has \"an unintentional eating disorder\".  Noted psych consulted.  - Weight history:  Wt Readings from Last 10 Encounters:   06/28/22 44.5 kg (98 lb)   11/19/21 44.5 kg (98 lb)   10/29/21 47.9 kg (105 lb 8 oz)   01/18/21 50.9 kg (112 lb 4.8 " "oz)   08/16/20 51.5 kg (113 lb 9.6 oz)   07/28/20 54.3 kg (119 lb 11.4 oz)   12/27/18 59 kg (130 lb)   12/04/18 57.2 kg (126 lb)   09/06/18 55 kg (121 lb 4 oz)   03/16/18 53.7 kg (118 lb 6.2 oz)     - 7% wt loss in the past 8 months. She wants to be closer to \"120 pounds\" per report.    CURRENT NUTRITION ORDERS  Diet/Nutrition Received: clear liquid    Current Intake/Tolerance:  Discussed available supplement options on clears vs fulls.  Wanted handout on high calorie/high protein diet which was provided with the disclaimer that diet may eventually advance to low fat which would trump any high calorie items such as butter/higher fat dairy products, etc.    ASSESSED NUTRITION NEEDS:  Weight for Calculation (kg): 45 kg (99 lb 3.3 oz)    Estimated Energy Needs:   >/=35 kcal/kg/day  Justification: repletion  Estimated Protein Needs:   >/=1.5 g pro/kg/day  Justification: Repletion and preservation of lean body mass  Estimated Fluid Needs: per MD    Nutrition Diagnosis: Malnutrition related to mental health/stressors with decreased appetite, N/V with pancreatitis leading to weight loss and decreased oral intake as evidenced by meeting <50% needs for at least 5 days up to a period of weeks, severe wt loss, overt fat/muscle loss.    INTERVENTIONS  Recommendations  See above    Implementation  Medical Food Supplement: As above.    Collaboration and referral of nutrition-related care: Discussed POC with team during rounds and with RN.    Nutrition Education: Patient reports she wants \"answers\" as to why she cannot eat much.  We discussed possibility of decreased appetite in the setting of mental health/life stressors with current etoh intake/electrolyte imbalances/pancreatitis contributing.  Encouraged to see how diet tolerated throughout admit and to continue following with PCP +/- mental health team at discharge for ongoing monitoring.  Discussed current supplement options and provided high calorie/high protein education w/ " handouts as described above.    Goals:  Diet advancement to solids w/in 48-72 hrs.      Patient to tolerate at least 50-75% of meals or supplements TID w/ diet advancement.     Monitoring/Evaluation  Progress towards goals will be monitored and evaluated per protocol and Practice Guidelines      Rosaura Valladares RDN, LD  Clinical Dietitian  3rd floor/ICU: [phone]  All other floors: [phone]  Weekend/holiday: [phone]  Office: [phone]

## 2022-06-29 NOTE — PLAN OF CARE
Goal Outcome Evaluation:    Plan of Care Reviewed With: patient     Outcome Evaluation: Ongoing decreased appetite, slowly advancing diet.    See other note.

## 2022-06-29 NOTE — PLAN OF CARE
Goal Outcome Evaluation: Goal met    End of Shift Summary  For vital signs and complete assessments, please see documentation flowsheets.     Pertinent assessments: A&Ox4, up with SBA, VSS,IV dilaudid given for abdominal pain with relief. CIWA score 2, 4 mostly for anxiety. Advance diet to clear liquid which patient tolerated well, will advance diet as tolerated this evening. Voiding adequately.     Major Shift Events: More anxious, was given additional Seroquel.    Treatment Plan: Psych and nutrition consult, Monitor for withdrawal, pain management    Bedside Nurse: Jose Plascencia RN

## 2022-06-29 NOTE — PLAN OF CARE
Pertinent assessments: pt admitted to unit.  Pt weak.  Pt states she is glad to be in hosp to get help with eating.  She also states her last drink was yesterday wine 2 glasses.  Started LR bolus.  Meds await pharmacy sign off.    Major Shift Events admit to floor.  Treatment Plan: pain meds  Bedside Nurse: Xi Major RN

## 2022-06-29 NOTE — PROGRESS NOTES
"SPIRITUAL HEALTH SERVICES Progress Note  RH Med/Surg 5    Saw pt Audelia HOWELL Souleymane to assess emotional/spiritual resources and needs base on information shared during IDT rounds.  This author is familiar with pt from a previous admission to [facility] last October ().  Provided reflective conversation to facilitate the processing of thoughts and feelings.      Illness Narrative - Audelia reported that she has had a poor appetite and has become so weak to the extent that it is difficult for her to walk (she made no mention of her alcohol use).      Distress - Her daughter Marilee  suddenly and unexpectedly last September ().  Audelia stated that she does not know if her loss of appetite is the result of her grief or of something else.  Her primary care provider encouraged her to seek psychological counseling but the resource she was provided had a two-month waiting list.  Audelia reported that she lost the contact information for [facility] support group (specifically for parents who have lost children of any age) that this author provided during our last encounter.  She expressed interest in having the information again.      Coping -   keith Win acknowledged that she has \"OK days but not good days.\"  Validated her grief experience.  keith Win mentioned her son-in-law and her sister as being present and supportive.  She finds comfort in talking to her six-year old granddaughter 2-3 times a week (she is her  daughter's child).  keith Win reflected that she has \"forgiven\" God for \"taking\" her daughter and shared an experience that one of her friends had of seeing Marilee in heaven with Audelia's  brother.   She welcomed prayer and engaged in a few moments of guided contemplative breathing.         Meaning-Making - Audelia shared that it was her daughter's birthday last week.  She acknowledges that she occasionally talks to her daughter, which this author validated.  Audelia talked " "about singing \"[place]\" at her daughter's .  She shared that talking about her daughter and her grief has helped her \"feel peaceful.\"  Invited her to consider that talking to a therapist and participating in a grief support group might provider her more peace.      Plan - Informed pt how she can request further  support.  This author and other chaplains remain available per pt/family request.    Neal Willoughby M.Div., [facility]  Staff   Phone [phone]  "

## 2022-06-30 LAB
ALBUMIN SERPL-MCNC: 2.5 G/DL (ref 3.4–5)
ALP SERPL-CCNC: 228 U/L (ref 40–150)
ALT SERPL W P-5'-P-CCNC: 26 U/L (ref 0–50)
ANION GAP SERPL CALCULATED.3IONS-SCNC: 10 MMOL/L (ref 3–14)
AST SERPL W P-5'-P-CCNC: 91 U/L (ref 0–45)
BILIRUB DIRECT SERPL-MCNC: 0.6 MG/DL (ref 0–0.2)
BILIRUB SERPL-MCNC: 0.9 MG/DL (ref 0.2–1.3)
BUN SERPL-MCNC: 1 MG/DL (ref 7–30)
CALCIUM SERPL-MCNC: 7.8 MG/DL (ref 8.5–10.1)
CHLORIDE BLD-SCNC: 105 MMOL/L (ref 94–109)
CO2 SERPL-SCNC: 25 MMOL/L (ref 20–32)
CREAT SERPL-MCNC: 0.3 MG/DL (ref 0.52–1.04)
ERYTHROCYTE [DISTWIDTH] IN BLOOD BY AUTOMATED COUNT: 11.7 % (ref 10–15)
GFR SERPL CREATININE-BSD FRML MDRD: >90 ML/MIN/1.73M2
GLUCOSE BLD-MCNC: 99 MG/DL (ref 70–99)
HCT VFR BLD AUTO: 23.2 % (ref 35–47)
HGB BLD-MCNC: 8 G/DL (ref 11.7–15.7)
MAGNESIUM SERPL-MCNC: 1.5 MG/DL (ref 1.6–2.3)
MAGNESIUM SERPL-MCNC: 2.4 MG/DL (ref 1.6–2.3)
MCH RBC QN AUTO: 34.3 PG (ref 26.5–33)
MCHC RBC AUTO-ENTMCNC: 34.5 G/DL (ref 31.5–36.5)
MCV RBC AUTO: 100 FL (ref 78–100)
PHOSPHATE SERPL-MCNC: 2.8 MG/DL (ref 2.5–4.5)
PLATELET # BLD AUTO: 184 10E3/UL (ref 150–450)
POTASSIUM BLD-SCNC: 3.1 MMOL/L (ref 3.4–5.3)
POTASSIUM BLD-SCNC: 3.3 MMOL/L (ref 3.4–5.3)
POTASSIUM BLD-SCNC: 3.6 MMOL/L (ref 3.4–5.3)
PROT SERPL-MCNC: 5.7 G/DL (ref 6.8–8.8)
RBC # BLD AUTO: 2.33 10E6/UL (ref 3.8–5.2)
SODIUM SERPL-SCNC: 140 MMOL/L (ref 133–144)
WBC # BLD AUTO: 5.6 10E3/UL (ref 4–11)

## 2022-06-30 PROCEDURE — 258N000003 HC RX IP 258 OP 636: Performed by: INTERNAL MEDICINE

## 2022-06-30 PROCEDURE — 250N000011 HC RX IP 250 OP 636: Performed by: INTERNAL MEDICINE

## 2022-06-30 PROCEDURE — 250N000013 HC RX MED GY IP 250 OP 250 PS 637: Performed by: EMERGENCY MEDICINE

## 2022-06-30 PROCEDURE — 85027 COMPLETE CBC AUTOMATED: CPT | Performed by: INTERNAL MEDICINE

## 2022-06-30 PROCEDURE — 80053 COMPREHEN METABOLIC PANEL: CPT | Performed by: INTERNAL MEDICINE

## 2022-06-30 PROCEDURE — 36416 COLLJ CAPILLARY BLOOD SPEC: CPT | Performed by: INTERNAL MEDICINE

## 2022-06-30 PROCEDURE — 999N000216 HC STATISTIC ADULT CD FACE TO FACE-NO CHRG

## 2022-06-30 PROCEDURE — 84100 ASSAY OF PHOSPHORUS: CPT | Performed by: INTERNAL MEDICINE

## 2022-06-30 PROCEDURE — 250N000013 HC RX MED GY IP 250 OP 250 PS 637: Performed by: HOSPITALIST

## 2022-06-30 PROCEDURE — 99232 SBSQ HOSP IP/OBS MODERATE 35: CPT | Performed by: INTERNAL MEDICINE

## 2022-06-30 PROCEDURE — 250N000013 HC RX MED GY IP 250 OP 250 PS 637: Performed by: STUDENT IN AN ORGANIZED HEALTH CARE EDUCATION/TRAINING PROGRAM

## 2022-06-30 PROCEDURE — 250N000011 HC RX IP 250 OP 636: Performed by: STUDENT IN AN ORGANIZED HEALTH CARE EDUCATION/TRAINING PROGRAM

## 2022-06-30 PROCEDURE — 250N000009 HC RX 250: Performed by: STUDENT IN AN ORGANIZED HEALTH CARE EDUCATION/TRAINING PROGRAM

## 2022-06-30 PROCEDURE — 250N000013 HC RX MED GY IP 250 OP 250 PS 637: Performed by: INTERNAL MEDICINE

## 2022-06-30 PROCEDURE — 36415 COLL VENOUS BLD VENIPUNCTURE: CPT | Performed by: INTERNAL MEDICINE

## 2022-06-30 PROCEDURE — 84132 ASSAY OF SERUM POTASSIUM: CPT | Performed by: INTERNAL MEDICINE

## 2022-06-30 PROCEDURE — 82248 BILIRUBIN DIRECT: CPT | Performed by: INTERNAL MEDICINE

## 2022-06-30 PROCEDURE — 120N000001 HC R&B MED SURG/OB

## 2022-06-30 PROCEDURE — 83735 ASSAY OF MAGNESIUM: CPT | Performed by: INTERNAL MEDICINE

## 2022-06-30 PROCEDURE — 258N000003 HC RX IP 258 OP 636: Performed by: STUDENT IN AN ORGANIZED HEALTH CARE EDUCATION/TRAINING PROGRAM

## 2022-06-30 RX ORDER — MAGNESIUM SULFATE HEPTAHYDRATE 40 MG/ML
2 INJECTION, SOLUTION INTRAVENOUS ONCE
Status: COMPLETED | OUTPATIENT
Start: 2022-06-30 | End: 2022-06-30

## 2022-06-30 RX ORDER — POTASSIUM CHLORIDE 1500 MG/1
20 TABLET, EXTENDED RELEASE ORAL ONCE
Status: DISCONTINUED | OUTPATIENT
Start: 2022-06-30 | End: 2022-06-30

## 2022-06-30 RX ORDER — QUETIAPINE FUMARATE 25 MG/1
25 TABLET, FILM COATED ORAL 2 TIMES DAILY PRN
Status: DISCONTINUED | OUTPATIENT
Start: 2022-06-30 | End: 2022-07-06 | Stop reason: HOSPADM

## 2022-06-30 RX ORDER — FAMOTIDINE 20 MG/1
20 TABLET, FILM COATED ORAL 2 TIMES DAILY
Status: DISCONTINUED | OUTPATIENT
Start: 2022-06-30 | End: 2022-07-06 | Stop reason: HOSPADM

## 2022-06-30 RX ORDER — POTASSIUM CHLORIDE 1500 MG/1
20 TABLET, EXTENDED RELEASE ORAL ONCE
Status: COMPLETED | OUTPATIENT
Start: 2022-06-30 | End: 2022-06-30

## 2022-06-30 RX ORDER — POTASSIUM CHLORIDE 1.5 G/1.58G
20 POWDER, FOR SOLUTION ORAL ONCE
Status: COMPLETED | OUTPATIENT
Start: 2022-06-30 | End: 2022-06-30

## 2022-06-30 RX ADMIN — FOLIC ACID: 5 INJECTION, SOLUTION INTRAMUSCULAR; INTRAVENOUS; SUBCUTANEOUS at 00:03

## 2022-06-30 RX ADMIN — MULTIPLE VITAMINS W/ MINERALS TAB 1 TABLET: TAB at 08:01

## 2022-06-30 RX ADMIN — SODIUM CHLORIDE, POTASSIUM CHLORIDE, SODIUM LACTATE AND CALCIUM CHLORIDE: 600; 310; 30; 20 INJECTION, SOLUTION INTRAVENOUS at 10:45

## 2022-06-30 RX ADMIN — QUETIAPINE FUMARATE 50 MG: 25 TABLET ORAL at 20:47

## 2022-06-30 RX ADMIN — Medication 5 MG: at 00:09

## 2022-06-30 RX ADMIN — MIRTAZAPINE 30 MG: 15 TABLET, FILM COATED ORAL at 23:11

## 2022-06-30 RX ADMIN — MAGNESIUM SULFATE HEPTAHYDRATE 2 G: 40 INJECTION, SOLUTION INTRAVENOUS at 11:37

## 2022-06-30 RX ADMIN — OXYCODONE HYDROCHLORIDE 5 MG: 5 TABLET ORAL at 07:53

## 2022-06-30 RX ADMIN — QUETIAPINE FUMARATE 50 MG: 25 TABLET ORAL at 08:00

## 2022-06-30 RX ADMIN — FAMOTIDINE 20 MG: 20 TABLET ORAL at 10:32

## 2022-06-30 RX ADMIN — OXYCODONE HYDROCHLORIDE 5 MG: 5 TABLET ORAL at 23:11

## 2022-06-30 RX ADMIN — ACETAMINOPHEN 650 MG: 325 TABLET, FILM COATED ORAL at 11:45

## 2022-06-30 RX ADMIN — QUETIAPINE FUMARATE 25 MG: 25 TABLET ORAL at 17:39

## 2022-06-30 RX ADMIN — FAMOTIDINE 20 MG: 20 TABLET ORAL at 20:45

## 2022-06-30 RX ADMIN — POTASSIUM CHLORIDE 20 MEQ: 1500 TABLET, EXTENDED RELEASE ORAL at 07:53

## 2022-06-30 RX ADMIN — ACETAMINOPHEN 650 MG: 325 TABLET, FILM COATED ORAL at 23:22

## 2022-06-30 RX ADMIN — OXYCODONE HYDROCHLORIDE 5 MG: 5 TABLET ORAL at 01:46

## 2022-06-30 RX ADMIN — SODIUM CHLORIDE, POTASSIUM CHLORIDE, SODIUM LACTATE AND CALCIUM CHLORIDE: 600; 310; 30; 20 INJECTION, SOLUTION INTRAVENOUS at 16:26

## 2022-06-30 RX ADMIN — OXYCODONE HYDROCHLORIDE 5 MG: 5 TABLET ORAL at 17:45

## 2022-06-30 RX ADMIN — POTASSIUM CHLORIDE 20 MEQ: 1.5 POWDER, FOR SOLUTION ORAL at 15:06

## 2022-06-30 RX ADMIN — QUETIAPINE FUMARATE 25 MG: 25 TABLET ORAL at 04:22

## 2022-06-30 RX ADMIN — NICOTINE 1 PATCH: 21 PATCH, EXTENDED RELEASE TRANSDERMAL at 08:00

## 2022-06-30 RX ADMIN — FOLIC ACID: 5 INJECTION, SOLUTION INTRAMUSCULAR; INTRAVENOUS; SUBCUTANEOUS at 23:13

## 2022-06-30 RX ADMIN — POTASSIUM & SODIUM PHOSPHATES POWDER PACK 280-160-250 MG 2 PACKET: 280-160-250 PACK at 01:33

## 2022-06-30 RX ADMIN — ENOXAPARIN SODIUM 30 MG: 30 INJECTION SUBCUTANEOUS at 20:47

## 2022-06-30 ASSESSMENT — ACTIVITIES OF DAILY LIVING (ADL)
ADLS_ACUITY_SCORE: 24

## 2022-06-30 NOTE — CONSULTS
"[facility]             DISTRICT COURT- PROBATE/MENTAL HEALTH DIVISION  [place]     _________________________________________________________________________     In the Matter of the Civil Commitment of: Audelia Comer      EXAMINERS STATEMENT IN SUPPORT OF       PETITION FOR JUDICIAL COMMITMENT,       Respondent File No._________________________     I am a licensed physician, licensed psychologist, licensed mental health professional, licensed physician assistant or advanced practice registered nurse certified in mental health who is knowledgeable, trained and practicing in the diagnosis or assessment or in the treatment of the alleged impairment listed below:        X Mental Illness and Chemical Dependency      I have examined the above-named person within the last fifteen days, on  June 30, 2022  and the results of the examination are stated below:        Behavioral evidence to support commitment:      Upon clinical interview 6/30/22, the patient reports symptoms of depression, grief, posttraumatic stress, anxiety, and excessive alcohol use, including depressed mood, loss of appetite, marked weight loss, neglecting ADLs, agoraphobia, excessive worry, intrusive thoughts and memories of trauma, and feelings of shame, guilt, and overwhelm. The patient reported that she developed a habit of drinking a couple glasses of wine and a couple mixed drinks at night after work, and that now she has continued that drinking pattern \"on an empty stomach,\" as she has not felt well enough to eat. The patient presented to Emergency Department via ambulance with marked weight loss and was assessed by attending physician with Failure to Thrive, Severe Malnutrition, Acute Alcoholic Pancreatitis, Normocytic Anemia, Hepatitic Steatosis, Generalized Weakness, Hyponatremia, Hypokalemia, Hypophosphatemia, and Hypomagnesemia.     The patient arrived at for evaluation at the hospital " "on an BRITTANY placed by her primary care physician, when contacted by a [facility] Crisis worker performing a welfare check at the patient's home. The crisis worker communicated to the patient's primary care clinic that she found the patient disheveled, malnourished and dehydrated, weighing 95 pounds, and with feces on the floor (unidentified as dog or human). The patient reported in clinical interview today that the crisis worker \"had to feed my dog before we left.\"     The patient has been unemployed since losing her job last year due to pandemic correlated stress, per her report. She has been behind in her rent and worked with community resources for assistance.     The patient spoke with an LADC who called her in her hospital room the morning of 6/30/22, and the patient declined to engage in an TAVO assessment which had been ordered for her by her attending physician to be completed via inpatient consult. [facility] this examiner the same afternoon, that she had gone through a treatment program for alcohol use more than ten years ago, and that she \"learned a lot about myself\" and she does not need to through treatment again.     The patient further explained to this examiner that she is far too anxious to engage in any residential or in-person outpatient programming for mental health or substance use The patient reported that she feels safe within her home.     The patient reported that she is willing to meet with a psychiatric medication provider via telehealth and to take medication for her mental health. She reported that she would like to work with a \"\" and to attend a grief support support.       Diagnostic Impression and Conclusion:      F33.2 Major Depressive Disorder, Recurrent Episode, Severe, With anxious distress  F43.10 Posttraumatic Stress Disorder  F10.20 Alcohol Use Disorder, Severe, In a controlled environment  F17.200 Tobacco Use Disorder, " Severe, In a controlled environment     The patient displays limited insight into the severity and life-threatening consequences of her mental illness and substance use disorder, including the high risk for relapse without consistent treatment adherence as evidenced by making frequent minimizing statements during the clinical interview, dismissing the TAVO , and asking daily to discharge home without additional supports. The patient's marked decompensation is evidence that she has not been able to manage her mental health and substance use disorder on her own terms--she has not engaged necessary resources to maintain minimal wellbeing.     Recommendations:      - Establish psychiatric medication management with an outpatient provider who can provide continuity of care and coordinate with inpatient providers as needed. Patient prefers female provider.     - Follow up with primary care provider and any medical specialty care as recommended by inpatient attending physician to support whole-person well-being.     - [place] Comprehensive Assessment for referral to least restrictive level of care equipped to provide integrated mental health + substance use disorder treatment meeting patient's current needs. Follow recommendations and update assessment as needed.     - Case management.         _________________________________________________________________________  Will this person need neuroleptic medications?  yes        Does this person have sufficient awareness of their situation and understanding of treatment with neuroleptics to make this decision for themselves?  yes    **If you answered No, then you must provide either a [facility] petition or a Request for a Substitute Decision Maker form.      I am of the opinion that the above-named person is in need of treatment and should be committed to a treatment facility for mental health and chemical dependence       Patient is currently accepting Neuroleptic  Medications.    (If mental illness is diagnosed) treatment with neuroleptic medication is:  Recommended    The above-named person has capacity to make decisions regarding such treatment.    Reasons for this opinion:    No evidence of neurocognitive disorder affecting medical decision-making capacity at time of clinical interview.      Dated:   June 30, 2022        Signature: ________________________________           Examiner s name: NICKY SAAVEDRA M.Ed., [facility], [facility]          Address: [facility], [address], [place] [address]    Phone: [phone] (direct unit)

## 2022-06-30 NOTE — PROGRESS NOTES
[facility]    Hospitalist Progress Note  Name: Audelia Comer    MRN: [id number]  Provider:  Charles Hylton DO  Date of Service: 06/30/2022    Summary of Stay: Audelia Comer is a 56 year old female with a history of alcohol abuse, depression, breast cancer status post bilateral mastectomy in remission, active nicotine dependence with cigarettes admitted on 6/28/2022 after a welfare check was placed on the patient and she was found by [facility] crisis worker to have feces on the floor (possibly from the dog) and too weak to walk.  The patient was brought to the emergency department for evaluation.  The patient admitted to drinking daily and not eating or drinking for the past few days.  In the emergency department, the patient was found to have a blood pressure of 129/94, respiratory rate 18, heart rate 114, SPO2 100% on room air.  Initial lab work showed sodium 128, potassium 2.9, chloride 91, BUN 5, creatinine 0.40, anion gap 17, magnesium 1.4, phosphorus 1.8, alkaline phosphatase 340, AST//27, lipase 543, hemoglobin 10.4, blood alcohol level less than 0.01.  The patient had a CT abdomen and pelvis in the emergency department that showed small fluid posterior to the pancreas tail is slightly increased in the interval possibly representing edematous pancreatitis, heterogenous appearance of the liver suggesting prominent fatty infiltration.  Right upper quadrant ultrasound was obtained that showed hepatic steatosis without evidence of cholelithiasis or biliary obstruction.  The patient was started on IV fluids, made n.p.o., and provided with pain control for the treatment of acute alcoholic pancreatitis.  Given concern for the patient's wellbeing at home as well as her severe depression, psychiatry was consulted to see the patient.  Nutrition was also consulted to see the patient.  Physical Therapy was also consulted to see the patient.    TODAY'S PLAN:  Appreciate Psychiatry  recommendations.  ?commitment.  Electrolytes still off and needing replacement.  Pt ate part of breakfast but had some diarrhea overnight.  No indication of etoh withdrawal.  Continue IVF for now.  Plan for PT evaluation and Psychiatry evaluation.  Anticipate pt will be medically stable for discharge in 24-48 hours pending psych and PT recommendations.    Problem List:   Acute Alcoholic Pancreatitis  - Advance diet as tolerated - tolerating regular diet  - IVF  - Pain Control  - Improved     Alcohol Abuse  - Reports drinking several glasses of wine daily, but has not drank in several days due to her weakness  - Continue Banana bag infusions daily  - MV, FA, Thiamine  - CIWA  - Appreciate CD recommendations - pt declined CD resources     Failure to Thrive  Severe Malnutrition  Generalized Weakness  Hyponatremia  Hypokalemia  Hypophosphatemia  Hypomagnesemia  - Appreciate Nutrition recommendations  - Electrolyte replacement protocol  - IVF  - Daily BMP     Depression  - Worsened in the setting of the passing of her daughter last year, diagnosis of breast cancer requiring bilateral mastectomy, job loss, and fear of eviction  - Appreciate Psychiatry recommendations  - Appreciate SW recommendations  - Continue Remeron and Seroquel  - Pt seems open and ready for treatment as indicated by psychiatry     Normocytic Anemia  - Likely secondary to alcohol abuse and hemodilution  - Daily CBC     Nicotine Dependence with Cigarettes  - Smokes 1 pck per day  - Nicotine patch     Breast Cancer  - s/p bilateral mastectomy and now considered cured  - Recommend continued follow up with Oncology     Hepatic Steatosis  Elevated LFTs  - Likely secondary to etoh abuse  - Daily Hepatic Panel    DVT Prophylaxis: Pneumatic Compression Devices  Code Status: Full Code  Diet: Snacks/Supplements Adult: Other; Ok to order any diet appropriate supplement prn; With Meals  Advance Diet as Tolerated: Regular Diet Adult; Regular Diet Adult    Sukumar  Catheter: Not present  Disposition: Expected discharge in 1-2 days to home vs home with home care/PT vs inpt psych. Goals prior to discharge include psychiatry evaluation complete, PT assessment complete, electrolytes stable.   Family updated today: No     Interval History   Pt seen and examined.  Pt states she is doing well today.  Bites of food this AM.  Diarrhea overnight.    -Data reviewed today: I personally reviewed all new labs and imaging results over the last 24 hours.     Physical Exam   Temp: 98.6  F (37  C) Temp src: Axillary BP: 116/75 Pulse: 96   Resp: 16 SpO2: 100 % O2 Device: None (Room air)    Vitals:    06/28/22 1320   Weight: 44.5 kg (98 lb)     Vital Signs with Ranges  Temp:  [98.5  F (36.9  C)-98.6  F (37  C)] 98.6  F (37  C)  Pulse:  [96-99] 96  Resp:  [14-16] 16  BP: (104-116)/(71-75) 116/75  SpO2:  [100 %] 100 %  I/O last 3 completed shifts:  In: 3135 [P.O.:990; I.V.:2145]  Out: 1600 [Urine:1600]    GENERAL: No apparent distress. Awake, alert, and fully oriented.  HEENT: Normocephalic, atraumatic. Extraocular movements intact.  CARDIOVASCULAR: Regular rate and rhythm without murmurs or rubs. No S3.  PULMONARY: Clear bilaterally.  GASTROINTESTINAL: Soft, non-tender, non-distended. Bowel sounds normoactive.   EXTREMITIES: No cyanosis or clubbing. No edema.  NEUROLOGICAL: CN 2-12 grossly intact, no focal neurological deficits.  DERMATOLOGICAL: No rash, ulcer, bruising, nor jaundice.    Medications     lactated ringers 125 mL/hr at 06/30/22 1045       enoxaparin ANTICOAGULANT  30 mg Subcutaneous Q24H     famotidine  20 mg Oral BID     magnesium sulfate  2 g Intravenous Once     mirtazapine  30 mg Oral At Bedtime     multivitamin w/minerals  1 tablet Oral Daily     nicotine  1 patch Transdermal Daily    Followed by     [START ON 8/9/2022] nicotine  1 patch Transdermal Daily    Followed by     [START ON 9/6/2022] nicotine  1 patch Transdermal Daily     nicotine   Transdermal Q8H     QUEtiapine  50  mg Oral BID     sodium chloride (PF)  3 mL Intracatheter Q8H     IV Fluid with vitamins   Intravenous Q24H     Data     Laboratory:  Recent Labs   Lab 06/30/22  0546 06/29/22  0932 06/28/22  1307   WBC 5.6 5.9 8.5   HGB 8.0* 8.9* 10.4*   HCT 23.2* 25.1* 29.4*    97 97    156 256     Recent Labs   Lab 06/30/22  0546 06/29/22  1649 06/29/22  0932 06/29/22  0221 06/28/22  1915 06/28/22  1307     --  135  --   --  128*   POTASSIUM 3.1*  --  3.5  --  3.8 2.9*   CHLORIDE 105  --   --   --   --  91*   CO2 25  --  22  --   --  20   ANIONGAP 10  --   --   --   --  17*   GLC 99  --  80  --   --  89   BUN 1*  --  3*  --   --  5*   CR 0.30*  --  0.27*  --  0.32* 0.40*   GFRESTIMATED >90  --  >90  --  >90 >90   FRANCOISE 7.8*  --   --   --   --  9.8   MAG 1.5*  --   --  2.1 1.2* 1.4*   PHOS 2.8 1.2*  --   --   --  1.8*   PROTTOTAL 5.7*  --  6.1*  --   --  7.9   ALBUMIN 2.5*  --  2.7*  --   --  3.4   BILITOTAL 0.9  --  1.2  --   --  1.3   ALKPHOS 228*  --  251*  --   --  340*   AST 91*  --  120*  --   --  137*   ALT 26  --  25  --   --  27     No results for input(s): CULT in the last 168 hours.    Imaging:  No results found for this or any previous visit (from the past 24 hour(s)).      Charles Hylton DO  [facility] Hospitalist  [address] [place] [address]  06/30/2022

## 2022-06-30 NOTE — PROGRESS NOTES
Care Management Follow Up    Length of Stay (days): 2    Expected Discharge Date: 07/01/2022     Concerns to be Addressed: all concerns addressed in this encounter     Patient plan of care discussed at interdisciplinary rounds: Yes    Anticipated Discharge Disposition: Home, Outpatient Mental Health     Anticipated Discharge Services: Mental Health Resources, Chemical Dependency Resources  Anticipated Discharge DME: None    Patient/family educated on Medicare website which has current facility and service quality ratings: no  Education Provided on the Discharge Plan:  yes  Patient/Family in Agreement with the Plan: unable to assess       Private pay costs discussed: Not applicable    Additional Information:  Kristie received a call from Zehra with [facility] Triage and Transition - Consult and Liaison P: [phone].  Zehra consulted with Kristie to gather background information and to collaborate prior to speaking with the pt.  Kristie explained that [facility] APS is involved and the pt's APS worker is Arabella P: [phone].  Kristie told Zehra that per the Sw from yesterday, the [facility] is pushing for the pt to be committed.    Zehra called Kristie back and left a vm, updating Kristie that Arabella is getting close to closing out the pt's APS case.  The [facility] assessed to see if guardianship should be pursued.  The [facility] will not pursue guardianship because she does not meet the criteria.  The [facility] (Pre-Petition Screening) would like the [facility] to pursue a Civil Commitment because it is less restrictive than guardianship.  Zehra said that she will talk with the pt and then update Kristie as to the next steps and psych's recommendation for the pt.    Kristie will continue with discharge planning and will be available as needed until discharge.      RANDAL Scott, [facility]  Inpatient Care Coordination  [facility]  [phone]

## 2022-06-30 NOTE — CONSULTS
6/30/2022      Spoke with pt via telephone to offer CD services. Pt reports she went through treatment years ago. Pt reports she learned a lot at that time and doesn't feel like she needs to attend CD treatment again. Pt is able to call Mental Health and Addiction Services Line: [phone] and make an appt through [employer] if she would like an evaluation and referrals to outpatient CD treatment after she is discharged.     [facility]  Phone: [phone]  Email: [email or web address]

## 2022-06-30 NOTE — CONSULTS
"Triage and Transition - Consult and Liaison     Audelia Comer  June 30, 2022    Plan:     Continue care coordination with Spiritual Health Services, Social Work, as well as per attending physician PT, Nutrition.     Maintain current transition plan. Next steps include: Petition for MI-CD commitment. See Examiner's Statement in Epic.      Additional interventions may involve Addiction Medicine consult, OT consult, reorder TAVO consult after either commitment or if patient willing to participate.    Psychiatry to see patient one more time (7/1) for medication consult.     Session start: 1:53PM  Session end: 3:10PM  Session duration in minutes: 67  CPT utilized: 13126 - Brief diagnostic assessment (modifier 52)  Patient was seen virtually ([employer] cart or other teleconferencing device).    Reason for consult: Psychiatry consult was requested due to major depression, alcohol use, screening for civil comittment. [facility] team.     Identifying information: Audelia is 56 year old  female person followed related to Depression, Alcohol Use Disorder, Failure to Thrive, Severe Malnutrition, Acute Alcoholic Pancreatitis, Normocytic Anemia, Hepatitic Steatosis, Generalized Weakness, Hyponatremia, Hypokalemia, Hypophosphatemia, Hypomagnesemia.     Presenting problem (including symptoms, strengths risks, resources used): Patient reports symptoms of depression, grief, anxiety, alcohol use, including depressed mood, loss of appetite, marked weight loss, neglecting ADLs, agoraphobia, excessive worry, intrusive thoughts and memories of trauma, and feelings of shame, guilt, and overwhelm. The patient reported that she developed a habit of drinking a couple glasses of wine and a couple mixed drinks at night after work, and that now she has continued that drinking pattern \"on an empty stomach,\" as she has not felt well enough to eat. The patient presents with acute pancreatitis and alcohol withdrawal, " "suggesting significant alcohol consumption.  In individual therapeutic contact with patient today, patient presents with somewaht restricted affect and depressed anxious mood.     Safety concerns ARE present today and include excessive alcohol use and failure to thrive. The patient denied suicidal ideations, plan, and intent. She also denied previous attempts with the last decade. She denied that she had made any statement that she would \"kill myself if I was evicted, like my sister said I did,\" and she reported that she recent a letter of guarantee from [employer] [employer] last Thursday preventing her eviction that had been scheduled for 6/27. Current risk factors for suicide include history of suicide attempt(s), living alone, recent legal concerns , disengagement with or distrust of medical/mental health care, history of or current substance use, chronic pain and/or chronic illness and grieving death of adult daughter. Protective factors against suicide include identifies reason for living, responsibilities to others (spouse, pets, children, etc.), positive working relationship with existing medical/mental health providers, culture, spiritual, or Restorationist beliefs, identification of future goals, future oriented towards goals, hopes, dreams and reality testing ability.    Mental Status Exam   Affect: Anxious  Appearance: within expected limits for context  Attention Span/Concentration: Attentive    Eye Contact: Engaged  Fund of Knowledge: Other: within expected limits   Language /Speech Content: Fluent  Language /Speech Volume: Other: within expected limits   Language /Speech Rate/Productions: within expected limits  Recent Memory: Intact  Remote Memory: Intact  Mood: Anxious, Depressed and Other: grief   Orientation:   Person: Yes   Place: Yes  Time of Day: Yes   Date: Yes   Situation (Do they understand why they are here?): Yes , though minimizing symptoms  Psychomotor Behavior: Other: fidgety   Thought Content: " Clear  Thought Form: Intact         Current medications:   Current Facility-Administered Medications   Medication     acetaminophen (TYLENOL) tablet 650 mg    Or     acetaminophen (TYLENOL) Suppository 650 mg     cetirizine (zyrTEC) tablet 10 mg     enoxaparin ANTICOAGULANT (LOVENOX) injection 30 mg     famotidine (PEPCID) tablet 20 mg     flumazenil (ROMAZICON) injection 0.2 mg     fluticasone (FLONASE) 50 MCG/ACT spray 1 spray     OLANZapine zydis (zyPREXA) ODT tab 5-10 mg    Or     haloperidol lactate (HALDOL) injection 2.5-5 mg     HYDROmorphone (DILAUDID) injection 0.2 mg     lactated ringers infusion     lidocaine (LMX4) cream     lidocaine 1 % 0.1-1 mL     LORazepam (ATIVAN) tablet 1-2 mg    Or     LORazepam (ATIVAN) injection 1-2 mg     magnesium sulfate 2 g in water intermittent infusion     melatonin tablet 5 mg     mirtazapine (REMERON) tablet 30 mg     multivitamin w/minerals (THERA-VIT-M) tablet 1 tablet     naloxone (NARCAN) injection 0.2 mg    Or     naloxone (NARCAN) injection 0.4 mg    Or     naloxone (NARCAN) injection 0.2 mg    Or     naloxone (NARCAN) injection 0.4 mg     nicotine (COMMIT) lozenge 2 mg     nicotine (NICODERM CQ) 21 MG/24HR 24 hr patch 1 patch    Followed by     [START ON 8/9/2022] nicotine (NICODERM CQ) 14 MG/24HR 24 hr patch 1 patch    Followed by     [START ON 9/6/2022] nicotine (NICODERM CQ) 7 MG/24HR 24 hr patch 1 patch     nicotine Patch in Place     ondansetron (ZOFRAN ODT) ODT tab 4 mg    Or     ondansetron (ZOFRAN) injection 4 mg     oxyCODONE (ROXICODONE) tablet 5 mg     pantoprazole (PROTONIX) EC tablet 40 mg     polyethylene glycol (MIRALAX) Packet 17 g     prochlorperazine (COMPAZINE) injection 10 mg    Or     prochlorperazine (COMPAZINE) tablet 10 mg    Or     prochlorperazine (COMPAZINE) suppository 25 mg     QUEtiapine (SEROquel) tablet 25 mg     QUEtiapine (SEROquel) tablet 50 mg     sodium chloride (PF) 0.9% PF flush 3 mL     sodium chloride (PF) 0.9% PF flush 3  "mL     sodium chloride 0.9 % 1,000 mL with Infuvite Adult 10 mL, thiamine 100 mg, folic acid 1 mg infusion         Relevant history: The patient arrived in the ED via EMS after a welfare check by [facility] found her at home disheveled, malnourished, and with feces on the floor (unidentified as dog or human). The patient reported today that the crisis worker \"had to feed my dog before we left.\"   Per Arabella [facility] , the patient has a past history of two civil commitments with which she was very successful, one for mental health, and one for TAVO. 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 The patient reported that she continues to live in the same apartment complex where she was previously employed. The patient reported that last year she also underwent double mastectomy and reconstruction for breast cancer. Additionally, she reported that her 29-year-old daughter  in sleep unexpectedly last August. The patient reported being emotionally overwhelmed and acknowledged isolating in her home. The patient reported that she continues to grieve her daughter's death and thinks she will forever.     Cultural Influences: The patient reported that she met with hospital  and that was very helpful. Patient did not report any other cultural influences during interview.     Therapeutic intervention and progress:  Therapeutic intervention consisted of building therapeutic rapport, active listening, validation, thought reframing, crisis management, CBT concepts and Motivational Interviewing.     Collateral information:   Reviewed chart and coordinated with  [facility]  " [facility]  Dona.  Update provided to care team including SHANTA Da Silva.     Diagnosis:   F33.2 Major Depressive Disorder, Recurrent Episode, Severe, With anxious distress - primary  F43.10 Posttraumatic Stress Disorder  F10.20 Alcohol Use Disorder, Severe, In a controlled environment  F17.200 Tobacco Use Disorder, Severe, In a controlled environment        Plan:     Continue care coordination with Spiritual Health Services, Social Work, as well as per attending physician PT, Nutrition.     Maintain current transition plan. Next steps include: Petition for MI-CD commitment. See Examiner's Statement in Epic.      Additional interventions may involve Addiction Medicine consult, OT consult, reorder TAVO consult after either commitment or if patient willing to participate.    Psychiatry to see patient one more time (7/1) for medication consult.       NICKY SAAVEDRA, [facility], LADC  Triage and Transition - Consult and Liaison   [phone]

## 2022-07-01 LAB
ALBUMIN SERPL-MCNC: 2.6 G/DL (ref 3.4–5)
ALP SERPL-CCNC: 233 U/L (ref 40–150)
ALT SERPL W P-5'-P-CCNC: 28 U/L (ref 0–50)
ANION GAP SERPL CALCULATED.3IONS-SCNC: 8 MMOL/L (ref 3–14)
AST SERPL W P-5'-P-CCNC: 79 U/L (ref 0–45)
BILIRUB SERPL-MCNC: 0.6 MG/DL (ref 0.2–1.3)
BUN SERPL-MCNC: 2 MG/DL (ref 7–30)
CALCIUM SERPL-MCNC: 8.4 MG/DL (ref 8.5–10.1)
CHLORIDE BLD-SCNC: 107 MMOL/L (ref 94–109)
CO2 SERPL-SCNC: 24 MMOL/L (ref 20–32)
CREAT SERPL-MCNC: 0.28 MG/DL (ref 0.52–1.04)
ERYTHROCYTE [DISTWIDTH] IN BLOOD BY AUTOMATED COUNT: 12.2 % (ref 10–15)
GFR SERPL CREATININE-BSD FRML MDRD: >90 ML/MIN/1.73M2
GLUCOSE BLD-MCNC: 98 MG/DL (ref 70–99)
HCT VFR BLD AUTO: 24.3 % (ref 35–47)
HGB BLD-MCNC: 8.3 G/DL (ref 11.7–15.7)
MAGNESIUM SERPL-MCNC: 1.7 MG/DL (ref 1.6–2.3)
MCH RBC QN AUTO: 34.2 PG (ref 26.5–33)
MCHC RBC AUTO-ENTMCNC: 34.2 G/DL (ref 31.5–36.5)
MCV RBC AUTO: 100 FL (ref 78–100)
PHOSPHATE SERPL-MCNC: 2.8 MG/DL (ref 2.5–4.5)
PLATELET # BLD AUTO: 185 10E3/UL (ref 150–450)
POTASSIUM BLD-SCNC: 3.5 MMOL/L (ref 3.4–5.3)
PROT SERPL-MCNC: 5.9 G/DL (ref 6.8–8.8)
RBC # BLD AUTO: 2.43 10E6/UL (ref 3.8–5.2)
SODIUM SERPL-SCNC: 139 MMOL/L (ref 133–144)
WBC # BLD AUTO: 7.2 10E3/UL (ref 4–11)

## 2022-07-01 PROCEDURE — 83735 ASSAY OF MAGNESIUM: CPT | Performed by: INTERNAL MEDICINE

## 2022-07-01 PROCEDURE — 84100 ASSAY OF PHOSPHORUS: CPT | Performed by: INTERNAL MEDICINE

## 2022-07-01 PROCEDURE — 250N000011 HC RX IP 250 OP 636: Performed by: STUDENT IN AN ORGANIZED HEALTH CARE EDUCATION/TRAINING PROGRAM

## 2022-07-01 PROCEDURE — 36415 COLL VENOUS BLD VENIPUNCTURE: CPT | Performed by: INTERNAL MEDICINE

## 2022-07-01 PROCEDURE — 120N000001 HC R&B MED SURG/OB

## 2022-07-01 PROCEDURE — 85027 COMPLETE CBC AUTOMATED: CPT | Performed by: INTERNAL MEDICINE

## 2022-07-01 PROCEDURE — 250N000013 HC RX MED GY IP 250 OP 250 PS 637: Performed by: HOSPITALIST

## 2022-07-01 PROCEDURE — 80053 COMPREHEN METABOLIC PANEL: CPT | Performed by: INTERNAL MEDICINE

## 2022-07-01 PROCEDURE — 250N000013 HC RX MED GY IP 250 OP 250 PS 637: Performed by: INTERNAL MEDICINE

## 2022-07-01 PROCEDURE — 99232 SBSQ HOSP IP/OBS MODERATE 35: CPT | Performed by: INTERNAL MEDICINE

## 2022-07-01 PROCEDURE — 250N000013 HC RX MED GY IP 250 OP 250 PS 637: Performed by: STUDENT IN AN ORGANIZED HEALTH CARE EDUCATION/TRAINING PROGRAM

## 2022-07-01 PROCEDURE — 250N000013 HC RX MED GY IP 250 OP 250 PS 637: Performed by: EMERGENCY MEDICINE

## 2022-07-01 RX ORDER — MULTIPLE VITAMINS W/ MINERALS TAB 9MG-400MCG
1 TAB ORAL DAILY
Status: DISCONTINUED | OUTPATIENT
Start: 2022-07-01 | End: 2022-07-01

## 2022-07-01 RX ORDER — FOLIC ACID 1 MG/1
1 TABLET ORAL DAILY
Status: DISCONTINUED | OUTPATIENT
Start: 2022-07-01 | End: 2022-07-06 | Stop reason: HOSPADM

## 2022-07-01 RX ADMIN — FAMOTIDINE 20 MG: 20 TABLET ORAL at 09:18

## 2022-07-01 RX ADMIN — OXYCODONE HYDROCHLORIDE 5 MG: 5 TABLET ORAL at 18:46

## 2022-07-01 RX ADMIN — QUETIAPINE FUMARATE 25 MG: 25 TABLET ORAL at 16:02

## 2022-07-01 RX ADMIN — MULTIPLE VITAMINS W/ MINERALS TAB 1 TABLET: TAB at 09:18

## 2022-07-01 RX ADMIN — OXYCODONE HYDROCHLORIDE 5 MG: 5 TABLET ORAL at 23:15

## 2022-07-01 RX ADMIN — FOLIC ACID 1 MG: 1 TABLET ORAL at 16:02

## 2022-07-01 RX ADMIN — THIAMINE HCL TAB 100 MG 100 MG: 100 TAB at 16:02

## 2022-07-01 RX ADMIN — ENOXAPARIN SODIUM 30 MG: 30 INJECTION SUBCUTANEOUS at 18:40

## 2022-07-01 RX ADMIN — NICOTINE 1 PATCH: 21 PATCH, EXTENDED RELEASE TRANSDERMAL at 09:19

## 2022-07-01 RX ADMIN — QUETIAPINE FUMARATE 50 MG: 25 TABLET ORAL at 09:18

## 2022-07-01 RX ADMIN — QUETIAPINE FUMARATE 50 MG: 25 TABLET ORAL at 20:10

## 2022-07-01 RX ADMIN — MIRTAZAPINE 30 MG: 15 TABLET, FILM COATED ORAL at 23:15

## 2022-07-01 RX ADMIN — FAMOTIDINE 20 MG: 20 TABLET ORAL at 20:10

## 2022-07-01 RX ADMIN — OXYCODONE HYDROCHLORIDE 5 MG: 5 TABLET ORAL at 09:18

## 2022-07-01 RX ADMIN — ONDANSETRON 4 MG: 4 TABLET, ORALLY DISINTEGRATING ORAL at 09:17

## 2022-07-01 ASSESSMENT — ACTIVITIES OF DAILY LIVING (ADL)
ADLS_ACUITY_SCORE: 24

## 2022-07-01 NOTE — PROGRESS NOTES
Care Management Follow Up    Length of Stay (days): 3    Expected Discharge Date: 07/03/2022 - when IP psych is available     Concerns to be Addressed: discharge planning     Patient plan of care discussed at interdisciplinary rounds: Yes    Anticipated Discharge Disposition: Inpatient Mental Health     Anticipated Discharge Services: Mental Health Resources, Chemical Dependency Resources  Anticipated Discharge DME: None    Patient/family educated on Medicare website which has current facility and service quality ratings: N/A  Education Provided on the Discharge Plan:  yes  Patient/Family in Agreement with the Plan: unable to assess    Referrals Placed by CM/SW: IP   Private pay costs discussed: Not applicable at this time    Additional Information:  Per chart review, psych has determined that a commitment for the pt to go to IP psych should be pursued.  Zehra Everett, with [facility] Triage and Transition - Consult and Liaison, completed the Examiner's statement.  Kristie completed the Exhibit A and the Petition for Judicial commitment.   Kristie faxed the completed paperwork and the pt's clinical notes to [facility] Pre-Petition Screening ([facility]) for review P: [phone] F: [phone].  Kristie left a vm with [facility] and requested a call back to confirm that they received the documentation.    Sw will await an update from [facility] to see if they will uphold the commitment or not.      Kristie will continue with discharge planning and will be available as needed until discharge.      RANDAL Scott, [facility]  Inpatient Care Coordination  [facility]  [phone]    ADDENDUM 5369:  Kristie received a call from Daniel at [facility] as a screener P: [phone].  Kristie answered his follow-up questions and he will have it assigned to a worker for further review.  They will update Kristie was a decision has been made if they will support the commitment or not.    ADDENDUM 1558:  Kristie received a call from  Darlin Yonny, [facility] PPS worker P: [phone].  She said that she received all of the paperwork to review and determine if a commitment will be pursued or not.  She said that she will meet with the pt early afternoon on Tuesday, 7/5/22 as part of her assessment.  Sw told her that it is recommended the pt discharge to  Psych.  Kristie told Darlin that they will update her on any changes prior to her visiting the pt on Tuesday.  Darlin requested that she be updated if the pt needs to be put on a 72-hour hold or if the pt discharges from the hospital to a different treatment facility.    Kristie met with the pt and updated her.  The pt was upset as she thought after talking with Zehra yesterday, she was going to be able to go home with OP MH treatment.  Kristie explained the process and the concerns.  The pt stated that she understood everything and will call her son to update him.  She said that she feels like this is a bad dream and not what she expected.  The pt was cooperative and agrees to follow the plan as recommended despite feeling that it is not the plan that is right for her.

## 2022-07-01 NOTE — CONSULTS
"Consult Date: 07/01/2022    IDENTIFICATION:  The patient is a 56-year-old seen for medication evaluation at the request of Dr. Hylton and who was actively followed and in commitment proceedings by Zehra Everett CD/mental health counselor.    INTERIM SUMMARY:  The patient was admitted with failure to thrive.  Chief complaint of abdominal pain, diarrhea.  She was found to be tachycardic and had admitted to active drinking.  She was treated for alcoholic pancreatitis and hepatitis with supportive care, withdrawal precautions.  Her ALT and AST were elevated.  Lipase was found to be 543.  She admits to ongoing grief since the loss of her daughter in her sleep last year.  She has been unemployed.  She has a dog at home and family had called for a health and welfare check and found dog feces all over floor.  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  She has been restarted on her outpatient Remeron and Seroquel, which she has taken long-term.  She has not been in counseling.  States she met with  and plans to do grief and loss group.  She expressed some guilt about the effects of her behavior on her son as well as grandchild.  She states she would be open to physical therapy, but feels that she is \"being punished\" by the proceedings.  Her record indicated use of Lexapro in the past, although she denies.  She states she was taking her medications \"usually\" even though she had been drinking.  She does express some short-term memory issues as well as alcohol-related blackouts.  She was quite willing to start Lexapro and to abide by medical recommendations.  No acute panic attacks.    VITAL SIGNS:  Temperature 99.2, pulse 103, /72, respiratory rate 16, weight 44.5 kilograms, saturation 99% on room air.    REVIEW OF SYSTEMS:  Peripheral neuropathy symptoms of hands and feet continue.  No recent falls or injuries.  She is struggling to eat recommended amounts daily " with some ongoing poor appetite.  No further acute abdominal pain.  No evidence of seizure.    MENTAL STATUS EXAMINATION:  The patient is a quite thin 56-year-old blonde but tan female.  She was pleasant and cooperative.  There was some delay in occasional word finding difficulty, but memory appears improved.  Long-term memory intact.  Speech nonpressured.  No agitation, tic or tremor noted.  She is bedbound and gait not observed.  Mood is reported to be improved and more hopeful.  She appeared to have insight into effects of her grief.  She may be downplaying recent struggles at home.  No hallucinations.  Denies active or passive suicidal ideation or death wish.  She had expressed frustration to staff previously.  She has historically been normal range of intelligence and no preexisting dementia.    DIAGNOSES:  Alcohol use disorder, severe, alcohol-related pancreatitis and hepatitis, alcohol-related neuropathy, major depressive episode with grief, unspecified anxiety disorder, history of breast cancer with bilateral mastectomy.    RECOMMENDATION:  Restart Lexapro 5 mg daily and maybe advance to 10 mg daily after 1 week.  She will continue on Remeron and Seroquel as ordered.  I did discuss the risk of neuroleptic side effects, however, on the Seroquel.  She has no outpatient psychiatrist or therapist.  She is currently undergoing commitment proceedings for dual program.  She may consider agreeing to stay off commitment.  She looks forward with [facility] .  She may benefit from TCU/PT OT evaluation.      Please call with questions and concerns.  Thank you for the consultation.    Liya Garcia MD        D: 2022   T: 2022   MT: SUJATA    Name:     NICOLA PLAZA  MRN:      [id number]        Account:      [id number]   :      1965           Consult Date: 2022     Document: [id number]

## 2022-07-01 NOTE — PROGRESS NOTES
PT: Orders received. Chart reviewed and discussed with care team.  PT not indicated due to RN reporting pt is up IND in room; RN denies mobility concerns for PT to address.  Defer discharge recommendations to medical team.  Will complete orders.

## 2022-07-01 NOTE — PROGRESS NOTES
[facility]    Hospitalist Progress Note  Name: Audelia Comer    MRN: [id number]  Provider:  Charles Hylton DO  Date of Service: 07/01/2022    Summary of Stay: Audelia Comer is a 56 year old female with a history of alcohol abuse, depression, breast cancer status post bilateral mastectomy in remission, active nicotine dependence with cigarettes admitted on 6/28/2022 after a welfare check was placed on the patient and she was found by [facility] crisis worker to have feces on the floor (possibly from the dog) and too weak to walk.  The patient was brought to the emergency department for evaluation.  The patient admitted to drinking daily and not eating or drinking for the past few days.  In the emergency department, the patient was found to have a blood pressure of 129/94, respiratory rate 18, heart rate 114, SPO2 100% on room air.  Initial lab work showed sodium 128, potassium 2.9, chloride 91, BUN 5, creatinine 0.40, anion gap 17, magnesium 1.4, phosphorus 1.8, alkaline phosphatase 340, AST//27, lipase 543, hemoglobin 10.4, blood alcohol level less than 0.01.  The patient had a CT abdomen and pelvis in the emergency department that showed small fluid posterior to the pancreas tail is slightly increased in the interval possibly representing edematous pancreatitis, heterogenous appearance of the liver suggesting prominent fatty infiltration.  Right upper quadrant ultrasound was obtained that showed hepatic steatosis without evidence of cholelithiasis or biliary obstruction.  The patient was started on IV fluids, made n.p.o., and provided with pain control for the treatment of acute alcoholic pancreatitis.  Given concern for the patient's wellbeing at home as well as her severe depression, psychiatry was consulted to see the patient.  Nutrition was also consulted to see the patient.  Physical Therapy was also consulted to see the patient.    TODAY'S PLAN: Patient appears to be  ordering food, but not eating much of her food.  Encouraged patient to increase her oral intake as this is critical in the healing process.  Appreciate psychiatry recommendations.  Looks like patient will be committed, though ultimately deferred to psychiatry evaluation planned for today.  Discussed with  appreciated assistance with discharge planning every other inpatient psych versus outpatient follow-up.  Appreciate nutrition recommendations.    Problem List:   Acute Alcoholic Pancreatitis  - Advance diet as tolerated - tolerating regular diet  - Discontinue IVF  - Pain Control  - Improved     Alcohol Abuse  - Reports drinking several glasses of wine daily, but has not drank in several days due to her weakness  - Continue Banana bag infusions daily  - MV, FA, Thiamine  - CIWA  - Appreciate CD recommendations - pt declined CD resources     Failure to Thrive  Severe Malnutrition  Generalized Weakness  Hyponatremia  Hypokalemia  Hypophosphatemia  Hypomagnesemia  - Appreciate Nutrition recommendations  - Electrolyte replacement protocol  - Discontinue IVF  - Daily BMP     Depression  - Worsened in the setting of the passing of her daughter last year, diagnosis of breast cancer requiring bilateral mastectomy, job loss, and fear of eviction  - Appreciate Psychiatry recommendations  - Appreciate SW recommendations  - Continue Remeron and Seroquel  - Pt seems open and ready for treatment as indicated by psychiatry     Normocytic Anemia  - Likely secondary to alcohol abuse and hemodilution  - Daily CBC     Nicotine Dependence with Cigarettes  - Smokes 1 pck per day  - Nicotine patch     Breast Cancer  - s/p bilateral mastectomy and now considered cured  - Recommend continued follow up with Oncology     Hepatic Steatosis  Elevated LFTs  - Likely secondary to etoh abuse  - Daily Hepatic Panel    DVT Prophylaxis: Pneumatic Compression Devices  Code Status: Full Code  Diet: Snacks/Supplements Adult: Other; Ok to  order any diet appropriate supplement prn; With Meals  Advance Diet as Tolerated: Regular Diet Adult; Regular Diet Adult    Patino Catheter: Not present  Disposition: Expected discharge in 1-2 days to inpatient psych. Goals prior to discharge include psych assessment complete.   Family updated today: No     Interval History   Pt seen and examined.  Pt states she is feeling better.  States she has nausea this morning.  Diarrhea has improved.    -Data reviewed today: I personally reviewed all new labs and imaging results over the last 24 hours.     Physical Exam   Temp: 99.2  F (37.3  C) Temp src: Oral BP: 109/72 Pulse: 103   Resp: 16 SpO2: 99 % O2 Device: None (Room air)    Vitals:    06/28/22 1320   Weight: 44.5 kg (98 lb)     Vital Signs with Ranges  Temp:  [98.9  F (37.2  C)-99.9  F (37.7  C)] 99.2  F (37.3  C)  Pulse:  [102-114] 103  Resp:  [16-20] 16  BP: ()/(60-72) 109/72  SpO2:  [98 %-99 %] 99 %  I/O last 3 completed shifts:  In: 240 [P.O.:240]  Out: -     GENERAL: No apparent distress. Awake, alert, and fully oriented.  HEENT: Normocephalic, atraumatic. Extraocular movements intact.  CARDIOVASCULAR: Regular rate and rhythm without murmurs or rubs. No S3.  PULMONARY: Clear bilaterally.  GASTROINTESTINAL: Soft, non-tender, non-distended. Bowel sounds normoactive.   EXTREMITIES: No cyanosis or clubbing. No edema.  NEUROLOGICAL: CN 2-12 grossly intact, no focal neurological deficits.  DERMATOLOGICAL: No rash, ulcer, bruising, nor jaundice.    Medications       enoxaparin ANTICOAGULANT  30 mg Subcutaneous Q24H     famotidine  20 mg Oral BID     mirtazapine  30 mg Oral At Bedtime     multivitamin w/minerals  1 tablet Oral Daily     nicotine  1 patch Transdermal Daily    Followed by     [START ON 8/9/2022] nicotine  1 patch Transdermal Daily    Followed by     [START ON 9/6/2022] nicotine  1 patch Transdermal Daily     nicotine   Transdermal Q8H     QUEtiapine  50 mg Oral BID     sodium chloride (PF)  3 mL  Intracatheter Q8H     IV Fluid with vitamins   Intravenous Q24H     Data     Laboratory:  Recent Labs   Lab 07/01/22  0705 06/30/22  0546 06/29/22  0932   WBC 7.2 5.6 5.9   HGB 8.3* 8.0* 8.9*   HCT 24.3* 23.2* 25.1*    100 97    184 156     Recent Labs   Lab 07/01/22  0705 06/30/22  1900 06/30/22  1618 06/30/22  1216 06/30/22  0546 06/29/22  1649 06/29/22  0932 06/28/22  1915 06/28/22  1307     --   --   --  140  --  135  --  128*   POTASSIUM 3.5 3.6  --  3.3* 3.1*  --  3.5   < > 2.9*   CHLORIDE 107  --   --   --  105  --   --   --  91*   CO2 24  --   --   --  25  --  22  --  20   ANIONGAP 8  --   --   --  10  --   --   --  17*   GLC 98  --   --   --  99  --  80  --  89   BUN 2*  --   --   --  1*  --  3*  --  5*   CR 0.28*  --   --   --  0.30*  --  0.27*   < > 0.40*   GFRESTIMATED >90  --   --   --  >90  --  >90   < > >90   FRANCOISE 8.4*  --   --   --  7.8*  --   --   --  9.8   MAG 1.7  --  2.4*  --  1.5*  --   --    < > 1.4*   PHOS 2.8  --   --   --  2.8 1.2*  --   --  1.8*   PROTTOTAL 5.9*  --   --   --  5.7*  --  6.1*  --  7.9   ALBUMIN 2.6*  --   --   --  2.5*  --  2.7*  --  3.4   BILITOTAL 0.6  --   --   --  0.9  --  1.2  --  1.3   ALKPHOS 233*  --   --   --  228*  --  251*  --  340*   AST 79*  --   --   --  91*  --  120*  --  137*   ALT 28  --   --   --  26  --  25  --  27    < > = values in this interval not displayed.     No results for input(s): CULT in the last 168 hours.    Imaging:  No results found for this or any previous visit (from the past 24 hour(s)).      Charles Hylton DO  [facility] Hospitalist  [address] [place] [address]  07/01/2022

## 2022-07-01 NOTE — PLAN OF CARE
End of Shift Summary  For vital signs and complete assessments, please see documentation flowsheets.     Pertinent assessments: A&Ox4. VSS on room air. Temp 99.9, tylenol given. Pt c/o moderate abd pain, oxycodone given x1. Tolerating regular diet. Banana bag infusing. CIWA 2, 3, 1. Up independently in room.    Major Shift Events: None  Treatment Plan: symptom management, ETOH withdrawal protocol. PT/OT consulted.   Bedside Nurse: Kavita Saavedra RN

## 2022-07-02 ENCOUNTER — TELEPHONE (OUTPATIENT)
Dept: BEHAVIORAL HEALTH | Facility: CLINIC | Age: 57
End: 2022-07-02

## 2022-07-02 LAB
ALBUMIN SERPL-MCNC: 2.5 G/DL (ref 3.4–5)
ALP SERPL-CCNC: 238 U/L (ref 40–150)
ALT SERPL W P-5'-P-CCNC: 29 U/L (ref 0–50)
ANION GAP SERPL CALCULATED.3IONS-SCNC: 9 MMOL/L (ref 3–14)
AST SERPL W P-5'-P-CCNC: 71 U/L (ref 0–45)
BILIRUB SERPL-MCNC: 0.5 MG/DL (ref 0.2–1.3)
BUN SERPL-MCNC: 3 MG/DL (ref 7–30)
CALCIUM SERPL-MCNC: 8.7 MG/DL (ref 8.5–10.1)
CHLORIDE BLD-SCNC: 106 MMOL/L (ref 94–109)
CO2 SERPL-SCNC: 23 MMOL/L (ref 20–32)
CREAT SERPL-MCNC: 0.33 MG/DL (ref 0.52–1.04)
ERYTHROCYTE [DISTWIDTH] IN BLOOD BY AUTOMATED COUNT: 12.8 % (ref 10–15)
GFR SERPL CREATININE-BSD FRML MDRD: >90 ML/MIN/1.73M2
GLUCOSE BLD-MCNC: 93 MG/DL (ref 70–99)
HCT VFR BLD AUTO: 25 % (ref 35–47)
HGB BLD-MCNC: 8.1 G/DL (ref 11.7–15.7)
MAGNESIUM SERPL-MCNC: 1.9 MG/DL (ref 1.6–2.3)
MCH RBC QN AUTO: 33.2 PG (ref 26.5–33)
MCHC RBC AUTO-ENTMCNC: 32.4 G/DL (ref 31.5–36.5)
MCV RBC AUTO: 103 FL (ref 78–100)
PHOSPHATE SERPL-MCNC: 4.5 MG/DL (ref 2.5–4.5)
PLATELET # BLD AUTO: 232 10E3/UL (ref 150–450)
POTASSIUM BLD-SCNC: 3.4 MMOL/L (ref 3.4–5.3)
PROT SERPL-MCNC: 6 G/DL (ref 6.8–8.8)
RBC # BLD AUTO: 2.44 10E6/UL (ref 3.8–5.2)
SODIUM SERPL-SCNC: 138 MMOL/L (ref 133–144)
WBC # BLD AUTO: 7.6 10E3/UL (ref 4–11)

## 2022-07-02 PROCEDURE — 250N000013 HC RX MED GY IP 250 OP 250 PS 637: Performed by: INTERNAL MEDICINE

## 2022-07-02 PROCEDURE — 250N000013 HC RX MED GY IP 250 OP 250 PS 637: Performed by: EMERGENCY MEDICINE

## 2022-07-02 PROCEDURE — 80053 COMPREHEN METABOLIC PANEL: CPT | Performed by: INTERNAL MEDICINE

## 2022-07-02 PROCEDURE — 250N000013 HC RX MED GY IP 250 OP 250 PS 637: Performed by: HOSPITALIST

## 2022-07-02 PROCEDURE — 120N000001 HC R&B MED SURG/OB

## 2022-07-02 PROCEDURE — 36415 COLL VENOUS BLD VENIPUNCTURE: CPT | Performed by: INTERNAL MEDICINE

## 2022-07-02 PROCEDURE — 85027 COMPLETE CBC AUTOMATED: CPT | Performed by: INTERNAL MEDICINE

## 2022-07-02 PROCEDURE — 84100 ASSAY OF PHOSPHORUS: CPT | Performed by: INTERNAL MEDICINE

## 2022-07-02 PROCEDURE — 83735 ASSAY OF MAGNESIUM: CPT | Performed by: INTERNAL MEDICINE

## 2022-07-02 PROCEDURE — 250N000011 HC RX IP 250 OP 636: Performed by: STUDENT IN AN ORGANIZED HEALTH CARE EDUCATION/TRAINING PROGRAM

## 2022-07-02 PROCEDURE — 250N000013 HC RX MED GY IP 250 OP 250 PS 637: Performed by: STUDENT IN AN ORGANIZED HEALTH CARE EDUCATION/TRAINING PROGRAM

## 2022-07-02 PROCEDURE — 99232 SBSQ HOSP IP/OBS MODERATE 35: CPT | Performed by: INTERNAL MEDICINE

## 2022-07-02 RX ORDER — POTASSIUM CHLORIDE 1.5 G/1.58G
20 POWDER, FOR SOLUTION ORAL ONCE
Status: COMPLETED | OUTPATIENT
Start: 2022-07-02 | End: 2022-07-02

## 2022-07-02 RX ADMIN — MULTIPLE VITAMINS W/ MINERALS TAB 1 TABLET: TAB at 08:31

## 2022-07-02 RX ADMIN — QUETIAPINE FUMARATE 50 MG: 25 TABLET ORAL at 08:31

## 2022-07-02 RX ADMIN — POTASSIUM CHLORIDE 20 MEQ: 1.5 POWDER, FOR SOLUTION ORAL at 13:04

## 2022-07-02 RX ADMIN — FAMOTIDINE 20 MG: 20 TABLET ORAL at 20:50

## 2022-07-02 RX ADMIN — QUETIAPINE FUMARATE 50 MG: 25 TABLET ORAL at 20:50

## 2022-07-02 RX ADMIN — FOLIC ACID 1 MG: 1 TABLET ORAL at 08:31

## 2022-07-02 RX ADMIN — THIAMINE HCL TAB 100 MG 100 MG: 100 TAB at 08:31

## 2022-07-02 RX ADMIN — FAMOTIDINE 20 MG: 20 TABLET ORAL at 08:31

## 2022-07-02 RX ADMIN — MIRTAZAPINE 30 MG: 15 TABLET, FILM COATED ORAL at 23:32

## 2022-07-02 RX ADMIN — QUETIAPINE FUMARATE 25 MG: 25 TABLET ORAL at 15:16

## 2022-07-02 RX ADMIN — OXYCODONE HYDROCHLORIDE 5 MG: 5 TABLET ORAL at 08:36

## 2022-07-02 RX ADMIN — NICOTINE 1 PATCH: 21 PATCH, EXTENDED RELEASE TRANSDERMAL at 08:29

## 2022-07-02 RX ADMIN — ENOXAPARIN SODIUM 30 MG: 30 INJECTION SUBCUTANEOUS at 20:50

## 2022-07-02 RX ADMIN — OXYCODONE HYDROCHLORIDE 5 MG: 5 TABLET ORAL at 20:50

## 2022-07-02 ASSESSMENT — ACTIVITIES OF DAILY LIVING (ADL)
ADLS_ACUITY_SCORE: 24

## 2022-07-02 NOTE — PLAN OF CARE
Goal Outcome Evaluation:            Pt up ind. A&Ox4. CIWA scores 1 or 2, no meds given. Appetite fair, states if eats a large meal it just sits in her stomach, gave Zofran x1. Gave Oxy x2 for abd pain. Increased anxiety when learned was potentially getting committed, feels like she was misinformed about the plan & was going to do OP tx, gave PRN Seroquel.

## 2022-07-02 NOTE — PROGRESS NOTES
[facility]    Hospitalist Progress Note  Name: Audelia Coemr    MRN: [id number]  Provider:  Charles Hylton DO  Date of Service: 07/02/2022    Summary of Stay: Audelia Comer is a 56 year old female with a history of alcohol abuse, depression, breast cancer status post bilateral mastectomy in remission, active nicotine dependence with cigarettes admitted on 6/28/2022 after a welfare check was placed on the patient and she was found by [facility] crisis worker to have feces on the floor (possibly from the dog) and too weak to walk.  The patient was brought to the emergency department for evaluation.  The patient admitted to drinking daily and not eating or drinking for the past few days.  In the emergency department, the patient was found to have a blood pressure of 129/94, respiratory rate 18, heart rate 114, SPO2 100% on room air.  Initial lab work showed sodium 128, potassium 2.9, chloride 91, BUN 5, creatinine 0.40, anion gap 17, magnesium 1.4, phosphorus 1.8, alkaline phosphatase 340, AST//27, lipase 543, hemoglobin 10.4, blood alcohol level less than 0.01.  The patient had a CT abdomen and pelvis in the emergency department that showed small fluid posterior to the pancreas tail is slightly increased in the interval possibly representing edematous pancreatitis, heterogenous appearance of the liver suggesting prominent fatty infiltration.  Right upper quadrant ultrasound was obtained that showed hepatic steatosis without evidence of cholelithiasis or biliary obstruction.  The patient was started on IV fluids, made n.p.o., and provided with pain control for the treatment of acute alcoholic pancreatitis.  Given concern for the patient's wellbeing at home as well as her severe depression, psychiatry was consulted to see the patient.  Nutrition was also consulted to see the patient.  Physical Therapy was also consulted to see the patient.  Psychiatry recommended inpatient psych.   [facility] was following the patient and recommended commitment.  Social Work was consulted for discharge planning.    TODAY'S PLAN:  Appreciate  assistance with discharge arrangements and coordination with [facility].  Planning for inpatient mental health.  [facility] will be by Tuesday to see the patient.  No hold on pt needed at this point as she is calm and cooperative at this point.  Encouraged PO intake.  Pt would be medically stable for discharge at this point.    Problem List:   Acute Alcoholic Pancreatitis  - Advance diet as tolerated - tolerating regular diet  - Discontinue IVF  - Pain Control  - Improved     Alcohol Abuse  - Reports drinking several glasses of wine daily, but has not drank in several days due to her weakness  - Continue Banana bag infusions daily  - MV, FA, Thiamine  - CIWA  - Appreciate CD recommendations - pt declined CD resources     Failure to Thrive  Severe Malnutrition  Generalized Weakness  Hyponatremia  Hypokalemia  Hypophosphatemia  Hypomagnesemia  - Appreciate Nutrition recommendations  - Electrolyte replacement protocol  - Discontinue IVF  - Daily BMP     Depression  - Worsened in the setting of the passing of her daughter last year, diagnosis of breast cancer requiring bilateral mastectomy, job loss, and fear of eviction  - Appreciate Psychiatry recommendations - recommending inpatient mental health.  [facility] following and pursuing commitment.  Appreciate SW assistance with discharge planning to inpatient mental health  - Appreciate SW recommendations  - Continue Remeron and Seroquel  - Pt calm and cooperative     Normocytic Anemia  - Likely secondary to alcohol abuse and hemodilution  - Daily CBC     Nicotine Dependence with Cigarettes  - Smokes 1 pck per day  - Nicotine patch     Breast Cancer  - s/p bilateral mastectomy and now considered cured  - Recommend continued follow up with Oncology     Hepatic Steatosis  Elevated LFTs  - Likely secondary to etoh  abuse  - Daily Hepatic Panel    DVT Prophylaxis: Pneumatic Compression Devices  Code Status: Full Code  Diet: Snacks/Supplements Adult: Other; Ok to order any diet appropriate supplement prn; With Meals  Advance Diet as Tolerated: Regular Diet Adult; Regular Diet Adult    Patino Catheter: Not present  Disposition: Expected discharge in 2-3 days to inpatient mental health. Goals prior to discharge include inpatient mental health bed available.   Family updated today: No     Interval History   Pt seen and examined.  Pt reports some mild abd pain described as gas pain.  Trying to eat more.    -Data reviewed today: I personally reviewed all new labs and imaging results over the last 24 hours.     Physical Exam   Temp: 98.2  F (36.8  C) Temp src: Oral BP: 95/65 Pulse: 104   Resp: 18 SpO2: 99 % O2 Device: None (Room air)    Vitals:    06/28/22 1320   Weight: 44.5 kg (98 lb)     Vital Signs with Ranges  Temp:  [98.2  F (36.8  C)-99.1  F (37.3  C)] 98.2  F (36.8  C)  Pulse:  [102-107] 104  Resp:  [16-24] 18  BP: ()/(65-68) 95/65  SpO2:  [98 %-100 %] 99 %  I/O last 3 completed shifts:  In: 2256 [P.O.:720; I.V.:1536]  Out: -     GENERAL: No apparent distress. Awake, alert, and fully oriented.  HEENT: Normocephalic, atraumatic. Extraocular movements intact.  CARDIOVASCULAR: Regular rate and rhythm without murmurs or rubs. No S3.  PULMONARY: Clear bilaterally.  GASTROINTESTINAL: Soft, non-tender, non-distended. Bowel sounds normoactive.   EXTREMITIES: No cyanosis or clubbing. No edema.  NEUROLOGICAL: CN 2-12 grossly intact, no focal neurological deficits.  DERMATOLOGICAL: No rash, ulcer, bruising, nor jaundice.    Medications       enoxaparin ANTICOAGULANT  30 mg Subcutaneous Q24H     famotidine  20 mg Oral BID     folic acid  1 mg Oral Daily     mirtazapine  30 mg Oral At Bedtime     multivitamin w/minerals  1 tablet Oral Daily     nicotine  1 patch Transdermal Daily    Followed by     [START ON 8/9/2022] nicotine  1  patch Transdermal Daily    Followed by     [START ON 9/6/2022] nicotine  1 patch Transdermal Daily     nicotine   Transdermal Q8H     potassium chloride  20 mEq Oral or Feeding Tube Once     QUEtiapine  50 mg Oral BID     sodium chloride (PF)  3 mL Intracatheter Q8H     thiamine  100 mg Oral Daily     Data     Laboratory:  Recent Labs   Lab 07/02/22  0644 07/01/22  0705 06/30/22  0546   WBC 7.6 7.2 5.6   HGB 8.1* 8.3* 8.0*   HCT 25.0* 24.3* 23.2*   * 100 100    185 184     Recent Labs   Lab 07/02/22  0644 07/01/22  0705 06/30/22  1900 06/30/22  1216 06/30/22  0546    139  --   --  140   POTASSIUM 3.4 3.5 3.6   < > 3.1*   CHLORIDE 106 107  --   --  105   CO2 23 24  --   --  25   ANIONGAP 9 8  --   --  10   GLC 93 98  --   --  99   BUN 3* 2*  --   --  1*   CR 0.33* 0.28*  --   --  0.30*   GFRESTIMATED >90 >90  --   --  >90   FRANCOISE 8.7 8.4*  --   --  7.8*    < > = values in this interval not displayed.     No results for input(s): CULT in the last 168 hours.    Imaging:  No results found for this or any previous visit (from the past 24 hour(s)).      Charles Hylton DO  [facility] Hospitalist  [address] [place] [address]  07/02/2022

## 2022-07-02 NOTE — PLAN OF CARE
End of Shift Summary  For vital signs and complete assessments, please see documentation flowsheets.     Pertinent assessments: A&Ox4. VSS on room air, afebrile. C/o mild abdominal pain, oxy given x1. Pt anxiety managed with scheduled meds. Up independently in room. CIWA 4, 2, 1.    Major Shift Events: None  Treatment Plan: symptom management, ETOH withdrawal protocol.   Bedside Nurse: Kavita Saavedra RN

## 2022-07-02 NOTE — TELEPHONE ENCOUNTER
S:  NORBERT Lynn from [facility] called @ 2:22pm with 56y/F on 5 ortho r/t alcohol withdrawls, but now clear for IP MH transfer due to SI.    B:  Pt was admitted at [facility] r/t Alcohol withdrawls.  Pt has been endorsing SI, and a petition for commitment has been submitted.  Once patient learned she was being transferred for IPMH per , and that commitment had been petitioned for, pt is per - being manipulative and now denying all SI and depression and grief.  Pt has also had a significant weight loss and not eating.    Pt is calm and medically clear for IP MH     A:  Vol, but commitment paper submitted and in right fax 7/2/22 @ 2:35pm (27 pages)    R:  Patient cleared and ready for behavioral bed placement: Yes     Pt placed on adult worklisat pending bed availability

## 2022-07-02 NOTE — PROGRESS NOTES
Care Management Follow Up    Length of Stay (days): 4    Expected Discharge Date: 07/05/2022     Concerns to be Addressed: SW team wokrign with provider for mental health placement    Patient plan of care discussed at interdisciplinary rounds: Yes    Anticipated Discharge Disposition: IP mental health    Anticipated Discharge Services: Mental Health Resources, Chemical Dependency Resources  Anticipated Discharge DME: None    Patient/family educated on Medicare website which has current facility and service quality ratings: no  Education Provided on the Discharge Plan:    Patient/Family in Agreement with the Plan: will inform pt on placement once bed can be acquired     Referrals Placed by CM/SW:yes    Private pay costs discussed: Not applicable    Additional Information:  Spoke with Ana Cristina at Intake . They will start reviewing pt's case. They have requested paper work be faxed to them regarding the possible commitment.. They are aware that the [facility] plans to see pt in person on Tuesday.. NO NEED to keep on medical if mental health bed can be found before Tue  NORBERT will update county of location change. Pt currently NOT on a hold.. Can be placed if needed once bed found for transfer    Will fax to [phone]      Corinne C. White, LSW

## 2022-07-03 LAB
HOLD SPECIMEN: NORMAL
MAGNESIUM SERPL-MCNC: 1.6 MG/DL (ref 1.6–2.3)
PHOSPHATE SERPL-MCNC: 5 MG/DL (ref 2.5–4.5)
POTASSIUM BLD-SCNC: 3.9 MMOL/L (ref 3.4–5.3)

## 2022-07-03 PROCEDURE — 83735 ASSAY OF MAGNESIUM: CPT | Performed by: INTERNAL MEDICINE

## 2022-07-03 PROCEDURE — 250N000013 HC RX MED GY IP 250 OP 250 PS 637: Performed by: EMERGENCY MEDICINE

## 2022-07-03 PROCEDURE — 250N000013 HC RX MED GY IP 250 OP 250 PS 637: Performed by: HOSPITALIST

## 2022-07-03 PROCEDURE — 84100 ASSAY OF PHOSPHORUS: CPT | Performed by: INTERNAL MEDICINE

## 2022-07-03 PROCEDURE — 250N000013 HC RX MED GY IP 250 OP 250 PS 637: Performed by: STUDENT IN AN ORGANIZED HEALTH CARE EDUCATION/TRAINING PROGRAM

## 2022-07-03 PROCEDURE — 99232 SBSQ HOSP IP/OBS MODERATE 35: CPT | Performed by: INTERNAL MEDICINE

## 2022-07-03 PROCEDURE — 84132 ASSAY OF SERUM POTASSIUM: CPT | Performed by: INTERNAL MEDICINE

## 2022-07-03 PROCEDURE — 120N000001 HC R&B MED SURG/OB

## 2022-07-03 PROCEDURE — 250N000011 HC RX IP 250 OP 636: Performed by: STUDENT IN AN ORGANIZED HEALTH CARE EDUCATION/TRAINING PROGRAM

## 2022-07-03 PROCEDURE — 36415 COLL VENOUS BLD VENIPUNCTURE: CPT | Performed by: INTERNAL MEDICINE

## 2022-07-03 PROCEDURE — 250N000013 HC RX MED GY IP 250 OP 250 PS 637: Performed by: INTERNAL MEDICINE

## 2022-07-03 RX ORDER — QUETIAPINE FUMARATE 50 MG/1
50 TABLET, FILM COATED ORAL ONCE
Status: COMPLETED | OUTPATIENT
Start: 2022-07-03 | End: 2022-07-03

## 2022-07-03 RX ORDER — AMOXICILLIN 500 MG/1
500 CAPSULE ORAL EVERY 8 HOURS SCHEDULED
Status: DISCONTINUED | OUTPATIENT
Start: 2022-07-03 | End: 2022-07-03

## 2022-07-03 RX ORDER — AMOXICILLIN 500 MG/1
500 CAPSULE ORAL EVERY 8 HOURS SCHEDULED
Status: DISCONTINUED | OUTPATIENT
Start: 2022-07-03 | End: 2022-07-06 | Stop reason: HOSPADM

## 2022-07-03 RX ORDER — QUETIAPINE FUMARATE 100 MG/1
100 TABLET, FILM COATED ORAL 2 TIMES DAILY
Status: DISCONTINUED | OUTPATIENT
Start: 2022-07-03 | End: 2022-07-06 | Stop reason: HOSPADM

## 2022-07-03 RX ORDER — OXYCODONE HYDROCHLORIDE 5 MG/1
5 TABLET ORAL EVERY 6 HOURS PRN
Status: DISCONTINUED | OUTPATIENT
Start: 2022-07-03 | End: 2022-07-06 | Stop reason: HOSPADM

## 2022-07-03 RX ADMIN — NICOTINE 1 PATCH: 21 PATCH, EXTENDED RELEASE TRANSDERMAL at 08:23

## 2022-07-03 RX ADMIN — OXYCODONE HYDROCHLORIDE 5 MG: 5 TABLET ORAL at 20:47

## 2022-07-03 RX ADMIN — OXYCODONE HYDROCHLORIDE 5 MG: 5 TABLET ORAL at 08:23

## 2022-07-03 RX ADMIN — ACETAMINOPHEN 650 MG: 325 TABLET, FILM COATED ORAL at 19:04

## 2022-07-03 RX ADMIN — FOLIC ACID 1 MG: 1 TABLET ORAL at 08:26

## 2022-07-03 RX ADMIN — MULTIPLE VITAMINS W/ MINERALS TAB 1 TABLET: TAB at 08:26

## 2022-07-03 RX ADMIN — AMOXICILLIN 500 MG: 500 CAPSULE ORAL at 11:35

## 2022-07-03 RX ADMIN — AMOXICILLIN 500 MG: 500 CAPSULE ORAL at 22:47

## 2022-07-03 RX ADMIN — MIRTAZAPINE 30 MG: 15 TABLET, FILM COATED ORAL at 23:01

## 2022-07-03 RX ADMIN — OXYCODONE HYDROCHLORIDE 5 MG: 5 TABLET ORAL at 14:25

## 2022-07-03 RX ADMIN — ENOXAPARIN SODIUM 30 MG: 30 INJECTION SUBCUTANEOUS at 18:55

## 2022-07-03 RX ADMIN — QUETIAPINE 100 MG: 100 TABLET ORAL at 20:47

## 2022-07-03 RX ADMIN — FAMOTIDINE 20 MG: 20 TABLET ORAL at 08:26

## 2022-07-03 RX ADMIN — THIAMINE HCL TAB 100 MG 100 MG: 100 TAB at 08:26

## 2022-07-03 RX ADMIN — QUETIAPINE FUMARATE 50 MG: 25 TABLET ORAL at 08:23

## 2022-07-03 RX ADMIN — ONDANSETRON 4 MG: 4 TABLET, ORALLY DISINTEGRATING ORAL at 07:59

## 2022-07-03 RX ADMIN — QUETIAPINE FUMARATE 50 MG: 50 TABLET ORAL at 11:32

## 2022-07-03 RX ADMIN — FAMOTIDINE 20 MG: 20 TABLET ORAL at 20:47

## 2022-07-03 ASSESSMENT — ACTIVITIES OF DAILY LIVING (ADL)
ADLS_ACUITY_SCORE: 24

## 2022-07-03 NOTE — PROGRESS NOTES
Care Management Follow Up    Length of Stay (days): 5    Expected Discharge Date: 07/05/2022     Concerns to be Addressed: needs placement for mental health support   Patient plan of care discussed at interdisciplinary rounds: Yes    Anticipated Discharge Disposition: mental health Unit      Anticipated Discharge Services: Mental Health Resources, Chemical Dependency Resources      Patient/Family in Agreement with the Plan: unable to assess- no     Referrals Placed by CM/SW: yes   Private pay costs discussed: Not applicable    Additional Information:  Called Intake. They have reviewed paperwork and Commitment paperwork. Pt has been placed on waiting list for transfer. No beds in [facility] area at this time  Soonest Intake anticipates a bed would Tue vs Wednesday.. Anticipate pt will need 72 hour hold once she transfers. Left message for Arabella ADULT [employer]OTE[employer] worker at [facility] [phone]    Corinne White, LSW  [facility]  [phone]

## 2022-07-03 NOTE — PROGRESS NOTES
[facility]    Hospitalist Progress Note  Name: Audelia Comer    MRN: [id number]  Provider:  Charles Hylton DO  Date of Service: 07/03/2022    Summary of Stay: Audelia Comer is a 56 year old female with a history of alcohol abuse, depression, breast cancer status post bilateral mastectomy in remission, active nicotine dependence with cigarettes admitted on 6/28/2022 after a welfare check was placed on the patient and she was found by [facility] crisis worker to have feces on the floor (possibly from the dog) and too weak to walk.  The patient was brought to the emergency department for evaluation.  The patient admitted to drinking daily and not eating or drinking for the past few days.  In the emergency department, the patient was found to have a blood pressure of 129/94, respiratory rate 18, heart rate 114, SPO2 100% on room air.  Initial lab work showed sodium 128, potassium 2.9, chloride 91, BUN 5, creatinine 0.40, anion gap 17, magnesium 1.4, phosphorus 1.8, alkaline phosphatase 340, AST//27, lipase 543, hemoglobin 10.4, blood alcohol level less than 0.01.  The patient had a CT abdomen and pelvis in the emergency department that showed small fluid posterior to the pancreas tail is slightly increased in the interval possibly representing edematous pancreatitis, heterogenous appearance of the liver suggesting prominent fatty infiltration.  Right upper quadrant ultrasound was obtained that showed hepatic steatosis without evidence of cholelithiasis or biliary obstruction.  The patient was started on IV fluids, made n.p.o., and provided with pain control for the treatment of acute alcoholic pancreatitis.  Given concern for the patient's wellbeing at home as well as her severe depression, psychiatry was consulted to see the patient.  Nutrition was also consulted to see the patient.  Physical Therapy was also consulted to see the patient.  Psychiatry recommended inpatient psych.   [facility] was following the patient and recommended commitment.  Social Work was consulted for discharge planning.    TODAY'S PLAN:  Pt still with anxiety and cried regarding her current situation.  Increase seroquel to 100 mg BID with extra 50 mg this morning.  Plan for commitment per [facility] with inpatient mental health.  Appreciate  assistance with discharge arrangements.  Pt medically stable for discharge at this point.      Problem List:   Acute Alcoholic Pancreatitis  - Advance diet as tolerated - tolerating regular diet  - Discontinue IVF  - Pain Control  - Improved     Alcohol Abuse  - Reports drinking several glasses of wine daily, but has not drank in several days due to her weakness  - Continue Banana bag infusions daily  - MV, FA, Thiamine  - CIWA  - Appreciate CD recommendations - pt declined CD resources     Failure to Thrive  Severe Malnutrition  Generalized Weakness  Hyponatremia  Hypokalemia  Hypophosphatemia  Hypomagnesemia  - Appreciate Nutrition recommendations  - Electrolyte replacement protocol  - Discontinue IVF  - Daily BMP     Depression  - Worsened in the setting of the passing of her daughter last year, diagnosis of breast cancer requiring bilateral mastectomy, job loss, and fear of eviction  - Appreciate Psychiatry recommendations - recommending inpatient mental health.  [facility] following and pursuing commitment.  Appreciate  assistance with discharge planning to inpatient mental health  - Appreciate  recommendations  - Continue Remeron and Seroquel  - Increase seroquel to 100 mg BID  - Pt calm and cooperative     Left Upper Molar Dental Infection  - Start Amoxicillin  - Recommend follow up with Dentist    Normocytic Anemia  - Likely secondary to alcohol abuse and hemodilution  - Daily CBC     Nicotine Dependence with Cigarettes  - Smokes 1 pck per day  - Nicotine patch     Breast Cancer  - s/p bilateral mastectomy and now considered cured  - Recommend continued  follow up with Oncology     Hepatic Steatosis  Elevated LFTs  - Likely secondary to etoh abuse  - Daily Hepatic Panel    DVT Prophylaxis: Pneumatic Compression Devices  Code Status: Full Code  Diet: Snacks/Supplements Adult: Other; Ok to order any diet appropriate supplement prn; With Meals  Advance Diet as Tolerated: Regular Diet Adult; Regular Diet Adult    Patino Catheter: Not present  Disposition: Expected discharge to inpatient mental health when bed available.   Family updated today: No     Interval History   Pt seen and examined.  Pt states she is having a rough morning.  Also reports concern for dental infection in L upper molars.    -Data reviewed today: I personally reviewed all new labs and imaging results over the last 24 hours.     Physical Exam   Temp: 99.1  F (37.3  C) Temp src: Oral BP: 102/67 Pulse: 109   Resp: 18 SpO2: 97 % O2 Device: None (Room air)    Vitals:    06/28/22 1320   Weight: 44.5 kg (98 lb)     Vital Signs with Ranges  Temp:  [99  F (37.2  C)-99.3  F (37.4  C)] 99.1  F (37.3  C)  Pulse:  [101-109] 109  Resp:  [16-24] 18  BP: ()/(59-67) 102/67  SpO2:  [97 %-100 %] 97 %  I/O last 3 completed shifts:  In: 730 [P.O.:730]  Out: -     GENERAL: No apparent distress. Awake, alert, and fully oriented.  HEENT: Dental caries in L upper molar with swelling  CARDIOVASCULAR: Regular rate and rhythm without murmurs or rubs. No S3.  PULMONARY: Clear bilaterally.  GASTROINTESTINAL: Soft, non-tender, non-distended. Bowel sounds normoactive.   EXTREMITIES: No cyanosis or clubbing. No edema.  NEUROLOGICAL: CN 2-12 grossly intact, no focal neurological deficits.  DERMATOLOGICAL: No rash, ulcer, bruising, nor jaundice.    Medications       enoxaparin ANTICOAGULANT  30 mg Subcutaneous Q24H     famotidine  20 mg Oral BID     folic acid  1 mg Oral Daily     mirtazapine  30 mg Oral At Bedtime     multivitamin w/minerals  1 tablet Oral Daily     nicotine  1 patch Transdermal Daily    Followed by     [START  ON 8/9/2022] nicotine  1 patch Transdermal Daily    Followed by     [START ON 9/6/2022] nicotine  1 patch Transdermal Daily     nicotine   Transdermal Q8H     QUEtiapine  100 mg Oral BID     QUEtiapine  50 mg Oral Once     sodium chloride (PF)  3 mL Intracatheter Q8H     thiamine  100 mg Oral Daily     Data     Laboratory:  Recent Labs   Lab 07/02/22  0644 07/01/22  0705 06/30/22  0546   WBC 7.6 7.2 5.6   HGB 8.1* 8.3* 8.0*   HCT 25.0* 24.3* 23.2*   * 100 100    185 184     Recent Labs   Lab 07/03/22  0718 07/02/22  0644 07/01/22  0705 06/30/22  1216 06/30/22  0546   NA  --  138 139  --  140   POTASSIUM 3.9 3.4 3.5   < > 3.1*   CHLORIDE  --  106 107  --  105   CO2  --  23 24  --  25   ANIONGAP  --  9 8  --  10   GLC  --  93 98  --  99   BUN  --  3* 2*  --  1*   CR  --  0.33* 0.28*  --  0.30*   GFRESTIMATED  --  >90 >90  --  >90   FRANCOISE  --  8.7 8.4*  --  7.8*    < > = values in this interval not displayed.     No results for input(s): CULT in the last 168 hours.    Imaging:  No results found for this or any previous visit (from the past 24 hour(s)).      Charles Hylton DO  [facility] Hospitalist  [address] [place] [address]  07/03/2022

## 2022-07-03 NOTE — PLAN OF CARE
Goal Outcome Evaluation:       Pt up ind. VSS A&Ox4. Anxious, gave PRN Seroquel x1. CIWAs d/c'd. Abd pain, gave Oxy x1. David diet, slight nausea, appetite fair, abd fullness after eating.

## 2022-07-03 NOTE — PLAN OF CARE
End of Shift Summary  For vital signs and complete assessments, please see documentation flowsheets.     Pertinent assessments: A&Ox4. VSS on room air. Pt remains anxious, scheduled seroquel given. Oxy given x1 for abd pain. Up independently in room.     Major Shift Events: None  Treatment Plan: symptom management, awaiting inpt bed  Bedside Nurse: Kavita Saavedra RN

## 2022-07-03 NOTE — PLAN OF CARE
Goal Outcome Evaluation:       Pt up ind. VSS A&Ox4. L tooth infection, painful & facial swelling, difficult to chew, sensitive to heat/cold, applied ice pack, taking Oxycodone. David diet, appetite good, some nausea, gave Zofran. Increased anxiety, Seroquel increased.

## 2022-07-04 LAB
ALBUMIN SERPL-MCNC: 2.9 G/DL (ref 3.4–5)
ALP SERPL-CCNC: 270 U/L (ref 40–150)
ALT SERPL W P-5'-P-CCNC: 34 U/L (ref 0–50)
ANION GAP SERPL CALCULATED.3IONS-SCNC: 13 MMOL/L (ref 3–14)
AST SERPL W P-5'-P-CCNC: 68 U/L (ref 0–45)
BILIRUB SERPL-MCNC: 0.5 MG/DL (ref 0.2–1.3)
BUN SERPL-MCNC: 5 MG/DL (ref 7–30)
CALCIUM SERPL-MCNC: 9.5 MG/DL (ref 8.5–10.1)
CHLORIDE BLD-SCNC: 100 MMOL/L (ref 94–109)
CO2 SERPL-SCNC: 25 MMOL/L (ref 20–32)
CREAT SERPL-MCNC: 0.39 MG/DL (ref 0.52–1.04)
ERYTHROCYTE [DISTWIDTH] IN BLOOD BY AUTOMATED COUNT: 13.7 % (ref 10–15)
GFR SERPL CREATININE-BSD FRML MDRD: >90 ML/MIN/1.73M2
GLUCOSE BLD-MCNC: 145 MG/DL (ref 70–99)
HCT VFR BLD AUTO: 27.6 % (ref 35–47)
HGB BLD-MCNC: 9 G/DL (ref 11.7–15.7)
MAGNESIUM SERPL-MCNC: 1.6 MG/DL (ref 1.6–2.3)
MCH RBC QN AUTO: 33.6 PG (ref 26.5–33)
MCHC RBC AUTO-ENTMCNC: 32.6 G/DL (ref 31.5–36.5)
MCV RBC AUTO: 103 FL (ref 78–100)
PHOSPHATE SERPL-MCNC: 4.6 MG/DL (ref 2.5–4.5)
PLATELET # BLD AUTO: 286 10E3/UL (ref 150–450)
POTASSIUM BLD-SCNC: 3.7 MMOL/L (ref 3.4–5.3)
PROT SERPL-MCNC: 7.2 G/DL (ref 6.8–8.8)
RBC # BLD AUTO: 2.68 10E6/UL (ref 3.8–5.2)
SODIUM SERPL-SCNC: 138 MMOL/L (ref 133–144)
WBC # BLD AUTO: 11.4 10E3/UL (ref 4–11)

## 2022-07-04 PROCEDURE — 85027 COMPLETE CBC AUTOMATED: CPT | Performed by: INTERNAL MEDICINE

## 2022-07-04 PROCEDURE — 250N000013 HC RX MED GY IP 250 OP 250 PS 637: Performed by: STUDENT IN AN ORGANIZED HEALTH CARE EDUCATION/TRAINING PROGRAM

## 2022-07-04 PROCEDURE — 84100 ASSAY OF PHOSPHORUS: CPT | Performed by: INTERNAL MEDICINE

## 2022-07-04 PROCEDURE — 83735 ASSAY OF MAGNESIUM: CPT | Performed by: INTERNAL MEDICINE

## 2022-07-04 PROCEDURE — 250N000011 HC RX IP 250 OP 636: Performed by: STUDENT IN AN ORGANIZED HEALTH CARE EDUCATION/TRAINING PROGRAM

## 2022-07-04 PROCEDURE — 36415 COLL VENOUS BLD VENIPUNCTURE: CPT | Performed by: INTERNAL MEDICINE

## 2022-07-04 PROCEDURE — 120N000001 HC R&B MED SURG/OB

## 2022-07-04 PROCEDURE — 250N000013 HC RX MED GY IP 250 OP 250 PS 637: Performed by: EMERGENCY MEDICINE

## 2022-07-04 PROCEDURE — 80053 COMPREHEN METABOLIC PANEL: CPT | Performed by: INTERNAL MEDICINE

## 2022-07-04 PROCEDURE — 99232 SBSQ HOSP IP/OBS MODERATE 35: CPT | Performed by: INTERNAL MEDICINE

## 2022-07-04 PROCEDURE — 250N000013 HC RX MED GY IP 250 OP 250 PS 637: Performed by: INTERNAL MEDICINE

## 2022-07-04 PROCEDURE — 250N000013 HC RX MED GY IP 250 OP 250 PS 637: Performed by: HOSPITALIST

## 2022-07-04 RX ADMIN — QUETIAPINE 100 MG: 100 TABLET ORAL at 20:31

## 2022-07-04 RX ADMIN — AMOXICILLIN 500 MG: 500 CAPSULE ORAL at 06:27

## 2022-07-04 RX ADMIN — OXYCODONE HYDROCHLORIDE 5 MG: 5 TABLET ORAL at 09:55

## 2022-07-04 RX ADMIN — FAMOTIDINE 20 MG: 20 TABLET ORAL at 09:56

## 2022-07-04 RX ADMIN — OXYCODONE HYDROCHLORIDE 5 MG: 5 TABLET ORAL at 16:54

## 2022-07-04 RX ADMIN — OXYCODONE HYDROCHLORIDE 5 MG: 5 TABLET ORAL at 23:05

## 2022-07-04 RX ADMIN — MIRTAZAPINE 30 MG: 15 TABLET, FILM COATED ORAL at 23:05

## 2022-07-04 RX ADMIN — OXYCODONE HYDROCHLORIDE 5 MG: 5 TABLET ORAL at 03:21

## 2022-07-04 RX ADMIN — ACETAMINOPHEN 650 MG: 325 TABLET, FILM COATED ORAL at 14:05

## 2022-07-04 RX ADMIN — AMOXICILLIN 500 MG: 500 CAPSULE ORAL at 14:40

## 2022-07-04 RX ADMIN — NICOTINE 1 PATCH: 21 PATCH, EXTENDED RELEASE TRANSDERMAL at 09:55

## 2022-07-04 RX ADMIN — POLYETHYLENE GLYCOL 3350 17 G: 17 POWDER, FOR SOLUTION ORAL at 10:02

## 2022-07-04 RX ADMIN — FAMOTIDINE 20 MG: 20 TABLET ORAL at 20:31

## 2022-07-04 RX ADMIN — MULTIPLE VITAMINS W/ MINERALS TAB 1 TABLET: TAB at 09:56

## 2022-07-04 RX ADMIN — ACETAMINOPHEN 650 MG: 325 TABLET, FILM COATED ORAL at 20:31

## 2022-07-04 RX ADMIN — ACETAMINOPHEN 650 MG: 325 TABLET, FILM COATED ORAL at 06:27

## 2022-07-04 RX ADMIN — QUETIAPINE 100 MG: 100 TABLET ORAL at 09:55

## 2022-07-04 RX ADMIN — ENOXAPARIN SODIUM 30 MG: 30 INJECTION SUBCUTANEOUS at 18:24

## 2022-07-04 RX ADMIN — AMOXICILLIN 500 MG: 500 CAPSULE ORAL at 23:05

## 2022-07-04 RX ADMIN — THIAMINE HCL TAB 100 MG 100 MG: 100 TAB at 09:56

## 2022-07-04 RX ADMIN — FOLIC ACID 1 MG: 1 TABLET ORAL at 09:56

## 2022-07-04 ASSESSMENT — ACTIVITIES OF DAILY LIVING (ADL)
ADLS_ACUITY_SCORE: 24

## 2022-07-04 NOTE — PROGRESS NOTES
"[facility]  Hospitalist Progress Note     Assessment & Plan     ASSESSMENT:    56F with hx of alcoholism, breast cancer s/p BL mastectomy, tobacco abuse, and depression presents with failure to thrive at home and found to have acute pancreatitis along with other issues described below. Evaluated by psych and [facility] who recommended commitment.    PLAN:  -Alcoholic Pancreatitis: Resolved now on regular diet.  -Alcoholism:  cessation. Inpatient treatment for alcohol once bed is available.  -Major Depressive Disorder: Seroquel.  -Tobacco Abuse:  cessation. Nicotine patch.  -Dental Infection: Amoxicillin. F/u with dentist after hospital stay.  -Failure to Thrive, Severe Protein Malnutrition: RD consulted.  -Hyponatremia, Hypokalemia Hypophosphatemia Hypomagnesemia: Resolved.  -Breast Cancer: F/u with oncology after hospital stay.  -DVT Prophy: LMWH.  -Disposition: Inpatient treatment for alcohol once bed is available.      Sixto Nascimento MD    Subjective     Patient seen at bedside. Endorsed benefit from increased dose of Seroquel added last night and slept well. Denies sx of alcohol withdrawal. Awaiting placement.        Objective   Blood pressure 92/59, pulse 108, temperature 99.2  F (37.3  C), temperature source Oral, resp. rate 20, height 1.676 m (5' 6\"), weight 44.5 kg (98 lb), SpO2 97 %.    PHYSICAL EXAM  General: In no acute distress  CV: RRR.  Lungs: CTAB. Nl WOB.  Abd: Non-tender.  Ext: No edema.    LABS AND IMAGING: Reviewed and pertinent results discussed in assessment and plan.    "

## 2022-07-04 NOTE — TELEPHONE ENCOUNTER
Inpatient Bed Call Log 7/4/2022 Morning done at 7:05a    Adults:             Commitment    [facility] is posting 0 beds.     [facility] is posting 0 beds.    [facility] is posting 0 beds.    [facility] is posting 0 beds.    [facility] is posting 0 beds.    [facility] is posting 0 beds.    [facility] is posting 0 beds.    [facility] is posting 0 beds.      [facility] is posting 1 beds. Mixed unit 12+. Low acuity only.     [facility] is positing 0 beds. No aggression.     [facility] is posting 0 beds.     [facility] is posting 0 beds. Low acuity only.    [facility] is posting 1 beds.    [facility] is posting 1 beds. Low acuity.     [facility] is posting 2 beds. 72 hr hold required.     [facility] is posting 0 beds.       [facility] is posting 0 beds. Vol only, No Hx of aggression, violence or assault. No sexual offenders. No 72 hr holds.    [facility] is posting 5 beds. (Must have the cognitive ability to do programming. No aggressive or violent behavior or recent HX in the last 2 yrs. MH must be primary.)    [facility] is posting 0 beds. Low acuity only. Violence and aggression capped.     [facility] is posting 0 beds. Low acuity, Neg Covid.     [facility] is posting 0 beds. Covid neg. Vol only. Combined adolescent and adult unit. No aggressive or violent behavior. No registered sex offenders.     [facility] is posting 8 beds. Call for details.      [facility] is posting 4 beds.    7:40am No appropriate bed available.

## 2022-07-05 ENCOUNTER — TELEPHONE (OUTPATIENT)
Dept: BEHAVIORAL HEALTH | Facility: CLINIC | Age: 57
End: 2022-07-05

## 2022-07-05 ENCOUNTER — MEDICAL CORRESPONDENCE (OUTPATIENT)
Dept: MEDSURG UNIT | Facility: CLINIC | Age: 57
End: 2022-07-05

## 2022-07-05 LAB
MAGNESIUM SERPL-MCNC: 2 MG/DL (ref 1.6–2.3)
PHOSPHATE SERPL-MCNC: 4.6 MG/DL (ref 2.5–4.5)
POTASSIUM BLD-SCNC: 3.8 MMOL/L (ref 3.4–5.3)

## 2022-07-05 PROCEDURE — 99232 SBSQ HOSP IP/OBS MODERATE 35: CPT | Performed by: INTERNAL MEDICINE

## 2022-07-05 PROCEDURE — 120N000001 HC R&B MED SURG/OB

## 2022-07-05 PROCEDURE — 250N000013 HC RX MED GY IP 250 OP 250 PS 637: Performed by: STUDENT IN AN ORGANIZED HEALTH CARE EDUCATION/TRAINING PROGRAM

## 2022-07-05 PROCEDURE — 250N000011 HC RX IP 250 OP 636: Performed by: STUDENT IN AN ORGANIZED HEALTH CARE EDUCATION/TRAINING PROGRAM

## 2022-07-05 PROCEDURE — 84100 ASSAY OF PHOSPHORUS: CPT | Performed by: INTERNAL MEDICINE

## 2022-07-05 PROCEDURE — 250N000013 HC RX MED GY IP 250 OP 250 PS 637: Performed by: HOSPITALIST

## 2022-07-05 PROCEDURE — 250N000013 HC RX MED GY IP 250 OP 250 PS 637: Performed by: INTERNAL MEDICINE

## 2022-07-05 PROCEDURE — 84132 ASSAY OF SERUM POTASSIUM: CPT | Performed by: INTERNAL MEDICINE

## 2022-07-05 PROCEDURE — 83735 ASSAY OF MAGNESIUM: CPT | Performed by: INTERNAL MEDICINE

## 2022-07-05 PROCEDURE — 250N000013 HC RX MED GY IP 250 OP 250 PS 637: Performed by: EMERGENCY MEDICINE

## 2022-07-05 PROCEDURE — 36416 COLLJ CAPILLARY BLOOD SPEC: CPT | Performed by: INTERNAL MEDICINE

## 2022-07-05 RX ORDER — POLYETHYLENE GLYCOL 3350 17 G/17G
17 POWDER, FOR SOLUTION ORAL ONCE
Status: COMPLETED | OUTPATIENT
Start: 2022-07-05 | End: 2022-07-05

## 2022-07-05 RX ADMIN — OXYCODONE HYDROCHLORIDE 5 MG: 5 TABLET ORAL at 06:37

## 2022-07-05 RX ADMIN — OXYCODONE HYDROCHLORIDE 5 MG: 5 TABLET ORAL at 15:55

## 2022-07-05 RX ADMIN — OXYCODONE HYDROCHLORIDE 5 MG: 5 TABLET ORAL at 23:49

## 2022-07-05 RX ADMIN — ENOXAPARIN SODIUM 30 MG: 30 INJECTION SUBCUTANEOUS at 18:14

## 2022-07-05 RX ADMIN — FAMOTIDINE 20 MG: 20 TABLET ORAL at 21:24

## 2022-07-05 RX ADMIN — AMOXICILLIN 500 MG: 500 CAPSULE ORAL at 06:37

## 2022-07-05 RX ADMIN — MIRTAZAPINE 30 MG: 15 TABLET, FILM COATED ORAL at 23:48

## 2022-07-05 RX ADMIN — QUETIAPINE FUMARATE 25 MG: 25 TABLET ORAL at 18:20

## 2022-07-05 RX ADMIN — POLYETHYLENE GLYCOL 3350 17 G: 17 POWDER, FOR SOLUTION ORAL at 18:14

## 2022-07-05 RX ADMIN — QUETIAPINE 100 MG: 100 TABLET ORAL at 10:04

## 2022-07-05 RX ADMIN — ACETAMINOPHEN 650 MG: 325 TABLET, FILM COATED ORAL at 19:47

## 2022-07-05 RX ADMIN — AMOXICILLIN 500 MG: 500 CAPSULE ORAL at 14:26

## 2022-07-05 RX ADMIN — MULTIPLE VITAMINS W/ MINERALS TAB 1 TABLET: TAB at 10:05

## 2022-07-05 RX ADMIN — NICOTINE 1 PATCH: 21 PATCH, EXTENDED RELEASE TRANSDERMAL at 10:06

## 2022-07-05 RX ADMIN — POLYETHYLENE GLYCOL 3350 17 G: 17 POWDER, FOR SOLUTION ORAL at 10:14

## 2022-07-05 RX ADMIN — AMOXICILLIN 500 MG: 500 CAPSULE ORAL at 21:23

## 2022-07-05 RX ADMIN — QUETIAPINE 100 MG: 100 TABLET ORAL at 21:23

## 2022-07-05 RX ADMIN — FOLIC ACID 1 MG: 1 TABLET ORAL at 10:05

## 2022-07-05 RX ADMIN — FAMOTIDINE 20 MG: 20 TABLET ORAL at 10:04

## 2022-07-05 RX ADMIN — THIAMINE HCL TAB 100 MG 100 MG: 100 TAB at 10:05

## 2022-07-05 ASSESSMENT — ACTIVITIES OF DAILY LIVING (ADL)
ADLS_ACUITY_SCORE: 24

## 2022-07-05 NOTE — PLAN OF CARE
Goal Outcome Evaluation:    Plan of Care Reviewed With: patient     Overall Patient Progress: improving    Patient alert and oriented  Peripheral IV saline locked  VSS, on room air  Pain controlled with PRN medication  Up independently   Tolerating diet, denies nausea  No BM today, patient feels constipated- given PRN miralax  Chem dep reconsulted, discharge pending their recommendation

## 2022-07-05 NOTE — TELEPHONE ENCOUNTER
147pm - psych consult provider called, reports the pt does not need IP MH. Pt has a dual dx tx plan set up, pt removed from work list. Intake no longer following

## 2022-07-05 NOTE — TELEPHONE ENCOUNTER
R: The pt is currently at [facility] on Medical 5 awaiting a medical to behavioral transfer.      8:20a Per chart review,  IP Psych consult did not recommend IP Psych- consult was completed on 7/1 at 11:08a by Jose. Per psych provider, the pt is denying SI/ HI/ AH/ VH. Recommendations are related to medication adjustments, dual OP program and PT/OT. Intake reached out to leadership for consultation on how to proceed w/ placement. Intake awaiting response.     9:27a Per leadership- pt needs an up to date follow-up note by psych.     9:49a  from [facility] 5th floor called Intake- pt will have an up to date psych consult today 7/5/2022. SW to call Intake after Psych has seen the pt for an update regarding the pt's referral. Intake awaiting reassessment and update from the unit SW.

## 2022-07-05 NOTE — PROGRESS NOTES
Care Management Follow Up    Length of Stay (days): 7    Expected Discharge Date: 07/06/2022 - Pending psych's updated recommendation     Concerns to be Addressed: all concerns addressed in this encounter     Patient plan of care discussed at interdisciplinary rounds: Yes    Anticipated Discharge Disposition: TBD, Pending psych consult     Anticipated Discharge Services: Mental Health Resources, Chemical Dependency Resources  Anticipated Discharge DME: None    Patient/family educated on Medicare website which has current facility and service quality ratings: no  Education Provided on the Discharge Plan:  yes  Patient/Family in Agreement with the Plan: unable to assess    Referrals Placed by CM/SW:  TBD based on psych's updated recommendation  Private pay costs discussed: Not applicable    Additional Information:  Kristie spoke with Darlin Blancas [facility] PPS worker P: [phone], who said that she spoke with the pt and the [facility] will not support a commitment for the pt.  They do not see that the pt is a danger to herself or others to support a commitment being supported.      Sw met with the pt to discuss her discharge disposition.  Sw told her that the [facility] will not pursue a commitment for her to go to IP Psych.  Sw told her that Psych and CD is consulted to see her again.  The pt is agreeable to meeting with both of them.  Kristie received a call from Zehra Everett, with [facility] Triage and Transition - Consult and Liaison P: [phone], who clarified that she is not recommending that the pt go to IP Psych.  She said that the pt needs dual treatment for MH and CD and that she will not get that at IP Psych.  Zehra said that they will need an updated [place] [facility] assessment and that CD was consulted to see the pt again since she declined the first time CD attempted to meet with the pt.  Zehra said that they will need an updated CD assessment to get their recommendation on the appropriate treatment plan  for the pt.  Zehra said that if the pt goes to an IP CD treatment program, if it is a dual diagnosis program, she can get both CD and MH treatment.  Zehra said that the pt will be able to follow the least restrictive recommendations to potentially include Intensive Outpatient (IOP) treatment.  Zehra said that the pt may need residential support if IOP is not enough for her.  Zehra did mention [facility] as a potential treatment option.    Sw will continue with discharge planning and will be available as needed until discharge.      RANDAL Scott, [facility]  Inpatient Care Coordination  [facility]  [phone]

## 2022-07-05 NOTE — PLAN OF CARE
Goal Outcome Evaluation:     To Do:  End of Shift Summary  For vital signs and complete assessments, please see documentation flowsheets.     Pertinent assessments: BP soft. Denies nausea and SOB. Oxycodone 2x and Tylenol given for tooth ache.  Major Shift Events SBP 70's. No dizziness or lightheadedness. Improved to 94/62  Treatment Plan: pain management  Bedside Nurse: Renetta Montano RN

## 2022-07-05 NOTE — PROGRESS NOTES
"[facility]  Hospitalist Progress Note     Assessment & Plan     ASSESSMENT:    56F with hx of alcoholism, breast cancer s/p BL mastectomy, tobacco abuse, and depression presents with failure to thrive at home and found to have acute pancreatitis, failure to thrive at home (was found surrounded by her dog's feces), and severe depression. Evaluated by psych who recommended committment to inpatient psych for mental health treatment. Awaiting eval by [facility], which will hopefully be today.     PLAN:    -Major Depressive Disorder: Seroquel. Psych rec pt be committed for inpatient psych tx - waiting eval by [facility].    -Alcoholic Pancreatitis: Resolved now on regular diet.    -Alcoholism:  cessation.    -Tobacco Abuse:  cessation. Nicotine patch.    -Dental Infection: Amoxicillin. F/u with dentist after hospital stay.    -Failure to Thrive, Severe Protein Malnutrition: RD consulted.    -Hyponatremia, Hypokalemia Hypophosphatemia Hypomagnesemia: Resolved.    -Breast Cancer: F/u with oncology after hospital stay.    -DVT Prophy: LMWH.    -Disposition: Inpatient treatment for alcohol once bed is available.      Sixto Nascimento MD    Subjective     Patient seen at bedside and case discussed with SW. Psych has previously recommended patient be committed to inpatient psych for mental health treatment although awaiting eval by [facility], which will hopefully be today. Discussed this with patient - patient is not willing to go to inpatient psych voluntarily.        Objective   Blood pressure 96/65, pulse 99, temperature 98.5  F (36.9  C), temperature source Oral, resp. rate 20, height 1.676 m (5' 6\"), weight 44.5 kg (98 lb), SpO2 98 %.    PHYSICAL EXAM  General: In no acute distress  CV: RRR.  Lungs: CTAB. Nl WOB.  Abd: Non-tender.  Ext: No edema.    LABS AND IMAGING: Reviewed and pertinent results discussed in assessment and plan.    "

## 2022-07-05 NOTE — CONSULTS
Triage and Transition - Consult and Liaison     Audelia Comer  July 5, 2022    Session start: 1:11PM  Session end: 1:28PM  Session duration in minutes: 17  CPT utilized: 53984  Patient was seen virtually ([employer] cart or other teleconferencing device).  Anticipated number of sessions or this episode of care: 1-4    Diagnosis:   F33.2 Major Depressive Disorder, Recurrent Episode, Severe, With anxious distress - primary  F43.10 Posttraumatic Stress Disorder  F10.20 Alcohol Use Disorder, Severe, In a controlled environment  F17.200 Tobacco Use Disorder, Severe, In a controlled environment      PLAN/Recommendations:     Continue care coordination with social work, TAVO .     Maintain current transition plan. Next steps include: [facility] assessment and transfer to start recommended Dual-Diagnosis (MICD) treatment programming per those recommendations.     Additional interventions may involve: Schedule appointment to establish care with outpatient psychiatric medication provider. Patient prefers female provider.     Order placed for IP Psychiatry Consult for psych med review, per patient request. Patient will not otherwise continue to be followed by this service.    Reason for consult: Audelia is 56 year old female person. Psychiatry consult was requested due to patient requesting to follow treatment recommendations voluntarily without committment. Patient was seen by [facility] Consult & Liaison team.     Presenting problem: Upon clinical interview with this consultant 6/30/22, the patient reported symptoms of depression, grief, posttraumatic stress, anxiety, and excessive alcohol use, including depressed mood, loss of appetite, marked weight loss, neglecting ADLs, agoraphobia, excessive worry, intrusive thoughts and memories of trauma, and feelings of shame, guilt, and overwhelm. The patient reported that she developed a habit of drinking a couple glasses of wine and a couple mixed drinks at night after work, and that now  "she has continued that drinking pattern \"on an empty stomach,\" as she has not felt well enough to eat. The patient had presented to ED via EMS with marked weight loss and was assessed by attending physician with Failure to Thrive, Severe Malnutrition, Acute Alcoholic Pancreatitis, Normocytic Anemia, Hepatitic Steatosis, Generalized Weakness, Hyponatremia, Hypokalemia, Hypophosphatemia, and Hypomagnesemia. After speaking with the patient's [facility] , as well as the hospital , this consultant completed an Examiner's Statement in favor of MI+CD commitment. Briefly summarized:     \"The patient displays limited insight into the severity and life-threatening consequences of her mental illness and substance use disorder, including the high risk for relapse without consistent treatment adherence as evidenced by making frequent minimizing statements during the clinical interview, dismissing the TAVO , and asking daily to discharge home without additional supports. The patient's marked decompensation is evidence that she has not been able to manage her mental health and substance use disorder on her own terms--she has not engaged necessary resources to maintain minimal wellbeing.   Recommendations:    - Establish psychiatric medication management with an outpatient provider who can provide continuity of care and coordinate with inpatient providers as needed. Patient prefers female provider.   - Follow up with primary care provider and any medical specialty care as recommended by inpatient attending physician to support whole-person well-being.   - [place] Comprehensive Assessment for referral to least restrictive level of care equipped to provide integrated mental health + substance use disorder treatment meeting patient's current needs. Follow recommendations and update assessment as needed.   - Case management. \"      Session Summary: Today the patient reported that \"it wouldn't be the " "worst thing\" if she engaged in formal co-occurring disorders (MICD) treatment programming at this time. The patient reported that she met with \"Alisha\" a pre-petition screener from [facility] earlier today, and that she learned that if she agrees to recommendations voluntarily, the county does not need to pursue a stay of commitment. The patient reported that \"going before a  scared me\" and that she \"felt like I was being punished.\" The patient reported that she is willing to do what she needs to do get better, \"But I'm not going to do it if I'm forced.\" Consultant reflected that this is a good outcome, that patient has decided to seek support voluntarily. Validated that commitment/stay of commitment is not needed if patient is able to access the care she needs without it. Validated patient's feelings of fear, shame, anxiety.     Patient reported ongoing worries about not knowing what is going to happen next, as well as eagerness to get started with the process. \"I'm not doing anything just sitting here.\" Reflected that patient may be benefiting from the time to rest, eat, and gain some more strength and stamina before beginning the demands of treatment programming. Patient reported concerns about being increasingly sleepy. Patient reported concern that hospitalist did not start Lexapro after patient saw Dr. Fatima 7/1. Patient reported that she asked Dr. Hylton about it, and he told her that it was \"mostly for people experiencing withdrawal and you're way past that now.\" Patient reported that Dr. Hylton increased her Seroquel instead. As this consultant is not a medication provider, placed new IP Psychiatry Consult order, per patient's request, for a psych med provider to review.     Mental Status Exam   Affect: Other: congruent with content  Appearance: Disheveled within expected limits for context  Attention Span/Concentration: Attentive    Eye Contact: Engaged  Fund of Knowledge: Other: within expected " limits   Language /Speech Content: Fluent  Language /Speech Volume: Other: within expected limits   Language /Speech Rate/Productions: within expected limits  Recent Memory: Intact  Remote Memory: Intact  Mood: Anxious and Depressed   Orientation:   Person: Yes   Place: Yes  Time of Day: Yes   Date: Yes   Situation (Do they understand why they are here?): Yes   Psychomotor Behavior: Other: within expected limits, a bit fidgity   Thought Content: Clear  Thought Form: Goal Directed    Current medications:   Current Facility-Administered Medications   Medication     acetaminophen (TYLENOL) tablet 650 mg    Or     acetaminophen (TYLENOL) Suppository 650 mg     amoxicillin (AMOXIL) capsule 500 mg     cetirizine (zyrTEC) tablet 10 mg     enoxaparin ANTICOAGULANT (LOVENOX) injection 30 mg     famotidine (PEPCID) tablet 20 mg     flumazenil (ROMAZICON) injection 0.2 mg     fluticasone (FLONASE) 50 MCG/ACT spray 1 spray     folic acid (FOLVITE) tablet 1 mg     OLANZapine zydis (zyPREXA) ODT tab 5-10 mg    Or     haloperidol lactate (HALDOL) injection 2.5-5 mg     lidocaine (LMX4) cream     lidocaine 1 % 0.1-1 mL     melatonin tablet 5 mg     mirtazapine (REMERON) tablet 30 mg     multivitamin w/minerals (THERA-VIT-M) tablet 1 tablet     naloxone (NARCAN) injection 0.2 mg    Or     naloxone (NARCAN) injection 0.4 mg    Or     naloxone (NARCAN) injection 0.2 mg    Or     naloxone (NARCAN) injection 0.4 mg     nicotine (COMMIT) lozenge 2 mg     nicotine (NICODERM CQ) 21 MG/24HR 24 hr patch 1 patch    Followed by     [START ON 8/9/2022] nicotine (NICODERM CQ) 14 MG/24HR 24 hr patch 1 patch    Followed by     [START ON 9/6/2022] nicotine (NICODERM CQ) 7 MG/24HR 24 hr patch 1 patch     nicotine Patch in Place     ondansetron (ZOFRAN ODT) ODT tab 4 mg    Or     ondansetron (ZOFRAN) injection 4 mg     oxyCODONE (ROXICODONE) tablet 5 mg     pantoprazole (PROTONIX) EC tablet 40 mg     polyethylene glycol (MIRALAX) Packet 17 g      prochlorperazine (COMPAZINE) injection 10 mg    Or     prochlorperazine (COMPAZINE) tablet 10 mg    Or     prochlorperazine (COMPAZINE) suppository 25 mg     QUEtiapine (SEROquel) tablet 100 mg     QUEtiapine (SEROquel) tablet 25 mg     sodium chloride (PF) 0.9% PF flush 3 mL     sodium chloride (PF) 0.9% PF flush 3 mL     thiamine (B-1) tablet 100 mg         MeasurableTreatment Goal(s) related to diagnosis / functional impairment(s)    GOAL: Patient will identify cognitive distortions in relation to their anxiety and explore alternatives.      Objective A: Patient will identify thoughts and beliefs about self and the world as they relate to anxiety.      Intervention: LMHP will guide discussion and assist patient in identification of negative beliefs.      Objective B: Patient will challenge negative cognitions.      Intervention: LMHP will guide patient in examining the evidence for and against beliefs.             Therapeutic intervention and progress:  Therapeutic intervention consisted of active listening, validation, thought reframing, CBT concepts and Motivational Interviewing. Patient is making progress towards treatment goals as evidenced by agreeing to move forward toward treatment recommendations voluntarily.     Care Team Coordination  - Reviewed chart and coordinated with hospitalist Crow Brito MD.     - Update provided to care team including RANDAL Scott, LGSW.      - Called  Intake 1:47PM to remove patient from IP waitlist (was erroneously placed on list last week).     - Placed new IP Psychiatry Consult order for psych med review    NICKY SAAVEDRA M.Ed., [facility], [facility]  Triage and Transition - Consult and Liaison   [phone]

## 2022-07-06 VITALS
HEIGHT: 66 IN | HEART RATE: 96 BPM | TEMPERATURE: 98.6 F | SYSTOLIC BLOOD PRESSURE: 95 MMHG | DIASTOLIC BLOOD PRESSURE: 58 MMHG | WEIGHT: 98 LBS | RESPIRATION RATE: 14 BRPM | BODY MASS INDEX: 15.75 KG/M2 | OXYGEN SATURATION: 96 %

## 2022-07-06 LAB
HOLD SPECIMEN: NORMAL
MAGNESIUM SERPL-MCNC: 2 MG/DL (ref 1.6–2.3)
PHOSPHATE SERPL-MCNC: 4.9 MG/DL (ref 2.5–4.5)
POTASSIUM BLD-SCNC: 3.9 MMOL/L (ref 3.4–5.3)

## 2022-07-06 PROCEDURE — 84132 ASSAY OF SERUM POTASSIUM: CPT | Performed by: INTERNAL MEDICINE

## 2022-07-06 PROCEDURE — 250N000013 HC RX MED GY IP 250 OP 250 PS 637: Performed by: EMERGENCY MEDICINE

## 2022-07-06 PROCEDURE — 250N000013 HC RX MED GY IP 250 OP 250 PS 637: Performed by: STUDENT IN AN ORGANIZED HEALTH CARE EDUCATION/TRAINING PROGRAM

## 2022-07-06 PROCEDURE — 84100 ASSAY OF PHOSPHORUS: CPT | Performed by: INTERNAL MEDICINE

## 2022-07-06 PROCEDURE — H0001 ALCOHOL AND/OR DRUG ASSESS: HCPCS

## 2022-07-06 PROCEDURE — 36415 COLL VENOUS BLD VENIPUNCTURE: CPT | Performed by: INTERNAL MEDICINE

## 2022-07-06 PROCEDURE — 83735 ASSAY OF MAGNESIUM: CPT | Performed by: INTERNAL MEDICINE

## 2022-07-06 PROCEDURE — 99239 HOSP IP/OBS DSCHRG MGMT >30: CPT | Performed by: STUDENT IN AN ORGANIZED HEALTH CARE EDUCATION/TRAINING PROGRAM

## 2022-07-06 PROCEDURE — 250N000013 HC RX MED GY IP 250 OP 250 PS 637: Performed by: INTERNAL MEDICINE

## 2022-07-06 RX ORDER — QUETIAPINE FUMARATE 100 MG/1
100 TABLET, FILM COATED ORAL 2 TIMES DAILY
Qty: 20 TABLET | Refills: 0 | Status: SHIPPED | OUTPATIENT
Start: 2022-07-06 | End: 2022-10-04

## 2022-07-06 RX ORDER — LANOLIN ALCOHOL/MO/W.PET/CERES
100 CREAM (GRAM) TOPICAL DAILY
Qty: 20 TABLET | Refills: 0 | Status: SHIPPED | OUTPATIENT
Start: 2022-07-07

## 2022-07-06 RX ORDER — QUETIAPINE FUMARATE 25 MG/1
25 TABLET, FILM COATED ORAL 2 TIMES DAILY PRN
Qty: 10 TABLET | Refills: 0 | Status: SHIPPED | OUTPATIENT
Start: 2022-07-06 | End: 2022-10-04

## 2022-07-06 RX ORDER — IBUPROFEN 400 MG/1
400 TABLET, FILM COATED ORAL EVERY 6 HOURS PRN
Qty: 30 TABLET | Refills: 0 | Status: ON HOLD | OUTPATIENT
Start: 2022-07-06 | End: 2023-02-02

## 2022-07-06 RX ORDER — ACETAMINOPHEN 325 MG/1
650 TABLET ORAL EVERY 6 HOURS PRN
Qty: 30 TABLET | Refills: 0 | Status: SHIPPED | OUTPATIENT
Start: 2022-07-06

## 2022-07-06 RX ORDER — OXYCODONE HYDROCHLORIDE 5 MG/1
5 TABLET ORAL EVERY 6 HOURS PRN
Qty: 6 TABLET | Refills: 0 | Status: SHIPPED | OUTPATIENT
Start: 2022-07-06 | End: 2022-07-09

## 2022-07-06 RX ORDER — FOLIC ACID 1 MG/1
1 TABLET ORAL DAILY
Qty: 20 TABLET | Refills: 0 | Status: ON HOLD | OUTPATIENT
Start: 2022-07-07 | End: 2023-02-03

## 2022-07-06 RX ORDER — AMOXICILLIN 500 MG/1
500 CAPSULE ORAL EVERY 8 HOURS
Qty: 12 CAPSULE | Refills: 0 | Status: SHIPPED | OUTPATIENT
Start: 2022-07-06 | End: 2022-07-10

## 2022-07-06 RX ADMIN — AMOXICILLIN 500 MG: 500 CAPSULE ORAL at 14:31

## 2022-07-06 RX ADMIN — QUETIAPINE 100 MG: 100 TABLET ORAL at 08:28

## 2022-07-06 RX ADMIN — NICOTINE 1 PATCH: 21 PATCH, EXTENDED RELEASE TRANSDERMAL at 08:29

## 2022-07-06 RX ADMIN — AMOXICILLIN 500 MG: 500 CAPSULE ORAL at 06:20

## 2022-07-06 RX ADMIN — FAMOTIDINE 20 MG: 20 TABLET ORAL at 08:25

## 2022-07-06 RX ADMIN — MULTIPLE VITAMINS W/ MINERALS TAB 1 TABLET: TAB at 08:27

## 2022-07-06 RX ADMIN — FOLIC ACID 1 MG: 1 TABLET ORAL at 08:27

## 2022-07-06 RX ADMIN — THIAMINE HCL TAB 100 MG 100 MG: 100 TAB at 08:27

## 2022-07-06 RX ADMIN — OXYCODONE HYDROCHLORIDE 5 MG: 5 TABLET ORAL at 06:22

## 2022-07-06 ASSESSMENT — ACTIVITIES OF DAILY LIVING (ADL)
ADLS_ACUITY_SCORE: 24

## 2022-07-06 NOTE — PLAN OF CARE
Goal Outcome Evaluation:      End of Shift Summary  For vital signs and complete assessments, please see documentation flowsheets.     Pertinent assessments: Pt  A&O X4. Vss with soft Bp. Had some pain to her tooth oxy x1 and tylenol x1. No nausea or sob reported. C/o of some constipation miralax given x 1 with results and BS audible. Had a good appetite.    Major Shift Events :Constipation with result.    Treatment Plan: Pain management, Po ABX, Chem dep consulted.

## 2022-07-06 NOTE — PROGRESS NOTES
Pt states understanding of discharge meds - follow up --and instructions---- meds given --- son is driving pt home

## 2022-07-06 NOTE — PROGRESS NOTES
Care Management Discharge Note    Discharge Date: 07/06/2022       Discharge Disposition: Home, Outpatient Mental Health, Outpatient Chemical Dependency    Discharge Services: Meals on Wheels, Mental Health Resources, Chemical Dependency Resources    Discharge DME: None    Discharge Transportation: pt's son    Private pay costs discussed: Not applicable    PAS Confirmation Code:  (N/A)  Patient/family educated on Medicare website which has current facility and service quality ratings: N/A    Education Provided on the Discharge Plan:  yes  Persons Notified of Discharge Plans: Pt, physician, pt's bedside nurse, pt's [facility]  Patient/Family in Agreement with the Plan: yes    Handoff Referral Completed: Yes    Additional Information:  The pt will discharge home today with outpatient MH and CD resources.  The pt's AVS has been updated to include the follow-up recommendation for CD treatment and MH treatment.  The pt agreed to sign the LISETTE for [facility] CD treatment, but was not interested in [place] treatment since she has been there before.  Kristie sent the signed LISETTE back to the  CD team.  The pt was given OP MH resources and plans to contact [facility] to begin services.  She will do both virtual and in person, depending on when she can get rides to the appointments.    Kristie updated Darlin Blancas [facility] PPS worker P: [phone], who will follow-up with the pt post discharge for services and assistance.  The pt is aware of all of her follow-up needs and plans to follow-through with what has been recommended by the specialities and medical providers.    Sw will continue to be available as needed until discharge.        RANDAL Scott, [facility]  Inpatient Care Coordination  [facility]  [phone]

## 2022-07-06 NOTE — CONSULTS
7/6/2022    Pt has been interviewed via [employer] and CD Consult has been completed. Email has been sent to unit  with [facility]'s for pt to sign. Referrals can be sent when if pt decides she would like to attend CD treatment.     Recommendations:      1. Abstain from all non-prescribed mood-altering substances  2. Take all medications as prescribed  3. Enter and complete an IOP  treatment program  4. Increase sober support, attend support meetings at least one time weekly  5. Follow up with attending grief support group and scheduling a therapy appointment        6.   Follow all recommendations of your medical providers        Clinical Substantiation:       Pt meets criteria for Alcohol use disorder. Pt reports she has attended outpatient CD treatment in the past. Pt reports she has not been attending sober support meetings. Pt reports she has been through major life stressors in the past year (daughter's death, breast cancer, unemployment). Pt reports she has been isolating at home. Pt reports she would not want to attend inpatient CD treatment. Pt reports she might be interested in attending virtual outpatient CD treatment. Pt reports she received information from the  about a grief support group. Pt reports she would like to attend. Pt reports she would be interested in attending therapy also and wrote down information to contact [facility].      Referrals/ Alternatives:      CD Outpatient Treatment-[facility] -  Phone: [phone]  Fax: [phone]     [facility]  Phone: [phone]  Fax: [phone]     [facility] waitlist: [phone]  [facility] Admissions: [phone]     TAVO consult completed by: GRETTA Wu  Phone Number: [phone]  E-mail Address: [email or web address]  [address] [place] [id number]

## 2022-07-06 NOTE — PLAN OF CARE
Goal Outcome Evaluation:    Plan of Care Reviewed With: patient     Overall Patient Progress: improving    Outcome Evaluation: Overall improving appetite and PO intake over the past week. Patient reports feeling stressed/overwhelmed the past 24-48 hours so intake has been acutely decreased again - utilizing Ensure today for nutritional intake.    Milena Thompson RD, LD

## 2022-07-06 NOTE — DISCHARGE SUMMARY
[facility]  Discharge Summary  Name: Audelia Comer    MRN: [id number]  YOB: 1965    Age: 56 year old  Date of Discharge:  7/6/2022  2:42 PM  Date of Admission: 6/28/2022  Primary Care Provider: Randee Woods  Discharge Physician:  Jonathon Narvaez DO  Discharging Service:  Hospitalist      Hospital Course  Audelia Comer is a 56 year old female with a history of alcohol abuse, depression, breast cancer status post bilateral mastectomy in remission, active nicotine dependence with cigarettes admitted on 6/28/2022 after a welfare check was placed on the patient and she was found by [facility] crisis worker to have feces on the floor (possibly from the dog) and too weak to walk.     In the emergency department, the patient was found to have a blood pressure of 129/94, respiratory rate 18, heart rate 114, SPO2 100% on room air.  Initial lab work showed sodium 128, potassium 2.9, chloride 91, BUN 5, creatinine 0.40, anion gap 17, magnesium 1.4, phosphorus 1.8, alkaline phosphatase 340, AST//27, lipase 543, hemoglobin 10.4, blood alcohol level less than 0.01.  The patient had a CT abdomen and pelvis in the emergency department that showed small fluid posterior to the pancreas tail is slightly increased in the interval possibly representing edematous pancreatitis, heterogenous appearance of the liver suggesting prominent fatty infiltration.  Right upper quadrant ultrasound was obtained that showed hepatic steatosis without evidence of cholelithiasis or biliary obstruction.  The patient was started on IV fluids, made n.p.o., and provided with pain control for the treatment of acute alcoholic pancreatitis.     Given concern for the patient's wellbeing at home as well as her severe depression, psychiatry and chemical dependency were consulted to see the patient.  There was initial consideration for inpatient psychiatry with voluntary commitment.  However, [facility] who denies  need to pursue commitment at this time as the patient is voluntary.  Psychiatry and chemical dependency have cleared the patient for outpatient treatment at this time.  The patient was discharged home with her son.    Discharge Diagnoses:  Acute Alcoholic Pancreatitis  Patient with severe alcohol use disorder who presented with lipase 543, CT A/P imaging consistent with small fluid posterior to the pancreas tail slightly increased possibly representing edematous pancreatitis.  Patient was treated with IV fluid resuscitation.  She was tolerating regular diet on the day of discharge.  She had no abdominal pain.  Encouraged alcohol cessation on discharge.     Severe alcohol use disorder  Reports drinking several glasses of wine daily, but has not drank in several days due to her weakness.  The patient was given aggressive IV fluid resuscitation, multivitamin, folate acid, thiamine while inpatient.  She was monitored on [facility] protocol.  She was evaluated by chemical dependency who recommended outpatient chemical dependency treatment.  She was provided with phone numbers/resources on the day discharge and plan to call tomorrow.     Failure to Thrive  Severe Malnutrition  Generalized Weakness  Hyponatremia  Hypokalemia  Hypophosphatemia  Hypomagnesemia  All of these are likely related to her above severe alcohol use disorder.  Her electrolytes were replaced as needed while inpatient.  She was given aggressive IV fluid resuscitation which improved her hyponatremia/weakness.  She was cleared for discharge home.     Major depressive disorder and generalized anxiety disorder:  Worsened in the setting of the passing of her daughter last year, diagnosis of breast cancer requiring bilateral mastectomy, job loss, and fear of eviction.  Psychiatry was consulted and initially recommended inpatient psychiatry.  [facility] was following.  As the patient was voluntary, [facility] did not feel the need to pursue commitment at this  time.  Psychiatry and chemical dependency have cleared the patient for outpatient treatment -we are hoping for a dual MH/CD treatment center.  The patient was provided with resources on discharge and plans to call tomorrow to schedule appointments.  She was also referred to psychiatry on discharge.  She was discharged with Seroquel 100 mg twice daily with 25 mg to be used as needed for anxiety.     Left Upper Molar Dental Infection  Patient presented with left-sided face pain.  She was found to have a molar dental infection.  She reports that she is been dealing this for many years.  She was prescribed amoxicillin with significant improvement in her symptoms.  She reports ongoing need for pain medications and therefore she will be discharged with Tylenol, ibuprofen, and a couple tablets of oxycodone.  I informed her that she should follow-up with her dentist.     Normocytic Anemia  Likely secondary to alcohol abuse and hemodilution.  CBC on follow-up with her primary doctor.     Nicotine Dependence with Cigarettes  Smokes 1 pck per day  -Nicotine patch     Breast Cancer  S/p bilateral mastectomy and now considered cured  -Recommend continued follow up with Oncology     Hepatic Steatosis  Elevated LFTs  Likely secondary to etoh abuse.  No more than 2 g of Tylenol per day    Discharge Disposition:  Discharged to home    Allergies:  No Known Allergies     Discharge Medications:        Review of your medicines      START taking      Dose / Directions   acetaminophen 325 MG tablet  Commonly known as: TYLENOL  Used for: Toothache      Dose: 650 mg  Take 2 tablets (650 mg) by mouth every 6 hours as needed for mild pain Do not take more than 2000 mg per day. Alternate with ibuprofen.  Quantity: 30 tablet  Refills: 0     amoxicillin 500 MG capsule  Commonly known as: AMOXIL  Indication: dental infection  Used for: Toothache      Dose: 500 mg  Take 1 capsule (500 mg) by mouth every 8 hours for 4 days  Quantity: 12  capsule  Refills: 0     folic acid 1 MG tablet  Commonly known as: FOLVITE  Used for: Alcohol-induced acute pancreatitis, unspecified complication status, Alcohol use disorder, severe, in early remission (H)      Dose: 1 mg  Start taking on: July 7, 2022  Take 1 tablet (1 mg) by mouth daily  Quantity: 20 tablet  Refills: 0     ibuprofen 400 MG tablet  Commonly known as: ADVIL/MOTRIN  Used for: Toothache      Dose: 400 mg  Take 1 tablet (400 mg) by mouth every 6 hours as needed for moderate pain  Quantity: 30 tablet  Refills: 0     oxyCODONE 5 MG tablet  Commonly known as: ROXICODONE  Used for: Toothache      Dose: 5 mg  Take 1 tablet (5 mg) by mouth every 6 hours as needed for severe pain  Quantity: 6 tablet  Refills: 0     thiamine 100 MG tablet  Commonly known as: B-1  Used for: Alcohol-induced acute pancreatitis, unspecified complication status, Alcohol use disorder, severe, in early remission (H)      Dose: 100 mg  Start taking on: July 7, 2022  Take 1 tablet (100 mg) by mouth daily  Quantity: 20 tablet  Refills: 0        CONTINUE these medicines which may have CHANGED, or have new prescriptions. If we are uncertain of the size of tablets/capsules you have at home, strength may be listed as something that might have changed.      Dose / Directions   * QUEtiapine 100 MG tablet  Commonly known as: SEROquel  This may have changed:     medication strength    how much to take    additional instructions  Used for: Anxiety      Dose: 100 mg  Take 1 tablet (100 mg) by mouth 2 times daily  Quantity: 20 tablet  Refills: 0     * QUEtiapine 25 MG tablet  Commonly known as: SEROquel  This may have changed:     when to take this    reasons to take this  Used for: Anxiety      Dose: 25 mg  Take 1 tablet (25 mg) by mouth 2 times daily as needed (Anxiety)  Quantity: 10 tablet  Refills: 0         * This list has 2 medication(s) that are the same as other medications prescribed for you. Read the directions carefully, and ask your  doctor or other care provider to review them with you.            CONTINUE these medicines which have NOT CHANGED      Dose / Directions   azelastine 0.1 % nasal spray  Commonly known as: ASTELIN      Dose: 1 spray  Spray 1 spray into both nostrils daily as needed  Refills: 0     cetirizine 10 MG tablet  Commonly known as: zyrTEC      Dose: 10 mg  Take 10 mg by mouth daily as needed  Refills: 0     fluticasone 50 MCG/ACT nasal spray  Commonly known as: FLONASE      Dose: 1 spray  Spray 1 spray into both nostrils daily as needed  Refills: 0     mirtazapine 15 MG tablet  Commonly known as: REMERON      Dose: 30 mg  Take 30 mg by mouth At Bedtime  Refills: 0     multivitamin w/minerals tablet  Used for: Alcohol-induced acute pancreatitis without infection or necrosis      Dose: 1 tablet  Take 1 tablet by mouth daily  Quantity:    Refills: 0     ondansetron 4 MG ODT tab  Commonly known as: Zofran ODT  Used for: Alcohol-induced acute pancreatitis without infection or necrosis      Dose: 4 mg  Take 1 tablet (4 mg) by mouth every 6 hours as needed for nausea  Quantity: 20 tablet  Refills: 0     pantoprazole 40 MG EC tablet  Commonly known as: PROTONIX      Dose: 40 mg  Take 40 mg by mouth daily as needed  Refills: 0     potassium chloride ER 20 MEQ CR tablet  Commonly known as: K-TAB      Dose: 20 mEq  Take 20 mEq by mouth daily (with dinner)  Refills: 0           Where to get your medicines      These medications were sent to [facility] [address] [address] [address]    Phone: [phone]     acetaminophen 325 MG tablet    amoxicillin 500 MG capsule    folic acid 1 MG tablet    ibuprofen 400 MG tablet    QUEtiapine 100 MG tablet    QUEtiapine 25 MG tablet    thiamine 100 MG tablet     Some of these will need a paper prescription and others can be bought over the counter. Ask your nurse if you have questions.    Bring a paper prescription for each  "of these medications    oxyCODONE 5 MG tablet         Condition on Discharge:  Discharge condition: Stable       Code status on discharge: Full Code     History of Illness:  See detailed admission note for full details.    Physical Exam:  Vital signs:  Temp: 98.6  F (37  C) Temp src: Oral BP: 95/58 Pulse: 96   Resp: 14 SpO2: 96 % O2 Device: None (Room air)   Height: 167.6 cm (5' 6\") Weight: 44.5 kg (98 lb)  Estimated body mass index is 15.82 kg/m  as calculated from the following:    Height as of this encounter: 1.676 m (5' 6\").    Weight as of this encounter: 44.5 kg (98 lb).    Wt Readings from Last 1 Encounters:   06/28/22 44.5 kg (98 lb)     General: Alert, awake, no acute distress.  HEENT: NC/AT, eyes anicteric, external occular movements intact, face symmetric.  Dentition WNL, MM moist.  Cardiac: RRR, S1, S2.  No murmurs appreciated.  Pulmonary: Normal chest rise, normal work of breathing.  Lungs CTA BL  Abdomen: soft, non-tender, non-distended.  Bowel Sounds Present.  No guarding.  Extremities: no deformities.  Warm, well perfused.  Skin: no rashes or lesions noted.  Warm and Dry.  Neuro: No focal deficits noted.  Speech clear.  Coordination and strength grossly normal.  Psych: Appropriate affect.    Procedures other than Imaging:  None     Imaging:  No results found for this or any previous visit (from the past 24 hour(s)).     Consultations:  Consultation during this admission received from Psychiatry, Chemical dependence, .       Recent Lab Results:  Recent Labs   Lab 07/04/22  0814 07/02/22  0644 07/01/22  0705   WBC 11.4* 7.6 7.2   HGB 9.0* 8.1* 8.3*   HCT 27.6* 25.0* 24.3*   * 103* 100    232 185          Lab Results   Component Value Date     07/04/2022     07/02/2022     07/01/2022     01/19/2021     01/18/2021     08/21/2020    Lab Results   Component Value Date    CHLORIDE 100 07/04/2022    CHLORIDE 106 07/02/2022    CHLORIDE 107 07/01/2022    " CHLORIDE 107 01/19/2021    CHLORIDE 90 01/18/2021    CHLORIDE 106 08/21/2020    Lab Results   Component Value Date    BUN 5 07/04/2022    BUN 3 07/02/2022    BUN 2 07/01/2022    BUN 5 01/19/2021    BUN 6 01/18/2021    BUN 3 08/21/2020      Lab Results   Component Value Date    POTASSIUM 3.9 07/06/2022    POTASSIUM 3.8 07/05/2022    POTASSIUM 3.7 07/04/2022    POTASSIUM 3.4 01/19/2021    POTASSIUM 3.2 01/19/2021    POTASSIUM 2.6 01/18/2021    Lab Results   Component Value Date    CO2 25 07/04/2022    CO2 23 07/02/2022    CO2 24 07/01/2022    CO2 23 01/19/2021    CO2 24 01/18/2021    CO2 26 08/21/2020    Lab Results   Component Value Date    CR 0.39 07/04/2022    CR 0.33 07/02/2022    CR 0.28 07/01/2022    CR 0.38 01/19/2021    CR 0.46 01/18/2021    CR 0.41 08/21/2020             Pending Results:    Unresulted Labs Ordered in the Past 30 Days of this Admission     No orders found from 5/29/2022 to 6/29/2022.           Discharge Instructions and Follow-Up:   Discharge Procedure Orders   Adult Mental Health [facility] Referral   Standing Status: Future   Referral Priority: Priority: 1-2 Weeks Referral Type: Mental Health Outpatient   Number of Visits Requested: 1     Reason for your hospital stay   Order Comments: You were hospitalized for pancreatitis related to alcohol abuse. You also had many electrolyte abnormalities secondary to your drinking. This was treated with IV fluids, electrolyte replacement. You were also evaluated by psychiatry, chemical dependency who are recommending outpatient treatment.     Follow-up and recommended labs and tests    Order Comments: Follow up with primary care provider, Randee Woods, within 7 days for hospital follow- up.  The following labs/tests are recommended: CBC, BMP.      You should schedule an appointment with chemical dependency AND mental aayush. I have put in a referral. Usually they give you a call to schedule these appointments, but if they do not, please give them a call.      Activity   Order Comments: Your activity upon discharge: activity as tolerated     Order Specific Question Answer Comments   Is discharge order? Yes      Diet   Order Comments: Follow this diet upon discharge: Regular Diet Adult     Order Specific Question Answer Comments   Is discharge order? Yes        Total time spent in face to face contact with the patient and coordinating discharge was:  >30 Minutes.

## 2022-07-06 NOTE — PROGRESS NOTES
"CLINICAL NUTRITION SERVICES - REASSESSMENT NOTE     Nutrition Prescription    Recommendations:  Patient to utilize Ensure PRN  Continue Regular Diet  Encourage meal vs supplement TID     Malnutrition:  % Weight Loss:  Weight loss does not meet criteria for malnutrition   % Intake:  </= 75% for >/= 1 month (severe malnutrition)  Subcutaneous Fat Loss:  Orbital region moderate depletion, Upper arm region severe depletion and Thoracic region moderate depletion  Muscle Loss:  Temporal region moderate depletion, Clavicle bone region severe depletion, Acromion bone region severe depletion and Dorsal hand region moderate-severe depletion  Fluid Retention:  None noted    Malnutrition Diagnosis: Severe malnutrition   In Context of:  Acute illness or injury  Chronic illness or disease  Environmental or social circumstances       EVALUATION OF THE PROGRESS TOWARD GOALS   Diet:  Regular    Intake/Tolerance:    Per flowsheets, patient has been eating % of meals over the past week (improving). Ordering 3 meals/day vs 2 meals + 1 snack daily per review of meal ordering system.     Visited with patient at bedside this morning who states her appetite was improving but over the past 24-48 hours it has been decreasing again due to feeling overwhelmed with her barriers to discharge and \"social issues\". States she feels \"like a prisoner\" and just wants to go - \"I didn't do anything wrong\". Provided supportive listening. Breakfast tray at bedside mostly untouched. Noted patient drinks Ensure at home so offered to send her one of these to sip on instead as she doesn't feel like eating much this morning - she was very receptive and appreciative of this. States she forget these were available to her and plans to utilize PRN. Discussed that she is welcome to order them from room service.     Barriers to PO intakes including: decreased appetite in setting of stress, mental health issues     Updates:  - Medications reviewed: folic acid 1 " mg/day, remeron at HS, thera-vit-m, thiamine 100 mg/day   - Labs reviewed. K+ and Mg WNL, Phos 4.9 (H).   - Stooling patterns reviewed. Last BM 7/5.   - Weight trends reviewed:  Vitals:    06/28/22 1320   Weight: 44.5 kg (98 lb)       Previous Goals (6/29)  Patient Goals:Nutrition Focus  Nutrition Goals: Diet advancement to solids w/in 48-72 hrs.  Patient to tolerate at least 50-75% of meals or supplements TID w/ diet advancement.     Evaluation: Met     Previous Nutrition Diagnosis  Malnutrition related to mental health/stressors with decreased appetite, N/V with pancreatitis leading to weight loss and decreased oral intake as evidenced by meeting <50% needs for at least 5 days up to a period of weeks, severe wt loss, overt fat/muscle loss.   Evaluation: Improving, but ongoing - updated below       CURRENT NUTRITION DIAGNOSIS  Malnutrition r/t mental health/stressors with decreased appetite, N/V with pancreatitis leading to weight loss and decreased oral intake as evidenced by meeting </= 75% nutrition needs for the past several weeks, weight loss and overt fat/muscle losses        INTERVENTIONS  Implementation  Medical Food Supplement - PRN    Collaboration and Referral of Nutrition care: Discussed POC with team during rounds.      Goals  Patient will consume % meals or supplements TID     Monitoring/Evaluation  Progress towards goals will be monitored and evaluated per protocol and Practice Guidelines      Milena Thompson RDN, LD  Clinical Dietitian  3rd floor/ICU: [phone]  All other floors: [phone]  Weekend/holiday: [phone]  Office: [phone]

## 2022-07-06 NOTE — PLAN OF CARE
End of Shift Summary  For vital signs and complete assessments, please see documentation flowsheets.     Pertinent assessments: A&Ox4. Soft BP's, all other VSS. Mild pain relieved with oxy x1. Constipation relieved per pt. Up independently    Major Shift Events: None  Treatment Plan: Pain management, Po ABX, Chem dep consulted.  Bedside Nurse: Kavita Saavedra RN

## 2022-07-06 NOTE — PROGRESS NOTES
2022      Type Of Assessment: Inpatient Substance Use Comprehensive Assessment    Referral Source:  [facility] 5 Med Surg [phone]  MRN: [id number]    DATE OF SERVICE: 2022  Date of previous TAVO Assessment: NA  Patient confirmed identity through two factor verification: Full Legal Name and     PATIENT'S NAME: Audelia Comer  Age: 56 year old  Last 4 SSN: [id number]  Sex: female   Gender Identity: female  Sexual Orientation: Heterosexual  Cultural Background: No, Denies any cultural influences or concerns that need to be considered for treatment  YOB: 1965  Current Address:   [address]   [place] [address]  Patient Phone Number:  [phone]   Patient's E-Mail Contact:  No e-mail address on record  Funding: Georgette CORONADO  PMI: [id number]  Emergency Contact: Son Sharmila Comer [phone]  DAANES information was provided to patient and patient does not want a copy.     Telemedicine Visit: The patient's condition can be safely assessed and treated via synchronous audio and visual telemedicine encounter.    Reason for Telemedicine Visit: Services only offered telehealth  Originating Site (Patient Location): [facility] - [address], [place] [address]   Distant Site (Provider Location): Provider Remote Setting- Home Office  Consent:  The patient/guardian has verbally consented to: the potential risks and benefits of telemedicine (video visit) versus in person care; bill my insurance or make self-payment for services provided; and responsibility for payment of non-covered services.   Mode of Communication:  Video Conference via polycom    START TIME: 922 AM  END TIME: 1000 AM    As the provider I attest to compliance with applicable laws and regulations related to telemedicine.   Audelia oCmer was seen for a substance use disorder consult on 2022 by GRETTA Wu.    Reason for Substance Use Disorder Consult:  Per H&P    Chief Complaint       Abdominal pain, nausea, vomiting, weight loss.     History is obtained from the patient     History of Present Illness     Audelia Comer is a 56 year old female admitted on 6/28/2022.      A welfare check was called on her by someone, she suspects by her sister as she was concerned about her health.  Apparently the patient has been drinking every day and was losing weight (loss 30 pounds) since last year after she lost her daughter unexpectedly and later lost her job this year.  She had financial difficulties and was fearing eviction but apparently [facility] has been helping her with that.  She was also diagnosed with breast cancer last year needing bilateral mastectomy.  All of these have been very stressful for her.     She has been having abdominal pain for the last several days with occasional nausea and vomiting.  She has not been able to eat or sleep well.    Are you currently having severe withdrawal symptoms that are putting yourself or others in danger? No  Are you currently having severe medical problems that require immediate attention? No  Are you currently having severe emotional or behavioral problems that are putting yourself or others at risk of harm? No    Have you participated in prior substance use disorder evaluations? Yes. When, Where, and What circumstances: several, 14 years ago   Have you ever been to detox, inpatient or outpatient treatment for substance related use? List previous treatment: Yes. When, Where, and What circumstances: 3 OP,14 years ago, [place]    Have you ever had a gambling problem or had treatment for compulsive gambling? No  Patient does not appear to be in severe withdrawal, an imminent safety risk to self or others, or requiring immediate medical attention and may proceed with the assessment interview.    Comprehensive Substance Use History   X X = Primary Drug Used Age of First Use    Pattern of Substance Use   (heaviest use in life and a use history within  the past year if applicable) (DSM-5: Sx #3) Date /  Quantity of last use if within the past 30 days Withdrawal Potential?   Method of use  (Oral, smoked, snorted, IV, etc)   X Alcohol   14 Teens-to fit in with friends at parties, 20's-occasional/social   30's- weekends or couple cocktails sitting on deck  40's- after work couple drinks  50's-daily,1/2 bottle wine nightly/ quart vodka ever 3-4 days 2 weeks ago 6/22/2022 No Oral    Marijuana/Hashish   20's Tried in lifetime       Cocaine/Crack 20's Tried in lifetime       Meth/Amphetamines   No use        Heroin   No use        Other Opiates/Synthetics   Unsp Rx as prescribed after surgeries In hospital as administered      Inhalants  No use        Benzodiazepines   Unsp  In hospital as administered      Hallucinogens   No use        Barbiturates/Sedatives/Hypnotics   No use        Over-the-Counter Drugs   No use        Other   No use        Nicotine   15 HU cigarettes 1 PPD PTA  Smoke     Withdrawal symptoms: Have you had any of the following withdrawal symptoms?  None    Have you experienced any cravings?  No    Have you had periods of abstinence?  Yes   What was your longest period?  Pt reports 14 years ago for 9 months     Any circumstances that lead to relapse?  Pt reports because she could, she felt good and felt she could handle it.    What activities have you engaged in when using alcohol/other drugs that could be hazardous to you or others?  The patient reported having a history of driving while under the influence of alcohol or drugs.    A description of any risk-taking behavior, including behavior that puts the client at risk of exposure to blood-borne or sexually transmitted diseases: NA    Arrests and legal interventions related to substance use:  HU 3 DUI's most recent 31/2 years ago     A description of how the patient's use affected their ability to function appropriately in a work setting:   Pt reports no issues with work    A description of how the  patient's use affected their ability to function appropriately in an educational setting:  Pt reports no issues    Leisure time activities that are associated with substance use:  Pt reports she has sober activities she enjoys-walking dog, laying out by pool, singing, reading     Do you think your substance use has become a problem for you?  Pt reports sometimes yes and sometimes no.    MEDICAL HISTORY  Physical or medical concerns or diagnoses: Per H&P    1. Acute alcoholic pancreatitis   2. Alcohol abuse.   3. Multiple electrolyte abnormalities due to alcohol use  4. Depression and anxiety.  5. Anemia  6. Nicotine dependence with  7. Breast cancer  8. Severe malnutrition.        Past Medical History:   Diagnosis Date     Anxiety      Depressive disorder      Invasive ductal carcinoma of breast, right (H)      SBO (small bowel obstruction) (H)        Do you have any current medical treatment needs not being addressed by inpatient treatment?  Pt is currently hospitalized.     Do you need a referral for a medical provider?  Pt report she has a provider with [facility]     Current medications: Per EHR    Current Facility-Administered Medications   Medication     acetaminophen (TYLENOL) tablet 650 mg    Or     acetaminophen (TYLENOL) Suppository 650 mg     amoxicillin (AMOXIL) capsule 500 mg     cetirizine (zyrTEC) tablet 10 mg     enoxaparin ANTICOAGULANT (LOVENOX) injection 30 mg     famotidine (PEPCID) tablet 20 mg     flumazenil (ROMAZICON) injection 0.2 mg     fluticasone (FLONASE) 50 MCG/ACT spray 1 spray     folic acid (FOLVITE) tablet 1 mg     OLANZapine zydis (zyPREXA) ODT tab 5-10 mg    Or     haloperidol lactate (HALDOL) injection 2.5-5 mg     lidocaine (LMX4) cream     lidocaine 1 % 0.1-1 mL     melatonin tablet 5 mg     mirtazapine (REMERON) tablet 30 mg     multivitamin w/minerals (THERA-VIT-M) tablet 1 tablet     naloxone (NARCAN) injection 0.2 mg    Or     naloxone (NARCAN) injection 0.4 mg    Or      naloxone (NARCAN) injection 0.2 mg    Or     naloxone (NARCAN) injection 0.4 mg     nicotine (COMMIT) lozenge 2 mg     nicotine (NICODERM CQ) 21 MG/24HR 24 hr patch 1 patch    Followed by     [START ON 8/9/2022] nicotine (NICODERM CQ) 14 MG/24HR 24 hr patch 1 patch    Followed by     [START ON 9/6/2022] nicotine (NICODERM CQ) 7 MG/24HR 24 hr patch 1 patch     nicotine Patch in Place     ondansetron (ZOFRAN ODT) ODT tab 4 mg    Or     ondansetron (ZOFRAN) injection 4 mg     oxyCODONE (ROXICODONE) tablet 5 mg     pantoprazole (PROTONIX) EC tablet 40 mg     polyethylene glycol (MIRALAX) Packet 17 g     prochlorperazine (COMPAZINE) injection 10 mg    Or     prochlorperazine (COMPAZINE) tablet 10 mg    Or     prochlorperazine (COMPAZINE) suppository 25 mg     QUEtiapine (SEROquel) tablet 100 mg     QUEtiapine (SEROquel) tablet 25 mg     sodium chloride (PF) 0.9% PF flush 3 mL     sodium chloride (PF) 0.9% PF flush 3 mL     thiamine (B-1) tablet 100 mg         Are you pregnant? No    Do you have any specific physical needs/accommodations? No      MENTAL HEALTH HISTORY:  Have you ever had  hospitalizations or treatment for mental health illness: Yes. When, Where, and What circumstances: 14 years ago [facility]      Mental health history, including diagnosis and symptoms, and the effect on the client's ability to function: Per EHR  Diagnosis:   F33.2 Major Depressive Disorder, Recurrent Episode, Severe, With anxious distress - primary  F43.10 Posttraumatic Stress Disorder  F10.20 Alcohol Use Disorder, Severe, In a controlled environment  F17.200 Tobacco Use Disorder, Severe, In a controlled environment     Current mental health treatment including psychotropic medication needed to maintain stability: (Note: The assessment must utilize screening tools approved by the commissioner pursuant to section 245.4863 to identify whether the client screens positive for co-occurring disorders):  Refer to med list  Per EHR  The patient  "displays limited insight into the severity and life-threatening consequences of her mental illness and substance use disorder, including the high risk for relapse without consistent treatment adherence as evidenced by making frequent minimizing statements during the clinical interview, dismissing the TAVO , and asking daily to discharge home without additional supports. The patient's marked decompensation is evidence that she has not been able to manage her mental health and substance use disorder on her own terms--she has not engaged necessary resources to maintain minimal wellbeing.   Recommendations:    - Establish psychiatric medication management with an outpatient provider who can provide continuity of care and coordinate with inpatient providers as needed. Patient prefers female provider.   - Follow up with primary care provider and any medical specialty care as recommended by inpatient attending physician to support whole-person well-being.   - [place] Comprehensive Assessment for referral to least restrictive level of care equipped to provide integrated mental health + substance use disorder treatment meeting patient's current needs. Follow recommendations and update assessment as needed.   - Case management. \"    GAIN-SS Tool:  When was the last time that you had significant problems... 7/6/2022   with feeling very trapped, lonely, sad, blue, depressed or hopeless about the future? Past month   with sleep trouble, such as bad dreams, sleeping restlessly, or falling asleep during the day? Past Month   with feeling very anxious, nervous, tense, scared, panicked or like something bad was going to happen? Past month   with becoming very distressed & upset when something reminded you of the past? Past month   with thinking about ending your life or committing suicide? 1+ years ago     When was the last time that you did the following things 2 or more times? 7/6/2022   Lied or conned to get things you wanted " or to avoid having to do something? Never   Had a hard time paying attention at school, work or home? Never   Had a hard time listening to instructions at school, work or home? Never   Were a bully or threatened other people? Never   Started physical fights with other people? Never       Have you ever been verbally, emotionally, physically or sexually abused?   The patient denied having any history of being verbally, emotionally, physically or sexually abused.     Family history of substance use and misuse:   Pt reports her father-ETOH     The patient's desire for family involvement in the treatment program:   Pt reports her family is supportive, they are concerned about her health.   Level of family support: All    Social network in relation to expected support for recovery:   Pt reports she has no sober support attendance.    Are you currently in a significant relationship? No     Do you have any children (include living arrangements/custody/contact)?:   Pt reports 1 son age 36, 1 daughter passed away last year, 1 step daughter     What is your current living situation?  Pt reports she lives in an apartment with her little dog but her son will be staying with her sometimes.     Are you employed/attending school?  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 Pt reports she would like something where she can work from home.     SUMMARY:  Ability to understand written treatment materials: Yes  Ability to understand patient rules and patient rights: Yes  Does the patient recognize needs related to substance use and is willing to follow treatment recommendations: Yes  Does the patient have an opioid use disorder:  does not have a history of opiate use.    ASAM Dimension Scale Ratings:  Dimension 1: 0 Client displays full functioning with good ability to tolerate and cope with withdrawal discomfort. No signs or symptoms of intoxication or withdrawal or resolving  signs or symptoms.  Dimension 2: 1 Client tolerates and barry with physical discomfort and is able to get the services that the client needs.  Dimension 3: 2 Client has difficulty with impulse control and lacks coping skills. Client has thoughts of suicide or harm to others without means; however, the thoughts may interfere with participation in some treatment activities. Client has difficulty functioning in significant life areas. Client has moderate symptoms of emotional, behavioral, or cognitive problems. Client is able to participate in most treatment activities.  Dimension 4: 2 Client displays verbal compliance, but lacks consistent behaviors; has low motivation for change; and is passively involved in treatment.  Dimension 5: 3 Client has poor recognition and understanding of relapse and recidivism issues and displays moderately high vulnerability for further substance use or mental health problems. Client has few coping skills and rarely applies coping skills.  Dimension 6: 3 Client is not engaged in structured, meaningful activity and the client's peers, family, significant other, and living environment are unsupportive, or there is significant criminal justice system involvement.    Category of Substance Severity (ICD-10 Code / DSM 5 Code)     Alcohol Use Disorder Severe  (10.20) (303.90)   Cannabis Use Disorder The patient does not meet the criteria for a Cannabis use disorder.   Hallucinogen Use Disorder The patient does not meet the criteria for a Hallucinogen use disorder.   Inhalant Use Disorder The patient does not meet the criteria for an Inhalant use disorder.   Opioid Use Disorder The patient does not meet the criteria for an Opioid use disorder.   Sedative, Hypnotic, or Anxiolytic Use Disorder The patient does not meet the criteria for a Sedative/Hypnotic use disorder.   Stimulant Related Disorder The patient does not meet the criteria for a Stimulant use disorder.   Tobacco Use Disorder Mild     (Z72.0) (305.1)   Other (or unknown) Substance Use Disorder The patient does not meet the criteria for a Other (or unknown) Substance use disorder.     A problematic pattern of alcohol/drug use leading to clinically significant impairment or distress, as manifested by at least two of the following, occurring within a 12-month period:    3.) A great deal of time is spent in activities necessary to obtain alcohol, use alcohol, or recover from its effects.  5.) Recurrent alcohol/drug use resulting in a failure to fulfill major role obligations at work, school or home.  7.) Important social, occupational, or recreational activities are given up or reduced because of alcohol/drug use.  8.) Recurrent alcohol/drug use in situations in which it is physically hazardous.  9.) Alcohol/drug use is continued despite knowledge of having a persistent or recurrent physical or psychological problem that is likely to have been caused or exacerbated by alcohol.  10.) Tolerance, as defined by either of the following: A need for markedly increased amounts of alcohol/drug to achieve intoxication or desired effect.    Specify if: In early remission:  After full criteria for alcohol/drug use disorder were previously met, none of the criteria for alcohol/drug use disorder have been met for at least 3 months but for less than 12 months (with the exception that Criterion A4,  Craving or a strong desire or urge to use alcohol/drug  may be met).     In sustained remission:   After full criteria for alcohol use disorder were previously met, non of the criteria for alcohol/drug use disorder have been met at any time during a period of 12 months or longer (with the exception that Criterion A4,  Craving or strong desire or urge to use alcohol/drug  may be met).     Specify if:   This additional specifier is used if the individual is in an environment where access to alcohol is restricted.    Mild: Presence of 2-3 symptoms  Moderate: Presence of 4-5  symptoms  Severe: Presence of 6 or more symptoms    Collateral information: TAVO Collateral Info: Sufficient information is obtained from the patient to support diagnosis and recommendations. Contact with a collateral sources is not required. Patient's EHR has been reviewed.     Recommendations:     1. Abstain from all non-prescribed mood-altering substances  2. Take all medications as prescribed  3. Enter and complete an IOP  treatment program  4. Increase sober support, attend support meetings at least one time weekly  5. Follow up with attending grief support group and scheduling a therapy appointment        6.   Follow all recommendations of your medical providers      Clinical Substantiation:      Pt meets criteria for Alcohol use disorder. Pt reports she has attended outpatient CD treatment in the past. Pt reports she has not been attending sober support meetings. Pt reports she has been through major life stressors in the past year (daughter's death, breast cancer, unemployment). Pt reports she has been isolating at home. Pt reports she would not want to attend inpatient CD treatment. Pt reports she might be interested in attending virtual outpatient CD treatment. Pt reports she received information from the  about a grief support group. Pt reports she would like to attend. Pt reports she would be interested in attending therapy also and wrote down information to contact [facility].      Referrals/ Alternatives:     CD Outpatient Treatment[facility] -  Phone: [phone]  Fax: [phone]    [facility]  Phone: [phone]  Fax: [phone]    [facility] waitlist: [phone]  [facility] Admissions: [phone]     TAVO consult completed by: GRETTA Wu  Phone Number: [phone]  E-mail Address: [email or web address] Department  [address] [place] [address]      *Due to regulation of Title 42 of the Code of Federal Regulations (CFR) Part 2: Confidentiality laws apply to this note and the information wherein.  Thus, this note cannot be copy and pasted into any other health care staff's note nor can it be included in general medical records sent to ANY outside agency without the patient's written consent.

## 2022-07-07 ENCOUNTER — PATIENT OUTREACH (OUTPATIENT)
Dept: CARE COORDINATION | Facility: CLINIC | Age: 57
End: 2022-07-07

## 2022-07-07 DIAGNOSIS — Z71.89 OTHER SPECIFIED COUNSELING: ICD-10-CM

## 2022-07-07 NOTE — PROGRESS NOTES
[facility]    Background: Care Coordination referral placed from [facility] discharge report for reason of patient meeting criteria for a TCM outreach call by [facility] team.    Assessment: Upon chart review, [facility] Team member will cancel/close the referral for TCM outreach due to reason below:    Patient isn't interested in speaking with care team today and disconnected call    Plan: Care Coordination referral for TCM outreach canceled.      Diana Oconnell MA  [facility] [place], [facility]

## 2022-10-03 ENCOUNTER — HOSPITAL ENCOUNTER (OUTPATIENT)
Facility: CLINIC | Age: 57
Setting detail: OBSERVATION
Discharge: HOME | End: 2022-10-05
Attending: EMERGENCY MEDICINE | Admitting: INTERNAL MEDICINE
Payer: COMMERCIAL

## 2022-10-03 ENCOUNTER — APPOINTMENT (OUTPATIENT)
Dept: ULTRASOUND IMAGING | Facility: CLINIC | Age: 57
End: 2022-10-03
Attending: EMERGENCY MEDICINE
Payer: COMMERCIAL

## 2022-10-03 DIAGNOSIS — E87.1 HYPONATREMIA: ICD-10-CM

## 2022-10-03 DIAGNOSIS — E87.6 HYPOKALEMIA: ICD-10-CM

## 2022-10-03 DIAGNOSIS — E83.39 HYPOPHOSPHATEMIA: ICD-10-CM

## 2022-10-03 DIAGNOSIS — R11.2 NAUSEA AND VOMITING, UNSPECIFIED VOMITING TYPE: ICD-10-CM

## 2022-10-03 DIAGNOSIS — E83.42 HYPOMAGNESEMIA: ICD-10-CM

## 2022-10-03 DIAGNOSIS — R10.9 NONSPECIFIC ABDOMINAL PAIN: Primary | ICD-10-CM

## 2022-10-03 LAB
ALBUMIN SERPL BCG-MCNC: 4.3 G/DL (ref 3.5–5.2)
ALBUMIN UR-MCNC: 50 MG/DL
ALP SERPL-CCNC: 344 U/L (ref 35–104)
ALT SERPL W P-5'-P-CCNC: 72 U/L (ref 10–35)
ANION GAP SERPL CALCULATED.3IONS-SCNC: 27 MMOL/L (ref 7–15)
APPEARANCE UR: ABNORMAL
AST SERPL W P-5'-P-CCNC: 223 U/L (ref 10–35)
BASOPHILS # BLD AUTO: 0.1 10E3/UL (ref 0–0.2)
BASOPHILS NFR BLD AUTO: 1 %
BILIRUB SERPL-MCNC: 0.9 MG/DL
BILIRUB UR QL STRIP: ABNORMAL
BUN SERPL-MCNC: 16.4 MG/DL (ref 6–20)
CALCIUM SERPL-MCNC: 9.5 MG/DL (ref 8.6–10)
CHLORIDE SERPL-SCNC: 83 MMOL/L (ref 98–107)
COLOR UR AUTO: YELLOW
CREAT SERPL-MCNC: 0.6 MG/DL (ref 0.51–0.95)
DEPRECATED HCO3 PLAS-SCNC: 20 MMOL/L (ref 22–29)
EOSINOPHIL # BLD AUTO: 0 10E3/UL (ref 0–0.7)
EOSINOPHIL NFR BLD AUTO: 0 %
ERYTHROCYTE [DISTWIDTH] IN BLOOD BY AUTOMATED COUNT: 12.3 % (ref 10–15)
GFR SERPL CREATININE-BSD FRML MDRD: >90 ML/MIN/1.73M2
GLUCOSE SERPL-MCNC: 97 MG/DL (ref 70–99)
GLUCOSE UR STRIP-MCNC: NEGATIVE MG/DL
HCT VFR BLD AUTO: 36.8 % (ref 35–47)
HGB BLD-MCNC: 12.6 G/DL (ref 11.7–15.7)
HGB UR QL STRIP: NEGATIVE
HOLD SPECIMEN: NORMAL
HOLD SPECIMEN: NORMAL
HYALINE CASTS: 18 /LPF
IMM GRANULOCYTES # BLD: 0 10E3/UL
IMM GRANULOCYTES NFR BLD: 0 %
KETONES UR STRIP-MCNC: 150 MG/DL
LEUKOCYTE ESTERASE UR QL STRIP: ABNORMAL
LIPASE SERPL-CCNC: 26 U/L (ref 13–60)
LYMPHOCYTES # BLD AUTO: 1.8 10E3/UL (ref 0.8–5.3)
LYMPHOCYTES NFR BLD AUTO: 15 %
MCH RBC QN AUTO: 33 PG (ref 26.5–33)
MCHC RBC AUTO-ENTMCNC: 34.2 G/DL (ref 31.5–36.5)
MCV RBC AUTO: 96 FL (ref 78–100)
MONOCYTES # BLD AUTO: 1.5 10E3/UL (ref 0–1.3)
MONOCYTES NFR BLD AUTO: 13 %
MUCOUS THREADS #/AREA URNS LPF: PRESENT /LPF
NEUTROPHILS # BLD AUTO: 8 10E3/UL (ref 1.6–8.3)
NEUTROPHILS NFR BLD AUTO: 71 %
NITRATE UR QL: NEGATIVE
NRBC # BLD AUTO: 0 10E3/UL
NRBC BLD AUTO-RTO: 0 /100
PH UR STRIP: 6 [PH] (ref 5–7)
PLATELET # BLD AUTO: 194 10E3/UL (ref 150–450)
POTASSIUM SERPL-SCNC: 3 MMOL/L (ref 3.4–5.3)
PROT SERPL-MCNC: 7.9 G/DL (ref 6.4–8.3)
RBC # BLD AUTO: 3.82 10E6/UL (ref 3.8–5.2)
RBC URINE: 2 /HPF
SODIUM SERPL-SCNC: 130 MMOL/L (ref 136–145)
SP GR UR STRIP: 1.02 (ref 1–1.03)
SQUAMOUS EPITHELIAL: 1 /HPF
UROBILINOGEN UR STRIP-MCNC: 2 MG/DL
WBC # BLD AUTO: 11.4 10E3/UL (ref 4–11)
WBC URINE: 102 /HPF

## 2022-10-03 PROCEDURE — 83735 ASSAY OF MAGNESIUM: CPT | Performed by: EMERGENCY MEDICINE

## 2022-10-03 PROCEDURE — 76705 ECHO EXAM OF ABDOMEN: CPT

## 2022-10-03 PROCEDURE — 82077 ASSAY SPEC XCP UR&BREATH IA: CPT | Performed by: EMERGENCY MEDICINE

## 2022-10-03 PROCEDURE — 83690 ASSAY OF LIPASE: CPT | Performed by: EMERGENCY MEDICINE

## 2022-10-03 PROCEDURE — 85004 AUTOMATED DIFF WBC COUNT: CPT | Performed by: EMERGENCY MEDICINE

## 2022-10-03 PROCEDURE — 96375 TX/PRO/DX INJ NEW DRUG ADDON: CPT

## 2022-10-03 PROCEDURE — 36415 COLL VENOUS BLD VENIPUNCTURE: CPT | Performed by: EMERGENCY MEDICINE

## 2022-10-03 PROCEDURE — 96365 THER/PROPH/DIAG IV INF INIT: CPT

## 2022-10-03 PROCEDURE — 250N000013 HC RX MED GY IP 250 OP 250 PS 637: Performed by: EMERGENCY MEDICINE

## 2022-10-03 PROCEDURE — 80053 COMPREHEN METABOLIC PANEL: CPT | Performed by: EMERGENCY MEDICINE

## 2022-10-03 PROCEDURE — 99285 EMERGENCY DEPT VISIT HI MDM: CPT | Mod: 25

## 2022-10-03 PROCEDURE — 250N000011 HC RX IP 250 OP 636: Performed by: EMERGENCY MEDICINE

## 2022-10-03 PROCEDURE — 96368 THER/DIAG CONCURRENT INF: CPT

## 2022-10-03 PROCEDURE — 82040 ASSAY OF SERUM ALBUMIN: CPT | Performed by: EMERGENCY MEDICINE

## 2022-10-03 PROCEDURE — C9803 HOPD COVID-19 SPEC COLLECT: HCPCS

## 2022-10-03 PROCEDURE — 81001 URINALYSIS AUTO W/SCOPE: CPT | Performed by: EMERGENCY MEDICINE

## 2022-10-03 RX ORDER — ONDANSETRON 2 MG/ML
4 INJECTION INTRAMUSCULAR; INTRAVENOUS ONCE
Status: COMPLETED | OUTPATIENT
Start: 2022-10-03 | End: 2022-10-03

## 2022-10-03 RX ORDER — HYDROCODONE BITARTRATE AND ACETAMINOPHEN 5; 325 MG/1; MG/1
2 TABLET ORAL ONCE
Status: COMPLETED | OUTPATIENT
Start: 2022-10-03 | End: 2022-10-03

## 2022-10-03 RX ORDER — MORPHINE SULFATE 4 MG/ML
4 INJECTION, SOLUTION INTRAMUSCULAR; INTRAVENOUS ONCE
Status: DISCONTINUED | OUTPATIENT
Start: 2022-10-03 | End: 2022-10-04

## 2022-10-03 RX ORDER — POTASSIUM CHLORIDE 1.5 G/1.58G
20 POWDER, FOR SOLUTION ORAL ONCE
Status: COMPLETED | OUTPATIENT
Start: 2022-10-03 | End: 2022-10-03

## 2022-10-03 RX ORDER — MORPHINE SULFATE 4 MG/ML
4 INJECTION, SOLUTION INTRAMUSCULAR; INTRAVENOUS ONCE
Status: COMPLETED | OUTPATIENT
Start: 2022-10-04 | End: 2022-10-04

## 2022-10-03 RX ORDER — CEFTRIAXONE 1 G/1
1 INJECTION, POWDER, FOR SOLUTION INTRAMUSCULAR; INTRAVENOUS ONCE
Status: COMPLETED | OUTPATIENT
Start: 2022-10-04 | End: 2022-10-04

## 2022-10-03 RX ADMIN — HYDROCODONE BITARTRATE AND ACETAMINOPHEN 2 TABLET: 5; 325 TABLET ORAL at 23:29

## 2022-10-03 RX ADMIN — POTASSIUM CHLORIDE 20 MEQ: 1.5 POWDER, FOR SOLUTION ORAL at 20:03

## 2022-10-03 RX ADMIN — ONDANSETRON 4 MG: 2 INJECTION INTRAMUSCULAR; INTRAVENOUS at 20:03

## 2022-10-03 ASSESSMENT — ACTIVITIES OF DAILY LIVING (ADL)
ADLS_ACUITY_SCORE: 37
ADLS_ACUITY_SCORE: 37

## 2022-10-03 ASSESSMENT — ENCOUNTER SYMPTOMS
FEVER: 0
VOMITING: 1
NAUSEA: 1
CONSTIPATION: 1
ABDOMINAL PAIN: 1

## 2022-10-03 NOTE — ED TRIAGE NOTES
"    Pt arrives via EMS, PT reports that she has had N/V for the last wk. PT states that over the last couple of days her emesis has had \"brown chunks in it\" Pt reports that her urine today was also \"bright orange\" Pt states that she has had her appendix removed and a c section in the past. PT reports chills at home. VSS and ABC's intact, pt received 4 mg of zofran en route. Pt reports epigastric abdominal pain   "

## 2022-10-04 ENCOUNTER — APPOINTMENT (OUTPATIENT)
Dept: CT IMAGING | Facility: CLINIC | Age: 57
End: 2022-10-04
Attending: EMERGENCY MEDICINE
Payer: COMMERCIAL

## 2022-10-04 PROBLEM — R11.2 NAUSEA AND VOMITING: Status: ACTIVE | Noted: 2021-11-19

## 2022-10-04 LAB
ALBUMIN SERPL BCG-MCNC: 3.2 G/DL (ref 3.5–5.2)
ALP SERPL-CCNC: 232 U/L (ref 35–104)
ALT SERPL W P-5'-P-CCNC: 48 U/L (ref 10–35)
ANION GAP SERPL CALCULATED.3IONS-SCNC: 14 MMOL/L (ref 7–15)
AST SERPL W P-5'-P-CCNC: 122 U/L (ref 10–35)
BILIRUB DIRECT SERPL-MCNC: 0.32 MG/DL (ref 0–0.3)
BILIRUB SERPL-MCNC: 0.6 MG/DL
BUN SERPL-MCNC: 7.6 MG/DL (ref 6–20)
CALCIUM SERPL-MCNC: 8.1 MG/DL (ref 8.6–10)
CHLORIDE SERPL-SCNC: 97 MMOL/L (ref 98–107)
CREAT SERPL-MCNC: 0.48 MG/DL (ref 0.51–0.95)
DEPRECATED HCO3 PLAS-SCNC: 22 MMOL/L (ref 22–29)
ERYTHROCYTE [DISTWIDTH] IN BLOOD BY AUTOMATED COUNT: 12.4 % (ref 10–15)
ETHANOL SERPL-MCNC: <0.01 G/DL
GFR SERPL CREATININE-BSD FRML MDRD: >90 ML/MIN/1.73M2
GGT SERPL-CCNC: >1200 U/L (ref 5–36)
GLUCOSE SERPL-MCNC: 111 MG/DL (ref 70–99)
HCT VFR BLD AUTO: 30.5 % (ref 35–47)
HGB BLD-MCNC: 10.4 G/DL (ref 11.7–15.7)
MAGNESIUM SERPL-MCNC: 1.3 MG/DL (ref 1.7–2.3)
MAGNESIUM SERPL-MCNC: 2.2 MG/DL (ref 1.7–2.3)
MCH RBC QN AUTO: 33.5 PG (ref 26.5–33)
MCHC RBC AUTO-ENTMCNC: 34.1 G/DL (ref 31.5–36.5)
MCV RBC AUTO: 98 FL (ref 78–100)
PLATELET # BLD AUTO: 152 10E3/UL (ref 150–450)
POTASSIUM SERPL-SCNC: 2.6 MMOL/L (ref 3.4–5.3)
POTASSIUM SERPL-SCNC: 3.8 MMOL/L (ref 3.4–5.3)
POTASSIUM SERPL-SCNC: 3.8 MMOL/L (ref 3.4–5.3)
PROT SERPL-MCNC: 5.9 G/DL (ref 6.4–8.3)
RBC # BLD AUTO: 3.1 10E6/UL (ref 3.8–5.2)
SARS-COV-2 RNA RESP QL NAA+PROBE: NEGATIVE
SODIUM SERPL-SCNC: 133 MMOL/L (ref 136–145)
WBC # BLD AUTO: 8.3 10E3/UL (ref 4–11)

## 2022-10-04 PROCEDURE — 99207 PR NO BILLABLE SERVICE THIS VISIT: CPT | Performed by: HOSPITALIST

## 2022-10-04 PROCEDURE — 250N000011 HC RX IP 250 OP 636: Performed by: EMERGENCY MEDICINE

## 2022-10-04 PROCEDURE — 258N000003 HC RX IP 258 OP 636: Performed by: EMERGENCY MEDICINE

## 2022-10-04 PROCEDURE — 85027 COMPLETE CBC AUTOMATED: CPT | Performed by: INTERNAL MEDICINE

## 2022-10-04 PROCEDURE — 82248 BILIRUBIN DIRECT: CPT | Performed by: INTERNAL MEDICINE

## 2022-10-04 PROCEDURE — 250N000013 HC RX MED GY IP 250 OP 250 PS 637: Performed by: INTERNAL MEDICINE

## 2022-10-04 PROCEDURE — 82374 ASSAY BLOOD CARBON DIOXIDE: CPT | Performed by: INTERNAL MEDICINE

## 2022-10-04 PROCEDURE — 96376 TX/PRO/DX INJ SAME DRUG ADON: CPT

## 2022-10-04 PROCEDURE — 99220 PR INITIAL OBSERVATION CARE,LEVEL III: CPT | Performed by: INTERNAL MEDICINE

## 2022-10-04 PROCEDURE — 74177 CT ABD & PELVIS W/CONTRAST: CPT

## 2022-10-04 PROCEDURE — 83735 ASSAY OF MAGNESIUM: CPT | Performed by: INTERNAL MEDICINE

## 2022-10-04 PROCEDURE — 250N000009 HC RX 250: Performed by: EMERGENCY MEDICINE

## 2022-10-04 PROCEDURE — G0378 HOSPITAL OBSERVATION PER HR: HCPCS

## 2022-10-04 PROCEDURE — 36415 COLL VENOUS BLD VENIPUNCTURE: CPT | Performed by: INTERNAL MEDICINE

## 2022-10-04 PROCEDURE — 82977 ASSAY OF GGT: CPT | Performed by: HOSPITALIST

## 2022-10-04 PROCEDURE — C9113 INJ PANTOPRAZOLE SODIUM, VIA: HCPCS | Performed by: INTERNAL MEDICINE

## 2022-10-04 PROCEDURE — 250N000011 HC RX IP 250 OP 636: Performed by: INTERNAL MEDICINE

## 2022-10-04 PROCEDURE — 84132 ASSAY OF SERUM POTASSIUM: CPT | Performed by: INTERNAL MEDICINE

## 2022-10-04 PROCEDURE — 250N000013 HC RX MED GY IP 250 OP 250 PS 637: Performed by: HOSPITALIST

## 2022-10-04 PROCEDURE — U0005 INFEC AGEN DETEC AMPLI PROBE: HCPCS | Performed by: EMERGENCY MEDICINE

## 2022-10-04 PROCEDURE — 96375 TX/PRO/DX INJ NEW DRUG ADDON: CPT

## 2022-10-04 RX ORDER — PROCHLORPERAZINE MALEATE 5 MG
10 TABLET ORAL EVERY 6 HOURS PRN
Status: DISCONTINUED | OUTPATIENT
Start: 2022-10-04 | End: 2022-10-05 | Stop reason: HOSPADM

## 2022-10-04 RX ORDER — NICOTINE 21 MG/24HR
1 PATCH, TRANSDERMAL 24 HOURS TRANSDERMAL DAILY
Status: DISCONTINUED | OUTPATIENT
Start: 2022-10-04 | End: 2022-10-05 | Stop reason: HOSPADM

## 2022-10-04 RX ORDER — NICOTINE 21 MG/24HR
1 PATCH, TRANSDERMAL 24 HOURS TRANSDERMAL DAILY
Status: DISCONTINUED | OUTPATIENT
Start: 2022-10-05 | End: 2022-10-04

## 2022-10-04 RX ORDER — ONDANSETRON 4 MG/1
4 TABLET, ORALLY DISINTEGRATING ORAL EVERY 6 HOURS PRN
Status: DISCONTINUED | OUTPATIENT
Start: 2022-10-04 | End: 2022-10-05 | Stop reason: HOSPADM

## 2022-10-04 RX ORDER — POTASSIUM CHLORIDE 1500 MG/1
40 TABLET, EXTENDED RELEASE ORAL ONCE
Status: COMPLETED | OUTPATIENT
Start: 2022-10-04 | End: 2022-10-04

## 2022-10-04 RX ORDER — POTASSIUM CHLORIDE 1500 MG/1
20 TABLET, EXTENDED RELEASE ORAL ONCE
Status: COMPLETED | OUTPATIENT
Start: 2022-10-04 | End: 2022-10-04

## 2022-10-04 RX ORDER — AMOXICILLIN 250 MG
2 CAPSULE ORAL 2 TIMES DAILY PRN
Status: DISCONTINUED | OUTPATIENT
Start: 2022-10-04 | End: 2022-10-05 | Stop reason: HOSPADM

## 2022-10-04 RX ORDER — POLYETHYLENE GLYCOL 3350 17 G/17G
17 POWDER, FOR SOLUTION ORAL DAILY PRN
Status: DISCONTINUED | OUTPATIENT
Start: 2022-10-04 | End: 2022-10-05 | Stop reason: HOSPADM

## 2022-10-04 RX ORDER — CEFTRIAXONE 1 G/1
1 INJECTION, POWDER, FOR SOLUTION INTRAMUSCULAR; INTRAVENOUS EVERY 24 HOURS
Status: DISCONTINUED | OUTPATIENT
Start: 2022-10-04 | End: 2022-10-04

## 2022-10-04 RX ORDER — MAGNESIUM SULFATE HEPTAHYDRATE 40 MG/ML
2 INJECTION, SOLUTION INTRAVENOUS ONCE
Status: COMPLETED | OUTPATIENT
Start: 2022-10-04 | End: 2022-10-04

## 2022-10-04 RX ORDER — SODIUM CHLORIDE AND POTASSIUM CHLORIDE 150; 900 MG/100ML; MG/100ML
INJECTION, SOLUTION INTRAVENOUS CONTINUOUS
Status: DISCONTINUED | OUTPATIENT
Start: 2022-10-04 | End: 2022-10-05 | Stop reason: HOSPADM

## 2022-10-04 RX ORDER — IOPAMIDOL 755 MG/ML
500 INJECTION, SOLUTION INTRAVASCULAR ONCE
Status: COMPLETED | OUTPATIENT
Start: 2022-10-04 | End: 2022-10-04

## 2022-10-04 RX ORDER — QUETIAPINE FUMARATE 25 MG/1
100 TABLET, FILM COATED ORAL AT BEDTIME
COMMUNITY

## 2022-10-04 RX ORDER — QUETIAPINE FUMARATE 50 MG/1
100 TABLET, FILM COATED ORAL AT BEDTIME
Status: DISCONTINUED | OUTPATIENT
Start: 2022-10-04 | End: 2022-10-04

## 2022-10-04 RX ORDER — QUETIAPINE FUMARATE 50 MG/1
50 TABLET, FILM COATED ORAL
Status: DISCONTINUED | OUTPATIENT
Start: 2022-10-05 | End: 2022-10-04

## 2022-10-04 RX ORDER — AMOXICILLIN 250 MG
1 CAPSULE ORAL 2 TIMES DAILY PRN
Status: DISCONTINUED | OUTPATIENT
Start: 2022-10-04 | End: 2022-10-05 | Stop reason: HOSPADM

## 2022-10-04 RX ORDER — QUETIAPINE FUMARATE 25 MG/1
25 TABLET, FILM COATED ORAL 2 TIMES DAILY PRN
Status: DISCONTINUED | OUTPATIENT
Start: 2022-10-04 | End: 2022-10-04

## 2022-10-04 RX ORDER — QUETIAPINE FUMARATE 50 MG/1
100 TABLET, FILM COATED ORAL 2 TIMES DAILY
Status: DISCONTINUED | OUTPATIENT
Start: 2022-10-04 | End: 2022-10-04

## 2022-10-04 RX ORDER — MULTIVITAMIN,THERAPEUTIC
1 TABLET ORAL DAILY
Status: DISCONTINUED | OUTPATIENT
Start: 2022-10-04 | End: 2022-10-05 | Stop reason: HOSPADM

## 2022-10-04 RX ORDER — FOLIC ACID 1 MG/1
1 TABLET ORAL DAILY
Status: DISCONTINUED | OUTPATIENT
Start: 2022-10-04 | End: 2022-10-05 | Stop reason: HOSPADM

## 2022-10-04 RX ORDER — MIRTAZAPINE 15 MG/1
30 TABLET, FILM COATED ORAL AT BEDTIME
Status: DISCONTINUED | OUTPATIENT
Start: 2022-10-04 | End: 2022-10-05 | Stop reason: HOSPADM

## 2022-10-04 RX ORDER — PROCHLORPERAZINE 25 MG
25 SUPPOSITORY, RECTAL RECTAL EVERY 12 HOURS PRN
Status: DISCONTINUED | OUTPATIENT
Start: 2022-10-04 | End: 2022-10-05 | Stop reason: HOSPADM

## 2022-10-04 RX ORDER — QUETIAPINE FUMARATE 25 MG/1
25 TABLET, FILM COATED ORAL 2 TIMES DAILY
COMMUNITY

## 2022-10-04 RX ORDER — QUETIAPINE FUMARATE 50 MG/1
100 TABLET, FILM COATED ORAL AT BEDTIME
Status: DISCONTINUED | OUTPATIENT
Start: 2022-10-04 | End: 2022-10-05 | Stop reason: HOSPADM

## 2022-10-04 RX ORDER — ONDANSETRON 2 MG/ML
4 INJECTION INTRAMUSCULAR; INTRAVENOUS EVERY 6 HOURS PRN
Status: DISCONTINUED | OUTPATIENT
Start: 2022-10-04 | End: 2022-10-05 | Stop reason: HOSPADM

## 2022-10-04 RX ORDER — DIPHENHYDRAMINE HYDROCHLORIDE 50 MG/ML
25 INJECTION INTRAMUSCULAR; INTRAVENOUS ONCE
Status: COMPLETED | OUTPATIENT
Start: 2022-10-04 | End: 2022-10-04

## 2022-10-04 RX ORDER — QUETIAPINE FUMARATE 50 MG/1
50 TABLET, FILM COATED ORAL EVERY MORNING
Status: DISCONTINUED | OUTPATIENT
Start: 2022-10-05 | End: 2022-10-05 | Stop reason: HOSPADM

## 2022-10-04 RX ORDER — QUETIAPINE FUMARATE 50 MG/1
50 TABLET, FILM COATED ORAL DAILY PRN
Status: DISCONTINUED | OUTPATIENT
Start: 2022-10-04 | End: 2022-10-05 | Stop reason: HOSPADM

## 2022-10-04 RX ORDER — OXYCODONE HYDROCHLORIDE 5 MG/1
5 TABLET ORAL EVERY 4 HOURS PRN
Status: DISCONTINUED | OUTPATIENT
Start: 2022-10-04 | End: 2022-10-05 | Stop reason: HOSPADM

## 2022-10-04 RX ORDER — CEFTRIAXONE 1 G/1
1 INJECTION, POWDER, FOR SOLUTION INTRAMUSCULAR; INTRAVENOUS EVERY 24 HOURS
Status: DISCONTINUED | OUTPATIENT
Start: 2022-10-04 | End: 2022-10-05 | Stop reason: HOSPADM

## 2022-10-04 RX ORDER — QUETIAPINE FUMARATE 25 MG/1
25 TABLET, FILM COATED ORAL EVERY MORNING
Status: ON HOLD | COMMUNITY
End: 2023-01-04

## 2022-10-04 RX ADMIN — THIAMINE HCL TAB 100 MG 100 MG: 100 TAB at 08:58

## 2022-10-04 RX ADMIN — SODIUM CHLORIDE 52 ML: 9 INJECTION, SOLUTION INTRAVENOUS at 00:39

## 2022-10-04 RX ADMIN — QUETIAPINE FUMARATE 25 MG: 25 TABLET ORAL at 15:26

## 2022-10-04 RX ADMIN — NICOTINE 1 PATCH: 21 PATCH, EXTENDED RELEASE TRANSDERMAL at 23:03

## 2022-10-04 RX ADMIN — SODIUM CHLORIDE 1000 ML: 9 INJECTION, SOLUTION INTRAVENOUS at 00:18

## 2022-10-04 RX ADMIN — THERA TABS 1 TABLET: TAB at 08:58

## 2022-10-04 RX ADMIN — MIRTAZAPINE 30 MG: 15 TABLET, FILM COATED ORAL at 22:07

## 2022-10-04 RX ADMIN — POTASSIUM CHLORIDE AND SODIUM CHLORIDE: 900; 150 INJECTION, SOLUTION INTRAVENOUS at 15:27

## 2022-10-04 RX ADMIN — PANTOPRAZOLE SODIUM 40 MG: 40 INJECTION, POWDER, FOR SOLUTION INTRAVENOUS at 20:03

## 2022-10-04 RX ADMIN — MORPHINE SULFATE 4 MG: 4 INJECTION, SOLUTION INTRAMUSCULAR; INTRAVENOUS at 00:19

## 2022-10-04 RX ADMIN — POTASSIUM CHLORIDE AND SODIUM CHLORIDE: 900; 150 INJECTION, SOLUTION INTRAVENOUS at 03:32

## 2022-10-04 RX ADMIN — CEFTRIAXONE 1 G: 1 INJECTION, POWDER, FOR SOLUTION INTRAMUSCULAR; INTRAVENOUS at 22:18

## 2022-10-04 RX ADMIN — FOLIC ACID 1 MG: 1 TABLET ORAL at 08:58

## 2022-10-04 RX ADMIN — CEFTRIAXONE 1 G: 1 INJECTION, POWDER, FOR SOLUTION INTRAMUSCULAR; INTRAVENOUS at 00:18

## 2022-10-04 RX ADMIN — OXYCODONE HYDROCHLORIDE 5 MG: 5 TABLET ORAL at 08:58

## 2022-10-04 RX ADMIN — DIPHENHYDRAMINE HYDROCHLORIDE 25 MG: 50 INJECTION, SOLUTION INTRAMUSCULAR; INTRAVENOUS at 01:03

## 2022-10-04 RX ADMIN — MAGNESIUM SULFATE HEPTAHYDRATE 2 G: 40 INJECTION, SOLUTION INTRAVENOUS at 01:07

## 2022-10-04 RX ADMIN — IOPAMIDOL 45 ML: 755 INJECTION, SOLUTION INTRAVENOUS at 00:39

## 2022-10-04 RX ADMIN — ONDANSETRON 4 MG: 4 TABLET, ORALLY DISINTEGRATING ORAL at 02:40

## 2022-10-04 RX ADMIN — THIAMINE HCL TAB 100 MG 100 MG: 100 TAB at 17:18

## 2022-10-04 RX ADMIN — QUETIAPINE FUMARATE 100 MG: 50 TABLET ORAL at 22:06

## 2022-10-04 RX ADMIN — POTASSIUM CHLORIDE 20 MEQ: 1500 TABLET, EXTENDED RELEASE ORAL at 04:55

## 2022-10-04 RX ADMIN — MIRTAZAPINE 30 MG: 15 TABLET, FILM COATED ORAL at 03:22

## 2022-10-04 RX ADMIN — POTASSIUM CHLORIDE 40 MEQ: 1500 TABLET, EXTENDED RELEASE ORAL at 03:22

## 2022-10-04 RX ADMIN — OXYCODONE HYDROCHLORIDE 5 MG: 5 TABLET ORAL at 17:46

## 2022-10-04 RX ADMIN — QUETIAPINE FUMARATE 50 MG: 50 TABLET ORAL at 20:02

## 2022-10-04 RX ADMIN — PANTOPRAZOLE SODIUM 40 MG: 40 INJECTION, POWDER, FOR SOLUTION INTRAVENOUS at 09:00

## 2022-10-04 RX ADMIN — QUETIAPINE FUMARATE 25 MG: 25 TABLET ORAL at 03:25

## 2022-10-04 RX ADMIN — OXYCODONE HYDROCHLORIDE 5 MG: 5 TABLET ORAL at 22:13

## 2022-10-04 RX ADMIN — PANTOPRAZOLE SODIUM 40 MG: 40 INJECTION, POWDER, FOR SOLUTION INTRAVENOUS at 03:22

## 2022-10-04 ASSESSMENT — ACTIVITIES OF DAILY LIVING (ADL)
ADLS_ACUITY_SCORE: 37
ADLS_ACUITY_SCORE: 35
ADLS_ACUITY_SCORE: 37
ADLS_ACUITY_SCORE: 35
ADLS_ACUITY_SCORE: 37

## 2022-10-04 NOTE — ED PROVIDER NOTES
"  History     Chief Complaint:    Nausea & Vomiting      HPI   Audelia Comer is a 56 year old female who presents with nausea/vomiting for 1.5 weeks. She feels this is \"acidic\" and notes brown material in the emesis. She reports a history of acid reflux, but typically does not vomit like this. Has difficulty keeping things down for 1.5 weeks. Thought that she was constipated and the emesis was a result. Took laxatives and was able to have a BM, but the vomiting did not change. She now states she is constipated. She only urinated once yesterday and noted a dark orange color. She notes a history of \"ulcers\" and pancreatitis.    Review of Systems   Constitutional: Negative for fever.   Gastrointestinal: Positive for abdominal pain, constipation, nausea and vomiting.   All other systems reviewed and are negative.    Allergies:  No Known Allergies    Medications:    acetaminophen (TYLENOL) 325 MG tablet  azelastine (ASTELIN) 0.1 % nasal spray  cetirizine (ZYRTEC) 10 MG tablet  fluticasone (FLONASE) 50 MCG/ACT nasal spray  folic acid (FOLVITE) 1 MG tablet  ibuprofen (ADVIL/MOTRIN) 400 MG tablet  mirtazapine (REMERON) 15 MG tablet  multivitamin w/minerals (THERA-VIT-M) tablet  ondansetron (ZOFRAN ODT) 4 MG ODT tab  pantoprazole (PROTONIX) 40 MG EC tablet  potassium chloride ER (K-TAB) 20 MEQ CR tablet  QUEtiapine (SEROQUEL) 100 MG tablet  QUEtiapine (SEROQUEL) 25 MG tablet  thiamine (B-1) 100 MG tablet         Past Medical History:   Past Surgical History:     Past Medical History:   Diagnosis Date     Anxiety      Depressive disorder      Invasive ductal carcinoma of breast, right (H)      SBO (small bowel obstruction) (H)     Past Surgical History:   Procedure Laterality Date     APPENDECTOMY       APPENDECTOMY  1989      SECTION  1984     GYN SURGERY      c section      MASTECTOMY, BILATERAL Bilateral     With reconstructive surgery     SOFT TISSUE SURGERY      breast implants      Patient Active " Problem List    Diagnosis Date Noted     Unintended weight loss 06/28/2022     Priority: Medium     Nonspecific abdominal pain 06/28/2022     Priority: Medium     Hypokalemia 11/19/2021     Priority: Medium     Nausea and vomiting, intractability of vomiting not specified, unspecified vomiting type 11/19/2021     Priority: Medium     Hepatitis 01/18/2021     Priority: Medium     Acute gastritis without hemorrhage, unspecified gastritis type 01/18/2021     Priority: Medium     Acute pancreatitis 03/15/2018     Priority: Medium     Ileitis 08/02/2016     Priority: Medium          Family History  Family History   Problem Relation Age of Onset     Colon Cancer Maternal Grandmother      Lung Cancer Paternal Grandmother      Breast Cancer Paternal Grandmother        Social History:  PCP: Randee Woods  Presents to the ED alone    Physical Exam     Patient Vitals for the past 24 hrs:   BP Temp Temp src Pulse Resp SpO2   10/04/22 0030 121/85 -- -- 95 -- --   10/04/22 0020 120/87 -- -- 95 -- --   10/04/22 0000 -- -- -- -- -- 100 %   10/03/22 2334 -- -- -- -- -- 100 %   10/03/22 2333 (!) 133/92 -- -- 104 -- --   10/03/22 1740 106/79 97.5  F (36.4  C) Temporal 112 18 98 %       Physical Exam  Constitutional: Vital signs reviewed as above.   HENT:    Head: No external signs of trauma noted.   Eyes: Conjunctivae are normal. Pupils are equal, round, and reactive to light.   Cardiovascular:    Normal rate, regular rhythm and normal heart sounds.     Exam reveals no friction rub.     No murmur heard.  Pulmonary/Chest:    Effort normal and breath sounds normal.    No respiratory distress.    There are no wheezes.    There are no rales.   Gastrointestinal:    Soft.    Bowel sounds normal.    There is no distension.    There is no tenderness.    There is no rebound or guarding.   Musculoskeletal:    Normal range of motion.    Normal Tone  Neurological: Patient is alert and oriented to person, place, and time.   Skin: Skin is warm and  dry. Patient is not diaphoretic  Psychiatric: The patient appears calm      Emergency Department Course     Imaging:  CT Abdomen Pelvis w Contrast   Final Result   IMPRESSION:    1.  No evidence for acute interstitial edematous pancreatitis or necrotizing pancreatitis.      2.  Changes related to chronic pancreatitis with pancreatic calcifications and pancreatic atrophy. No ductal dilatation.      3.  Hepatomegaly with interval increase in hepatic steatosis diffusely.      4.  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      5.  No ascites.      US Abdomen Limited   Final Result   IMPRESSION:   1.  Portions of the pancreas are not well seen. If there is clinical concern for acute pancreatitis, then CT is recommended.      2.  Negative for cholelithiasis or acute cholecystitis.      3.  No intra or extra hepatic biliary ductal dilation.      4.  At least moderate diffuse hepatic steatosis with geographic focal fatty sparing. This is similar to prior.             Laboratory:  Labs Ordered and Resulted from Time of ED Arrival to Time of ED Departure   COMPREHENSIVE METABOLIC PANEL - Abnormal       Result Value    Sodium 130 (*)     Potassium 3.0 (*)     Chloride 83 (*)     Carbon Dioxide (CO2) 20 (*)     Anion Gap 27 (*)     Urea Nitrogen 16.4      Creatinine 0.60      Calcium 9.5      Glucose 97      Alkaline Phosphatase 344 (*)      (*)     ALT 72 (*)     Protein Total 7.9      Albumin 4.3      Bilirubin Total 0.9      GFR Estimate >90     CBC WITH PLATELETS AND DIFFERENTIAL - Abnormal    WBC Count 11.4 (*)     RBC Count 3.82      Hemoglobin 12.6      Hematocrit 36.8      MCV 96      MCH 33.0      MCHC 34.2      RDW 12.3      Platelet Count 194      % Neutrophils 71      % Lymphocytes 15      % Monocytes 13      % Eosinophils 0      % Basophils 1      % Immature Granulocytes 0      NRBCs per 100 WBC 0      Absolute Neutrophils 8.0      Absolute Lymphocytes 1.8       Absolute Monocytes 1.5 (*)     Absolute Eosinophils 0.0      Absolute Basophils 0.1      Absolute Immature Granulocytes 0.0      Absolute NRBCs 0.0     ROUTINE UA WITH MICROSCOPIC - Abnormal    Color Urine Yellow      Appearance Urine Slightly Cloudy (*)     Glucose Urine Negative      Bilirubin Urine Small (*)     Ketones Urine 150  (*)     Specific Gravity Urine 1.020      Blood Urine Negative      pH Urine 6.0      Protein Albumin Urine 50  (*)     Urobilinogen Urine 2.0      Nitrite Urine Negative      Leukocyte Esterase Urine Large (*)     Mucus Urine Present (*)     RBC Urine 2      WBC Urine 102 (*)     Squamous Epithelials Urine 1      Hyaline Casts Urine 18 (*)    MAGNESIUM - Abnormal    Magnesium 1.3 (*)    ETHYL ALCOHOL LEVEL - Abnormal    Alcohol ethyl <0.01 (*)    LIPASE - Normal    Lipase 26     COVID-19 VIRUS (CORONAVIRUS) BY PCR       Procedures:      Emergency Department Course:           Reviewed:    I reviewed nursing notes, vitals and past history    Assessments/Consults:   ED Course as of 10/04/22 0202   Tue Oct 04, 2022   0002 Dr. Petersen' evaluation.   0102 Rechecked.   0117 D/W Dr. Fidel Hylton. Ok for obs.       Interventions:  Medications   morphine (PF) injection 4 mg (4 mg Intravenous Not Given 10/3/22 2004)   magnesium sulfate 2 g in water intermittent infusion (2 g Intravenous New Bag 10/4/22 0107)   potassium chloride (KLOR-CON) Packet 20 mEq (20 mEq Oral Given 10/3/22 2003)   ondansetron (ZOFRAN) injection 4 mg (4 mg Intravenous Given 10/3/22 2003)   HYDROcodone-acetaminophen (NORCO) 5-325 MG per tablet 2 tablet (2 tablets Oral Given 10/3/22 2329)   morphine (PF) injection 4 mg (4 mg Intravenous Given 10/4/22 0019)   0.9% sodium chloride BOLUS (0 mLs Intravenous Stopped 10/4/22 0141)   cefTRIAXone (ROCEPHIN) 1 g vial to attach to  mL bag for ADULTS or NS 50 mL bag for PEDS (0 g Intravenous Stopped 10/4/22 0114)   CT saline flush (52 mLs Intravenous Given 10/4/22 0039)   iopamidol  (ISOVUE-370) solution 500 mL (45 mLs Intravenous Given 10/4/22 0039)   diphenhydrAMINE (BENADRYL) injection 25 mg (25 mg Intravenous Given 10/4/22 0103)       Disposition:  The patient was placed in observation in the hospital under the care of Dr. Fidel Hylton.    Impression & Plan        CMS Diagnoses:           Medical Decision Making:  This 56-year-old female patient presents to the ED due to vomiting and abdominal bloating/discomfort.  Please see the HPI and exam for specifics.  A broad differential was considered though by the time of my evaluation the patient's triage laboratory studies had resulted and were notable for hyponatremia and hypokalemia.  I added on a magnesium which was also low.  Electrolyte supplementation was begun and the patient also got IV fluids.  Due to her feeling of bloating and abdominal discomfort, CT imaging was ordered.  Fortunately, no obstruction, gross mass, or pancreatitis are noted.  Given her symptoms, I felt it was reasonable to monitor her overnight and replace her electrolytes.  I discussed the case with the hospitalist who accepts patient for observation.          Critical Care time:  none    Covid-19  Audelia Comer was evaluated during a global COVID-19 pandemic, which necessitated consideration that the patient might be at risk for infection with the SARS-CoV-2 virus that causes COVID-19.  Applicable protocols for evaluation were followed during the patient's care. COVID-19 was considered as part of the patient's evaluation.       Diagnosis:    ICD-10-CM    1. Nausea and vomiting, unspecified vomiting type  R11.2    2. Hypomagnesemia  E83.42    3. Hypokalemia  E87.6    4. Hyponatremia  E87.1        Discharge Medications:  New Prescriptions    No medications on file              NicolaJose De JesusDO  10/04/22 0208

## 2022-10-04 NOTE — H&P
"[facility]  History and Physical  Hospitalist - Charles Hylton DO       Date of Admission:  10/3/2022    Chief Complaint   Nausea and vomiting    History is obtained from the patient, the emergency department physician, as well as the electronic medical record.    History of Present Illness   Audelia Comer is a 56 year old female with past medical history of alcohol abuse with chronic pancreatitis, depression, breast cancer status post bilateral mastectomy currently in remission, active nicotine dependence with cigarettes who presented on 10/3/2022 with chief complaint of nausea and vomiting.  The patient states that she has been vomiting for the past week and a half.  She has been unable to keep any food down.  She has also been complaining of some epigastric abdominal discomfort during this time.  She states that yesterday she started vomiting brown chunks and became concerned so decided to come to the emergency department.  She was admitted to the hospital in 6/2022 for the treatment of alcoholic pancreatitis and major depression.  At that time there was some consideration for inpatient psychiatry, however the patient was cleared by [facility] and discharged home.  She states that she has been drinking alcohol again.  She states her last drink was approximately 1-1/2 weeks ago at which point she stopped because she was having nausea and vomiting.  She reports drinking vodka and cranberry juice.  She states she was drinking \"a couple of night caps\" every night to just take the edge off.  She has not been able to take her medications due to her nausea and vomiting.  She denies any blood in the vomit.  She denies any diarrhea and reports constipation.  The patient also reports that she did not urinate yesterday and when she finally did it was \"orange\".  She denies any dysuria or malodorous urine.    In the emergency department, the patient was found to have a temperature of 97.5  F, heart " rate 112, blood pressure 106/79, respiratory rate 18, SPO2 98% on room air.  Initial lab work showed sodium 130, potassium 3.0, chloride 83, bicarb 20, anion gap 27, magnesium 1.3, alkaline phosphatase 344, AST//72, lipase 26, WBC 11.4.  Urinalysis showed ketonuria, large leukocyte esterase, 102 WBC.  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  The patient was provided with electrolyte replacement and IV fluids as well as antiemetics.  The patient was admitted for observation and electrolyte replacement.      ASSESSMENT/PLAN    Acute Intractable Nausea and Vomiting  - Likely secondary to etoh abuse  - Antiemetics prn  - PPI IV BID    Hyponatremia  Hypokalemia  Hypomagnesemia  - Electrolyte replacement protocol  - IVF with 20 mEq KCl  - Daily BMP    Alcohol Abuse  Chronic Pancreatitis  Alcoholic Hepatitis  Hepatic Steatosis  - Recommend alcohol cessation  - Daily Hepatic Panel  - Pt reports drinking a few night caps of vodka/cranberry juice per night.  Last reported drink was 1.5 weeks ago  - Hold off on CIWA as pt does not appear to have withdrawal symptoms  - MV, FA, Thiamine    Severe Malnutrition  - Recommend alcohol cessation    Major Depressive Disorder  Anxiety  - Resume PTA Seroquel and Mirtazipine    PLAN: Resume home medications as appropriate once confirmed by pharmacy.  If the patient's symptoms improve and her electrolytes stable we did discuss the likelihood of discharge later today.  Patient expressed understanding.    DVT Prophylaxis: Pneumatic Compression Devices  Code Status: Full Code  Discharge Plan:    Expected Discharge Date: 10/05/2022                 Charles Hylton DO    Primary Care Physician   Randee  Peggy    -----------------------------------------------------------------------------------------------------------------------------------------------------------------------------------------------------    Past Medical History    I have reviewed this patient's medical history and updated it with pertinent information if needed.   Past Medical History:   Diagnosis Date     Anxiety      Depressive disorder      Invasive ductal carcinoma of breast, right (H)      SBO (small bowel obstruction) (H)        Past Surgical History   I have reviewed this patient's surgical history and updated it with pertinent information if needed.  Past Surgical History:   Procedure Laterality Date     APPENDECTOMY       APPENDECTOMY  1989      SECTION  1984     GYN SURGERY      c section      MASTECTOMY, BILATERAL Bilateral     With reconstructive surgery     SOFT TISSUE SURGERY      breast implants       Prior to Admission Medications   Prior to Admission Medications   Prescriptions Last Dose Informant Patient Reported? Taking?   QUEtiapine (SEROQUEL) 100 MG tablet   No No   Sig: Take 1 tablet (100 mg) by mouth 2 times daily   QUEtiapine (SEROQUEL) 25 MG tablet   No No   Sig: Take 1 tablet (25 mg) by mouth 2 times daily as needed (Anxiety)   acetaminophen (TYLENOL) 325 MG tablet   No No   Sig: Take 2 tablets (650 mg) by mouth every 6 hours as needed for mild pain Do not take more than 2000 mg per day. Alternate with ibuprofen.   azelastine (ASTELIN) 0.1 % nasal spray   Yes No   Sig: Spray 1 spray into both nostrils daily as needed    cetirizine (ZYRTEC) 10 MG tablet   Yes No   Sig: Take 10 mg by mouth daily as needed    fluticasone (FLONASE) 50 MCG/ACT nasal spray   Yes No   Sig: Spray 1 spray into both nostrils daily as needed    folic acid (FOLVITE) 1 MG tablet   No No   Sig: Take 1 tablet (1 mg) by mouth daily   ibuprofen (ADVIL/MOTRIN) 400 MG tablet   No No   Sig: Take 1 tablet (400 mg) by mouth every 6 hours  as needed for moderate pain   mirtazapine (REMERON) 15 MG tablet   Yes No   Sig: Take 30 mg by mouth At Bedtime   multivitamin w/minerals (THERA-VIT-M) tablet   No No   Sig: Take 1 tablet by mouth daily   ondansetron (ZOFRAN ODT) 4 MG ODT tab   No No   Sig: Take 1 tablet (4 mg) by mouth every 6 hours as needed for nausea   pantoprazole (PROTONIX) 40 MG EC tablet   Yes No   Sig: Take 40 mg by mouth daily as needed   potassium chloride ER (K-TAB) 20 MEQ CR tablet   Yes No   Sig: Take 20 mEq by mouth daily (with dinner)   thiamine (B-1) 100 MG tablet   No No   Sig: Take 1 tablet (100 mg) by mouth daily      Facility-Administered Medications: None     Allergies   No Known Allergies    Social History   I have reviewed this patient's social history and updated it with pertinent information if needed. Audelia Comer  reports that she has been smoking. She has been smoking about 1.00 pack per day. She has never used smokeless tobacco. She reports current alcohol use. She reports that she does not use drugs.    Family History   I have reviewed this patient's family history and updated it with pertinent information if needed.   Family History   Problem Relation Age of Onset     Colon Cancer Maternal Grandmother      Lung Cancer Paternal Grandmother      Breast Cancer Paternal Grandmother        -----------------------------------------------------------------------------------------------------------------------------------------------------------------------------------------------------    Review of Systems   The 10 point Review of Systems is negative other than noted in the HPI or here.     Physical Exam   Temp: 97.5  F (36.4  C) Temp src: Temporal BP: 121/85 Pulse: 95   Resp: 18 SpO2: 100 % O2 Device: None (Room air)    Vital Signs with Ranges  Temp:  [97.5  F (36.4  C)] 97.5  F (36.4  C)  Pulse:  [] 95  Resp:  [18] 18  BP: (106-133)/(79-92) 121/85  SpO2:  [98 %-100 %] 100 %  0 lbs 0 oz    Constitutional: Awake,  alert, cooperative, no apparent distress.  Eyes: Conjunctiva and pupils examined and normal.  HEENT: Moist mucous membranes, normal dentition.  Respiratory: Clear to auscultation bilaterally, no crackles or wheezing.  Cardiovascular: Regular rate and rhythm, normal S1 and S2, and no murmur noted.  GI: Soft, non-distended, non-tender, normal bowel sounds.  Lymph/Hematologic: No anterior cervical or supraclavicular adenopathy.  Skin: No rashes, no cyanosis, no edema.  Musculoskeletal: No joint swelling, erythema or tenderness.  Neurologic: Cranial nerves 2-12 intact, normal strength and sensation.  Psychiatric: Alert, oriented to person, place and time, no obvious anxiety or depression.     Data     Recent Labs   Lab 10/03/22  1743   WBC 11.4*   HGB 12.6   MCV 96      *   POTASSIUM 3.0*   CHLORIDE 83*   CO2 20*   BUN 16.4   CR 0.60   ANIONGAP 27*   FRANCOISE 9.5   GLC 97   ALBUMIN 4.3   PROTTOTAL 7.9   BILITOTAL 0.9   ALKPHOS 344*   ALT 72*   *   LIPASE 26       Recent Results (from the past 24 hour(s))   US Abdomen Limited    Narrative    EXAM: US ABDOMEN LIMITED  LOCATION: [facility]  DATE/TIME: 10/3/2022 8:49 PM    INDICATION: NV, epigastric abd pain and tenderness  COMPARISON: 06/28/2022.  TECHNIQUE: Limited abdominal ultrasound.    FINDINGS:    GALLBLADDER: Normal. No gallstones, wall thickening, or pericholecystic fluid. Negative sonographic Ribera's sign.    BILE DUCTS: No biliary dilatation. The common duct measures 6 mm.    LIVER: 16 cm. Increased hepatic parenchymal echotexture. There is geographic focal fatty sparing. No mass.    RIGHT KIDNEY: 11.4 cm. No hydronephrosis.    PANCREAS: Distal body and tail not well seen due to overlying bowel gas.    No ascites.      Impression    IMPRESSION:  1.  Portions of the pancreas are not well seen. If there is clinical concern for acute pancreatitis, then CT is recommended.    2.  Negative for cholelithiasis or acute  cholecystitis.    3.  No intra or extra hepatic biliary ductal dilation.    4.  At least moderate diffuse hepatic steatosis with geographic focal fatty sparing. This is similar to prior.     CT Abdomen Pelvis w Contrast    Narrative    EXAM: CT ABDOMEN PELVIS W CONTRAST  LOCATION: [facility]  DATE/TIME: 10/4/2022 12:47 AM    INDICATION: Epigastric abdominal pain. Concern for pancreatitis.  COMPARISON: CT from 6/28/2022 is currently unavailable. CT 10/28/2021.  TECHNIQUE: CT scan of the abdomen and pelvis was performed following injection of IV contrast. Multiplanar reformats were obtained. Dose reduction techniques were used.  CONTRAST: 45 mL Isovue 370    FINDINGS:   LOWER CHEST: Periesophageal varices in the posterior inferior aspect of the mediastinum.    HEPATOBILIARY: Hepatomegaly. Interval progression of a geographic fatty infiltration throughout the liver. No calcified gallstones or biliary dilatation. Portal vein and hepatic veins patent.    PANCREAS: Diffuse pancreatic atrophy. No pancreatic inflammatory change or ductal dilatation. Mild pancreatic calcifications.    SPLEEN: Normal size. Splenic vein patent.    ADRENAL GLANDS: Unremarkable.    KIDNEYS/BLADDER: No urinary collecting system dilatation or significant calculi. Symmetric renal enhancement. Bladder unremarkable.    BOWEL: No obstruction or inflammatory change. Colonic diverticulosis.    LYMPH NODES: No lymphadenopathy.    VASCULATURE: Normal caliber aorta. Moderate vascular calcification.    PELVIC ORGANS: No free fluid.    MUSCULOSKELETAL: Minimal degenerative disc disease lower lumbar spine.      Impression    IMPRESSION:   1.  No evidence for acute interstitial edematous pancreatitis or necrotizing pancreatitis.    2.  Changes related to chronic pancreatitis with pancreatic calcifications and pancreatic atrophy. No ductal dilatation.    3.  Hepatomegaly with interval increase in hepatic steatosis diffusely.    4.   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    5.  No ascites.

## 2022-10-04 NOTE — PHARMACY-ADMISSION MEDICATION HISTORY
Admission medication history interview status for this patient is complete. See [facility] admission navigator for allergy information, prior to admission medications and immunization status.     Medication history interview done, indicate source(s): Patient  Medication history resources (including written lists, pill bottles, clinic record): [place] ([facility] [employer])  Pharmacy: [place], [place] - [address] [facility]    Changes made to PTA medication list:  Added: none  Changed: per pt/CareEverywhere    Quetiapine 25 mg BID PRN anxiety + 100 mg BID --> re-entered as follows:    Quetiapine 50 mg in AM    Quetiapine 50 mg daily in afternoon PRN anxiety    Quetiapine 100 mg HS  Reported as Not Taking: pt ran out of refills of folic acid, multivite, and thiamine    Folic acid    Multivitamin    Potassium Chloride ER 20 mEq tablet - pt has trouble swallowing tablet, could consider packet or liquid form instead.    Thiamine  Removed: none    Actions taken by pharmacist (provider contacted, etc): left sticky, MD paged to re-evaluate quetiapine dosing    Additional medication history information: None    Medication reconciliation/reorder completed by provider prior to medication history?  Y   (Y/N)     Prior to Admission medications    Medication Sig Last Dose Taking? Auth Provider Long Term End Date   acetaminophen (TYLENOL) 325 MG tablet Take 2 tablets (650 mg) by mouth every 6 hours as needed for mild pain Do not take more than 2000 mg per day. Alternate with ibuprofen.  at PRN Yes Jonathon Narvaez,      azelastine (ASTELIN) 0.1 % nasal spray Spray 1 spray into both nostrils daily as needed   at PRN Yes Unknown, Entered By History     cetirizine (ZYRTEC) 10 MG tablet Take 10 mg by mouth daily as needed   at PRN Yes Unknown, Entered By History     fluticasone (FLONASE) 50 MCG/ACT nasal spray Spray 1 spray into both nostrils daily as needed   at PRN Yes Unknown, Entered By History      ibuprofen (ADVIL/MOTRIN) 400 MG tablet Take 1 tablet (400 mg) by mouth every 6 hours as needed for moderate pain  at PRN Yes Jonathon Narvaez DO     mirtazapine (REMERON) 15 MG tablet Take 30 mg by mouth At Bedtime 10/3/2022 at HS Yes Unknown, Entered By History Yes 4/19/23   ondansetron (ZOFRAN ODT) 4 MG ODT tab Take 1 tablet (4 mg) by mouth every 6 hours as needed for nausea  at PRN Yes Darion Reed MD     pantoprazole (PROTONIX) 40 MG EC tablet Take 40 mg by mouth daily as needed  at PRN Yes Unknown, Entered By History  4/19/23   QUEtiapine (SEROQUEL) 25 MG tablet Take 50 mg by mouth every morning 10/3/2022 at AM Yes Unknown, Entered By History No    QUEtiapine (SEROQUEL) 25 MG tablet Take 50 mg by mouth daily as needed (anxiety) 10/3/2022 at afternoon Yes Unknown, Entered By History No    QUEtiapine (SEROQUEL) 25 MG tablet Take 100 mg by mouth At Bedtime 10/2/2022 at HS Yes Unknown, Entered By History No    folic acid (FOLVITE) 1 MG tablet Take 1 tablet (1 mg) by mouth daily   Jonathon Narvaez DO     multivitamin w/minerals (THERA-VIT-M) tablet Take 1 tablet by mouth daily   Hong Benitez MD     potassium chloride ER (K-TAB) 20 MEQ CR tablet Take 20 mEq by mouth daily (with dinner)   Unknown, Entered By History     thiamine (B-1) 100 MG tablet Take 1 tablet (100 mg) by mouth daily   Baxa, Alexander, DO Nicollette McMann, PharmD  [phone] (Med Reconciliation [facility] phone)

## 2022-10-04 NOTE — CONSULTS
Care Management Initial Consult    General Information  Assessment completed with: Patient, Patient  Type of CM/SW Visit: Initial Assessment    Primary Care Provider verified and updated as needed: No   Readmission within the last 30 days: no previous admission in last 30 days      Reason for Consult: discharge planning  Advance Care Planning:            Communication Assessment  Patient's communication style: spoken language (English or Bilingual)             Cognitive  Cognitive/Neuro/Behavioral: WDL                      Living Environment:   People in home: alone     Current living Arrangements:        Able to return to prior arrangements: yes       Family/Social Support:  Care provided by: self  Provides care for: no one, unable/limited ability to care for self     Children          Description of Support System: Supportive, Involved    Support Assessment: Adequate family and caregiver support    Current Resources:   Patient receiving home care services: No     Community Resources: [facility],  Mental Health  Equipment currently used at home:    Supplies currently used at home:      Employment/Financial:  Employment Status: disabled, other (see comments), unemployed (Working on getting social security disability)        Financial Concerns: unable to afford rent/mortgage   Referral to Financial Worker: No       Lifestyle & Psychosocial Needs:  Social Determinants of Health     Tobacco Use: High Risk     Smoking Tobacco Use: Current Every Day Smoker     Smokeless Tobacco Use: Never Used   Alcohol Use: Not on file   Financial Resource Strain: Not on file   Food Insecurity: Not on file   Transportation Needs: Not on file   Physical Activity: Not on file   Stress: Not on file   Social Connections: Not on file   Intimate Partner Violence: Not on file   Depression: Not on file   Housing Stability: Not on file       Functional Status:  Prior to admission patient needed assistance:              Mental Health  Status:  Mental Health Status: Past Concern  Mental Health Management: Medication, Individual Therapy, Group Therapy    Chemical Dependency Status:  Chemical Dependency Status: Current Concern           Care Management Discharge Note    Discharge Date: 10/05/2022       Discharge Disposition:      Discharge Services: None, Chemical Dependency Resources    Additional Information:  Patient is boarding in the ED. SW met with patient at bedside. Patient is not under a CD civil commitment. Patient lives alone. She has a son that lives in the area that helps when needed. Her son is currently taking care of her dog and helps patient get food. Patient has concerns about paying her rent. She has contacted [facility]. Her son will assist in paying rent as well. Patient is in the process of applying for disability. Patient gets some food support through [facility]. Patient has a car but does not drive due to a medication that she is on. Patient has friends that help transport. Patient is unsure if she has transportation benefits through her insurance. SW will place information in AVS on [employer] ([phone]). Patient sees a therapist at [facility]. She also attends a grief group for parents who have lost children.     At the end of the visit, patient was asking about taking her medication, specifially Seroquel. SW contacted RN and put on the ED communication that she wants her medication.     RANDAL Garner, [facility]  Emergency Room   [phone]-Please contact the SW on the floor in which the patient is staying for any questions or concerns

## 2022-10-04 NOTE — ED NOTES
"Rapid Assessment Note    History:   Patient reports that for a week and a half she has been experiencing no appetite and intermittent \"acidy\" vomiting. She describes her vomit as little chunks of \"brown/black slime\" in a brown liquid for the past 5-6 days. She states that she has not been able to tolerate her medications without vomiting. She notes that she has epigastric pain that is improved with the vomiting. She adds that she has constipation, but took a stool softener 5-6 days ago with good improvement of her symptoms, until 3 days ago and she is now constipated once again. Her most recent bowel movement was brown. She reports that her main concern is that she did not urine yesterday and today she urinated a bright orange color with associated strong odor. She has a history of an appendectomy and a . She has been experiencing increased stress recently, which caused her loss of appetite.    Exam:   General:  Alert, interactive  Cardiovascular:  Well perfused  Lungs:  No respiratory distress, no accessory muscle use  Abd: Mild epigastric tenderness.  Neuro:  Moving all 4 extremities  Skin:  Warm, dry  Psych:  Normal affect    Plan of Care:   I evaluated the patient and developed an initial plan of care. I discussed this plan and explained that I, or one of my partners, would be returning to complete the evaluation.     I, Spencer Blancas, am serving as a scribe to document services personally performed by Carmelo Grant MD, based on my observations and the provider's statements to me.    10/03/22  [employer]    Portions of this medical record were completed by a scribe. UPON MY REVIEW AND AUTHENTICATION BY ELECTRONIC SIGNATURE, this confirms (a) I performed the applicable clinical services, and (b) the record is accurate.   "

## 2022-10-04 NOTE — ED NOTES
OBSERVATION GOALS  -diagnostic tests and consults completed and resulted Met   -vital signs normal or at patient baseline Met  -tolerating oral intake to maintain hydration Not Met, tolerating small amounts of PO intake   -safe disposition plan has been identified Met

## 2022-10-04 NOTE — DISCHARGE INSTRUCTIONS
[employer] ([phone])  Please call 3 days in advance for a ride       Substance Use Resources    It is recommended that you abstain from all mood altering chemicals. Please contact the sober support hotline ([phone]) as needed; phones are answered 24 hours a day, 7 days a week.     To access substance abuse treatment you must have an assessment completed within 30 days of starting any program. Information for this to be completed and to secure funding if you have medical assistance or no insurance can be found through your County's chemical health intake line. If you have private insurance, call the customer service number on the back of your insurance card to find an in-network substance abuse assessment. The ideal provider will be a treatment facility, licensed in the [facility].    For those with [place] Medicaid you will need a Rule 25 assessment: The following are phone numbers for each [facility]. Rule 25 assessments must be completed by your current county of residence. Once approved for funding you can connect with a facility that does Rule 25 assessment.  [facility] [phone]  [facility] [phone]  [facility] [phone]  [facility] [phone]  [facility] [phone]  [facility] [phone]  [facility] [phone]  [place] - [phone]    The following facilities also offer Rule 25/chemical health assessments:    [facility]  [phone]  Mon-Friday: [address] [facility], [address]  Saturday:  [address], [place], [address]  M-F assessments (7a-1:45p); Saturday assessments (7:45-10:45a)    [facility]  [phone]  [address] [place], [place], [address]  *by appointment only M-W; walk ins available Fridays from 10-2.    [place]  [phone] (phone consultation available 24/7)  In-person Assessments: [address], [address] [address]  [address] [address] [place] [address]  [address] [place] [address]  [address]  [address] [place] [address]    [facility]  [phone]  [address] [facility],  [place], [place], [address]  *walk in and appointments available by calling    [facility]  [phone] [address] [place], [place], [address]  *by appointment only M-Th     [facility]  [phone]  [address] [place], [place], [address]  *walk ins available M-F    [facility]  [phone]  [address] [place], [place], [address]  *available by appointments only    [facility]  [phone]  [address] [place], [place], [address]  *available by appointment only    [facility] (ADAP)  [phone]  [address] [facility] [facility] [address], [place], [address]  *Walk in assessments also available M-F starting at 8 am.    [facility]  [phone]  [address] [place], [place], [address]  *available by appointments    [facility]  [phone]  [address] [place], [place], [address]  *walk in assessments available M-F starting at 7 am.    [facility]  [phone]  [facility]  [address], [place], [place], [address]  *Walk in assessments availble M-F starting at 8 am OR [phone]    [facility]  [address] , [place] [address]  *Walk in assessments available M-F starting at 8 am    [facility]  [phone]  [address] [place] [address]    [facility]  [phone]  [address] [place], [place], [address]  *available by appointment only    [facility]  [phone]  [address] [facility] [address], [place], [address]    [facility]  [phone]  [address], [place], [address]  *available by appointment    [facility]  [phone]  [address], [place], [place], [address]  *walk ins available M-TH from 9-3    [facility]   [phone]  [address] [place], [address]    [place]  [phone] [address] [facility], [place] [address]  [address] [address] [address]    [facility] (Does Rule 25 Assessments)  [email or web address]  [phone]  [address] [place] [address], [address], [place] [address]    [facility]  https://www.[employer]/our-services/drug-alcohol-treatment  [phone], [address], [place], [place] [id number] [phone], [address], [place], [place] [address] [phone], [address] [facility] [facility], [place], [place] [address] [phone], [address] [facility] , [address], ([facility]), [place], [place] [id number] [phone], [address] [place] [address]    If you are intoxicated, you may be required to detox at a detox facility before starting treatment. The following are detox facilities that you can self present to. All detox facilities are able to help you complete an assessment/rule 25 prior to discharge if you choose:    [facility]: [address] [place], [place], [address]  [phone]  [facility]: [address], [place], [address]  [phone]  [facility]: [address] [place], [place], [address] [phone]  [facility]: [address] [place], [place], [address] [phone]  [facility]: [address] [place], [place], [address] [phone]     Ways to help cope with sobriety:  Take prescribed medicines as scheduled  Keep follow-up appointments  Talk to others about your concerns  Get regular exercise  Practice deep breathing skills  Eat a healthy diet  Use community resources, including hotline numbers, [facility] crisis and support meetings  Stay sober and avoid places/people/things associated with substance use  Maintain a daily schedule/routine  Get at least 7-8 hours of sleep per night  Create a  list 10--20 healthy activities that you can do that are enjoyable and do not involve substance use  Create daily goals (approx. 1-4 goals) per day and work to achieve them throughout the day    [facility] ([facility]): [facility] connects people seeking recovery to resources that help foster and sustain long-term recovery. Whether you are seeking resources for treatment, transportation, housing, job training, education, health care or other pathways to recovery, [facility] is a great place to start. Phone: [phone]. www.[place] (Great listing of all types of recovery and non-recovery related resources)    [facility]: https://www.[employer].org/    Alcoholics Anonymous: 8-800-ALCOHOL  HTTP://WWW.AA.ORG/  [facility] [place] (465-877-7085 or http://aaminneaDomeeis.org)  [facility] [place] (279-809-9583 or www.aastpaul.org)    Narcotics Anonymous: 278.576.9477  www.[employer].    [facility]: [address] [place], [place] [address], [place]  [phone]  Drop-in Hours: Monday-Friday 9-11:30 am. By appointment at other times.  [facility] is a drop-in center on the [facility] that provides a safe space for individuals who are homeless and have a history of chemical use. Sobriety is not a requirement but drugs and alcohol are not allowed on the property.  Non-clients can access drop-in services such as Recovery and Harm Reduction Groups, referrals to case management, community activities, shower facilities, and a pool table. Individuals who are homeless and have chemical health needs may be eligible for enrollment into [facility]'s case management program. Clients and  work together to access benefits, treatment, health care, shelter, and external housing resources.     [facility] Chemical Assessment & Referral Unit: [address] [place] [address]  [phone]  Monday-Friday 8 am-5 pm  Rule 25 assessment and referral for individuals  seeking treatment or counseling for chemical dependency. Must be a resident of [facility]. There is no fee for assessment. There is some funding available for treatment programs.    [facility]: [address] [facility]  [phone] or [phone]  Monday - Friday 7 am - 5 pm  Daily drop-in Rule 25 assessments and weekly mental health assessments and services. Outpatient chemical dependency treatment (with sober recovery housing), relapse prevention, case management, and employment services. Outpatient and residential treatment for women with children. Specializes in assisting individuals with a history of relapse who face multiple barriers to achieving stable recovery. No charge for most services or services can be billed through insurance. Gender-specific services and treatment for co-occurring substance abuse and mental health concerns offered.

## 2022-10-04 NOTE — ED NOTES
[facility]  ED Nurse Handoff Report    Audelia Comer is a 56 year old female   ED Chief complaint: Nausea & Vomiting  . ED Diagnosis:   Final diagnoses:   Nausea and vomiting, unspecified vomiting type   Hypomagnesemia   Hypokalemia   Hyponatremia     Allergies: No Known Allergies    Code Status: Full Code  Activity level - Baseline/Home:  Independent. Activity Level - Current:   Stand by Assist. Lift room needed: No. Bariatric: No   Needed: No   Isolation: No. Infection: Not Applicable.     Vital Signs:   Vitals:    10/03/22 2334 10/04/22 0000 10/04/22 0020 10/04/22 0030   BP:   120/87 121/85   Pulse:   95 95   Resp:       Temp:       TempSrc:       SpO2: 100% 100%         Cardiac Rhythm:  ,      Pain level:    Patient confused: No. Patient Falls Risk: No.   Elimination Status: Has voided   Patient Report - Initial Complaint: Nausea and vomiting. Focused Assessment:  N/V for 1.5 weeks, felt constipated as well. Abdominal pain. Pain controlled at this time, pt intermittently nauseous.  Tests Performed: Labs,CT, . Abnormal Results:   Labs Ordered and Resulted from Time of ED Arrival to Time of ED Departure   COMPREHENSIVE METABOLIC PANEL - Abnormal       Result Value    Sodium 130 (*)     Potassium 3.0 (*)     Chloride 83 (*)     Carbon Dioxide (CO2) 20 (*)     Anion Gap 27 (*)     Urea Nitrogen 16.4      Creatinine 0.60      Calcium 9.5      Glucose 97      Alkaline Phosphatase 344 (*)      (*)     ALT 72 (*)     Protein Total 7.9      Albumin 4.3      Bilirubin Total 0.9      GFR Estimate >90     CBC WITH PLATELETS AND DIFFERENTIAL - Abnormal    WBC Count 11.4 (*)     RBC Count 3.82      Hemoglobin 12.6      Hematocrit 36.8      MCV 96      MCH 33.0      MCHC 34.2      RDW 12.3      Platelet Count 194      % Neutrophils 71      % Lymphocytes 15      % Monocytes 13      % Eosinophils 0      % Basophils 1      % Immature Granulocytes 0      NRBCs per 100 WBC 0      Absolute  Neutrophils 8.0      Absolute Lymphocytes 1.8      Absolute Monocytes 1.5 (*)     Absolute Eosinophils 0.0      Absolute Basophils 0.1      Absolute Immature Granulocytes 0.0      Absolute NRBCs 0.0     ROUTINE UA WITH MICROSCOPIC - Abnormal    Color Urine Yellow      Appearance Urine Slightly Cloudy (*)     Glucose Urine Negative      Bilirubin Urine Small (*)     Ketones Urine 150  (*)     Specific Gravity Urine 1.020      Blood Urine Negative      pH Urine 6.0      Protein Albumin Urine 50  (*)     Urobilinogen Urine 2.0      Nitrite Urine Negative      Leukocyte Esterase Urine Large (*)     Mucus Urine Present (*)     RBC Urine 2      WBC Urine 102 (*)     Squamous Epithelials Urine 1      Hyaline Casts Urine 18 (*)    MAGNESIUM - Abnormal    Magnesium 1.3 (*)    ETHYL ALCOHOL LEVEL - Abnormal    Alcohol ethyl <0.01 (*)    LIPASE - Normal    Lipase 26     COVID-19 VIRUS (CORONAVIRUS) BY PCR     CT Abdomen Pelvis w Contrast   Final Result   IMPRESSION:    1.  No evidence for acute interstitial edematous pancreatitis or necrotizing pancreatitis.      2.  Changes related to chronic pancreatitis with pancreatic calcifications and pancreatic atrophy. No ductal dilatation.      3.  Hepatomegaly with interval increase in hepatic steatosis diffusely.      4.  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      5.  No ascites.      US Abdomen Limited   Final Result   IMPRESSION:   1.  Portions of the pancreas are not well seen. If there is clinical concern for acute pancreatitis, then CT is recommended.      2.  Negative for cholelithiasis or acute cholecystitis.      3.  No intra or extra hepatic biliary ductal dilation.      4.  At least moderate diffuse hepatic steatosis with geographic focal fatty sparing. This is similar to prior.           .   Treatments provided: See MAR  Family Comments: n/a  OBS brochure/video discussed/provided to patient:  Yes  ED Medications:   Medications    morphine (PF) injection 4 mg (4 mg Intravenous Not Given 10/3/22 2004)   magnesium sulfate 2 g in water intermittent infusion (2 g Intravenous New Bag 10/4/22 0107)   potassium chloride (KLOR-CON) Packet 20 mEq (20 mEq Oral Given 10/3/22 2003)   ondansetron (ZOFRAN) injection 4 mg (4 mg Intravenous Given 10/3/22 2003)   HYDROcodone-acetaminophen (NORCO) 5-325 MG per tablet 2 tablet (2 tablets Oral Given 10/3/22 2329)   morphine (PF) injection 4 mg (4 mg Intravenous Given 10/4/22 0019)   0.9% sodium chloride BOLUS (0 mLs Intravenous Stopped 10/4/22 0141)   cefTRIAXone (ROCEPHIN) 1 g vial to attach to  mL bag for ADULTS or NS 50 mL bag for PEDS (0 g Intravenous Stopped 10/4/22 0114)   CT saline flush (52 mLs Intravenous Given 10/4/22 0039)   iopamidol (ISOVUE-370) solution 500 mL (45 mLs Intravenous Given 10/4/22 0039)   diphenhydrAMINE (BENADRYL) injection 25 mg (25 mg Intravenous Given 10/4/22 0103)     Drips infusing:  No  For the majority of the shift, the patient's behavior Green. Interventions performed were n/a.    Sepsis treatment initiated: No     Patient tested for COVID 19 prior to admission: YES    ED Nurse Name/Phone Number: Josi Pichardo RN,   2:01 AM    RECEIVING UNIT ED HANDOFF REVIEW    Above ED Nurse Handoff Report was reviewed: Yes  Reviewed by: Trinity Buenrostro RN on October 4, 2022 at 6:14 PM

## 2022-10-05 VITALS
DIASTOLIC BLOOD PRESSURE: 74 MMHG | HEIGHT: 66 IN | RESPIRATION RATE: 16 BRPM | TEMPERATURE: 98.3 F | WEIGHT: 113.4 LBS | SYSTOLIC BLOOD PRESSURE: 114 MMHG | HEART RATE: 95 BPM | OXYGEN SATURATION: 98 % | BODY MASS INDEX: 18.23 KG/M2

## 2022-10-05 LAB
MAGNESIUM SERPL-MCNC: 1.4 MG/DL (ref 1.7–2.3)
PHOSPHATE SERPL-MCNC: 0.9 MG/DL (ref 2.5–4.5)
POTASSIUM SERPL-SCNC: 4.7 MMOL/L (ref 3.4–5.3)

## 2022-10-05 PROCEDURE — 250N000013 HC RX MED GY IP 250 OP 250 PS 637: Performed by: INTERNAL MEDICINE

## 2022-10-05 PROCEDURE — G0378 HOSPITAL OBSERVATION PER HR: HCPCS

## 2022-10-05 PROCEDURE — 258N000003 HC RX IP 258 OP 636: Performed by: PHYSICIAN ASSISTANT

## 2022-10-05 PROCEDURE — 84132 ASSAY OF SERUM POTASSIUM: CPT | Performed by: HOSPITALIST

## 2022-10-05 PROCEDURE — 36416 COLLJ CAPILLARY BLOOD SPEC: CPT | Performed by: INTERNAL MEDICINE

## 2022-10-05 PROCEDURE — 999N000127 HC STATISTIC PERIPHERAL IV START W US GUIDANCE

## 2022-10-05 PROCEDURE — C9113 INJ PANTOPRAZOLE SODIUM, VIA: HCPCS | Performed by: INTERNAL MEDICINE

## 2022-10-05 PROCEDURE — 84100 ASSAY OF PHOSPHORUS: CPT | Performed by: PHYSICIAN ASSISTANT

## 2022-10-05 PROCEDURE — 83735 ASSAY OF MAGNESIUM: CPT | Performed by: INTERNAL MEDICINE

## 2022-10-05 PROCEDURE — 250N000009 HC RX 250: Performed by: PHYSICIAN ASSISTANT

## 2022-10-05 PROCEDURE — 250N000011 HC RX IP 250 OP 636: Performed by: INTERNAL MEDICINE

## 2022-10-05 PROCEDURE — 250N000013 HC RX MED GY IP 250 OP 250 PS 637: Performed by: HOSPITALIST

## 2022-10-05 PROCEDURE — 99217 PR OBSERVATION CARE DISCHARGE: CPT | Performed by: PHYSICIAN ASSISTANT

## 2022-10-05 PROCEDURE — 96376 TX/PRO/DX INJ SAME DRUG ADON: CPT

## 2022-10-05 PROCEDURE — 250N000013 HC RX MED GY IP 250 OP 250 PS 637: Performed by: PHYSICIAN ASSISTANT

## 2022-10-05 PROCEDURE — 87086 URINE CULTURE/COLONY COUNT: CPT | Performed by: PHYSICIAN ASSISTANT

## 2022-10-05 RX ORDER — NALOXONE HYDROCHLORIDE 0.4 MG/ML
0.2 INJECTION, SOLUTION INTRAMUSCULAR; INTRAVENOUS; SUBCUTANEOUS
Status: DISCONTINUED | OUTPATIENT
Start: 2022-10-05 | End: 2022-10-05 | Stop reason: HOSPADM

## 2022-10-05 RX ORDER — PANTOPRAZOLE SODIUM 40 MG/1
40 TABLET, DELAYED RELEASE ORAL DAILY
Status: DISCONTINUED | OUTPATIENT
Start: 2022-10-05 | End: 2022-10-05 | Stop reason: HOSPADM

## 2022-10-05 RX ORDER — NALOXONE HYDROCHLORIDE 0.4 MG/ML
0.4 INJECTION, SOLUTION INTRAMUSCULAR; INTRAVENOUS; SUBCUTANEOUS
Status: DISCONTINUED | OUTPATIENT
Start: 2022-10-05 | End: 2022-10-05 | Stop reason: HOSPADM

## 2022-10-05 RX ORDER — MAGNESIUM OXIDE 400 MG/1
400 TABLET ORAL 2 TIMES DAILY
Status: DISCONTINUED | OUTPATIENT
Start: 2022-10-05 | End: 2022-10-05 | Stop reason: HOSPADM

## 2022-10-05 RX ORDER — PANTOPRAZOLE SODIUM 40 MG/1
40 TABLET, DELAYED RELEASE ORAL DAILY PRN
Qty: 30 TABLET | Refills: 3 | Status: ON HOLD | OUTPATIENT
Start: 2022-10-05 | End: 2023-02-02

## 2022-10-05 RX ORDER — MAGNESIUM SULFATE HEPTAHYDRATE 40 MG/ML
4 INJECTION, SOLUTION INTRAVENOUS ONCE
Status: DISCONTINUED | OUTPATIENT
Start: 2022-10-05 | End: 2022-10-05 | Stop reason: HOSPADM

## 2022-10-05 RX ORDER — MAGNESIUM OXIDE 400 MG/1
400 TABLET ORAL 2 TIMES DAILY
Qty: 14 TABLET | Refills: 0 | Status: ON HOLD | OUTPATIENT
Start: 2022-10-05 | End: 2023-01-04

## 2022-10-05 RX ORDER — LIDOCAINE 40 MG/G
CREAM TOPICAL
Status: DISCONTINUED | OUTPATIENT
Start: 2022-10-05 | End: 2022-10-05 | Stop reason: HOSPADM

## 2022-10-05 RX ORDER — LIDOCAINE/PRILOCAINE 2.5 %-2.5%
CREAM (GRAM) TOPICAL
Status: DISCONTINUED | OUTPATIENT
Start: 2022-10-05 | End: 2022-10-05 | Stop reason: RX

## 2022-10-05 RX ORDER — POTASSIUM CHLORIDE 1.5 G/1.58G
20 POWDER, FOR SOLUTION ORAL
Status: DISCONTINUED | OUTPATIENT
Start: 2022-10-05 | End: 2022-10-05 | Stop reason: HOSPADM

## 2022-10-05 RX ADMIN — POTASSIUM & SODIUM PHOSPHATES POWDER PACK 280-160-250 MG 2 PACKET: 280-160-250 PACK at 14:06

## 2022-10-05 RX ADMIN — OXYCODONE HYDROCHLORIDE 5 MG: 5 TABLET ORAL at 10:27

## 2022-10-05 RX ADMIN — OXYCODONE HYDROCHLORIDE 5 MG: 5 TABLET ORAL at 05:40

## 2022-10-05 RX ADMIN — THERA TABS 1 TABLET: TAB at 08:15

## 2022-10-05 RX ADMIN — SODIUM PHOSPHATE, MONOBASIC, MONOHYDRATE 30 MMOL: 276; 142 INJECTION, SOLUTION INTRAVENOUS at 12:59

## 2022-10-05 RX ADMIN — PANTOPRAZOLE SODIUM 40 MG: 40 INJECTION, POWDER, FOR SOLUTION INTRAVENOUS at 08:16

## 2022-10-05 RX ADMIN — THIAMINE HCL TAB 100 MG 100 MG: 100 TAB at 08:14

## 2022-10-05 RX ADMIN — POTASSIUM CHLORIDE AND SODIUM CHLORIDE: 900; 150 INJECTION, SOLUTION INTRAVENOUS at 00:02

## 2022-10-05 RX ADMIN — OXYCODONE HYDROCHLORIDE 5 MG: 5 TABLET ORAL at 15:47

## 2022-10-05 RX ADMIN — FOLIC ACID 1 MG: 1 TABLET ORAL at 08:18

## 2022-10-05 RX ADMIN — Medication 400 MG: at 10:43

## 2022-10-05 RX ADMIN — POTASSIUM & SODIUM PHOSPHATES POWDER PACK 280-160-250 MG 1 PACKET: 280-160-250 PACK at 10:17

## 2022-10-05 RX ADMIN — NICOTINE 1 PATCH: 21 PATCH, EXTENDED RELEASE TRANSDERMAL at 08:15

## 2022-10-05 RX ADMIN — QUETIAPINE FUMARATE 50 MG: 50 TABLET ORAL at 08:14

## 2022-10-05 RX ADMIN — QUETIAPINE FUMARATE 50 MG: 50 TABLET ORAL at 12:24

## 2022-10-05 RX ADMIN — POTASSIUM & SODIUM PHOSPHATES POWDER PACK 280-160-250 MG 1 PACKET: 280-160-250 PACK at 12:23

## 2022-10-05 RX ADMIN — PANTOPRAZOLE SODIUM 40 MG: 40 TABLET, DELAYED RELEASE ORAL at 17:12

## 2022-10-05 ASSESSMENT — ACTIVITIES OF DAILY LIVING (ADL)
ADLS_ACUITY_SCORE: 33
ADLS_ACUITY_SCORE: 35
ADLS_ACUITY_SCORE: 35
ADLS_ACUITY_SCORE: 33
ADLS_ACUITY_SCORE: 35

## 2022-10-05 NOTE — PROGRESS NOTES
Pt a/o x4. VSS. C/o abdominal pain, oxycodone given x2. SBA to bathroom. Denies nausea. IVF running. Gave pt PRN Seroquel, feeling anxious and did not get morning dose bc it was not verified. Voiding adequately. Better appetite today. Continue monitoring electrolytes and pain.

## 2022-10-05 NOTE — PLAN OF CARE
PRIMARY DIAGNOSIS: N&V    OUTPATIENT/OBSERVATION GOALS TO BE MET BEFORE DISCHARGE  1. Orthostatic performed: No    2. Tolerating PO fluid and/or antibiotics (if applicable): Yes    3. Nausea/Vomiting/Diarrhea symptoms improved: Yes    4. Pain status: Improved with use of alternative comfort measures i.e.: heat    5. Return to near baseline physical activity: Yes    Discharge Planner Nurse   Safe discharge environment identified: Yes  Barriers to discharge: No       Entered by: Coreen Alvarado RN 10/05/2022 4:24 PM     Please review provider order for any additional goals.   Nurse to notify provider when observation goals have been met and patient is ready for discharge.Goal Outcome Evaluation:    Plan of Care Reviewed With: patient     A&O x4, VSS on ra. Pt denies N&V, epigastric pain controlled. Tolerating regular diet. Steady and ind ambulation. Adequate UOP, denies burning or urgency. Pt educated by provider on waiting 4 hours after IV Phos replacement to recheck levels to support a discharge. Pt refusing 9PM lab draw, and requesting discharge after Phos is complete via IV. Pt education completed on importance of taking oral replacements, as well as scheduling a follow-up PCP visit 10/7/22 for lab draw and outpatient electrolyte replacement.

## 2022-10-05 NOTE — PLAN OF CARE
Patient's After Visit Summary was reviewed with patient and/or family.   Patient verbalized understanding of After Visit Summary, recommended follow up and was given an opportunity to ask questions.   Discharge medications sent home with patient/family: YES,  Discharged with son    OBSERVATION patient END time: 9318

## 2022-10-05 NOTE — PROVIDER NOTIFICATION
DATE:  10/5/2022   TIME OF RECEIPT FROM LAB:  0910  LAB TEST:  phosphorus  LAB VALUE:  0.9  RESULTS GIVEN WITH READ-BACK TO (PROVIDER):  TIANNA Muhammad  TIME LAB VALUE REPORTED TO PROVIDER:   0910

## 2022-10-05 NOTE — PLAN OF CARE
PRIMARY DIAGNOSIS: Abdominal Pain/ Nausea/Vomiting   OUTPATIENT/OBSERVATION GOALS TO BE MET BEFORE DISCHARGE:  1. Pain Status: Improved-controlled with oral pain medications.    2. Return to near baseline physical activity: Yes    3. Cleared for discharge by consultants (if involved): No    Discharge Planner Nurse   Safe discharge environment identified: Yes  Barriers to discharge: Yes       Entered by: Trinity Buenrostro RN 10/04/2022 10:36 PM     Please review provider order for any additional goals.   Nurse to notify provider when observation goals have been met and patient is ready for discharge.    Temp: 98.7  F (37.1  C) Temp src: Oral BP: 99/68 Pulse: 91   Resp: 16 SpO2: 99 % O2 Device: None (Room air)       A&O. Up Independently. Issue with pts home doses of seroquel, pt very upset she did not receive the doses she takes at home. Writer called pharmacy and orders are no correct. Pt takes 50mg in the am, 50mg prn daily, 100mg in the evening. Pt very anxious and frustrated when she arrived to the unit due to this issue. Prn seroquel given. Complains of abdominal pain, prn oxy given x1. Regular diet- ate boxed lunch, tolerated. Pt states she smokes 1 pack of cigarettes a day, pt requesting a nicotine patch, provider notified and now ordered. Plan- IVF, rocephin daily, pain/nausea control, mag/potassium protocol- recheck in am.

## 2022-10-05 NOTE — DISCHARGE SUMMARY
Discharge Summary  Hospitalist Service    Audelia Comer MRN# [id number]   YOB: 1965 Age: 56 year old     Date of Admission: 10/3/2022  Date of Discharge: 10/5/2022  Admitting Physician: Charles Hylton DO  Discharge Physician: Amarilis Blancas PA-C  Discharging Service: Hospitalist Service     Primary Provider: Randee Woods  Primary Care Physician Phone Number: [phone]         Discharge Diagnoses/Problem Oriented Hospital Course (Providers):      Audelia Comer was admitted on 10/3/2022 by Charles Hylton DO and I would refer you to their history and physical.  Briefly, she presented with nausea and vomiting for the past week and a half with epigastric abdominal pain.     In the emergency department, the patient was found to have a temperature of 97.5  F, heart rate 112, blood pressure 106/79, respiratory rate 18, SPO2 98% on room air.  Initial lab work showed sodium 130, potassium 3.0, chloride 83, bicarb 20, anion gap 27, magnesium 1.3, alkaline phosphatase 344, AST//72, lipase 26, WBC 11.4.  Urinalysis showed ketonuria, large leukocyte esterase, 102 WBC.  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 The patient was admitted for observation and electrolyte replacement.      The patient was provided with electrolyte replacement and IV fluids as well as antiemetics.  Her symptoms dramatically improved and she was able to eat as of 10/4.  Unfortunately her electrolytes continue to be low on 10/5 with critically low phosphorus level of 0.9.  I recommended she stay for correction and recheck but she declined.  She was discharged with 4 days of Neutra-Phos packets and 1 week of magnesium supplement.  She  was instructed it is very important to have her electrolytes rechecked including magnesium and phosphorus on 10/7.  She was also instructed to take Protonix daily.             Code Status:      Full Code        Brief Hospital Stay Summary Sent Home With Patient in AVS:          Reason for your hospital stay      You were admitted for concerns of nausea/vomiting that has now resolved.    Unfortunately multiple electrolytes were abnormal including sodium,   magnesium, potassium and phosphorus.  We spent a lot of time correcting   these and likely at discharge your phosphorus level is still low.  We   recommend you continue phosphorus packets 4 times daily for 4 days with   recheck of your electrolytes on Friday. Magnesium should be continued   twice daily for 1 week.    We recommend you take  Protonix daily                          Pending Results:        Unresulted Labs Ordered in the Past 30 Days of this Admission     Date and Time Order Name Status Description    10/5/2022  9:19 AM Urine Culture In process             Discharge Instructions and Follow-Up:      Follow-up Appointments     Follow-up and recommended labs and tests       Follow up with lab recheck on 10/7 and ask for BMP, magnesium and   phosphorus levels.               Discharge Disposition:        Discharged to home         Discharge Medications:        Current Discharge Medication List      START taking these medications    Details   magnesium oxide (MAG-OX) 400 MG tablet Take 1 tablet (400 mg) by mouth 2 times daily  Qty: 14 tablet, Refills: 0    Associated Diagnoses: Hypomagnesemia      potassium & sodium phosphates (NEUTRA-PHOS) 280-160-250 MG Packet Take 1 packet by mouth 4 times daily for 4 days  Qty: 16 packet, Refills: 0    Associated Diagnoses: Hypophosphatemia         CONTINUE these medications which have CHANGED    Details   pantoprazole (PROTONIX) 40 MG EC tablet Take 1 tablet (40 mg) by mouth daily as needed  Qty: 30 tablet, Refills: 3     Associated Diagnoses: Nonspecific abdominal pain         CONTINUE these medications which have NOT CHANGED    Details   acetaminophen (TYLENOL) 325 MG tablet Take 2 tablets (650 mg) by mouth every 6 hours as needed for mild pain Do not take more than 2000 mg per day. Alternate with ibuprofen.  Qty: 30 tablet, Refills: 0    Associated Diagnoses: Toothache      azelastine (ASTELIN) 0.1 % nasal spray Spray 1 spray into both nostrils daily as needed       cetirizine (ZYRTEC) 10 MG tablet Take 10 mg by mouth daily as needed       fluticasone (FLONASE) 50 MCG/ACT nasal spray Spray 1 spray into both nostrils daily as needed       ibuprofen (ADVIL/MOTRIN) 400 MG tablet Take 1 tablet (400 mg) by mouth every 6 hours as needed for moderate pain  Qty: 30 tablet, Refills: 0    Associated Diagnoses: Toothache      mirtazapine (REMERON) 15 MG tablet Take 30 mg by mouth At Bedtime      ondansetron (ZOFRAN ODT) 4 MG ODT tab Take 1 tablet (4 mg) by mouth every 6 hours as needed for nausea  Qty: 20 tablet, Refills: 0    Associated Diagnoses: Alcohol-induced acute pancreatitis without infection or necrosis      !! QUEtiapine (SEROQUEL) 25 MG tablet Take 50 mg by mouth every morning      !! QUEtiapine (SEROQUEL) 25 MG tablet Take 50 mg by mouth daily as needed (anxiety)      !! QUEtiapine (SEROQUEL) 25 MG tablet Take 100 mg by mouth At Bedtime      folic acid (FOLVITE) 1 MG tablet Take 1 tablet (1 mg) by mouth daily  Qty: 20 tablet, Refills: 0    Associated Diagnoses: Alcohol-induced acute pancreatitis, unspecified complication status; Alcohol use disorder, severe, in early remission (H)      multivitamin w/minerals (THERA-VIT-M) tablet Take 1 tablet by mouth daily  Qty:      Associated Diagnoses: Alcohol-induced acute pancreatitis without infection or necrosis      potassium chloride ER (K-TAB) 20 MEQ CR tablet Take 20 mEq by mouth daily (with dinner)      thiamine (B-1) 100 MG tablet Take 1 tablet (100 mg) by mouth daily  Qty: 20  "tablet, Refills: 0    Associated Diagnoses: Alcohol-induced acute pancreatitis, unspecified complication status; Alcohol use disorder, severe, in early remission (H)       !! - Potential duplicate medications found. Please discuss with provider.            Allergies:       No Known Allergies        Consultations This Hospital Stay:        No consultations were requested during this admission         Condition and Physical on Discharge:        Discharge condition: Stable   Vitals: Blood pressure 104/72, pulse 84, temperature 98.2  F (36.8  C), temperature source Oral, resp. rate 16, height 1.676 m (5' 5.98\"), weight 51.4 kg (113 lb 6.4 oz), SpO2 97 %.     Constitutional: Alert and orientated x 3     Lungs: CTAB   Cardiovascular: RRR without murmur   Abdomen: Bowel sounds are present without tenderness   Skin: No rash or open sores   Other:          Discharge Time:      Less than 30 minutes.        Image Results From This Hospital Stay (For Non-EPIC Providers):        Results for orders placed or performed during the hospital encounter of 10/03/22   US Abdomen Limited    Narrative    EXAM: US ABDOMEN LIMITED  LOCATION: [facility]  DATE/TIME: 10/3/2022 8:49 PM    INDICATION: NV, epigastric abd pain and tenderness  COMPARISON: 06/28/2022.  TECHNIQUE: Limited abdominal ultrasound.    FINDINGS:    GALLBLADDER: Normal. No gallstones, wall thickening, or pericholecystic fluid. Negative sonographic Ribera's sign.    BILE DUCTS: No biliary dilatation. The common duct measures 6 mm.    LIVER: 16 cm. Increased hepatic parenchymal echotexture. There is geographic focal fatty sparing. No mass.    RIGHT KIDNEY: 11.4 cm. No hydronephrosis.    PANCREAS: Distal body and tail not well seen due to overlying bowel gas.    No ascites.      Impression    IMPRESSION:  1.  Portions of the pancreas are not well seen. If there is clinical concern for acute pancreatitis, then CT is recommended.    2.  Negative for " cholelithiasis or acute cholecystitis.    3.  No intra or extra hepatic biliary ductal dilation.    4.  At least moderate diffuse hepatic steatosis with geographic focal fatty sparing. This is similar to prior.     CT Abdomen Pelvis w Contrast    Narrative    EXAM: CT ABDOMEN PELVIS W CONTRAST  LOCATION: [facility]  DATE/TIME: 10/4/2022 12:47 AM    INDICATION: Epigastric abdominal pain. Concern for pancreatitis.  COMPARISON: CT from 6/28/2022 is currently unavailable. CT 10/28/2021.  TECHNIQUE: CT scan of the abdomen and pelvis was performed following injection of IV contrast. Multiplanar reformats were obtained. Dose reduction techniques were used.  CONTRAST: 45 mL Isovue 370    FINDINGS:   LOWER CHEST: Periesophageal varices in the posterior inferior aspect of the mediastinum.    HEPATOBILIARY: Hepatomegaly. Interval progression of a geographic fatty infiltration throughout the liver. No calcified gallstones or biliary dilatation. Portal vein and hepatic veins patent.    PANCREAS: Diffuse pancreatic atrophy. No pancreatic inflammatory change or ductal dilatation. Mild pancreatic calcifications.    SPLEEN: Normal size. Splenic vein patent.    ADRENAL GLANDS: Unremarkable.    KIDNEYS/BLADDER: No urinary collecting system dilatation or significant calculi. Symmetric renal enhancement. Bladder unremarkable.    BOWEL: No obstruction or inflammatory change. Colonic diverticulosis.    LYMPH NODES: No lymphadenopathy.    VASCULATURE: Normal caliber aorta. Moderate vascular calcification.    PELVIC ORGANS: No free fluid.    MUSCULOSKELETAL: Minimal degenerative disc disease lower lumbar spine.      Impression    IMPRESSION:   1.  No evidence for acute interstitial edematous pancreatitis or necrotizing pancreatitis.    2.  Changes related to chronic pancreatitis with pancreatic calcifications and pancreatic atrophy. No ductal dilatation.    3.  Hepatomegaly with interval increase in hepatic steatosis  diffusely.    4.  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    5.  No ascites.           Most Recent Lab Results In EPIC (For Non-EPIC Providers):    Most Recent 3 CBC's:  Recent Labs   Lab Test 10/04/22  0835 10/03/22  1743 07/04/22  0814   WBC 8.3 11.4* 11.4*   HGB 10.4* 12.6 9.0*   MCV 98 96 103*    194 286      Most Recent 3 BMP's:  Recent Labs   Lab Test 10/05/22  0551 10/04/22  1000 10/04/22  0835 10/04/22  0231 10/03/22  1743 07/05/22  0807 07/04/22  0814   NA  --   --  133*  --  130*  --  138   POTASSIUM 4.7 3.8 3.8   < > 3.0*   < > 3.7   CHLORIDE  --   --  97*  --  83*  --  100   CO2  --   --  22  --  20*  --  25   BUN  --   --  7.6  --  16.4  --  5*   CR  --   --  0.48*  --  0.60  --  0.39*   ANIONGAP  --   --  14  --  27*  --  13   FRANCOISE  --   --  8.1*  --  9.5  --  9.5   GLC  --   --  111*  --  97  --  145*    < > = values in this interval not displayed.     Most Recent 3 Troponin's:No lab results found.  Most Recent 3 INR's:No lab results found.  Most Recent 2 LFT's:  Recent Labs   Lab Test 10/04/22  0835 10/03/22  1743   * 223*   ALT 48* 72*   ALKPHOS 232* 344*   BILITOTAL 0.6 0.9     Most Recent Cholesterol Panel:No lab results found.  Most Recent 6 Bacteria Isolates From Any Culture (See EPIC Reports for Culture Details):  Recent Labs   Lab Test 07/28/20  1653   CULT >100,000 colonies/mL  mixed urogenital shaka  Susceptibility testing not routinely done    Multiple morphotypes present with no predominant organism.  Growth consistent with   probable contamination during collection.  Suggest repeat specimen if clinically   indicated.       Most Recent TSH, T4 and HgbA1c: No lab results found.

## 2022-10-05 NOTE — PROGRESS NOTES
Assuming care.  I have seen this patient in the emergency department, interviewed her and examined.  At the moment of my interview the patient is complaining of moderate level of pain in the abdomen.  She is asking for her Seroquel to be resumed at home dose.  Denies nausea or vomiting.  She denies also any recent use of alcohol though liver enzymes tell a  different story.     She does not have any physical signs of alcohol withdrawal.  Vital sign:  Temp 97.6, heart rate 83, blood pressure 106/72, respiratory rate 18, oxygen saturation 97% at room air.    I agree with assessment and plan as outlined by my partner Dr. Fidel Hylton

## 2022-10-05 NOTE — PROGRESS NOTES
PRIMARY DIAGNOSIS: ACUTE PAIN  OUTPATIENT/OBSERVATION GOALS TO BE MET BEFORE DISCHARGE:  1. Pain Status: Improved with use of alternative comfort measures i.e.: heat    2. Return to near baseline physical activity: Yes    3. Cleared for discharge by consultants (if involved): Yes    Discharge Planner Nurse   Safe discharge environment identified: Yes  Barriers to discharge: No       Entered by: Coreen Alvarado RN 10/05/2022 4:23 PM     Please review provider order for any additional goals.   Nurse to notify provider when observation goals have been met and patient is ready for discharge.   Oral Mag replacement started Q4 hr, pt tolerating. IV Phos initiated with new PIV, pt tolerating.  Pt denies N&V, pain contolled with 1x PO oxy tolerating regular diet. Steady and ind ambulation in room. Pt states that bladder pulling sensation has subsided. Discharge pending 5pm Phos and Mag lab draw.

## 2022-10-05 NOTE — PLAN OF CARE
"PRIMARY DIAGNOSIS: Hypokalemia, Hypomagnesemia, Hyponatremia, Nausea and vomiting    OUTPATIENT/OBSERVATION GOALS TO BE MET BEFORE DISCHARGE:  1. ADLs back to baseline: Yes    2. Activity and level of assistance: Ambulating independently.    3. Pain status: Improved-controlled with oral pain medications.    4. Return to near baseline physical activity: Yes     Discharge Planner Nurse   Safe discharge environment identified: Yes  Barriers to discharge: Yes       Entered by: Benji Alvarado RN 10/05/2022 12:25 AM    BP 99/68 (BP Location: Left leg)   Pulse 91   Temp 98.7  F (37.1  C) (Oral)   Resp 16   Ht 1.676 m (5' 5.98\")   Wt 51.4 kg (113 lb 6.4 oz)   SpO2 99%   BMI 18.31 kg/m      Please review provider order for any additional goals.   Nurse to notify provider when observation goals have been met and patient is ready for discharge.    "

## 2022-10-05 NOTE — PROVIDER NOTIFICATION
Received call from lab regarding UC - per lab, unable to use urine sample from UA as it was thrown away.   Re-ordered UC.

## 2022-10-05 NOTE — PLAN OF CARE
"PRIMARY DIAGNOSIS: Hypokalemia, Hypomagnesemia, Hyponatremia, Nausea and vomiting  OUTPATIENT/OBSERVATION GOALS TO BE MET BEFORE DISCHARGE:  1. ADLs back to baseline: Yes    2. Activity and level of assistance: Ambulating independently.    3. Pain status: Improved-controlled with oral pain medications.    4. Return to near baseline physical activity: Yes     Discharge Planner Nurse   Safe discharge environment identified: Yes  Barriers to discharge: Yes       Entered by: Benji Alvarado RN 10/05/2022 5:07 AM    /71 (BP Location: Left arm)   Pulse 97   Temp 98.7  F (37.1  C) (Oral)   Resp 16   Ht 1.676 m (5' 5.98\")   Wt 51.4 kg (113 lb 6.4 oz)   SpO2 97%   BMI 18.31 kg/m    Pt is alert and oriented x4. Pt denies pain or discomfort during the shift. VSS. Pt is independent in transfer and cares. Pt denies nausea and vomiting. Pt is on Rocephin for UTI. Pt is on IVF 0.9% NS +KCL 20mEq @100ml/hr. Pt has nicotine patch on her right arm. Call light is within reach. Will continue to monitor and assess pt.  Please review provider order for any additional goals.   Nurse to notify provider when observation goals have been met and patient is ready for discharge.  "

## 2022-10-05 NOTE — PLAN OF CARE
ROOM # 205    Living Situation (if not independent, order SW consult):  Facility name:  : Son, pt lives Ind in her apartment with her dog    Activity level at baseline: Ind  Activity level on admit: Ind    Who will be transporting you at discharge: Son     Patient registered to observation; given Patient Bill of Rights; given the opportunity to ask questions about observation status and their plan of care.  Patient has been oriented to the observation room, bathroom and call light is in place.    Discussed discharge goals and expectations with patient/family.

## 2022-10-05 NOTE — PLAN OF CARE
PRIMARY DIAGNOSIS: GASTROENTERITIS    OUTPATIENT/OBSERVATION GOALS TO BE MET BEFORE DISCHARGE  1. Orthostatic performed: No    2. Tolerating PO fluid and/or antibiotics (if applicable): Yes    3. Nausea/Vomiting/Diarrhea symptoms improved: Yes    4. Pain status: Improved with use of alternative comfort measures i.e.: heat and positioning    5. Return to near baseline physical activity: Yes    Discharge Planner Nurse   Safe discharge environment identified: Yes  Barriers to discharge: No       Entered by: Coreen Alvarado RN 10/05/2022 4:21 PM     Please review provider order for any additional goals.   Nurse to notify provider when observation goals have been met and patient is ready for discharge.Goal Outcome Evaluation:    Pt A&O x4, VSS on ra. Magnesium replacement protocol ordered for AM of 1.4. IV infiltrated. Holding replacement mag for provider instruction on the need for IV. Pt denies N&V through night, pt stated pain controlled with PO Oxycodone at 5am. Tolerating regular diet. Stead and ind ambulation in room. Complaint of pulling sensation in bladder with urination, denies burning or pain. Provider aware.

## 2022-10-06 ENCOUNTER — PATIENT OUTREACH (OUTPATIENT)
Dept: CARE COORDINATION | Facility: CLINIC | Age: 57
End: 2022-10-06

## 2022-10-06 NOTE — PROGRESS NOTES
General acute hospital    Background: Transitional Care Management program auto-identified and prompting a chart review by General acute hospital team.    Assessment: Upon chart review, [facility] Team member will cancel/close this episode of Transitional Care Management program due to reason below:    Patient has active communication with a nurse, provider or care team for reason of post-hospital follow up plan.  Outreach call by CCR team not indicated to minimize duplicative efforts.     Plan: Transitional Care Management episode changed to enrolled.      Rosaura Keen RN  [facility]    *Connected Care Resource Team does NOT follow patient ongoing. Referrals are identified based on internal discharge reports and the outreach is to ensure patient has an understanding of their discharge instructions.

## 2022-10-07 LAB — BACTERIA UR CULT: NO GROWTH

## 2022-12-31 ENCOUNTER — HOSPITAL ENCOUNTER (INPATIENT)
Facility: CLINIC | Age: 57
LOS: 2 days | Discharge: HOME | End: 2023-01-05
Attending: EMERGENCY MEDICINE | Admitting: INTERNAL MEDICINE
Payer: COMMERCIAL

## 2022-12-31 ENCOUNTER — APPOINTMENT (OUTPATIENT)
Dept: CT IMAGING | Facility: CLINIC | Age: 57
End: 2022-12-31
Attending: EMERGENCY MEDICINE
Payer: COMMERCIAL

## 2022-12-31 DIAGNOSIS — A04.72 C. DIFFICILE COLITIS: Primary | ICD-10-CM

## 2022-12-31 DIAGNOSIS — E87.8 HYPOCHLOREMIA: ICD-10-CM

## 2022-12-31 DIAGNOSIS — R11.10 VOMITING AND DIARRHEA: ICD-10-CM

## 2022-12-31 DIAGNOSIS — E87.1 HYPONATREMIA: ICD-10-CM

## 2022-12-31 DIAGNOSIS — F39 MOOD DISORDER (H): ICD-10-CM

## 2022-12-31 DIAGNOSIS — E87.29 INCREASED ANION GAP METABOLIC ACIDOSIS: ICD-10-CM

## 2022-12-31 DIAGNOSIS — E83.42 HYPOMAGNESEMIA: ICD-10-CM

## 2022-12-31 DIAGNOSIS — K52.9 ENTEROCOLITIS: ICD-10-CM

## 2022-12-31 DIAGNOSIS — R19.7 VOMITING AND DIARRHEA: ICD-10-CM

## 2022-12-31 DIAGNOSIS — E83.51 HYPOCALCEMIA: ICD-10-CM

## 2022-12-31 DIAGNOSIS — E87.6 HYPOKALEMIA: ICD-10-CM

## 2022-12-31 DIAGNOSIS — U07.1 INFECTION DUE TO 2019 NOVEL CORONAVIRUS: ICD-10-CM

## 2022-12-31 DIAGNOSIS — K86.1 CHRONIC PANCREATITIS, UNSPECIFIED PANCREATITIS TYPE (H): ICD-10-CM

## 2022-12-31 DIAGNOSIS — M62.81 GENERALIZED MUSCLE WEAKNESS: ICD-10-CM

## 2022-12-31 DIAGNOSIS — R00.0 TACHYCARDIA: ICD-10-CM

## 2022-12-31 DIAGNOSIS — E86.0 DEHYDRATION: ICD-10-CM

## 2022-12-31 DIAGNOSIS — K85.20 ALCOHOL-INDUCED ACUTE PANCREATITIS WITHOUT INFECTION OR NECROSIS: ICD-10-CM

## 2022-12-31 DIAGNOSIS — K76.0 FATTY LIVER: ICD-10-CM

## 2022-12-31 LAB
ALBUMIN SERPL BCG-MCNC: 3.4 G/DL (ref 3.5–5.2)
ALP SERPL-CCNC: 208 U/L (ref 35–104)
ALT SERPL W P-5'-P-CCNC: 17 U/L (ref 10–35)
ANION GAP BLD CALCULATED.3IONS-SCNC: 18 MMOL/L (ref 3–14)
ANION GAP SERPL CALCULATED.3IONS-SCNC: 19 MMOL/L (ref 7–15)
AST SERPL W P-5'-P-CCNC: 51 U/L (ref 10–35)
BILIRUB DIRECT SERPL-MCNC: <0.2 MG/DL (ref 0–0.3)
BILIRUB SERPL-MCNC: 0.7 MG/DL
BUN SERPL-MCNC: 7.8 MG/DL (ref 8–23)
BUN SERPL-MCNC: 9 MG/DL (ref 7–30)
CA-I BLD-MCNC: 3.9 MG/DL (ref 4.4–5.2)
CALCIUM SERPL-MCNC: 8.2 MG/DL (ref 8.6–10)
CHLORIDE BLD-SCNC: 81 MMOL/L (ref 94–109)
CHLORIDE SERPL-SCNC: 82 MMOL/L (ref 98–107)
CO2 BLD-SCNC: 31 MMOL/L (ref 20–32)
CREAT BLD-MCNC: 0.4 MG/DL (ref 0.5–1)
CREAT SERPL-MCNC: 0.43 MG/DL (ref 0.51–0.95)
DEPRECATED HCO3 PLAS-SCNC: 24 MMOL/L (ref 22–29)
ERYTHROCYTE [DISTWIDTH] IN BLOOD BY AUTOMATED COUNT: 13.5 % (ref 10–15)
FLUAV RNA SPEC QL NAA+PROBE: NEGATIVE
FLUBV RNA RESP QL NAA+PROBE: NEGATIVE
GFR SERPL CREATININE-BSD FRML MDRD: >90 ML/MIN/1.73M2
GLUCOSE BLD-MCNC: 104 MG/DL (ref 70–99)
GLUCOSE SERPL-MCNC: 100 MG/DL (ref 70–99)
HCT VFR BLD AUTO: 34 % (ref 35–47)
HCT VFR BLD CALC: 38 % (ref 35–47)
HGB BLD-MCNC: 11.9 G/DL (ref 11.7–15.7)
HGB BLD-MCNC: 12.9 G/DL (ref 11.7–15.7)
LIPASE SERPL-CCNC: 20 U/L (ref 13–60)
MAGNESIUM SERPL-MCNC: 1.2 MG/DL (ref 1.7–2.3)
MCH RBC QN AUTO: 32.6 PG (ref 26.5–33)
MCHC RBC AUTO-ENTMCNC: 35 G/DL (ref 31.5–36.5)
MCV RBC AUTO: 93 FL (ref 78–100)
PLATELET # BLD AUTO: 332 10E3/UL (ref 150–450)
POTASSIUM BLD-SCNC: 3.6 MMOL/L (ref 3.4–5.3)
POTASSIUM SERPL-SCNC: 3.7 MMOL/L (ref 3.4–5.3)
PROT SERPL-MCNC: 7.4 G/DL (ref 6.4–8.3)
RBC # BLD AUTO: 3.65 10E6/UL (ref 3.8–5.2)
RSV RNA SPEC NAA+PROBE: NEGATIVE
SARS-COV-2 RNA RESP QL NAA+PROBE: POSITIVE
SODIUM BLD-SCNC: 125 MMOL/L (ref 133–144)
SODIUM SERPL-SCNC: 125 MMOL/L (ref 136–145)
TROPONIN T SERPL HS-MCNC: 6 NG/L
WBC # BLD AUTO: 10.3 10E3/UL (ref 4–11)

## 2022-12-31 PROCEDURE — G0378 HOSPITAL OBSERVATION PER HR: HCPCS

## 2022-12-31 PROCEDURE — 85027 COMPLETE CBC AUTOMATED: CPT | Performed by: EMERGENCY MEDICINE

## 2022-12-31 PROCEDURE — 93005 ELECTROCARDIOGRAM TRACING: CPT

## 2022-12-31 PROCEDURE — 87637 SARSCOV2&INF A&B&RSV AMP PRB: CPT | Performed by: EMERGENCY MEDICINE

## 2022-12-31 PROCEDURE — 250N000011 HC RX IP 250 OP 636: Performed by: EMERGENCY MEDICINE

## 2022-12-31 PROCEDURE — C9803 HOPD COVID-19 SPEC COLLECT: HCPCS

## 2022-12-31 PROCEDURE — 250N000009 HC RX 250: Performed by: EMERGENCY MEDICINE

## 2022-12-31 PROCEDURE — 83735 ASSAY OF MAGNESIUM: CPT | Performed by: EMERGENCY MEDICINE

## 2022-12-31 PROCEDURE — 258N000003 HC RX IP 258 OP 636: Performed by: EMERGENCY MEDICINE

## 2022-12-31 PROCEDURE — 82248 BILIRUBIN DIRECT: CPT | Performed by: EMERGENCY MEDICINE

## 2022-12-31 PROCEDURE — 250N000011 HC RX IP 250 OP 636: Performed by: INTERNAL MEDICINE

## 2022-12-31 PROCEDURE — 250N000013 HC RX MED GY IP 250 OP 250 PS 637: Performed by: INTERNAL MEDICINE

## 2022-12-31 PROCEDURE — 96365 THER/PROPH/DIAG IV INF INIT: CPT

## 2022-12-31 PROCEDURE — 96372 THER/PROPH/DIAG INJ SC/IM: CPT | Performed by: INTERNAL MEDICINE

## 2022-12-31 PROCEDURE — 99285 EMERGENCY DEPT VISIT HI MDM: CPT | Mod: 25

## 2022-12-31 PROCEDURE — 80053 COMPREHEN METABOLIC PANEL: CPT | Performed by: EMERGENCY MEDICINE

## 2022-12-31 PROCEDURE — 85014 HEMATOCRIT: CPT

## 2022-12-31 PROCEDURE — 74177 CT ABD & PELVIS W/CONTRAST: CPT

## 2022-12-31 PROCEDURE — 96375 TX/PRO/DX INJ NEW DRUG ADDON: CPT

## 2022-12-31 PROCEDURE — 96367 TX/PROPH/DG ADDL SEQ IV INF: CPT

## 2022-12-31 PROCEDURE — 99220 PR INITIAL OBSERVATION CARE,LEVEL III: CPT | Performed by: INTERNAL MEDICINE

## 2022-12-31 PROCEDURE — 258N000003 HC RX IP 258 OP 636: Performed by: INTERNAL MEDICINE

## 2022-12-31 PROCEDURE — 84484 ASSAY OF TROPONIN QUANT: CPT | Performed by: EMERGENCY MEDICINE

## 2022-12-31 PROCEDURE — 83690 ASSAY OF LIPASE: CPT | Performed by: EMERGENCY MEDICINE

## 2022-12-31 PROCEDURE — 36415 COLL VENOUS BLD VENIPUNCTURE: CPT | Performed by: EMERGENCY MEDICINE

## 2022-12-31 RX ORDER — AMOXICILLIN 250 MG
2 CAPSULE ORAL 2 TIMES DAILY PRN
Status: DISCONTINUED | OUTPATIENT
Start: 2022-12-31 | End: 2023-01-04

## 2022-12-31 RX ORDER — ACETAMINOPHEN 650 MG/1
650 SUPPOSITORY RECTAL EVERY 6 HOURS PRN
Status: DISCONTINUED | OUTPATIENT
Start: 2022-12-31 | End: 2023-01-02

## 2022-12-31 RX ORDER — ACETAMINOPHEN 325 MG/1
650 TABLET ORAL EVERY 6 HOURS PRN
Status: DISCONTINUED | OUTPATIENT
Start: 2022-12-31 | End: 2023-01-02

## 2022-12-31 RX ORDER — FOLIC ACID 1 MG/1
1 TABLET ORAL ONCE
Status: COMPLETED | OUTPATIENT
Start: 2022-12-31 | End: 2022-12-31

## 2022-12-31 RX ORDER — CALCIUM GLUCONATE 94 MG/ML
1 INJECTION, SOLUTION INTRAVENOUS ONCE
Status: COMPLETED | OUTPATIENT
Start: 2022-12-31 | End: 2022-12-31

## 2022-12-31 RX ORDER — PROCHLORPERAZINE MALEATE 5 MG
10 TABLET ORAL EVERY 6 HOURS PRN
Status: DISCONTINUED | OUTPATIENT
Start: 2022-12-31 | End: 2023-01-05 | Stop reason: HOSPADM

## 2022-12-31 RX ORDER — AMOXICILLIN 250 MG
1 CAPSULE ORAL 2 TIMES DAILY PRN
Status: DISCONTINUED | OUTPATIENT
Start: 2022-12-31 | End: 2023-01-04

## 2022-12-31 RX ORDER — ONDANSETRON 2 MG/ML
4 INJECTION INTRAMUSCULAR; INTRAVENOUS ONCE
Status: COMPLETED | OUTPATIENT
Start: 2022-12-31 | End: 2022-12-31

## 2022-12-31 RX ORDER — POLYETHYLENE GLYCOL 3350 17 G/17G
17 POWDER, FOR SOLUTION ORAL DAILY PRN
Status: DISCONTINUED | OUTPATIENT
Start: 2022-12-31 | End: 2023-01-04

## 2022-12-31 RX ORDER — QUETIAPINE FUMARATE 50 MG/1
100 TABLET, FILM COATED ORAL AT BEDTIME
Status: DISCONTINUED | OUTPATIENT
Start: 2022-12-31 | End: 2023-01-05 | Stop reason: HOSPADM

## 2022-12-31 RX ORDER — POTASSIUM CHLORIDE 7.45 MG/ML
10 INJECTION INTRAVENOUS ONCE
Status: COMPLETED | OUTPATIENT
Start: 2022-12-31 | End: 2022-12-31

## 2022-12-31 RX ORDER — MULTIVITAMIN,THERAPEUTIC
1 TABLET ORAL ONCE
Status: COMPLETED | OUTPATIENT
Start: 2022-12-31 | End: 2022-12-31

## 2022-12-31 RX ORDER — MAGNESIUM OXIDE 400 MG/1
800 TABLET ORAL ONCE
Status: DISCONTINUED | OUTPATIENT
Start: 2022-12-31 | End: 2023-01-01

## 2022-12-31 RX ORDER — MAGNESIUM SULFATE HEPTAHYDRATE 40 MG/ML
2 INJECTION, SOLUTION INTRAVENOUS ONCE
Status: COMPLETED | OUTPATIENT
Start: 2022-12-31 | End: 2022-12-31

## 2022-12-31 RX ORDER — IOPAMIDOL 755 MG/ML
500 INJECTION, SOLUTION INTRAVASCULAR ONCE
Status: COMPLETED | OUTPATIENT
Start: 2022-12-31 | End: 2022-12-31

## 2022-12-31 RX ORDER — ONDANSETRON 2 MG/ML
4 INJECTION INTRAMUSCULAR; INTRAVENOUS EVERY 6 HOURS PRN
Status: DISCONTINUED | OUTPATIENT
Start: 2022-12-31 | End: 2023-01-05 | Stop reason: HOSPADM

## 2022-12-31 RX ORDER — HYDROMORPHONE HYDROCHLORIDE 1 MG/ML
0.5 INJECTION, SOLUTION INTRAMUSCULAR; INTRAVENOUS; SUBCUTANEOUS
Status: COMPLETED | OUTPATIENT
Start: 2022-12-31 | End: 2022-12-31

## 2022-12-31 RX ORDER — PROCHLORPERAZINE 25 MG
25 SUPPOSITORY, RECTAL RECTAL EVERY 12 HOURS PRN
Status: DISCONTINUED | OUTPATIENT
Start: 2022-12-31 | End: 2023-01-05 | Stop reason: HOSPADM

## 2022-12-31 RX ORDER — SODIUM CHLORIDE 9 MG/ML
INJECTION, SOLUTION INTRAVENOUS CONTINUOUS
Status: DISCONTINUED | OUTPATIENT
Start: 2022-12-31 | End: 2023-01-05 | Stop reason: HOSPADM

## 2022-12-31 RX ORDER — ENOXAPARIN SODIUM 100 MG/ML
40 INJECTION SUBCUTANEOUS EVERY 24 HOURS
Status: DISCONTINUED | OUTPATIENT
Start: 2022-12-31 | End: 2023-01-01

## 2022-12-31 RX ORDER — MIRTAZAPINE 15 MG/1
30 TABLET, FILM COATED ORAL AT BEDTIME
Status: DISCONTINUED | OUTPATIENT
Start: 2022-12-31 | End: 2023-01-05 | Stop reason: HOSPADM

## 2022-12-31 RX ORDER — ONDANSETRON 4 MG/1
4 TABLET, ORALLY DISINTEGRATING ORAL EVERY 6 HOURS PRN
Status: DISCONTINUED | OUTPATIENT
Start: 2022-12-31 | End: 2023-01-05 | Stop reason: HOSPADM

## 2022-12-31 RX ADMIN — ONDANSETRON 4 MG: 2 INJECTION INTRAMUSCULAR; INTRAVENOUS at 17:44

## 2022-12-31 RX ADMIN — POTASSIUM CHLORIDE 10 MEQ: 7.46 INJECTION, SOLUTION INTRAVENOUS at 18:41

## 2022-12-31 RX ADMIN — MAGNESIUM SULFATE HEPTAHYDRATE 2 G: 40 INJECTION, SOLUTION INTRAVENOUS at 19:56

## 2022-12-31 RX ADMIN — PROMETHAZINE HYDROCHLORIDE 25 MG: 25 INJECTION INTRAMUSCULAR; INTRAVENOUS at 20:31

## 2022-12-31 RX ADMIN — SODIUM CHLORIDE: 9 INJECTION, SOLUTION INTRAVENOUS at 22:51

## 2022-12-31 RX ADMIN — SODIUM CHLORIDE 1000 ML: 9 INJECTION, SOLUTION INTRAVENOUS at 17:46

## 2022-12-31 RX ADMIN — QUETIAPINE FUMARATE 100 MG: 50 TABLET ORAL at 22:51

## 2022-12-31 RX ADMIN — SODIUM CHLORIDE 54 ML: 9 INJECTION, SOLUTION INTRAVENOUS at 19:29

## 2022-12-31 RX ADMIN — IOPAMIDOL 57 ML: 755 INJECTION, SOLUTION INTRAVENOUS at 19:29

## 2022-12-31 RX ADMIN — HYDROMORPHONE HYDROCHLORIDE 0.5 MG: 1 INJECTION, SOLUTION INTRAMUSCULAR; INTRAVENOUS; SUBCUTANEOUS at 18:54

## 2022-12-31 RX ADMIN — MIRTAZAPINE 30 MG: 15 TABLET, FILM COATED ORAL at 22:51

## 2022-12-31 RX ADMIN — CALCIUM GLUCONATE 1 G: 98 INJECTION, SOLUTION INTRAVENOUS at 18:27

## 2022-12-31 RX ADMIN — ENOXAPARIN SODIUM 40 MG: 40 INJECTION SUBCUTANEOUS at 22:53

## 2022-12-31 ASSESSMENT — ACTIVITIES OF DAILY LIVING (ADL)
ADLS_ACUITY_SCORE: 37

## 2022-12-31 ASSESSMENT — ENCOUNTER SYMPTOMS
NAUSEA: 1
DIARRHEA: 1
APPETITE CHANGE: 1
VOMITING: 1
WEAKNESS: 1
ABDOMINAL PAIN: 1

## 2022-12-31 NOTE — ED TRIAGE NOTES
Pt arrives via EMS home d/t cough, nausea, diarrhea past few days. C/o lower abd pain. Denies CP, SOB, blood in stool. No recent antibiotic use. ABC intact. A&O x4.     Pt states she has had poor PO intake over the last several weeks. In triage, pt having involuntary hand cramps and muscle pain.

## 2023-01-01 LAB
ALBUMIN SERPL BCG-MCNC: 2.7 G/DL (ref 3.5–5.2)
ALP SERPL-CCNC: 171 U/L (ref 35–104)
ALT SERPL W P-5'-P-CCNC: 10 U/L (ref 10–35)
ANION GAP SERPL CALCULATED.3IONS-SCNC: 12 MMOL/L (ref 7–15)
AST SERPL W P-5'-P-CCNC: 32 U/L (ref 10–35)
BILIRUB DIRECT SERPL-MCNC: 0.29 MG/DL (ref 0–0.3)
BILIRUB SERPL-MCNC: 0.5 MG/DL
BUN SERPL-MCNC: 5.5 MG/DL (ref 8–23)
C DIFF GDH STL QL IA: POSITIVE
C DIFF TOX A+B STL QL IA: NEGATIVE
C DIFF TOX B STL QL: POSITIVE
CA-I BLD-MCNC: 4.1 MG/DL (ref 4.4–5.2)
CALCIUM SERPL-MCNC: 7.6 MG/DL (ref 8.6–10)
CHLORIDE SERPL-SCNC: 92 MMOL/L (ref 98–107)
CREAT SERPL-MCNC: 0.43 MG/DL (ref 0.51–0.95)
DEPRECATED HCO3 PLAS-SCNC: 27 MMOL/L (ref 22–29)
ERYTHROCYTE [DISTWIDTH] IN BLOOD BY AUTOMATED COUNT: 13.5 % (ref 10–15)
GFR SERPL CREATININE-BSD FRML MDRD: >90 ML/MIN/1.73M2
GLUCOSE SERPL-MCNC: 108 MG/DL (ref 70–99)
HCT VFR BLD AUTO: 27.1 % (ref 35–47)
HGB BLD-MCNC: 9.3 G/DL (ref 11.7–15.7)
MAGNESIUM SERPL-MCNC: 1.7 MG/DL (ref 1.7–2.3)
MCH RBC QN AUTO: 32.1 PG (ref 26.5–33)
MCHC RBC AUTO-ENTMCNC: 34.3 G/DL (ref 31.5–36.5)
MCV RBC AUTO: 93 FL (ref 78–100)
PHOSPHATE SERPL-MCNC: 2.4 MG/DL (ref 2.5–4.5)
PLATELET # BLD AUTO: 259 10E3/UL (ref 150–450)
POTASSIUM SERPL-SCNC: 2.4 MMOL/L (ref 3.4–5.3)
POTASSIUM SERPL-SCNC: 3.3 MMOL/L (ref 3.4–5.3)
POTASSIUM SERPL-SCNC: 3.5 MMOL/L (ref 3.4–5.3)
PROT SERPL-MCNC: 5.6 G/DL (ref 6.4–8.3)
RBC # BLD AUTO: 2.9 10E6/UL (ref 3.8–5.2)
SODIUM SERPL-SCNC: 131 MMOL/L (ref 136–145)
WBC # BLD AUTO: 7.4 10E3/UL (ref 4–11)

## 2023-01-01 PROCEDURE — 250N000013 HC RX MED GY IP 250 OP 250 PS 637: Performed by: STUDENT IN AN ORGANIZED HEALTH CARE EDUCATION/TRAINING PROGRAM

## 2023-01-01 PROCEDURE — 82248 BILIRUBIN DIRECT: CPT | Performed by: INTERNAL MEDICINE

## 2023-01-01 PROCEDURE — 36415 COLL VENOUS BLD VENIPUNCTURE: CPT | Performed by: INTERNAL MEDICINE

## 2023-01-01 PROCEDURE — G0378 HOSPITAL OBSERVATION PER HR: HCPCS

## 2023-01-01 PROCEDURE — 87324 CLOSTRIDIUM AG IA: CPT | Performed by: INTERNAL MEDICINE

## 2023-01-01 PROCEDURE — 83735 ASSAY OF MAGNESIUM: CPT | Performed by: INTERNAL MEDICINE

## 2023-01-01 PROCEDURE — 250N000011 HC RX IP 250 OP 636: Performed by: HOSPITALIST

## 2023-01-01 PROCEDURE — 250N000013 HC RX MED GY IP 250 OP 250 PS 637: Performed by: INTERNAL MEDICINE

## 2023-01-01 PROCEDURE — 258N000003 HC RX IP 258 OP 636: Performed by: INTERNAL MEDICINE

## 2023-01-01 PROCEDURE — 84132 ASSAY OF SERUM POTASSIUM: CPT | Performed by: INTERNAL MEDICINE

## 2023-01-01 PROCEDURE — 96372 THER/PROPH/DIAG INJ SC/IM: CPT | Performed by: INTERNAL MEDICINE

## 2023-01-01 PROCEDURE — 96375 TX/PRO/DX INJ NEW DRUG ADDON: CPT

## 2023-01-01 PROCEDURE — 80053 COMPREHEN METABOLIC PANEL: CPT | Performed by: INTERNAL MEDICINE

## 2023-01-01 PROCEDURE — 250N000013 HC RX MED GY IP 250 OP 250 PS 637: Performed by: HOSPITALIST

## 2023-01-01 PROCEDURE — 96376 TX/PRO/DX INJ SAME DRUG ADON: CPT

## 2023-01-01 PROCEDURE — 87493 C DIFF AMPLIFIED PROBE: CPT | Performed by: INTERNAL MEDICINE

## 2023-01-01 PROCEDURE — 85027 COMPLETE CBC AUTOMATED: CPT | Performed by: INTERNAL MEDICINE

## 2023-01-01 PROCEDURE — 82330 ASSAY OF CALCIUM: CPT | Performed by: INTERNAL MEDICINE

## 2023-01-01 PROCEDURE — 250N000011 HC RX IP 250 OP 636: Performed by: INTERNAL MEDICINE

## 2023-01-01 PROCEDURE — 99233 SBSQ HOSP IP/OBS HIGH 50: CPT | Performed by: INTERNAL MEDICINE

## 2023-01-01 PROCEDURE — 84100 ASSAY OF PHOSPHORUS: CPT | Performed by: INTERNAL MEDICINE

## 2023-01-01 RX ORDER — NALOXONE HYDROCHLORIDE 0.4 MG/ML
0.4 INJECTION, SOLUTION INTRAMUSCULAR; INTRAVENOUS; SUBCUTANEOUS
Status: DISCONTINUED | OUTPATIENT
Start: 2023-01-01 | End: 2023-01-05 | Stop reason: HOSPADM

## 2023-01-01 RX ORDER — QUETIAPINE FUMARATE 25 MG/1
25 TABLET, FILM COATED ORAL
Status: COMPLETED | OUTPATIENT
Start: 2023-01-01 | End: 2023-01-01

## 2023-01-01 RX ORDER — AZELASTINE 1 MG/ML
1 SPRAY, METERED NASAL DAILY PRN
Status: DISCONTINUED | OUTPATIENT
Start: 2023-01-01 | End: 2023-01-05 | Stop reason: HOSPADM

## 2023-01-01 RX ORDER — FOLIC ACID 1 MG/1
1 TABLET ORAL DAILY
Status: DISCONTINUED | OUTPATIENT
Start: 2023-01-01 | End: 2023-01-05 | Stop reason: HOSPADM

## 2023-01-01 RX ORDER — HYDROXYZINE HYDROCHLORIDE 25 MG/1
25 TABLET, FILM COATED ORAL EVERY 6 HOURS PRN
Status: DISCONTINUED | OUTPATIENT
Start: 2023-01-01 | End: 2023-01-05 | Stop reason: HOSPADM

## 2023-01-01 RX ORDER — NALOXONE HYDROCHLORIDE 0.4 MG/ML
0.2 INJECTION, SOLUTION INTRAMUSCULAR; INTRAVENOUS; SUBCUTANEOUS
Status: DISCONTINUED | OUTPATIENT
Start: 2023-01-01 | End: 2023-01-05 | Stop reason: HOSPADM

## 2023-01-01 RX ORDER — MULTIVITAMIN,THERAPEUTIC
1 TABLET ORAL DAILY
Status: DISCONTINUED | OUTPATIENT
Start: 2023-01-01 | End: 2023-01-05 | Stop reason: HOSPADM

## 2023-01-01 RX ORDER — QUETIAPINE FUMARATE 25 MG/1
25 TABLET, FILM COATED ORAL EVERY MORNING
Status: DISCONTINUED | OUTPATIENT
Start: 2023-01-01 | End: 2023-01-05 | Stop reason: HOSPADM

## 2023-01-01 RX ORDER — NICOTINE 21 MG/24HR
1 PATCH, TRANSDERMAL 24 HOURS TRANSDERMAL
Status: DISCONTINUED | OUTPATIENT
Start: 2023-01-01 | End: 2023-01-05 | Stop reason: HOSPADM

## 2023-01-01 RX ORDER — ENOXAPARIN SODIUM 100 MG/ML
30 INJECTION SUBCUTANEOUS EVERY 24 HOURS
Status: DISCONTINUED | OUTPATIENT
Start: 2023-01-01 | End: 2023-01-05 | Stop reason: HOSPADM

## 2023-01-01 RX ORDER — POTASSIUM CHLORIDE 1.5 G/1.58G
40 POWDER, FOR SOLUTION ORAL ONCE
Status: COMPLETED | OUTPATIENT
Start: 2023-01-01 | End: 2023-01-01

## 2023-01-01 RX ORDER — VANCOMYCIN HYDROCHLORIDE 125 MG/1
125 CAPSULE ORAL 4 TIMES DAILY
Status: DISCONTINUED | OUTPATIENT
Start: 2023-01-01 | End: 2023-01-05 | Stop reason: HOSPADM

## 2023-01-01 RX ORDER — POTASSIUM CHLORIDE 1.5 G/1.58G
20 POWDER, FOR SOLUTION ORAL ONCE
Status: COMPLETED | OUTPATIENT
Start: 2023-01-01 | End: 2023-01-01

## 2023-01-01 RX ORDER — MAGNESIUM OXIDE 400 MG/1
400 TABLET ORAL 2 TIMES DAILY
Status: DISCONTINUED | OUTPATIENT
Start: 2023-01-01 | End: 2023-01-05 | Stop reason: HOSPADM

## 2023-01-01 RX ORDER — KETOROLAC TROMETHAMINE 15 MG/ML
15 INJECTION, SOLUTION INTRAMUSCULAR; INTRAVENOUS EVERY 6 HOURS PRN
Status: DISCONTINUED | OUTPATIENT
Start: 2023-01-01 | End: 2023-01-02

## 2023-01-01 RX ORDER — POTASSIUM CHLORIDE 750 MG/1
20 TABLET, EXTENDED RELEASE ORAL
Status: DISCONTINUED | OUTPATIENT
Start: 2023-01-01 | End: 2023-01-05 | Stop reason: HOSPADM

## 2023-01-01 RX ORDER — LIDOCAINE 40 MG/G
CREAM TOPICAL
Status: DISCONTINUED | OUTPATIENT
Start: 2023-01-01 | End: 2023-01-05 | Stop reason: HOSPADM

## 2023-01-01 RX ORDER — PANTOPRAZOLE SODIUM 40 MG/1
40 TABLET, DELAYED RELEASE ORAL
Status: DISCONTINUED | OUTPATIENT
Start: 2023-01-01 | End: 2023-01-05 | Stop reason: HOSPADM

## 2023-01-01 RX ORDER — CETIRIZINE HYDROCHLORIDE 10 MG/1
10 TABLET ORAL DAILY PRN
Status: DISCONTINUED | OUTPATIENT
Start: 2023-01-01 | End: 2023-01-05 | Stop reason: HOSPADM

## 2023-01-01 RX ORDER — CALCIUM CARBONATE 500(1250)
500 TABLET ORAL 2 TIMES DAILY WITH MEALS
Status: DISCONTINUED | OUTPATIENT
Start: 2023-01-01 | End: 2023-01-05 | Stop reason: HOSPADM

## 2023-01-01 RX ADMIN — ONDANSETRON 4 MG: 2 INJECTION INTRAMUSCULAR; INTRAVENOUS at 17:37

## 2023-01-01 RX ADMIN — VANCOMYCIN HYDROCHLORIDE 125 MG: 125 CAPSULE ORAL at 17:22

## 2023-01-01 RX ADMIN — FOLIC ACID 1 MG: 1 TABLET ORAL at 09:56

## 2023-01-01 RX ADMIN — MAGNESIUM OXIDE TAB 400 MG (241.3 MG ELEMENTAL MG) 400 MG: 400 (241.3 MG) TAB at 09:59

## 2023-01-01 RX ADMIN — THIAMINE HCL TAB 100 MG 100 MG: 100 TAB at 09:56

## 2023-01-01 RX ADMIN — HYDROXYZINE HYDROCHLORIDE 25 MG: 25 TABLET, FILM COATED ORAL at 09:56

## 2023-01-01 RX ADMIN — VANCOMYCIN HYDROCHLORIDE 125 MG: 125 CAPSULE ORAL at 21:06

## 2023-01-01 RX ADMIN — KETOROLAC TROMETHAMINE 15 MG: 15 INJECTION, SOLUTION INTRAMUSCULAR; INTRAVENOUS at 00:23

## 2023-01-01 RX ADMIN — ACETAMINOPHEN 650 MG: 325 TABLET, FILM COATED ORAL at 14:48

## 2023-01-01 RX ADMIN — QUETIAPINE FUMARATE 100 MG: 50 TABLET ORAL at 22:17

## 2023-01-01 RX ADMIN — POTASSIUM & SODIUM PHOSPHATES POWDER PACK 280-160-250 MG 1 PACKET: 280-160-250 PACK at 17:21

## 2023-01-01 RX ADMIN — SODIUM CHLORIDE: 9 INJECTION, SOLUTION INTRAVENOUS at 08:02

## 2023-01-01 RX ADMIN — MIRTAZAPINE 30 MG: 15 TABLET, FILM COATED ORAL at 22:17

## 2023-01-01 RX ADMIN — PANTOPRAZOLE SODIUM 40 MG: 40 TABLET, DELAYED RELEASE ORAL at 21:06

## 2023-01-01 RX ADMIN — POTASSIUM CHLORIDE FOR ORAL SOLUTION 20 MEQ: 1.5 POWDER, FOR SOLUTION ORAL at 19:06

## 2023-01-01 RX ADMIN — KETOROLAC TROMETHAMINE 15 MG: 15 INJECTION, SOLUTION INTRAMUSCULAR; INTRAVENOUS at 07:49

## 2023-01-01 RX ADMIN — ACETAMINOPHEN 650 MG: 325 TABLET, FILM COATED ORAL at 07:49

## 2023-01-01 RX ADMIN — QUETIAPINE FUMARATE 25 MG: 25 TABLET ORAL at 09:56

## 2023-01-01 RX ADMIN — POTASSIUM & SODIUM PHOSPHATES POWDER PACK 280-160-250 MG 1 PACKET: 280-160-250 PACK at 14:49

## 2023-01-01 RX ADMIN — ENOXAPARIN SODIUM 30 MG: 30 INJECTION SUBCUTANEOUS at 22:18

## 2023-01-01 RX ADMIN — CALCIUM 500 MG: 500 TABLET ORAL at 17:22

## 2023-01-01 RX ADMIN — QUETIAPINE FUMARATE 25 MG: 25 TABLET ORAL at 19:46

## 2023-01-01 RX ADMIN — POTASSIUM CHLORIDE FOR ORAL SOLUTION 40 MEQ: 1.5 POWDER, FOR SOLUTION ORAL at 10:53

## 2023-01-01 RX ADMIN — NICOTINE 1 PATCH: 14 PATCH, EXTENDED RELEASE TRANSDERMAL at 19:06

## 2023-01-01 RX ADMIN — KETOROLAC TROMETHAMINE 15 MG: 15 INJECTION, SOLUTION INTRAMUSCULAR; INTRAVENOUS at 14:49

## 2023-01-01 RX ADMIN — SODIUM CHLORIDE: 9 INJECTION, SOLUTION INTRAVENOUS at 19:10

## 2023-01-01 RX ADMIN — NICOTINE 1 PATCH: 14 PATCH, EXTENDED RELEASE TRANSDERMAL at 00:23

## 2023-01-01 RX ADMIN — HYDROMORPHONE HYDROCHLORIDE 1 MG: 2 TABLET ORAL at 17:22

## 2023-01-01 RX ADMIN — POTASSIUM CHLORIDE FOR ORAL SOLUTION 20 MEQ: 1.5 POWDER, FOR SOLUTION ORAL at 13:18

## 2023-01-01 RX ADMIN — THERA TABS 1 TABLET: TAB at 09:57

## 2023-01-01 RX ADMIN — POTASSIUM CHLORIDE 20 MEQ: 750 TABLET, FILM COATED, EXTENDED RELEASE ORAL at 17:22

## 2023-01-01 RX ADMIN — POTASSIUM & SODIUM PHOSPHATES POWDER PACK 280-160-250 MG 1 PACKET: 280-160-250 PACK at 22:18

## 2023-01-01 ASSESSMENT — ACTIVITIES OF DAILY LIVING (ADL)
ADLS_ACUITY_SCORE: 39
ADLS_ACUITY_SCORE: 35
ADLS_ACUITY_SCORE: 37
ADLS_ACUITY_SCORE: 35
ADLS_ACUITY_SCORE: 35
ADLS_ACUITY_SCORE: 37
ADLS_ACUITY_SCORE: 35
ADLS_ACUITY_SCORE: 37
ADLS_ACUITY_SCORE: 37
ADLS_ACUITY_SCORE: 35
ADLS_ACUITY_SCORE: 35
ADLS_ACUITY_SCORE: 37

## 2023-01-01 NOTE — H&P
[facility]  History and Physical  Hospitalist - Charles Hylton DO       Date of Admission:  12/31/2022    Chief Complaint   Nausea, vomiting, and diarrhea    History is obtained from the patient, the emergency department physician, as well as the electronic medical record.    History of Present Illness   Audelia Comer is a 57 year old female with past medical history of alcohol abuse with chronic pancreatitis, depression, breast cancer status post bilateral mastectomy currently in remission, active nicotine dependence with cigarettes who presented on 12/31/2022 with chief complaint of nausea, vomiting, and diarrhea.  The patient states that she has had bowel issues for the past month.  She reports intermittent vomiting, intermittent constipation followed by periods of diarrhea.  She has not been eating anything for the past 1 month.  She states she only drinks shakes and Pedialyte.  She states over the last 3 days her diarrhea and nausea and vomiting have been worse.  Approximately 2 days ago she was having near constant diarrhea.  She denies any bloody or black tarry stools at that time.  Yesterday she took some Imodium and developed some left lower quadrant abdominal pain.  She also reports runny nose, sore throat, cough.    In the emergency department, the patient was found have a temperature of 97.8  F, heart rate 118, blood pressure 95/70, respiratory rate 18, SPO2 96% on room air.  Initial lab work showed sodium 125, potassium 3.7, chloride 82, BUN/creatinine 7.8/0.43, anion gap 19, magnesium 1.2, albumin 3.4, alkaline phosphatase 208, AST/ALT 51/17, ionized calcium 3.9, glucose 100, WBC 10.3.  The patient tested positive for COVID-19.  The patient had a CT chest/abdomen/pelvis that showed no evidence of pulmonary embolism, fluid within numerous small bowel loops and portions of the colon could represent an enterocolitis, no wall thickening or inflammatory changes, chronic liver disease  with diffuse fatty infiltration and scattered areas of fatty sparing, changes of chronic pancreatitis, healing inferior right pubic ramus fracture.  The patient was started on IV fluids and provided with electrolyte replacement.  The patient was admitted for observation.    ASSESSMENT/PLAN    Acute Nausea, Vomiting, Diarrhea  Covid 19 Infection  - Pt received one dose of the pfizer vaccine  - Suspect GI issues secondary to viral gastroenteritis  - Check cdif pcr  - If cdif negative, could consider imodium or questran  - No hypoxia or infiltrates on CT so no role for dexamethasone or remdesivir  - IVF  - If no improvement in diarrhea, recommend GI consultation.  Regardless, pt should be referred to GI as an outpatient after resolution of her viral illness given her GI issues over the last month  - Encourage oral intake of solid food    Hyponatremia  Hypomagnesemia  Hypocalcemia  - Electrolyte replacement protocol  - Given calcium gluconate 1 gm in ED  - Repeat Ionized calcium in the AM    Alcohol Abuse  Chronic Pancreatitis  - MV, FA, Thiamine    Malnutrition  - In the setting of acute illness and poor oral intake for 1 month  - Encourage oral intake  - IVF    Chronic Medical Problems:  Alcoholic Liver Disease  Active Nicotine Dependence with Cigarettes    PLAN: Resume home medications as appropriate once confirmed by pharmacy.     DVT Prophylaxis: Pneumatic Compression Devices  Code Status: Full Code  Discharge Plan: Home in 1-2 days pending improvement in electrolytes and ability to tolerate oral food.    Charles Hylton DO    Primary Care Physician   Randee Woods    -----------------------------------------------------------------------------------------------------------------------------------------------------------------------------------------------------    Past Medical History    I have reviewed this patient's medical history and updated it with pertinent information if needed.   Past Medical History:    Diagnosis Date     Anxiety      Depressive disorder      Invasive ductal carcinoma of breast, right (H)      SBO (small bowel obstruction) (H)        Past Surgical History   I have reviewed this patient's surgical history and updated it with pertinent information if needed.  Past Surgical History:   Procedure Laterality Date     APPENDECTOMY       APPENDECTOMY  1989      SECTION  1984     GYN SURGERY      c section      MASTECTOMY, BILATERAL Bilateral     With reconstructive surgery     SOFT TISSUE SURGERY      breast implants       Prior to Admission Medications   Prior to Admission Medications   Prescriptions Last Dose Informant Patient Reported? Taking?   QUEtiapine (SEROQUEL) 25 MG tablet  Self Yes No   Sig: Take 50 mg by mouth every morning   QUEtiapine (SEROQUEL) 25 MG tablet  Self Yes No   Sig: Take 50 mg by mouth daily as needed (anxiety)   QUEtiapine (SEROQUEL) 25 MG tablet  Self Yes No   Sig: Take 100 mg by mouth At Bedtime   acetaminophen (TYLENOL) 325 MG tablet  Self No No   Sig: Take 2 tablets (650 mg) by mouth every 6 hours as needed for mild pain Do not take more than 2000 mg per day. Alternate with ibuprofen.   azelastine (ASTELIN) 0.1 % nasal spray  Self Yes No   Sig: Spray 1 spray into both nostrils daily as needed    cetirizine (ZYRTEC) 10 MG tablet  Self Yes No   Sig: Take 10 mg by mouth daily as needed    fluticasone (FLONASE) 50 MCG/ACT nasal spray  Self Yes No   Sig: Spray 1 spray into both nostrils daily as needed    folic acid (FOLVITE) 1 MG tablet  Self No No   Sig: Take 1 tablet (1 mg) by mouth daily   ibuprofen (ADVIL/MOTRIN) 400 MG tablet  Self No No   Sig: Take 1 tablet (400 mg) by mouth every 6 hours as needed for moderate pain   magnesium oxide (MAG-OX) 400 MG tablet   No No   Sig: Take 1 tablet (400 mg) by mouth 2 times daily   mirtazapine (REMERON) 15 MG tablet  Self Yes No   Sig: Take 30 mg by mouth At Bedtime   multivitamin w/minerals (THERA-VIT-M) tablet   Self No No   Sig: Take 1 tablet by mouth daily   ondansetron (ZOFRAN ODT) 4 MG ODT tab  Self No No   Sig: Take 1 tablet (4 mg) by mouth every 6 hours as needed for nausea   pantoprazole (PROTONIX) 40 MG EC tablet   No No   Sig: Take 1 tablet (40 mg) by mouth daily as needed   potassium chloride ER (K-TAB) 20 MEQ CR tablet  Self Yes No   Sig: Take 20 mEq by mouth daily (with dinner)   thiamine (B-1) 100 MG tablet  Self No No   Sig: Take 1 tablet (100 mg) by mouth daily      Facility-Administered Medications: None     Allergies   No Known Allergies    Social History   I have reviewed this patient's social history and updated it with pertinent information if needed. Audelia HOWELL Katelynmariam  reports that she has been smoking. She has been smoking an average of 1 pack per day. She has never used smokeless tobacco. She reports current alcohol use. She reports that she does not use drugs.    Family History   I have reviewed this patient's family history and updated it with pertinent information if needed.   Family History   Problem Relation Age of Onset     Colon Cancer Maternal Grandmother      Lung Cancer Paternal Grandmother      Breast Cancer Paternal Grandmother        -----------------------------------------------------------------------------------------------------------------------------------------------------------------------------------------------------    Review of Systems   The 10 point Review of Systems is negative other than noted in the HPI or here.     Physical Exam   Temp: 97.8  F (36.6  C) Temp src: Oral BP: 109/68 Pulse: 94   Resp: 16 SpO2: 98 % O2 Device: None (Room air)    Vital Signs with Ranges  Temp:  [97.8  F (36.6  C)] 97.8  F (36.6  C)  Pulse:  [] 94  Resp:  [10-28] 16  BP: ()/(68-87) 109/68  SpO2:  [92 %-100 %] 98 %  0 lbs 0 oz    Constitutional: Awake, alert, cooperative, no apparent distress, thin.  Eyes: Conjunctiva and pupils examined and normal.  HEENT: Moist mucous membranes,  normal dentition.  Respiratory: Clear to auscultation bilaterally, no crackles or wheezing.  Cardiovascular: Regular rate and rhythm, normal S1 and S2, and no murmur noted.  GI: Soft, non-distended, non-tender, normal bowel sounds.  Lymph/Hematologic: No anterior cervical or supraclavicular adenopathy.  Skin: No rashes, no cyanosis, no edema.  Musculoskeletal: No joint swelling, erythema or tenderness.  Neurologic: Cranial nerves 2-12 intact, normal strength and sensation.  Psychiatric: Alert, oriented to person, place and time, no obvious anxiety or depression.     Data     Recent Labs   Lab 12/31/22  1749 12/31/22  1746   WBC  --  10.3   HGB 12.9 11.9   MCV  --  93   PLT  --  332   * 125*   POTASSIUM 3.6 3.7   CHLORIDE 81* 82*   CO2  --  24   BUN 9 7.8*   CR 0.4* 0.43*   ANIONGAP  --  19*   FRANCOISE  --  8.2*   * 100*   ALBUMIN  --  3.4*   PROTTOTAL  --  7.4   BILITOTAL  --  0.7   ALKPHOS  --  208*   ALT  --  17   AST  --  51*   LIPASE  --  20       Recent Results (from the past 24 hour(s))   CT Chest/Abdomen/Pelvis w Contrast    Narrative    EXAM: CT CHEST/ABDOMEN/PELVIS W CONTRAST  LOCATION: [facility]  DATE/TIME: 12/31/2022 7:37 PM    INDICATION: weight loss, cachexic, nausea, vomiting, diarrhea, abd pain, smoker  COMPARISON: CT abdomen and pelvis 10/04/2022 and CTA chest exam 4/16/2020  TECHNIQUE: CT scan of the chest, abdomen, and pelvis was performed following injection of IV contrast. Multiplanar reformats were obtained. Dose reduction techniques were used.   CONTRAST: 57mL Isovue 370    FINDINGS:   LUNGS AND PLEURA: Very mild changes of centrilobular emphysema. Elevated right hemidiaphragm. Minimal atelectasis right base. No concerning nodules or masses. No infiltrates or pleural effusions.    MEDIASTINUM/AXILLAE: No enlarged mediastinal or hilar lymph nodes. Small paraesophageal varices as seen previously.    CORONARY ARTERY CALCIFICATION: None.    HEPATOBILIARY:  Heterogeneous liver parenchyma with enlarged caudate lobe compatible with chronic liver disease, with diffuse fatty infiltration and scattered areas of fatty sparing. No focal liver lesions. Mild gallbladder distention.    PANCREAS: Calcifications of the pancreatic head, unchanged. No acute inflammatory changes.    SPLEEN: Normal.    ADRENAL GLANDS: Normal.    KIDNEYS/BLADDER: Normal.    BOWEL: Fluid within numerous nondilated small bowel loops, and moderate fluid within the colon. No evidence for obstruction.    LYMPH NODES: Normal.    VASCULATURE: Atherosclerotic disease abdominal aorta.    PELVIC ORGANS: Trace free pelvic fluid.    MUSCULOSKELETAL: Bilateral breast implants. Subacute fracture inferior right pubic ramus with callus formation.      Impression    IMPRESSION:  1.  No evidence for acute pulmonary disease.   2.  Fluid within numerous small bowel loops and portions of the colon could represent an enterocolitis and account for the patient's diarrhea. No wall thickening or other inflammatory changes.  3.  Chronic liver disease with diffuse fatty infiltration and scattered areas of fatty sparing.  4.  Changes of chronic pancreatitis. No acute inflammatory changes.  5.  Healing inferior right pubic ramus fracture.

## 2023-01-01 NOTE — PHARMACY-ADMISSION MEDICATION HISTORY
Admission medication history interview status for this patient is complete. See [facility] admission navigator for allergy information, prior to admission medications and immunization status.     Medication history interview done, indicate source(s): Patient  Medication history resources (including written lists, pill bottles, clinic record):Socialize  Pharmacy: [id number] - [place], [place] - [address] [facility] [address]    Changes made to PTA medication list:  Added: none  Changed: updated instructions for Seroquel  Reported as Not Taking: none  Removed: none    Actions taken by pharmacist (provider contacted, etc):paged hospitalist     Additional medication history information:None    Medication reconciliation/reorder completed by provider prior to medication history?  N    Prior to Admission medications    Medication Sig Last Dose Taking? Auth Provider Long Term End Date   folic acid (FOLVITE) 1 MG tablet Take 1 tablet (1 mg) by mouth daily 12/31/2022 Yes Jonathon Narvaez,      magnesium oxide (MAG-OX) 400 MG tablet Take 1 tablet (400 mg) by mouth 2 times daily 12/31/2022 Yes Amarilis Blancas PA-C     mirtazapine (REMERON) 15 MG tablet Take 30 mg by mouth At Bedtime 12/30/2022 Yes Unknown, Entered By History Yes 4/19/23   multivitamin w/minerals (THERA-VIT-M) tablet Take 1 tablet by mouth daily 12/31/2022 Yes Hong Benitez MD     pantoprazole (PROTONIX) 40 MG EC tablet Take 1 tablet (40 mg) by mouth daily as needed 12/30/2022 Yes Amarilis Blancas PA-C     potassium chloride ER (K-TAB) 20 MEQ CR tablet Take 20 mEq by mouth daily (with dinner) 12/30/2022 Yes Unknown, Entered By History     QUEtiapine (SEROQUEL) 25 MG tablet Take 25 mg by mouth every morning 12/31/2022 Yes Unknown, Entered By History No    QUEtiapine (SEROQUEL) 25 MG tablet Take 25 mg by mouth daily as needed (anxiety) 12/31/2022 Yes Unknown, Entered By History No    QUEtiapine (SEROQUEL) 25 MG tablet Take  100 mg by mouth At Bedtime 12/30/2022 at hs Yes Unknown, Entered By History No    thiamine (B-1) 100 MG tablet Take 1 tablet (100 mg) by mouth daily 12/31/2022 Yes Jonathon Narvaez DO     acetaminophen (TYLENOL) 325 MG tablet Take 2 tablets (650 mg) by mouth every 6 hours as needed for mild pain Do not take more than 2000 mg per day. Alternate with ibuprofen.   Jonathon Narvaez DO     azelastine (ASTELIN) 0.1 % nasal spray Spray 1 spray into both nostrils daily as needed    Unknown, Entered By History     cetirizine (ZYRTEC) 10 MG tablet Take 10 mg by mouth daily as needed    Unknown, Entered By History     fluticasone (FLONASE) 50 MCG/ACT nasal spray Spray 1 spray into both nostrils daily as needed    Unknown, Entered By History     ibuprofen (ADVIL/MOTRIN) 400 MG tablet Take 1 tablet (400 mg) by mouth every 6 hours as needed for moderate pain   Jonatohn Narvaez DO     ondansetron (ZOFRAN ODT) 4 MG ODT tab Take 1 tablet (4 mg) by mouth every 6 hours as needed for nausea   Darion Reed MD

## 2023-01-01 NOTE — PROGRESS NOTES
[facility]    Medicine Progress Note - Hospitalist Service    Date of Admission:  12/31/2022    Assessment & Plan     Audelia Comer is a 57 year old female with past medical history of alcohol abuse with chronic pancreatitis, depression, breast cancer status post bilateral mastectomy currently in remission, active nicotine dependence with cigarettes who presented on 12/31/2022 with chief complaint of nausea, vomiting, and diarrhea.  Found to have COVID-19 infection and C. difficile colitis.    C. difficile colitis.  -Start vancomycin 125 mg 4 times a day.    COVID-19 infection.  -Not hypoxic.  -Symptomatic treatment as needed.    Hyponatremia.  Hypokalemia.  Hypocalcemia.  Hypomagnesemia.  Hypophosphatemia.  -Phosphorus replacement protocol.  -Restart scheduled magnesium supplement.  -Additional magnesium replacement protocol as needed.  -Restart scheduled potassium chloride supplement.  -Additional potassium replacement protocol as needed.  -Ionized calcium low.  -Start calcium carbonate 500 mg twice a day.  -Continue IV fluids.  -Recheck metabolic panel tomorrow.    Tobacco use disorder.  -Continue nicotine patch.    Depression.  -Continue mirtazapine 30 mg at bedtime.       Diet: Regular Diet Adult    DVT Prophylaxis: Enoxaparin (Lovenox) SQ  Patino Catheter: Not present  Lines: None     Cardiac Monitoring: None  Code Status: Full Code      Clinically Significant Risk Factors Present on Admission        # Hypokalemia: Lowest K = 2.4 mmol/L in last 2 days, will replace as needed  # Hyponatremia: Lowest Na = 125 mmol/L in last 2 days, will monitor as appropriate  # Hypocalcemia: Lowest iCa = 3.9 mg/dL in last 2 days, will monitor and replace as appropriate   # Hypomagnesemia: Lowest Mg = 1.2 mg/dL in last 2 days, will replace as needed  # Anion Gap Metabolic Acidosis: Highest Anion Gap = 19 mmol/L in last 2 days, will monitor and treat as appropriate  # Hypoalbuminemia: Lowest albumin = 2.7  "g/dL at 1/1/2023  8:43 AM, will monitor as appropriate          # Cachexia: Estimated body mass index is 15.59 kg/m  as calculated from the following:    Height as of this encounter: 1.676 m (5' 6\").    Weight as of this encounter: 43.8 kg (96 lb 9.6 oz).           Disposition Plan      Expected Discharge Date: 01/02/2023        Discharge Comments: Heladio Haji DO  Hospitalist Service  [facility]  Securely message with [employer] (more info)  Text page via [facility] Paging/Directory   ______________________________________________________________________    Interval History   Continues having diarrhea.  Occasional cough.  Still with some nausea.  Does not feel short of breath.  No chest pain, fevers, chills.    Physical Exam   Vital Signs: Temp: 97.8  F (36.6  C) Temp src: Temporal BP: 90/57 Pulse: 93   Resp: 16 SpO2: 96 % O2 Device: None (Room air)    Weight: 96 lbs 9.6 oz    Gen:  NAD, A&Ox3.  Eyes:  PERRL, sclera anicteric.  OP:  MMM, no lesions.  Neck:  Supple.  CV:  Regular, no murmurs.  Lung:  CTA b/l, normal effort.  Ab:  +BS, soft.  Skin:  Warm, dry to touch.  No rash.  Ext:  No pitting edema LE b/l.      Medical Decision Making       See above noted assessment and plan.    Data     I have personally reviewed the following data over the past 24 hrs:    7.4  \   9.3 (L)   / 259     131 (L) 92 (L) 5.5 (L) /  108 (H)   2.4 (LL) 27 0.43 (L) \       ALT: 10 AST: 32 AP: 171 (H) TBILI: 0.5   ALB: 2.7 (L) TOT PROTEIN: 5.6 (L) LIPASE: 20       Trop: 6 BNP: N/A       Imaging results reviewed over the past 24 hrs:   Recent Results (from the past 24 hour(s))   CT Chest/Abdomen/Pelvis w Contrast    Narrative    EXAM: CT CHEST/ABDOMEN/PELVIS W CONTRAST  LOCATION: [facility]  DATE/TIME: 12/31/2022 7:37 PM    INDICATION: weight loss, cachexic, nausea, vomiting, diarrhea, abd pain, smoker  COMPARISON: CT abdomen and pelvis 10/04/2022 and CTA chest exam " 4/16/2020  TECHNIQUE: CT scan of the chest, abdomen, and pelvis was performed following injection of IV contrast. Multiplanar reformats were obtained. Dose reduction techniques were used.   CONTRAST: 57mL Isovue 370    FINDINGS:   LUNGS AND PLEURA: Very mild changes of centrilobular emphysema. Elevated right hemidiaphragm. Minimal atelectasis right base. No concerning nodules or masses. No infiltrates or pleural effusions.    MEDIASTINUM/AXILLAE: No enlarged mediastinal or hilar lymph nodes. Small paraesophageal varices as seen previously.    CORONARY ARTERY CALCIFICATION: None.    HEPATOBILIARY: Heterogeneous liver parenchyma with enlarged caudate lobe compatible with chronic liver disease, with diffuse fatty infiltration and scattered areas of fatty sparing. No focal liver lesions. Mild gallbladder distention.    PANCREAS: Calcifications of the pancreatic head, unchanged. No acute inflammatory changes.    SPLEEN: Normal.    ADRENAL GLANDS: Normal.    KIDNEYS/BLADDER: Normal.    BOWEL: Fluid within numerous nondilated small bowel loops, and moderate fluid within the colon. No evidence for obstruction.    LYMPH NODES: Normal.    VASCULATURE: Atherosclerotic disease abdominal aorta.    PELVIC ORGANS: Trace free pelvic fluid.    MUSCULOSKELETAL: Bilateral breast implants. Subacute fracture inferior right pubic ramus with callus formation.      Impression    IMPRESSION:  1.  No evidence for acute pulmonary disease.   2.  Fluid within numerous small bowel loops and portions of the colon could represent an enterocolitis and account for the patient's diarrhea. No wall thickening or other inflammatory changes.  3.  Chronic liver disease with diffuse fatty infiltration and scattered areas of fatty sparing.  4.  Changes of chronic pancreatitis. No acute inflammatory changes.  5.  Healing inferior right pubic ramus fracture.

## 2023-01-01 NOTE — PLAN OF CARE
Goal Outcome Evaluation:      Plan of Care Reviewed With: patient             VS-stable, afebrile, K was 2.4 critical low, informed Dr. Haji. Replacing K, mg 1.7 no replacement needed. Phos was 2.4 and replaced. pt c/o pain during lab draws. Limited due to bilateral mastectomy and hard stick   Lung Sounds-clear, no cough, on room air, using IS upto 1000--encourged pt to use. Nicotine patch on L shoulder.   O2-on room air.   GI-+Bs, +flatus. 1 loose, water bm this shift--sent down to lab, lbm was Friday at home --pt states diarrhea at that time. Poor appetite, encouraged to eat and drink.   -700 dark urine in hat in bathroom upon arrival, pt stated that this was her first void in 2 weeks due to the diarrhea at home. Urine at 1030 was lighter in color. encouraged more fluids.   IVF-at 100.   Dressings-none.   CMS-has numbness and tingling in arms and legs, +pp, dizzy when up walking to the bathroom. +pp, strong dorsi/planter flexion.   Drain-none.   Activity-up with one assist, amb to bathroom. Dizzy when up.   Pain-rates pain as a headache. Backache. Abd discomfort. Tylenol/toradol and atarax given. Upper back into both sides of neck. Lower back on sides. And abd cramping in middle lower abdomen.   D/C Plan-replacing electrolytes, encourage fluid and food.

## 2023-01-01 NOTE — ED NOTES
[facility]  ED Nurse Handoff Report    Audelia Comer is a 57 year old female   ED Chief complaint: Diarrhea and Generalized Weakness  . ED Diagnosis:   Final diagnoses:   Infection due to 2019 novel coronavirus   Vomiting and diarrhea   Dehydration   Hyponatremia   Hypochloremia   Hypokalemia   Hypocalcemia   Hypomagnesemia   Increased anion gap metabolic acidosis   Generalized muscle weakness   Tachycardia   Fatty liver   Chronic pancreatitis, unspecified pancreatitis type (H)   Enterocolitis     Allergies: No Known Allergies    Code Status: Full Code  Activity level - Baseline/Home:  Independent. Activity Level - Current:   Stand by Assist. Lift room needed: No. Bariatric: No   Needed: No   Isolation: Yes. Infection:   C-Diff Pending  COVID r/o and special precautions.     Vital Signs:   Vitals:    12/31/22 2057 12/31/22 2110 12/31/22 2125 12/31/22 2219   BP: 112/72 108/71 109/68 115/75   Pulse: 96 93 94 95   Resp: 23 18 16 16   Temp:       TempSrc:       SpO2:           Cardiac Rhythm:  ,   Cardiac  Cardiac Rhythm: Sinus tachycardia  Pain level:    Patient confused: No. Patient Falls Risk: Yes.   Elimination Status: Unable to void   Patient Report - Initial Complaint:   1734  ED Triage Notes Addendum ED Triage Notes Addendum AM    Pt arrives via EMS home d/t cough, nausea, diarrhea past few days. C/o lower abd pain. Denies CP, SOB, blood in stool. No recent antibiotic use. ABC intact. A&O x4.      Pt states she has had poor PO intake over the last several weeks. In triage, pt having involuntary hand cramps and muscle pain.            . Focused Assessment:  0015  Gastrointestinal Gastrointestinal  KA    Gastrointestinal Gastrointestinal WDL: GI symptoms  GI Signs/Symptoms: abdominal discomfort; nausea; vomiting            0014  Musculoskeletal Musculoskeletal  KA    Musculoskeletal (Adult) Musculoskeletal WDL: all  General Mobility: generalized weakness         1806  Cardiac  Cardiac   CASANDRA    Cardiac (Adult) Cardiac Rhythm: ST             Tests Performed: Labs, CT. Abnormal Results:   Labs Ordered and Resulted from Time of ED Arrival to Time of ED Departure   CBC WITH PLATELETS - Abnormal       Result Value    WBC Count 10.3      RBC Count 3.65 (*)     Hemoglobin 11.9      Hematocrit 34.0 (*)     MCV 93      MCH 32.6      MCHC 35.0      RDW 13.5      Platelet Count 332     BASIC METABOLIC PANEL - Abnormal    Sodium 125 (*)     Potassium 3.7      Chloride 82 (*)     Carbon Dioxide (CO2) 24      Anion Gap 19 (*)     Urea Nitrogen 7.8 (*)     Creatinine 0.43 (*)     Calcium 8.2 (*)     Glucose 100 (*)     GFR Estimate >90     INFLUENZA A/B & SARS-COV2 PCR MULTIPLEX - Abnormal    Influenza A PCR Negative      Influenza B PCR Negative      RSV PCR Negative      SARS CoV2 PCR Positive (*)    ISTAT BASIC CHEM ICA HEMATOCRIT POCT - Abnormal    Chloride POCT 81 (*)     Potassium POCT 3.6      Sodium POCT 125 (*)     UREA NITROGEN POCT 9      Calcium, Ionized Whole Blood POCT 3.9 (*)     Glucose Whole Blood POCT 104 (*)     Anion Gap POCT 18.0 (*)     Hemoglobin POCT 12.9      Hematocrit POCT 38      Creatinine POCT 0.4 (*)     TOTAL CO2 POCT 31     HEPATIC FUNCTION PANEL - Abnormal    Protein Total 7.4      Albumin 3.4 (*)     Bilirubin Total 0.7      Alkaline Phosphatase 208 (*)     AST 51 (*)     ALT 17      Bilirubin Direct <0.20     MAGNESIUM - Abnormal    Magnesium 1.2 (*)    LIPASE - Normal    Lipase 20     TROPONIN T, HIGH SENSITIVITY - Normal    Troponin T, High Sensitivity 6     C. DIFFICILE TOXIN B PCR WITH REFLEX TO C. DIFFICILE ANTIGEN AND TOXINS A/B EIA     CT Chest/Abdomen/Pelvis w Contrast   Final Result   IMPRESSION:   1.  No evidence for acute pulmonary disease.    2.  Fluid within numerous small bowel loops and portions of the colon could represent an enterocolitis and account for the patient's diarrhea. No wall thickening or other inflammatory changes.   3.  Chronic liver disease with  diffuse fatty infiltration and scattered areas of fatty sparing.   4.  Changes of chronic pancreatitis. No acute inflammatory changes.   5.  Healing inferior right pubic ramus fracture.        .   Treatments provided: See MAR  Family Comments: None  OBS brochure/video discussed/provided to patient:  Yes  ED Medications:   Medications   mirtazapine (REMERON) tablet 30 mg (30 mg Oral Given 12/31/22 2251)   QUEtiapine (SEROquel) tablet 100 mg (100 mg Oral Given 12/31/22 2251)   melatonin tablet 1 mg (has no administration in time range)   ondansetron (ZOFRAN ODT) ODT tab 4 mg (has no administration in time range)     Or   ondansetron (ZOFRAN) injection 4 mg (has no administration in time range)   sodium chloride 0.9% infusion ( Intravenous New Bag 12/31/22 2251)   acetaminophen (TYLENOL) tablet 650 mg (has no administration in time range)     Or   acetaminophen (TYLENOL) Suppository 650 mg (has no administration in time range)   senna-docusate (SENOKOT-S/PERICOLACE) 8.6-50 MG per tablet 1 tablet (has no administration in time range)     Or   senna-docusate (SENOKOT-S/PERICOLACE) 8.6-50 MG per tablet 2 tablet (has no administration in time range)   polyethylene glycol (MIRALAX) Packet 17 g (has no administration in time range)   prochlorperazine (COMPAZINE) injection 10 mg (has no administration in time range)     Or   prochlorperazine (COMPAZINE) tablet 10 mg (has no administration in time range)     Or   prochlorperazine (COMPAZINE) suppository 25 mg (has no administration in time range)   magnesium oxide (MAG-OX) tablet 800 mg (has no administration in time range)   enoxaparin ANTICOAGULANT (LOVENOX) injection 40 mg (40 mg Subcutaneous Given 12/31/22 2253)   nicotine Patch in Place (has no administration in time range)   nicotine (NICODERM CQ) 14 MG/24HR 24 hr patch 1 patch (has no administration in time range)   ketorolac (TORADOL) injection 15 mg (has no administration in time range)   ondansetron (ZOFRAN)  injection 4 mg (4 mg Intravenous Given 12/31/22 1744)   0.9% sodium chloride BOLUS (0 mLs Intravenous Stopped 12/31/22 1846)   calcium gluconate 10 % injection 1 g (0 g Intravenous Stopped 12/31/22 1841)   potassium chloride 10 mEq in 100 mL sterile water infusion (0 mEq Intravenous Stopped 12/31/22 1959)   HYDROmorphone (PF) (DILAUDID) injection 0.5 mg (0.5 mg Intravenous Given 12/31/22 1854)   magnesium sulfate 2 g in water intermittent infusion (0 g Intravenous Stopped 12/31/22 2050)   CT Scan Flush (54 mLs Intravenous Given 12/31/22 1929)   iopamidol (ISOVUE-370) solution 500 mL (57 mLs Intravenous Given 12/31/22 1929)   multivitamin, therapeutic (THERA-VIT) tablet 1 tablet (1 tablet Oral Not Given 12/31/22 2259)   folic acid (FOLVITE) tablet 1 mg (1 mg Oral Not Given 12/31/22 2258)   thiamine (B-1) tablet 100 mg (100 mg Oral Not Given 12/31/22 2259)     Drips infusing:  No  For the majority of the shift, the patient's behavior Green.     Sepsis treatment initiated: No     Patient tested for COVID 19 prior to admission: YES    ED Nurse Name/Phone Number: Asiya Vidal RN,   12:13 AM    RECEIVING UNIT ED HANDOFF REVIEW    Above ED Nurse Handoff Report was reviewed: Yes  Reviewed by: Jenny Mendoza RN on January 1, 2023 at 12:45 AM

## 2023-01-01 NOTE — PROVIDER NOTIFICATION
01/01/23 0939   Critical Test Results/Notification   Critical Lab Result (Lab Name and Value) (S)  K 2.4     DATE:  1/1/2023   TIME OF RECEIPT FROM LAB:  0937  LAB TEST:  Potassium   LAB VALUE:  2.4  RESULTS GIVEN WITH READ-BACK TO (PROVIDER):  Dr. Haji   TIME LAB VALUE REPORTED TO PROVIDER:   0908

## 2023-01-01 NOTE — ED PROVIDER NOTES
History     Chief Complaint:  Diarrhea and Generalized Weakness       The history is provided by the patient.      Audelia Comer is a 57 year old female with history of ductal carcinoma of right breast, bilateral mastectomy, SBO, chronic pancreatitis, alcohol induced pancreatitis, alcoholic hepatitis, eating disorder, and hyperlipidemia who presents by EMS with nausea, vomiting, diarrhea, abdominal pain, loss of appetite, and generalized weakness. The patient provides that she has eaten very little solid food for around the last month as she has felt sick. Over the last 3 days, she developed nausea, vomiting, diarrhea, and abdominal pain that radiates to her back. She has been trying to drink pedialyte in this time and also started taking Imodium, but this would cause abdominal cramping. Additionally, the patient notes that she feel generally weaker where she will become fatigued from walking and she also has been having hand cramping, especially while her blood pressure cuff started here in the ED. Patient is not currently receiving chemotherapy for her breast cancer as she had a mastectomy.    ROS:  Review of Systems   Constitutional: Positive for appetite change.   Gastrointestinal: Positive for abdominal pain, diarrhea, nausea and vomiting.   Neurological: Positive for weakness.   All other systems reviewed and are negative.      Allergies:  The patient has no known allergies to medications.     Medications:    Zyrtec  Flonase  Folvite  Magnesium oxide  Remeron  Zofran  Protonix  Potassium chloride  Seroquel  Thiamine    Past Medical History:    Anxiety  Depression  Invasive ductal carcinoma of right breast  SBO  Magnesium deficiency  Chronic pancreatitis  Serum phosphorous decreased  Pancreatic cyst  Fatty liver  Alcohol induced pancreatitis  Hypokalemia  Alcohol abuse  Hyperlipidemia  Allergic rhinitis  Tobacco use disorder  Ileitis  Eczema  Urinary incontinence  Eating disorder  Alcoholic  hepatitis    Past Surgical History:    Appendectomy   section  Mastectomy bilateral  Breast implant surgery    Family History:    Liver tumor  MI  Colon cancer  Breast cancer  Lung cancer    Social History:   reports that she has been smoking. She has been smoking an average of 1 pack per day. She has never used smokeless tobacco. She reports current alcohol use. She reports that she does not use drugs.   Patient came from home by EMS.  Patient is unaccompanied in the ED.  PCP: Randee Woods     Physical Exam     Patient Vitals for the past 24 hrs:   BP Temp Temp src Pulse Resp SpO2   229 115/75 -- -- 95 16 --   22 109/68 -- -- 94 16 --   22 108/71 -- -- 93 18 --   22 112/72 -- -- 96 23 --   22 123/87 -- -- 102 17 98 %   22 -- -- -- 100 27 100 %   22 -- -- -- 93 -- --   22 -- -- -- 95 22 92 %   22 109/72 -- -- 103 15 100 %   22 1911 122/81 -- -- 104 10 94 %   22 1856 121/76 -- -- 102 26 100 %   22 1849 120/75 -- -- 103 14 --   22 1834 110/77 -- -- 105 14 --   22 1800 108/77 -- -- 106 28 99 %   22 1755 109/78 -- -- -- -- --   22 1732 95/70 97.8  F (36.6  C) Oral 118 18 96 %        Physical Exam  Constitutional: Patient is malnourished appearing. No distress. Smells of cigarette smoke.  Head: Atraumatic.  Eyes: Conjunctivae are normal. No scleral icterus.  Neck: Normal range of motion. Neck supple.   Cardiovascular: Normal rate, regular rhythm, normal heart sounds and intact distal perfusion.   Pulmonary/Chest: Breath sounds normal. No respiratory distress.  Abdominal: Soft. Bowel sounds are normal. Diffuse abdominal tenderness. No distension.  No rebound or guarding.   Musculoskeletal: Normal range of motion. No edema or tenderness.   Neurological: Alert and orientated to person, place, and time. No observable focal neuro deficit  Skin: Warm and dry. No rash noted. Not  diaphoretic.     Emergency Department Course     ECG  ECG obtained at 1751, ECG read at 1804  Sinus tachycardia  T wave abnormality, consider inferolateral ischemia  Abnormal ECG  Rate 112 bpm. DE interval 130 ms. QRS duration 72 ms. QT/QTc 334/455 ms. P-R-T axes 70 84 131.    Imaging:  CT Chest/Abdomen/Pelvis w Contrast   Final Result   IMPRESSION:   1.  No evidence for acute pulmonary disease.    2.  Fluid within numerous small bowel loops and portions of the colon could represent an enterocolitis and account for the patient's diarrhea. No wall thickening or other inflammatory changes.   3.  Chronic liver disease with diffuse fatty infiltration and scattered areas of fatty sparing.   4.  Changes of chronic pancreatitis. No acute inflammatory changes.   5.  Healing inferior right pubic ramus fracture.         Report per radiology    Laboratory:  Labs Ordered and Resulted from Time of ED Arrival to Time of ED Departure   CBC WITH PLATELETS - Abnormal       Result Value    WBC Count 10.3      RBC Count 3.65 (*)     Hemoglobin 11.9      Hematocrit 34.0 (*)     MCV 93      MCH 32.6      MCHC 35.0      RDW 13.5      Platelet Count 332     BASIC METABOLIC PANEL - Abnormal    Sodium 125 (*)     Potassium 3.7      Chloride 82 (*)     Carbon Dioxide (CO2) 24      Anion Gap 19 (*)     Urea Nitrogen 7.8 (*)     Creatinine 0.43 (*)     Calcium 8.2 (*)     Glucose 100 (*)     GFR Estimate >90     INFLUENZA A/B & SARS-COV2 PCR MULTIPLEX - Abnormal    Influenza A PCR Negative      Influenza B PCR Negative      RSV PCR Negative      SARS CoV2 PCR Positive (*)    ISTAT BASIC CHEM ICA HEMATOCRIT POCT - Abnormal    Chloride POCT 81 (*)     Potassium POCT 3.6      Sodium POCT 125 (*)     UREA NITROGEN POCT 9      Calcium, Ionized Whole Blood POCT 3.9 (*)     Glucose Whole Blood POCT 104 (*)     Anion Gap POCT 18.0 (*)     Hemoglobin POCT 12.9      Hematocrit POCT 38      Creatinine POCT 0.4 (*)     TOTAL CO2 POCT 31     HEPATIC  FUNCTION PANEL - Abnormal    Protein Total 7.4      Albumin 3.4 (*)     Bilirubin Total 0.7      Alkaline Phosphatase 208 (*)     AST 51 (*)     ALT 17      Bilirubin Direct <0.20     MAGNESIUM - Abnormal    Magnesium 1.2 (*)    LIPASE - Normal    Lipase 20     TROPONIN T, HIGH SENSITIVITY - Normal    Troponin T, High Sensitivity 6     C. DIFFICILE TOXIN B PCR WITH REFLEX TO C. DIFFICILE ANTIGEN AND TOXINS A/B EIA       Emergency Department Course & Assessments:       Interventions:  1744 Zofran 4 mg IV  1746 NS 1000 mL IV  1827 Calcium gluconate 1 g IV  1841 Potassium chloride 10 mEq IV  1854 Dilaudid 0.5 mg IV  1956 Magnesium sulfate 2 g IV  2031 Phenergan 25 mg IV  2251 Remeron 30 mg PO  2251 Seroquel 100 mg PO  2253 Lovenox 40 mg Subcutaneous  2258 Folvite 1 mg PO  2259 Thera-vit 1 tablet PO  2259 Thiamine 100 mg PO    Independent Interpretation (X-rays, CTs, rhythm strip):  I independently reviewed CT imaging and ECG.    Consultations/Discussion of Management or Tests:  1823 I obtained history and examined the patient.  2037 I rechecked the patient and explained findings.  2121 I consulted with Dr. Fidel Hylton of the hospitalist service and discussed patient admission. They accepted care of the patient.    Disposition:  The patient was admitted to the hospital under the care of Dr. Fidel Hylton.     Impression & Plan        Medical Decision Making:  Pt presents with acute on subacute NVD,  This is exacerbated by covid + tonight and multiple comorbid medical conditions.  Not in extremis but multiple metabolic and electrolyte abnl addressed and treated.  Nonsurgical exam abdomen but appears weak cachexia smoker.  CT chest abd and pelvis as above no new acute medical or surgical emergency. Admit for further workup and mgmt.     Diagnosis:    ICD-10-CM    1. Infection due to 2019 novel coronavirus  U07.1       2. Vomiting and diarrhea  R11.10     R19.7       3. Dehydration  E86.0       4. Hyponatremia  E87.1       5.  Hypochloremia  E87.8       6. Hypokalemia  E87.6       7. Hypocalcemia  E83.51       8. Hypomagnesemia  E83.42       9. Increased anion gap metabolic acidosis  E87.29       10. Generalized muscle weakness  M62.81       11. Tachycardia  R00.0       12. Fatty liver  K76.0       13. Chronic pancreatitis, unspecified pancreatitis type (H)  K86.1       14. Enterocolitis  K52.9            Scribe Disclosure:  I, Bryce Wsahington, am serving as a scribe at 6:17 PM on 12/31/2022 to document services personally performed by Margarito Diaz MD based on my observations and the provider's statements to me.       Margarito Diaz MD  01/01/23 0111

## 2023-01-01 NOTE — PLAN OF CARE
Goal Outcome Evaluation:         Pt came to floor around 0130, alert, VSS, afebrile. Ambulated to the bed with stand by assist using gait belt only during transfer. Refused to remove home clothes or wear gown. Denied nausea and vomiting, LS clear, diminished, abdomen tender to the touch, NS running 100 mL/hr. Will continue to monitor.

## 2023-01-02 LAB
ATRIAL RATE - MUSE: 112 BPM
DIASTOLIC BLOOD PRESSURE - MUSE: NORMAL MMHG
INTERPRETATION ECG - MUSE: NORMAL
MAGNESIUM SERPL-MCNC: 1.3 MG/DL (ref 1.7–2.3)
MAGNESIUM SERPL-MCNC: 1.9 MG/DL (ref 1.7–2.3)
P AXIS - MUSE: 70 DEGREES
PHOSPHATE SERPL-MCNC: 2.4 MG/DL (ref 2.5–4.5)
POTASSIUM SERPL-SCNC: 3.7 MMOL/L (ref 3.4–5.3)
PR INTERVAL - MUSE: 130 MS
QRS DURATION - MUSE: 72 MS
QT - MUSE: 334 MS
QTC - MUSE: 455 MS
R AXIS - MUSE: 84 DEGREES
SYSTOLIC BLOOD PRESSURE - MUSE: NORMAL MMHG
T AXIS - MUSE: 131 DEGREES
VENTRICULAR RATE- MUSE: 112 BPM

## 2023-01-02 PROCEDURE — 84100 ASSAY OF PHOSPHORUS: CPT | Performed by: INTERNAL MEDICINE

## 2023-01-02 PROCEDURE — 96375 TX/PRO/DX INJ NEW DRUG ADDON: CPT

## 2023-01-02 PROCEDURE — 250N000013 HC RX MED GY IP 250 OP 250 PS 637: Performed by: INTERNAL MEDICINE

## 2023-01-02 PROCEDURE — 36415 COLL VENOUS BLD VENIPUNCTURE: CPT | Performed by: INTERNAL MEDICINE

## 2023-01-02 PROCEDURE — 250N000013 HC RX MED GY IP 250 OP 250 PS 637: Performed by: STUDENT IN AN ORGANIZED HEALTH CARE EDUCATION/TRAINING PROGRAM

## 2023-01-02 PROCEDURE — 250N000011 HC RX IP 250 OP 636: Performed by: INTERNAL MEDICINE

## 2023-01-02 PROCEDURE — 96372 THER/PROPH/DIAG INJ SC/IM: CPT | Performed by: INTERNAL MEDICINE

## 2023-01-02 PROCEDURE — 83735 ASSAY OF MAGNESIUM: CPT | Performed by: INTERNAL MEDICINE

## 2023-01-02 PROCEDURE — 250N000013 HC RX MED GY IP 250 OP 250 PS 637: Performed by: HOSPITALIST

## 2023-01-02 PROCEDURE — 258N000003 HC RX IP 258 OP 636: Performed by: INTERNAL MEDICINE

## 2023-01-02 PROCEDURE — G0378 HOSPITAL OBSERVATION PER HR: HCPCS

## 2023-01-02 PROCEDURE — 84132 ASSAY OF SERUM POTASSIUM: CPT | Performed by: INTERNAL MEDICINE

## 2023-01-02 PROCEDURE — 99232 SBSQ HOSP IP/OBS MODERATE 35: CPT | Performed by: INTERNAL MEDICINE

## 2023-01-02 RX ORDER — QUETIAPINE FUMARATE 25 MG/1
25 TABLET, FILM COATED ORAL DAILY PRN
Status: DISCONTINUED | OUTPATIENT
Start: 2023-01-02 | End: 2023-01-05 | Stop reason: HOSPADM

## 2023-01-02 RX ORDER — ACETAMINOPHEN 325 MG/1
975 TABLET ORAL EVERY 8 HOURS
Status: DISCONTINUED | OUTPATIENT
Start: 2023-01-02 | End: 2023-01-05 | Stop reason: HOSPADM

## 2023-01-02 RX ORDER — GUAIFENESIN 200 MG/10ML
10 LIQUID ORAL EVERY 4 HOURS PRN
Status: DISCONTINUED | OUTPATIENT
Start: 2023-01-02 | End: 2023-01-05 | Stop reason: HOSPADM

## 2023-01-02 RX ORDER — MAGNESIUM SULFATE HEPTAHYDRATE 40 MG/ML
2 INJECTION, SOLUTION INTRAVENOUS ONCE
Status: COMPLETED | OUTPATIENT
Start: 2023-01-02 | End: 2023-01-02

## 2023-01-02 RX ADMIN — MIRTAZAPINE 30 MG: 15 TABLET, FILM COATED ORAL at 21:15

## 2023-01-02 RX ADMIN — SODIUM CHLORIDE: 9 INJECTION, SOLUTION INTRAVENOUS at 15:13

## 2023-01-02 RX ADMIN — HYDROMORPHONE HYDROCHLORIDE 1 MG: 2 TABLET ORAL at 13:05

## 2023-01-02 RX ADMIN — POTASSIUM & SODIUM PHOSPHATES POWDER PACK 280-160-250 MG 1 PACKET: 280-160-250 PACK at 13:05

## 2023-01-02 RX ADMIN — ENOXAPARIN SODIUM 30 MG: 30 INJECTION SUBCUTANEOUS at 22:33

## 2023-01-02 RX ADMIN — HYDROMORPHONE HYDROCHLORIDE 1 MG: 2 TABLET ORAL at 07:50

## 2023-01-02 RX ADMIN — PANTOPRAZOLE SODIUM 40 MG: 40 TABLET, DELAYED RELEASE ORAL at 07:49

## 2023-01-02 RX ADMIN — HYDROMORPHONE HYDROCHLORIDE 1 MG: 2 TABLET ORAL at 22:33

## 2023-01-02 RX ADMIN — HYDROMORPHONE HYDROCHLORIDE 1 MG: 2 TABLET ORAL at 17:26

## 2023-01-02 RX ADMIN — ACETAMINOPHEN 650 MG: 325 TABLET, FILM COATED ORAL at 13:05

## 2023-01-02 RX ADMIN — POTASSIUM CHLORIDE 20 MEQ: 750 TABLET, FILM COATED, EXTENDED RELEASE ORAL at 16:50

## 2023-01-02 RX ADMIN — THERA TABS 1 TABLET: TAB at 07:50

## 2023-01-02 RX ADMIN — SODIUM CHLORIDE: 9 INJECTION, SOLUTION INTRAVENOUS at 23:39

## 2023-01-02 RX ADMIN — FOLIC ACID 1 MG: 1 TABLET ORAL at 07:50

## 2023-01-02 RX ADMIN — MAGNESIUM SULFATE HEPTAHYDRATE 2 G: 40 INJECTION, SOLUTION INTRAVENOUS at 10:06

## 2023-01-02 RX ADMIN — NICOTINE 1 PATCH: 14 PATCH, EXTENDED RELEASE TRANSDERMAL at 21:14

## 2023-01-02 RX ADMIN — MAGNESIUM OXIDE TAB 400 MG (241.3 MG ELEMENTAL MG) 400 MG: 400 (241.3 MG) TAB at 07:50

## 2023-01-02 RX ADMIN — VANCOMYCIN HYDROCHLORIDE 125 MG: 125 CAPSULE ORAL at 21:15

## 2023-01-02 RX ADMIN — HYDROXYZINE HYDROCHLORIDE 25 MG: 25 TABLET, FILM COATED ORAL at 22:33

## 2023-01-02 RX ADMIN — HYDROXYZINE HYDROCHLORIDE 25 MG: 25 TABLET, FILM COATED ORAL at 13:05

## 2023-01-02 RX ADMIN — QUETIAPINE FUMARATE 25 MG: 25 TABLET ORAL at 07:49

## 2023-01-02 RX ADMIN — VANCOMYCIN HYDROCHLORIDE 125 MG: 125 CAPSULE ORAL at 07:50

## 2023-01-02 RX ADMIN — VANCOMYCIN HYDROCHLORIDE 125 MG: 125 CAPSULE ORAL at 16:50

## 2023-01-02 RX ADMIN — THIAMINE HCL TAB 100 MG 100 MG: 100 TAB at 07:50

## 2023-01-02 RX ADMIN — CALCIUM 500 MG: 500 TABLET ORAL at 07:50

## 2023-01-02 RX ADMIN — HYDROMORPHONE HYDROCHLORIDE 1 MG: 2 TABLET ORAL at 02:47

## 2023-01-02 RX ADMIN — ACETAMINOPHEN 975 MG: 325 TABLET, FILM COATED ORAL at 21:14

## 2023-01-02 RX ADMIN — POTASSIUM & SODIUM PHOSPHATES POWDER PACK 280-160-250 MG 1 PACKET: 280-160-250 PACK at 10:06

## 2023-01-02 RX ADMIN — VANCOMYCIN HYDROCHLORIDE 125 MG: 125 CAPSULE ORAL at 13:05

## 2023-01-02 RX ADMIN — QUETIAPINE FUMARATE 25 MG: 25 TABLET ORAL at 15:13

## 2023-01-02 RX ADMIN — QUETIAPINE FUMARATE 100 MG: 50 TABLET ORAL at 21:15

## 2023-01-02 RX ADMIN — POTASSIUM & SODIUM PHOSPHATES POWDER PACK 280-160-250 MG 1 PACKET: 280-160-250 PACK at 17:26

## 2023-01-02 ASSESSMENT — ACTIVITIES OF DAILY LIVING (ADL)
ADLS_ACUITY_SCORE: 40
ADLS_ACUITY_SCORE: 40
ADLS_ACUITY_SCORE: 37
ADLS_ACUITY_SCORE: 40
ADLS_ACUITY_SCORE: 43
ADLS_ACUITY_SCORE: 40
ADLS_ACUITY_SCORE: 43
ADLS_ACUITY_SCORE: 40

## 2023-01-02 NOTE — PROGRESS NOTES
VSS. Afebrile. AO x4. Room air. Left lower IV saline locked. Left upper IV infusing NS @ 100. Blood pressures taken on LLE. RUE limb alert. Pt. Complains of nausea. IV zofran given. Pt. In 8/10 pain and requested dilaudid. PO dilaudid given for pain management. Pt. On Potassium/Magnesium/Phosphorus. Potassium redraw at 1700 was 3.3.    Update: Potassium/Magnesium/Phosoporus order sets seemed off/duplicated. Called pharmacy to sort out. Magnesium redraw in the AM. Phosphorus replacement every 4 hours x3 doses. Retimed for last replacement at 2120, redraw in the AM. Potassium replacement given. Redraw 4 hours after replacement at 2310.

## 2023-01-02 NOTE — PROGRESS NOTES
[facility]    Medicine Progress Note - Hospitalist Service    Date of Admission:  12/31/2022    Assessment & Plan      Audelia Comer is a 57 year old female with past medical history of alcohol abuse with chronic pancreatitis, depression, breast cancer status post bilateral mastectomy currently in remission, active nicotine dependence with cigarettes who presented on 12/31/2022 with chief complaint of nausea, vomiting, and diarrhea.  Found to have COVID-19 infection and C. difficile colitis.     C. difficile colitis.  -Continue vancomycin 125 mg 4 times a day.     COVID-19 infection.  -Not hypoxic.  -Symptomatic treatment as needed.     Hyponatremia.  Hypokalemia.  Hypocalcemia.  Hypomagnesemia.  Hypophosphatemia.  -Phosphorus replacement protocol.  -Continue scheduled magnesium supplement.  -Additional magnesium replacement protocol as needed.  -Continue scheduled potassium chloride supplement.  -Additional potassium replacement protocol as needed.  -Ionized calcium low.  -Continue calcium carbonate 500 mg twice a day.  -Continue IV fluids.  -Recheck metabolic panel tomorrow.     Tobacco use disorder.  -Continue nicotine patch.     Depression.  -Continue mirtazapine 30 mg at bedtime.    Acute on chronic back pain.  -Change acetaminophen to scheduled 975 mg 3 times a day.  -Add diclofenac gel as needed.  -Continue other pain medications as needed.    GERD.  -Continue pantoprazole 40 mg a day.       Diet: Regular Diet Adult    DVT Prophylaxis: Enoxaparin (Lovenox) SQ  Patino Catheter: Not present  Lines: None     Cardiac Monitoring: None  Code Status: Full Code      Clinically Significant Risk Factors Present on Admission        # Hypokalemia: Lowest K = 2.4 mmol/L in last 2 days, will replace as needed  # Hyponatremia: Lowest Na = 125 mmol/L in last 2 days, will monitor as appropriate  # Hypocalcemia: Lowest iCa = 3.9 mg/dL in last 2 days, will monitor and replace as appropriate   #  "Hypomagnesemia: Lowest Mg = 1.2 mg/dL in last 2 days, will replace as needed  # Anion Gap Metabolic Acidosis: Highest Anion Gap = 19 mmol/L in last 2 days, will monitor and treat as appropriate  # Hypoalbuminemia: Lowest albumin = 2.7 g/dL at 1/1/2023  8:43 AM, will monitor as appropriate          # Cachexia: Estimated body mass index is 15.59 kg/m  as calculated from the following:    Height as of this encounter: 1.676 m (5' 6\").    Weight as of this encounter: 43.8 kg (96 lb 9.6 oz).           Disposition Plan     Expected Discharge Date: 01/02/2023        Discharge Comments: Heladio Haji, DO  Hospitalist Service  [facility]  Securely message with [employer] (more info)  Text page via [facility] Paging/Directory   ______________________________________________________________________    Interval History   Coughing.  Still with diarrhea.  Diarrhea seems improved from previous.  Having some abdominal and back pain.  Denies chest pain, shortness of breath, fevers, chills.    Physical Exam   Vital Signs: Temp: 97.5  F (36.4  C) Temp src: Temporal BP: 106/63 Pulse: 101   Resp: 16 SpO2: 96 % O2 Device: None (Room air)    Weight: 96 lbs 9.6 oz    Gen:  NAD, A&Ox3.  Eyes:  PERRL, sclera anicteric.  OP:  MMM, no lesions.  Neck:  Supple.  CV:  Regular, no murmurs.  Lung:  CTA b/l, normal effort.  Ab:  +BS, soft.  Skin:  Warm, dry to touch.  No rash.  Ext:  No pitting edema LE b/l.      Medical Decision Making       See above noted assessment and plan.    Data     I have personally reviewed the following data over the past 24 hrs:    N/A  \   N/A   / N/A     N/A N/A N/A /  N/A   3.7 N/A N/A \       Imaging results reviewed over the past 24 hrs:   No results found for this or any previous visit (from the past 24 hour(s)).  "

## 2023-01-02 NOTE — PLAN OF CARE
Goal Outcome Evaluation:         Pt A&Ox4, VSS, 99% on room air. Pain managed with PO dilaudid 1 mg. Denies nausea and vomiting this shift. Up with stand by assist, ambulated to the bathroom, voiding, one episode of incontinence. NS running 100 mL/hr. Will continue to monitor.

## 2023-01-02 NOTE — PLAN OF CARE
Goal Outcome Evaluation:      Plan of Care Reviewed With: patient             VS-afebrile stable, Mg and phos are both being replaced. K was 3.7  Lung Sounds-diminished bases, clear on top, dry cough, nonprod. Using IS upto 1500.   O2-on room air,   GI-+Bs,+Flatus.  Tolerating reg diet, appetite is improving. At bigger bkst, but no lunch, Denies nausea at this time, no emesis. On vanco po.   -voiding , urine is lighter in color than yesterday.   IVF-at 100, mg replaced orally and iv. Phos was replaced orally.   Dressings-none. Wearing brief.   CMS-has numbness and tingling in arms and legs. +pp, strong dorsi/planter flexion,   Drain-none.   Activity-up with sba, walking to bathroom. Dizzy when up .   Pain-rates pain an 8, when up and walking pain goes up to shoulders and neck area. Burning when up. Pain at rest is better and burning stops. Dilaudid orally. Atarax, tylenol, given. Also voltarin gel ordered for back pain.   D/C Plan-home when able. Lives alone.     Pt feeling anxious 1315, asking for prn seroquel. Paged out to md

## 2023-01-02 NOTE — PROGRESS NOTES
PRIMARY DIAGNOSIS: GASTROENTERITIS    OUTPATIENT/OBSERVATION GOALS TO BE MET BEFORE DISCHARGE  1. Orthostatic performed: No    2. Tolerating PO fluid and/or antibiotics (if applicable): Yes    3. Nausea/Vomiting/Diarrhea symptoms improved: Yes    4. Pain status: Improved-controlled with oral pain medications.    5. Return to near baseline physical activity: No    Discharge Planner Nurse   Safe discharge environment identified: Yes  Barriers to discharge: Yes       Entered by: Maria Ines Castaneda RN 01/02/2023 4:30 PM     Please review provider order for any additional goals.   Nurse to notify provider when observation goals have been met and patient is ready for discharge.

## 2023-01-02 NOTE — PLAN OF CARE
Goal Outcome Evaluation:           PRIMARY DIAGNOSIS: GASTROENTERITIS    OUTPATIENT/OBSERVATION GOALS TO BE MET BEFORE DISCHARGE  1. Orthostatic performed: N/A    2. Tolerating PO fluid and/or antibiotics (if applicable): Yes    3. Nausea/Vomiting/Diarrhea symptoms improved: Yes    4. Pain status: Improved-controlled with oral pain medications.    5. Return to near baseline physical activity: No    Discharge Planner Nurse   Safe discharge environment identified: Yes  Barriers to discharge: Yes       Entered by: Shital Camargo RN 01/02/2023 10:22 AM     Please review provider order for any additional goals.   Nurse to notify provider when observation goals have been met and patient is ready for discharge.

## 2023-01-02 NOTE — PROGRESS NOTES
VSS. Afebrile. AO x4. Room air. Left lower IV saline locked. Left upper IV infusing NS @ 100. Blood pressures taken on LLE. RUE limb alert. Pt. Requested prn seroquel. MD paged. Seroquel given per request. Pt. Reports having indigestion. MD paged. Protonix given. Pt. Report feeling itchy.  Lotion applied, and pt. States that helped. Pt. On Potassium/Magnesium/Phosphorus. Potassium redraw will be at 2300. Pt. Requested warm blanket around arms before lab arrives.

## 2023-01-03 ENCOUNTER — APPOINTMENT (OUTPATIENT)
Dept: GENERAL RADIOLOGY | Facility: CLINIC | Age: 58
End: 2023-01-03
Attending: INTERNAL MEDICINE
Payer: COMMERCIAL

## 2023-01-03 PROBLEM — E87.6 HYPOKALEMIA: Status: ACTIVE | Noted: 2021-11-19

## 2023-01-03 PROBLEM — E83.42 HYPOMAGNESEMIA: Status: ACTIVE | Noted: 2023-01-03

## 2023-01-03 PROBLEM — K86.1 CHRONIC PANCREATITIS, UNSPECIFIED PANCREATITIS TYPE (H): Status: ACTIVE | Noted: 2023-01-03

## 2023-01-03 PROBLEM — M62.81 GENERALIZED MUSCLE WEAKNESS: Status: ACTIVE | Noted: 2023-01-03

## 2023-01-03 PROBLEM — E86.0 DEHYDRATION: Status: ACTIVE | Noted: 2023-01-03

## 2023-01-03 PROBLEM — E87.29 INCREASED ANION GAP METABOLIC ACIDOSIS: Status: ACTIVE | Noted: 2023-01-03

## 2023-01-03 PROBLEM — R00.0 TACHYCARDIA: Status: ACTIVE | Noted: 2023-01-03

## 2023-01-03 PROBLEM — K52.9 ENTEROCOLITIS: Status: ACTIVE | Noted: 2023-01-03

## 2023-01-03 PROBLEM — E87.1 HYPONATREMIA: Status: ACTIVE | Noted: 2023-01-03

## 2023-01-03 PROBLEM — E87.8 HYPOCHLOREMIA: Status: ACTIVE | Noted: 2023-01-03

## 2023-01-03 PROBLEM — R19.7 VOMITING AND DIARRHEA: Status: ACTIVE | Noted: 2023-01-03

## 2023-01-03 PROBLEM — R11.10 VOMITING AND DIARRHEA: Status: ACTIVE | Noted: 2023-01-03

## 2023-01-03 PROBLEM — K76.0 FATTY LIVER: Status: ACTIVE | Noted: 2023-01-03

## 2023-01-03 PROBLEM — E83.51 HYPOCALCEMIA: Status: ACTIVE | Noted: 2023-01-03

## 2023-01-03 PROBLEM — U07.1 INFECTION DUE TO 2019 NOVEL CORONAVIRUS: Status: ACTIVE | Noted: 2023-01-03

## 2023-01-03 LAB
ALBUMIN SERPL BCG-MCNC: 2.2 G/DL (ref 3.5–5.2)
ALP SERPL-CCNC: 183 U/L (ref 35–104)
ALT SERPL W P-5'-P-CCNC: 17 U/L (ref 10–35)
ANION GAP SERPL CALCULATED.3IONS-SCNC: 9 MMOL/L (ref 7–15)
AST SERPL W P-5'-P-CCNC: 47 U/L (ref 10–35)
BILIRUB SERPL-MCNC: 0.2 MG/DL
BUN SERPL-MCNC: 4.9 MG/DL (ref 8–23)
CALCIUM SERPL-MCNC: 7.7 MG/DL (ref 8.6–10)
CHLORIDE SERPL-SCNC: 102 MMOL/L (ref 98–107)
CREAT SERPL-MCNC: 0.39 MG/DL (ref 0.51–0.95)
DEPRECATED HCO3 PLAS-SCNC: 22 MMOL/L (ref 22–29)
ERYTHROCYTE [DISTWIDTH] IN BLOOD BY AUTOMATED COUNT: 15 % (ref 10–15)
GFR SERPL CREATININE-BSD FRML MDRD: >90 ML/MIN/1.73M2
GLUCOSE SERPL-MCNC: 96 MG/DL (ref 70–99)
HCT VFR BLD AUTO: 26.6 % (ref 35–47)
HGB BLD-MCNC: 8.5 G/DL (ref 11.7–15.7)
MAGNESIUM SERPL-MCNC: 2 MG/DL (ref 1.7–2.3)
MCH RBC QN AUTO: 32 PG (ref 26.5–33)
MCHC RBC AUTO-ENTMCNC: 32 G/DL (ref 31.5–36.5)
MCV RBC AUTO: 100 FL (ref 78–100)
PHOSPHATE SERPL-MCNC: 3.2 MG/DL (ref 2.5–4.5)
PLATELET # BLD AUTO: 266 10E3/UL (ref 150–450)
POTASSIUM SERPL-SCNC: 4 MMOL/L (ref 3.4–5.3)
PROT SERPL-MCNC: 5 G/DL (ref 6.4–8.3)
RBC # BLD AUTO: 2.66 10E6/UL (ref 3.8–5.2)
SODIUM SERPL-SCNC: 133 MMOL/L (ref 136–145)
WBC # BLD AUTO: 8.3 10E3/UL (ref 4–11)

## 2023-01-03 PROCEDURE — 84100 ASSAY OF PHOSPHORUS: CPT | Performed by: INTERNAL MEDICINE

## 2023-01-03 PROCEDURE — 36415 COLL VENOUS BLD VENIPUNCTURE: CPT | Performed by: INTERNAL MEDICINE

## 2023-01-03 PROCEDURE — 74018 RADEX ABDOMEN 1 VIEW: CPT

## 2023-01-03 PROCEDURE — 250N000013 HC RX MED GY IP 250 OP 250 PS 637: Performed by: INTERNAL MEDICINE

## 2023-01-03 PROCEDURE — 250N000013 HC RX MED GY IP 250 OP 250 PS 637: Performed by: HOSPITALIST

## 2023-01-03 PROCEDURE — 250N000011 HC RX IP 250 OP 636: Performed by: INTERNAL MEDICINE

## 2023-01-03 PROCEDURE — 85027 COMPLETE CBC AUTOMATED: CPT | Performed by: INTERNAL MEDICINE

## 2023-01-03 PROCEDURE — 80053 COMPREHEN METABOLIC PANEL: CPT | Performed by: INTERNAL MEDICINE

## 2023-01-03 PROCEDURE — 99232 SBSQ HOSP IP/OBS MODERATE 35: CPT | Performed by: INTERNAL MEDICINE

## 2023-01-03 PROCEDURE — 258N000003 HC RX IP 258 OP 636: Performed by: INTERNAL MEDICINE

## 2023-01-03 PROCEDURE — 250N000013 HC RX MED GY IP 250 OP 250 PS 637: Performed by: STUDENT IN AN ORGANIZED HEALTH CARE EDUCATION/TRAINING PROGRAM

## 2023-01-03 PROCEDURE — G0378 HOSPITAL OBSERVATION PER HR: HCPCS

## 2023-01-03 PROCEDURE — 83735 ASSAY OF MAGNESIUM: CPT | Performed by: INTERNAL MEDICINE

## 2023-01-03 PROCEDURE — 120N000001 HC R&B MED SURG/OB

## 2023-01-03 RX ADMIN — THERA TABS 1 TABLET: TAB at 08:25

## 2023-01-03 RX ADMIN — QUETIAPINE FUMARATE 25 MG: 25 TABLET ORAL at 08:25

## 2023-01-03 RX ADMIN — SODIUM CHLORIDE: 9 INJECTION, SOLUTION INTRAVENOUS at 08:24

## 2023-01-03 RX ADMIN — ENOXAPARIN SODIUM 30 MG: 30 INJECTION SUBCUTANEOUS at 22:20

## 2023-01-03 RX ADMIN — NICOTINE 1 PATCH: 14 PATCH, EXTENDED RELEASE TRANSDERMAL at 19:49

## 2023-01-03 RX ADMIN — ACETAMINOPHEN 975 MG: 325 TABLET, FILM COATED ORAL at 22:16

## 2023-01-03 RX ADMIN — QUETIAPINE FUMARATE 100 MG: 50 TABLET ORAL at 22:16

## 2023-01-03 RX ADMIN — HYDROXYZINE HYDROCHLORIDE 25 MG: 25 TABLET, FILM COATED ORAL at 14:49

## 2023-01-03 RX ADMIN — HYDROMORPHONE HYDROCHLORIDE 1 MG: 2 TABLET ORAL at 03:06

## 2023-01-03 RX ADMIN — MIRTAZAPINE 30 MG: 15 TABLET, FILM COATED ORAL at 22:16

## 2023-01-03 RX ADMIN — VANCOMYCIN HYDROCHLORIDE 125 MG: 125 CAPSULE ORAL at 08:25

## 2023-01-03 RX ADMIN — VANCOMYCIN HYDROCHLORIDE 125 MG: 125 CAPSULE ORAL at 19:51

## 2023-01-03 RX ADMIN — HYDROMORPHONE HYDROCHLORIDE 1 MG: 2 TABLET ORAL at 08:25

## 2023-01-03 RX ADMIN — VANCOMYCIN HYDROCHLORIDE 125 MG: 125 CAPSULE ORAL at 12:14

## 2023-01-03 RX ADMIN — CALCIUM 500 MG: 500 TABLET ORAL at 17:10

## 2023-01-03 RX ADMIN — POTASSIUM CHLORIDE 20 MEQ: 750 TABLET, FILM COATED, EXTENDED RELEASE ORAL at 17:10

## 2023-01-03 RX ADMIN — MAGNESIUM OXIDE TAB 400 MG (241.3 MG ELEMENTAL MG) 400 MG: 400 (241.3 MG) TAB at 19:50

## 2023-01-03 RX ADMIN — CALCIUM 500 MG: 500 TABLET ORAL at 08:25

## 2023-01-03 RX ADMIN — HYDROMORPHONE HYDROCHLORIDE 1 MG: 2 TABLET ORAL at 19:51

## 2023-01-03 RX ADMIN — SODIUM CHLORIDE: 9 INJECTION, SOLUTION INTRAVENOUS at 17:15

## 2023-01-03 RX ADMIN — HYDROMORPHONE HYDROCHLORIDE 1 MG: 2 TABLET ORAL at 14:49

## 2023-01-03 RX ADMIN — ACETAMINOPHEN 975 MG: 325 TABLET, FILM COATED ORAL at 05:03

## 2023-01-03 RX ADMIN — VANCOMYCIN HYDROCHLORIDE 125 MG: 125 CAPSULE ORAL at 16:02

## 2023-01-03 RX ADMIN — PANTOPRAZOLE SODIUM 40 MG: 40 TABLET, DELAYED RELEASE ORAL at 06:36

## 2023-01-03 RX ADMIN — THIAMINE HCL TAB 100 MG 100 MG: 100 TAB at 08:25

## 2023-01-03 RX ADMIN — FOLIC ACID 1 MG: 1 TABLET ORAL at 08:25

## 2023-01-03 RX ADMIN — ACETAMINOPHEN 975 MG: 325 TABLET, FILM COATED ORAL at 13:26

## 2023-01-03 RX ADMIN — MAGNESIUM OXIDE TAB 400 MG (241.3 MG ELEMENTAL MG) 400 MG: 400 (241.3 MG) TAB at 08:25

## 2023-01-03 ASSESSMENT — ACTIVITIES OF DAILY LIVING (ADL)
EATING: 0-->INDEPENDENT
SWALLOWING: 0-->SWALLOWS FOODS/LIQUIDS WITHOUT DIFFICULTY (DEVELOPMENTALLY APPROPRIATE)
SWALLOWING: 0-->SWALLOWS FOODS/LIQUIDS WITHOUT DIFFICULTY
ADLS_ACUITY_SCORE: 28
DRESSING/BATHING_DIFFICULTY: NO
EATING: 0-->INDEPENDENT
VISION_MANAGEMENT: GLASSES
ADLS_ACUITY_SCORE: 38
ADLS_ACUITY_SCORE: 28
WEAR_GLASSES_OR_BLIND: YES
TOILETING_ISSUES: NO
NUMBER_OF_TIMES_PATIENT_HAS_FALLEN_WITHIN_LAST_SIX_MONTHS: 2
CHANGE_IN_FUNCTIONAL_STATUS_SINCE_ONSET_OF_CURRENT_ILLNESS/INJURY: NO
FALL_HISTORY_WITHIN_LAST_SIX_MONTHS: YES
ADLS_ACUITY_SCORE: 40
DIFFICULTY_EATING/SWALLOWING: YES
ADLS_ACUITY_SCORE: 28
ADLS_ACUITY_SCORE: 28
CONCENTRATING,_REMEMBERING_OR_MAKING_DECISIONS_DIFFICULTY: NO
DOING_ERRANDS_INDEPENDENTLY_DIFFICULTY: NO
DIFFICULTY_COMMUNICATING: NO
ADLS_ACUITY_SCORE: 38
ADLS_ACUITY_SCORE: 40
WALKING_OR_CLIMBING_STAIRS_DIFFICULTY: NO
ADLS_ACUITY_SCORE: 38
ADLS_ACUITY_SCORE: 40
EATING/SWALLOWING: EATING
ADLS_ACUITY_SCORE: 38
ADLS_ACUITY_SCORE: 38

## 2023-01-03 NOTE — UTILIZATION REVIEW
Admission Status; Secondary Review Determination    Under the authority of the Utilization Management Committee, the utilization review process indicated a secondary review on the above patient. The review outcome is based on review of the medical records, discussions with staff, and applying clinical experience noted on the date of the review.    (x) Inpatient Status Appropriate - This patient's medical care is consistent with medical management for inpatient care and reasonable inpatient medical practice.    RATIONALE FOR DETERMINATION: 57-year-old complex female with history of alcoholism, pancreatitis, eating disorder, small bowel obstruction.  Patient has been struggling with oral intake for the past month and now has developed nausea, vomiting and diarrhea with abdominal pain and progressive weakness over the past 2 to 3 days.  Patient was initially hypotensive with tachycardia, hyponatremia, hypomagnesia, hypokalemia, hypophosphatemia with signs of severe malnutrition with an albumin level down to 2.2.  Patient found to have COVID-19 as well as C. difficile colitis requiring multiple nights in the hospital for IV fluids and electrolyte replacement while ensuring adequate nutritional intake due to risk of progressive malnutrition.  Patient appropriate for inpatient care.    At the time of admission with the information available to the attending physician more than 2 nights Hospital complex care was anticipated, based on patient risk of adverse outcome if treated as outpatient and complex care required. Inpatient admission is appropriate based on the Medicare guidelines.    This document was produced using voice recognition software    The information on this document is developed by the utilization review team in order for the business office to ensure compliance. This only denotes the appropriateness of proper admission status and does not reflect the quality of care rendered.    The definitions of  Inpatient Status and Observation Status used in making the determination above are those provided in the CMS Coverage Manual, Chapter 1 and Chapter 6, section 70.4.    Sincerely,    Marcello Fitzgerald MD  Utilization Review  Physician Advisor  [facility].

## 2023-01-03 NOTE — PLAN OF CARE
[id number]: A&O. VSS on RA. Up with SBA. Special/enteric precautions maintained. C/o back pain, given PRN dilaudid PO. Phosphorus replacement completed, recheck in AM. Mg recheck in AM. LS clear. No SOB reported. No cough noted. Taking BP on L calf. IV fluids running. Nursing will continue to monitor.

## 2023-01-03 NOTE — PLAN OF CARE
Plan of Care Reviewed With: patient    Overall Patient Progress: no changeOverall Patient Progress: no change    PRIMARY DIAGNOSIS: GASTROENTERITIS    OUTPATIENT/OBSERVATION GOALS TO BE MET BEFORE DISCHARGE  1. Orthostatic performed: No    2. Tolerating PO fluid and/or antibiotics (if applicable): Yes    3. Nausea/Vomiting/Diarrhea symptoms improved: Yes    4. Pain status: Improved-controlled with oral pain medications.    5. Return to near baseline physical activity: No    Discharge Planner Nurse   Safe discharge environment identified: Yes  Barriers to discharge: Yes       Entered by: Elizabeth Pride RN 01/03/2023 3:28 AM     Pt A/O x4. VVS. PIV infusing. Pt complains of lower back pain. PRN PO dilaudid and atarax managing pain. SBA with IV pole when in room. Voiding good amounts. 1 soft/loose BM  over night. Tolerating a regular diet well. Taking BP on L calf. Plan TBD. Will continue to monitor.

## 2023-01-03 NOTE — PLAN OF CARE
Plan of Care Reviewed With: patient    Overall Patient Progress: improvingOverall Patient Progress: improving    PRIMARY DIAGNOSIS: GASTROENTERITIS    OUTPATIENT/OBSERVATION GOALS TO BE MET BEFORE DISCHARGE  1. Orthostatic performed: No    2. Tolerating PO fluid and/or antibiotics (if applicable): Yes    3. Nausea/Vomiting/Diarrhea symptoms improved: Yes    4. Pain status: Improved-controlled with oral pain medications.    5. Return to near baseline physical activity: No    Discharge Planner Nurse   Safe discharge environment identified: Yes  Barriers to discharge: Yes       Entered by: Elizabeth Pride RN 01/03/2023 12:53 AM

## 2023-01-03 NOTE — PLAN OF CARE
Goal Outcome Evaluation:      Plan of Care Reviewed With: patient    Overall Patient Progress: improvingOverall Patient Progress: improving       Afeb, vss, sats good on r/a, does have an occasional nonproductive cough. Had 1 loose stool on this shift as well as 1 last night. IVF continue, and electrolytes wnl.  C/o abdominal and back pain. Abdomen is disttended tender to palpation, pt is passing flatus as well as having bowel movements. Abdominal xray done this pm. PO dilaudid given with only mild improvement in the pain. Oral vanco continues for Cdiff. Pt was changed to inpt. Discharge plan to be determined.

## 2023-01-03 NOTE — PROGRESS NOTES
Pt admitted with C. Diff & COVID-19, noted to have unplanned readmission risk of 22%.  Care Coordination team is following and no SW needs identified.      SABINO Sr  [facility]  1/3/2023  3:07 PM

## 2023-01-03 NOTE — PROGRESS NOTES
[facility]    Medicine Progress Note - Hospitalist Service    Date of Admission:  12/31/2022    Assessment & Plan     Audelia Comer is a 57 year old female with past medical history of alcohol abuse with chronic pancreatitis, depression, breast cancer status post bilateral mastectomy currently in remission, active nicotine dependence with cigarettes who presented on 12/31/2022 with chief complaint of nausea, vomiting, and diarrhea.  Found to have COVID-19 infection and C. difficile colitis.     C. difficile colitis.  -Continue vancomycin 125 mg 4 times a day.  -Mild abdominal distention 1/3.  -Abdominal x-ray checked.  -Nonobstructive bowel gas pattern.     COVID-19 infection.  -Not hypoxic.  -Symptomatic treatment as needed.     Hyponatremia.  Hypokalemia.  Hypocalcemia.  Hypomagnesemia.  Hypophosphatemia.  -Phosphorus replacement protocol.  -Continue scheduled magnesium supplement.  -Additional magnesium replacement protocol as needed.  -Continue scheduled potassium chloride supplement.  -Additional potassium replacement protocol as needed.  -Ionized calcium low.  -Continue calcium carbonate 500 mg twice a day.  -Decrease IV fluids to 50 cc an hour.  -Recheck metabolic panel tomorrow.     Tobacco use disorder.  -Continue nicotine patch.     Depression.  -Continue mirtazapine 30 mg at bedtime.     Acute on chronic back pain.  -Continue acetaminophen 975 mg 3 times a day.  -diclofenac gel as needed.  -Continue other pain medications as needed.     GERD.  -Continue pantoprazole 40 mg a day.       Diet: Regular Diet Adult    DVT Prophylaxis: Enoxaparin (Lovenox) SQ  Patino Catheter: Not present  Lines: None     Cardiac Monitoring: None  Code Status: Full Code      Clinically Significant Risk Factors Present on Admission        # Hypokalemia: Lowest K = 3.3 mmol/L in last 2 days, will replace as needed     # Hypomagnesemia: Lowest Mg = 1.3 mg/dL in last 2 days, will replace as needed   #  "Hypoalbuminemia: Lowest albumin = 2.2 g/dL at 1/3/2023  6:17 AM, will monitor as appropriate          # Cachexia: Estimated body mass index is 15.59 kg/m  as calculated from the following:    Height as of this encounter: 1.676 m (5' 6\").    Weight as of this encounter: 43.8 kg (96 lb 9.6 oz).           Disposition Plan      Expected Discharge Date: 01/05/2023        Discharge Comments: Heladio Haji DO  Hospitalist Service  [facility]  Securely message with [email or web address] (more info)  Text page via [facility] Paging/Directory   ______________________________________________________________________    Interval History   Continues having abdominal pain.  Notices mild distention today.  Occasional cough.  Denies chest pain, shortness of breath, fevers, chills.    Physical Exam   Vital Signs: Temp: 97.8  F (36.6  C) Temp src: Temporal BP: 127/70 Pulse: 109   Resp: 16 SpO2: 98 % O2 Device: None (Room air)    Weight: 96 lbs 9.6 oz    Gen:  NAD, A&Ox3.  Eyes:  PERRL, sclera anicteric.  OP:  MMM, no lesions.  Neck:  Supple.  CV:  Regular, no murmurs.  Lung:  CTA b/l, normal effort.  Ab: Mild distention, +BS, soft.  Skin:  Warm, dry to touch.  No rash.  Ext:  No pitting edema LE b/l.      Medical Decision Making       See above noted assessment and plan.    Data     I have personally reviewed the following data over the past 24 hrs:    8.3  \   8.5 (L)   / 266     133 (L) 102 4.9 (L) /  96   4.0 22 0.39 (L) \       ALT: 17 AST: 47 (H) AP: 183 (H) TBILI: 0.2   ALB: 2.2 (L) TOT PROTEIN: 5.0 (L) LIPASE: N/A       Imaging results reviewed over the past 24 hrs:   Recent Results (from the past 24 hour(s))   XR Abdomen Port 1 View    Narrative    ABDOMEN PORTABLE ONE VIEW January 3, 2023 1:55 PM     HISTORY: Mild abdominal distension.    COMPARISON: CT chest, abdomen and pelvis on 12/31/2022.      Impression    IMPRESSION: Supine views of the abdomen and pelvis were obtained.  Nonobstructive bowel " gas pattern. No free peritoneal or portal venous  gas. Small ringlike metallic density projects over the midabdomen,  likely represents cutaneous umbilical ring. Few pelvic phleboliths.    DOMINIQUE MESSER MD         SYSTEM ID:  [id number]

## 2023-01-03 NOTE — PLAN OF CARE
Plan of Care Reviewed With: patient    Overall Patient Progress: improvingOverall Patient Progress: improving    PRIMARY DIAGNOSIS: GASTROENTERITIS    OUTPATIENT/OBSERVATION GOALS TO BE MET BEFORE DISCHARGE  1. Orthostatic performed: No    2. Tolerating PO fluid and/or antibiotics (if applicable): Yes    3. Nausea/Vomiting/Diarrhea symptoms improved: Yes    4. Pain status: Improved-controlled with oral pain medications. PRN dilaudid for back pain..     5. Return to near baseline physical activity: No    Discharge Planner Nurse   Safe discharge environment identified: Yes  Barriers to discharge: Yes       Entered by: Elizabeth Pride RN 01/02/2023 11:42 PM

## 2023-01-04 LAB
MAGNESIUM SERPL-MCNC: 1.4 MG/DL (ref 1.7–2.3)
MAGNESIUM SERPL-MCNC: 1.9 MG/DL (ref 1.7–2.3)
PHOSPHATE SERPL-MCNC: 3.2 MG/DL (ref 2.5–4.5)
POTASSIUM SERPL-SCNC: 4.1 MMOL/L (ref 3.4–5.3)

## 2023-01-04 PROCEDURE — 84100 ASSAY OF PHOSPHORUS: CPT | Performed by: INTERNAL MEDICINE

## 2023-01-04 PROCEDURE — 250N000011 HC RX IP 250 OP 636: Performed by: HOSPITALIST

## 2023-01-04 PROCEDURE — 250N000013 HC RX MED GY IP 250 OP 250 PS 637: Performed by: INTERNAL MEDICINE

## 2023-01-04 PROCEDURE — 250N000013 HC RX MED GY IP 250 OP 250 PS 637: Performed by: HOSPITALIST

## 2023-01-04 PROCEDURE — 258N000003 HC RX IP 258 OP 636: Performed by: INTERNAL MEDICINE

## 2023-01-04 PROCEDURE — 250N000013 HC RX MED GY IP 250 OP 250 PS 637: Performed by: STUDENT IN AN ORGANIZED HEALTH CARE EDUCATION/TRAINING PROGRAM

## 2023-01-04 PROCEDURE — 83735 ASSAY OF MAGNESIUM: CPT | Performed by: HOSPITALIST

## 2023-01-04 PROCEDURE — 83735 ASSAY OF MAGNESIUM: CPT | Performed by: INTERNAL MEDICINE

## 2023-01-04 PROCEDURE — 99231 SBSQ HOSP IP/OBS SF/LOW 25: CPT | Performed by: HOSPITALIST

## 2023-01-04 PROCEDURE — 120N000001 HC R&B MED SURG/OB

## 2023-01-04 PROCEDURE — 36415 COLL VENOUS BLD VENIPUNCTURE: CPT | Performed by: INTERNAL MEDICINE

## 2023-01-04 PROCEDURE — 84132 ASSAY OF SERUM POTASSIUM: CPT | Performed by: INTERNAL MEDICINE

## 2023-01-04 PROCEDURE — 36415 COLL VENOUS BLD VENIPUNCTURE: CPT | Performed by: HOSPITALIST

## 2023-01-04 PROCEDURE — 250N000011 HC RX IP 250 OP 636: Performed by: INTERNAL MEDICINE

## 2023-01-04 RX ORDER — VANCOMYCIN HYDROCHLORIDE 125 MG/1
125 CAPSULE ORAL 4 TIMES DAILY
Qty: 24 CAPSULE | Refills: 0 | Status: SHIPPED | OUTPATIENT
Start: 2023-01-04 | End: 2023-01-10

## 2023-01-04 RX ORDER — MAGNESIUM SULFATE HEPTAHYDRATE 40 MG/ML
2 INJECTION, SOLUTION INTRAVENOUS ONCE
Status: COMPLETED | OUTPATIENT
Start: 2023-01-04 | End: 2023-01-04

## 2023-01-04 RX ORDER — IBUPROFEN 200 MG
200 TABLET ORAL EVERY 6 HOURS PRN
Status: DISCONTINUED | OUTPATIENT
Start: 2023-01-04 | End: 2023-01-05 | Stop reason: HOSPADM

## 2023-01-04 RX ORDER — ONDANSETRON 4 MG/1
4 TABLET, ORALLY DISINTEGRATING ORAL EVERY 6 HOURS PRN
Qty: 5 TABLET | Refills: 0 | Status: SHIPPED | OUTPATIENT
Start: 2023-01-04

## 2023-01-04 RX ADMIN — MAGNESIUM OXIDE TAB 400 MG (241.3 MG ELEMENTAL MG) 400 MG: 400 (241.3 MG) TAB at 09:59

## 2023-01-04 RX ADMIN — HYDROMORPHONE HYDROCHLORIDE 1 MG: 2 TABLET ORAL at 01:25

## 2023-01-04 RX ADMIN — PANTOPRAZOLE SODIUM 40 MG: 40 TABLET, DELAYED RELEASE ORAL at 06:25

## 2023-01-04 RX ADMIN — THIAMINE HCL TAB 100 MG 100 MG: 100 TAB at 09:57

## 2023-01-04 RX ADMIN — ENOXAPARIN SODIUM 30 MG: 30 INJECTION SUBCUTANEOUS at 21:50

## 2023-01-04 RX ADMIN — MAGNESIUM SULFATE HEPTAHYDRATE 2 G: 40 INJECTION, SOLUTION INTRAVENOUS at 10:00

## 2023-01-04 RX ADMIN — CALCIUM 500 MG: 500 TABLET ORAL at 17:38

## 2023-01-04 RX ADMIN — HYDROXYZINE HYDROCHLORIDE 25 MG: 25 TABLET, FILM COATED ORAL at 20:04

## 2023-01-04 RX ADMIN — HYDROXYZINE HYDROCHLORIDE 25 MG: 25 TABLET, FILM COATED ORAL at 01:25

## 2023-01-04 RX ADMIN — VANCOMYCIN HYDROCHLORIDE 125 MG: 125 CAPSULE ORAL at 20:05

## 2023-01-04 RX ADMIN — MIRTAZAPINE 30 MG: 15 TABLET, FILM COATED ORAL at 21:47

## 2023-01-04 RX ADMIN — ACETAMINOPHEN 975 MG: 325 TABLET, FILM COATED ORAL at 06:25

## 2023-01-04 RX ADMIN — ACETAMINOPHEN 975 MG: 325 TABLET, FILM COATED ORAL at 14:18

## 2023-01-04 RX ADMIN — QUETIAPINE FUMARATE 25 MG: 25 TABLET ORAL at 06:25

## 2023-01-04 RX ADMIN — MAGNESIUM OXIDE TAB 400 MG (241.3 MG ELEMENTAL MG) 400 MG: 400 (241.3 MG) TAB at 20:07

## 2023-01-04 RX ADMIN — CALCIUM 500 MG: 500 TABLET ORAL at 09:57

## 2023-01-04 RX ADMIN — ACETAMINOPHEN 975 MG: 325 TABLET, FILM COATED ORAL at 21:46

## 2023-01-04 RX ADMIN — IBUPROFEN 200 MG: 200 TABLET, FILM COATED ORAL at 17:38

## 2023-01-04 RX ADMIN — SODIUM CHLORIDE: 9 INJECTION, SOLUTION INTRAVENOUS at 01:24

## 2023-01-04 RX ADMIN — QUETIAPINE FUMARATE 25 MG: 25 TABLET ORAL at 09:56

## 2023-01-04 RX ADMIN — POTASSIUM CHLORIDE 20 MEQ: 750 TABLET, FILM COATED, EXTENDED RELEASE ORAL at 16:25

## 2023-01-04 RX ADMIN — FOLIC ACID 1 MG: 1 TABLET ORAL at 09:57

## 2023-01-04 RX ADMIN — HYDROMORPHONE HYDROCHLORIDE 1 MG: 2 TABLET ORAL at 06:25

## 2023-01-04 RX ADMIN — VANCOMYCIN HYDROCHLORIDE 125 MG: 125 CAPSULE ORAL at 14:18

## 2023-01-04 RX ADMIN — VANCOMYCIN HYDROCHLORIDE 125 MG: 125 CAPSULE ORAL at 09:56

## 2023-01-04 RX ADMIN — QUETIAPINE FUMARATE 100 MG: 50 TABLET ORAL at 21:47

## 2023-01-04 RX ADMIN — VANCOMYCIN HYDROCHLORIDE 125 MG: 125 CAPSULE ORAL at 16:25

## 2023-01-04 RX ADMIN — THERA TABS 1 TABLET: TAB at 09:57

## 2023-01-04 RX ADMIN — NICOTINE 1 PATCH: 14 PATCH, EXTENDED RELEASE TRANSDERMAL at 20:09

## 2023-01-04 ASSESSMENT — ACTIVITIES OF DAILY LIVING (ADL)
ADLS_ACUITY_SCORE: 28
ADLS_ACUITY_SCORE: 32
ADLS_ACUITY_SCORE: 28

## 2023-01-04 NOTE — PLAN OF CARE
Goal Outcome Evaluation:         Patient aox4. VSS. IND in room. Patient had 1 loose stool on shift. PRN hydromorphone given for abdominal pain. Pain localized in RUQ. Good appetite for dinner.

## 2023-01-04 NOTE — PLAN OF CARE
Goal Outcome Evaluation:      Plan of Care Reviewed With: patient    Overall Patient Progress: improvingOverall Patient Progress: improving    Outcome Evaluation: Pt up independently to and from bathroom.    Pt alert, calm, and cooperative. Pain partially controlled with PO Dilaudid, Tylenol, and Atarax. LS clear, BS present, passing flatus. LBM 1/3/23. Abdomen rounded, distended, and tender. Voiding adequately. IV infusing. K, Mg, and Phosp will be be rechecked today per protocol. Plan is discharge to home once meets criteria.

## 2023-01-04 NOTE — PLAN OF CARE
Goal Outcome Evaluation:      Plan of Care Reviewed With: patient    Overall Patient Progress: improvingOverall Patient Progress: improving       Cared for pt. [id number]  VSS. Afebrile. AO x4. BP taken on the LLE. Room air. Up independently. IVF NS 50 ml/hr. Full code regular diet. Nicotine patch in place. Potassium/Magnesium/Phosphorus protocol. Magnesium was replaced this shift. Pt. Was given prn Seroquel per pt. Request this shift. Pt. Taking oral vancomycin. Pt. Was medically cleared for discharge but discharge plan is tomorrow 1/5.

## 2023-01-04 NOTE — DISCHARGE SUMMARY
[facility]  Hospitalist Discharge Summary      Date of Admission:  12/31/2022  Date of Discharge:  No discharge date for patient encounter.  Discharging Provider: Neal Cabrera DO  Discharge Service: Hospitalist Service    Discharge Diagnoses   C dif colitis  Hyponatremia, hypokalemia, hypomagnesemia, hypophos  Tobacco abuse  Hx GERD, chronic back pain, depression    Follow-ups Needed After Discharge   Follow-up Appointments     Follow-up and recommended labs and tests       Follow up with primary care provider, Randee Woods, within 7 days for   hospital follow- up.  No follow up labs or test are needed.           Unresulted Labs Ordered in the Past 30 Days of this Admission     No orders found from 12/1/2022 to 1/1/2023.      These results will be followed up by PCP    Discharge Disposition   Discharged to home  Condition at discharge: Stable    Hospital Course    Audelia Comer is a 57 year old female with past medical history of alcohol abuse with chronic pancreatitis, depression, breast cancer status post bilateral mastectomy currently in remission, active nicotine dependence with cigarettes who presented on 12/31/2022 with chief complaint of nausea, vomiting, and diarrhea.  Found to have COVID-19 infection and C. difficile colitis.     C. difficile colitis.  -Continue vancomycin 125 mg 4 times a day to complete 10 day total course  -rpt XR fine, diarrhea has resolved     COVID-19 infection.  -Not hypoxic.  -Symptomatic treatment as needed.     Hyponatremia, hypokalemia, hypocalcemia, hypomagnesemia, hypophosphatemia.  -cont supplements, hold home magnesium to avoid diarrhea  -chronic issue, recommend improved nutrition     Tobacco use disorder.  -Continue nicotine patch.     Depression.  -Continue mirtazapine 30 mg at bedtime.     Acute on chronic back pain.  -Continue acetaminophen 975 mg 3 times a day.  -diclofenac gel as needed.  -Continue other pain medications as  needed.     GERD.  -Continue pantoprazole 40 mg a day.      Consultations This Hospital Stay   VASCULAR ACCESS ADULT IP CONSULT    Code Status   Full Code    Time Spent on this Encounter   I, Neal Cabrera DO, personally saw the patient today and spent greater than 30 minutes discharging this patient.       Neal Cabrera DO  [facility]  [address]  [facility] [place] [address]  Phone: [phone]  Fax: [phone]  ______________________________________________________________________    Physical Exam   Vital Signs: Temp: 97  F (36.1  C) Temp src: Temporal BP: 101/59 Pulse: 109   Resp: 18 SpO2: 96 % O2 Device: None (Room air)    Weight: 96 lbs 9.6 oz  Face to face completed day of discharge       Primary Care Physician   Randee Woods    Discharge Orders      Reason for your hospital stay    Admitted for C dif colitis, complete entire course of antibiotics. Stop magnesium as it will worsen diarrhea. Ok for tylenol and ibuprofen for abd pain     Follow-up and recommended labs and tests     Follow up with primary care provider, Randee Woods, within 7 days for hospital follow- up.  No follow up labs or test are needed.     Activity    Your activity upon discharge: activity as tolerated     Diet    Follow this diet upon discharge: Orders Placed This Encounter      Regular Diet Adult       Significant Results and Procedures   Most Recent 3 CBC's:Recent Labs   Lab Test 01/03/23  0617 01/01/23  0843 12/31/22  1749 12/31/22  1746   WBC 8.3 7.4  --  10.3   HGB 8.5* 9.3* 12.9 11.9    93  --  93    259  --  332     Most Recent 3 BMP's:Recent Labs   Lab Test 01/04/23  0655 01/03/23  0617 01/02/23  0641 01/01/23  1702 01/01/23  0843 12/31/22  1749 12/31/22  1746   NA  --  133*  --   --  131* 125* 125*   POTASSIUM 4.1 4.0 3.7   < > 2.4* 3.6 3.7   CHLORIDE  --  102  --   --  92* 81* 82*   CO2  --  22  --   --  27  --  24   BUN  --  4.9*  --   --  5.5* 9 7.8*   CR  --  0.39*  --    --  0.43* 0.4* 0.43*   ANIONGAP  --  9  --   --  12  --  19*   FRANCOISE  --  7.7*  --   --  7.6*  --  8.2*   GLC  --  96  --   --  108* 104* 100*    < > = values in this interval not displayed.     Most Recent 2 LFT's:Recent Labs   Lab Test 01/03/23  0617 01/01/23  0843   AST 47* 32   ALT 17 10   ALKPHOS 183* 171*   BILITOTAL 0.2 0.5     Most Recent 3 INR's:No lab results found.,   Results for orders placed or performed during the hospital encounter of 12/31/22   CT Chest/Abdomen/Pelvis w Contrast    Narrative    EXAM: CT CHEST/ABDOMEN/PELVIS W CONTRAST  LOCATION: [facility]  DATE/TIME: 12/31/2022 7:37 PM    INDICATION: weight loss, cachexic, nausea, vomiting, diarrhea, abd pain, smoker  COMPARISON: CT abdomen and pelvis 10/04/2022 and CTA chest exam 4/16/2020  TECHNIQUE: CT scan of the chest, abdomen, and pelvis was performed following injection of IV contrast. Multiplanar reformats were obtained. Dose reduction techniques were used.   CONTRAST: 57mL Isovue 370    FINDINGS:   LUNGS AND PLEURA: Very mild changes of centrilobular emphysema. Elevated right hemidiaphragm. Minimal atelectasis right base. No concerning nodules or masses. No infiltrates or pleural effusions.    MEDIASTINUM/AXILLAE: No enlarged mediastinal or hilar lymph nodes. Small paraesophageal varices as seen previously.    CORONARY ARTERY CALCIFICATION: None.    HEPATOBILIARY: Heterogeneous liver parenchyma with enlarged caudate lobe compatible with chronic liver disease, with diffuse fatty infiltration and scattered areas of fatty sparing. No focal liver lesions. Mild gallbladder distention.    PANCREAS: Calcifications of the pancreatic head, unchanged. No acute inflammatory changes.    SPLEEN: Normal.    ADRENAL GLANDS: Normal.    KIDNEYS/BLADDER: Normal.    BOWEL: Fluid within numerous nondilated small bowel loops, and moderate fluid within the colon. No evidence for obstruction.    LYMPH NODES: Normal.    VASCULATURE:  Atherosclerotic disease abdominal aorta.    PELVIC ORGANS: Trace free pelvic fluid.    MUSCULOSKELETAL: Bilateral breast implants. Subacute fracture inferior right pubic ramus with callus formation.      Impression    IMPRESSION:  1.  No evidence for acute pulmonary disease.   2.  Fluid within numerous small bowel loops and portions of the colon could represent an enterocolitis and account for the patient's diarrhea. No wall thickening or other inflammatory changes.  3.  Chronic liver disease with diffuse fatty infiltration and scattered areas of fatty sparing.  4.  Changes of chronic pancreatitis. No acute inflammatory changes.  5.  Healing inferior right pubic ramus fracture.   XR Abdomen Port 1 View    Narrative    ABDOMEN PORTABLE ONE VIEW January 3, 2023 1:55 PM     HISTORY: Mild abdominal distension.    COMPARISON: CT chest, abdomen and pelvis on 12/31/2022.      Impression    IMPRESSION: Supine views of the abdomen and pelvis were obtained.  Nonobstructive bowel gas pattern. No free peritoneal or portal venous  gas. Small ringlike metallic density projects over the midabdomen,  likely represents cutaneous umbilical ring. Few pelvic phleboliths.    DOMINIQUE MESSER MD         SYSTEM ID:  [id number]       Discharge Medications   Current Discharge Medication List      START taking these medications    Details   vancomycin (VANCOCIN) 125 MG capsule Take 1 capsule (125 mg) by mouth 4 times daily for 6 days  Qty: 24 capsule, Refills: 0    Associated Diagnoses: C. difficile colitis         CONTINUE these medications which have CHANGED    Details   ondansetron (ZOFRAN ODT) 4 MG ODT tab Take 1 tablet (4 mg) by mouth every 6 hours as needed for nausea  Qty: 5 tablet, Refills: 0    Associated Diagnoses: Alcohol-induced acute pancreatitis without infection or necrosis         CONTINUE these medications which have NOT CHANGED    Details   folic acid (FOLVITE) 1 MG tablet Take 1 tablet (1 mg) by mouth daily  Qty: 20  tablet, Refills: 0    Associated Diagnoses: Alcohol-induced acute pancreatitis, unspecified complication status; Alcohol use disorder, severe, in early remission (H)      mirtazapine (REMERON) 15 MG tablet Take 30 mg by mouth At Bedtime      multivitamin w/minerals (THERA-VIT-M) tablet Take 1 tablet by mouth daily  Qty:      Associated Diagnoses: Alcohol-induced acute pancreatitis without infection or necrosis      pantoprazole (PROTONIX) 40 MG EC tablet Take 1 tablet (40 mg) by mouth daily as needed  Qty: 30 tablet, Refills: 3    Associated Diagnoses: Nonspecific abdominal pain      potassium chloride ER (K-TAB) 20 MEQ CR tablet Take 20 mEq by mouth daily (with dinner)      !! QUEtiapine (SEROQUEL) 25 MG tablet Take 25 mg by mouth daily as needed (anxiety)      !! QUEtiapine (SEROQUEL) 25 MG tablet Take 100 mg by mouth At Bedtime      thiamine (B-1) 100 MG tablet Take 1 tablet (100 mg) by mouth daily  Qty: 20 tablet, Refills: 0    Associated Diagnoses: Alcohol-induced acute pancreatitis, unspecified complication status; Alcohol use disorder, severe, in early remission (H)      acetaminophen (TYLENOL) 325 MG tablet Take 2 tablets (650 mg) by mouth every 6 hours as needed for mild pain Do not take more than 2000 mg per day. Alternate with ibuprofen.  Qty: 30 tablet, Refills: 0    Associated Diagnoses: Toothache      azelastine (ASTELIN) 0.1 % nasal spray Spray 1 spray into both nostrils daily as needed       cetirizine (ZYRTEC) 10 MG tablet Take 10 mg by mouth daily as needed       fluticasone (FLONASE) 50 MCG/ACT nasal spray Spray 1 spray into both nostrils daily as needed       ibuprofen (ADVIL/MOTRIN) 400 MG tablet Take 1 tablet (400 mg) by mouth every 6 hours as needed for moderate pain  Qty: 30 tablet, Refills: 0    Associated Diagnoses: Toothache       !! - Potential duplicate medications found. Please discuss with provider.      STOP taking these medications       magnesium oxide (MAG-OX) 400 MG tablet  Comments:   Reason for Stopping:             Allergies   No Known Allergies

## 2023-01-05 VITALS
SYSTOLIC BLOOD PRESSURE: 133 MMHG | DIASTOLIC BLOOD PRESSURE: 74 MMHG | RESPIRATION RATE: 16 BRPM | HEIGHT: 66 IN | OXYGEN SATURATION: 94 % | HEART RATE: 104 BPM | BODY MASS INDEX: 15.53 KG/M2 | TEMPERATURE: 97.6 F | WEIGHT: 96.6 LBS

## 2023-01-05 LAB
MAGNESIUM SERPL-MCNC: 1.5 MG/DL (ref 1.7–2.3)
MAGNESIUM SERPL-MCNC: 2.1 MG/DL (ref 1.7–2.3)
PHOSPHATE SERPL-MCNC: 3.5 MG/DL (ref 2.5–4.5)
POTASSIUM SERPL-SCNC: 4.4 MMOL/L (ref 3.4–5.3)

## 2023-01-05 PROCEDURE — 250N000011 HC RX IP 250 OP 636: Performed by: HOSPITALIST

## 2023-01-05 PROCEDURE — 250N000013 HC RX MED GY IP 250 OP 250 PS 637: Performed by: STUDENT IN AN ORGANIZED HEALTH CARE EDUCATION/TRAINING PROGRAM

## 2023-01-05 PROCEDURE — 84100 ASSAY OF PHOSPHORUS: CPT | Performed by: HOSPITALIST

## 2023-01-05 PROCEDURE — 250N000013 HC RX MED GY IP 250 OP 250 PS 637: Performed by: INTERNAL MEDICINE

## 2023-01-05 PROCEDURE — 36415 COLL VENOUS BLD VENIPUNCTURE: CPT | Performed by: HOSPITALIST

## 2023-01-05 PROCEDURE — 84132 ASSAY OF SERUM POTASSIUM: CPT | Performed by: HOSPITALIST

## 2023-01-05 PROCEDURE — 99239 HOSP IP/OBS DSCHRG MGMT >30: CPT | Performed by: HOSPITALIST

## 2023-01-05 PROCEDURE — 83735 ASSAY OF MAGNESIUM: CPT | Performed by: HOSPITALIST

## 2023-01-05 RX ORDER — MAGNESIUM SULFATE HEPTAHYDRATE 40 MG/ML
2 INJECTION, SOLUTION INTRAVENOUS ONCE
Status: COMPLETED | OUTPATIENT
Start: 2023-01-05 | End: 2023-01-05

## 2023-01-05 RX ORDER — QUETIAPINE FUMARATE 25 MG/1
25 TABLET, FILM COATED ORAL DAILY
DISCHARGE
Start: 2023-01-05 | End: 2023-03-18

## 2023-01-05 RX ADMIN — CALCIUM 500 MG: 500 TABLET ORAL at 09:18

## 2023-01-05 RX ADMIN — MAGNESIUM SULFATE HEPTAHYDRATE 2 G: 40 INJECTION, SOLUTION INTRAVENOUS at 09:18

## 2023-01-05 RX ADMIN — THERA TABS 1 TABLET: TAB at 09:18

## 2023-01-05 RX ADMIN — FOLIC ACID 1 MG: 1 TABLET ORAL at 09:18

## 2023-01-05 RX ADMIN — MAGNESIUM OXIDE TAB 400 MG (241.3 MG ELEMENTAL MG) 400 MG: 400 (241.3 MG) TAB at 09:20

## 2023-01-05 RX ADMIN — ACETAMINOPHEN 975 MG: 325 TABLET, FILM COATED ORAL at 14:37

## 2023-01-05 RX ADMIN — VANCOMYCIN HYDROCHLORIDE 125 MG: 125 CAPSULE ORAL at 14:37

## 2023-01-05 RX ADMIN — PANTOPRAZOLE SODIUM 40 MG: 40 TABLET, DELAYED RELEASE ORAL at 05:45

## 2023-01-05 RX ADMIN — ACETAMINOPHEN 975 MG: 325 TABLET, FILM COATED ORAL at 05:46

## 2023-01-05 RX ADMIN — THIAMINE HCL TAB 100 MG 100 MG: 100 TAB at 09:18

## 2023-01-05 RX ADMIN — QUETIAPINE FUMARATE 25 MG: 25 TABLET ORAL at 05:45

## 2023-01-05 RX ADMIN — VANCOMYCIN HYDROCHLORIDE 125 MG: 125 CAPSULE ORAL at 09:18

## 2023-01-05 RX ADMIN — QUETIAPINE FUMARATE 25 MG: 25 TABLET ORAL at 15:33

## 2023-01-05 ASSESSMENT — ACTIVITIES OF DAILY LIVING (ADL)
ADLS_ACUITY_SCORE: 28

## 2023-01-05 NOTE — DISCHARGE SUMMARY
[facility]  Hospitalist Discharge Summary      Date of Admission:  12/31/2022  Date of Discharge:  1/5/2023  Discharging Provider: Neal Cabrera DO  Discharge Service: Hospitalist Service    Discharge Diagnoses   C dif colitis  anemia  Hyponatremia, hypokalemia, hypomagnesemia, hypophos  Tobacco abuse  Medical non compliance  Hx GERD, chronic back pain, depression    Follow-ups Needed After Discharge   Follow-up Appointments     Follow-up and recommended labs and tests       Follow up with primary care provider, Randee Woods, within 7 days for   hospital follow- up.  No follow up labs or test are needed.        Discharge Disposition   Discharged to home  Condition at discharge: Stable    Hospital Course   Audelia Comer is a 57 year old female with past medical history of alcohol abuse with chronic pancreatitis, depression, breast cancer status post bilateral mastectomy currently in remission, active nicotine dependence with cigarettes who presented on 12/31/2022 with chief complaint of nausea, vomiting, and diarrhea.  Found to have COVID-19 infection and C. difficile colitis.     C. difficile colitis.  -Continue vancomycin 125 mg 4 times a day to complete 10 day total course  -rpt XR fine, diarrhea has resolved     Anemia  -likely dilutional, no evidence of bleeding  -follow up with PCP    COVID-19 infection.  -Not hypoxic.  -Symptomatic treatment as needed.     Hyponatremia, hypokalemia, hypocalcemia, hypomagnesemia, hypophosphatemia.  -cont supplements, hold home magnesium to avoid diarrhea  -chronic issue, recommend improved nutrition     Tobacco use disorder.  -Continue nicotine patch.     Depression.  -Continue mirtazapine 30 mg at bedtime.     Acute on chronic back pain.  -Continue acetaminophen 975 mg 3 times a day.  -diclofenac gel as needed.  -Continue other pain medications as needed.     GERD.  -Continue pantoprazole 40 mg a day.    Consultations This Hospital Stay   VASCULAR  ACCESS ADULT IP CONSULT  SOCIAL WORK IP CONSULT    Code Status   Full Code    Time Spent on this Encounter   I, Neal Cabrera DO, personally saw the patient today and spent greater than 30 minutes discharging this patient.       Neal Cabrera DO  [facility]  [address] [facility] [address]  Phone: [phone]  Fax: [phone]  ______________________________________________________________________    Physical Exam   Vital Signs: Temp: 97.6  F (36.4  C) Temp src: Temporal BP: 133/74 Pulse: 104   Resp: 16 SpO2: 94 % O2 Device: None (Room air)    Weight: 96 lbs 9.6 oz  Face to face completed day of discharge       Primary Care Physician   Randee Woods    Discharge Orders      Reason for your hospital stay    Admitted for C dif colitis, complete entire course of antibiotics. Stop magnesium as it will worsen diarrhea. Ok for tylenol and ibuprofen for abd pain     Follow-up and recommended labs and tests     Follow up with primary care provider, Randee Woods, within 7 days for hospital follow- up.  No follow up labs or test are needed.     Activity    Your activity upon discharge: activity as tolerated     Diet    Follow this diet upon discharge: Orders Placed This Encounter      Regular Diet Adult       Significant Results and Procedures   Most Recent 3 CBC's:Recent Labs   Lab Test 01/03/23  0617 01/01/23  0843 12/31/22  1749 12/31/22  1746   WBC 8.3 7.4  --  10.3   HGB 8.5* 9.3* 12.9 11.9    93  --  93    259  --  332     Most Recent 3 BMP's:Recent Labs   Lab Test 01/05/23  0652 01/04/23  0655 01/03/23  0617 01/01/23  1702 01/01/23  0843 12/31/22  1749 12/31/22  1746   NA  --   --  133*  --  131* 125* 125*   POTASSIUM 4.4 4.1 4.0   < > 2.4* 3.6 3.7   CHLORIDE  --   --  102  --  92* 81* 82*   CO2  --   --  22  --  27  --  24   BUN  --   --  4.9*  --  5.5* 9 7.8*   CR  --   --  0.39*  --  0.43* 0.4* 0.43*   ANIONGAP  --   --  9  --  12  --  19*   FRANCOISE  --   --   7.7*  --  7.6*  --  8.2*   GLC  --   --  96  --  108* 104* 100*    < > = values in this interval not displayed.     Most Recent 2 LFT's:Recent Labs   Lab Test 01/03/23  0617 01/01/23  0843   AST 47* 32   ALT 17 10   ALKPHOS 183* 171*   BILITOTAL 0.2 0.5     Most Recent 3 INR's:No lab results found.,   Results for orders placed or performed during the hospital encounter of 12/31/22   CT Chest/Abdomen/Pelvis w Contrast    Narrative    EXAM: CT CHEST/ABDOMEN/PELVIS W CONTRAST  LOCATION: [facility]  DATE/TIME: 12/31/2022 7:37 PM    INDICATION: weight loss, cachexic, nausea, vomiting, diarrhea, abd pain, smoker  COMPARISON: CT abdomen and pelvis 10/04/2022 and CTA chest exam 4/16/2020  TECHNIQUE: CT scan of the chest, abdomen, and pelvis was performed following injection of IV contrast. Multiplanar reformats were obtained. Dose reduction techniques were used.   CONTRAST: 57mL Isovue 370    FINDINGS:   LUNGS AND PLEURA: Very mild changes of centrilobular emphysema. Elevated right hemidiaphragm. Minimal atelectasis right base. No concerning nodules or masses. No infiltrates or pleural effusions.    MEDIASTINUM/AXILLAE: No enlarged mediastinal or hilar lymph nodes. Small paraesophageal varices as seen previously.    CORONARY ARTERY CALCIFICATION: None.    HEPATOBILIARY: Heterogeneous liver parenchyma with enlarged caudate lobe compatible with chronic liver disease, with diffuse fatty infiltration and scattered areas of fatty sparing. No focal liver lesions. Mild gallbladder distention.    PANCREAS: Calcifications of the pancreatic head, unchanged. No acute inflammatory changes.    SPLEEN: Normal.    ADRENAL GLANDS: Normal.    KIDNEYS/BLADDER: Normal.    BOWEL: Fluid within numerous nondilated small bowel loops, and moderate fluid within the colon. No evidence for obstruction.    LYMPH NODES: Normal.    VASCULATURE: Atherosclerotic disease abdominal aorta.    PELVIC ORGANS: Trace free pelvic  fluid.    MUSCULOSKELETAL: Bilateral breast implants. Subacute fracture inferior right pubic ramus with callus formation.      Impression    IMPRESSION:  1.  No evidence for acute pulmonary disease.   2.  Fluid within numerous small bowel loops and portions of the colon could represent an enterocolitis and account for the patient's diarrhea. No wall thickening or other inflammatory changes.  3.  Chronic liver disease with diffuse fatty infiltration and scattered areas of fatty sparing.  4.  Changes of chronic pancreatitis. No acute inflammatory changes.  5.  Healing inferior right pubic ramus fracture.   XR Abdomen Port 1 View    Narrative    ABDOMEN PORTABLE ONE VIEW January 3, 2023 1:55 PM     HISTORY: Mild abdominal distension.    COMPARISON: CT chest, abdomen and pelvis on 12/31/2022.      Impression    IMPRESSION: Supine views of the abdomen and pelvis were obtained.  Nonobstructive bowel gas pattern. No free peritoneal or portal venous  gas. Small ringlike metallic density projects over the midabdomen,  likely represents cutaneous umbilical ring. Few pelvic phleboliths.    DOMINIQUE MESSER MD         SYSTEM ID:  [id number]       Discharge Medications   Current Discharge Medication List      START taking these medications    Details   vancomycin (VANCOCIN) 125 MG capsule Take 1 capsule (125 mg) by mouth 4 times daily for 6 days  Qty: 24 capsule, Refills: 0    Associated Diagnoses: C. difficile colitis         CONTINUE these medications which have CHANGED    Details   ondansetron (ZOFRAN ODT) 4 MG ODT tab Take 1 tablet (4 mg) by mouth every 6 hours as needed for nausea  Qty: 5 tablet, Refills: 0    Associated Diagnoses: Alcohol-induced acute pancreatitis without infection or necrosis         CONTINUE these medications which have NOT CHANGED    Details   folic acid (FOLVITE) 1 MG tablet Take 1 tablet (1 mg) by mouth daily  Qty: 20 tablet, Refills: 0    Associated Diagnoses: Alcohol-induced acute pancreatitis,  unspecified complication status; Alcohol use disorder, severe, in early remission (H)      mirtazapine (REMERON) 15 MG tablet Take 30 mg by mouth At Bedtime      multivitamin w/minerals (THERA-VIT-M) tablet Take 1 tablet by mouth daily  Qty:      Associated Diagnoses: Alcohol-induced acute pancreatitis without infection or necrosis      pantoprazole (PROTONIX) 40 MG EC tablet Take 1 tablet (40 mg) by mouth daily as needed  Qty: 30 tablet, Refills: 3    Associated Diagnoses: Nonspecific abdominal pain      potassium chloride ER (K-TAB) 20 MEQ CR tablet Take 20 mEq by mouth daily (with dinner)      !! QUEtiapine (SEROQUEL) 25 MG tablet Take 25 mg by mouth daily as needed (anxiety)      !! QUEtiapine (SEROQUEL) 25 MG tablet Take 100 mg by mouth At Bedtime      thiamine (B-1) 100 MG tablet Take 1 tablet (100 mg) by mouth daily  Qty: 20 tablet, Refills: 0    Associated Diagnoses: Alcohol-induced acute pancreatitis, unspecified complication status; Alcohol use disorder, severe, in early remission (H)      acetaminophen (TYLENOL) 325 MG tablet Take 2 tablets (650 mg) by mouth every 6 hours as needed for mild pain Do not take more than 2000 mg per day. Alternate with ibuprofen.  Qty: 30 tablet, Refills: 0    Associated Diagnoses: Toothache      azelastine (ASTELIN) 0.1 % nasal spray Spray 1 spray into both nostrils daily as needed       cetirizine (ZYRTEC) 10 MG tablet Take 10 mg by mouth daily as needed       fluticasone (FLONASE) 50 MCG/ACT nasal spray Spray 1 spray into both nostrils daily as needed       ibuprofen (ADVIL/MOTRIN) 400 MG tablet Take 1 tablet (400 mg) by mouth every 6 hours as needed for moderate pain  Qty: 30 tablet, Refills: 0    Associated Diagnoses: Toothache       !! - Potential duplicate medications found. Please discuss with provider.      STOP taking these medications       magnesium oxide (MAG-OX) 400 MG tablet Comments:   Reason for Stopping:             Allergies   No Known Allergies

## 2023-01-05 NOTE — PLAN OF CARE
Goal Outcome Evaluation:      Plan of Care Reviewed With: patient    Overall Patient Progress: improvingOverall Patient Progress: improving       Cared for pt. [id number]  VSS. Afebrile. AO x4. Room air. BPs taken on the LLE. IVF NS running at 100. Pt. Is up in the room independently. K/Mag/Phos protocol. Pt. Was medically discharged yesterday. Pt. Declined all offers for assistance regarding transportation. See progress note for details. Pt. Supposed to be discharging today 1/5.    Plan will be pt. Leaving at 1600. Ride will wait downstairs for pt.

## 2023-01-05 NOTE — PROGRESS NOTES
Talked to pt. About getting a ride home today. Pt. States she will reach out to her friend and sister. Received an unpleasant phone call from pt. Friend. Discussed discharge with MD and put a consult in for care coordination. Pt. Is medically ready for discharge and is declining ride services and assisting with calling friend and sister. Options regarding transport were discussed with pt. Pt. Declines all interventions.

## 2023-01-05 NOTE — PLAN OF CARE
Goal Outcome Evaluation:       Pt A&O x4, afebrile, pulse 108, /68. O2 sat 94% on RA. Episode of urinary incontinence, linen changed. Up independently in room. Pain managed with tylenol and atarax, PO vanco, lovenox, remeron, and seroquel given at bedtime. Will continue to monitor.

## 2023-01-05 NOTE — CONSULTS
Care Management Note    Length of Stay (days): 2    Expected Discharge Date: 01/05/2023       CM spoke with nursing staff related to consult. No discharge needs identified at this time. CM  will clear consult but remains available if needs arise & can be re-consulted.    Divina Greene RN Case Manager  Inpatient Care Coordination   [facility]   [phone]      Divina Greene, RN

## 2023-01-06 ENCOUNTER — PATIENT OUTREACH (OUTPATIENT)
Dept: CARE COORDINATION | Facility: CLINIC | Age: 58
End: 2023-01-06

## 2023-01-06 NOTE — PROGRESS NOTES
Clinic Care Coordination Contact  [facility]: Post-Discharge Note  SITUATION                                                      Admission:    Admission Date: 12/31/22   Reason for Admission: C dif colitis  anemia  Hyponatremia, hypokalemia, hypomagnesemia, hypophos  Tobacco abuse  Medical non compliance  Hx GERD, chronic back pain, depression  Discharge:   Discharge Date: 01/05/23  Discharge Diagnosis: C dif colitis  anemia  Hyponatremia, hypokalemia, hypomagnesemia, hypophos  Tobacco abuse  Medical non compliance  Hx GERD, chronic back pain, depression    BACKGROUND                                                      Per hospital discharge summary and inpatient provider notes:    Audelia Comer is a 57 year old female with past medical history of alcohol abuse with chronic pancreatitis, depression, breast cancer status post bilateral mastectomy currently in remission, active nicotine dependence with cigarettes who presented on 12/31/2022 with chief complaint of nausea, vomiting, and diarrhea.  The patient states that she has had bowel issues for the past month.  She reports intermittent vomiting, intermittent constipation followed by periods of diarrhea.  She has not been eating anything for the past 1 month.  She states she only drinks shakes and Pedialyte.  She states over the last 3 days her diarrhea and nausea and vomiting have been worse.  Approximately 2 days ago she was having near constant diarrhea.  She denies any bloody or black tarry stools at that time.  Yesterday she took some Imodium and developed some left lower quadrant abdominal pain.  She also reports runny nose, sore throat, cough.     In the emergency department, the patient was found have a temperature of 97.8  F, heart rate 118, blood pressure 95/70, respiratory rate 18, SPO2 96% on room air.  Initial lab work showed sodium 125, potassium 3.7, chloride 82, BUN/creatinine 7.8/0.43, anion gap 19, magnesium 1.2, albumin 3.4, alkaline  "phosphatase 208, AST/ALT 51/17, ionized calcium 3.9, glucose 100, WBC 10.3.  The patient tested positive for COVID-19.  The patient had a CT chest/abdomen/pelvis that showed no evidence of pulmonary embolism, fluid within numerous small bowel loops and portions of the colon could represent an enterocolitis, no wall thickening or inflammatory changes, chronic liver disease with diffuse fatty infiltration and scattered areas of fatty sparing, changes of chronic pancreatitis, healing inferior right pubic ramus fracture.  The patient was started on IV fluids and provided with electrolyte replacement.  The patient was admitted for observation.    ASSESSMENT      Discharge Assessment  How are you doing now that you are home?: \"Not good at all\"  How are your symptoms? (Red Flag symptoms escalate to triage hotline per guidelines): Unchanged  Do you feel your condition is stable enough to be safe at home until your provider visit?: Yes  Does the patient have their discharge instructions? : Yes  Does the patient have questions regarding their discharge instructions? : No  Were you started on any new medications or were there changes to any of your previous medications? : Yes  Does the patient have all of their medications?: Yes  Do you have questions regarding any of your medications? : No  Do you have all of your needed medical supplies or equipment (DME)?  (i.e. oxygen tank, CPAP, cane, etc.): Yes  Discharge follow-up appointment scheduled within 14 calendar days? : No  Is patient agreeable to assistance with scheduling? : No    Post-op ([facility] CTA Only)  If the patient had a surgery or procedure, do they have any questions for a nurse?: No    PLAN                                                      Outpatient Plan:    Follow up with primary care provider, Randee Woods, within 7 days for   hospital follow- up.  No follow up labs or test are needed.     No future appointments.      For any urgent concerns, please contact our " 24 hour nurse triage line: [phone] ([phone])         RICHARD Lynne  [phone]  [facility]

## 2023-01-26 PROCEDURE — 96376 TX/PRO/DX INJ SAME DRUG ADON: CPT

## 2023-01-26 PROCEDURE — 99285 EMERGENCY DEPT VISIT HI MDM: CPT | Mod: 25

## 2023-01-26 PROCEDURE — 96374 THER/PROPH/DIAG INJ IV PUSH: CPT

## 2023-01-26 PROCEDURE — 96361 HYDRATE IV INFUSION ADD-ON: CPT

## 2023-01-26 NOTE — ED TRIAGE NOTES
Patient arrived via EMS with complaints of RLQ abdominal pain. Patient was recently discharged from [facility] after being treated for C. Diff and she thinks she was discharged too soon. Patient told EMS that she sneezed and then began having RLQ abdominal pain.

## 2023-01-27 ENCOUNTER — APPOINTMENT (OUTPATIENT)
Dept: CT IMAGING | Facility: CLINIC | Age: 58
End: 2023-01-27
Attending: EMERGENCY MEDICINE
Payer: COMMERCIAL

## 2023-01-27 ENCOUNTER — HOSPITAL ENCOUNTER (INPATIENT)
Facility: CLINIC | Age: 58
LOS: 3 days | Discharge: HOME | End: 2023-02-03
Attending: EMERGENCY MEDICINE | Admitting: HOSPITALIST
Payer: COMMERCIAL

## 2023-01-27 ENCOUNTER — APPOINTMENT (OUTPATIENT)
Dept: ULTRASOUND IMAGING | Facility: CLINIC | Age: 58
End: 2023-01-27
Attending: HOSPITALIST
Payer: COMMERCIAL

## 2023-01-27 DIAGNOSIS — K86.1 CHRONIC PANCREATITIS, UNSPECIFIED PANCREATITIS TYPE (H): Primary | ICD-10-CM

## 2023-01-27 DIAGNOSIS — R19.7 VOMITING AND DIARRHEA: ICD-10-CM

## 2023-01-27 DIAGNOSIS — R19.7 DIARRHEA OF PRESUMED INFECTIOUS ORIGIN: ICD-10-CM

## 2023-01-27 DIAGNOSIS — E87.6 HYPOKALEMIA: ICD-10-CM

## 2023-01-27 DIAGNOSIS — E87.1 HYPONATREMIA: ICD-10-CM

## 2023-01-27 DIAGNOSIS — R10.9 NONSPECIFIC ABDOMINAL PAIN: ICD-10-CM

## 2023-01-27 DIAGNOSIS — R10.11 RUQ ABDOMINAL PAIN: ICD-10-CM

## 2023-01-27 DIAGNOSIS — R11.2 NAUSEA AND VOMITING: ICD-10-CM

## 2023-01-27 DIAGNOSIS — R11.10 VOMITING AND DIARRHEA: ICD-10-CM

## 2023-01-27 DIAGNOSIS — F10.21 ALCOHOL USE DISORDER, SEVERE, IN EARLY REMISSION (H): ICD-10-CM

## 2023-01-27 DIAGNOSIS — K85.20 ALCOHOL-INDUCED ACUTE PANCREATITIS, UNSPECIFIED COMPLICATION STATUS: ICD-10-CM

## 2023-01-27 LAB
ALBUMIN SERPL BCG-MCNC: 3.4 G/DL (ref 3.5–5.2)
ALBUMIN UR-MCNC: NEGATIVE MG/DL
ALP SERPL-CCNC: 408 U/L (ref 35–104)
ALT SERPL W P-5'-P-CCNC: 28 U/L (ref 10–35)
ANION GAP SERPL CALCULATED.3IONS-SCNC: 15 MMOL/L (ref 7–15)
APPEARANCE UR: CLEAR
AST SERPL W P-5'-P-CCNC: 75 U/L (ref 10–35)
BASOPHILS # BLD AUTO: 0.1 10E3/UL (ref 0–0.2)
BASOPHILS NFR BLD AUTO: 0 %
BILIRUB SERPL-MCNC: 1.8 MG/DL
BILIRUB UR QL STRIP: NEGATIVE
BUN SERPL-MCNC: 9.3 MG/DL (ref 8–23)
CALCIUM SERPL-MCNC: 9 MG/DL (ref 8.6–10)
CHLORIDE SERPL-SCNC: 85 MMOL/L (ref 98–107)
COLOR UR AUTO: YELLOW
CREAT SERPL-MCNC: 0.4 MG/DL (ref 0.51–0.95)
DEPRECATED HCO3 PLAS-SCNC: 26 MMOL/L (ref 22–29)
EOSINOPHIL # BLD AUTO: 0 10E3/UL (ref 0–0.7)
EOSINOPHIL NFR BLD AUTO: 0 %
ERYTHROCYTE [DISTWIDTH] IN BLOOD BY AUTOMATED COUNT: 15.9 % (ref 10–15)
GFR SERPL CREATININE-BSD FRML MDRD: >90 ML/MIN/1.73M2
GLUCOSE SERPL-MCNC: 107 MG/DL (ref 70–99)
GLUCOSE UR STRIP-MCNC: NEGATIVE MG/DL
HCT VFR BLD AUTO: 35.7 % (ref 35–47)
HGB BLD-MCNC: 11.8 G/DL (ref 11.7–15.7)
HGB UR QL STRIP: NEGATIVE
IMM GRANULOCYTES # BLD: 0.1 10E3/UL
IMM GRANULOCYTES NFR BLD: 1 %
KETONES UR STRIP-MCNC: 20 MG/DL
LEUKOCYTE ESTERASE UR QL STRIP: ABNORMAL
LYMPHOCYTES # BLD AUTO: 2.6 10E3/UL (ref 0.8–5.3)
LYMPHOCYTES NFR BLD AUTO: 17 %
MAGNESIUM SERPL-MCNC: 1.7 MG/DL (ref 1.7–2.3)
MCH RBC QN AUTO: 31.6 PG (ref 26.5–33)
MCHC RBC AUTO-ENTMCNC: 33.1 G/DL (ref 31.5–36.5)
MCV RBC AUTO: 96 FL (ref 78–100)
MONOCYTES # BLD AUTO: 2.2 10E3/UL (ref 0–1.3)
MONOCYTES NFR BLD AUTO: 14 %
MUCOUS THREADS #/AREA URNS LPF: PRESENT /LPF
NEUTROPHILS # BLD AUTO: 10.5 10E3/UL (ref 1.6–8.3)
NEUTROPHILS NFR BLD AUTO: 68 %
NITRATE UR QL: NEGATIVE
NRBC # BLD AUTO: 0 10E3/UL
NRBC BLD AUTO-RTO: 0 /100
PH UR STRIP: 6.5 [PH] (ref 5–7)
PLATELET # BLD AUTO: 401 10E3/UL (ref 150–450)
POTASSIUM SERPL-SCNC: 4 MMOL/L (ref 3.4–5.3)
PROT SERPL-MCNC: 8 G/DL (ref 6.4–8.3)
RBC # BLD AUTO: 3.74 10E6/UL (ref 3.8–5.2)
RBC URINE: 2 /HPF
SODIUM SERPL-SCNC: 126 MMOL/L (ref 136–145)
SP GR UR STRIP: 1 (ref 1–1.03)
SQUAMOUS EPITHELIAL: 8 /HPF
TRANSITIONAL EPI: 1 /HPF
UROBILINOGEN UR STRIP-MCNC: 4 MG/DL
WBC # BLD AUTO: 15.5 10E3/UL (ref 4–11)
WBC URINE: 23 /HPF

## 2023-01-27 PROCEDURE — 250N000009 HC RX 250: Performed by: EMERGENCY MEDICINE

## 2023-01-27 PROCEDURE — G0378 HOSPITAL OBSERVATION PER HR: HCPCS

## 2023-01-27 PROCEDURE — 87086 URINE CULTURE/COLONY COUNT: CPT | Performed by: EMERGENCY MEDICINE

## 2023-01-27 PROCEDURE — 85025 COMPLETE CBC W/AUTO DIFF WBC: CPT | Performed by: EMERGENCY MEDICINE

## 2023-01-27 PROCEDURE — 250N000011 HC RX IP 250 OP 636: Performed by: EMERGENCY MEDICINE

## 2023-01-27 PROCEDURE — 36415 COLL VENOUS BLD VENIPUNCTURE: CPT | Performed by: EMERGENCY MEDICINE

## 2023-01-27 PROCEDURE — 74177 CT ABD & PELVIS W/CONTRAST: CPT

## 2023-01-27 PROCEDURE — 83735 ASSAY OF MAGNESIUM: CPT | Performed by: HOSPITALIST

## 2023-01-27 PROCEDURE — 250N000013 HC RX MED GY IP 250 OP 250 PS 637: Performed by: INTERNAL MEDICINE

## 2023-01-27 PROCEDURE — 99222 1ST HOSP IP/OBS MODERATE 55: CPT | Mod: AI | Performed by: HOSPITALIST

## 2023-01-27 PROCEDURE — 250N000013 HC RX MED GY IP 250 OP 250 PS 637: Performed by: HOSPITALIST

## 2023-01-27 PROCEDURE — 96376 TX/PRO/DX INJ SAME DRUG ADON: CPT

## 2023-01-27 PROCEDURE — 80053 COMPREHEN METABOLIC PANEL: CPT | Performed by: EMERGENCY MEDICINE

## 2023-01-27 PROCEDURE — 250N000011 HC RX IP 250 OP 636: Performed by: INTERNAL MEDICINE

## 2023-01-27 PROCEDURE — 258N000003 HC RX IP 258 OP 636: Performed by: HOSPITALIST

## 2023-01-27 PROCEDURE — 76705 ECHO EXAM OF ABDOMEN: CPT

## 2023-01-27 PROCEDURE — 258N000003 HC RX IP 258 OP 636: Performed by: EMERGENCY MEDICINE

## 2023-01-27 PROCEDURE — 81001 URINALYSIS AUTO W/SCOPE: CPT | Performed by: EMERGENCY MEDICINE

## 2023-01-27 PROCEDURE — 250N000011 HC RX IP 250 OP 636: Performed by: HOSPITALIST

## 2023-01-27 RX ORDER — ACETAMINOPHEN 650 MG/1
650 SUPPOSITORY RECTAL EVERY 6 HOURS PRN
Status: CANCELLED | OUTPATIENT
Start: 2023-01-27

## 2023-01-27 RX ORDER — SODIUM CHLORIDE, SODIUM LACTATE, POTASSIUM CHLORIDE, CALCIUM CHLORIDE 600; 310; 30; 20 MG/100ML; MG/100ML; MG/100ML; MG/100ML
125 INJECTION, SOLUTION INTRAVENOUS CONTINUOUS
Status: DISCONTINUED | OUTPATIENT
Start: 2023-01-27 | End: 2023-01-28

## 2023-01-27 RX ORDER — HYDROMORPHONE HYDROCHLORIDE 1 MG/ML
0.5 INJECTION, SOLUTION INTRAMUSCULAR; INTRAVENOUS; SUBCUTANEOUS
Status: COMPLETED | OUTPATIENT
Start: 2023-01-27 | End: 2023-01-27

## 2023-01-27 RX ORDER — MIRTAZAPINE 15 MG/1
30 TABLET, FILM COATED ORAL AT BEDTIME
Status: DISCONTINUED | OUTPATIENT
Start: 2023-01-27 | End: 2023-02-03 | Stop reason: HOSPADM

## 2023-01-27 RX ORDER — IBUPROFEN 600 MG/1
600 TABLET, FILM COATED ORAL EVERY 6 HOURS PRN
Status: DISCONTINUED | OUTPATIENT
Start: 2023-01-27 | End: 2023-02-03 | Stop reason: HOSPADM

## 2023-01-27 RX ORDER — MIRTAZAPINE 30 MG/1
30 TABLET, FILM COATED ORAL ONCE
Status: COMPLETED | OUTPATIENT
Start: 2023-01-27 | End: 2023-01-27

## 2023-01-27 RX ORDER — ONDANSETRON 4 MG/1
4 TABLET, ORALLY DISINTEGRATING ORAL EVERY 6 HOURS PRN
Status: DISCONTINUED | OUTPATIENT
Start: 2023-01-27 | End: 2023-01-31

## 2023-01-27 RX ORDER — ONDANSETRON 2 MG/ML
4 INJECTION INTRAMUSCULAR; INTRAVENOUS EVERY 6 HOURS PRN
Status: DISCONTINUED | OUTPATIENT
Start: 2023-01-27 | End: 2023-01-31

## 2023-01-27 RX ORDER — PANTOPRAZOLE SODIUM 40 MG/1
40 TABLET, DELAYED RELEASE ORAL DAILY PRN
Status: DISCONTINUED | OUTPATIENT
Start: 2023-01-27 | End: 2023-01-29

## 2023-01-27 RX ORDER — FLUTICASONE PROPIONATE 50 MCG
1 SPRAY, SUSPENSION (ML) NASAL DAILY PRN
Status: DISCONTINUED | OUTPATIENT
Start: 2023-01-27 | End: 2023-02-03 | Stop reason: HOSPADM

## 2023-01-27 RX ORDER — FOLIC ACID 1 MG/1
1 TABLET ORAL DAILY
Status: DISCONTINUED | OUTPATIENT
Start: 2023-01-27 | End: 2023-02-03 | Stop reason: HOSPADM

## 2023-01-27 RX ORDER — IOPAMIDOL 755 MG/ML
50 INJECTION, SOLUTION INTRAVASCULAR ONCE
Status: COMPLETED | OUTPATIENT
Start: 2023-01-27 | End: 2023-01-27

## 2023-01-27 RX ORDER — ACETAMINOPHEN 325 MG/1
650 TABLET ORAL EVERY 6 HOURS PRN
Status: CANCELLED | OUTPATIENT
Start: 2023-01-27

## 2023-01-27 RX ORDER — LANOLIN ALCOHOL/MO/W.PET/CERES
3 CREAM (GRAM) TOPICAL
Status: DISCONTINUED | OUTPATIENT
Start: 2023-01-27 | End: 2023-02-03 | Stop reason: HOSPADM

## 2023-01-27 RX ORDER — QUETIAPINE FUMARATE 50 MG/1
50 TABLET, FILM COATED ORAL ONCE
Status: COMPLETED | OUTPATIENT
Start: 2023-01-27 | End: 2023-01-27

## 2023-01-27 RX ORDER — HYDROMORPHONE HCL IN WATER/PF 6 MG/30 ML
0.2 PATIENT CONTROLLED ANALGESIA SYRINGE INTRAVENOUS
Status: DISCONTINUED | OUTPATIENT
Start: 2023-01-27 | End: 2023-02-03 | Stop reason: HOSPADM

## 2023-01-27 RX ORDER — QUETIAPINE FUMARATE 100 MG/1
100 TABLET, FILM COATED ORAL AT BEDTIME
Status: DISCONTINUED | OUTPATIENT
Start: 2023-01-27 | End: 2023-02-03 | Stop reason: HOSPADM

## 2023-01-27 RX ORDER — QUETIAPINE FUMARATE 25 MG/1
25 TABLET, FILM COATED ORAL DAILY
Status: DISCONTINUED | OUTPATIENT
Start: 2023-01-28 | End: 2023-02-03 | Stop reason: HOSPADM

## 2023-01-27 RX ORDER — QUETIAPINE FUMARATE 25 MG/1
25-50 TABLET, FILM COATED ORAL DAILY PRN
Status: DISCONTINUED | OUTPATIENT
Start: 2023-01-27 | End: 2023-02-03 | Stop reason: HOSPADM

## 2023-01-27 RX ADMIN — HYDROMORPHONE HYDROCHLORIDE 0.5 MG: 1 INJECTION, SOLUTION INTRAMUSCULAR; INTRAVENOUS; SUBCUTANEOUS at 03:09

## 2023-01-27 RX ADMIN — HYDROMORPHONE HYDROCHLORIDE 0.2 MG: 0.2 INJECTION, SOLUTION INTRAMUSCULAR; INTRAVENOUS; SUBCUTANEOUS at 16:20

## 2023-01-27 RX ADMIN — QUETIAPINE FUMARATE 100 MG: 100 TABLET ORAL at 21:08

## 2023-01-27 RX ADMIN — SODIUM CHLORIDE 60 ML: 900 INJECTION INTRAVENOUS at 03:34

## 2023-01-27 RX ADMIN — HYDROMORPHONE HYDROCHLORIDE 0.5 MG: 1 INJECTION, SOLUTION INTRAMUSCULAR; INTRAVENOUS; SUBCUTANEOUS at 04:15

## 2023-01-27 RX ADMIN — HYDROMORPHONE HYDROCHLORIDE 0.5 MG: 1 INJECTION, SOLUTION INTRAMUSCULAR; INTRAVENOUS; SUBCUTANEOUS at 08:26

## 2023-01-27 RX ADMIN — SODIUM CHLORIDE, POTASSIUM CHLORIDE, SODIUM LACTATE AND CALCIUM CHLORIDE 125 ML/HR: 600; 310; 30; 20 INJECTION, SOLUTION INTRAVENOUS at 08:32

## 2023-01-27 RX ADMIN — ONDANSETRON 4 MG: 4 TABLET, ORALLY DISINTEGRATING ORAL at 05:53

## 2023-01-27 RX ADMIN — SODIUM CHLORIDE, POTASSIUM CHLORIDE, SODIUM LACTATE AND CALCIUM CHLORIDE 1000 ML: 600; 310; 30; 20 INJECTION, SOLUTION INTRAVENOUS at 02:53

## 2023-01-27 RX ADMIN — QUETIAPINE FUMARATE 50 MG: 50 TABLET ORAL at 05:53

## 2023-01-27 RX ADMIN — MIRTAZAPINE 30 MG: 30 TABLET, FILM COATED ORAL at 21:09

## 2023-01-27 RX ADMIN — MIRTAZAPINE 30 MG: 30 TABLET, FILM COATED ORAL at 05:53

## 2023-01-27 RX ADMIN — FOLIC ACID 1 MG: 1 TABLET ORAL at 18:03

## 2023-01-27 RX ADMIN — IBUPROFEN 600 MG: 600 TABLET ORAL at 11:54

## 2023-01-27 RX ADMIN — HYDROMORPHONE HYDROCHLORIDE 0.2 MG: 0.2 INJECTION, SOLUTION INTRAMUSCULAR; INTRAVENOUS; SUBCUTANEOUS at 20:21

## 2023-01-27 RX ADMIN — SODIUM CHLORIDE, POTASSIUM CHLORIDE, SODIUM LACTATE AND CALCIUM CHLORIDE 125 ML/HR: 600; 310; 30; 20 INJECTION, SOLUTION INTRAVENOUS at 20:21

## 2023-01-27 RX ADMIN — IOPAMIDOL 50 ML: 755 INJECTION, SOLUTION INTRAVENOUS at 03:34

## 2023-01-27 RX ADMIN — QUETIAPINE FUMARATE 25 MG: 25 TABLET ORAL at 18:03

## 2023-01-27 ASSESSMENT — ACTIVITIES OF DAILY LIVING (ADL)
ADLS_ACUITY_SCORE: 37
ADLS_ACUITY_SCORE: 33

## 2023-01-27 NOTE — H&P
[facility]    History and Physical  Hospitalist       Date of Admission:  1/27/2023  Date of Service (when I saw the patient): 01/27/23    ASSESSMENT  Audelia Comer is a markedly pleasant 57 year old woman with past medical history that is most notable for severe alcohol use disorder and chronic alcoholic pancreatitis, as well as recent C difficile colitis, among others; who presents with ongoing diarrhea and acute abdominal pain and is found to have acute hypochloremic hyponatremia.    PLAN     Ongoing diarrhea and acute abdominal pain: Of note, Ms. Comer has a history of alcoholic liver disease and chronic pancreatitis. She has been admitted for acute pancreatitis  in 10/2021, 11/2021, 7/2022, and again in 10/2022. Most recently, she was admitted to [facility] from 12/31/22 through 1/5/23 for diarrhea, found to be due to C difficile colitis. She was also found to be COVID positive at that time. She has now completed two weeks of QID oral Vancocin but reports ongoing diarrhea.     She presents now for acute right upper quadrant abdominal pain while straining at the commode. In the ED, she is afebrile (T 99.5). She has acute leukocytosis, with acute hyponatremia and trans-aminitis as discussed below. CT of abdomen and pelvis shows changes of chronic cirrhosis and chronic pancreatitis, without signs of other acute pathology. Overall, we suspect musculoskeletal strain as the cause of her acute pain tonight. Her diarrhea now seems to have subsided. We will rule out biliary pathology and consider further monitoring and evaluation of her ongoing diarrhea.    -- Observation. Regular diet. US abdomen ordered for further evaluation. Judicious doses of Ibuprofen ordered as needed for pain. Anti-emetics as needed    -- Enteric precautions ordered for now. If she develops any further diarrhea while hospitalized could consider further discussions with ID     -- Otherwise once she is feeling better  and Na improved she could likely be discharged later today or tomorrow    Acute hypochloremic hyponatremia: Mild, at 126. Suspect due to hypovolemia. 125 ml/hour LR ordered, repeat CMP ordered for further evaluation.    Trans-aminitis, acute: Suspect due to cirrhosis, possibly alcoholic hepatitis. Tbili is 1.8.    -- Monitor CMP and US as above; consider GI consultation if labs are worsening     -- Check Mag level    -- Resume home Thiamine and PPI when verified    History of breast cancer: Status post bilateral mastectomies. Noted    Chronic depression: resume home seroquel and remeron when verified    Suspected severe protein calorie malnutrition: Nutrition services consulted.    I have spent 60 minutes on the date of service doing chart review, history, examination, documentation, and further activities per the note.    Chief Complaint   Diarrhea    History is obtained from the patient and the ED physician whom I have spoken with    History of Present Illness   Audelia Comer is a markedly pleasant 57 year old woman who presents with diarrhea. This is characterized as frequent loose, watery stool that started a little before New Years. She was hospitalized at that time and diagnosed with C difficile colitis. Oral vancocin was prescribed and she was discharged and completed that, but she says the frequent stools persist. They are associated with intermittent abdominal cramping. Then last night, while she was at the toilet, she sneezed, and developed sudden acute right upper quadrant sharp pain associated with a bulge in her abdomen in that area. She has therefore come in for further evaluation. Dilaudid given in the ED has significantly relieved her pain, and now she says in the last 10-12 hours her diarrhea may have subsided as well. She otherwise denies current fever, chills, or sweats, or any other acute complaints.    In the ED,   01/26 1616 95/65 99.5  F (37.5  C) 116 16 98 %     CBC and CMP were notable  for WBC 15.5, Na 126, Cl 85, alb 3.4, aphos 408, ast 75, tbili 1.8, Glucose 107, otherwise were within the normal reference range.     Recent Results (from the past 24 hour(s))   CT Abdomen Pelvis w Contrast    Narrative    EXAM: CT ABDOMEN PELVIS W CONTRAST  LOCATION: [facility]  DATE/TIME: 1/27/2023 3:50 AM    INDICATION: RUQ pain, possible hernia?  COMPARISON: CT chest, abdomen and pelvis with IV contrast 12/31/2022.  TECHNIQUE: CT scan of the abdomen and pelvis was performed following injection of IV contrast. Multiplanar reformats were obtained. Dose reduction techniques were used.  CONTRAST: 50 mL Isovue-370 IV.     FINDINGS:   LOWER CHEST: Minor dependent atelectasis in the posterior costophrenic angles identified today. No pleural effusion on either side. Normal cardiac size. No pericardial effusion.    HEPATOBILIARY: Heterogeneous hepatic parenchyma with lobulated contour and hypertrophy of the caudate lobe, representing cirrhosis. Geographic fatty sparing involving the hepatic parenchyma anteriorly extending inferiorly. Patent portal veins. Tiny   amount of adjacent ascites. No calcified gallstones or biliary dilatation.    PANCREAS: Parenchymal calcifications indicative of changes of chronic pancreatitis. No discrete mass.    SPLEEN: Normal.    ADRENAL GLANDS: Normal.    KIDNEYS/BLADDER: No urinary tract calculi. Both kidneys are well perfused without hydronephrosis or hydroureter. Normal urinary bladder.    BOWEL: Scattered colonic diverticulosis, more apparent distally, without acute inflammation. No mechanical obstruction or free gas.    LYMPH NODES: No suspicious abdominopelvic adenopathy.    VASCULATURE: Mildly atherosclerotic normal caliber abdominal aorta measuring 1.7 x 1.6 cm (image 93, series 3). Normal caliber IVC.    PELVIC ORGANS: Rectosigmoid diverticulosis. No adenopathy or free fluid. Phleboliths.    MUSCULOSKELETAL: Mild degenerative changes involving the spine  "and joints pelvis. Bilateral breast implants. Metallic ornament at the umbilicus.      Impression    IMPRESSION:   1.  Cirrhotic configuration of the liver with lobulated contour. Geographic fatty sparing involving the hepatic parenchyma anteriorly extending inferiorly. Tiny amount of ascites.    2.  Changes of chronic pancreatitis. No discrete mass.    3.  Scattered colonic diverticulosis, more apparent distally, without acute inflammation. No mechanical obstruction or free gas.           PHYSICAL EXAM  Blood pressure 95/65, pulse 116, temperature 99.5  F (37.5  C), temperature source Temporal, resp. rate 16, height 1.676 m (5' 6\"), weight 45.4 kg (100 lb), SpO2 98 %.  Constitutional: Alert and oriented to person, place and time; no apparent distress; markedly cachectic  HEENT: normocephalic dry mucus membranes  Respiratory: lungs clear to auscultation bilaterally  Cardiovascular: regular S1 S2  GI: abdomen soft non tender non distended bowel sounds positive  LMusculoskeletal: no clubbing, cyanosis or edema  Neurologic: extra-ocular muscles intact; moves all four extremities  Psychiatric: appropriate affect, speech tangential at times     DVT Prophylaxis: Pneumatic Compression Devices  Code Status: Full Code    Disposition: Expected discharge in 0-2 days    Davonte Zacarias MD, MD    Past Medical History    I have reviewed this patient's medical history and updated it with pertinent information if needed.   Past Medical History:   Diagnosis Date     Alcoholic liver disease (H)      Anxiety      C. difficile colitis 2023     Chronic pancreatitis (H)      Depressive disorder      Invasive ductal carcinoma of breast, right (H)      SBO (small bowel obstruction) (H)        Past Surgical History   I have reviewed this patient's surgical history and updated it with pertinent information if needed.  Past Surgical History:   Procedure Laterality Date     APPENDECTOMY       APPENDECTOMY  1989      " SECTION  01/01/1984     GYN SURGERY      c section      MASTECTOMY, BILATERAL Bilateral     With reconstructive surgery     SOFT TISSUE SURGERY      breast implants       Prior to Admission Medications   Prior to Admission Medications   Prescriptions Last Dose Informant Patient Reported? Taking?   QUEtiapine (SEROQUEL) 25 MG tablet  Self Yes No   Sig: Take 25 mg by mouth daily as needed (anxiety)   QUEtiapine (SEROQUEL) 25 MG tablet  Self Yes No   Sig: Take 100 mg by mouth At Bedtime   QUEtiapine (SEROQUEL) 25 MG tablet   No No   Sig: Take 1 tablet (25 mg) by mouth daily   acetaminophen (TYLENOL) 325 MG tablet  Self No No   Sig: Take 2 tablets (650 mg) by mouth every 6 hours as needed for mild pain Do not take more than 2000 mg per day. Alternate with ibuprofen.   azelastine (ASTELIN) 0.1 % nasal spray  Self Yes No   Sig: Spray 1 spray into both nostrils daily as needed    cetirizine (ZYRTEC) 10 MG tablet  Self Yes No   Sig: Take 10 mg by mouth daily as needed    fluticasone (FLONASE) 50 MCG/ACT nasal spray  Self Yes No   Sig: Spray 1 spray into both nostrils daily as needed    folic acid (FOLVITE) 1 MG tablet  Self No No   Sig: Take 1 tablet (1 mg) by mouth daily   ibuprofen (ADVIL/MOTRIN) 400 MG tablet  Self No No   Sig: Take 1 tablet (400 mg) by mouth every 6 hours as needed for moderate pain   mirtazapine (REMERON) 15 MG tablet  Self Yes No   Sig: Take 30 mg by mouth At Bedtime   multivitamin w/minerals (THERA-VIT-M) tablet  Self No No   Sig: Take 1 tablet by mouth daily   ondansetron (ZOFRAN ODT) 4 MG ODT tab   No No   Sig: Take 1 tablet (4 mg) by mouth every 6 hours as needed for nausea   pantoprazole (PROTONIX) 40 MG EC tablet   No No   Sig: Take 1 tablet (40 mg) by mouth daily as needed   potassium chloride ER (K-TAB) 20 MEQ CR tablet  Self Yes No   Sig: Take 20 mEq by mouth daily (with dinner)   thiamine (B-1) 100 MG tablet  Self No No   Sig: Take 1 tablet (100 mg) by mouth daily      Facility-Administered  Medications: None     Allergies   No Known Allergies    Social History   I have reviewed this patient's social history and updated it with pertinent information if needed. Audelia Comer  reports that she has been smoking. She has been smoking an average of 1 pack per day. She has never used smokeless tobacco. She reports current alcohol use. She reports that she does not use drugs.    Family History   Family history assessed and, except as above, is non-contributory.    Family History   Problem Relation Age of Onset     Colon Cancer Maternal Grandmother      Lung Cancer Paternal Grandmother      Breast Cancer Paternal Grandmother        Review of Systems   The 10 point Review of Systems is negative other than noted in the HPI or here.     Primary Care Physician   Randee Woods    Data   Labs Ordered and Resulted from Time of ED Arrival to Time of ED Departure   COMPREHENSIVE METABOLIC PANEL - Abnormal       Result Value    Sodium 126 (*)     Potassium 4.0      Chloride 85 (*)     Carbon Dioxide (CO2) 26      Anion Gap 15      Urea Nitrogen 9.3      Creatinine 0.40 (*)     Calcium 9.0      Glucose 107 (*)     Alkaline Phosphatase 408 (*)     AST 75 (*)     ALT 28      Protein Total 8.0      Albumin 3.4 (*)     Bilirubin Total 1.8 (*)     GFR Estimate >90     CBC WITH PLATELETS AND DIFFERENTIAL - Abnormal    WBC Count 15.5 (*)     RBC Count 3.74 (*)     Hemoglobin 11.8      Hematocrit 35.7      MCV 96      MCH 31.6      MCHC 33.1      RDW 15.9 (*)     Platelet Count 401      % Neutrophils 68      % Lymphocytes 17      % Monocytes 14      % Eosinophils 0      % Basophils 0      % Immature Granulocytes 1      NRBCs per 100 WBC 0      Absolute Neutrophils 10.5 (*)     Absolute Lymphocytes 2.6      Absolute Monocytes 2.2 (*)     Absolute Eosinophils 0.0      Absolute Basophils 0.1      Absolute Immature Granulocytes 0.1      Absolute NRBCs 0.0     ROUTINE UA WITH MICROSCOPIC REFLEX TO CULTURE   MAGNESIUM       Data  reviewed today:  I personally reviewed the abdominal CT image(s) showing cirrhosis and chronic pancreatitis.

## 2023-01-27 NOTE — ED PROVIDER NOTES
History     Chief Complaint:  Abdominal Pain       HPI   Audelia Comer is a 57 year old female who presents with right upper quadrant pain.   she had gone to the hospital and was diagnosed with C. difficile.  She had been taking vancomycin but has had some continued diarrhea.  Tonight's while on the toilet she sneezed and had sudden pain in the right upper quadrant and felt a bulge.  She states that she had continued pain and a bulge in that area since.  She has noted that her diarrhea seems to have slowed since this is happened.      Review of External Notes: Discharge summary 23    ROS:  Review of Systems    Allergies:  No Known Allergies     Medications:    acetaminophen (TYLENOL) 325 MG tablet  azelastine (ASTELIN) 0.1 % nasal spray  cetirizine (ZYRTEC) 10 MG tablet  fluticasone (FLONASE) 50 MCG/ACT nasal spray  folic acid (FOLVITE) 1 MG tablet  ibuprofen (ADVIL/MOTRIN) 400 MG tablet  mirtazapine (REMERON) 15 MG tablet  multivitamin w/minerals (THERA-VIT-M) tablet  ondansetron (ZOFRAN ODT) 4 MG ODT tab  pantoprazole (PROTONIX) 40 MG EC tablet  potassium chloride ER (K-TAB) 20 MEQ CR tablet  QUEtiapine (SEROQUEL) 25 MG tablet  QUEtiapine (SEROQUEL) 25 MG tablet  QUEtiapine (SEROQUEL) 25 MG tablet  thiamine (B-1) 100 MG tablet        Past Medical History:    Past Medical History:   Diagnosis Date     Anxiety      Depressive disorder      Invasive ductal carcinoma of breast, right (H)      SBO (small bowel obstruction) (H)        Past Surgical History:    Past Surgical History:   Procedure Laterality Date     APPENDECTOMY       APPENDECTOMY  1989      SECTION  1984     GYN SURGERY      c section      MASTECTOMY, BILATERAL Bilateral     With reconstructive surgery     SOFT TISSUE SURGERY      breast implants        Family History:    family history includes Breast Cancer in her paternal grandmother; Colon Cancer in her maternal grandmother; Lung Cancer in her paternal  "grandmother.    Social History:   reports that she has been smoking. She has been smoking an average of 1 pack per day. She has never used smokeless tobacco. She reports current alcohol use. She reports that she does not use drugs.  PCP: Randee Woods     Physical Exam     Patient Vitals for the past 24 hrs:   BP Temp Temp src Pulse Resp SpO2 Height Weight   01/26/23 1616 95/65 99.5  F (37.5  C) Temporal 116 16 98 % 1.676 m (5' 6\") 45.4 kg (100 lb)        Physical Exam  Head: No signs of trauma.   CV: Normal rate and regular rhythm.    Resp: Effort normal and breath sounds normal. No respiratory distress.   GI: Soft. There is RUQ tenderness with a moveable mass.  No rebound or guarding.  Normal bowel sounds.  No CVA tenderness.  MSK: Normal range of motion.   Neuro: The patient is alert and oriented. Speech normal.  Skin: Skin is warm and dry. No rash noted.   Psych: normal mood and affect. behavior is normal.       Emergency Department Course     Imaging:  US Abdomen Limited   Final Result   IMPRESSION:   1.  Geographic echogenic and hypoechoic areas in the liver, likely   related to patchy hepatic steatosis, better assessed by the CT today.   Coarsened hepatic parenchyma with lobulated contour suggestive of   cirrhosis.   2.  Distended gallbladder. No cholelithiasis or acute cholecystitis.   3.  Mildly distended common hepatic duct measuring 7 mm, stable. No   obstructing calculi in the visualized duct. No obstructing calculi or   mass was visualized on the CT earlier today. If clinically indicated,   an MRCP can be considered.   4.  Sequela of chronic pancreatitis, better assessed by CT earlier.   5.  Stable mild dilatation of the right renal pelvis, either due to   mild hydronephrosis or pelviectasis.      ANYA BROWN MD            SYSTEM ID:  [id number]      CT Abdomen Pelvis w Contrast   Final Result   IMPRESSION:    1.  Cirrhotic configuration of the liver with lobulated contour. Geographic fatty sparing " involving the hepatic parenchyma anteriorly extending inferiorly. Tiny amount of ascites.      2.  Changes of chronic pancreatitis. No discrete mass.      3.  Scattered colonic diverticulosis, more apparent distally, without acute inflammation. No mechanical obstruction or free gas.               Report per radiology    Laboratory:  Labs Ordered and Resulted from Time of ED Arrival to Time of ED Departure   COMPREHENSIVE METABOLIC PANEL - Abnormal       Result Value    Sodium 126 (*)     Potassium 4.0      Chloride 85 (*)     Carbon Dioxide (CO2) 26      Anion Gap 15      Urea Nitrogen 9.3      Creatinine 0.40 (*)     Calcium 9.0      Glucose 107 (*)     Alkaline Phosphatase 408 (*)     AST 75 (*)     ALT 28      Protein Total 8.0      Albumin 3.4 (*)     Bilirubin Total 1.8 (*)     GFR Estimate >90     ROUTINE UA WITH MICROSCOPIC REFLEX TO CULTURE - Abnormal    Color Urine Yellow      Appearance Urine Clear      Glucose Urine Negative      Bilirubin Urine Negative      Ketones Urine 20 (*)     Specific Gravity Urine 1.005      Blood Urine Negative      pH Urine 6.5      Protein Albumin Urine Negative      Urobilinogen Urine 4.0 (*)     Nitrite Urine Negative      Leukocyte Esterase Urine Moderate (*)     Mucus Urine Present (*)     RBC Urine 2      WBC Urine 23 (*)     Squamous Epithelials Urine 8 (*)     Transitional Epithelials Urine 1     CBC WITH PLATELETS AND DIFFERENTIAL - Abnormal    WBC Count 15.5 (*)     RBC Count 3.74 (*)     Hemoglobin 11.8      Hematocrit 35.7      MCV 96      MCH 31.6      MCHC 33.1      RDW 15.9 (*)     Platelet Count 401      % Neutrophils 68      % Lymphocytes 17      % Monocytes 14      % Eosinophils 0      % Basophils 0      % Immature Granulocytes 1      NRBCs per 100 WBC 0      Absolute Neutrophils 10.5 (*)     Absolute Lymphocytes 2.6      Absolute Monocytes 2.2 (*)     Absolute Eosinophils 0.0      Absolute Basophils 0.1      Absolute Immature Granulocytes 0.1      Absolute  NRBCs 0.0     MAGNESIUM - Normal    Magnesium 1.7     URINE CULTURE            Emergency Department Course & Assessments:             Interventions:  Medications   lactated ringers BOLUS 1,000 mL (has no administration in time range)     Followed by   lactated ringers infusion (has no administration in time range)          Consultations/Discussion of Management or Tests:  Dr. Zacarias        Assessments:    Disposition:  The patient was discharged to home.     Impression & Plan      Medical Decision Making:  Pt presents with abdominal pain.  She was diagnosed with C.Diff a few weeks ago and was seen at [facility].  She had been discharged to continue home care.  She reports she has had continued diarrhea.  Tonight she was having diarrhea and sneezed and had sudden RUQ pain and felt a bulge.  On my exam I did feel a mobile bulge in the RUQ.  Blood work was obtained that showed a low sodium and elevated WBC.  CT scan showed a nodule liver, but no signs of hernia or significant acute process.  Given the hyponatremia with continued diarrhea, pt was admitted for continued monitoring and hydration.  The cause of the pt's pain with sneezing may have been a rectus strain.       Diagnosis:    ICD-10-CM    1. Hyponatremia  E87.1       2. Diarrhea of presumed infectious origin  R19.7       3. RUQ abdominal pain  R10.11                 Venancio Montero MD  01/27/23 1123

## 2023-01-27 NOTE — ED NOTES
[facility]  ED Nurse Handoff Report    ED Chief complaint: Abdominal Pain      ED Diagnosis:   Final diagnoses:   None       Code Status: Full Code    Allergies: No Known Allergies    Patient Story: Pt brought in by ambulance with c/o RLQ abd pain. Pt reports pain is 10/10. Pt recently discharged from [facility] for Tx of C. Diff.   Focused Assessment:  Pt reports 10/10 RLQ pain and shoulder pain     Labs Ordered and Resulted from Time of ED Arrival to Time of ED Departure   COMPREHENSIVE METABOLIC PANEL - Abnormal       Result Value    Sodium 126 (*)     Potassium 4.0      Chloride 85 (*)     Carbon Dioxide (CO2) 26      Anion Gap 15      Urea Nitrogen 9.3      Creatinine 0.40 (*)     Calcium 9.0      Glucose 107 (*)     Alkaline Phosphatase 408 (*)     AST 75 (*)     ALT 28      Protein Total 8.0      Albumin 3.4 (*)     Bilirubin Total 1.8 (*)     GFR Estimate >90     CBC WITH PLATELETS AND DIFFERENTIAL - Abnormal    WBC Count 15.5 (*)     RBC Count 3.74 (*)     Hemoglobin 11.8      Hematocrit 35.7      MCV 96      MCH 31.6      MCHC 33.1      RDW 15.9 (*)     Platelet Count 401      % Neutrophils 68      % Lymphocytes 17      % Monocytes 14      % Eosinophils 0      % Basophils 0      % Immature Granulocytes 1      NRBCs per 100 WBC 0      Absolute Neutrophils 10.5 (*)     Absolute Lymphocytes 2.6      Absolute Monocytes 2.2 (*)     Absolute Eosinophils 0.0      Absolute Basophils 0.1      Absolute Immature Granulocytes 0.1      Absolute NRBCs 0.0     ROUTINE UA WITH MICROSCOPIC REFLEX TO CULTURE     CT Abdomen Pelvis w Contrast   Final Result   IMPRESSION:    1.  Cirrhotic configuration of the liver with lobulated contour. Geographic fatty sparing involving the hepatic parenchyma anteriorly extending inferiorly. Tiny amount of ascites.      2.  Changes of chronic pancreatitis. No discrete mass.      3.  Scattered colonic diverticulosis, more apparent distally, without acute inflammation. No  "mechanical obstruction or free gas.                  Treatments and/or interventions provided: 0.5mg Dilaudid x2, 1L LR, CT, Labs  Patient's response to treatments and/or interventions: Pt reports decrease in pain     To be done/followed up on inpatient unit:  Per admitting     Does this patient have any cognitive concerns?: AxO x4    Activity level - Baseline/Home:  Independent  Activity Level - Current:   Independent    Patient's Preferred language: English   Needed?: No    Isolation: None  Infection: Not Applicable  Patient tested for COVID 19 prior to admission: YES  Bariatric?: No    Vital Signs:   Vitals:    01/26/23 1616   BP: 95/65   Pulse: 116   Resp: 16   Temp: 99.5  F (37.5  C)   TempSrc: Temporal   SpO2: 98%   Weight: 45.4 kg (100 lb)   Height: 1.676 m (5' 6\")       Cardiac Rhythm:     Was the PSS-3 completed:   Yes  What interventions are required if any?               Family Comments: n/a  OBS brochure/video discussed/provided to patient/family: N/A              Name of person given brochure if not patient: n/a              Relationship to patient: n/a    For the majority of the shift this patient's behavior was Green.   Behavioral interventions performed were Care and rounding.    ED NURSE PHONE NUMBER: *[phone]         "

## 2023-01-27 NOTE — PHARMACY-ADMISSION MEDICATION HISTORY
Pharmacy Medication History  Admission medication history interview status for the 1/27/2023  admission is complete. See EPIC admission navigator for prior to admission medications     Location of Interview: Phone  Medication history sources: Patient and Surescripts    Significant changes made to the medication list:  Deleted MVI and KCL    In the past week, patient estimated taking medication this percent of the time: 50-90% due to illness    Medication Affordability:  Not including over the counter (OTC) medications, was there a time in the past 12 months when you did not take your medications as prescribed because of cost?: No    Additional medication history information:   none    Medication reconciliation completed by provider prior to medication history? No    Time spent in this activity: 20 minutes    Prior to Admission medications    Medication Sig Last Dose Taking? Auth Provider Long Term End Date   acetaminophen (TYLENOL) 325 MG tablet Take 2 tablets (650 mg) by mouth every 6 hours as needed for mild pain Do not take more than 2000 mg per day. Alternate with ibuprofen. Past Month Yes Jonathon Narvaez DO     folic acid (FOLVITE) 1 MG tablet Take 1 tablet (1 mg) by mouth daily Past Week Yes Jonathon Narvaez DO     mirtazapine (REMERON) 15 MG tablet Take 30 mg by mouth At Bedtime 1/26/2023 at pm Yes Unknown, Entered By History Yes 4/19/23   ondansetron (ZOFRAN ODT) 4 MG ODT tab Take 1 tablet (4 mg) by mouth every 6 hours as needed for nausea 1/26/2023 Yes Neal Cabrera DO     QUEtiapine (SEROQUEL) 25 MG tablet Take 1 tablet (25 mg) by mouth daily  Patient taking differently: Take 25 mg by mouth every morning 1/26/2023 at am Yes Neal Cabrera, DO Yes    QUEtiapine (SEROQUEL) 25 MG tablet Take 25-50 mg by mouth daily as needed (anxiety) In the afternoon Past Week at 1400 Yes Unknown, Entered By History Yes    QUEtiapine (SEROQUEL) 25 MG tablet Take 100 mg by mouth At Bedtime 1/26/2023 at hs Yes Unknown,  Entered By History Yes    thiamine (B-1) 100 MG tablet Take 1 tablet (100 mg) by mouth daily 1/26/2023 at am Yes Jonathon Narvaez, DO     azelastine (ASTELIN) 0.1 % nasal spray Spray 1 spray into both nostrils daily as needed  prn  Unknown, Entered By History     cetirizine (ZYRTEC) 10 MG tablet Take 10 mg by mouth daily as needed  prn  Unknown, Entered By History     fluticasone (FLONASE) 50 MCG/ACT nasal spray Spray 1 spray into both nostrils daily as needed  prn  Unknown, Entered By History     ibuprofen (ADVIL/MOTRIN) 400 MG tablet Take 1 tablet (400 mg) by mouth every 6 hours as needed for moderate pain prn  Jonathon Narvaez DO     pantoprazole (PROTONIX) 40 MG EC tablet Take 1 tablet (40 mg) by mouth daily as needed prn  Amarilis Blancas PA-C         The information provided in this note is only as accurate as the sources available at the time of update(s)

## 2023-01-28 LAB
ALBUMIN SERPL BCG-MCNC: 2.3 G/DL (ref 3.5–5.2)
ALBUMIN SERPL BCG-MCNC: 2.3 G/DL (ref 3.5–5.2)
ALP SERPL-CCNC: 260 U/L (ref 35–104)
ALP SERPL-CCNC: 265 U/L (ref 35–104)
ALT SERPL W P-5'-P-CCNC: 18 U/L (ref 10–35)
ALT SERPL W P-5'-P-CCNC: 19 U/L (ref 10–35)
ANION GAP SERPL CALCULATED.3IONS-SCNC: 10 MMOL/L (ref 7–15)
ANION GAP SERPL CALCULATED.3IONS-SCNC: 7 MMOL/L (ref 7–15)
AST SERPL W P-5'-P-CCNC: 55 U/L (ref 10–35)
AST SERPL W P-5'-P-CCNC: 55 U/L (ref 10–35)
BASOPHILS # BLD AUTO: 0 10E3/UL (ref 0–0.2)
BASOPHILS NFR BLD AUTO: 1 %
BILIRUB SERPL-MCNC: 0.6 MG/DL
BILIRUB SERPL-MCNC: 0.9 MG/DL
BUN SERPL-MCNC: 3.8 MG/DL (ref 8–23)
BUN SERPL-MCNC: 4.5 MG/DL (ref 8–23)
CALCIUM SERPL-MCNC: 7.9 MG/DL (ref 8.6–10)
CALCIUM SERPL-MCNC: 8.2 MG/DL (ref 8.6–10)
CHLORIDE SERPL-SCNC: 101 MMOL/L (ref 98–107)
CHLORIDE SERPL-SCNC: 97 MMOL/L (ref 98–107)
CREAT SERPL-MCNC: 0.37 MG/DL (ref 0.51–0.95)
CREAT SERPL-MCNC: 0.38 MG/DL (ref 0.51–0.95)
DEPRECATED HCO3 PLAS-SCNC: 27 MMOL/L (ref 22–29)
DEPRECATED HCO3 PLAS-SCNC: 28 MMOL/L (ref 22–29)
EOSINOPHIL # BLD AUTO: 0 10E3/UL (ref 0–0.7)
EOSINOPHIL NFR BLD AUTO: 1 %
ERYTHROCYTE [DISTWIDTH] IN BLOOD BY AUTOMATED COUNT: 16 % (ref 10–15)
ERYTHROCYTE [DISTWIDTH] IN BLOOD BY AUTOMATED COUNT: 16.2 % (ref 10–15)
GFR SERPL CREATININE-BSD FRML MDRD: >90 ML/MIN/1.73M2
GFR SERPL CREATININE-BSD FRML MDRD: >90 ML/MIN/1.73M2
GLUCOSE SERPL-MCNC: 110 MG/DL (ref 70–99)
GLUCOSE SERPL-MCNC: 86 MG/DL (ref 70–99)
HCT VFR BLD AUTO: 27.8 % (ref 35–47)
HCT VFR BLD AUTO: 28.5 % (ref 35–47)
HGB BLD-MCNC: 9.3 G/DL (ref 11.7–15.7)
HGB BLD-MCNC: 9.3 G/DL (ref 11.7–15.7)
IMM GRANULOCYTES # BLD: 0 10E3/UL
IMM GRANULOCYTES NFR BLD: 0 %
IRON BINDING CAPACITY (ROCHE): 112 UG/DL (ref 240–430)
IRON SATN MFR SERPL: 38 % (ref 15–46)
IRON SERPL-MCNC: 43 UG/DL (ref 37–145)
LIPASE SERPL-CCNC: 52 U/L (ref 13–60)
LYMPHOCYTES # BLD AUTO: 2.2 10E3/UL (ref 0.8–5.3)
LYMPHOCYTES NFR BLD AUTO: 34 %
MCH RBC QN AUTO: 31.5 PG (ref 26.5–33)
MCH RBC QN AUTO: 31.8 PG (ref 26.5–33)
MCHC RBC AUTO-ENTMCNC: 32.6 G/DL (ref 31.5–36.5)
MCHC RBC AUTO-ENTMCNC: 33.5 G/DL (ref 31.5–36.5)
MCV RBC AUTO: 94 FL (ref 78–100)
MCV RBC AUTO: 98 FL (ref 78–100)
MONOCYTES # BLD AUTO: 1 10E3/UL (ref 0–1.3)
MONOCYTES NFR BLD AUTO: 16 %
NEUTROPHILS # BLD AUTO: 3.1 10E3/UL (ref 1.6–8.3)
NEUTROPHILS NFR BLD AUTO: 48 %
NRBC # BLD AUTO: 0 10E3/UL
NRBC BLD AUTO-RTO: 0 /100
PLATELET # BLD AUTO: 245 10E3/UL (ref 150–450)
PLATELET # BLD AUTO: 246 10E3/UL (ref 150–450)
POTASSIUM SERPL-SCNC: 2.8 MMOL/L (ref 3.4–5.3)
POTASSIUM SERPL-SCNC: 3.6 MMOL/L (ref 3.4–5.3)
POTASSIUM SERPL-SCNC: 3.6 MMOL/L (ref 3.4–5.3)
PROT SERPL-MCNC: 5.5 G/DL (ref 6.4–8.3)
PROT SERPL-MCNC: 5.6 G/DL (ref 6.4–8.3)
RBC # BLD AUTO: 2.92 10E6/UL (ref 3.8–5.2)
RBC # BLD AUTO: 2.95 10E6/UL (ref 3.8–5.2)
SODIUM SERPL-SCNC: 134 MMOL/L (ref 136–145)
SODIUM SERPL-SCNC: 136 MMOL/L (ref 136–145)
WBC # BLD AUTO: 6.5 10E3/UL (ref 4–11)
WBC # BLD AUTO: 7.2 10E3/UL (ref 4–11)

## 2023-01-28 PROCEDURE — 82105 ALPHA-FETOPROTEIN SERUM: CPT | Performed by: PHYSICIAN ASSISTANT

## 2023-01-28 PROCEDURE — 99232 SBSQ HOSP IP/OBS MODERATE 35: CPT | Performed by: INTERNAL MEDICINE

## 2023-01-28 PROCEDURE — 84155 ASSAY OF PROTEIN SERUM: CPT | Performed by: INTERNAL MEDICINE

## 2023-01-28 PROCEDURE — 250N000013 HC RX MED GY IP 250 OP 250 PS 637: Performed by: INTERNAL MEDICINE

## 2023-01-28 PROCEDURE — 96376 TX/PRO/DX INJ SAME DRUG ADON: CPT

## 2023-01-28 PROCEDURE — 250N000013 HC RX MED GY IP 250 OP 250 PS 637: Performed by: PHYSICIAN ASSISTANT

## 2023-01-28 PROCEDURE — 85025 COMPLETE CBC W/AUTO DIFF WBC: CPT | Performed by: HOSPITALIST

## 2023-01-28 PROCEDURE — 258N000003 HC RX IP 258 OP 636: Performed by: INTERNAL MEDICINE

## 2023-01-28 PROCEDURE — 99207 PR NO CHARGE LOS: CPT | Performed by: PHYSICIAN ASSISTANT

## 2023-01-28 PROCEDURE — G0378 HOSPITAL OBSERVATION PER HR: HCPCS

## 2023-01-28 PROCEDURE — 83690 ASSAY OF LIPASE: CPT | Performed by: INTERNAL MEDICINE

## 2023-01-28 PROCEDURE — 84450 TRANSFERASE (AST) (SGOT): CPT | Performed by: INTERNAL MEDICINE

## 2023-01-28 PROCEDURE — 250N000011 HC RX IP 250 OP 636: Performed by: INTERNAL MEDICINE

## 2023-01-28 PROCEDURE — 250N000013 HC RX MED GY IP 250 OP 250 PS 637: Performed by: HOSPITALIST

## 2023-01-28 PROCEDURE — 36415 COLL VENOUS BLD VENIPUNCTURE: CPT | Performed by: HOSPITALIST

## 2023-01-28 PROCEDURE — 258N000003 HC RX IP 258 OP 636: Performed by: HOSPITALIST

## 2023-01-28 PROCEDURE — 83550 IRON BINDING TEST: CPT | Performed by: PHYSICIAN ASSISTANT

## 2023-01-28 PROCEDURE — 82728 ASSAY OF FERRITIN: CPT | Performed by: PHYSICIAN ASSISTANT

## 2023-01-28 PROCEDURE — 85027 COMPLETE CBC AUTOMATED: CPT | Performed by: INTERNAL MEDICINE

## 2023-01-28 PROCEDURE — 36415 COLL VENOUS BLD VENIPUNCTURE: CPT | Performed by: INTERNAL MEDICINE

## 2023-01-28 PROCEDURE — 80053 COMPREHEN METABOLIC PANEL: CPT | Performed by: HOSPITALIST

## 2023-01-28 RX ORDER — POTASSIUM CHLORIDE 1500 MG/1
40 TABLET, EXTENDED RELEASE ORAL ONCE
Status: COMPLETED | OUTPATIENT
Start: 2023-01-28 | End: 2023-01-28

## 2023-01-28 RX ORDER — LIDOCAINE 40 MG/G
CREAM TOPICAL
Status: CANCELLED | OUTPATIENT
Start: 2023-01-28

## 2023-01-28 RX ORDER — OXYCODONE HYDROCHLORIDE 5 MG/1
5-10 TABLET ORAL EVERY 4 HOURS PRN
Status: DISCONTINUED | OUTPATIENT
Start: 2023-01-28 | End: 2023-01-28

## 2023-01-28 RX ORDER — NALOXONE HYDROCHLORIDE 0.4 MG/ML
0.4 INJECTION, SOLUTION INTRAMUSCULAR; INTRAVENOUS; SUBCUTANEOUS
Status: DISCONTINUED | OUTPATIENT
Start: 2023-01-28 | End: 2023-02-03 | Stop reason: HOSPADM

## 2023-01-28 RX ORDER — NALOXONE HYDROCHLORIDE 0.4 MG/ML
0.2 INJECTION, SOLUTION INTRAMUSCULAR; INTRAVENOUS; SUBCUTANEOUS
Status: DISCONTINUED | OUTPATIENT
Start: 2023-01-28 | End: 2023-02-03 | Stop reason: HOSPADM

## 2023-01-28 RX ORDER — NICOTINE 21 MG/24HR
1 PATCH, TRANSDERMAL 24 HOURS TRANSDERMAL DAILY
Status: DISCONTINUED | OUTPATIENT
Start: 2023-01-28 | End: 2023-02-03 | Stop reason: HOSPADM

## 2023-01-28 RX ORDER — SODIUM CHLORIDE, SODIUM LACTATE, POTASSIUM CHLORIDE, CALCIUM CHLORIDE 600; 310; 30; 20 MG/100ML; MG/100ML; MG/100ML; MG/100ML
INJECTION, SOLUTION INTRAVENOUS CONTINUOUS
Status: DISCONTINUED | OUTPATIENT
Start: 2023-01-28 | End: 2023-01-29

## 2023-01-28 RX ORDER — OXYCODONE HYDROCHLORIDE 5 MG/1
5-10 TABLET ORAL EVERY 4 HOURS PRN
Status: DISCONTINUED | OUTPATIENT
Start: 2023-01-28 | End: 2023-02-03 | Stop reason: HOSPADM

## 2023-01-28 RX ADMIN — MELATONIN TAB 3 MG 3 MG: 3 TAB at 21:04

## 2023-01-28 RX ADMIN — OXYCODONE HYDROCHLORIDE 10 MG: 5 TABLET ORAL at 18:24

## 2023-01-28 RX ADMIN — SODIUM CHLORIDE, POTASSIUM CHLORIDE, SODIUM LACTATE AND CALCIUM CHLORIDE: 600; 310; 30; 20 INJECTION, SOLUTION INTRAVENOUS at 20:34

## 2023-01-28 RX ADMIN — QUETIAPINE FUMARATE 100 MG: 100 TABLET ORAL at 21:01

## 2023-01-28 RX ADMIN — IBUPROFEN 600 MG: 600 TABLET ORAL at 02:17

## 2023-01-28 RX ADMIN — HYDROMORPHONE HYDROCHLORIDE 0.2 MG: 0.2 INJECTION, SOLUTION INTRAMUSCULAR; INTRAVENOUS; SUBCUTANEOUS at 05:09

## 2023-01-28 RX ADMIN — SODIUM CHLORIDE, POTASSIUM CHLORIDE, SODIUM LACTATE AND CALCIUM CHLORIDE 125 ML/HR: 600; 310; 30; 20 INJECTION, SOLUTION INTRAVENOUS at 09:17

## 2023-01-28 RX ADMIN — QUETIAPINE FUMARATE 25 MG: 25 TABLET ORAL at 09:11

## 2023-01-28 RX ADMIN — HYDROMORPHONE HYDROCHLORIDE 0.2 MG: 0.2 INJECTION, SOLUTION INTRAMUSCULAR; INTRAVENOUS; SUBCUTANEOUS at 00:07

## 2023-01-28 RX ADMIN — NICOTINE 1 PATCH: 21 PATCH, EXTENDED RELEASE TRANSDERMAL at 20:29

## 2023-01-28 RX ADMIN — OXYCODONE HYDROCHLORIDE 10 MG: 5 TABLET ORAL at 14:37

## 2023-01-28 RX ADMIN — SODIUM CHLORIDE, POTASSIUM CHLORIDE, SODIUM LACTATE AND CALCIUM CHLORIDE 125 ML/HR: 600; 310; 30; 20 INJECTION, SOLUTION INTRAVENOUS at 02:18

## 2023-01-28 RX ADMIN — POTASSIUM CHLORIDE 40 MEQ: 1500 TABLET, EXTENDED RELEASE ORAL at 09:17

## 2023-01-28 RX ADMIN — MIRTAZAPINE 30 MG: 30 TABLET, FILM COATED ORAL at 21:01

## 2023-01-28 RX ADMIN — IBUPROFEN 600 MG: 600 TABLET ORAL at 11:49

## 2023-01-28 RX ADMIN — IBUPROFEN 600 MG: 600 TABLET ORAL at 18:24

## 2023-01-28 RX ADMIN — OXYCODONE HYDROCHLORIDE 5 MG: 5 TABLET ORAL at 09:11

## 2023-01-28 RX ADMIN — OXYCODONE HYDROCHLORIDE 10 MG: 5 TABLET ORAL at 22:34

## 2023-01-28 RX ADMIN — FOLIC ACID 1 MG: 1 TABLET ORAL at 09:11

## 2023-01-28 ASSESSMENT — ACTIVITIES OF DAILY LIVING (ADL)
ADLS_ACUITY_SCORE: 33

## 2023-01-28 NOTE — PROVIDER NOTIFICATION
MD Notification    Notified Person: MD    Notified Person Name: Dr Levy    Notification Date/Time: 1/28/2023  8:04 AM    Notification Interaction: Text paged    Purpose of Notification: Pt is requesting pain meds, IV dilaudid is the only option, wondering if we can have oral meds available?    Orders Received:    Comments:

## 2023-01-28 NOTE — PROGRESS NOTES
diagnostic tests and consults completed and resulted  no  -vital signs normal or at patient baseline  Yes  -tolerating oral intake to maintain hydration Yes  -adequate pain control on oral analgesics  Yes  -returns to baseline functional status  Yes

## 2023-01-28 NOTE — PROGRESS NOTES
Orientation/Cognitive: A&Ox4.  Observation Goals (Met/ Not Met): Not met  Mobility Level/Assist Equipment: IND  Fall Risk (Y/N): N  Behavior Concerns: N/A  Pain Management: Pain reported in RUQ abdomen being relieved by PRN Ibuprofen and IV diluadid. C/o spasms intermittently.  Tele/VS/O2: VSS. RA  ABNL Lab/BG: Na+= 126  Diet: Regular  Bowel/Bladder: Continent--not currently experiencing diarrhea overnight.  Skin Concerns: None noted  Drains/Devices: LR @ 125mL/hr  Tests/Procedures for next shift: Nutrition consult, consider GI consult if labs worsen.  Anticipated DC date & active delays: TBD  Patient Stated Goal for Today: Rest

## 2023-01-28 NOTE — CONSULTS
[facility]  Gastroenterology Consultation         Audelia Comer  [address]   [facility] [id number]  57 year old female    Admission Date/Time: 1/27/2023  Primary Care Provider: Randee Woods  Referring / Attending Physician:  Rashida Levy MD    We were asked to see the patient in consultation by Dr. Rashida Levy for evaluation of acute abdominal pain in patient with known history of liver cirrhosis and recent C. Diff.     CC: Acute RUQ abdominal pain and ongoing diarrhea    HPI:  Audelia Comer is a markedly pleasant 57 year old woman with past medical history that is most notable for severe alcohol use disorder and chronic alcoholic pancreatitis, as well as recent C difficile colitis, among others; who presents with ongoing diarrhea and acute RUQ abdominal pain and was found to have acute hypochloremic hyponatremia.    Prior to this admission, on 12/31/22 she had gone to the hospital and was diagnosed with C. difficile.  She completed course of vancomycin but has had some continued diarrhea. Day of admission, while on the toilet she sneezed and had sudden pain in the right upper quadrant and felt a bulge.  She states that she had continued pain and a bulge in that area since.  She has noted that her diarrhea seems to have slowed since this is happened.    Of note, Ms. Comer has a history of alcoholic liver disease and chronic pancreatitis. She has been admitted for acute pancreatitis  in 10/2021, 11/2021, 7/2022, and again in 10/2022. Most recently, she was admitted to [facility] from 12/31/22 through 1/5/23 for diarrhea, found to be due to C difficile colitis. She was also found to be COVID positive at that time. She has now completed two weeks of QID oral Vancocin but reports ongoing diarrhea.     ED Course: afebrile (T 99.5). She has acute leukocytosis, with acute hyponatremia and transaminitis. CT of abdomen and pelvis shows changes of chronic cirrhosis and  "chronic pancreatitis, without signs of other acute pathology. In ED, suspected she has musculoskeletal strain as the cause of her acute pain. Her diarrhea seems to have subsided. RUQ ultrasound showing \"1. Geographic echogenic and hypoechoic areas in the liver, likely related to patchy hepatic steatosis, better assessed by the CT today. Coarsened hepatic parenchyma with lobulated contour suggestive of cirrhosis. 2.  Distended gallbladder. No cholelithiasis or acute cholecystitis. 3.  Mildly distended common hepatic duct measuring 7 mm, stable. No obstructing calculi in the visualized duct. No obstructing calculi or mass was visualized on the CT earlier today. If clinically indicated, an MRCP can be considered. 4. Sequela of chronic pancreatitis, better assessed by CT earlier. 5.  Stable mild dilatation of the right renal pelvis, either due to mild hydronephrosis or pelviectasis.    Interim Visit (1/28/23): patient reports continued loose stools x 2 today and that the RN told her they would still try to collect a C. Diff test. She continues to have RUQ abdominal pain along the lower right ribs. She denies nausea or vomiting. Lipase has been normal. Sodium improved at 134. Potassium low at 2.8. ALP improved at 265<408. AST improved at 55<75. ALT normal. Patient's tenderness is mostly directly over the lower ribs and not directly below. I discussed possibility of costochondritis, slipped rib, or even gallbladder disorder. Will recommend EGD/EUS for further evaluation of liver, gallbladder, bile ducts and pancreas. Patient is agreeable. She reports never having a colonoscopy.     ROS: A comprehensive ten point review of systems was negative aside from those in mentioned in the HPI.      PAST MED HX:  I have reviewed this patient's medical history and updated it with pertinent information if needed.   Past Medical History:   Diagnosis Date     Alcoholic liver disease (H)      Anxiety      C. difficile colitis 01/2023     " Chronic pancreatitis (H)      Depressive disorder      Invasive ductal carcinoma of breast, right (H)      SBO (small bowel obstruction) (H)        MEDICATIONS:   Prior to Admission Medications   Prescriptions Last Dose Informant Patient Reported? Taking?   QUEtiapine (SEROQUEL) 25 MG tablet Past Week at 1400 Self Yes Yes   Sig: Take 25-50 mg by mouth daily as needed (anxiety) In the afternoon   QUEtiapine (SEROQUEL) 25 MG tablet 1/26/2023 at hs Self Yes Yes   Sig: Take 100 mg by mouth At Bedtime   QUEtiapine (SEROQUEL) 25 MG tablet 1/26/2023 at am Self No Yes   Sig: Take 1 tablet (25 mg) by mouth daily   Patient taking differently: Take 25 mg by mouth every morning   acetaminophen (TYLENOL) 325 MG tablet Past Month Self No Yes   Sig: Take 2 tablets (650 mg) by mouth every 6 hours as needed for mild pain Do not take more than 2000 mg per day. Alternate with ibuprofen.   azelastine (ASTELIN) 0.1 % nasal spray prn Self Yes No   Sig: Spray 1 spray into both nostrils daily as needed    cetirizine (ZYRTEC) 10 MG tablet prn Self Yes No   Sig: Take 10 mg by mouth daily as needed    fluticasone (FLONASE) 50 MCG/ACT nasal spray prn Self Yes No   Sig: Spray 1 spray into both nostrils daily as needed    folic acid (FOLVITE) 1 MG tablet Past Week Self No Yes   Sig: Take 1 tablet (1 mg) by mouth daily   ibuprofen (ADVIL/MOTRIN) 400 MG tablet prn Self No No   Sig: Take 1 tablet (400 mg) by mouth every 6 hours as needed for moderate pain   mirtazapine (REMERON) 15 MG tablet 1/26/2023 at pm Self Yes Yes   Sig: Take 30 mg by mouth At Bedtime   ondansetron (ZOFRAN ODT) 4 MG ODT tab 1/26/2023 Self No Yes   Sig: Take 1 tablet (4 mg) by mouth every 6 hours as needed for nausea   pantoprazole (PROTONIX) 40 MG EC tablet prn Self No No   Sig: Take 1 tablet (40 mg) by mouth daily as needed   thiamine (B-1) 100 MG tablet 1/26/2023 at am Self No Yes   Sig: Take 1 tablet (100 mg) by mouth daily      Facility-Administered Medications: None        ALLERGIES: No Known Allergies    SOCIAL HISTORY:  Social History     Tobacco Use     Smoking status: Every Day     Packs/day: 1.00     Types: Cigarettes     Smokeless tobacco: Never   Substance Use Topics     Alcohol use: Yes     Comment: Vague about intake.     Drug use: No       FAMILY HISTORY:  Family History   Problem Relation Age of Onset     Colon Cancer Maternal Grandmother      Lung Cancer Paternal Grandmother      Breast Cancer Paternal Grandmother        PHYSICAL EXAM:   Vital Signs with Ranges  Temp: 97.7  F (36.5  C) Temp src: Oral BP: 105/67 Pulse: 93   Resp: 16 SpO2: 92 % O2 Device: None (Room air)    No intake/output data recorded.     Constitutional: Awake, alert, cooperative, no apparent distress.  Eyes: Conjunctiva normal. PERRLA.   HEENT: Moist mucous membranes, normal dentition.  Respiratory: Clear to auscultation bilaterally, no crackles, rales, or wheezing.  Cardiovascular: Regular rate and rhythm, normal S1 and S2. No murmurs, clicks or rubs noted.  Chest: No scars or abnormalities.   GI: Soft, mildly distended. Tender over lower right rib cage, liver edge extends below rib cage and hepatomegaly present, normal bowel sounds.   Lymph/Hematologic: No anterior cervical or supraclavicular adenopathy.  Skin: No rashes, no cyanosis, no edema bilaterally in lower extremities.   Musculoskeletal: No joint swelling, erythema or tenderness.  Psychiatric: Alert, oriented to person, place and time. Good eye contact, normal mood and affect.     ADDITIONAL COMMENTS:     Recent Labs   Lab Test 01/28/23  0642 01/27/23 0228 01/03/23  0617   WBC 6.5 15.5* 8.3   HGB 9.3* 11.8 8.5*   MCV 94 96 100    401 266     Recent Labs   Lab Test 01/28/23  0642 01/27/23 0228 01/05/23  0652 01/04/23  0655 01/03/23  0617   POTASSIUM 2.8* 4.0 4.4   < > 4.0   CHLORIDE 97* 85*  --   --  102   CO2 27 26  --   --  22   BUN 4.5* 9.3  --   --  4.9*   ANIONGAP 10 15  --   --  9    < > = values in this interval not  displayed.     Recent Labs   Lab Test 01/28/23  0642 01/27/23  1003 01/27/23  0228 01/03/23  0617 01/01/23  0843 12/31/22  1746 10/04/22  0835 10/03/22  2327 10/03/22  1743 06/29/22  0932 06/29/22  0826   ALBUMIN 2.3*  --  3.4* 2.2*   < > 3.4*   < >  --  4.3   < >  --    BILITOTAL 0.9  --  1.8* 0.2   < > 0.7   < >  --  0.9   < >  --    ALT 19  --  28 17   < > 17   < >  --  72*   < >  --    AST 55*  --  75* 47*   < > 51*   < >  --  223*   < >  --    PROTEIN  --  Negative  --   --   --   --   --  50*  --   --  Negative   LIPASE 52  --   --   --   --  20  --   --  26  --   --     < > = values in this interval not displayed.       I reviewed the patient's new imaging results.    CONSULTATION ASSESSMENT AND PLAN:  Audelia Comer is a markedly pleasant 57 year old woman with past medical history that is most notable for severe alcohol use disorder and chronic alcoholic pancreatitis, as well as recent C difficile colitis, among others; who presents with ongoing diarrhea and acute RUQ abdominal pain and was found to have acute hypochloremic hyponatremia.    Of note, on 12/31 she had gone to the hospital and was diagnosed with C. difficile.  She completed course of vancomycin but has had some continued diarrhea. Day of admission, while on the toilet she sneezed and had sudden pain in the right upper quadrant and felt a bulge.  She states that she had continued pain and a bulge in that area since.  She has noted that her diarrhea seems to have slowed since this is happened.    Ongoing diarrhea and acute RUQ abdominal pain (possible     -- Observation. Regular diet. US abdomen ordered for further evaluation. Judicious doses of Ibuprofen ordered as needed for pain. Anti-emetics as needed    -- Enteric precautions ordered    -- 1/28/23- C. Diff, stool multiplex, fecal fat and pancreatic elastase ordered    History of alcoholic cirrhosis and chronic pancreatitis in setting of elevated TBil, ALP and AST  -- 1/28/23-  fecal fat  and pancreatic elastase ordered due to chronic diarrhea  --  liver cirrhosis lab workup ordered to rule out other causes other than suspected alcohol-induced cirrhosis. Unable to find any previously tested  -- Will plan for EGD and EUS tomorrow morning  -- NPO at midnight      Acute hypochloremic hyponatremia: Mild, at 126. Suspect due to hypovolemia. 125 ml/hour LR ordered, repeat CMP ordered for further evaluation.     Trans-aminitis, acute: Suspect due to cirrhosis, possibly alcoholic hepatitis. Tbili is 1.8.    -- GI consulted    -- Resume home Thiamine and PPI when verified    -- 1/28/23- Daily LFTs and INR tomorrow AM (ordered)     History of breast cancer: Status post bilateral mastectomies. Noted     Chronic depression: resume home seroquel and remeron when verified     Suspected severe protein calorie malnutrition: Nutrition services consulted.    I reviewed the patient's recent labs and imaging reports since admission.   I discussed the patient with Dr. Dickinson.     MELISSA Mcfadden PA-C [facility]  Office: [phone]  Cell : [phone] (Dr. Dickinson)  Cell: [phone] (Alaina Jackson PA-C)

## 2023-01-28 NOTE — PROGRESS NOTES
[facility]    HOSPITALIST PROGRESS NOTE :   --------------------------------------------------    Date of Admission:  1/27/2023    Cumulative Summary: Audelia Comer is a 57 year old female with past medical history that is most notable for severe alcohol use disorder and chronic alcoholic pancreatitis, as well as recent C difficile colitis, among others; who presents with ongoing diarrhea and acute abdominal pain and is found to have acute hypochloremic hyponatremia who was admitted on 1/27/2023.     Assessment & Plan     Recent diagnosis of C.Diff Colitis , finished antibiotics course but ongoing diarrhea, most likely sec to unresolved infection:  Acute abdominal pain : Hepatobiliary etiology versus musculoskeletal   Elevated alkaline phosphatase, bilirubin and AST:    ** Ms. Comer has a history of alcoholic liver disease and chronic pancreatitis.   ** She was admitted for acute pancreatitis  in 10/2021, 11/2021, 7/2022, and again in 10/2022. Most recently, she was admitted to [facility] from 12/31/22 through 1/5/23 for diarrhea, found to be due to C difficile colitis. She was also found to be COVID positive at that time. She has now completed two weeks of QID oral Vancocin but reports ongoing diarrhea.   ** she is now presenting now with acute right upper quadrant abdominal pain which started while she was straining at the commode.  ** Presented to ED, she was afebrile (T 99.5). She was found to have acute leukocytosis, acute hyponatremia and trans-aminitis as discussed below. CT of abdomen and pelvis showed changes of chronic cirrhosis and chronic pancreatitis, without signs of other acute pathology.  **  Overall, initial impression is musculoskeletal strain as the cause of her acute pain on presentation.  **CT scan done on admission of abdomen and pelvis with contrast showed cirrhotic configuration of the liver with lobulated contour, geographic fatty sparing involving the hepatic  parenchyma anteriorly extending inferiorly.  Tiny amount of ascites, chronic pancreatitis changes, no discrete mass, scattered colonic diverticulosis no signs of colitis or acute inflammation, mechanical obstruction or free gas.  **Abdominal ultrasound showed geographic echogenic and hypoechoic areas in the liver, likely related to hepatic steatosis.  Coarsened hepatic parenchyma with lobulated contour suggestive of cirrhosis.  There was also distended gallbladder, mildly distended common hepatic duct measuring 7 mm and is stable but no obstructing calculi was visualized.  Sequela of chronic pancreatitis was again seen.  **Her diarrhea history seems fluctuating, per previous providers her diarrhea is now improved but this morning patient was asking for her C. difficile panel to be rechecked as patient told me that her diarrhea never completely got improved and got slightly better but she continues to have diarrhea since her diagnosis of C. difficile colitis and was found to have watery bowel movement this morning by nursing.    --Patient care was assumed this morning, patient was seen and examined.  --Still complaining of right upper quadrant discomfort, ongoing diarrhea  -- Keep patient in enteric precautions, will order C. difficile PCR, expecting it to be positive as was +26 days ago but will hold off for starting antibiotics till repeat PCR has been repeated.  Patient is having loose bowel movements.  --Consulted gastroenterology and discussed the case with gastroenterology, planning to take patient for EGD and EUS tomorrow morning  -- We will make patient NPO. after midnight.  -- Will and potassium replacement protocol for hypokalemia.  --Check labs next morning, labs reviewed, BMP showed improvement in sodium of 134, potassium 2.8, creatinine of 0.37, albumin of 2.3, total protein of 5.6, alkaline phosphatase has downward trend to 65 from 408, ALT is in the normal range AST is now going downward from 75-55,  bili has also normalized from 1.8-0.9.  -- Continue patient on Ringer lactate 75 mL/h.  --Repeat labs tomorrow morning.  --Continue patient on regular diet.  -- Antiemetic as needed  -- We will also add oral Roxicodone as currently Advil and IV Dilaudid is available.  --Discussed with patient regarding tentative discharge tomorrow after EGD and if C. difficile PCR is back and patient is clinically feeling better.     Acute hypochloremic hyponatremia: Mild, at 126. Suspect due to hypovolemia.   -- Sodium is improved to 134, continue gentle fluids, recheck BMP tomorrow morning.     Trans-aminitis, acute: Suspect due to cirrhosis, possibly alcoholic hepatitis. Tbili is 1.8.    -- Monitor CMP and US as above; gastroenterology has been consulted, reviewed the case with them as above    -- Check Mag level    -- Resume home Thiamine and PPI when verified     History of breast cancer: Status post bilateral mastectomies. Noted     Chronic depression:   -- Continue PTA mirtazapine 30 mg at bedtime, Seroquel 100 mg at bedtime and 25 mg of Seroquel in the morning.     Suspected severe protein calorie malnutrition:   -- Nutrition services consulted.  -- According to patient, she has lost significant amount of weight in the last year and a half due to poor oral intake, poor health conditions and significant distress including her daughter passing away.  -- Emotional support was provided, discussed with patient that we are checking for C. difficile PCR to make sure that her C. difficile infection is treated appropriately to help her diarrhea.    Clinically Significant Risk Factors Present on Admission         # Hyponatremia: Lowest Na = 126 mmol/L in last 2 days, will monitor as appropriate      # Hypoalbuminemia: Lowest albumin = 3.4 g/dL at 1/27/2023  2:28 AM, will monitor as appropriate          # Cachexia: Estimated body mass index is 15.59 kg/m  as calculated from the following:    Height as of this encounter: 1.676 m (5'  "6\").    Weight as of this encounter: 43.8 kg (96 lb 9.6 oz).           Diet: Regular Diet Adult    Patino Catheter: Not present  DVT Prophylaxis: Pneumatic Compression Devices  Code Status: Full Code    The patient's care was discussed with the Bedside Nurse, Patient and Gastroenterology Consultant(s).    Medical Decision Making     50 min were spent in patient care today including the floor time , more than 50% of the time was spent in patient care coordination and counseling.    Disposition Plan      Expected Discharge Date: 01/29/2023        Discharge Comments: cdiff     Entered: Rashida Levy MD 01/28/2023, 8:19 AM       Rashida Levy MD, MD, [facility]  Text Page (7am - 6pm)    ----------------------------------------------------------------------------------------------------------------------    Interval History     Patient care was assumed this morning, patient was seen and examined, lying in bed, still complaining of right upper quadrant discomfort.  Patient thinks that this pain is different than her pancreatitis pain, told me that her last pancreatitis episode was 2 years ago.  Patient has lost significant amount of weight in the last year and a half due to being in poor health, and multiple stressors including her daughter passing away.  Emotional support was provided.  According to patient, her diarrhea never improved completely when she was done with her antibiotic course and she continues to have diarrhea and she had watery bowel movement this morning, we discussed about rechecking C. difficile colitis, and discussed with her that we have to go more with her symptoms as C. difficile PCR can be positive from her previous infection and this is going to be considered the same infection rather than recurrence.  Plan of care was also discussed with gastroenterology, planning EGD and EUS tomorrow morning.    -Data reviewed today: I reviewed all new labs and imaging results over the last 24 hours.    I personally " reviewed abdominal CT and abdominal ultrasound as above.    Physical Exam   Temp: 97.7  F (36.5  C) Temp src: Oral BP: 105/67 Pulse: (!) 3   Resp: 16 SpO2: 92 % O2 Device: None (Room air)    Vitals:    01/26/23 1616 01/28/23 0521   Weight: 45.4 kg (100 lb) 43.8 kg (96 lb 9.6 oz)     Vital Signs with Ranges  Temp:  [97.7  F (36.5  C)-97.9  F (36.6  C)] 97.7  F (36.5  C)  Pulse:  [3-100] 3  Resp:  [16] 16  BP: (105-127)/(54-83) 105/67  SpO2:  [92 %-99 %] 92 %  No intake/output data recorded.    GENERAL: Alert , awake and oriented. NAD. Conversational, appropriate.  Malnourished and cachectic, pleasant and cooperative  HEENT: Normocephalic. EOMI. No icterus or injection. Nares normal.   LUNGS: Clear to auscultation. No dyspnea at rest.   HEART: Regular rate. Extremities perfused.   ABDOMEN: Soft, positive tenderness in right upper quadrant but no guarding or rigidity, and nondistended. Positive bowel sounds.   EXTREMITIES: No LE edema noted.   NEUROLOGIC: Moves extremities x4 on command. No acute focal neurologic abnormalities noted.     Medications     lactated ringers 125 mL/hr (01/28/23 0218)       folic acid  1 mg Oral Daily     mirtazapine  30 mg Oral At Bedtime     QUEtiapine  100 mg Oral At Bedtime     QUEtiapine  25 mg Oral Daily       Data   Recent Labs   Lab 01/28/23  0642 01/27/23 0228   WBC 6.5 15.5*   HGB 9.3* 11.8   MCV 94 96    401   NA  --  126*   POTASSIUM  --  4.0   CHLORIDE  --  85*   CO2  --  26   BUN  --  9.3   CR  --  0.40*   ANIONGAP  --  15   FRANCOISE  --  9.0   GLC  --  107*   ALBUMIN  --  3.4*   PROTTOTAL  --  8.0   BILITOTAL  --  1.8*   ALKPHOS  --  408*   ALT  --  28   AST  --  75*   LIPASE 52  --        Imaging:   Recent Results (from the past 24 hour(s))   US Abdomen Limited    Narrative    US ABDOMEN LIMITED 1/27/2023 9:24 AM    CLINICAL HISTORY: RUQ abdominal pain, history of cirrhosis and  pancreattitis  TECHNIQUE: Limited abdominal ultrasound.    COMPARISON: CT of the abdomen and  pelvis at 3:45 AM today    FINDINGS:    GALLBLADDER: The gallbladder is distended. No stones or sludge. No  gallbladder wall thickening or pericholecystic fluid. Sonographic  Ribera sign is negative.    BILE DUCTS: No intrahepatic biliary ductal dilatation. The common duct  is mildly enlarged and measures 7mm. No obstructing calculi in the  visualized portions of the duct. No obstructing masses were placed on  the CT earlier today.    LIVER: Heterogeneous hepatic parenchyma with multiple echogenic and  hypoechoic areas, likely related to patchy hepatic steatosis seen on  the CT today. Coarsened hepatic parenchyma and lobulated hepatic  contour.    RIGHT KIDNEY: Mild prominence of the renal pelvis, stable since the  CT.    PANCREAS: The body and tail of the pancreas is obscured. Multiple  parenchymal calcifications in the pancreatic head seen on the CT  earlier today are not well seen by ultrasound.      Impression    IMPRESSION:  1.  Geographic echogenic and hypoechoic areas in the liver, likely  related to patchy hepatic steatosis, better assessed by the CT today.  Coarsened hepatic parenchyma with lobulated contour suggestive of  cirrhosis.  2.  Distended gallbladder. No cholelithiasis or acute cholecystitis.  3.  Mildly distended common hepatic duct measuring 7 mm, stable. No  obstructing calculi in the visualized duct. No obstructing calculi or  mass was visualized on the CT earlier today. If clinically indicated,  an MRCP can be considered.  4.  Sequela of chronic pancreatitis, better assessed by CT earlier.  5.  Stable mild dilatation of the right renal pelvis, either due to  mild hydronephrosis or pelviectasis.    ANYA BROWN MD         SYSTEM ID:  [id number]

## 2023-01-28 NOTE — PROVIDER NOTIFICATION
MD Notification    Notified Person: MD    Notified Person Name:Dr Levy    Notification Date/Time: 1/28/2023  8:42 AM    Notification Interaction: Text paged    Purpose of Notification: Pt's K+ low at 2.8, can we get orders for replacement please?    Orders Received:    Comments:

## 2023-01-28 NOTE — PLAN OF CARE
Goal Outcome Evaluation:  diagnostic tests and consults completed and resulted  no  -vital signs normal or at patient baseline  Yes  -tolerating oral intake to maintain hydration Yes  -adequate pain control on oral analgesics  Yes  -returns to baseline functional status  Yes  Orientation/Cognitive: A&OX4  Observation Goals (Met/ Not Met): Not met  Mobility Level/Assist Equipment: Up ad david  Fall Risk (Y/N): No  Behavior Concerns: none, calm and cooperative  Pain Management: Oxy and ibuprofen for RUQ pain, cramping in nature  Tele/VS/O2: VSS on RA, no tele. BP soft  ABNL Lab/BG:  K+ low at 2.8, replaced, recheck pending. LFTs improving, Hgb down to 9.3.   Diet: regular diet  Bowel/Bladder:  Continent, 2 BMs today, none since the stool orders were put in  Skin Concerns:  None  Drains/Devices:  IVF at 75mL/hr  Tests/Procedures for next shift: Stool sample needs to be collected, K+ recheck  Anticipated DC date & active delays:  pending GI clearance  Patient Stated Goal for Today:  Pain management

## 2023-01-29 LAB
AFP SERPL-MCNC: 1.8 NG/ML
ANION GAP SERPL CALCULATED.3IONS-SCNC: 8 MMOL/L (ref 7–15)
BACTERIA UR CULT: NORMAL
BUN SERPL-MCNC: 3.4 MG/DL (ref 8–23)
C DIFF TOX B STL QL: NEGATIVE
CALCIUM SERPL-MCNC: 8.2 MG/DL (ref 8.6–10)
CHLORIDE SERPL-SCNC: 105 MMOL/L (ref 98–107)
CREAT SERPL-MCNC: 0.36 MG/DL (ref 0.51–0.95)
DEPRECATED HCO3 PLAS-SCNC: 24 MMOL/L (ref 22–29)
FERRITIN SERPL-MCNC: 556 NG/ML (ref 11–328)
GFR SERPL CREATININE-BSD FRML MDRD: >90 ML/MIN/1.73M2
GLUCOSE SERPL-MCNC: 89 MG/DL (ref 70–99)
INR PPP: 1.37 (ref 0.85–1.15)
POTASSIUM SERPL-SCNC: 3.7 MMOL/L (ref 3.4–5.3)
SODIUM SERPL-SCNC: 137 MMOL/L (ref 136–145)
UPPER EUS: NORMAL
UPPER GI ENDOSCOPY: NORMAL

## 2023-01-29 PROCEDURE — 43235 EGD DIAGNOSTIC BRUSH WASH: CPT | Performed by: INTERNAL MEDICINE

## 2023-01-29 PROCEDURE — 43237 ENDOSCOPIC US EXAM ESOPH: CPT | Performed by: INTERNAL MEDICINE

## 2023-01-29 PROCEDURE — G0500 MOD SEDAT ENDO SERVICE >5YRS: HCPCS | Performed by: INTERNAL MEDICINE

## 2023-01-29 PROCEDURE — 82653 EL-1 FECAL QUANTITATIVE: CPT | Performed by: PHYSICIAN ASSISTANT

## 2023-01-29 PROCEDURE — 82705 FATS/LIPIDS FECES QUAL: CPT | Performed by: PHYSICIAN ASSISTANT

## 2023-01-29 PROCEDURE — 85610 PROTHROMBIN TIME: CPT | Performed by: PHYSICIAN ASSISTANT

## 2023-01-29 PROCEDURE — 250N000013 HC RX MED GY IP 250 OP 250 PS 637: Performed by: HOSPITALIST

## 2023-01-29 PROCEDURE — 258N000003 HC RX IP 258 OP 636: Performed by: INTERNAL MEDICINE

## 2023-01-29 PROCEDURE — 86706 HEP B SURFACE ANTIBODY: CPT | Performed by: PHYSICIAN ASSISTANT

## 2023-01-29 PROCEDURE — 86704 HEP B CORE ANTIBODY TOTAL: CPT | Performed by: PHYSICIAN ASSISTANT

## 2023-01-29 PROCEDURE — 99232 SBSQ HOSP IP/OBS MODERATE 35: CPT | Performed by: INTERNAL MEDICINE

## 2023-01-29 PROCEDURE — 99207 PR NO CHARGE LOS: CPT | Performed by: PHYSICIAN ASSISTANT

## 2023-01-29 PROCEDURE — 87493 C DIFF AMPLIFIED PROBE: CPT | Performed by: INTERNAL MEDICINE

## 2023-01-29 PROCEDURE — 250N000013 HC RX MED GY IP 250 OP 250 PS 637: Performed by: PHYSICIAN ASSISTANT

## 2023-01-29 PROCEDURE — 86038 ANTINUCLEAR ANTIBODIES: CPT | Performed by: PHYSICIAN ASSISTANT

## 2023-01-29 PROCEDURE — 87506 IADNA-DNA/RNA PROBE TQ 6-11: CPT | Performed by: INTERNAL MEDICINE

## 2023-01-29 PROCEDURE — 250N000011 HC RX IP 250 OP 636: Performed by: HOSPITALIST

## 2023-01-29 PROCEDURE — 99153 MOD SED SAME PHYS/QHP EA: CPT | Performed by: INTERNAL MEDICINE

## 2023-01-29 PROCEDURE — 86381 MITOCHONDRIAL ANTIBODY EACH: CPT | Performed by: PHYSICIAN ASSISTANT

## 2023-01-29 PROCEDURE — 36415 COLL VENOUS BLD VENIPUNCTURE: CPT | Performed by: PHYSICIAN ASSISTANT

## 2023-01-29 PROCEDURE — 250N000013 HC RX MED GY IP 250 OP 250 PS 637: Performed by: INTERNAL MEDICINE

## 2023-01-29 PROCEDURE — 250N000009 HC RX 250: Performed by: INTERNAL MEDICINE

## 2023-01-29 PROCEDURE — 0W9G3ZX DRAINAGE OF PERITONEAL CAVITY, PERCUTANEOUS APPROACH, DIAGNOSTIC: ICD-10-PCS | Performed by: INTERNAL MEDICINE

## 2023-01-29 PROCEDURE — 82103 ALPHA-1-ANTITRYPSIN TOTAL: CPT | Performed by: PHYSICIAN ASSISTANT

## 2023-01-29 PROCEDURE — 87340 HEPATITIS B SURFACE AG IA: CPT | Performed by: PHYSICIAN ASSISTANT

## 2023-01-29 PROCEDURE — 80048 BASIC METABOLIC PNL TOTAL CA: CPT | Performed by: INTERNAL MEDICINE

## 2023-01-29 PROCEDURE — 86803 HEPATITIS C AB TEST: CPT | Performed by: PHYSICIAN ASSISTANT

## 2023-01-29 PROCEDURE — G0378 HOSPITAL OBSERVATION PER HR: HCPCS

## 2023-01-29 PROCEDURE — 86015 ACTIN ANTIBODY EACH: CPT | Performed by: PHYSICIAN ASSISTANT

## 2023-01-29 PROCEDURE — 250N000011 HC RX IP 250 OP 636: Performed by: INTERNAL MEDICINE

## 2023-01-29 RX ORDER — FENTANYL CITRATE 50 UG/ML
INJECTION, SOLUTION INTRAMUSCULAR; INTRAVENOUS PRN
Status: DISCONTINUED | OUTPATIENT
Start: 2023-01-29 | End: 2023-01-29 | Stop reason: HOSPADM

## 2023-01-29 RX ORDER — ACETAMINOPHEN 325 MG/1
650 TABLET ORAL EVERY 4 HOURS PRN
Status: DISCONTINUED | OUTPATIENT
Start: 2023-01-29 | End: 2023-02-03 | Stop reason: HOSPADM

## 2023-01-29 RX ORDER — CALCIUM CARBONATE 500 MG/1
500 TABLET, CHEWABLE ORAL 3 TIMES DAILY PRN
Status: DISCONTINUED | OUTPATIENT
Start: 2023-01-29 | End: 2023-02-03 | Stop reason: HOSPADM

## 2023-01-29 RX ORDER — PANTOPRAZOLE SODIUM 40 MG/1
40 TABLET, DELAYED RELEASE ORAL
Status: DISCONTINUED | OUTPATIENT
Start: 2023-01-30 | End: 2023-01-30

## 2023-01-29 RX ORDER — ACETAMINOPHEN 650 MG/1
650 SUPPOSITORY RECTAL EVERY 4 HOURS PRN
Status: DISCONTINUED | OUTPATIENT
Start: 2023-01-29 | End: 2023-02-03 | Stop reason: HOSPADM

## 2023-01-29 RX ORDER — FLUMAZENIL 0.1 MG/ML
0.2 INJECTION, SOLUTION INTRAVENOUS
Status: ACTIVE | OUTPATIENT
Start: 2023-01-29 | End: 2023-01-29

## 2023-01-29 RX ADMIN — QUETIAPINE FUMARATE 100 MG: 100 TABLET ORAL at 21:11

## 2023-01-29 RX ADMIN — CALCIUM CARBONATE (ANTACID) CHEW TAB 500 MG 500 MG: 500 CHEW TAB at 14:15

## 2023-01-29 RX ADMIN — CALCIUM CARBONATE (ANTACID) CHEW TAB 500 MG 500 MG: 500 CHEW TAB at 19:29

## 2023-01-29 RX ADMIN — IBUPROFEN 600 MG: 600 TABLET ORAL at 16:14

## 2023-01-29 RX ADMIN — IBUPROFEN 600 MG: 600 TABLET ORAL at 06:58

## 2023-01-29 RX ADMIN — FOLIC ACID 1 MG: 1 TABLET ORAL at 09:02

## 2023-01-29 RX ADMIN — NICOTINE 1 PATCH: 21 PATCH, EXTENDED RELEASE TRANSDERMAL at 09:03

## 2023-01-29 RX ADMIN — OXYCODONE HYDROCHLORIDE 10 MG: 5 TABLET ORAL at 06:59

## 2023-01-29 RX ADMIN — ACETAMINOPHEN 650 MG: 325 TABLET, FILM COATED ORAL at 20:35

## 2023-01-29 RX ADMIN — MELATONIN TAB 3 MG 3 MG: 3 TAB at 21:11

## 2023-01-29 RX ADMIN — SODIUM CHLORIDE, POTASSIUM CHLORIDE, SODIUM LACTATE AND CALCIUM CHLORIDE: 600; 310; 30; 20 INJECTION, SOLUTION INTRAVENOUS at 09:07

## 2023-01-29 RX ADMIN — ONDANSETRON 4 MG: 4 TABLET, ORALLY DISINTEGRATING ORAL at 20:40

## 2023-01-29 RX ADMIN — QUETIAPINE FUMARATE 25 MG: 25 TABLET ORAL at 06:59

## 2023-01-29 RX ADMIN — OXYCODONE HYDROCHLORIDE 10 MG: 5 TABLET ORAL at 14:25

## 2023-01-29 RX ADMIN — OXYCODONE HYDROCHLORIDE 10 MG: 5 TABLET ORAL at 20:36

## 2023-01-29 RX ADMIN — MIRTAZAPINE 30 MG: 30 TABLET, FILM COATED ORAL at 21:10

## 2023-01-29 ASSESSMENT — ACTIVITIES OF DAILY LIVING (ADL)
ADLS_ACUITY_SCORE: 33

## 2023-01-29 NOTE — PROGRESS NOTES
[facility]  Gastroenterology Progress Note     Audelia Comer MRN# [id number]   YOB: 1965 Age: 57 year old          Assessment and Plan:     Patient did well overnight patient continues to complain of significant abdominal pain in the epigastric area right upper quadrant area.  Patient labs remained stable continue to improve.  Upper GI endoscopy showed multiple ulcers in the duodenal bulb.  Endoscopic ultrasound findings consistent with normal biliary system with minimally dilated bile duct up to 7.4 mm moderate chronic pancreatitis along with small perihepatic ascites.  I will recommend to continue on Protonix 40 mg twice a day start low-fat 2 g sodium diet.  Repeat labs again tomorrow patient can be discharged from GI standpoint with follow-up in GI clinic.  Finding and plan discussed in detail with patient and hospitalist team.            Hyponatremia  Diarrhea of presumed infectious origin  RUQ abdominal pain      Interval History:     doing well; no cp, sob, n/v/d, or abd pain.              Review of Systems:     C: NEGATIVE for fever, chills, change in weight  E/M: NEGATIVE for ear, mouth and throat problems  R: NEGATIVE for significant cough or SOB  CV: NEGATIVE for chest pain, palpitations or peripheral edema             Medications:   I have reviewed this patient's current medications    folic acid  1 mg Oral Daily     mirtazapine  30 mg Oral At Bedtime     nicotine  1 patch Transdermal Daily     nicotine   Transdermal Q8H     QUEtiapine  100 mg Oral At Bedtime     QUEtiapine  25 mg Oral Daily                  Physical Exam:   Vitals were reviewed  Vital Signs with Ranges  Temp:  [97.9  F (36.6  C)-99  F (37.2  C)] 98.1  F (36.7  C)  Pulse:  [] 92  Resp:  [16] 16  BP: ()/(59-75) 110/75  SpO2:  [94 %-97 %] 96 %  I/O last 3 completed shifts:  In: 810 [P.O.:810]  Out: -   Constitutional: healthy, alert and no distress   Cardiovascular: negative, PMI  normal. No lifts, heaves, or thrills. RRR. No murmurs, clicks gallops or rub  Respiratory: negative, Percussion normal. Good diaphragmatic excursion. Lungs clear  Neck: Neck supple. No adenopathy. Thyroid symmetric, normal size,, Carotids without bruits.  Abdomen: Abdomen soft, non-tender. BS normal. No masses, organomegaly  SKIN: no suspicious lesions or rashes           Data:   I reviewed the patient's new clinical lab test results.   Recent Labs   Lab Test 01/29/23  0653 01/28/23  1522 01/28/23  0642 01/27/23  0228   WBC  --  7.2 6.5 15.5*   HGB  --  9.3* 9.3* 11.8   MCV  --  98 94 96   PLT  --  246 245 401   INR 1.37*  --   --   --      Recent Labs   Lab Test 01/29/23  0653 01/28/23  1522 01/28/23  0642   POTASSIUM 3.7 3.6  3.6 2.8*   CHLORIDE 105 101 97*   CO2 24 28 27   BUN 3.4* 3.8* 4.5*   ANIONGAP 8 7 10     Recent Labs   Lab Test 01/28/23  1522 01/28/23  0642 01/27/23  1003 01/27/23  0228 01/01/23  0843 12/31/22  1746 10/04/22  0835 10/03/22  2327 10/03/22  1743 06/29/22  0932 06/29/22  0826   ALBUMIN 2.3* 2.3*  --  3.4*   < > 3.4*   < >  --  4.3   < >  --    BILITOTAL 0.6 0.9  --  1.8*   < > 0.7   < >  --  0.9   < >  --    ALT 18 19  --  28   < > 17   < >  --  72*   < >  --    AST 55* 55*  --  75*   < > 51*   < >  --  223*   < >  --    PROTEIN  --   --  Negative  --   --   --   --  50*  --   --  Negative   LIPASE  --  52  --   --   --  20  --   --  26  --   --     < > = values in this interval not displayed.       I reviewed the patient's new imaging results.    All laboratory data reviewed  All imaging studies reviewed by me.    Lg Dickinson MD,  1/29/2023  [facility]  Office : [phone]  Cell: [phone]      [facility]  Ph: [phone] Fax: [phone]

## 2023-01-29 NOTE — PROGRESS NOTES
Observation goals  PRIOR TO DISCHARGE        Comments:   -diagnostic tests and consults completed and resulted- NOT MET   -vital signs normal or at patient baseline- MET   -tolerating oral intake to maintain hydration- Partially MET   -adequate pain control on oral analgesics- MET   -returns to baseline functional status- NOT MET   Nurse to notify provider when observation goals have been met and patient is ready for discharge.

## 2023-01-29 NOTE — PROGRESS NOTES
Cared for pt x4hrs. Pt is ind in room. LR @ 75mL/hr. Awaiting stool sample. NPO @ midnight for EGD in AM. Potassium reordered for AM lab draw. Pt is taking PRN Ibuprofen and Oxycodone for pain. VSS on RA. PRN melatonin given per pt request. Nicotine patch on Right shoulder. No complaints at this time.

## 2023-01-29 NOTE — PLAN OF CARE
Orientation/Cognitive: A&OX4  Observation Goals (Met/ Not Met): Not met  Mobility Level/Assist Equipment: Up ad david  Fall Risk (Y/N): No  Behavior Concerns: none, calm and cooperative  Pain Management: Oxy and ibuprofen for RUQ pain, cramping in nature.  Tele/VS/O2: Patient refused vitals this shift stating she wants to be left alone to sleep.  ABNL Lab/BG: LFTs improving, Hgb 9.3; Creat 0.38.   Diet: NPO since midnight  Bowel/Bladder:  Continent, no stools this shift.  Awaiting stool sample.  Skin Concerns:  Small open area to R buttock, mepilex in place.  Drains/Devices:  LR at 75mL/hr  Tests/Procedures for next shift: Stool sample if able.  EUS/EGD this am.  Anticipated DC date & active delays: possible discharge after EGD pending pain control and GI recs.  Patient Stated Goal for Today:  Pain management  Other: Nicotine patch to R shoulder.  Patent slept most of the night.    Observation goals  PRIOR TO DISCHARGE        Comments:   -diagnostic tests and consults completed and resulted- NOT MET   -vital signs normal or at patient baseline- MET   -tolerating oral intake to maintain hydration- Partially MET   -adequate pain control on oral analgesics- MET   -returns to baseline functional status- NOT MET   Nurse to notify provider when observation goals have been met and patient is ready for discharge.

## 2023-01-29 NOTE — UTILIZATION REVIEW
"Admission Status; Secondary Review Determination     Admission Date: 1/27/2023  1:54 AM       Under the authority of the Utilization Management Committee, the utilization review process indicated a secondary review on the above patient.  The review outcome is based on review of the medical records, discussions with staff, and applying clinical experience noted on the date of the review.          (x) Observation Status Appropriate - This patient does not meet hospital inpatient criteria and is placed in observation status. If this patient's primary payer is Medicare and was admitted as an inpatient, Condition Code 44 should be used and patient status changed to \"observation\".       RATIONALE FOR DETERMINATION      Brief clinical presentation, information copied from the chart, abbreviated and edited for relevant content:       Other than LOS, no criteria for IP. Discharging tomorrow.     Audelia Comer is a 57 year old female presented withsignificant abdominal pain in the epigastric area right upper quadrant area. Upper GI endoscopy showed multiple ulcers in the duodenal bulb.Plan to stay on Protonix 40 mg twice a day start low-fat 2 g sodium diet.  Repeat labs again tomorrow patient can be discharged from GI standpoint with follow-up in GI clinic.         The severity of illness, intensity of cares provided, risk for adverse outcome, and expected LOS make the care appropriate for observation.       The information on this document is developed by the utilization review team in order for the business office to ensure compliance.  This only denotes the appropriateness of proper admission status and does not reflect the quality of care rendered.         The definitions of Inpatient Status and Observation Status used in making the determination above are those provided in the CMS Coverage Manual, Chapter 1 and Chapter 6, section 70.4.      Sincerely,     Andreea Be MD   Utilization Review/ Case " Management  [facility].

## 2023-01-29 NOTE — PROGRESS NOTES
[facility]    HOSPITALIST PROGRESS NOTE :   --------------------------------------------------    Date of Admission:  1/27/2023    Cumulative Summary: Audelia Comer is a 57 year old female with past medical history that is most notable for severe alcohol use disorder and chronic alcoholic pancreatitis, as well as recent C difficile colitis, among others; who presents with ongoing diarrhea and acute abdominal pain and is found to have acute hypochloremic hyponatremia who was admitted on 1/27/2023.     Assessment & Plan     Recent diagnosis of C.Diff Colitis , finished antibiotics course but ongoing diarrhea, most likely sec to unresolved infection:  Acute abdominal pain , s/p EGD and EUS on January 29:   Elevated alkaline phosphatase, bilirubin and AST:    ** Ms. Comer has a history of alcoholic liver disease and chronic pancreatitis.   ** She was admitted for acute pancreatitis  in 10/2021, 11/2021, 7/2022, and again in 10/2022. Most recently, she was admitted to [facility] from 12/31/22 through 1/5/23 for diarrhea, found to be due to C difficile colitis. She was also found to be COVID positive at that time. She has now completed two weeks of QID oral Vancocin but reports ongoing diarrhea.   ** she is now presenting now with acute right upper quadrant abdominal pain which started while she was straining at the commode.  ** Presented to ED, she was afebrile (T 99.5). She was found to have acute leukocytosis, acute hyponatremia and trans-aminitis as discussed below. CT of abdomen and pelvis showed changes of chronic cirrhosis and chronic pancreatitis, without signs of other acute pathology.  **  Overall, initial impression is musculoskeletal strain as the cause of her acute pain on presentation.  **CT scan done on admission of abdomen and pelvis with contrast showed cirrhotic configuration of the liver with lobulated contour, geographic fatty sparing involving the hepatic parenchyma  anteriorly extending inferiorly.  Tiny amount of ascites, chronic pancreatitis changes, no discrete mass, scattered colonic diverticulosis no signs of colitis or acute inflammation, mechanical obstruction or free gas.  **Abdominal ultrasound showed geographic echogenic and hypoechoic areas in the liver, likely related to hepatic steatosis.  Coarsened hepatic parenchyma with lobulated contour suggestive of cirrhosis.  There was also distended gallbladder, mildly distended common hepatic duct measuring 7 mm and is stable but no obstructing calculi was visualized.  Sequela of chronic pancreatitis was again seen.  **Her diarrhea history seems fluctuating, per previous providers her diarrhea is now improved but this morning patient was asking for her C. difficile panel to be rechecked as patient told me that her diarrhea never completely got improved and got slightly better but she continues to have diarrhea since her diagnosis of C. difficile colitis    -- Patient was seen and examined this morning, s/p EGD and EUS.  -- EUS showed chronic moderate pancreatitis  -- EGD showed small hiatal hernia, congestive gastropathy, nonbleeding duodenal ulcers with no stigmata of bleeding, congested duodenal mucosa  -- We will start patient on Protonix 40 mg p.o. twice daily.  --Patient is also complaining of acid reflux, will also order as needed Tums  --Plan to discontinue IV fluids today  -- Continue patient on regular diet  -- Patient is very concerned regarding her loose bowel movements although they are significantly decrease as 1-2 times per day as compared to when she was diagnosed with C. difficile infection.  --Long discussion with patient, as she wants her C. difficile panel to be checked again if she has been cured or if her diarrhea is secondary to ongoing infection.  --After discussion with gastroenterology, will order C. difficile and stool enteric panel to be checked.  --Expecting C. difficile PCR to be positive from  her previous infection, if patient has ongoing significant diarrhea then it will be considered ongoing initial infection and I would not consider this reinfection or relapse.  -- Patient also has lots of social concerns, currently lives at home, unable to clean her home on her own because of her health issues, she told me that her friends and family are unable to come and help her as they feel hesitant due to the concern for C. difficile being contagious.  -- Long discussion with patient, bedside nursing and , will ask infection prevention nursing to come and have further discussion with patient regarding patient education along with precautions need to be taken by family in case of patient being C. difficile positive  -- Case was also reviewed with , they will also have discussion with patient that if family is not able to help then her only other option would be to hire cleaning services to help clean her home.  --I also discussed with patient regarding following up with gastroenterology.  Your symptoms get worse.  --Continue current medications, reviewed that gastroenterology impression is that her discomfort is most likely secondary to musculoskeletal pain versus some discomfort from duodenal ulcers.  --Patient does not feel comfortable going home today as she would like some more help regarding her home situation from infection prevention and .  --Follow-up with gastroenterology, they have also ordered fecal fat, stool multiplex and pancreatic elastase to work-up for diarrhea associated with malabsorption.  --Although gastroenterology has ordered nutrition services due to the concern for suspected severe protein calorie malnutrition, patient might not be evaluated by the services being observational status, will recommend outpatient nutrition follow-up.     Acute hypochloremic hyponatremia: Mild, at 126. Suspect due to hypovolemia.   --BMP reviewed, sodium is improved  "to 137, plan to stop IV fluids.     Trans-aminitis, acute: Suspect due to cirrhosis, possibly alcoholic hepatitis. Tbili is 1.8.  --Gastroenterology following closely, plan as above     History of breast cancer: Status post bilateral mastectomies. Noted     Chronic depression:   -- Continue PTA mirtazapine 30 mg at bedtime, Seroquel 100 mg at bedtime and 25 mg of Seroquel in the morning.     Suspected severe protein calorie malnutrition:   -- Nutrition services consulted.  -- According to patient, she has lost significant amount of weight in the last year and a half, due to poor oral intake, poor health conditions and significant distress including her daughter passing away.  -- Emotional support was provided, discussed with patient that we are checking for C. difficile PCR to make sure that her C. difficile infection is treated appropriately to help her diarrhea.    Clinically Significant Risk Factors Present on Admission        # Hypokalemia: Lowest K = 2.8 mmol/L in last 2 days, will replace as needed       # Hypoalbuminemia: Lowest albumin = 2.3 g/dL at 1/28/2023  3:22 PM, will monitor as appropriate  # Coagulation Defect: INR = 1.37 (Ref range: 0.85 - 1.15) and/or PTT = N/A, will monitor for bleeding         # Cachexia: Estimated body mass index is 16.12 kg/m  as calculated from the following:    Height as of this encounter: 1.676 m (5' 6\").    Weight as of this encounter: 45.3 kg (99 lb 14.4 oz).           Diet: NPO per Anesthesia Guidelines for Procedure/Surgery Except for: Meds, Ice Chips  Snacks/Supplements Pediatric: Beneprotein; Between Meals    Patino Catheter: Not present  DVT Prophylaxis: Pneumatic Compression Devices  Code Status: Full Code    The patient's care was discussed with the Bedside Nurse, Patient and Gastroenterology Consultant(s).    Medical Decision Making     40 min were spent in direct patient care today including the floor time , more than 50% of the time was spent in patient care " coordination and counseling.    Disposition Plan      Expected Discharge Date: 01/30/2023        Discharge Comments: cdiff. abnormal labs.     Entered: Rashida Levy MD 01/29/2023, 8:09 AM       Rashida Levy MD, [facility], [facility]  Text Page (7am - 6pm)    ----------------------------------------------------------------------------------------------------------------------    Interval History      Patient was seen and examined, lying in bed, s/p EGD and EUS.  Discussed with patient regarding her lab results and her EGD results in detail.  I also reviewed the EGD and EUS results with Dr. Moya from gastroenterology.  We discussed about discontinuing C. difficile panel as she did not have any further loose bowel movement in the last 20 hours, patient is very concerned regarding her C. difficile infection and is status, she wants to know if she is over that infection, discussed with her that if her bowel movements are now decreased to once or twice a day most likely she has recovered from C. difficile infection although patient remains concerned as this still remains watery.  Patient also has lots of social concerns, she is unable to take care of herself on her own, she still has soiled clothes from the last few weeks, family and friends are hesitant to come and help her due to the concern for contagious C. difficile.  Patient is not sure if she is a still contagious.  We offered to have her further discussion with infection prevention RN regarding education for patient and family.  They will be available tomorrow morning.  After discussion with gastroenterology, we decided that if patient continues to have loose bowel movement then we can check her C. difficile PCR, discussed with patient that I am concerned that the C. difficile PCR might be positive from her previous infection also so it might not be able to provide as much clinical significance.    -Data reviewed today: I reviewed all new labs and imaging results over the last  24 hours.    I personally reviewed abdominal CT and abdominal ultrasound as above.    Physical Exam   Temp: 98.1  F (36.7  C) Temp src: Oral BP: 104/69 Pulse: 101   Resp: 16 SpO2: 95 % O2 Device: None (Room air)    Vitals:    01/26/23 1616 01/28/23 0521 01/29/23 0500   Weight: 45.4 kg (100 lb) 43.8 kg (96 lb 9.6 oz) 45.3 kg (99 lb 14.4 oz)     Vital Signs with Ranges  Temp:  [97.9  F (36.6  C)-99  F (37.2  C)] 98.1  F (36.7  C)  Pulse:  [] 101  Resp:  [16] 16  BP: ()/(59-69) 104/69  SpO2:  [95 %-97 %] 95 %  I/O last 3 completed shifts:  In: 810 [P.O.:810]  Out: -     GENERAL: Alert , awake and oriented. NAD. Conversational, appropriate.  Malnourished and cachectic, pleasant and cooperative  HEENT: Normocephalic. EOMI. No icterus or injection. Nares normal.   LUNGS: Clear to auscultation. No dyspnea at rest.   HEART: Regular rate. Extremities perfused.   ABDOMEN: Soft, positive tenderness in right upper quadrant but no guarding or rigidity, and nondistended. Positive bowel sounds.   EXTREMITIES: No LE edema noted.   NEUROLOGIC: Moves extremities x4 on command. No acute focal neurologic abnormalities noted.     Medications     lactated ringers 75 mL/hr at 01/28/23 2034       folic acid  1 mg Oral Daily     mirtazapine  30 mg Oral At Bedtime     nicotine  1 patch Transdermal Daily     nicotine   Transdermal Q8H     QUEtiapine  100 mg Oral At Bedtime     QUEtiapine  25 mg Oral Daily       Data   Recent Labs   Lab 01/29/23  0653 01/28/23  1522 01/28/23  0642 01/27/23  0228   WBC  --  7.2 6.5 15.5*   HGB  --  9.3* 9.3* 11.8   MCV  --  98 94 96   PLT  --  246 245 401   INR 1.37*  --   --   --     136 134* 126*   POTASSIUM 3.7 3.6  3.6 2.8* 4.0   CHLORIDE 105 101 97* 85*   CO2 24 28 27 26   BUN 3.4* 3.8* 4.5* 9.3   CR 0.36* 0.38* 0.37* 0.40*   ANIONGAP 8 7 10 15   FRANCOISE 8.2* 7.9* 8.2* 9.0   GLC 89 110* 86 107*   ALBUMIN  --  2.3* 2.3* 3.4*   PROTTOTAL  --  5.5* 5.6* 8.0   BILITOTAL  --  0.6 0.9 1.8*    ALKPHOS  --  260* 265* 408*   ALT  --  18 19 28   AST  --  55* 55* 75*   LIPASE  --   --  52  --        Imaging:   No results found for this or any previous visit (from the past 24 hour(s)).

## 2023-01-29 NOTE — PLAN OF CARE
Goal Outcome Evaluation:  diagnostic tests and consults completed and resulted  No  -vital signs normal or at patient baseline  Yes  -tolerating oral intake to maintain hydration Yes  -adequate pain control on oral analgesics  Yes  -returns to baseline functional status  Yes  Orientation/Cognitive: A&OX4  Observation Goals (Met/ Not Met): Not met  Mobility Level/Assist Equipment: Up ad david  Fall Risk (Y/N): No  Behavior Concerns: none, calm and cooperative  Pain Management: Oxy and Tums for abd pain and acid reflux  Tele/VS/O2: VSS on RA, no tele. BP soft  ABNL Lab/BG:  INR of 1.37, Ca+ down at 8.2  Diet: regular diet  Bowel/Bladder:  Continent, 3 BMs today  Skin Concerns:  None  Drains/Devices:  IVF at 75mL/hr  Tests/Procedures for next shift: stool samples collected, s/p EGD/EUS  Anticipated DC date & active delays: tomorrow  Patient Stated Goal for Today:  Pain management,  please provide pt with c.diff education handout.

## 2023-01-29 NOTE — PLAN OF CARE
Goal Outcome Evaluation:          Observation goals  PRIOR TO DISCHARGE        Comments: -diagnostic tests and consults completed and resulted not met  -vital signs normal or at patient baseline not met  -tolerating oral intake to maintain hydration not met  -adequate pain control on oral analgesics not met  -returns to baseline functional status met  Nurse to notify provider when observation goals have been met and patient is ready for discharge.

## 2023-01-29 NOTE — PROVIDER NOTIFICATION
MD Notification    Notified Person: MD RIC Harmon    Notified Person Name:    Notification Date/Time:1/28/2023 1930    Notification Interaction:web    Purpose of Notification:pt requesting nicotine patch    Orders Received:md to order    Comments:

## 2023-01-30 ENCOUNTER — APPOINTMENT (OUTPATIENT)
Dept: ULTRASOUND IMAGING | Facility: CLINIC | Age: 58
End: 2023-01-30
Attending: INTERNAL MEDICINE
Payer: COMMERCIAL

## 2023-01-30 LAB
A1AT SERPL-MCNC: 120 MG/DL (ref 90–200)
ALBUMIN BODY FLUID SOURCE: NORMAL
ALBUMIN FLD-MCNC: 1.1 G/DL
ALBUMIN SERPL BCG-MCNC: 2.4 G/DL (ref 3.5–5.2)
ALBUMIN SERPL BCG-MCNC: 2.6 G/DL (ref 3.5–5.2)
ALP SERPL-CCNC: 258 U/L (ref 35–104)
ALT SERPL W P-5'-P-CCNC: 19 U/L (ref 10–35)
ANA SER QL IF: NEGATIVE
ANION GAP SERPL CALCULATED.3IONS-SCNC: 10 MMOL/L (ref 7–15)
APPEARANCE FLD: CLEAR
AST SERPL W P-5'-P-CCNC: 54 U/L (ref 10–35)
BILIRUB SERPL-MCNC: 0.8 MG/DL
BUN SERPL-MCNC: 5.7 MG/DL (ref 8–23)
C COLI+JEJUNI+LARI FUSA STL QL NAA+PROBE: NOT DETECTED
CALCIUM SERPL-MCNC: 9 MG/DL (ref 8.6–10)
CELL COUNT BODY FLUID SOURCE: NORMAL
CHLORIDE SERPL-SCNC: 99 MMOL/L (ref 98–107)
COLOR FLD: COLORLESS
CREAT SERPL-MCNC: 0.49 MG/DL (ref 0.51–0.95)
CRP SERPL-MCNC: 19.59 MG/L
DEPRECATED HCO3 PLAS-SCNC: 29 MMOL/L (ref 22–29)
EC STX1 GENE STL QL NAA+PROBE: NOT DETECTED
EC STX2 GENE STL QL NAA+PROBE: NOT DETECTED
ERYTHROCYTE [DISTWIDTH] IN BLOOD BY AUTOMATED COUNT: 16.3 % (ref 10–15)
GFR SERPL CREATININE-BSD FRML MDRD: >90 ML/MIN/1.73M2
GLUCOSE SERPL-MCNC: 103 MG/DL (ref 70–99)
GRAM STAIN RESULT: NORMAL
GRAM STAIN RESULT: NORMAL
HBV CORE AB SERPL QL IA: NONREACTIVE
HBV SURFACE AB SERPL IA-ACNC: 0.33 M[IU]/ML
HBV SURFACE AB SERPL IA-ACNC: NONREACTIVE M[IU]/ML
HBV SURFACE AG SERPL QL IA: NONREACTIVE
HCT VFR BLD AUTO: 33 % (ref 35–47)
HCV AB SERPL QL IA: NONREACTIVE
HGB BLD-MCNC: 11.1 G/DL (ref 11.7–15.7)
INR PPP: 1.31 (ref 0.85–1.15)
MAGNESIUM SERPL-MCNC: 1.4 MG/DL (ref 1.7–2.3)
MAGNESIUM SERPL-MCNC: 2.4 MG/DL (ref 1.7–2.3)
MCH RBC QN AUTO: 32.6 PG (ref 26.5–33)
MCHC RBC AUTO-ENTMCNC: 33.6 G/DL (ref 31.5–36.5)
MCV RBC AUTO: 97 FL (ref 78–100)
NOROV GI+II ORF1-ORF2 JNC STL QL NAA+PR: NOT DETECTED
PLATELET # BLD AUTO: 343 10E3/UL (ref 150–450)
POTASSIUM SERPL-SCNC: 3.2 MMOL/L (ref 3.4–5.3)
POTASSIUM SERPL-SCNC: 3.6 MMOL/L (ref 3.4–5.3)
PROCALCITONIN SERPL IA-MCNC: 0.32 NG/ML
PROT FLD-MCNC: 2.3 G/DL
PROT SERPL-MCNC: 6.1 G/DL (ref 6.4–8.3)
PROTEIN BODY FLUID SOURCE: NORMAL
RBC # BLD AUTO: 3.4 10E6/UL (ref 3.8–5.2)
RVA NSP5 STL QL NAA+PROBE: NOT DETECTED
SALMONELLA SP RPOD STL QL NAA+PROBE: NOT DETECTED
SHIGELLA SP+EIEC IPAH STL QL NAA+PROBE: NOT DETECTED
SMA IGG SER-ACNC: 17 UNITS
SODIUM SERPL-SCNC: 138 MMOL/L (ref 136–145)
V CHOL+PARA RFBL+TRKH+TNAA STL QL NAA+PR: NOT DETECTED
WBC # BLD AUTO: 14.9 10E3/UL (ref 4–11)
WBC # FLD AUTO: 784 /UL
Y ENTERO RECN STL QL NAA+PROBE: NOT DETECTED

## 2023-01-30 PROCEDURE — 82042 OTHER SOURCE ALBUMIN QUAN EA: CPT | Performed by: INTERNAL MEDICINE

## 2023-01-30 PROCEDURE — 80053 COMPREHEN METABOLIC PANEL: CPT | Performed by: INTERNAL MEDICINE

## 2023-01-30 PROCEDURE — 87070 CULTURE OTHR SPECIMN AEROBIC: CPT | Performed by: INTERNAL MEDICINE

## 2023-01-30 PROCEDURE — 250N000013 HC RX MED GY IP 250 OP 250 PS 637: Performed by: PHYSICIAN ASSISTANT

## 2023-01-30 PROCEDURE — 36415 COLL VENOUS BLD VENIPUNCTURE: CPT | Performed by: INTERNAL MEDICINE

## 2023-01-30 PROCEDURE — 83735 ASSAY OF MAGNESIUM: CPT | Performed by: INTERNAL MEDICINE

## 2023-01-30 PROCEDURE — 250N000013 HC RX MED GY IP 250 OP 250 PS 637: Performed by: INTERNAL MEDICINE

## 2023-01-30 PROCEDURE — 250N000009 HC RX 250: Performed by: RADIOLOGY

## 2023-01-30 PROCEDURE — 86140 C-REACTIVE PROTEIN: CPT | Performed by: INTERNAL MEDICINE

## 2023-01-30 PROCEDURE — 250N000013 HC RX MED GY IP 250 OP 250 PS 637: Performed by: HOSPITALIST

## 2023-01-30 PROCEDURE — 84145 PROCALCITONIN (PCT): CPT | Performed by: INTERNAL MEDICINE

## 2023-01-30 PROCEDURE — G0378 HOSPITAL OBSERVATION PER HR: HCPCS

## 2023-01-30 PROCEDURE — 99233 SBSQ HOSP IP/OBS HIGH 50: CPT | Performed by: INTERNAL MEDICINE

## 2023-01-30 PROCEDURE — 85027 COMPLETE CBC AUTOMATED: CPT | Performed by: INTERNAL MEDICINE

## 2023-01-30 PROCEDURE — 0DJ08ZZ INSPECTION OF UPPER INTESTINAL TRACT, VIA NATURAL OR ARTIFICIAL OPENING ENDOSCOPIC: ICD-10-PCS | Performed by: RADIOLOGY

## 2023-01-30 PROCEDURE — 89050 BODY FLUID CELL COUNT: CPT | Performed by: INTERNAL MEDICINE

## 2023-01-30 PROCEDURE — 84157 ASSAY OF PROTEIN OTHER: CPT | Performed by: INTERNAL MEDICINE

## 2023-01-30 PROCEDURE — 250N000011 HC RX IP 250 OP 636: Performed by: HOSPITALIST

## 2023-01-30 PROCEDURE — 258N000003 HC RX IP 258 OP 636: Performed by: INTERNAL MEDICINE

## 2023-01-30 PROCEDURE — 84132 ASSAY OF SERUM POTASSIUM: CPT | Performed by: INTERNAL MEDICINE

## 2023-01-30 PROCEDURE — 272N000706 US PARACENTESIS WITHOUT ALBUMIN

## 2023-01-30 PROCEDURE — 250N000011 HC RX IP 250 OP 636: Performed by: INTERNAL MEDICINE

## 2023-01-30 PROCEDURE — 87205 SMEAR GRAM STAIN: CPT | Performed by: INTERNAL MEDICINE

## 2023-01-30 PROCEDURE — 96375 TX/PRO/DX INJ NEW DRUG ADDON: CPT

## 2023-01-30 PROCEDURE — 85610 PROTHROMBIN TIME: CPT | Performed by: PHYSICIAN ASSISTANT

## 2023-01-30 RX ORDER — LIDOCAINE HYDROCHLORIDE 10 MG/ML
10 INJECTION, SOLUTION EPIDURAL; INFILTRATION; INTRACAUDAL; PERINEURAL ONCE
Status: COMPLETED | OUTPATIENT
Start: 2023-01-30 | End: 2023-01-30

## 2023-01-30 RX ORDER — POTASSIUM CHLORIDE 1.5 G/1.58G
20 POWDER, FOR SOLUTION ORAL ONCE
Status: COMPLETED | OUTPATIENT
Start: 2023-01-30 | End: 2023-01-30

## 2023-01-30 RX ORDER — PANTOPRAZOLE SODIUM 40 MG/1
40 TABLET, DELAYED RELEASE ORAL
Status: DISCONTINUED | OUTPATIENT
Start: 2023-01-30 | End: 2023-02-03 | Stop reason: HOSPADM

## 2023-01-30 RX ORDER — POTASSIUM CHLORIDE 1500 MG/1
20 TABLET, EXTENDED RELEASE ORAL ONCE
Status: DISCONTINUED | OUTPATIENT
Start: 2023-01-30 | End: 2023-01-30

## 2023-01-30 RX ORDER — MAGNESIUM SULFATE HEPTAHYDRATE 40 MG/ML
2 INJECTION, SOLUTION INTRAVENOUS ONCE
Status: COMPLETED | OUTPATIENT
Start: 2023-01-30 | End: 2023-01-30

## 2023-01-30 RX ORDER — DICYCLOMINE HYDROCHLORIDE 10 MG/1
10 CAPSULE ORAL 4 TIMES DAILY
Status: DISCONTINUED | OUTPATIENT
Start: 2023-01-30 | End: 2023-02-03 | Stop reason: HOSPADM

## 2023-01-30 RX ADMIN — ACETAMINOPHEN 650 MG: 325 TABLET, FILM COATED ORAL at 07:06

## 2023-01-30 RX ADMIN — DICYCLOMINE HYDROCHLORIDE 10 MG: 10 CAPSULE ORAL at 17:12

## 2023-01-30 RX ADMIN — MIRTAZAPINE 30 MG: 30 TABLET, FILM COATED ORAL at 21:51

## 2023-01-30 RX ADMIN — QUETIAPINE FUMARATE 50 MG: 25 TABLET ORAL at 16:10

## 2023-01-30 RX ADMIN — OXYCODONE HYDROCHLORIDE 10 MG: 5 TABLET ORAL at 21:50

## 2023-01-30 RX ADMIN — DICYCLOMINE HYDROCHLORIDE 10 MG: 10 CAPSULE ORAL at 12:25

## 2023-01-30 RX ADMIN — OXYCODONE HYDROCHLORIDE 10 MG: 5 TABLET ORAL at 00:38

## 2023-01-30 RX ADMIN — CALCIUM CARBONATE (ANTACID) CHEW TAB 500 MG 500 MG: 500 CHEW TAB at 18:16

## 2023-01-30 RX ADMIN — MAGNESIUM SULFATE HEPTAHYDRATE 2 G: 40 INJECTION, SOLUTION INTRAVENOUS at 17:13

## 2023-01-30 RX ADMIN — FOLIC ACID 1 MG: 1 TABLET ORAL at 08:57

## 2023-01-30 RX ADMIN — CALCIUM CARBONATE (ANTACID) CHEW TAB 500 MG 500 MG: 500 CHEW TAB at 12:28

## 2023-01-30 RX ADMIN — QUETIAPINE FUMARATE 25 MG: 25 TABLET ORAL at 08:57

## 2023-01-30 RX ADMIN — PANCRELIPASE 1 CAPSULE: 36000; 180000; 114000 CAPSULE, DELAYED RELEASE PELLETS ORAL at 12:25

## 2023-01-30 RX ADMIN — QUETIAPINE FUMARATE 100 MG: 100 TABLET ORAL at 21:50

## 2023-01-30 RX ADMIN — OXYCODONE HYDROCHLORIDE 10 MG: 5 TABLET ORAL at 04:42

## 2023-01-30 RX ADMIN — SODIUM CHLORIDE 1000 ML: 9 INJECTION, SOLUTION INTRAVENOUS at 13:06

## 2023-01-30 RX ADMIN — OXYCODONE HYDROCHLORIDE 10 MG: 5 TABLET ORAL at 08:57

## 2023-01-30 RX ADMIN — ONDANSETRON 4 MG: 4 TABLET, ORALLY DISINTEGRATING ORAL at 12:25

## 2023-01-30 RX ADMIN — IBUPROFEN 600 MG: 600 TABLET ORAL at 00:38

## 2023-01-30 RX ADMIN — NICOTINE 1 PATCH: 21 PATCH, EXTENDED RELEASE TRANSDERMAL at 08:59

## 2023-01-30 RX ADMIN — OXYCODONE HYDROCHLORIDE 10 MG: 5 TABLET ORAL at 16:10

## 2023-01-30 RX ADMIN — PANTOPRAZOLE SODIUM 40 MG: 40 TABLET, DELAYED RELEASE ORAL at 07:06

## 2023-01-30 RX ADMIN — PANTOPRAZOLE SODIUM 40 MG: 40 TABLET, DELAYED RELEASE ORAL at 17:12

## 2023-01-30 RX ADMIN — LIDOCAINE HYDROCHLORIDE 10 ML: 10 INJECTION, SOLUTION EPIDURAL; INFILTRATION; INTRACAUDAL; PERINEURAL at 15:03

## 2023-01-30 RX ADMIN — POTASSIUM CHLORIDE 20 MEQ: 1.5 POWDER, FOR SOLUTION ORAL at 16:25

## 2023-01-30 ASSESSMENT — ACTIVITIES OF DAILY LIVING (ADL)
ADLS_ACUITY_SCORE: 33

## 2023-01-30 NOTE — PLAN OF CARE
Goal Outcome Evaluation:          Observation goals  PRIOR TO DISCHARGE        Comments: -diagnostic tests and consults completed and resulted ,not met  -vital signs normal or at patient baseline met  -tolerating oral intake to maintain hydration partially met  -adequate pain control on oral analgesics not met  -returns to baseline functional status met  Nurse to notify provider when observation goals have been met and patient is ready for discharge.    01/27/23 0883

## 2023-01-30 NOTE — PROVIDER NOTIFICATION
MD Notification    Notified Person: MD    Notified Person Name: Dr Andre    Notification Date/Time: 1/30/2023 1402 PM    Notification Interaction: [employer] messaging    Purpose of Notification: CRP and procal elevated, not on abx, Mg++ also low, need replacement    Orders Received: Paracentesis ordered    Comments:

## 2023-01-30 NOTE — PLAN OF CARE
Goal Outcome Evaluation:       Orientation/Cognitive: alert and oriented  Observation Goals (Met/ Not Met): not met  Mobility Level/Assist Equipment: independent  Fall Risk (Y/N): y  Behavior Concerns: n  Pain Management: tylenol, ibuprofen, oxtcodone  Tele/VS/O2: vss tachy  ABNL Lab/BG:r/o c-diff, negative awaiting enteric panel  Diet: reg  Bowel/Bladder: continent , frequent stools  Skin Concerns: meplex over wound left buttocks  Drains/Devices:n  Tests/Procedures for next shift: n  Anticipated DC date & active delays:TBd  Patient Stated Goal for Today:y

## 2023-01-30 NOTE — PROVIDER NOTIFICATION
MD Notification    Notified Person: MD Vivek Harmon    Notified Person Name:    Notification Date/Time:1/29/2023    Notification Interaction:web    Purpose of Notification:pt has slight temp, slight h/a and generally not feeling good asking for tylenol    Orders Received:awaiting order    Comments:

## 2023-01-30 NOTE — PROGRESS NOTES
[facility]  Gastroenterology Progress Note     Audelia Comer MRN# [id number]   YOB: 1965 Age: 57 year old          Assessment and Plan:   Audelia Comer is a 57 year old woman with past medical history that is most notable for severe alcohol use disorder and chronic alcoholic pancreatitis, as well as recent C difficile colitis( 12/31 with negative C difficile toxin on 1/29/23, among others; who presents with ongoing diarrhea and acute RUQ abdominal pain and was found to have acute hypochloremic hyponatremia.     Ongoing diarrhea and acute RUQ abdominal pain     -- Observation. Regular diet. US abdomen ordered for further evaluation. Judicious doses of Ibuprofen ordered as needed for pain. Anti-emetics as needed    -- Enteric precautions ordered    -- C difficile and enteric pathogens negative    -- 1/30/23-  fecal fat and pancreatic elastase pendng     Alcoholic cirrhosis and chronic pancreatitis   - 1/29/23 EGD noted non bleeding duodenal ulcerations, small hiatal hernia, congested gastrtopathy  - 1/29/23 EUS revealed moderate chronic pancreatitis and cirrhotic liver  - pending fecal elastase and fate  --  liver cirrhosis lab workup pending to rule out other causes other than suspected alcohol-induced cirrhosis.   - MELD 10  -- start creon 36k one with three meals daily and one with 2 snacks daily     RUQ pain  Worse with movement  CT notes no source of cause  May be due to musculoskeletal pain vs IBS vs chronic pancreatitis    -- will trial dicyclomine 10 mg QID for IBS   -- Creon as above                Interval History:   denies chest pain, denies shortness of breath, alert, oriented to person, place and time and has ongoing abdominal pain, diarrhea and mild nausea              Review of Systems:   C: NEGATIVE for fever, chills, change in weight  E/M: NEGATIVE for ear, mouth and throat problems  R: NEGATIVE for significant cough or SOB  CV: NEGATIVE for chest pain,  palpitations or peripheral edema             Medications:   I have reviewed this patient's current medications    folic acid  1 mg Oral Daily     mirtazapine  30 mg Oral At Bedtime     nicotine  1 patch Transdermal Daily     nicotine   Transdermal Q8H     pantoprazole  40 mg Oral BID AC     QUEtiapine  100 mg Oral At Bedtime     QUEtiapine  25 mg Oral Daily                  Physical Exam:   Vitals were reviewed  Vital Signs with Ranges  Temp:  [97.5  F (36.4  C)-99.4  F (37.4  C)] 97.8  F (36.6  C)  Pulse:  [] 103  Resp:  [14-18] 16  BP: ()/(64-82) 113/67  SpO2:  [92 %-100 %] 96 %  No intake/output data recorded.  Constitutional: healthy, alert, mild distress and cooperative   Cardiovascular: negative, PMI normal. No lifts, heaves, or thrills. RRR. No murmurs, clicks gallops or rub  Respiratory: negative, Percussion normal. Good diaphragmatic excursion. Lungs clear  Abdomen: Abdomen soft, non-tender. BS normal. No masses, organomegaly, positive findings: tenderness mild RUQ- with mild pressure           Data:   I reviewed the patient's new clinical lab test results.   Recent Labs   Lab Test 01/29/23  0653 01/28/23  1522 01/28/23  0642 01/27/23 0228   WBC  --  7.2 6.5 15.5*   HGB  --  9.3* 9.3* 11.8   MCV  --  98 94 96   PLT  --  246 245 401   INR 1.37*  --   --   --      Recent Labs   Lab Test 01/29/23  0653 01/28/23  1522 01/28/23 0642   POTASSIUM 3.7 3.6  3.6 2.8*   CHLORIDE 105 101 97*   CO2 24 28 27   BUN 3.4* 3.8* 4.5*   ANIONGAP 8 7 10     Recent Labs   Lab Test 01/28/23  1522 01/28/23  0642 01/27/23  1003 01/27/23  0228 01/01/23  0843 12/31/22  1746 10/04/22  0835 10/03/22  2327 10/03/22  1743 06/29/22  0932 06/29/22  0826   ALBUMIN 2.3* 2.3*  --  3.4*   < > 3.4*   < >  --  4.3   < >  --    BILITOTAL 0.6 0.9  --  1.8*   < > 0.7   < >  --  0.9   < >  --    ALT 18 19  --  28   < > 17   < >  --  72*   < >  --    AST 55* 55*  --  75*   < > 51*   < >  --  223*   < >  --    PROTEIN  --   --  Negative   --   --   --   --  50*  --   --  Negative   LIPASE  --  52  --   --   --  20  --   --  26  --   --     < > = values in this interval not displayed.       I reviewed the patient's new imaging results.    All laboratory data reviewed  All imaging studies reviewed by me.    Keely Watts PA-C,  1/30/2023  [facility]  Office : [phone]  Cell: [phone] (Dr. Dickinson)  Cell: [phone] (Keely Watts PA-C)

## 2023-01-30 NOTE — PLAN OF CARE
Goal Outcome Evaluation:      Plan of Care Reviewed With: patient    Overall Patient Progress: no changeOverall Patient Progress: no change     Observation goals  PRIOR TO DISCHARGE        Comments:   -Diagnostic tests and consults completed and resulted -Not met    -Vital signs normal or at patient baseline -Not met, tachy    -Tolerating oral intake to maintain hydration -Met    -Adequate pain control on oral analgesics -Met    -Returns to baseline functional status -Not met    Nurse to notify provider when observation goals have been met and patient is ready for discharge.

## 2023-01-30 NOTE — PROGRESS NOTES
[facility]    Medicine Progress Note - Hospitalist Service    Date of Admission:  1/27/2023    Assessment & Plan   Audelia Comer is a 57 year old female with past medical history that is most notable for severe alcohol use disorder and chronic alcoholic pancreatitis, as well as recent C difficile colitis, among others; who presents with ongoing diarrhea and acute abdominal pain and is found to have acute hypochloremic hyponatremia who was admitted on 1/27/2023.       Recent diagnosis of C.Diff Colitis , finished antibiotics course but ongoing diarrhea, most likely sec to unresolved infection:  Acute abdominal pain , s/p EGD and EUS on January 29:   Elevated alkaline phosphatase, bilirubin and AST:     ** Ms. Comer has a history of alcoholic liver disease and chronic pancreatitis.   ** She was admitted for acute pancreatitis  in 10/2021, 11/2021, 7/2022, and again in 10/2022. Most recently, she was admitted to [facility] from 12/31/22 through 1/5/23 for diarrhea, found to be due to C difficile colitis. She was also found to be COVID positive at that time. She has now completed two weeks of QID oral Vancocin but reports ongoing diarrhea.   ** she is now presenting now with acute right upper quadrant abdominal pain which started while she was straining at the commode.  ** Presented to ED, she was afebrile (T 99.5). She was found to have acute leukocytosis, acute hyponatremia and trans-aminitis as discussed below. CT of abdomen and pelvis showed changes of chronic cirrhosis and chronic pancreatitis, without signs of other acute pathology.  **CT scan done on admission of abdomen and pelvis with contrast showed cirrhotic configuration of the liver with lobulated contour, geographic fatty sparing involving the hepatic parenchyma anteriorly extending inferiorly.  Tiny amount of ascites, chronic pancreatitis changes, no discrete mass, scattered colonic diverticulosis no signs of colitis or acute  inflammation, mechanical obstruction or free gas.  **Abdominal ultrasound showed geographic echogenic and hypoechoic areas in the liver, likely related to hepatic steatosis.  Coarsened hepatic parenchyma with lobulated contour suggestive of cirrhosis.  There was also distended gallbladder, mildly distended common hepatic duct measuring 7 mm and is stable but no obstructing calculi was visualized.  Sequela of chronic pancreatitis was again seen.     -1/29: -- EUS showed chronic moderate pancreatitis.   -- EGD showed small hiatal hernia, congestive gastropathy, nonbleeding duodenal ulcers with no stigmata of bleeding, congested duodenal mucosa  -- started patient on Protonix 40 mg p.o. twice daily.    1/30: Significant abdominal pain. Tachycardic in 120s and vomiting.  -We will give 1 L of normal saline bolus.  Add hyoscyamine .   - will get USS para if possible . (recheck C.Diff and stool panel- neg on 1/29)     Acute hypochloremic hyponatremia: Mild, at 126. Suspect due to hypovolemia.   --better     Trans-aminitis, acute: Suspect due to cirrhosis, possibly alcoholic hepatitis. better  --Gastroenterology following closely, plan as above     History of breast cancer: Status post bilateral mastectomies. Noted     Chronic depression:   -- Continue PTA mirtazapine 30 mg at bedtime, Seroquel 100 mg at bedtime and 25 mg of Seroquel in the morning.     Suspected severe protein calorie malnutrition:   -- Nutrition services consulted.  -- According to patient, she has lost significant amount of weight in the last year and a half, due to poor oral intake, poor health conditions and significant distress including her daughter passing away.        Diet: Snacks/Supplements Pediatric: Beneprotein; Between Meals  Regular Diet Adult    DVT Prophylaxis: Pneumatic Compression Devices  Patino Catheter: Not present  Lines: None     Cardiac Monitoring: None  Code Status: Full Code      Clinically Significant Risk Factors Present on  "Admission        # Hypokalemia: Lowest K = 3.2 mmol/L in last 2 days, will replace as needed    # Hypercalcemia: corrected calcium is >10.1, will monitor as appropriate    # Hypoalbuminemia: Lowest albumin = 2.3 g/dL at 1/28/2023  3:22 PM, will monitor as appropriate  # Coagulation Defect: INR = 1.31 (Ref range: 0.85 - 1.15) and/or PTT = N/A, will monitor for bleeding         # Cachexia: Estimated body mass index is 17.43 kg/m  as calculated from the following:    Height as of this encounter: 1.676 m (5' 6\").    Weight as of this encounter: 49 kg (108 lb).           Disposition Plan      Expected Discharge Date: 01/30/2023,  6:00 PM      Discharge Comments: abnormal labs. EGD/EUS today.          Bi Andre MD, MD  Hospitalist Service  [facility]  Securely message with [email or web address] (more info)  Text page via [employer] Paging/Directory   ______________________________________________________________________    Interval History   Continues to complain of significant symptoms.  Persistent nausea.  Diffuse abdominal pain    Denies any chest pain/palpitations  Had multiple episodes of diarrhea yesterday    Physical Exam   BP 96/65 (BP Location: Right arm, Patient Position: Right side, Cuff Size: Adult Small)   Pulse (!) 123   Temp 98.3  F (36.8  C) (Oral)   Resp 16   Ht 1.676 m (5' 6\")   Wt 49 kg (108 lb)   SpO2 94%   BMI 17.43 kg/m    Gen- pleasant cachectic looking  HEENT- NAD, AMY  Neck- supple  CVS- I+II+ no m/r/g  RS- CTAB  Abdo- soft, diffuse tenderness.  Predominantly right lower quadrant.  No guarding/rigidity  Ext- no edema     Medical Decision Making       50 MINUTES SPENT BY ME on the date of service doing chart review, history, exam, documentation & further activities per the note.      Data   ------------------------- PAST 24 HR DATA REVIEWED -----------------------------------------------    I have personally reviewed the following data over the past 24 hrs:    14.9 (H)  \   11.1 " (L)   / 343     138 99 5.7 (L) /  103 (H)   3.2 (L) 29 0.49 (L) \       ALT: 19 AST: 54 (H) AP: 258 (H) TBILI: 0.8   ALB: 2.6 (L) TOT PROTEIN: 6.1 (L) LIPASE: N/A       Procal: N/A CRP: 19.59 (H) Lactic Acid: N/A       INR:  1.31 (H) PTT:  N/A   D-dimer:  N/A Fibrinogen:  N/A       Imaging results reviewed over the past 24 hrs:   No results found for this or any previous visit (from the past 24 hour(s)).     BMPRecent Labs   Lab 01/30/23  1056 01/29/23  0653 01/28/23  1522 01/28/23 0642    137 136 134*   POTASSIUM 3.2* 3.7 3.6  3.6 2.8*   CHLORIDE 99 105 101 97*   FRANCOISE 9.0 8.2* 7.9* 8.2*   CO2 29 24 28 27   BUN 5.7* 3.4* 3.8* 4.5*   CR 0.49* 0.36* 0.38* 0.37*   * 89 110* 86     CBC  Recent Labs   Lab 01/30/23  1056 01/28/23  1522 01/28/23 0642 01/27/23 0228   WBC 14.9* 7.2 6.5 15.5*   RBC 3.40* 2.92* 2.95* 3.74*   HGB 11.1* 9.3* 9.3* 11.8   HCT 33.0* 28.5* 27.8* 35.7   MCV 97 98 94 96   MCH 32.6 31.8 31.5 31.6   MCHC 33.6 32.6 33.5 33.1   RDW 16.3* 16.2* 16.0* 15.9*    246 245 401     INR  Recent Labs   Lab 01/30/23  1056 01/29/23  0653   INR 1.31* 1.37*     LFTs  Recent Labs   Lab 01/30/23  1056 01/28/23  1522 01/28/23 0642 01/27/23 0228   ALKPHOS 258* 260* 265* 408*   AST 54* 55* 55* 75*   ALT 19 18 19 28   BILITOTAL 0.8 0.6 0.9 1.8*   PROTTOTAL 6.1* 5.5* 5.6* 8.0   ALBUMIN 2.6* 2.3* 2.3* 3.4*      PANC  Recent Labs   Lab 01/28/23  0642   LIPASE 52   US ABDOMEN LIMITED 1/27/2023 9:24 AM   IMPRESSION:   1.  Geographic echogenic and hypoechoic areas in the liver, likely   related to patchy hepatic steatosis, better assessed by the CT today.   Coarsened hepatic parenchyma with lobulated contour suggestive of   cirrhosis.   2.  Distended gallbladder. No cholelithiasis or acute cholecystitis.   3.  Mildly distended common hepatic duct measuring 7 mm, stable. No   obstructing calculi in the visualized duct. No obstructing calculi or   mass was visualized on the CT earlier today. If clinically  indicated,   an MRCP can be considered.   4.  Sequela of chronic pancreatitis, better assessed by CT earlier.   5.  Stable mild dilatation of the right renal pelvis, either due to   mild hydronephrosis or pelviectasis.     EXAM: CT ABDOMEN PELVIS W CONTRAST   LOCATION: [facility]   DATE/TIME: 1/27/2023 3:50 AM     IMPRESSION:   1.  Cirrhotic configuration of the liver with lobulated contour. Geographic fatty sparing involving the hepatic parenchyma anteriorly extending inferiorly. Tiny amount of ascites.     2.  Changes of chronic pancreatitis. No discrete mass.     3.  Scattered colonic diverticulosis, more apparent distally, without acute inflammation. No mechanical obstruction or free gas.

## 2023-01-30 NOTE — UTILIZATION REVIEW
"  [facility] Utilization Review  Admission Status; Secondary Review Determination     Admission Date: 1/27/2023  1:54 AM      Under the authority of the Utilization Management Committee, the utilization review process indicated a secondary review on the above patient.  The review outcome is based on review of the medical records, discussions with staff, and applying clinical experience noted on the date of the review.        (X) Observation Status Appropriate - This patient does not meet hospital inpatient criteria and is placed in observation status. If this patient's primary payer is Medicare and was admitted as an inpatient, Condition Code 44 should be used and patient status changed to \"observation\".   () Observation Status concurrent Review           RATIONALE FOR DETERMINATION   57-year-old female with history of severe alcohol use disorder and chronic alcoholic pancreatitis, recent C. difficile colitis, admitted with ongoing diarrhea and acute abdominal pain, found to have acute hypochloremic hyponatremia.  Patient had upper endoscopy which showed multiple ulcers in the duodenal bulb, continued on PPI.  Completed antibiotic course for C. difficile colitis but continues to have ongoing diarrhea.  CT abdomen pelvis on admission showed cirrhotic liver with small ascites and chronic pancreatitis, abdominal ultrasound showed distended gallbladder with mildly distended CBD 7 mm, EUS showed chronic moderate pancreatitis.  Patient developed significant abdominal pain with tachycardia and vomiting, started on normal saline bolus, hyoscyamine.  Recent recheck C. difficile and rotavirus, norovirus negative.  Complex patient with ongoing diarrhea and right upper quadrant abdominal pain, with CT abdomen showing no source of pain, had extensive work-up per GI team which was unremarkable, ultrasound abdomen ordered for further evaluation with judicious doses of ibuprofen as needed for pain, does not meet " criteria for inpatient stay, recommend continue observation status      The severity of illness, intensity of service provided, expected LOS make the care appropriate for observation status at this time.        The information on this document is developed by the utilization review team in order for the business office to ensure compliance.  This only denotes the appropriateness of proper admission status and does not reflect the quality of care rendered.         The definitions of Inpatient Status and Observation Status used in making the determination above are those provided in the CMS Coverage Manual, Chapter 1 and Chapter 6, section 70.4.      Sincerely,       Pam Aguayo MD  Physician Advisor  Utilization Review-[place]    Phone: [phone]

## 2023-01-31 PROBLEM — R19.7 DIARRHEA OF PRESUMED INFECTIOUS ORIGIN: Status: ACTIVE | Noted: 2023-01-31

## 2023-01-31 PROBLEM — R10.11 RUQ ABDOMINAL PAIN: Status: ACTIVE | Noted: 2023-01-31

## 2023-01-31 LAB
% LINING CELLS, BODY FLUID: 3 %
ABSOLUTE NEUTROPHILS, BODY FLUID: 156.8 /UL
ALBUMIN SERPL BCG-MCNC: 2.2 G/DL (ref 3.5–5.2)
ALP SERPL-CCNC: 210 U/L (ref 35–104)
ALT SERPL W P-5'-P-CCNC: 16 U/L (ref 10–35)
ANION GAP SERPL CALCULATED.3IONS-SCNC: 11 MMOL/L (ref 7–15)
AST SERPL W P-5'-P-CCNC: 46 U/L (ref 10–35)
BILIRUB SERPL-MCNC: 0.7 MG/DL
BUN SERPL-MCNC: 7.7 MG/DL (ref 8–23)
CALCIUM SERPL-MCNC: 8 MG/DL (ref 8.6–10)
CHLORIDE SERPL-SCNC: 102 MMOL/L (ref 98–107)
CREAT SERPL-MCNC: 0.43 MG/DL (ref 0.51–0.95)
DEPRECATED HCO3 PLAS-SCNC: 23 MMOL/L (ref 22–29)
ERYTHROCYTE [DISTWIDTH] IN BLOOD BY AUTOMATED COUNT: 16.5 % (ref 10–15)
FAT STL QL: ABNORMAL
GFR SERPL CREATININE-BSD FRML MDRD: >90 ML/MIN/1.73M2
GLUCOSE SERPL-MCNC: 91 MG/DL (ref 70–99)
HCT VFR BLD AUTO: 28.5 % (ref 35–47)
HGB BLD-MCNC: 9.3 G/DL (ref 11.7–15.7)
LYMPHOCYTES NFR FLD MANUAL: 28 %
MAGNESIUM SERPL-MCNC: 1.7 MG/DL (ref 1.7–2.3)
MCH RBC QN AUTO: 32 PG (ref 26.5–33)
MCHC RBC AUTO-ENTMCNC: 32.6 G/DL (ref 31.5–36.5)
MCV RBC AUTO: 98 FL (ref 78–100)
MONOS+MACROS NFR FLD MANUAL: 49 %
NEUTRAL FAT STL QL: ABNORMAL
NEUTS BAND NFR FLD MANUAL: 20 %
PLATELET # BLD AUTO: 264 10E3/UL (ref 150–450)
POTASSIUM SERPL-SCNC: 3.4 MMOL/L (ref 3.4–5.3)
POTASSIUM SERPL-SCNC: 4.2 MMOL/L (ref 3.4–5.3)
PROT SERPL-MCNC: 5.4 G/DL (ref 6.4–8.3)
RBC # BLD AUTO: 2.91 10E6/UL (ref 3.8–5.2)
SODIUM SERPL-SCNC: 136 MMOL/L (ref 136–145)
WBC # BLD AUTO: 12.9 10E3/UL (ref 4–11)

## 2023-01-31 PROCEDURE — 250N000013 HC RX MED GY IP 250 OP 250 PS 637: Performed by: PHYSICIAN ASSISTANT

## 2023-01-31 PROCEDURE — 84520 ASSAY OF UREA NITROGEN: CPT | Performed by: INTERNAL MEDICINE

## 2023-01-31 PROCEDURE — 83735 ASSAY OF MAGNESIUM: CPT | Performed by: INTERNAL MEDICINE

## 2023-01-31 PROCEDURE — 250N000013 HC RX MED GY IP 250 OP 250 PS 637: Performed by: INTERNAL MEDICINE

## 2023-01-31 PROCEDURE — 250N000011 HC RX IP 250 OP 636: Performed by: INTERNAL MEDICINE

## 2023-01-31 PROCEDURE — 120N000001 HC R&B MED SURG/OB

## 2023-01-31 PROCEDURE — G0378 HOSPITAL OBSERVATION PER HR: HCPCS

## 2023-01-31 PROCEDURE — 99233 SBSQ HOSP IP/OBS HIGH 50: CPT | Performed by: INTERNAL MEDICINE

## 2023-01-31 PROCEDURE — 36415 COLL VENOUS BLD VENIPUNCTURE: CPT | Performed by: INTERNAL MEDICINE

## 2023-01-31 PROCEDURE — 84132 ASSAY OF SERUM POTASSIUM: CPT | Performed by: INTERNAL MEDICINE

## 2023-01-31 PROCEDURE — 96376 TX/PRO/DX INJ SAME DRUG ADON: CPT

## 2023-01-31 PROCEDURE — 250N000011 HC RX IP 250 OP 636: Performed by: PHYSICIAN ASSISTANT

## 2023-01-31 PROCEDURE — 85027 COMPLETE CBC AUTOMATED: CPT | Performed by: INTERNAL MEDICINE

## 2023-01-31 RX ORDER — ONDANSETRON 2 MG/ML
4 INJECTION INTRAMUSCULAR; INTRAVENOUS EVERY 8 HOURS SCHEDULED
Status: DISCONTINUED | OUTPATIENT
Start: 2023-01-31 | End: 2023-02-03 | Stop reason: HOSPADM

## 2023-01-31 RX ORDER — ONDANSETRON 4 MG/1
4 TABLET, ORALLY DISINTEGRATING ORAL EVERY 8 HOURS SCHEDULED
Status: DISCONTINUED | OUTPATIENT
Start: 2023-01-31 | End: 2023-02-03 | Stop reason: HOSPADM

## 2023-01-31 RX ORDER — MAGNESIUM SULFATE HEPTAHYDRATE 40 MG/ML
2 INJECTION, SOLUTION INTRAVENOUS ONCE
Status: COMPLETED | OUTPATIENT
Start: 2023-01-31 | End: 2023-01-31

## 2023-01-31 RX ORDER — CHOLESTYRAMINE 4 G/9G
1 POWDER, FOR SUSPENSION ORAL DAILY
Status: DISCONTINUED | OUTPATIENT
Start: 2023-01-31 | End: 2023-02-03 | Stop reason: HOSPADM

## 2023-01-31 RX ORDER — POTASSIUM CHLORIDE 1500 MG/1
20 TABLET, EXTENDED RELEASE ORAL ONCE
Status: COMPLETED | OUTPATIENT
Start: 2023-01-31 | End: 2023-01-31

## 2023-01-31 RX ADMIN — CALCIUM CARBONATE (ANTACID) CHEW TAB 500 MG 500 MG: 500 CHEW TAB at 01:07

## 2023-01-31 RX ADMIN — OXYCODONE HYDROCHLORIDE 10 MG: 5 TABLET ORAL at 22:41

## 2023-01-31 RX ADMIN — PANCRELIPASE 1 CAPSULE: 36000; 180000; 114000 CAPSULE, DELAYED RELEASE PELLETS ORAL at 08:36

## 2023-01-31 RX ADMIN — ACETAMINOPHEN 650 MG: 325 TABLET, FILM COATED ORAL at 22:42

## 2023-01-31 RX ADMIN — OXYCODONE HYDROCHLORIDE 10 MG: 5 TABLET ORAL at 10:20

## 2023-01-31 RX ADMIN — QUETIAPINE FUMARATE 100 MG: 100 TABLET ORAL at 21:12

## 2023-01-31 RX ADMIN — PANTOPRAZOLE SODIUM 40 MG: 40 TABLET, DELAYED RELEASE ORAL at 16:23

## 2023-01-31 RX ADMIN — MIRTAZAPINE 30 MG: 30 TABLET, FILM COATED ORAL at 21:12

## 2023-01-31 RX ADMIN — ACETAMINOPHEN 650 MG: 325 TABLET, FILM COATED ORAL at 12:44

## 2023-01-31 RX ADMIN — CHOLESTYRAMINE 4 G: 4 POWDER, FOR SUSPENSION ORAL at 13:43

## 2023-01-31 RX ADMIN — ONDANSETRON 4 MG: 4 TABLET, ORALLY DISINTEGRATING ORAL at 21:12

## 2023-01-31 RX ADMIN — PANCRELIPASE 1 CAPSULE: 36000; 180000; 114000 CAPSULE, DELAYED RELEASE PELLETS ORAL at 17:29

## 2023-01-31 RX ADMIN — OXYCODONE HYDROCHLORIDE 10 MG: 5 TABLET ORAL at 18:46

## 2023-01-31 RX ADMIN — DICYCLOMINE HYDROCHLORIDE 10 MG: 10 CAPSULE ORAL at 16:23

## 2023-01-31 RX ADMIN — QUETIAPINE FUMARATE 25 MG: 25 TABLET ORAL at 08:36

## 2023-01-31 RX ADMIN — PANCRELIPASE 1 CAPSULE: 36000; 180000; 114000 CAPSULE, DELAYED RELEASE PELLETS ORAL at 12:44

## 2023-01-31 RX ADMIN — FOLIC ACID 1 MG: 1 TABLET ORAL at 08:36

## 2023-01-31 RX ADMIN — DICYCLOMINE HYDROCHLORIDE 10 MG: 10 CAPSULE ORAL at 12:44

## 2023-01-31 RX ADMIN — PANTOPRAZOLE SODIUM 40 MG: 40 TABLET, DELAYED RELEASE ORAL at 08:36

## 2023-01-31 RX ADMIN — MAGNESIUM SULFATE HEPTAHYDRATE 2 G: 2 INJECTION, SOLUTION INTRAVENOUS at 10:12

## 2023-01-31 RX ADMIN — OXYCODONE HYDROCHLORIDE 10 MG: 5 TABLET ORAL at 14:41

## 2023-01-31 RX ADMIN — DICYCLOMINE HYDROCHLORIDE 10 MG: 10 CAPSULE ORAL at 08:36

## 2023-01-31 RX ADMIN — ONDANSETRON 4 MG: 4 TABLET, ORALLY DISINTEGRATING ORAL at 14:41

## 2023-01-31 RX ADMIN — OXYCODONE HYDROCHLORIDE 10 MG: 5 TABLET ORAL at 06:19

## 2023-01-31 RX ADMIN — POTASSIUM CHLORIDE 20 MEQ: 1500 TABLET, EXTENDED RELEASE ORAL at 10:12

## 2023-01-31 RX ADMIN — ACETAMINOPHEN 650 MG: 325 TABLET, FILM COATED ORAL at 17:29

## 2023-01-31 RX ADMIN — NICOTINE 1 PATCH: 21 PATCH, EXTENDED RELEASE TRANSDERMAL at 08:36

## 2023-01-31 RX ADMIN — ACETAMINOPHEN 650 MG: 325 TABLET, FILM COATED ORAL at 08:36

## 2023-01-31 RX ADMIN — QUETIAPINE FUMARATE 50 MG: 25 TABLET ORAL at 16:22

## 2023-01-31 RX ADMIN — DICYCLOMINE HYDROCHLORIDE 10 MG: 10 CAPSULE ORAL at 21:12

## 2023-01-31 RX ADMIN — OXYCODONE HYDROCHLORIDE 10 MG: 5 TABLET ORAL at 02:22

## 2023-01-31 ASSESSMENT — ACTIVITIES OF DAILY LIVING (ADL)
ADLS_ACUITY_SCORE: 33

## 2023-01-31 NOTE — PLAN OF CARE
Goal Outcome Evaluation:    -diagnostic tests and consults completed and resulted-met  -vital signs normal or at patient baseline-met  -tolerating oral intake to maintain hydration-not met   -adequate pain control on oral analgesics-met  -returns to baseline functional status-met    Sunita Hermosillo, RN

## 2023-01-31 NOTE — PLAN OF CARE
Goal Outcome Evaluation:            Observation goals  PRIOR TO DISCHARGE        Comments: -diagnostic tests and consults completed and resulted not met  -vital signs normal or at patient baseline not met  -tolerating oral intake to maintain hydration not met  -adequate pain control on oral analgesics not met  -returns to baseline functional status not mt  Nurse to notify provider when observation goals have been met and patient is ready for discharge.

## 2023-01-31 NOTE — PROGRESS NOTES
"SW:  D: Call from [facility] ([phone][facility] regarding PCP's concern with patient discharging home without increased support. Writer expressed that patient does not qualify for TCU or Homecare at this time. Patient will need to contact front door services and request a MNCHOICE assessment. Writer expressed she will provide this number to patient prior to discharge. Important to note that MNCHoices assessment do take several weeks to get set up. RNCC reports that the MD is concerns with patient starting to wear \"adult diapers for two months now, messier house when she does not feel up for cleaning\" and concern with being evicted.     Writer will meet with patient tomorrow to provide resources. RNCC stated he will monitor patient during her hospital stay and follow up with her once she is discharged.     RANDAL Luu, [facility]   Social Work   [facility]  "

## 2023-01-31 NOTE — UTILIZATION REVIEW
Admission Status; Secondary Review Determination       Under the authority of the Utilization Management Committee, the utilization review process indicated a secondary review on the above patient. The review outcome is based on review of the medical records, discussions with staff, and applying clinical experience noted on the date of the review.     (x) Inpatient Status Appropriate - This patient's medical care is consistent with medical management for inpatient care and reasonable inpatient medical practice.     RATIONALE FOR DETERMINATION   57 year old female with past medical history that is most notable for severe alcohol use disorder and chronic alcoholic pancreatitis, as well as recent C difficile colitis, among others; who presents with ongoing diarrhea and acute abdominal pain and is found to have acute hypochloremic hyponatremia who was admitted on 1/27/2023.       Patient requires inpatient admission versus short stay observation or outpatient treatment for the following reasons: Patient has been having significant abdominal pain, 1/30 had significant tachycardia vomiting required paracentesis also concern for hyponatremia severe protein malnutrition.  Has been receiving hospital level of care for more than 4 nights and still not ready for discharge  Dr. Andre notified    The expected length of stay at the time of admission was more than 2 nights because of the severity of illness, intensity of service provided, and risk for adverse outcome. Inpatient admission is appropriate.         This document was produced using voice recognition software       The information on this document is developed by the utilization review team in order for the business office to ensure compliance. This only denotes the appropriateness of proper admission status and does not reflect the quality of care rendered.   The definitions of Inpatient Status and Observation Status used in making the determination above are those  provided in the CMS Coverage Manual, Chapter 1 and Chapter 6, section 70.4.   Sincerely,   MONTSE VACA MD   System Medical Director   Utilization Management   [facility].

## 2023-01-31 NOTE — PLAN OF CARE
Orientation/Cognitive: A&O  Observation Goals (Met/ Not Met): Not met  Mobility Level/Assist Equipment: SBA. Pt Steady on feet.  Fall Risk (Y/N): y  Behavior Concerns: None  Pain Management: Oxycodone PRN given x2 on writer shift  Tele/VS/O2: Elevated HR  ABNL Lab/BG: Potassium 3.6, magnesium 2.4  Diet: Reg + Supplement  Bowel/Bladder: Continent. Pt experiencing diarrhea x4 overnight. Pt also had 3 episodes of emesis.  Skin Concerns: meplex on buttocks for wound, paracentesis  site on right abd  Drains/Devices: PIV SL  Tests/Procedures for next shift: lab  Anticipated DC date & active delays: TBD  Patient Stated Goal for Today: To rest

## 2023-01-31 NOTE — PROGRESS NOTES
[facility]  Gastroenterology Progress Note     Audelia Comer MRN# [id number]   YOB: 1965 Age: 57 year old          Assessment and Plan:   Audelia Comer is a 57 year old woman with past medical history that is most notable for severe alcohol use disorder and chronic alcoholic pancreatitis, as well as recent C difficile colitis( 12/31 with negative C difficile toxin on 1/29/23, among others; who presents with ongoing diarrhea and acute RUQ abdominal pain and was found to have acute hypochloremic hyponatremia.     Acute RUQ abdominal pain   -- RUQ ultrasound noted distended gallbladder with no cholelithiasis. Contour of liver consistent with liver cirrhosis  - CT notes no source of cause of pain- positive for findings consistent with liver cirrhosis and chronic pancreatitis.  - Worse with movement and vomiting as well as sneezing  - May be due to musculoskeletal pain vs IBS vs chronic pancreatitis  - 1/30/23 Paracentesis removed 0.7L of fluid- cell counte revealed 784 nucleated cells /uL 20% neutrophils. Not greater than 250- unlikely SBP- NO organisms seen- not consistent with source of pain    -- will trial dicyclomine 10 mg QID for IBS   -- Creon below  -- ice to abdomen    -- scheduled antiemetics    Nausea and vomiting and diarrhea    -- C difficile and enteric pathogens negative    -- Possibly from IBS-D vs chronic pancreatitis as well as gastroenteritis    -- schedule ondansetron q 8 hours    -- cholestyramine 4 g daily    Alcoholic cirrhosis and chronic pancreatitis   - 1/29/23 EGD noted non bleeding duodenal ulcerations, small hiatal hernia, congested gastrtopathy  - 1/29/23 EUS revealed moderate chronic pancreatitis and cirrhotic liver  - pending fecal elastase and fate  --  liver cirrhosis lab workup pending to rule out other causes other than suspected alcohol-induced cirrhosis.   - MELD 10  -- creon 36k one with three meals daily and one with 2 snacks daily    -- 1/31/23-  fecal fat and pancreatic elastase pending                   Interval History:   denies chest pain, denies shortness of breath, alert, oriented to person, place and time and has ongoing abdominal pain, nausea, vomiting and diarrhea. Has noted no improvement with initiation of dicyclomine or creon              Review of Systems:   C: NEGATIVE for fever, chills, change in weight  E/M: NEGATIVE for ear, mouth and throat problems  R: NEGATIVE for significant cough or SOB  CV: NEGATIVE for chest pain, palpitations or peripheral edema             Medications:   I have reviewed this patient's current medications    amylase-lipase-protease  1 capsule Oral TID w/meals     dicyclomine  10 mg Oral 4x Daily     folic acid  1 mg Oral Daily     magnesium sulfate  2 g Intravenous Once     mirtazapine  30 mg Oral At Bedtime     nicotine  1 patch Transdermal Daily     nicotine   Transdermal Q8H     pantoprazole  40 mg Oral BID AC     QUEtiapine  100 mg Oral At Bedtime     QUEtiapine  25 mg Oral Daily                  Physical Exam:   Vitals were reviewed  Vital Signs with Ranges  Temp:  [98.3  F (36.8  C)-99.4  F (37.4  C)] 98.7  F (37.1  C)  Pulse:  [103-126] 103  Resp:  [16-18] 18  BP: ()/(65-91) 109/68  SpO2:  [94 %-96 %] 96 %  I/O last 3 completed shifts:  In: 290 [P.O.:290]  Out: 650 [Emesis/NG output:650]  Constitutional: healthy, alert, mild distress and cooperative   Cardiovascular: negative, PMI normal. No lifts, heaves, or thrills. RRR. No murmurs, clicks gallops or rub  Respiratory: negative, Percussion normal. Good diaphragmatic excursion. Lungs clear  Abdomen: Abdomen soft, non-tender. BS normal. No masses, organomegaly, positive findings: tenderness mild RUQ- with mild pressure           Data:   I reviewed the patient's new clinical lab test results.   Recent Labs   Lab Test 01/31/23  0658 01/30/23  1056 01/29/23  0653 01/28/23  1522   WBC 12.9* 14.9*  --  7.2   HGB 9.3* 11.1*  --  9.3*   MCV 98 97   --  98    343  --  246   INR  --  1.31* 1.37*  --      Recent Labs   Lab Test 01/31/23  0658 01/30/23  1827 01/30/23  1056 01/29/23  0653   POTASSIUM 3.4 3.6 3.2* 3.7   CHLORIDE 102  --  99 105   CO2 23  --  29 24   BUN 7.7*  --  5.7* 3.4*   ANIONGAP 11  --  10 8     Recent Labs   Lab Test 01/31/23  0658 01/30/23  1827 01/30/23  1056 01/28/23  1522 01/28/23  0642 01/27/23  1003 01/01/23  0843 12/31/22  1746 10/04/22  0835 10/03/22  2327 10/03/22  1743 06/29/22  0932 06/29/22  0826   ALBUMIN 2.2* 2.4* 2.6* 2.3* 2.3*  --    < > 3.4*   < >  --  4.3   < >  --    BILITOTAL 0.7  --  0.8 0.6 0.9  --    < > 0.7   < >  --  0.9   < >  --    ALT 16  --  19 18 19  --    < > 17   < >  --  72*   < >  --    AST 46*  --  54* 55* 55*  --    < > 51*   < >  --  223*   < >  --    PROTEIN  --   --   --   --   --  Negative  --   --   --  50*  --   --  Negative   LIPASE  --   --   --   --  52  --   --  20  --   --  26  --   --     < > = values in this interval not displayed.       I reviewed the patient's new imaging results.    All laboratory data reviewed  All imaging studies reviewed by me.    Keely Watts PA-C,  1/30/2023  [facility]  Office : [phone]  Cell: [phone] (Dr. Dickinson)  Cell: [phone] (Keely Watts PA-C)

## 2023-01-31 NOTE — PLAN OF CARE
Goal Outcome Evaluation:       Orientation/Cognitive: alert and oriented, anxious at times  Observation Goals (Met/ Not Met):n  Mobility Level/Assist Equipment:SBA  Fall Risk (Y/N): y  Behavior Concerns: n  Pain Management: tylenol, oxycodone,  Tele/VS/O2: low blood presure wnl after bolus, HR tachy  ABNL Lab/BG: low potassium, magnesium, elvated procal. , low albumin and   Diet: r, poor appetite  Bowel/Bladder: cont  Skin Concerns: meplex on buttocks for wound, paracentesis  site on right abd  Drains/Devices: n  Tests/Procedures for next shift: lab  Anticipated DC date & active delays: TBD  Patient Stated Goal for Today:n

## 2023-01-31 NOTE — PROGRESS NOTES
Notified bedside RN, received call from hospitalist to continue enteric precaution overnight and rounding hospitalist to address in the morning.

## 2023-01-31 NOTE — PLAN OF CARE
Problem: Plan of Care - These are the overarching goals to be used throughout the patient stay.    Goal: Plan of Care Review  Description: The Plan of Care Review/Shift note should be completed every shift.  The Outcome Evaluation is a brief statement about your assessment that the patient is improving, declining, or no change.  This information will be displayed automatically on your shift note.  Outcome: Progressing     Problem: Diarrhea  Goal: Effective Diarrhea Management  Outcome: Progressing   Goal Outcome Evaluation:       Diarrhea and nausea has been much better today so far.  Pain still quite severe rating mostly 7/10.  Pain medications helps some.  Ice has been applied to abdomen as well.  Eating some today, which is improved from yesterday.  No more emesis since early this morning.  Up independent in room.  Continue to monitor and report.

## 2023-01-31 NOTE — PROGRESS NOTES
[facility]    Medicine Progress Note - Hospitalist Service    Date of Admission:  1/27/2023    Assessment & Plan   Audelia Comer is a 57 year old female with past medical history that is most notable for severe alcohol use disorder and chronic alcoholic pancreatitis, as well as recent C difficile colitis, among others; who presents with ongoing diarrhea and acute abdominal pain and is found to have acute hypochloremic hyponatremia who was admitted on 1/27/2023.       Recent diagnosis of C.Diff Colitis , finished antibiotics course but ongoing diarrhea, most likely sec to chronic pancreatitis versus IBS  Acute abdominal pain , s/p EGD and EUS on January 29:   Elevated alkaline phosphatase, bilirubin and AST:     ** Ms. Comer has a history of alcoholic liver disease and chronic pancreatitis.   ** She was admitted for acute pancreatitis  in 10/2021, 11/2021, 7/2022, and again in 10/2022. Most recently, she was admitted to [facility] from 12/31/22 through 1/5/23 for diarrhea, found to be due to C difficile colitis. She was also found to be COVID positive at that time. She has now completed two weeks of QID oral Vancocin but reports ongoing diarrhea.   ** she is now presenting now with acute right upper quadrant abdominal pain which started while she was straining at the commode.  ** Presented to ED, she was afebrile (T 99.5). She was found to have acute leukocytosis, acute hyponatremia and trans-aminitis as discussed below. CT of abdomen and pelvis showed changes of chronic cirrhosis and chronic pancreatitis, without signs of other acute pathology.  **CT scan done on admission of abdomen and pelvis with contrast showed cirrhotic configuration of the liver with lobulated contour, geographic fatty sparing involving the hepatic parenchyma anteriorly extending inferiorly.  Tiny amount of ascites, chronic pancreatitis changes, no discrete mass, scattered colonic diverticulosis no signs of colitis  or acute inflammation, mechanical obstruction or free gas.  **Abdominal ultrasound showed geographic echogenic and hypoechoic areas in the liver, likely related to hepatic steatosis.  Coarsened hepatic parenchyma with lobulated contour suggestive of cirrhosis.  There was also distended gallbladder, mildly distended common hepatic duct measuring 7 mm and is stable but no obstructing calculi was visualized.  Sequela of chronic pancreatitis was again seen.     -1/29: -- EUS showed chronic moderate pancreatitis.   -- EGD showed small hiatal hernia, congestive gastropathy, nonbleeding duodenal ulcers with no stigmata of bleeding, congested duodenal mucosa  -- started patient on Protonix 40 mg p.o. twice daily.    1/30: Significant abdominal pain. Tachycardic in 120s and vomiting.  - s/p USS para 0.7 L straw colored fluid revealed 784 nucleated cells /uL 20% neutrophils).  Stool enteric panel and C. difficile negative  -Continue on Creon, dicyclomine.  Encourage p.o. intake    Leukocytosis-downtrending.  Monitor for now    Acute hypochloremic hyponatremia: Mild, at 126. Suspect due to hypovolemia.   --better     Trans-aminitis, acute: Suspect due to cirrhosis, possibly alcoholic hepatitis. better  --Gastroenterology following closely, plan as above     History of breast cancer: Status post bilateral mastectomies. Noted     Chronic depression:   -- Continue PTA mirtazapine 30 mg at bedtime, Seroquel 100 mg at bedtime and 25 mg of Seroquel in the morning.     Suspected severe protein calorie malnutrition:   -- Nutrition services consulted.  -- According to patient, she has lost significant amount of weight in the last year and a half, due to poor oral intake, poor health conditions and significant distress including her daughter passing away.      Diet: Snacks/Supplements Pediatric: Beneprotein; Between Meals  Regular Diet Adult    DVT Prophylaxis: Pneumatic Compression Devices  Patino Catheter: Not present  Lines: None    "  Cardiac Monitoring: None  Code Status: Full Code      Clinically Significant Risk Factors Present on Admission        # Hypokalemia: Lowest K = 3.2 mmol/L in last 2 days, will replace as needed    # Hypercalcemia: corrected calcium is >10.1, will monitor as appropriate  # Hypomagnesemia: Lowest Mg = 1.4 mg/dL in last 2 days, will replace as needed   # Hypoalbuminemia: Lowest albumin = 2.2 g/dL at 1/31/2023  6:58 AM, will monitor as appropriate  # Coagulation Defect: INR = 1.31 (Ref range: 0.85 - 1.15) and/or PTT = N/A, will monitor for bleeding         # Cachexia: Estimated body mass index is 17.43 kg/m  as calculated from the following:    Height as of this encounter: 1.676 m (5' 6\").    Weight as of this encounter: 49 kg (108 lb).           Disposition Plan      Expected Discharge Date: 02/02/2023,  6:00 PM      Discharge Comments: Vomited.  Paracentesis done 1/30.    Tachy and a soft b/p.  GI following--medication changes for pain and diarrhea control.          Bi Andre MD, MD  Hospitalist Service  [facility]  Securely message with [email or web address] (more info)  Text page via [employer] Paging/Directory   ______________________________________________________________________    Interval History   Tolerated paracentesis yesterday.  Abdomen feels better but continues with a significant amount of diarrhea and vomiting.  Not tolerating much p.o.     Physical Exam   /68 (BP Location: Left arm, Patient Position: Semi-Estrella's, Cuff Size: Adult Small)   Pulse 103   Temp 98.7  F (37.1  C) (Oral)   Resp 18   Ht 1.676 m (5' 6\")   Wt 49 kg (108 lb)   SpO2 96%   BMI 17.43 kg/m    Gen- pleasant cachectic looking  HEENT- NAD, AMY  Neck- supple  CVS- I+II+ no m/r/g  RS- CTAB  Abdo- soft, diffuse tenderness (better than yesterday). No guarding/rigidity  Ext- no edema     Medical Decision Making       50 MINUTES SPENT BY ME on the date of service doing chart review, history, exam, documentation & " further activities per the note.      Data   ------------------------- PAST 24 HR DATA REVIEWED -----------------------------------------------    I have personally reviewed the following data over the past 24 hrs:    12.9 (H)  \   9.3 (L)   / 264     136 102 7.7 (L) /  91   3.4 23 0.43 (L) \       ALT: 16 AST: 46 (H) AP: 210 (H) TBILI: 0.7   ALB: 2.2 (L) TOT PROTEIN: 5.4 (L) LIPASE: N/A       Procal: N/A CRP: N/A Lactic Acid: N/A         Imaging results reviewed over the past 24 hrs:   Recent Results (from the past 24 hour(s))   US Paracentesis without Albumin    Narrative    US PARACENTESIS WITHOUT ALBUMIN 1/30/2023 3:22 PM     HISTORY: High volume paracentesis with or without diagnostic fluid  analysis with labs to be drawn if ordered. Total paracentesis volume  as much as possible.    PROCEDURE: Ultrasound was used to evaluate for the presence and best  approach for paracentesis. Written and oral informed consent was  obtained. A pause for the cause procedure to verify the correct  patient and correct procedure. The skin overlying the right lower  quadrant was prepped and draped in the usual sterile fashion. The  subcutaneous tissues were anesthetized with 10 mL 1% lidocaine. A  catheter was advanced into the peritoneal space and 0.7 L of  straw  colored fluid was drained. There were no immediate complications.  Ultrasound images were permanently stored.  Patient left the  ultrasound suite in satisfactory condition.      Impression    IMPRESSION: Technically successful paracentesis without immediate  complications.     OCTAVIO MOMIN MD         SYSTEM ID:  [id number]        [facility]  Recent Labs   Lab 01/31/23  0658 01/30/23  1827 01/30/23  1056 01/29/23  0653 01/28/23  1522     --  138 137 136   POTASSIUM 3.4 3.6 3.2* 3.7 3.6  3.6   CHLORIDE 102  --  99 105 101   FRANCOISE 8.0*  --  9.0 8.2* 7.9*   CO2 23  --  29 24 28   BUN 7.7*  --  5.7* 3.4* 3.8*   CR 0.43*  --  0.49* 0.36* 0.38*   GLC 91  --  103* 89 110*      CBC  Recent Labs   Lab 01/31/23  0658 01/30/23  1056 01/28/23  1522 01/28/23  0642   WBC 12.9* 14.9* 7.2 6.5   RBC 2.91* 3.40* 2.92* 2.95*   HGB 9.3* 11.1* 9.3* 9.3*   HCT 28.5* 33.0* 28.5* 27.8*   MCV 98 97 98 94   MCH 32.0 32.6 31.8 31.5   MCHC 32.6 33.6 32.6 33.5   RDW 16.5* 16.3* 16.2* 16.0*    343 246 245     INR  Recent Labs   Lab 01/30/23  1056 01/29/23  0653   INR 1.31* 1.37*     LFTs  Recent Labs   Lab 01/31/23  0658 01/30/23  1827 01/30/23  1056 01/28/23  1522 01/28/23  0642   ALKPHOS 210*  --  258* 260* 265*   AST 46*  --  54* 55* 55*   ALT 16  --  19 18 19   BILITOTAL 0.7  --  0.8 0.6 0.9   PROTTOTAL 5.4*  --  6.1* 5.5* 5.6*   ALBUMIN 2.2* 2.4* 2.6* 2.3* 2.3*      PANC  Recent Labs   Lab 01/28/23  0642   LIPASE 52   US ABDOMEN LIMITED 1/27/2023 9:24 AM   IMPRESSION:   1.  Geographic echogenic and hypoechoic areas in the liver, likely   related to patchy hepatic steatosis, better assessed by the CT today.   Coarsened hepatic parenchyma with lobulated contour suggestive of   cirrhosis.   2.  Distended gallbladder. No cholelithiasis or acute cholecystitis.   3.  Mildly distended common hepatic duct measuring 7 mm, stable. No   obstructing calculi in the visualized duct. No obstructing calculi or   mass was visualized on the CT earlier today. If clinically indicated,   an MRCP can be considered.   4.  Sequela of chronic pancreatitis, better assessed by CT earlier.   5.  Stable mild dilatation of the right renal pelvis, either due to   mild hydronephrosis or pelviectasis.     EXAM: CT ABDOMEN PELVIS W CONTRAST   LOCATION: [facility]   DATE/TIME: 1/27/2023 3:50 AM     IMPRESSION:   1.  Cirrhotic configuration of the liver with lobulated contour. Geographic fatty sparing involving the hepatic parenchyma anteriorly extending inferiorly. Tiny amount of ascites.     2.  Changes of chronic pancreatitis. No discrete mass.     3.  Scattered colonic diverticulosis, more apparent  distally, without acute inflammation. No mechanical obstruction or free gas.

## 2023-02-01 LAB
ALBUMIN SERPL BCG-MCNC: 2.2 G/DL (ref 3.5–5.2)
ALP SERPL-CCNC: 198 U/L (ref 35–104)
ALT SERPL W P-5'-P-CCNC: 14 U/L (ref 10–35)
ANION GAP SERPL CALCULATED.3IONS-SCNC: 10 MMOL/L (ref 7–15)
AST SERPL W P-5'-P-CCNC: 41 U/L (ref 10–35)
BILIRUB SERPL-MCNC: 0.5 MG/DL
BUN SERPL-MCNC: 5.9 MG/DL (ref 8–23)
CALCIUM SERPL-MCNC: 8.2 MG/DL (ref 8.6–10)
CHLORIDE SERPL-SCNC: 106 MMOL/L (ref 98–107)
CREAT SERPL-MCNC: 0.44 MG/DL (ref 0.51–0.95)
DEPRECATED HCO3 PLAS-SCNC: 22 MMOL/L (ref 22–29)
ELASTASE PANC STL-MCNT: 5.3 UG/G
GFR SERPL CREATININE-BSD FRML MDRD: >90 ML/MIN/1.73M2
GLUCOSE SERPL-MCNC: 90 MG/DL (ref 70–99)
MAGNESIUM SERPL-MCNC: 1.5 MG/DL (ref 1.7–2.3)
MITOCHONDRIA M2 IGG SER-ACNC: 2.1 U/ML
POTASSIUM SERPL-SCNC: 4 MMOL/L (ref 3.4–5.3)
POTASSIUM SERPL-SCNC: 4 MMOL/L (ref 3.4–5.3)
PROT SERPL-MCNC: 5.3 G/DL (ref 6.4–8.3)
SODIUM SERPL-SCNC: 138 MMOL/L (ref 136–145)

## 2023-02-01 PROCEDURE — 250N000013 HC RX MED GY IP 250 OP 250 PS 637: Performed by: PHYSICIAN ASSISTANT

## 2023-02-01 PROCEDURE — 99233 SBSQ HOSP IP/OBS HIGH 50: CPT | Performed by: INTERNAL MEDICINE

## 2023-02-01 PROCEDURE — 250N000011 HC RX IP 250 OP 636: Performed by: INTERNAL MEDICINE

## 2023-02-01 PROCEDURE — 84132 ASSAY OF SERUM POTASSIUM: CPT | Performed by: INTERNAL MEDICINE

## 2023-02-01 PROCEDURE — 250N000011 HC RX IP 250 OP 636: Performed by: PHYSICIAN ASSISTANT

## 2023-02-01 PROCEDURE — 120N000001 HC R&B MED SURG/OB

## 2023-02-01 PROCEDURE — 250N000013 HC RX MED GY IP 250 OP 250 PS 637: Performed by: INTERNAL MEDICINE

## 2023-02-01 PROCEDURE — 83735 ASSAY OF MAGNESIUM: CPT | Performed by: INTERNAL MEDICINE

## 2023-02-01 PROCEDURE — 36415 COLL VENOUS BLD VENIPUNCTURE: CPT | Performed by: INTERNAL MEDICINE

## 2023-02-01 PROCEDURE — 80053 COMPREHEN METABOLIC PANEL: CPT | Performed by: PHYSICIAN ASSISTANT

## 2023-02-01 PROCEDURE — 250N000013 HC RX MED GY IP 250 OP 250 PS 637: Performed by: HOSPITALIST

## 2023-02-01 RX ORDER — MAGNESIUM SULFATE HEPTAHYDRATE 40 MG/ML
2 INJECTION, SOLUTION INTRAVENOUS ONCE
Status: COMPLETED | OUTPATIENT
Start: 2023-02-01 | End: 2023-02-01

## 2023-02-01 RX ADMIN — OXYCODONE HYDROCHLORIDE 10 MG: 5 TABLET ORAL at 06:38

## 2023-02-01 RX ADMIN — QUETIAPINE FUMARATE 25 MG: 25 TABLET ORAL at 08:58

## 2023-02-01 RX ADMIN — PANCRELIPASE 1 CAPSULE: 36000; 180000; 114000 CAPSULE, DELAYED RELEASE PELLETS ORAL at 13:37

## 2023-02-01 RX ADMIN — ONDANSETRON 4 MG: 4 TABLET, ORALLY DISINTEGRATING ORAL at 06:35

## 2023-02-01 RX ADMIN — MAGNESIUM SULFATE HEPTAHYDRATE 2 G: 2 INJECTION, SOLUTION INTRAVENOUS at 10:41

## 2023-02-01 RX ADMIN — DICYCLOMINE HYDROCHLORIDE 10 MG: 10 CAPSULE ORAL at 13:38

## 2023-02-01 RX ADMIN — PANTOPRAZOLE SODIUM 40 MG: 40 TABLET, DELAYED RELEASE ORAL at 06:35

## 2023-02-01 RX ADMIN — ONDANSETRON 4 MG: 4 TABLET, ORALLY DISINTEGRATING ORAL at 21:07

## 2023-02-01 RX ADMIN — FOLIC ACID 1 MG: 1 TABLET ORAL at 08:58

## 2023-02-01 RX ADMIN — DICYCLOMINE HYDROCHLORIDE 10 MG: 10 CAPSULE ORAL at 08:59

## 2023-02-01 RX ADMIN — PANCRELIPASE 1 CAPSULE: 36000; 180000; 114000 CAPSULE, DELAYED RELEASE PELLETS ORAL at 17:49

## 2023-02-01 RX ADMIN — PANCRELIPASE 1 CAPSULE: 36000; 180000; 114000 CAPSULE, DELAYED RELEASE PELLETS ORAL at 08:58

## 2023-02-01 RX ADMIN — DICYCLOMINE HYDROCHLORIDE 10 MG: 10 CAPSULE ORAL at 17:49

## 2023-02-01 RX ADMIN — MELATONIN TAB 3 MG 3 MG: 3 TAB at 21:07

## 2023-02-01 RX ADMIN — DICYCLOMINE HYDROCHLORIDE 10 MG: 10 CAPSULE ORAL at 21:07

## 2023-02-01 RX ADMIN — QUETIAPINE FUMARATE 100 MG: 100 TABLET ORAL at 21:07

## 2023-02-01 RX ADMIN — QUETIAPINE FUMARATE 50 MG: 25 TABLET ORAL at 14:52

## 2023-02-01 RX ADMIN — OXYCODONE HYDROCHLORIDE 10 MG: 5 TABLET ORAL at 14:49

## 2023-02-01 RX ADMIN — PANTOPRAZOLE SODIUM 40 MG: 40 TABLET, DELAYED RELEASE ORAL at 17:49

## 2023-02-01 RX ADMIN — OXYCODONE HYDROCHLORIDE 10 MG: 5 TABLET ORAL at 23:32

## 2023-02-01 RX ADMIN — OXYCODONE HYDROCHLORIDE 10 MG: 5 TABLET ORAL at 18:54

## 2023-02-01 RX ADMIN — MIRTAZAPINE 30 MG: 30 TABLET, FILM COATED ORAL at 21:07

## 2023-02-01 RX ADMIN — CHOLESTYRAMINE 4 G: 4 POWDER, FOR SUSPENSION ORAL at 10:07

## 2023-02-01 RX ADMIN — NICOTINE 1 PATCH: 21 PATCH, EXTENDED RELEASE TRANSDERMAL at 08:57

## 2023-02-01 ASSESSMENT — ACTIVITIES OF DAILY LIVING (ADL)
ADLS_ACUITY_SCORE: 33
CHANGE_IN_FUNCTIONAL_STATUS_SINCE_ONSET_OF_CURRENT_ILLNESS/INJURY: NO
DIFFICULTY_EATING/SWALLOWING: NO
DIFFICULTY_COMMUNICATING: NO
CONCENTRATING,_REMEMBERING_OR_MAKING_DECISIONS_DIFFICULTY: NO
WEAR_GLASSES_OR_BLIND: YES
ADLS_ACUITY_SCORE: 22
ADLS_ACUITY_SCORE: 22
TOILETING_ISSUES: NO
VISION_MANAGEMENT: GLASSES AT BEDSIDE
ADLS_ACUITY_SCORE: 33
WALKING_OR_CLIMBING_STAIRS_DIFFICULTY: NO
ADLS_ACUITY_SCORE: 33
FALL_HISTORY_WITHIN_LAST_SIX_MONTHS: NO
DOING_ERRANDS_INDEPENDENTLY_DIFFICULTY: NO
ADLS_ACUITY_SCORE: 33
HEARING_DIFFICULTY_OR_DEAF: NO
ADLS_ACUITY_SCORE: 33
ADLS_ACUITY_SCORE: 33
DRESSING/BATHING_DIFFICULTY: NO
ADLS_ACUITY_SCORE: 22
ADLS_ACUITY_SCORE: 22
ADLS_ACUITY_SCORE: 33
ADLS_ACUITY_SCORE: 33

## 2023-02-01 NOTE — PROGRESS NOTES
Patient went up to [id number] together with all her belongings via wheelchair, assisted by nursing assistant.

## 2023-02-01 NOTE — PROGRESS NOTES
[facility]    Medicine Progress Note - Hospitalist Service    Date of Admission:  1/27/2023    Assessment & Plan   Audelia Comer is a 57 year old female with past medical history that is most notable for severe alcohol use disorder and chronic alcoholic pancreatitis, as well as recent C difficile colitis, among others; who presents with ongoing diarrhea and acute abdominal pain and is found to have acute hypochloremic hyponatremia who was admitted on 1/27/2023.       Recent diagnosis of C.Diff Colitis , finished antibiotics course but ongoing diarrhea, most likely sec to chronic pancreatitis versus IBS  Acute abdominal pain , s/p EGD and EUS on January 29:   Elevated alkaline phosphatase, bilirubin and AST:     ** Ms. Comer has a history of alcoholic liver disease and chronic pancreatitis.   ** She was admitted for acute pancreatitis  in 10/2021, 11/2021, 7/2022, and again in 10/2022. Most recently, she was admitted to [facility] from 12/31/22 through 1/5/23 for diarrhea, found to be due to C difficile colitis. She was also found to be COVID positive at that time. She has now completed two weeks of QID oral Vancocin but reports ongoing diarrhea.   ** she is now presenting now with acute right upper quadrant abdominal pain which started while she was straining at the commode.  ** Presented to ED, she was afebrile (T 99.5). She was found to have acute leukocytosis, acute hyponatremia and trans-aminitis as discussed below. CT of abdomen and pelvis showed changes of chronic cirrhosis and chronic pancreatitis, without signs of other acute pathology.  **CT scan done on admission of abdomen and pelvis with contrast showed cirrhotic configuration of the liver with lobulated contour, geographic fatty sparing involving the hepatic parenchyma anteriorly extending inferiorly.  Tiny amount of ascites, chronic pancreatitis changes, no discrete mass, scattered colonic diverticulosis no signs of colitis  or acute inflammation, mechanical obstruction or free gas.  **Abdominal ultrasound showed geographic echogenic and hypoechoic areas in the liver, likely related to hepatic steatosis.  Coarsened hepatic parenchyma with lobulated contour suggestive of cirrhosis.  There was also distended gallbladder, mildly distended common hepatic duct measuring 7 mm and is stable but no obstructing calculi was visualized.  Sequela of chronic pancreatitis was again seen.     -1/29: -- EUS showed chronic moderate pancreatitis.   -- EGD showed small hiatal hernia, congestive gastropathy, nonbleeding duodenal ulcers with no stigmata of bleeding, congested duodenal mucosa  -- started patient on Protonix 40 mg p.o. twice daily.    1/30: - s/p USS para 0.7 L straw colored fluid revealed 784 nucleated cells /uL 20% neutrophils).  Stool enteric panel and C. difficile negative  2/1: -Continue on Creon, dicyclomine.  Encourage p.o. intake  - note reviewed and discussed with RN for PMDs concern for discharge home.  Will get PT and OT and continued social work involvement.    Leukocytosis-downtrending.  Monitor for now    Acute hypochloremic hyponatremia: Mild, at 126. Suspect due to hypovolemia.   --better     Trans-aminitis, acute: Suspect due to cirrhosis, possibly alcoholic hepatitis. better  --Gastroenterology following closely, plan as above     History of breast cancer: Status post bilateral mastectomies. Noted     Chronic depression:   -- Continue PTA mirtazapine 30 mg at bedtime, Seroquel 100 mg at bedtime and 25 mg of Seroquel in the morning.     Suspected severe protein calorie malnutrition:   -- Nutrition services consulted.  -- According to patient, she has lost significant amount of weight in the last year and a half, due to poor oral intake, poor health conditions and significant distress including her daughter passing away.      Diet: Snacks/Supplements Pediatric: Beneprotein; Between Meals  Regular Diet  "Adult  Snacks/Supplements Adult: Ensure Enlive; Between Meals    DVT Prophylaxis: Pneumatic Compression Devices  Patino Catheter: Not present  Lines: None     Cardiac Monitoring: None  Code Status: Full Code      Clinically Significant Risk Factors            # Hypomagnesemia: Lowest Mg = 1.5 mg/dL in last 2 days, will replace as needed   # Hypoalbuminemia: Lowest albumin = 2.2 g/dL at 2/1/2023  8:21 AM, will monitor as appropriate            # Cachexia: Estimated body mass index is 16.48 kg/m  as calculated from the following:    Height as of this encounter: 1.676 m (5' 6\").    Weight as of this encounter: 46.3 kg (102 lb 1.6 oz)., PRESENT ON ADMISSION         Disposition Plan      Expected Discharge Date: 02/06/2023        Discharge Comments: Vomited.  Paracentesis done 1/30.    Tachy and a soft b/p.  GI following--medication changes for pain and diarrhea control.          Bi Andre MD, MD  Hospitalist Service  [facility]  Securely message with [employer] (more info)  Text page via [email or web address] Paging/Directory   ______________________________________________________________________    Interval History    better compared to yesterday.  No diarrhea since last night.    Physical Exam   BP 98/64 (BP Location: Left arm)   Pulse 102   Temp 98.1  F (36.7  C) (Oral)   Resp 16   Ht 1.676 m (5' 6\")   Wt 46.3 kg (102 lb 1.6 oz)   SpO2 92%   BMI 16.48 kg/m    Gen- pleasant cachectic looking  HEENT- NAD, AMY  Neck- supple  CVS- I+II+ no m/r/g  RS- CTAB  Abdo- soft, diffuse tenderness (better than yesterday). No guarding/rigidity  Ext- no edema     Medical Decision Making       50 MINUTES SPENT BY ME on the date of service doing chart review, history, exam, documentation & further activities per the note.      Data   ------------------------- PAST 24 HR DATA REVIEWED -----------------------------------------------    I have personally reviewed the following data over the past 24 hrs:    N/A  \   N/A "   / N/A     138 106 5.9 (L) /  90   4.0; 4.0 22 0.44 (L) \       ALT: 14 AST: 41 (H) AP: 198 (H) TBILI: 0.5   ALB: 2.2 (L) TOT PROTEIN: 5.3 (L) LIPASE: N/A       Imaging results reviewed over the past 24 hrs:   No results found for this or any previous visit (from the past 24 hour(s)).     BMP  Recent Labs   Lab 02/01/23 0821 01/31/23  1524 01/31/23 0658 01/30/23 1827 01/30/23  1056 01/29/23  0653     --  136  --  138 137   POTASSIUM 4.0  4.0 4.2 3.4 3.6 3.2* 3.7   CHLORIDE 106  --  102  --  99 105   FRANCOISE 8.2*  --  8.0*  --  9.0 8.2*   CO2 22  --  23  --  29 24   BUN 5.9*  --  7.7*  --  5.7* 3.4*   CR 0.44*  --  0.43*  --  0.49* 0.36*   GLC 90  --  91  --  103* 89     CBC  Recent Labs   Lab 01/31/23 0658 01/30/23  1056 01/28/23  1522 01/28/23  0642   WBC 12.9* 14.9* 7.2 6.5   RBC 2.91* 3.40* 2.92* 2.95*   HGB 9.3* 11.1* 9.3* 9.3*   HCT 28.5* 33.0* 28.5* 27.8*   MCV 98 97 98 94   MCH 32.0 32.6 31.8 31.5   MCHC 32.6 33.6 32.6 33.5   RDW 16.5* 16.3* 16.2* 16.0*    343 246 245     INR  Recent Labs   Lab 01/30/23 1056 01/29/23  0653   INR 1.31* 1.37*     LFTs  Recent Labs   Lab 02/01/23  0821 01/31/23 0658 01/30/23 1827 01/30/23  1056 01/28/23  1522   ALKPHOS 198* 210*  --  258* 260*   AST 41* 46*  --  54* 55*   ALT 14 16  --  19 18   BILITOTAL 0.5 0.7  --  0.8 0.6   PROTTOTAL 5.3* 5.4*  --  6.1* 5.5*   ALBUMIN 2.2* 2.2* 2.4* 2.6* 2.3*      PANC  Recent Labs   Lab 01/28/23  0642   LIPASE 52   US ABDOMEN LIMITED 1/27/2023 9:24 AM   IMPRESSION:   1.  Geographic echogenic and hypoechoic areas in the liver, likely   related to patchy hepatic steatosis, better assessed by the CT today.   Coarsened hepatic parenchyma with lobulated contour suggestive of   cirrhosis.   2.  Distended gallbladder. No cholelithiasis or acute cholecystitis.   3.  Mildly distended common hepatic duct measuring 7 mm, stable. No   obstructing calculi in the visualized duct. No obstructing calculi or   mass was visualized on the CT  earlier today. If clinically indicated,   an MRCP can be considered.   4.  Sequela of chronic pancreatitis, better assessed by CT earlier.   5.  Stable mild dilatation of the right renal pelvis, either due to   mild hydronephrosis or pelviectasis.     EXAM: CT ABDOMEN PELVIS W CONTRAST   LOCATION: [facility]   DATE/TIME: 1/27/2023 3:50 AM     IMPRESSION:   1.  Cirrhotic configuration of the liver with lobulated contour. Geographic fatty sparing involving the hepatic parenchyma anteriorly extending inferiorly. Tiny amount of ascites.     2.  Changes of chronic pancreatitis. No discrete mass.     3.  Scattered colonic diverticulosis, more apparent distally, without acute inflammation. No mechanical obstruction or free gas.    0

## 2023-02-01 NOTE — PLAN OF CARE
DATE & TIME: 2/1/23 8789-7544    Cognitive Concerns/ Orientation : Alert and oriented x4   BEHAVIOR & AGGRESSION TOOL COLOR: Green   ABNL VS/O2: VSS on RA  MOBILITY: Ind  PAIN MANAGMENT: c/o RLQ pain controlled w/ prn oxy and tylenol  DIET: Regular  BOWEL/BLADDER: Continent of B&B  ABNL LAB/BG: K 4.2. Mg 1.7. WBC 12.9. c-diff panel all negative.  DRAIN/DEVICES: PIV saline locked  TELEMETRY RHYTHM: N/A  SKIN: small open area on R buttock, mepilex in place.  TESTS/PROCEDURES: none this shift  D/C DATE: possibly 2/1 to home. May need more time to set up home care d/t pt's PCP being concerned pt being unable to care for self at home  Discharge Barriers: funding?  OTHER IMPORTANT INFO: nicotine patch L arm

## 2023-02-01 NOTE — PLAN OF CARE
Goal Outcome Evaluation:    DATE & TIME: 2/1/23 7157-7993    Cognitive Concerns/ Orientation : Alert and oriented x4   BEHAVIOR & AGGRESSION TOOL COLOR: Green   ABNL VS/O2: BP soft 98/64, other VSS on RA  MOBILITY: Independent, steady  PAIN MANAGMENT: c/o RUQ pain around paracentesis site, tender to touch, received prn oxycodone x1, applied cold pack.   DIET: Regular  BOWEL/BLADDER: Continent of B&B, no BM this shift.   ABNL LAB/BG: Mg 1.5, replaced, recheck tomorrow AM, Albumin 2.2, Alk phos 198, AST 41  DRAIN/DEVICES: PIV saline locked  TELEMETRY RHYTHM: N/A  SKIN: small open area on Rt buttock, mepilex in place.  TESTS/PROCEDURES: none   D/C DATE: possibly discharge home 1-2 days. May need more time to set up home care d/t pt's PCP being concerned pt being unable to care for self at home  Discharge Barriers: funding?  OTHER IMPORTANT INFO: requested for seroquel x1 for anxiety. Nicotine patch Rt arm. PT/OT following.

## 2023-02-01 NOTE — PROGRESS NOTES
[facility]  Gastroenterology Progress Note     Audelia Comer MRN# [id number]   YOB: 1965 Age: 57 year old          Assessment and Plan:   Audelia Comer is a 57 year old woman with past medical history that is most notable for severe alcohol use disorder and chronic alcoholic pancreatitis, as well as recent C difficile colitis( 12/31 with negative C difficile toxin on 1/29/23, among others; who presents with ongoing diarrhea and acute RUQ abdominal pain and was found to have acute hypochloremic hyponatremia.     Acute RUQ abdominal pain   -- RUQ ultrasound noted distended gallbladder with no cholelithiasis. Contour of liver consistent with liver cirrhosis  - CT notes no source of cause of pain- positive for findings consistent with liver cirrhosis and chronic pancreatitis.  - Worse with movement and vomiting as well as sneezing  - May be due to musculoskeletal pain vs IBS vs chronic pancreatitis  - 1/30/23 Paracentesis removed 0.7L of fluid- cell counte revealed 784 nucleated cells /uL 20% neutrophils. Not greater than 250- unlikely SBP- NO organisms seen- not consistent with source of pain    -- will trial dicyclomine 10 mg QID for IBS   -- Creon below  -- ice to abdomen   -- scheduled antiemetics    Nausea and vomiting and diarrhea    -- C difficile and enteric pathogens negative    -- fecal fat increased- elastase pending- consistent with probable chronic pancreatitic insufficiency as cause of diarrhea and nausea    -- schedule ondansetron q 8 hours    -- cholestyramine 4 g daily    Alcoholic cirrhosis and chronic pancreatitis   - 1/29/23 EGD noted non bleeding duodenal ulcerations, small hiatal hernia, congested gastrtopathy  - 1/29/23 EUS revealed moderate chronic pancreatitis and cirrhotic liver  - pending fecal elastase and fate  --  liver cirrhosis lab workup negative for autoimmune and viral cause  - MELD 10  -- creon 36k one with three meals daily and one with  2 snacks daily   -- 1/31/23-  fecal fat increased and pancreatic elastase pending-    -- protein with every meal- discussed food choices                Interval History:   denies chest pain, denies shortness of breath, alert, oriented to person, place and time and has ongoing abdominal pain, nausea, vomiting and diarrhea. Has noted improvement with initiation of dicyclomine, cholestyramine and creon              Review of Systems:   C: NEGATIVE for fever, chills, change in weight  E/M: NEGATIVE for ear, mouth and throat problems  R: NEGATIVE for significant cough or SOB  CV: NEGATIVE for chest pain, palpitations or peripheral edema             Medications:   I have reviewed this patient's current medications    amylase-lipase-protease  1 capsule Oral TID w/meals     cholestyramine  1 packet Oral Daily     dicyclomine  10 mg Oral 4x Daily     folic acid  1 mg Oral Daily     magnesium sulfate  2 g Intravenous Once     mirtazapine  30 mg Oral At Bedtime     nicotine  1 patch Transdermal Daily     nicotine   Transdermal Q8H     ondansetron  4 mg Oral Q8H HENRRY    Or     ondansetron  4 mg Intravenous Q8H HENRRY     pantoprazole  40 mg Oral BID AC     QUEtiapine  100 mg Oral At Bedtime     QUEtiapine  25 mg Oral Daily                  Physical Exam:   Vitals were reviewed  Vital Signs with Ranges  Temp:  [97.3  F (36.3  C)-98.6  F (37  C)] 98.1  F (36.7  C)  Pulse:  [] 102  Resp:  [16-18] 16  BP: ()/(55-68) 98/64  SpO2:  [92 %-98 %] 92 %  No intake/output data recorded.  Constitutional: healthy, alert, mild distress and cooperative   Cardiovascular: negative, PMI normal. No lifts, heaves, or thrills. RRR. No murmurs, clicks gallops or rub  Respiratory: negative, Percussion normal. Good diaphragmatic excursion. Lungs clear  Abdomen: Abdomen soft, non-tender. BS normal. No masses, organomegaly, positive findings: tenderness mild RUQ- with mild pressure           Data:   I reviewed the patient's new clinical lab test  results.   Recent Labs   Lab Test 01/31/23  0658 01/30/23  1056 01/29/23  0653 01/28/23  1522   WBC 12.9* 14.9*  --  7.2   HGB 9.3* 11.1*  --  9.3*   MCV 98 97  --  98    343  --  246   INR  --  1.31* 1.37*  --      Recent Labs   Lab Test 02/01/23  0821 01/31/23  1524 01/31/23  0658 01/30/23  1827 01/30/23  1056 01/29/23  0653   POTASSIUM 4.0 4.2 3.4   < > 3.2* 3.7   CHLORIDE  --   --  102  --  99 105   CO2  --   --  23 --  29 24   BUN  --   --  7.7*  --  5.7* 3.4*   ANIONGAP  --   --  11  --  10 8    < > = values in this interval not displayed.     Recent Labs   Lab Test 01/31/23  0658 01/30/23  1827 01/30/23  1056 01/28/23  1522 01/28/23  0642 01/27/23  1003 01/01/23  0843 12/31/22  1746 10/04/22  0835 10/03/22  2327 10/03/22  1743 06/29/22  0932 06/29/22  0826   ALBUMIN 2.2* 2.4* 2.6* 2.3* 2.3*  --    < > 3.4*   < >  --  4.3   < >  --    BILITOTAL 0.7  --  0.8 0.6 0.9  --    < > 0.7   < >  --  0.9   < >  --    ALT 16  --  19 18 19  --    < > 17   < >  --  72*   < >  --    AST 46*  --  54* 55* 55*  --    < > 51*   < >  --  223*   < >  --    PROTEIN  --   --   --   --   --  Negative  --   --   --  50*  --   --  Negative   LIPASE  --   --   --   --  52  --   --  20  --   --  26  --   --     < > = values in this interval not displayed.       I reviewed the patient's new imaging results.    All laboratory data reviewed  All imaging studies reviewed by me.    Keely Watts PA-C,  1/30/2023  [facility]  Office : [phone]  Cell: [phone] (Dr. Dickinson)  Cell: [phone] (Keely Watts PA-C)

## 2023-02-01 NOTE — CONSULTS
"CLINICAL NUTRITION SERVICES - ASSESSMENT NOTE     Nutrition Prescription    Malnutrition Status:  Severe malnutrition In Context of: Chronic illness or disease  % Weight Loss:  > 7.5% in 3 months (severe malnutrition)  % Intake:  </= 50% for >/= 1 month (severe malnutrition)  Subcutaneous Fat Loss:  Orbital region severe depletion, Upper arm region severe depletion, Thoracic region moderate depletion and Lumbar region moderate depletion  Muscle Loss:  Temporal region moderate depletion, Clavicle bone region severe depletion, Scapular bone region severe depletion, Dorsal hand region severe depletion, Anterior thigh region severe depletion and Posterior calf region severe depletion  Fluid Retention:  None noted    Recommendations already ordered by Registered Dietitian (RD):  Ensure Enlive TID - In between meals (vanilla)  Thera-Vit M    Future/Additional Recommendations:  Monitor for PO tolerance of supplements     REASON FOR ASSESSMENT  Audelia Comer is a/an 57 year old female assessed by the dietitian for Provider Order - Suspected severe protein calorie malnutrition    NUTRITION HISTORY  Per MD note: Pt with alcohol use disorder and chronic alcoholic pancreatitis, as well as recent C difficile colitis( 12/31 with negative C difficile toxin on 1/29/23, among others; who presents with ongoing diarrhea and acute RUQ abdominal pain and was found to have acute hypochloremic hyponatremia.    Pt has been experiencing C-dif related diarrhea since early January, and general GI problems for past year (pt reports MD diagnosed her with IBS), which has greatly impacted her food intake. Reports food \"just sits in my stomach\" when she eats. Lives in apartment by herself, and has low ability to prepare her own meals d/t weakness and low energy. When asked about oral supplements, pt inquired if they are covered by insurance, so financial stress is also a potential factor.   Pt agrees her food intake must increase, and that " "drinkable meals would be easier for her in this regard. Consented to oral supplementation.    CURRENT NUTRITION ORDERS  Diet: Regular  Intake/Tolerance: Pt ordering 4505-5098 kcal/day per HT, consuming 0-50% of meals.  Supplements: Beneprotein between meals    LABS  Labs reviewed  Albumin 2.2 (L)  Mag 1.7 (WNL) improved  K+ 4.2 (WNL) improved    MEDICATIONS  Medications reviewed  Mag and K+ replacement, potassium chloride, folvite, creon 36, questran, zofran, protonix    ANTHROPOMETRICS  Height: 167.6 cm (5' 6\")  Most Recent Weight: 46.3 kg (102 lb 1.6 oz)    IBW: 59.3 kg (130 lb 11.7 oz)  BMI: Underweight BMI <18.5  Weight History:   46.3 kg/59.3 kg = 78% of IBW  10% wt loss from 10/04/22 to 2/1/23 (4 mo period)  1/30 49.0 kg (108 lb) - suspicious of this wt, does not track with wt trend  1/29 45.3 kg (99 lb 14.4 oz)  1/28 43.8 kg (96 lb 9.6 oz)  1/26 45.3 kg (100 lb)  Wt Readings from Last 5 Encounters:   02/01/23 46.3 kg (102 lb 1.6 oz)   01/01/23 43.8 kg (96 lb 9.6 oz)   10/04/22 51.4 kg (113 lb 6.4 oz)   06/28/22 44.5 kg (98 lb)   11/19/21 44.5 kg (98 lb)     Dosing Weight:  46.3 kg actual bw    ASSESSED NUTRITION NEEDS  Estimated Energy Needs: 4820-7578 kcals/day (40-45 kcals/kg)  Justification: Repletion, per practice guidelines for malnutrition/pancreatitis  Estimated Protein Needs: 70-92 grams protein/day (1.5 - 2 grams of pro/kg)  Justification: Repletion  Estimated Fluid Needs: 5613-3184 mL/day (1 mL/kcal)   Justification: Maintenance    PHYSICAL FINDINGS  See malnutrition section below.    MALNUTRITION:  % Weight Loss:  > 7.5% in 3 months (severe malnutrition)  % Intake:  </= 50% for >/= 1 month (severe malnutrition)  Subcutaneous Fat Loss:  Orbital region severe depletion, Upper arm region severe depletion, Thoracic region moderate depletion and Lumbar region moderate depletion  Muscle Loss:  Temporal region moderate depletion, Clavicle bone region severe depletion, Scapular bone region severe " depletion, Dorsal hand region severe depletion, Anterior thigh region severe depletion and Posterior calf region severe depletion  Fluid Retention:  None noted    Malnutrition Diagnosis: Severe malnutrition  In Context of:  Chronic illness or disease    NUTRITION DIAGNOSIS  Malnutrition related to altered GI function as evidenced by pt >7.5% wt loss in 3 months, intake <50% for 1 month, and severe subcutaneous fat loss and muscle loss      INTERVENTIONS  Implementation  Medical food supplement therapy - Ensure Enlive TID  Thera-Vit M     Goals  Patient to consume % of nutritionally adequate meals three times per day, or the equivalent with supplements/snacks.     Monitoring/Evaluation  Progress toward goals will be monitored and evaluated per protocol.  Monitor for PO tolerance of supplements

## 2023-02-02 ENCOUNTER — APPOINTMENT (OUTPATIENT)
Dept: PHYSICAL THERAPY | Facility: CLINIC | Age: 58
End: 2023-02-02
Attending: INTERNAL MEDICINE
Payer: COMMERCIAL

## 2023-02-02 LAB — MAGNESIUM SERPL-MCNC: 1.7 MG/DL (ref 1.7–2.3)

## 2023-02-02 PROCEDURE — 36415 COLL VENOUS BLD VENIPUNCTURE: CPT | Performed by: INTERNAL MEDICINE

## 2023-02-02 PROCEDURE — 83735 ASSAY OF MAGNESIUM: CPT | Performed by: INTERNAL MEDICINE

## 2023-02-02 PROCEDURE — 250N000011 HC RX IP 250 OP 636: Performed by: INTERNAL MEDICINE

## 2023-02-02 PROCEDURE — 97530 THERAPEUTIC ACTIVITIES: CPT | Mod: GP

## 2023-02-02 PROCEDURE — 250N000013 HC RX MED GY IP 250 OP 250 PS 637: Performed by: INTERNAL MEDICINE

## 2023-02-02 PROCEDURE — 250N000013 HC RX MED GY IP 250 OP 250 PS 637: Performed by: PHYSICIAN ASSISTANT

## 2023-02-02 PROCEDURE — 120N000001 HC R&B MED SURG/OB

## 2023-02-02 PROCEDURE — 99233 SBSQ HOSP IP/OBS HIGH 50: CPT | Performed by: INTERNAL MEDICINE

## 2023-02-02 PROCEDURE — 97116 GAIT TRAINING THERAPY: CPT | Mod: GP

## 2023-02-02 PROCEDURE — 97161 PT EVAL LOW COMPLEX 20 MIN: CPT | Mod: GP

## 2023-02-02 RX ORDER — PANTOPRAZOLE SODIUM 40 MG/1
40 TABLET, DELAYED RELEASE ORAL 2 TIMES DAILY
Qty: 60 TABLET | Refills: 3 | Status: SHIPPED | OUTPATIENT
Start: 2023-02-02

## 2023-02-02 RX ORDER — MAGNESIUM SULFATE HEPTAHYDRATE 40 MG/ML
2 INJECTION, SOLUTION INTRAVENOUS ONCE
Status: COMPLETED | OUTPATIENT
Start: 2023-02-02 | End: 2023-02-02

## 2023-02-02 RX ORDER — CHOLESTYRAMINE 4 G/9G
1 POWDER, FOR SUSPENSION ORAL DAILY
Qty: 30 PACKET | Refills: 1 | Status: SHIPPED | OUTPATIENT
Start: 2023-02-03

## 2023-02-02 RX ORDER — NICOTINE 21 MG/24HR
1 PATCH, TRANSDERMAL 24 HOURS TRANSDERMAL DAILY
Qty: 14 PATCH | Refills: 1 | Status: SHIPPED | OUTPATIENT
Start: 2023-02-03

## 2023-02-02 RX ORDER — DICYCLOMINE HYDROCHLORIDE 10 MG/1
10 CAPSULE ORAL 4 TIMES DAILY
Qty: 120 CAPSULE | Refills: 1 | Status: SHIPPED | OUTPATIENT
Start: 2023-02-02

## 2023-02-02 RX ORDER — CALCIUM CARBONATE 500 MG/1
1 TABLET, CHEWABLE ORAL 3 TIMES DAILY PRN
Qty: 100 TABLET | Refills: 0 | Status: SHIPPED | OUTPATIENT
Start: 2023-02-02

## 2023-02-02 RX ADMIN — OXYCODONE HYDROCHLORIDE 5 MG: 5 TABLET ORAL at 07:22

## 2023-02-02 RX ADMIN — PANCRELIPASE 1 CAPSULE: 36000; 180000; 114000 CAPSULE, DELAYED RELEASE PELLETS ORAL at 07:59

## 2023-02-02 RX ADMIN — ACETAMINOPHEN 650 MG: 325 TABLET, FILM COATED ORAL at 21:17

## 2023-02-02 RX ADMIN — CHOLESTYRAMINE 4 G: 4 POWDER, FOR SUSPENSION ORAL at 09:20

## 2023-02-02 RX ADMIN — ACETAMINOPHEN 650 MG: 325 TABLET, FILM COATED ORAL at 12:11

## 2023-02-02 RX ADMIN — DICYCLOMINE HYDROCHLORIDE 10 MG: 10 CAPSULE ORAL at 11:35

## 2023-02-02 RX ADMIN — PANCRELIPASE 1 CAPSULE: 36000; 180000; 114000 CAPSULE, DELAYED RELEASE PELLETS ORAL at 11:35

## 2023-02-02 RX ADMIN — QUETIAPINE FUMARATE 100 MG: 100 TABLET ORAL at 21:16

## 2023-02-02 RX ADMIN — DICYCLOMINE HYDROCHLORIDE 10 MG: 10 CAPSULE ORAL at 18:15

## 2023-02-02 RX ADMIN — PANTOPRAZOLE SODIUM 40 MG: 40 TABLET, DELAYED RELEASE ORAL at 07:23

## 2023-02-02 RX ADMIN — QUETIAPINE FUMARATE 50 MG: 25 TABLET ORAL at 18:17

## 2023-02-02 RX ADMIN — QUETIAPINE FUMARATE 25 MG: 25 TABLET ORAL at 08:06

## 2023-02-02 RX ADMIN — PANTOPRAZOLE SODIUM 40 MG: 40 TABLET, DELAYED RELEASE ORAL at 18:16

## 2023-02-02 RX ADMIN — MIRTAZAPINE 30 MG: 30 TABLET, FILM COATED ORAL at 21:16

## 2023-02-02 RX ADMIN — DICYCLOMINE HYDROCHLORIDE 10 MG: 10 CAPSULE ORAL at 21:16

## 2023-02-02 RX ADMIN — DICYCLOMINE HYDROCHLORIDE 10 MG: 10 CAPSULE ORAL at 07:58

## 2023-02-02 RX ADMIN — FOLIC ACID 1 MG: 1 TABLET ORAL at 07:58

## 2023-02-02 RX ADMIN — PANCRELIPASE 1 CAPSULE: 36000; 180000; 114000 CAPSULE, DELAYED RELEASE PELLETS ORAL at 18:14

## 2023-02-02 RX ADMIN — MAGNESIUM SULFATE HEPTAHYDRATE 2 G: 2 INJECTION, SOLUTION INTRAVENOUS at 11:36

## 2023-02-02 RX ADMIN — NICOTINE 1 PATCH: 21 PATCH, EXTENDED RELEASE TRANSDERMAL at 08:02

## 2023-02-02 ASSESSMENT — ACTIVITIES OF DAILY LIVING (ADL)
ADLS_ACUITY_SCORE: 22

## 2023-02-02 NOTE — PROGRESS NOTES
DATE & TIME: 2/1/23 Evening    Cognitive Concerns/ Orientation : Alert and oriented x4   BEHAVIOR & AGGRESSION TOOL COLOR: Green   ABNL VS/O2: Stable on room air  MOBILITY: Independent, steady  PAIN MANAGMENT: c/o RUQ pain around paracentesis site, tender to touch, received prn Oxycodone x1.   DIET: Regular  BOWEL/BLADDER: Continent of B&B, no BM this shift.   ABNL LAB/BG:   DRAIN/DEVICES: PIV saline locked  TELEMETRY RHYTHM: N/A  SKIN: small open area on Rt buttock, mepilex in place.  TESTS/PROCEDURES: none   D/C DATE: possibly discharge home 1-2 days. May need more time to set up home care d/t pt's PCP being concerned pt being unable to care for self at home  Discharge Barriers: funding?  OTHER IMPORTANT INFO: received prn Seroquel x1 for c/o anxiety. Nicotine patch Rt arm. PT following.

## 2023-02-02 NOTE — PROGRESS NOTES
[facility]  Gastroenterology Progress Note     Audelia Comer MRN# [id number]   YOB: 1965 Age: 57 year old          Assessment and Plan:   Audelia Comer is a 57 year old woman with past medical history that is most notable for severe alcohol use disorder and chronic alcoholic pancreatitis, as well as recent C difficile colitis( 12/31 with negative C difficile toxin on 1/29/23, among others; who presents with ongoing diarrhea and acute RUQ abdominal pain and was found to have acute hypochloremic hyponatremia.     Acute RUQ abdominal pain   -- RUQ ultrasound noted distended gallbladder with no cholelithiasis. Contour of liver consistent with liver cirrhosis  - CT notes no source of cause of pain- positive for findings consistent with liver cirrhosis and chronic pancreatitis.  - Worse with movement and vomiting as well as sneezing  - May be due to musculoskeletal pain vs IBS vs chronic pancreatitis  - 1/30/23 Paracentesis removed 0.7L of fluid- cell counte revealed 784 nucleated cells /uL 20% neutrophils. Not greater than 250- unlikely SBP- NO organisms seen- not consistent with source of pain    -- dicyclomine 10 mg QID for IBS   -- Creon below    Nausea and vomiting and diarrhea  -- Nausea and diarrhea resolved today    -- C difficile and enteric pathogens negative    -- fecal fat increased- elastase 5.3 and very low- consistent with chronic pancreatitic insufficiency as cause of diarrhea and nausea    -- cholestyramine 4 g daily    Alcoholic cirrhosis and chronic pancreatitis  Pancreatic insuffiency  1/29/23 EGD noted non bleeding duodenal ulcerations, small hiatal hernia, congested gastrtopathy  1/29/23 EUS revealed moderate chronic pancreatitis and cirrhotic liver  Liver cirrhosis lab workup negative for autoimmune and viral cause  MELD 10     -- creon 36k one with three meals daily and one with 2 snacks daily   -- 1/31/23-  fecal fat increased and pancreatic elastase  very low- consistent with pancreatic insufficency   -- pantoprazole 40 mg BID   -- protein with every meal- discussed food choices    Ok with GI to discharge patient and plan close outpatient follow-up in 1-2 weeks                Interval History:   denies chest pain, denies shortness of breath, alert, oriented to person, place and time and has ongoing abdominal pain, nausea, vomiting and diarrhea. Has noted improvement with initiation of dicyclomine, cholestyramine and creon              Review of Systems:   C: NEGATIVE for fever, chills, change in weight  E/M: NEGATIVE for ear, mouth and throat problems  R: NEGATIVE for significant cough or SOB  CV: NEGATIVE for chest pain, palpitations or peripheral edema             Medications:   I have reviewed this patient's current medications    amylase-lipase-protease  1 capsule Oral TID w/meals     cholestyramine  1 packet Oral Daily     dicyclomine  10 mg Oral 4x Daily     folic acid  1 mg Oral Daily     mirtazapine  30 mg Oral At Bedtime     nicotine  1 patch Transdermal Daily     nicotine   Transdermal Q8H     ondansetron  4 mg Oral Q8H HENRRY    Or     ondansetron  4 mg Intravenous Q8H HENRRY     pantoprazole  40 mg Oral BID AC     QUEtiapine  100 mg Oral At Bedtime     QUEtiapine  25 mg Oral Daily                  Physical Exam:   Vitals were reviewed  Vital Signs with Ranges  Temp:  [98.6  F (37  C)-98.8  F (37.1  C)] 98.6  F (37  C)  Pulse:  [104-109] 104  Resp:  [16] 16  BP: ()/(67-72) 104/70  SpO2:  [94 %-98 %] 94 %  I/O last 3 completed shifts:  In: 200 [P.O.:200]  Out: -   Constitutional: healthy, alert, mild distress and cooperative   Cardiovascular: negative, PMI normal. No lifts, heaves, or thrills. RRR. No murmurs, clicks gallops or rub  Respiratory: negative, Percussion normal. Good diaphragmatic excursion. Lungs clear  Abdomen: Abdomen soft, non-tender. BS normal. No masses, organomegaly, positive findings: tenderness mild RUQ- with mild pressure            Data:   I reviewed the patient's new clinical lab test results.   Recent Labs   Lab Test 01/31/23  0658 01/30/23  1056 01/29/23  0653 01/28/23  1522   WBC 12.9* 14.9*  --  7.2   HGB 9.3* 11.1*  --  9.3*   MCV 98 97  --  98    343  --  246   INR  --  1.31* 1.37*  --      Recent Labs   Lab Test 02/01/23  0821 01/31/23  1524 01/31/23  0658 01/30/23  1827 01/30/23  1056   POTASSIUM 4.0  4.0 4.2 3.4   < > 3.2*   CHLORIDE 106  --  102  --  99   CO2 22  --  23  --  29   BUN 5.9*  --  7.7*  --  5.7*   ANIONGAP 10  --  11  --  10    < > = values in this interval not displayed.     Recent Labs   Lab Test 02/01/23  0821 01/31/23  0658 01/30/23  1827 01/30/23  1056 01/28/23  1522 01/28/23  0642 01/27/23  1003 01/01/23  0843 12/31/22  1746 10/04/22  0835 10/03/22  2327 10/03/22  1743 06/29/22  0932 06/29/22  0826   ALBUMIN 2.2* 2.2* 2.4* 2.6*   < > 2.3*  --    < > 3.4*   < >  --  4.3   < >  --    BILITOTAL 0.5 0.7  --  0.8   < > 0.9  --    < > 0.7   < >  --  0.9   < >  --    ALT 14 16  --  19   < > 19  --    < > 17   < >  --  72*   < >  --    AST 41* 46*  --  54*   < > 55*  --    < > 51*   < >  --  223*   < >  --    PROTEIN  --   --   --   --   --   --  Negative  --   --   --  50*  --   --  Negative   LIPASE  --   --   --   --   --  52  --   --  20  --   --  26  --   --     < > = values in this interval not displayed.       I reviewed the patient's new imaging results.    All laboratory data reviewed  All imaging studies reviewed by me.    Keely Watts PA-C,  1/30/2023  [facility]  Office : [phone]  Cell: [phone] (Dr. Dickinson)  Cell: [phone] (Keely Watts PA-C)

## 2023-02-02 NOTE — PLAN OF CARE
DATE & TIME: 2/1-02/02/23 Night shift Cognitive Concerns/ Orientation : Alert and oriented x4   BEHAVIOR & AGGRESSION TOOL COLOR: Green   ABNL VS/O2: VSS  MOBILITY: Independent  PAIN MANAGMENT: c/o RUQ pain at the beginning of shift; gave Oxycodone x1 with relief  DIET: Regular- great appetite  BOWEL/BLADDER: Continent of B&B, no BM this shift.   ABNL LAB/BG: Mag 1.5- replaced and recheck this AM  DRAIN/DEVICES: PIV saline locked  TELEMETRY RHYTHM: N/A  SKIN: small open area on Rt buttock, mepilex in place.  TESTS/PROCEDURES: Had Paracentesis and 0.7L fluid taken out on 01/30  D/C DATE: Possibly discharge home today; however may need more time to set up home care d/t pt's PCP being concerned pt being unable to care for self at home?   Discharge Barriers: funding?  OTHER IMPORTANT INFO: Nicotine patch Rt arm. PT following. Slept well all shift

## 2023-02-02 NOTE — PROGRESS NOTES
[facility]    Medicine Progress Note - Hospitalist Service    Date of Admission:  1/27/2023    Assessment & Plan    Audelia Comer is a 57 year old female with past medical history that is most notable for severe alcohol use disorder and chronic alcoholic pancreatitis, as well as recent C difficile colitis, among others; who presents with ongoing diarrhea and acute abdominal pain and is found to have acute hypochloremic hyponatremia who was admitted on 1/27/2023.       Recent diagnosis of C.Diff Colitis , finished antibiotics course but ongoing diarrhea, most likely sec to chronic pancreatitis versus IBS  Acute abdominal pain , s/p EGD and EUS on January 29:   Elevated alkaline phosphatase, bilirubin and AST:     ** Ms. Comer has a history of alcoholic liver disease and chronic pancreatitis.   ** She was admitted for acute pancreatitis  in 10/2021, 11/2021, 7/2022, and again in 10/2022. Most recently, she was admitted to [facility] from 12/31/22 through 1/5/23 for diarrhea, found to be due to C difficile colitis. She was also found to be COVID positive at that time. She has now completed two weeks of QID oral Vancocin but reports ongoing diarrhea.   ** she is now presenting now with acute right upper quadrant abdominal pain which started while she was straining at the commode.  ** Presented to ED, she was afebrile (T 99.5). She was found to have acute leukocytosis, acute hyponatremia and trans-aminitis as discussed below. CT of abdomen and pelvis showed changes of chronic cirrhosis and chronic pancreatitis, without signs of other acute pathology.  **CT scan done on admission of abdomen and pelvis with contrast showed cirrhotic configuration of the liver with lobulated contour, geographic fatty sparing involving the hepatic parenchyma anteriorly extending inferiorly.  Tiny amount of ascites, chronic pancreatitis changes, no discrete mass, scattered colonic diverticulosis no signs of  colitis or acute inflammation, mechanical obstruction or free gas.  **Abdominal ultrasound showed geographic echogenic and hypoechoic areas in the liver, likely related to hepatic steatosis.  Coarsened hepatic parenchyma with lobulated contour suggestive of cirrhosis.  There was also distended gallbladder, mildly distended common hepatic duct measuring 7 mm and is stable but no obstructing calculi was visualized.  Sequela of chronic pancreatitis was again seen.     -1/29: -- EUS showed chronic moderate pancreatitis.   -- EGD showed small hiatal hernia, congestive gastropathy, nonbleeding duodenal ulcers with no stigmata of bleeding, congested duodenal mucosa  -- started patient on Protonix 40 mg p.o. twice daily.    1/30: - s/p USS para 0.7 L straw colored fluid revealed 784 nucleated cells /uL 20% neutrophils).  Stool enteric panel and C. difficile negative  2/2: -clinically better. Continue on Creon, dicyclomine.  Encourage p.o. intake  - note reviewed and discussed with RN for PMDs concern for discharge home.  A/W PT and OT and continued social work involvement.    Leukocytosis-downtrending.  Monitor for now    Acute hypochloremic hyponatremia: Mild, at 126. Suspect due to hypovolemia.   --better  Hypomagnesemia: replace as per protocol      Trans-aminitis, acute: Suspect due to cirrhosis, possibly alcoholic hepatitis. better  --Gastroenterology following closely, plan as above     History of breast cancer: Status post bilateral mastectomies. Noted     Chronic depression:   -- Continue PTA mirtazapine 30 mg at bedtime, Seroquel 100 mg at bedtime and 25 mg of Seroquel in the morning.     Suspected severe protein calorie malnutrition:   -- Nutrition services consulted.  -- According to patient, she has lost significant amount of weight in the last year and a half, due to poor oral intake, poor health conditions and significant distress including her daughter passing away.      Diet: Snacks/Supplements  "Pediatric: Beneprotein; Between Meals  Regular Diet Adult  Snacks/Supplements Adult: Ensure Enlive; Between Meals    DVT Prophylaxis: Pneumatic Compression Devices  Patino Catheter: Not present  Lines: None     Cardiac Monitoring: None  Code Status: Full Code      Clinically Significant Risk Factors            # Hypomagnesemia: Lowest Mg = 1.5 mg/dL in last 2 days, will replace as needed   # Hypoalbuminemia: Lowest albumin = 2.2 g/dL at 2/1/2023  8:21 AM, will monitor as appropriate            # Cachexia: Estimated body mass index is 16.48 kg/m  as calculated from the following:    Height as of this encounter: 1.676 m (5' 6\").    Weight as of this encounter: 46.3 kg (102 lb 1.6 oz)., PRESENT ON ADMISSION       Disposition Plan      Expected Discharge Date: 02/03/2023        Discharge Comments: Vomited.  Paracentesis done 1/30.    Tachy and a soft b/p.  GI following--medication changes for pain and diarrhea control.        Bi Andre MD, MD  Hospitalist Service  [facility]  Securely message with [employer] (more info)  Text page via [employer] Paging/Directory   __________________________________________________________________  Interval History     Better. Less pain and has not used opioids overnight.     Physical Exam   /70 (BP Location: Left arm)   Pulse 104   Temp 98.6  F (37  C) (Oral)   Resp 16   Ht 1.676 m (5' 6\")   Wt 46.3 kg (102 lb 1.6 oz)   SpO2 94%   BMI 16.48 kg/m    Gen- pleasant cachectic looking  HEENT- NAD, AMY  Neck- supple  CVS- I+II+ no m/r/g  RS- CTAB  Abdo- soft, minimal tenderness. No guarding/rigidity  Ext- no edema     Medical Decision Making       50 MINUTES SPENT BY ME on the date of service doing chart review, history, exam, documentation & further activities per the note.      Data   ------------------------- PAST 24 HR DATA REVIEWED -----------------------------------------------        Imaging results reviewed over the past 24 hrs:   No results found " for this or any previous visit (from the past 24 hour(s)).     BMP  Recent Labs   Lab 02/01/23  0821 01/31/23  1524 01/31/23  0658 01/30/23 1827 01/30/23  1056 01/29/23  0653     --  136  --  138 137   POTASSIUM 4.0  4.0 4.2 3.4 3.6 3.2* 3.7   CHLORIDE 106  --  102  --  99 105   FRANCOISE 8.2*  --  8.0*  --  9.0 8.2*   CO2 22  --  23  --  29 24   BUN 5.9*  --  7.7*  --  5.7* 3.4*   CR 0.44*  --  0.43*  --  0.49* 0.36*   GLC 90  --  91  --  103* 89     CBC  Recent Labs   Lab 01/31/23  0658 01/30/23  1056 01/28/23  1522 01/28/23  0642   WBC 12.9* 14.9* 7.2 6.5   RBC 2.91* 3.40* 2.92* 2.95*   HGB 9.3* 11.1* 9.3* 9.3*   HCT 28.5* 33.0* 28.5* 27.8*   MCV 98 97 98 94   MCH 32.0 32.6 31.8 31.5   MCHC 32.6 33.6 32.6 33.5   RDW 16.5* 16.3* 16.2* 16.0*    343 246 245     INR  Recent Labs   Lab 01/30/23  1056 01/29/23  0653   INR 1.31* 1.37*     LFTs  Recent Labs   Lab 02/01/23  0821 01/31/23  0658 01/30/23 1827 01/30/23  1056 01/28/23  1522   ALKPHOS 198* 210*  --  258* 260*   AST 41* 46*  --  54* 55*   ALT 14 16  --  19 18   BILITOTAL 0.5 0.7  --  0.8 0.6   PROTTOTAL 5.3* 5.4*  --  6.1* 5.5*   ALBUMIN 2.2* 2.2* 2.4* 2.6* 2.3*      PANC  Recent Labs   Lab 01/28/23  0642   LIPASE 52   US ABDOMEN LIMITED 1/27/2023 9:24 AM   IMPRESSION:   1.  Geographic echogenic and hypoechoic areas in the liver, likely   related to patchy hepatic steatosis, better assessed by the CT today.   Coarsened hepatic parenchyma with lobulated contour suggestive of   cirrhosis.   2.  Distended gallbladder. No cholelithiasis or acute cholecystitis.   3.  Mildly distended common hepatic duct measuring 7 mm, stable. No   obstructing calculi in the visualized duct. No obstructing calculi or   mass was visualized on the CT earlier today. If clinically indicated,   an MRCP can be considered.   4.  Sequela of chronic pancreatitis, better assessed by CT earlier.   5.  Stable mild dilatation of the right renal pelvis, either due to   mild  hydronephrosis or pelviectasis.     EXAM: CT ABDOMEN PELVIS W CONTRAST   LOCATION: [facility]   DATE/TIME: 1/27/2023 3:50 AM     IMPRESSION:   1.  Cirrhotic configuration of the liver with lobulated contour. Geographic fatty sparing involving the hepatic parenchyma anteriorly extending inferiorly. Tiny amount of ascites.     2.  Changes of chronic pancreatitis. No discrete mass.     3.  Scattered colonic diverticulosis, more apparent distally, without acute inflammation. No mechanical obstruction or free gas.

## 2023-02-02 NOTE — PROGRESS NOTES
02/02/23 5005   Appointment Info   Signing Clinician's Name / Credentials (PT) Maddi Vivas DPT   Living Environment   People in Home alone   Current Living Arrangements apartment   Home Accessibility stairs to enter home   Number of Stairs, Main Entrance 8   Stair Railings, Main Entrance railings safe and in good condition   Transportation Anticipated family or friend will provide   Living Environment Comments Pt reports her family can assist w/ getting up the stairs.   Self-Care   Usual Activity Tolerance moderate   Current Activity Tolerance fair   Equipment Currently Used at Home walker, standard   Fall history within last six months no   Activity/Exercise/Self-Care Comment Pt IND at baseline, does not use AD   General Information   Onset of Illness/Injury or Date of Surgery 01/27/23   Referring Physician Bi Andre MD   Patient/Family Therapy Goals Statement (PT) Return home   Pertinent History of Current Problem (include personal factors and/or comorbidities that impact the POC) Per chart: Audelia Comer is a 57 year old female with past medical history that is most notable for severe alcohol use disorder and chronic alcoholic pancreatitis, as well as recent C difficile colitis, among others; who presents with ongoing diarrhea and acute abdominal pain and is found to have acute hypochloremic hyponatremia   General Observations Pt supine in bed upon therapist arrival, agreeable to PT   Cognition   Affect/Mental Status (Cognition) WFL   Orientation Status (Cognition) oriented x 4   Follows Commands (Cognition) WFL   Pain Assessment   Patient Currently in Pain No   Integumentary/Edema   Integumentary/Edema no deficits were identifed   Posture    Posture Forward head position;Protracted shoulders   Range of Motion (ROM)   Range of Motion ROM is WFL   Strength (Manual Muscle Testing)   Strength (Manual Muscle Testing) Able to perform R SLR;Able to perform L SLR;Deficits observed during functional  mobility   Bed Mobility   Comment, (Bed Mobility) Supine to sit IND   Transfers   Comment, (Transfers) Sit to stand IND   Gait/Stairs (Locomotion)   Comment, (Gait/Stairs) Pt amb w/ no AD and SBA   Balance   Balance Comments Good seated and fair standing w/out AD   Sensory Examination   Sensory Perception Comments Pt reports numbness in B feet   Coordination   Coordination no deficits were identified   Clinical Impression   Criteria for Skilled Therapeutic Intervention Yes, treatment indicated   PT Diagnosis (PT) Impaired functional mobility and gait   Influenced by the following impairments Pain, weakness, decreased activity tolerance, impaired balance   Functional limitations due to impairments Limited functional mobility requiring AD and assist   Clinical Presentation (PT Evaluation Complexity) Stable/Uncomplicated   Clinical Presentation Rationale Based on PMH, current status, and social support   Clinical Decision Making (Complexity) low complexity   Planned Therapy Interventions (PT) balance training;bed mobility training;gait training;stair training;strengthening;transfer training;progressive activity/exercise   Risk & Benefits of therapy have been explained evaluation/treatment results reviewed;care plan/treatment goals reviewed;risks/benefits reviewed;current/potential barriers reviewed;participants voiced agreement with care plan;participants included;patient   PT Total Evaluation Time   PT Eval, Low Complexity Minutes (08252) 10   Physical Therapy Goals   PT Frequency Daily   PT Predicted Duration/Target Date for Goal Attainment 02/04/23   PT Goals Bed Mobility;Transfers;Gait;Stairs   PT: Bed Mobility Independent;Supine to/from sit;Goal Met   PT: Transfers Independent;Sit to/from stand;Goal Met   PT: Gait Independent;50 feet;Goal Met   PT: Stairs Supervision/stand-by assist;8 stairs   Interventions   Interventions Quick Adds Gait Training;Therapeutic Activity   Therapeutic Activity   Therapeutic  Activities: dynamic activities to improve functional performance Minutes (68904) 8   Symptoms Noted During/After Treatment None   Treatment Detail/Skilled Intervention Pt performed sit <> stand from EOB x3 reps IND, demos good hand placement and stability. Pt performed sit <> stand from toilet IND. Discussion w/ pt on mobility and assist at home. Educated pt on activity recommendations and safety. Pt sat EOB after amb, returned to supine IND. All needs w/in reach, RN updated   Gait Training   Gait Training Minutes (43762) 12   Symptoms Noted During/After Treatment (Gait Training) fatigue   Treatment Detail/Skilled Intervention Pt amb 200' w/ no AD and SBA, progressed to IND. Pt demos fair speed and stability, no LOB. Seated rest break. Pt amb 20' to stairwell. Therapist educated pt on stair negotiation. Pt navigated 1 stair w/ one handrail, HHA, and Min A. Pt attempted to go up one more step, unable to push into L LE to bring R LE up to next step. Pt navigated back down 1 stair. Standing rest break. Pt navigated up 1 stair w/ same level of assist, still unable to navigate up 2nd stair d/t weakness, stepped back down. Pt amb 20' back to room.   PT Discharge Planning   PT Plan Progress amb distance w/out AD, progress stairs w/ Ax1, LE strength, balance   PT Discharge Recommendation (DC Rec) home with assist;home with home care physical therapy   PT Rationale for DC Rec Pt is below baseline, currently IND for bed mob, transfers, and short distance amb. Pt Ax1 for stairs, fatigues very quickly. Pt would benefit from home PT to increase strength and functional mobility   PT Brief overview of current status IND bed mob, transfers, amb. Min A stairs   Total Session Time   Timed Code Treatment Minutes 20   Total Session Time (sum of timed and untimed services) 30

## 2023-02-03 ENCOUNTER — APPOINTMENT (OUTPATIENT)
Dept: PHYSICAL THERAPY | Facility: CLINIC | Age: 58
End: 2023-02-03
Payer: COMMERCIAL

## 2023-02-03 ENCOUNTER — APPOINTMENT (OUTPATIENT)
Dept: OCCUPATIONAL THERAPY | Facility: CLINIC | Age: 58
End: 2023-02-03
Attending: INTERNAL MEDICINE
Payer: COMMERCIAL

## 2023-02-03 VITALS
HEART RATE: 101 BPM | SYSTOLIC BLOOD PRESSURE: 106 MMHG | OXYGEN SATURATION: 95 % | WEIGHT: 102.1 LBS | RESPIRATION RATE: 16 BRPM | BODY MASS INDEX: 16.41 KG/M2 | TEMPERATURE: 98.8 F | HEIGHT: 66 IN | DIASTOLIC BLOOD PRESSURE: 72 MMHG

## 2023-02-03 LAB
ALBUMIN SERPL BCG-MCNC: 2.5 G/DL (ref 3.5–5.2)
ALP SERPL-CCNC: 316 U/L (ref 35–104)
ALT SERPL W P-5'-P-CCNC: 17 U/L (ref 10–35)
ANION GAP SERPL CALCULATED.3IONS-SCNC: 7 MMOL/L (ref 7–15)
AST SERPL W P-5'-P-CCNC: 52 U/L (ref 10–35)
BILIRUB SERPL-MCNC: 0.4 MG/DL
BUN SERPL-MCNC: 7.1 MG/DL (ref 8–23)
CALCIUM SERPL-MCNC: 8.3 MG/DL (ref 8.6–10)
CHLORIDE SERPL-SCNC: 106 MMOL/L (ref 98–107)
CREAT SERPL-MCNC: 0.4 MG/DL (ref 0.51–0.95)
DEPRECATED HCO3 PLAS-SCNC: 24 MMOL/L (ref 22–29)
ERYTHROCYTE [DISTWIDTH] IN BLOOD BY AUTOMATED COUNT: 16 % (ref 10–15)
GFR SERPL CREATININE-BSD FRML MDRD: >90 ML/MIN/1.73M2
GLUCOSE SERPL-MCNC: 102 MG/DL (ref 70–99)
HCT VFR BLD AUTO: 26.1 % (ref 35–47)
HGB BLD-MCNC: 8.7 G/DL (ref 11.7–15.7)
MAGNESIUM SERPL-MCNC: 1.6 MG/DL (ref 1.7–2.3)
MCH RBC QN AUTO: 31.6 PG (ref 26.5–33)
MCHC RBC AUTO-ENTMCNC: 33.3 G/DL (ref 31.5–36.5)
MCV RBC AUTO: 95 FL (ref 78–100)
PLATELET # BLD AUTO: 320 10E3/UL (ref 150–450)
POTASSIUM SERPL-SCNC: 4 MMOL/L (ref 3.4–5.3)
PROT SERPL-MCNC: 5.9 G/DL (ref 6.4–8.3)
RBC # BLD AUTO: 2.75 10E6/UL (ref 3.8–5.2)
SODIUM SERPL-SCNC: 137 MMOL/L (ref 136–145)
WBC # BLD AUTO: 8.6 10E3/UL (ref 4–11)

## 2023-02-03 PROCEDURE — 250N000013 HC RX MED GY IP 250 OP 250 PS 637: Performed by: PHYSICIAN ASSISTANT

## 2023-02-03 PROCEDURE — 85027 COMPLETE CBC AUTOMATED: CPT | Performed by: INTERNAL MEDICINE

## 2023-02-03 PROCEDURE — 36415 COLL VENOUS BLD VENIPUNCTURE: CPT | Performed by: INTERNAL MEDICINE

## 2023-02-03 PROCEDURE — 97535 SELF CARE MNGMENT TRAINING: CPT | Mod: GO

## 2023-02-03 PROCEDURE — 99239 HOSP IP/OBS DSCHRG MGMT >30: CPT | Performed by: INTERNAL MEDICINE

## 2023-02-03 PROCEDURE — 82374 ASSAY BLOOD CARBON DIOXIDE: CPT | Performed by: INTERNAL MEDICINE

## 2023-02-03 PROCEDURE — 250N000013 HC RX MED GY IP 250 OP 250 PS 637: Performed by: INTERNAL MEDICINE

## 2023-02-03 PROCEDURE — 97165 OT EVAL LOW COMPLEX 30 MIN: CPT | Mod: GO

## 2023-02-03 PROCEDURE — 83735 ASSAY OF MAGNESIUM: CPT | Performed by: INTERNAL MEDICINE

## 2023-02-03 PROCEDURE — 97116 GAIT TRAINING THERAPY: CPT | Mod: GP

## 2023-02-03 PROCEDURE — 82310 ASSAY OF CALCIUM: CPT | Performed by: INTERNAL MEDICINE

## 2023-02-03 RX ORDER — FOLIC ACID 1 MG/1
1 TABLET ORAL DAILY
Qty: 30 TABLET | Refills: 3 | Status: SHIPPED | OUTPATIENT
Start: 2023-02-03

## 2023-02-03 RX ORDER — MAGNESIUM OXIDE 400 MG/1
400 TABLET ORAL EVERY 4 HOURS
Status: COMPLETED | OUTPATIENT
Start: 2023-02-03 | End: 2023-02-03

## 2023-02-03 RX ADMIN — FOLIC ACID 1 MG: 1 TABLET ORAL at 09:48

## 2023-02-03 RX ADMIN — CHOLESTYRAMINE 4 G: 4 POWDER, FOR SUSPENSION ORAL at 09:49

## 2023-02-03 RX ADMIN — PANTOPRAZOLE SODIUM 40 MG: 40 TABLET, DELAYED RELEASE ORAL at 05:54

## 2023-02-03 RX ADMIN — PANCRELIPASE 1 CAPSULE: 36000; 180000; 114000 CAPSULE, DELAYED RELEASE PELLETS ORAL at 09:49

## 2023-02-03 RX ADMIN — MAGNESIUM OXIDE TAB 400 MG (241.3 MG ELEMENTAL MG) 400 MG: 400 (241.3 MG) TAB at 11:26

## 2023-02-03 RX ADMIN — MAGNESIUM OXIDE TAB 400 MG (241.3 MG ELEMENTAL MG) 400 MG: 400 (241.3 MG) TAB at 15:45

## 2023-02-03 RX ADMIN — NICOTINE 1 PATCH: 21 PATCH, EXTENDED RELEASE TRANSDERMAL at 09:48

## 2023-02-03 RX ADMIN — DICYCLOMINE HYDROCHLORIDE 10 MG: 10 CAPSULE ORAL at 13:21

## 2023-02-03 RX ADMIN — ACETAMINOPHEN 650 MG: 325 TABLET, FILM COATED ORAL at 09:48

## 2023-02-03 RX ADMIN — QUETIAPINE FUMARATE 50 MG: 25 TABLET ORAL at 13:21

## 2023-02-03 RX ADMIN — DICYCLOMINE HYDROCHLORIDE 10 MG: 10 CAPSULE ORAL at 09:49

## 2023-02-03 RX ADMIN — PANCRELIPASE 1 CAPSULE: 36000; 180000; 114000 CAPSULE, DELAYED RELEASE PELLETS ORAL at 13:21

## 2023-02-03 RX ADMIN — QUETIAPINE FUMARATE 25 MG: 25 TABLET ORAL at 09:49

## 2023-02-03 ASSESSMENT — ACTIVITIES OF DAILY LIVING (ADL)
ADLS_ACUITY_SCORE: 22

## 2023-02-03 NOTE — PROGRESS NOTES
"   02/03/23 1000   Appointment Info   Signing Clinician's Name / Credentials (OT) Jaycee Bliss, OTR/L   Living Environment   People in Home alone   Current Living Arrangements apartment   Home Accessibility stairs to enter home   Number of Stairs, Main Entrance 8   Stair Railings, Main Entrance railings safe and in good condition   Transportation Anticipated family or friend will provide   Living Environment Comments Pt reports her family can assist w/ getting up the stairs. BR SET UP: Tub/shower combo, grab bar; standard toilet. Pt has FWW, does not use.   Self-Care   Usual Activity Tolerance moderate   Current Activity Tolerance fair   Equipment Currently Used at Home walker, standard   Fall history within last six months yes   Activity/Exercise/Self-Care Comment Ind all ADLs baseline.   Instrumental Activities of Daily Living (IADL)   Previous Responsibilities laundry;housekeeping;meal prep;medication management;finances   IADL Comments \"Now that I have gained more weight back, I want to get things cleaned up but I don't want to push it. I want to set myself some boundaries\": Groceries delivered. Pt does not drive.   General Information   Onset of Illness/Injury or Date of Surgery 01/27/23   Referring Physician Bi Andre MD   Patient/Family Therapy Goal Statement (OT) To go home to do her dishes   Additional Occupational Profile Info/Pertinent History of Current Problem Per chart: \"Audelia Comer is a 57 year old female with past medical history that is most notable for severe alcohol use disorder and chronic alcoholic pancreatitis, as well as recent C difficile colitis, among others; who presents with ongoing diarrhea and acute abdominal pain and is found to have acute hypochloremic hyponatremia who was admitted on 1/27/2023.\"   Existing Precautions/Restrictions fall   Cognitive Status Examination   Orientation Status orientation to person, place and time  (Pt also oriented to situation)   Cognitive " Status Comments Pt answers 3 of 3 home safety questions appropriately with some indirect cueing for one question - including med management safety question, fall risk safety question, and pt understands how to put out stove-top fires/what to do in that situation.   Cognitive Screens/Assessments   Cognitive Assessments Completed [facility] Mental Status Exam (UMS):  Total Score out of /30 27/30   [facility] 27-30 equals normal   [facility] Domains assessed: orientation, memory, attention, executive functions   SLUMS Interpretation Pt completed [facility] cognition screen, scoring 27/30 (20 and below = severe cognitive impairment, 21-26 = mild cognitive impairment, 27 and above = normal). Pt demonstrates deficits in the areas of Immediate Recall with Interference/Time Constraint (Question 6); Executive Function plus Extrapolation (Question 11).   Visual Perception   Visual Impairment/Limitations WFL   Sensory   Sensory Comments Not tested   Range of Motion Comprehensive   Comment, General Range of Motion BUEs WFL   Strength Comprehensive (MMT)   Comment, General Manual Muscle Testing (MMT) Assessment BUEs WFL   Coordination   Upper Extremity Coordination No deficits were identified   Bed Mobility   Comment (Bed Mobility) Not tested   Transfers   Transfer Comments Not tested   Activities of Daily Living   BADL Assessment/Intervention upper body dressing   Upper Body Dressing Assessment/Training   Comment, (Upper Body Dressing) Ind to adjust gown in bed   Clinical Impression   Criteria for Skilled Therapeutic Interventions Met (OT) Yes, treatment indicated   OT Diagnosis Decreased ind with I/ADLs   OT Problem List-Impairments impacting ADL problems related to;cognition   Assessment of Occupational Performance 1-3 Performance Deficits   Identified Performance Deficits Higher level cognitive tasks   Planned Therapy Interventions (OT) IADL retraining   Clinical Decision Making Complexity (OT) low complexity    Risk & Benefits of therapy have been explained patient   OT Total Evaluation Time   OT Eval, Low Complexity Minutes (04315) 20   OT Goals   Therapy Frequency (OT) Daily   OT Predicted Duration/Target Date for Goal Attainment 02/07/23   OT Goals Cognition;OT Goal 1   OT: Cognitive Patient/caregiver will verbalize understanding of cognitive assessment results/recommendations as needed for safe discharge planning   OT: Goal 1 Pt will verbalized 3 of 3 fall reduction strategies to implement at home for increased safety within completion of I/ADLs.   Self-Care/Home Management   Self-Care/Home Mgmt/ADL, Compensatory, Meal Prep Minutes (45537) 8   Symptoms Noted During/After Treatment (Meal Preparation/Planning Training) none   Treatment Detail/Skilled Intervention Pt greeted in supine, agreeable to OT. Pt participated in SLUMS cog screen. Pt educated on memory strategies to implement at home including use of phone for reminders, setting alarms, calendars - pt reports she uses a planner. Pt educated on fall risk reductions strategies to implement in her home and encouraged to carry her cellphone with her at all times - pt verbalied understanding. Pt supine in bed with all needs met, alarm set.   OT Discharge Planning   OT Plan Med management and fall safety handout   OT Discharge Recommendation (DC Rec) home with assist   OT Rationale for DC Rec Pt functioning near baseline level, would benefit from IP OT for education and higher level thinking strategies for increased ind and safety at home. Rec d/c home with intermittent assist as needed for higher level tasks such as occasional phone call check-ins from family for med management and phone call check-ins as a fall safety strategy to implement.   OT Brief overview of current status 27/30 on SLUMS, 3 of 3 higher level thinking quesitions answered appropriately with some indirect cues.   Total Session Time   Timed Code Treatment Minutes 8   Total Session Time (sum of  timed and untimed services) 28

## 2023-02-03 NOTE — PLAN OF CARE
Goal Outcome Evaluation:      DATE & TIME: 2/2/23-2/3/23 [id number]  Cognitive Concerns/ Orientation : Alert and oriented x4   BEHAVIOR & AGGRESSION TOOL COLOR: Green   ABNL VS/O2: VSS  MOBILITY: Independent  PAIN MANAGMENT: c/o RUQ pain, given prn Tylenol x1.   DIET: Regular  BOWEL/BLADDER: Continent of B&B  ABNL LAB/BG: Mag 1.7 (on high protocol)  recheck in AM  DRAIN/DEVICES: PIV saline locked  TELEMETRY RHYTHM: N/A  SKIN: small open area on Rt buttock, mepilex in place.  TESTS/PROCEDURES: none  D/C DATE: Possibly discharge home tomorrow, pending OT eval.   Discharge Barriers: none  OTHER IMPORTANT INFO: Nicotine patch Left arm.

## 2023-02-03 NOTE — DISCHARGE SUMMARY
[facility]  Hospitalist Discharge Summary      Date of Admission:  1/27/2023  Date of Discharge:  2/3/2023  Discharging Provider: Bi Andre MD, MD  Discharge Service: Hospitalist Service    Discharge Diagnoses       Recent diagnosis of C.Diff Colitis   chronic pancreatitis versus IBS  Acute abdominal pain , s/p EGD and EUS on January 29:     Acute hypochloremic hyponatremia:  Hypomagnesemia:    Trans-aminitis, acute:   History of breast cancer: Status post bilateral mastectomies.    Chronic depression:   Suspected severe protein calorie malnutrition:     Follow-ups Needed After Discharge   Follow-up Appointments     Follow-up and recommended labs and tests       Follow up with primary care provider, Randee Woods, within 7 days for   hospital follow- up.  The following labs/tests are recommended: CBC, CMP,   Mag.      Follow up with Dr. Dickinson/ GI within 1-2 weeks         {  Unresulted Labs Ordered in the Past 30 Days of this Admission     Date and Time Order Name Status Description    1/30/2023 12:56 PM Ascites Fluid Aerobic Bacterial Culture Routine Preliminary         Discharge Disposition   Discharged to home  Condition at discharge: Stable    Hospital Course   Audelia Comer is a 57 year old female with past medical history that is most notable for severe alcohol use disorder and chronic alcoholic pancreatitis, as well as recent C difficile colitis, among others; who presents with ongoing diarrhea and acute abdominal pain and is found to have acute hypochloremic hyponatremia who was admitted on 1/27/2023.       Recent diagnosis of C.Diff Colitis , finished antibiotics course but ongoing diarrhea, most likely sec to chronic pancreatitis versus IBS  Acute abdominal pain , s/p EGD and EUS on January 29:   Elevated alkaline phosphatase, bilirubin and AST:     ** Ms. Comer has a history of alcoholic liver disease and chronic pancreatitis.   ** She was admitted for acute pancreatitis   in 10/2021, 11/2021, 7/2022, and again in 10/2022. Most recently, she was admitted to [facility] from 12/31/22 through 1/5/23 for diarrhea, found to be due to C difficile colitis. She was also found to be COVID positive at that time. She has now completed two weeks of QID oral Vancocin but reports ongoing diarrhea.   ** she is now presenting now with acute right upper quadrant abdominal pain which started while she was straining at the commode.  ** Presented to ED, she was afebrile (T 99.5). She was found to have acute leukocytosis, acute hyponatremia and trans-aminitis as discussed below. CT of abdomen and pelvis showed changes of chronic cirrhosis and chronic pancreatitis, without signs of other acute pathology.  **CT scan done on admission of abdomen and pelvis with contrast showed cirrhotic configuration of the liver with lobulated contour, geographic fatty sparing involving the hepatic parenchyma anteriorly extending inferiorly.  Tiny amount of ascites, chronic pancreatitis changes, no discrete mass, scattered colonic diverticulosis no signs of colitis or acute inflammation, mechanical obstruction or free gas.  **Abdominal ultrasound showed geographic echogenic and hypoechoic areas in the liver, likely related to hepatic steatosis.  Coarsened hepatic parenchyma with lobulated contour suggestive of cirrhosis.  There was also distended gallbladder, mildly distended common hepatic duct measuring 7 mm and is stable but no obstructing calculi was visualized.  Sequela of chronic pancreatitis was again seen.     -1/29: -- EUS showed chronic moderate pancreatitis.   -- EGD showed small hiatal hernia, congestive gastropathy, nonbleeding duodenal ulcers with no stigmata of bleeding, congested duodenal mucosa  -- started patient on Protonix 40 mg p.o. twice daily.     1/30: - s/p USS para 0.7 L straw colored fluid revealed 784 nucleated cells /uL 20% neutrophils).  Stool enteric panel and C. difficile negative  2/2:  -clinically better. Continue on Creon, dicyclomine.  Encourage p.o. intake  - note reviewed and discussed with RN for PMDs concern for discharge home.  A/W PT and OT and continued social work involvement.  Advised to follow-up closely with GI clinic  Also advised close follow-up with PMD, recheck labs including magnesium    Leukocytosis-downtrending.  Monitor for now     Acute hypochloremic hyponatremia: Mild, at 126. Suspect due to hypovolemia.   --better  Hypomagnesemia: replace as per protocol       Trans-aminitis, acute: Suspect due to cirrhosis, possibly alcoholic hepatitis. better  --Gastroenterology following closely, plan as above     History of breast cancer: Status post bilateral mastectomies. Noted     Chronic depression:   -- Continue PTA mirtazapine 30 mg at bedtime, Seroquel 100 mg at bedtime and 25 mg of Seroquel in the morning.     Suspected severe protein calorie malnutrition:   -- Nutrition services consulted.  -- According to patient, she has lost significant amount of weight in the last year and a half, due to poor oral intake, poor health conditions and significant distress including her daughter passing away.     Consultations This Hospital Stay   NUTRITION SERVICES ADULT IP CONSULT  GASTROENTEROLOGY IP CONSULT  OCCUPATIONAL THERAPY ADULT IP CONSULT  PHYSICAL THERAPY ADULT IP CONSULT    Code Status   Full Code    Time Spent on this Encounter   I, Bi Andre MD, MD, personally saw the patient today and spent greater than 30 minutes discharging this patient.       Bi Andre MD, MD  [place] [address] [facility]  [facility] [place] [address]  Phone: [phone]  ______________________________________________________________________    Physical Exam   Vital Signs: Temp: 98.8  F (37.1  C) Temp src: Oral BP: 106/72 Pulse: 101   Resp: 16 SpO2: 95 % O2 Device: None (Room air)    Weight: 102 lbs 1.6 oz  Gen- pleasant cachectic looking  HEENT- NAD,  AMY  Neck- supple  CVS- I+II+ no m/r/g  RS- CTAB  Abdo- soft, minimal tenderness. No guarding/rigidity  Ext- no edema   Primary Care Physician   Randee Woods    Discharge Orders      Home Care Referral      Reason for your hospital stay    Audelia Comer is a 57 year old female with past medical history that is most notable for severe alcohol use disorder and chronic alcoholic pancreatitis, as well as recent C difficile colitis, among others; who presents with ongoing diarrhea and acute abdominal pain and is found to have acute hypochloremic hyponatremia who was admitted on 1/27/2023.       Recent diagnosis of C.Diff Colitis , finished antibiotics course but ongoing diarrhea, most likely sec to chronic pancreatitis versus IBS  Acute abdominal pain , s/p EGD and EUS on January 29:   Elevated alkaline phosphatase, bilirubin and AST:     Follow-up and recommended labs and tests     Follow up with primary care provider, Randee Woods, within 7 days for hospital follow- up.  The following labs/tests are recommended: CBC, CMP, Mag.      Follow up with Dr. Dickinson/ GI within 1-2 weeks     Activity    Your activity upon discharge: activity as tolerated     When to contact your care team    Call your primary doctor if you have any of the following: temperature greater than 100.4 or less than 96,  increased shortness of breath, or increased pain.     Diet    Follow this diet upon discharge: Orders Placed This Encounter      Snacks/Supplements Pediatric: Beneprotein; Between Meals      Snacks/Supplements Adult: Ensure Enlive; Between Meals      Regular Diet Adult       Significant Results and Procedures   Results for orders placed or performed during the hospital encounter of 01/27/23   CT Abdomen Pelvis w Contrast    Narrative    EXAM: CT ABDOMEN PELVIS W CONTRAST  LOCATION: [facility]  DATE/TIME: 1/27/2023 3:50 AM    INDICATION: RUQ pain, possible hernia?  COMPARISON: CT chest, abdomen and pelvis  with IV contrast 12/31/2022.  TECHNIQUE: CT scan of the abdomen and pelvis was performed following injection of IV contrast. Multiplanar reformats were obtained. Dose reduction techniques were used.  CONTRAST: 50 mL Isovue-370 IV.     FINDINGS:   LOWER CHEST: Minor dependent atelectasis in the posterior costophrenic angles identified today. No pleural effusion on either side. Normal cardiac size. No pericardial effusion.    HEPATOBILIARY: Heterogeneous hepatic parenchyma with lobulated contour and hypertrophy of the caudate lobe, representing cirrhosis. Geographic fatty sparing involving the hepatic parenchyma anteriorly extending inferiorly. Patent portal veins. Tiny   amount of adjacent ascites. No calcified gallstones or biliary dilatation.    PANCREAS: Parenchymal calcifications indicative of changes of chronic pancreatitis. No discrete mass.    SPLEEN: Normal.    ADRENAL GLANDS: Normal.    KIDNEYS/BLADDER: No urinary tract calculi. Both kidneys are well perfused without hydronephrosis or hydroureter. Normal urinary bladder.    BOWEL: Scattered colonic diverticulosis, more apparent distally, without acute inflammation. No mechanical obstruction or free gas.    LYMPH NODES: No suspicious abdominopelvic adenopathy.    VASCULATURE: Mildly atherosclerotic normal caliber abdominal aorta measuring 1.7 x 1.6 cm (image 93, series 3). Normal caliber IVC.    PELVIC ORGANS: Rectosigmoid diverticulosis. No adenopathy or free fluid. Phleboliths.    MUSCULOSKELETAL: Mild degenerative changes involving the spine and joints pelvis. Bilateral breast implants. Metallic ornament at the umbilicus.      Impression    IMPRESSION:   1.  Cirrhotic configuration of the liver with lobulated contour. Geographic fatty sparing involving the hepatic parenchyma anteriorly extending inferiorly. Tiny amount of ascites.    2.  Changes of chronic pancreatitis. No discrete mass.    3.  Scattered colonic diverticulosis, more apparent distally,  without acute inflammation. No mechanical obstruction or free gas.       US Abdomen Limited    Narrative    US ABDOMEN LIMITED 1/27/2023 9:24 AM    CLINICAL HISTORY: RUQ abdominal pain, history of cirrhosis and  pancreattitis  TECHNIQUE: Limited abdominal ultrasound.    COMPARISON: CT of the abdomen and pelvis at 3:45 AM today    FINDINGS:    GALLBLADDER: The gallbladder is distended. No stones or sludge. No  gallbladder wall thickening or pericholecystic fluid. Sonographic  Ribera sign is negative.    BILE DUCTS: No intrahepatic biliary ductal dilatation. The common duct  is mildly enlarged and measures 7mm. No obstructing calculi in the  visualized portions of the duct. No obstructing masses were placed on  the CT earlier today.    LIVER: Heterogeneous hepatic parenchyma with multiple echogenic and  hypoechoic areas, likely related to patchy hepatic steatosis seen on  the CT today. Coarsened hepatic parenchyma and lobulated hepatic  contour.    RIGHT KIDNEY: Mild prominence of the renal pelvis, stable since the  CT.    PANCREAS: The body and tail of the pancreas is obscured. Multiple  parenchymal calcifications in the pancreatic head seen on the CT  earlier today are not well seen by ultrasound.      Impression    IMPRESSION:  1.  Geographic echogenic and hypoechoic areas in the liver, likely  related to patchy hepatic steatosis, better assessed by the CT today.  Coarsened hepatic parenchyma with lobulated contour suggestive of  cirrhosis.  2.  Distended gallbladder. No cholelithiasis or acute cholecystitis.  3.  Mildly distended common hepatic duct measuring 7 mm, stable. No  obstructing calculi in the visualized duct. No obstructing calculi or  mass was visualized on the CT earlier today. If clinically indicated,  an MRCP can be considered.  4.  Sequela of chronic pancreatitis, better assessed by CT earlier.  5.  Stable mild dilatation of the right renal pelvis, either due to  mild hydronephrosis or  pelviectasis.    ANYA BROWN MD         SYSTEM ID:  [id number]   US Paracentesis without Albumin    Narrative    US PARACENTESIS WITHOUT ALBUMIN 1/30/2023 3:22 PM     HISTORY: High volume paracentesis with or without diagnostic fluid  analysis with labs to be drawn if ordered. Total paracentesis volume  as much as possible.    PROCEDURE: Ultrasound was used to evaluate for the presence and best  approach for paracentesis. Written and oral informed consent was  obtained. A pause for the cause procedure to verify the correct  patient and correct procedure. The skin overlying the right lower  quadrant was prepped and draped in the usual sterile fashion. The  subcutaneous tissues were anesthetized with 10 mL 1% lidocaine. A  catheter was advanced into the peritoneal space and 0.7 L of  straw  colored fluid was drained. There were no immediate complications.  Ultrasound images were permanently stored.  Patient left the  ultrasound suite in satisfactory condition.      Impression    IMPRESSION: Technically successful paracentesis without immediate  complications.     OCTAVIO MOMIN MD         SYSTEM ID:  [id number]     BMPRecent Labs   Lab 02/03/23  0901 02/01/23  0821 01/31/23  1524 01/31/23  0658 01/30/23  1827 01/30/23  1056    138  --  136  --  138   POTASSIUM 4.0 4.0  4.0 4.2 3.4   < > 3.2*   CHLORIDE 106 106  --  102  --  99   FRANCOISE 8.3* 8.2*  --  8.0*  --  9.0   CO2 24 22  --  23  --  29   BUN 7.1* 5.9*  --  7.7*  --  5.7*   CR 0.40* 0.44*  --  0.43*  --  0.49*   * 90  --  91  --  103*    < > = values in this interval not displayed.     CBC  Recent Labs   Lab 02/03/23  0901 01/31/23  0658 01/30/23  1056 01/28/23  1522   WBC 8.6 12.9* 14.9* 7.2   RBC 2.75* 2.91* 3.40* 2.92*   HGB 8.7* 9.3* 11.1* 9.3*   HCT 26.1* 28.5* 33.0* 28.5*   MCV 95 98 97 98   MCH 31.6 32.0 32.6 31.8   MCHC 33.3 32.6 33.6 32.6   RDW 16.0* 16.5* 16.3* 16.2*    264 343 246     INR  Recent Labs   Lab 01/30/23  1056 01/29/23  0653    INR 1.31* 1.37*     LFTs  Recent Labs   Lab 02/03/23  0901 02/01/23  0821 01/31/23  0658 01/30/23  1827 01/30/23  1056   ALKPHOS 316* 198* 210*  --  258*   AST 52* 41* 46*  --  54*   ALT 17 14 16  --  19   BILITOTAL 0.4 0.5 0.7  --  0.8   PROTTOTAL 5.9* 5.3* 5.4*  --  6.1*   ALBUMIN 2.5* 2.2* 2.2* 2.4* 2.6*      PANC  Recent Labs   Lab 01/28/23  0642   LIPASE 52         Discharge Medications   Current Discharge Medication List      START taking these medications    Details   !! amylase-lipase-protease (CREON 36) [id number] units CPEP Take 1 capsule by mouth 3 times daily (with meals) DO NOT CRUSH. To be swallowed as whole; Not to be crushed, chewed or retained in mouth. For patients who are unable to swallow intact capsule, the contents may be sprinkled on soft acidic food.  Qty: 100 capsule, Refills: 3    Associated Diagnoses: Chronic pancreatitis, unspecified pancreatitis type (H)      !! amylase-lipase-protease (CREON 36) [id number] units CPEP Take 1 capsule by mouth Take with snacks or supplements (with snacks)  Qty: 100 capsule, Refills: 1    Associated Diagnoses: Chronic pancreatitis, unspecified pancreatitis type (H)      calcium carbonate (TUMS) 500 MG chewable tablet Take 1 tablet (500 mg) by mouth 3 times daily as needed for heartburn  Qty: 100 tablet, Refills: 0    Associated Diagnoses: Chronic pancreatitis, unspecified pancreatitis type (H)      cholestyramine (QUESTRAN) 4 g packet Take 1 packet (4 g) by mouth daily  Qty: 30 packet, Refills: 1    Associated Diagnoses: Chronic pancreatitis, unspecified pancreatitis type (H)      dicyclomine (BENTYL) 10 MG capsule Take 1 capsule (10 mg) by mouth 4 times daily  Qty: 120 capsule, Refills: 1    Associated Diagnoses: Chronic pancreatitis, unspecified pancreatitis type (H)      nicotine (NICODERM CQ) 21 MG/24HR 24 hr patch Place 1 patch onto the skin daily  Qty: 14 patch, Refills: 1    Associated Diagnoses: Chronic pancreatitis, unspecified  pancreatitis type (H)       !! - Potential duplicate medications found. Please discuss with provider.      CONTINUE these medications which have CHANGED    Details   folic acid (FOLVITE) 1 MG tablet Take 1 tablet (1 mg) by mouth daily  Qty: 30 tablet, Refills: 3    Associated Diagnoses: Alcohol use disorder, severe, in early remission (H); Alcohol-induced acute pancreatitis, unspecified complication status      pantoprazole (PROTONIX) 40 MG EC tablet Take 1 tablet (40 mg) by mouth 2 times daily  Qty: 60 tablet, Refills: 3    Associated Diagnoses: Nonspecific abdominal pain         CONTINUE these medications which have NOT CHANGED    Details   acetaminophen (TYLENOL) 325 MG tablet Take 2 tablets (650 mg) by mouth every 6 hours as needed for mild pain Do not take more than 2000 mg per day. Alternate with ibuprofen.  Qty: 30 tablet, Refills: 0    Associated Diagnoses: Toothache      mirtazapine (REMERON) 15 MG tablet Take 30 mg by mouth At Bedtime      ondansetron (ZOFRAN ODT) 4 MG ODT tab Take 1 tablet (4 mg) by mouth every 6 hours as needed for nausea  Qty: 5 tablet, Refills: 0    Associated Diagnoses: Alcohol-induced acute pancreatitis without infection or necrosis      !! QUEtiapine (SEROQUEL) 25 MG tablet Take 1 tablet (25 mg) by mouth daily    Associated Diagnoses: Mood disorder (H)      !! QUEtiapine (SEROQUEL) 25 MG tablet Take 25-50 mg by mouth daily as needed (anxiety) In the afternoon      !! QUEtiapine (SEROQUEL) 25 MG tablet Take 100 mg by mouth At Bedtime      thiamine (B-1) 100 MG tablet Take 1 tablet (100 mg) by mouth daily  Qty: 20 tablet, Refills: 0    Associated Diagnoses: Alcohol-induced acute pancreatitis, unspecified complication status; Alcohol use disorder, severe, in early remission (H)      azelastine (ASTELIN) 0.1 % nasal spray Spray 1 spray into both nostrils daily as needed       cetirizine (ZYRTEC) 10 MG tablet Take 10 mg by mouth daily as needed       fluticasone (FLONASE) 50 MCG/ACT  nasal spray Spray 1 spray into both nostrils daily as needed        !! - Potential duplicate medications found. Please discuss with provider.      STOP taking these medications       ibuprofen (ADVIL/MOTRIN) 400 MG tablet Comments:   Reason for Stopping:             Allergies   No Known Allergies

## 2023-02-03 NOTE — PLAN OF CARE
Physical Therapy Discharge Summary     Reason for therapy discharge:    Discharged to home.     Progress towards therapy goal(s). See goals on Care Plan in [facility] electronic health record for goal details.  Goals partially met.  Barriers to achieving goals:   discharge from facility.     Therapy recommendation(s):    Continued therapy is recommended.  Rationale/Recommendations:  Patient would benefit from Home PT in order to increase strength, activity tolerance and independence with mobility.  Patient requires home PT at this time as attending PT in a clinic setting would be a considerable and significantly taxing effort, limiting their ability to participate in therapy session.

## 2023-02-03 NOTE — PLAN OF CARE
Goal Outcome Evaluation:  DATE & TIME: 2/2/23 8722-9292 Cognitive Concerns/ Orientation : Alert and oriented x4   BEHAVIOR & AGGRESSION TOOL COLOR: Green   ABNL VS/O2: VSS  MOBILITY: Independent  PAIN MANAGMENT: c/o RUQ pain, given prn Tylenol x1.   DIET: Regular- great appetite  BOWEL/BLADDER: Continent of B&B, large formed BM x1 this shift.   ABNL LAB/BG: Mag 1.7 (on high protocol) replaced and recheck tomorrow.   DRAIN/DEVICES: PIV saline locked  TELEMETRY RHYTHM: N/A  SKIN: small open area on Rt buttock, mepilex in place.  TESTS/PROCEDURES: none  D/C DATE: Possibly discharge home tomorrow, pending OT eval.   Discharge Barriers: none  OTHER IMPORTANT INFO: Nicotine patch Left arm. PRN Seroquel given x1.

## 2023-02-04 ENCOUNTER — PATIENT OUTREACH (OUTPATIENT)
Dept: CARE COORDINATION | Facility: CLINIC | Age: 58
End: 2023-02-04
Payer: COMMERCIAL

## 2023-02-04 LAB — BACTERIA FLD CULT: NO GROWTH

## 2023-02-04 NOTE — PROGRESS NOTES
[facility]: Post-Discharge Note  SITUATION                                                      Admission:    Admission Date: 01/27/23   Reason for Admission: ongoing diarrhea and acute abdominal pain  Discharge:   Discharge Date: 02/03/23  Discharge Diagnosis: Recent diagnosis of C.Diff Colitis    BACKGROUND                                                      Per hospital discharge summary and inpatient provider notes:    Audelia Comer is a 57 year old female with past medical history that is most notable for severe alcohol use disorder and chronic alcoholic pancreatitis, as well as recent C difficile colitis, among others; who presents with ongoing diarrhea and acute abdominal pain and is found to have acute hypochloremic hyponatremia who was admitted on 1/27/2023.   ASSESSMENT        Discharge Assessment  How are you doing now that you are home?: Okay,had one bout of diarrhea today.  Hospital MD told that this could happen.  How are your symptoms? (Red Flag symptoms escalate to triage hotline per guidelines): Improved  Do you feel your condition is stable enough to be safe at home until your provider visit?: Yes  Does the patient have their discharge instructions? : Yes  Does the patient have questions regarding their discharge instructions? : No  Were you started on any new medications or were there changes to any of your previous medications? : Yes  Does the patient have all of their medications?: Yes  Do you have questions regarding any of your medications? : No  Do you have all of your needed medical supplies or equipment (DME)?  (i.e. oxygen tank, CPAP, cane, etc.): Yes  Discharge follow-up appointment scheduled within 14 calendar days? : No  Is patient agreeable to assistance with scheduling? : No (PCP is with [facility].  She will call for appt on Monday.)    Post-op ([facility] CTA Only)  If the patient had a surgery or procedure, do they have any questions for a nurse?: No      PLAN                                                       Outpatient Plan: Follow up with primary care provider, Randee Woods, within 7 days for hospital follow- up.  The following labs/tests are recommended: CBC, CMP, Mag.      Follow up with Dr. Dickinson/ GI within 1-2 weeks    No future appointments.      For any urgent concerns, please contact our 24 hour nurse triage line: [phone] ([phone])         RAY Dueñas  [phone]  [facility]

## 2023-02-04 NOTE — CARE PLAN
Occupational Therapy Discharge Summary    Reason for therapy discharge:    Discharged to home.    Progress towards therapy goal(s). See goals on Care Plan in [facility] electronic health record for goal details.  Goals not met.  Barriers to achieving goals:   discharge on same date as initial evaluation.    Therapy recommendation(s):    No further therapy is recommended.

## 2023-03-18 ENCOUNTER — HOSPITAL ENCOUNTER (INPATIENT)
Facility: CLINIC | Age: 58
LOS: 3 days | Discharge: HOME HEALTH | End: 2023-03-21
Attending: EMERGENCY MEDICINE | Admitting: INTERNAL MEDICINE
Payer: COMMERCIAL

## 2023-03-18 DIAGNOSIS — K86.1 CHRONIC PANCREATITIS, UNSPECIFIED PANCREATITIS TYPE (H): Primary | ICD-10-CM

## 2023-03-18 DIAGNOSIS — E87.20 METABOLIC ACIDOSIS: ICD-10-CM

## 2023-03-18 DIAGNOSIS — E87.8 HYPOCHLOREMIA: ICD-10-CM

## 2023-03-18 DIAGNOSIS — R62.7 FAILURE TO THRIVE IN ADULT: ICD-10-CM

## 2023-03-18 DIAGNOSIS — E87.6 HYPOKALEMIA: ICD-10-CM

## 2023-03-18 DIAGNOSIS — R63.4 UNINTENDED WEIGHT LOSS: ICD-10-CM

## 2023-03-18 DIAGNOSIS — E88.89 KETOSIS (H): ICD-10-CM

## 2023-03-18 DIAGNOSIS — K76.0 FATTY LIVER: ICD-10-CM

## 2023-03-18 DIAGNOSIS — E87.1 HYPONATREMIA: ICD-10-CM

## 2023-03-18 PROBLEM — R19.7 DIARRHEA OF PRESUMED INFECTIOUS ORIGIN: Status: RESOLVED | Noted: 2023-01-31 | Resolved: 2023-03-18

## 2023-03-18 PROBLEM — U07.1 INFECTION DUE TO 2019 NOVEL CORONAVIRUS: Status: RESOLVED | Noted: 2023-01-03 | Resolved: 2023-03-18

## 2023-03-18 PROBLEM — R10.11 RUQ ABDOMINAL PAIN: Status: RESOLVED | Noted: 2023-01-31 | Resolved: 2023-03-18

## 2023-03-18 PROBLEM — K56.609 SBO (SMALL BOWEL OBSTRUCTION) (H): Status: RESOLVED | Noted: 2023-03-18 | Resolved: 2023-03-18

## 2023-03-18 PROBLEM — K56.609 SBO (SMALL BOWEL OBSTRUCTION) (H): Status: ACTIVE | Noted: 2023-03-18

## 2023-03-18 PROBLEM — K85.90 ACUTE PANCREATITIS: Status: RESOLVED | Noted: 2018-03-15 | Resolved: 2023-03-18

## 2023-03-18 LAB
ALBUMIN SERPL BCG-MCNC: 4.2 G/DL (ref 3.5–5.2)
ALP SERPL-CCNC: 328 U/L (ref 35–104)
ALT SERPL W P-5'-P-CCNC: 61 U/L (ref 10–35)
ANION GAP SERPL CALCULATED.3IONS-SCNC: 26 MMOL/L (ref 7–15)
AST SERPL W P-5'-P-CCNC: 238 U/L (ref 10–35)
B-OH-BUTYR SERPL-SCNC: 5.9 MMOL/L
BASOPHILS # BLD AUTO: 0.1 10E3/UL (ref 0–0.2)
BASOPHILS NFR BLD AUTO: 1 %
BILIRUB SERPL-MCNC: 0.9 MG/DL
BUN SERPL-MCNC: 11.2 MG/DL (ref 8–23)
CALCIUM SERPL-MCNC: 9.8 MG/DL (ref 8.6–10)
CHLORIDE SERPL-SCNC: 84 MMOL/L (ref 98–107)
CREAT SERPL-MCNC: 0.47 MG/DL (ref 0.51–0.95)
DEPRECATED HCO3 PLAS-SCNC: 17 MMOL/L (ref 22–29)
EOSINOPHIL # BLD AUTO: 0 10E3/UL (ref 0–0.7)
EOSINOPHIL NFR BLD AUTO: 0 %
ERYTHROCYTE [DISTWIDTH] IN BLOOD BY AUTOMATED COUNT: 14.2 % (ref 10–15)
ETHANOL SERPL-MCNC: <0.01 G/DL
GFR SERPL CREATININE-BSD FRML MDRD: >90 ML/MIN/1.73M2
GLUCOSE SERPL-MCNC: 116 MG/DL (ref 70–99)
HCT VFR BLD AUTO: 41.1 % (ref 35–47)
HGB BLD-MCNC: 14.2 G/DL (ref 11.7–15.7)
IMM GRANULOCYTES # BLD: 0 10E3/UL
IMM GRANULOCYTES NFR BLD: 0 %
LACTATE SERPL-SCNC: 0.8 MMOL/L (ref 0.7–2)
LIPASE SERPL-CCNC: 37 U/L (ref 13–60)
LYMPHOCYTES # BLD AUTO: 1.7 10E3/UL (ref 0.8–5.3)
LYMPHOCYTES NFR BLD AUTO: 15 %
MAGNESIUM SERPL-MCNC: 1.5 MG/DL (ref 1.7–2.3)
MCH RBC QN AUTO: 31.8 PG (ref 26.5–33)
MCHC RBC AUTO-ENTMCNC: 34.5 G/DL (ref 31.5–36.5)
MCV RBC AUTO: 92 FL (ref 78–100)
MONOCYTES # BLD AUTO: 0.9 10E3/UL (ref 0–1.3)
MONOCYTES NFR BLD AUTO: 8 %
NEUTROPHILS # BLD AUTO: 8.4 10E3/UL (ref 1.6–8.3)
NEUTROPHILS NFR BLD AUTO: 76 %
NRBC # BLD AUTO: 0 10E3/UL
NRBC BLD AUTO-RTO: 0 /100
PHOSPHATE SERPL-MCNC: 1.8 MG/DL (ref 2.5–4.5)
PLATELET # BLD AUTO: 157 10E3/UL (ref 150–450)
POTASSIUM SERPL-SCNC: 2.7 MMOL/L (ref 3.4–5.3)
PROT SERPL-MCNC: 8.4 G/DL (ref 6.4–8.3)
RBC # BLD AUTO: 4.47 10E6/UL (ref 3.8–5.2)
SODIUM SERPL-SCNC: 127 MMOL/L (ref 136–145)
WBC # BLD AUTO: 11 10E3/UL (ref 4–11)

## 2023-03-18 PROCEDURE — 258N000003 HC RX IP 258 OP 636: Performed by: EMERGENCY MEDICINE

## 2023-03-18 PROCEDURE — 96374 THER/PROPH/DIAG INJ IV PUSH: CPT

## 2023-03-18 PROCEDURE — 99285 EMERGENCY DEPT VISIT HI MDM: CPT | Mod: 25

## 2023-03-18 PROCEDURE — 250N000011 HC RX IP 250 OP 636: Performed by: EMERGENCY MEDICINE

## 2023-03-18 PROCEDURE — 36415 COLL VENOUS BLD VENIPUNCTURE: CPT | Performed by: EMERGENCY MEDICINE

## 2023-03-18 PROCEDURE — 82077 ASSAY SPEC XCP UR&BREATH IA: CPT | Performed by: EMERGENCY MEDICINE

## 2023-03-18 PROCEDURE — 85025 COMPLETE CBC W/AUTO DIFF WBC: CPT | Performed by: EMERGENCY MEDICINE

## 2023-03-18 PROCEDURE — 83690 ASSAY OF LIPASE: CPT | Performed by: EMERGENCY MEDICINE

## 2023-03-18 PROCEDURE — 82010 KETONE BODYS QUAN: CPT | Performed by: EMERGENCY MEDICINE

## 2023-03-18 PROCEDURE — 250N000011 HC RX IP 250 OP 636: Performed by: INTERNAL MEDICINE

## 2023-03-18 PROCEDURE — G0378 HOSPITAL OBSERVATION PER HR: HCPCS

## 2023-03-18 PROCEDURE — 250N000013 HC RX MED GY IP 250 OP 250 PS 637: Performed by: INTERNAL MEDICINE

## 2023-03-18 PROCEDURE — 99223 1ST HOSP IP/OBS HIGH 75: CPT | Mod: AI | Performed by: INTERNAL MEDICINE

## 2023-03-18 PROCEDURE — 84100 ASSAY OF PHOSPHORUS: CPT | Performed by: INTERNAL MEDICINE

## 2023-03-18 PROCEDURE — 83605 ASSAY OF LACTIC ACID: CPT | Performed by: EMERGENCY MEDICINE

## 2023-03-18 PROCEDURE — 250N000009 HC RX 250: Performed by: INTERNAL MEDICINE

## 2023-03-18 PROCEDURE — 83735 ASSAY OF MAGNESIUM: CPT | Performed by: EMERGENCY MEDICINE

## 2023-03-18 PROCEDURE — 120N000001 HC R&B MED SURG/OB

## 2023-03-18 PROCEDURE — 258N000001 HC RX 258: Performed by: INTERNAL MEDICINE

## 2023-03-18 PROCEDURE — 80053 COMPREHEN METABOLIC PANEL: CPT | Performed by: EMERGENCY MEDICINE

## 2023-03-18 RX ORDER — NALOXONE HYDROCHLORIDE 0.4 MG/ML
0.2 INJECTION, SOLUTION INTRAMUSCULAR; INTRAVENOUS; SUBCUTANEOUS
Status: DISCONTINUED | OUTPATIENT
Start: 2023-03-18 | End: 2023-03-21 | Stop reason: HOSPADM

## 2023-03-18 RX ORDER — SODIUM CHLORIDE 9 MG/ML
INJECTION, SOLUTION INTRAVENOUS CONTINUOUS
Status: DISCONTINUED | OUTPATIENT
Start: 2023-03-18 | End: 2023-03-18

## 2023-03-18 RX ORDER — NALOXONE HYDROCHLORIDE 0.4 MG/ML
0.4 INJECTION, SOLUTION INTRAMUSCULAR; INTRAVENOUS; SUBCUTANEOUS
Status: DISCONTINUED | OUTPATIENT
Start: 2023-03-18 | End: 2023-03-21 | Stop reason: HOSPADM

## 2023-03-18 RX ORDER — PANTOPRAZOLE SODIUM 40 MG/1
40 TABLET, DELAYED RELEASE ORAL 2 TIMES DAILY
Status: DISCONTINUED | OUTPATIENT
Start: 2023-03-18 | End: 2023-03-21 | Stop reason: HOSPADM

## 2023-03-18 RX ORDER — ONDANSETRON 2 MG/ML
4 INJECTION INTRAMUSCULAR; INTRAVENOUS EVERY 6 HOURS PRN
Status: DISCONTINUED | OUTPATIENT
Start: 2023-03-18 | End: 2023-03-21 | Stop reason: HOSPADM

## 2023-03-18 RX ORDER — POTASSIUM CHLORIDE 20MEQ/15ML
20 LIQUID (ML) ORAL ONCE
Status: COMPLETED | OUTPATIENT
Start: 2023-03-18 | End: 2023-03-18

## 2023-03-18 RX ORDER — SODIUM CHLORIDE, SODIUM LACTATE, POTASSIUM CHLORIDE, CALCIUM CHLORIDE 600; 310; 30; 20 MG/100ML; MG/100ML; MG/100ML; MG/100ML
INJECTION, SOLUTION INTRAVENOUS CONTINUOUS
Status: DISCONTINUED | OUTPATIENT
Start: 2023-03-18 | End: 2023-03-18

## 2023-03-18 RX ORDER — POTASSIUM CHLORIDE 7.45 MG/ML
10 INJECTION INTRAVENOUS
Status: COMPLETED | OUTPATIENT
Start: 2023-03-18 | End: 2023-03-18

## 2023-03-18 RX ORDER — ENOXAPARIN SODIUM 100 MG/ML
30 INJECTION SUBCUTANEOUS EVERY 24 HOURS
Status: DISCONTINUED | OUTPATIENT
Start: 2023-03-18 | End: 2023-03-21 | Stop reason: HOSPADM

## 2023-03-18 RX ORDER — QUETIAPINE FUMARATE 50 MG/1
100 TABLET, FILM COATED ORAL AT BEDTIME
Status: DISCONTINUED | OUTPATIENT
Start: 2023-03-18 | End: 2023-03-21 | Stop reason: HOSPADM

## 2023-03-18 RX ORDER — NICOTINE 21 MG/24HR
1 PATCH, TRANSDERMAL 24 HOURS TRANSDERMAL DAILY
Status: DISCONTINUED | OUTPATIENT
Start: 2023-03-18 | End: 2023-03-21 | Stop reason: HOSPADM

## 2023-03-18 RX ORDER — QUETIAPINE FUMARATE 25 MG/1
25 TABLET, FILM COATED ORAL 2 TIMES DAILY
Status: DISCONTINUED | OUTPATIENT
Start: 2023-03-19 | End: 2023-03-21 | Stop reason: HOSPADM

## 2023-03-18 RX ORDER — HYDROMORPHONE HYDROCHLORIDE 1 MG/ML
0.5 INJECTION, SOLUTION INTRAMUSCULAR; INTRAVENOUS; SUBCUTANEOUS
Status: COMPLETED | OUTPATIENT
Start: 2023-03-18 | End: 2023-03-19

## 2023-03-18 RX ORDER — MIRTAZAPINE 15 MG/1
30 TABLET, FILM COATED ORAL AT BEDTIME
Status: DISCONTINUED | OUTPATIENT
Start: 2023-03-18 | End: 2023-03-21 | Stop reason: HOSPADM

## 2023-03-18 RX ORDER — ACETAMINOPHEN 650 MG/1
650 SUPPOSITORY RECTAL EVERY 6 HOURS PRN
Status: DISCONTINUED | OUTPATIENT
Start: 2023-03-18 | End: 2023-03-21 | Stop reason: HOSPADM

## 2023-03-18 RX ORDER — FOLIC ACID 1 MG/1
1 TABLET ORAL DAILY
Status: DISCONTINUED | OUTPATIENT
Start: 2023-03-18 | End: 2023-03-21 | Stop reason: HOSPADM

## 2023-03-18 RX ORDER — MAGNESIUM SULFATE HEPTAHYDRATE 40 MG/ML
4 INJECTION, SOLUTION INTRAVENOUS ONCE
Status: COMPLETED | OUTPATIENT
Start: 2023-03-18 | End: 2023-03-19

## 2023-03-18 RX ORDER — PROCHLORPERAZINE MALEATE 5 MG
10 TABLET ORAL EVERY 6 HOURS PRN
Status: DISCONTINUED | OUTPATIENT
Start: 2023-03-18 | End: 2023-03-21 | Stop reason: HOSPADM

## 2023-03-18 RX ORDER — ONDANSETRON 4 MG/1
4 TABLET, ORALLY DISINTEGRATING ORAL EVERY 6 HOURS PRN
Status: DISCONTINUED | OUTPATIENT
Start: 2023-03-18 | End: 2023-03-21 | Stop reason: HOSPADM

## 2023-03-18 RX ORDER — ACETAMINOPHEN 325 MG/1
650 TABLET ORAL EVERY 6 HOURS PRN
Status: DISCONTINUED | OUTPATIENT
Start: 2023-03-18 | End: 2023-03-21 | Stop reason: HOSPADM

## 2023-03-18 RX ORDER — PROCHLORPERAZINE 25 MG
25 SUPPOSITORY, RECTAL RECTAL EVERY 12 HOURS PRN
Status: DISCONTINUED | OUTPATIENT
Start: 2023-03-18 | End: 2023-03-21 | Stop reason: HOSPADM

## 2023-03-18 RX ORDER — POTASSIUM CHLORIDE 20MEQ/15ML
40 LIQUID (ML) ORAL ONCE
Status: COMPLETED | OUTPATIENT
Start: 2023-03-18 | End: 2023-03-18

## 2023-03-18 RX ORDER — HYDROMORPHONE HYDROCHLORIDE 1 MG/ML
0.5 INJECTION, SOLUTION INTRAMUSCULAR; INTRAVENOUS; SUBCUTANEOUS ONCE
Status: COMPLETED | OUTPATIENT
Start: 2023-03-18 | End: 2023-03-18

## 2023-03-18 RX ORDER — POTASSIUM CHLORIDE 7.45 MG/ML
10 INJECTION INTRAVENOUS
Status: COMPLETED | OUTPATIENT
Start: 2023-03-18 | End: 2023-03-19

## 2023-03-18 RX ADMIN — ACETAMINOPHEN 650 MG: 325 TABLET ORAL at 23:27

## 2023-03-18 RX ADMIN — ACETAMINOPHEN 650 MG: 325 TABLET ORAL at 17:21

## 2023-03-18 RX ADMIN — POTASSIUM CHLORIDE 20 MEQ: 20 SOLUTION ORAL at 17:21

## 2023-03-18 RX ADMIN — FOLIC ACID 1 MG: 1 TABLET ORAL at 20:04

## 2023-03-18 RX ADMIN — PANCRELIPASE 1 CAPSULE: 36000; 180000; 114000 CAPSULE, DELAYED RELEASE PELLETS ORAL at 20:46

## 2023-03-18 RX ADMIN — HYDROMORPHONE HYDROCHLORIDE 0.5 MG: 1 INJECTION, SOLUTION INTRAMUSCULAR; INTRAVENOUS; SUBCUTANEOUS at 17:21

## 2023-03-18 RX ADMIN — POTASSIUM CHLORIDE 10 MEQ: 7.46 INJECTION, SOLUTION INTRAVENOUS at 22:11

## 2023-03-18 RX ADMIN — POTASSIUM & SODIUM PHOSPHATES POWDER PACK 280-160-250 MG 2 PACKET: 280-160-250 PACK at 20:04

## 2023-03-18 RX ADMIN — POTASSIUM CHLORIDE 10 MEQ: 7.46 INJECTION, SOLUTION INTRAVENOUS at 17:35

## 2023-03-18 RX ADMIN — POTASSIUM CHLORIDE 20 MEQ: 20 SOLUTION ORAL at 17:29

## 2023-03-18 RX ADMIN — SODIUM CHLORIDE, POTASSIUM CHLORIDE, SODIUM LACTATE AND CALCIUM CHLORIDE 500 ML: 600; 310; 30; 20 INJECTION, SOLUTION INTRAVENOUS at 17:20

## 2023-03-18 RX ADMIN — QUETIAPINE FUMARATE 100 MG: 50 TABLET ORAL at 22:25

## 2023-03-18 RX ADMIN — MIRTAZAPINE 30 MG: 15 TABLET, FILM COATED ORAL at 22:25

## 2023-03-18 RX ADMIN — SODIUM BICARBONATE: 84 INJECTION, SOLUTION INTRAVENOUS at 21:18

## 2023-03-18 RX ADMIN — ONDANSETRON 4 MG: 4 TABLET, ORALLY DISINTEGRATING ORAL at 17:29

## 2023-03-18 RX ADMIN — NICOTINE 1 PATCH: 21 PATCH, EXTENDED RELEASE TRANSDERMAL at 20:47

## 2023-03-18 RX ADMIN — PANTOPRAZOLE SODIUM 40 MG: 40 TABLET, DELAYED RELEASE ORAL at 20:04

## 2023-03-18 RX ADMIN — THIAMINE HCL TAB 100 MG 100 MG: 100 TAB at 20:04

## 2023-03-18 RX ADMIN — SODIUM CHLORIDE, POTASSIUM CHLORIDE, SODIUM LACTATE AND CALCIUM CHLORIDE: 600; 310; 30; 20 INJECTION, SOLUTION INTRAVENOUS at 19:06

## 2023-03-18 ASSESSMENT — ACTIVITIES OF DAILY LIVING (ADL)
ADLS_ACUITY_SCORE: 37
ADLS_ACUITY_SCORE: 37
ADLS_ACUITY_SCORE: 42
ADLS_ACUITY_SCORE: 37
ADLS_ACUITY_SCORE: 42

## 2023-03-18 NOTE — ED NOTES
"[facility]  ED Nurse Handoff Report    Audelia Comer is a 57 year old female   ED Chief complaint: Weight Loss and Generalized Weakness  . ED Diagnosis:   Final diagnoses:   Failure to thrive in adult   Hyponatremia   Hypochloremia   Hypokalemia   Metabolic acidosis     Allergies: No Known Allergies    Code Status: Full Code  Activity level - Baseline/Home:  Stand by Assist. Activity Level - Current:   Stand by Assist. Lift room needed: No. Bariatric: No   Needed: No   Isolation: No. Infection: Not Applicable.     Vital Signs:   Vitals:    03/18/23 1420 03/18/23 1443 03/18/23 1514 03/18/23 1748   BP: 122/83  106/84    Pulse: 120  112    Resp:   16    Temp:       TempSrc:       SpO2: 100%  100% 94%   Weight:  38.9 kg (85 lb 12.8 oz)         Cardiac Rhythm:  ,      Pain level:    Patient confused: No. Patient Falls Risk: Yes.   Elimination Status: Has voided   Patient Report - Initial Complaint: weakness, low appetite, generalized pain. Focused Assessment:   Pt reports that she has had significant weight loss in the last 2 weeks due to not eating, low appetite. Pt states that she has pain \"all over.\"   Tests Performed:   No orders to display     Labs Ordered and Resulted from Time of ED Arrival to Time of ED Departure   COMPREHENSIVE METABOLIC PANEL - Abnormal       Result Value    Sodium 127 (*)     Potassium 2.7 (*)     Chloride 84 (*)     Carbon Dioxide (CO2) 17 (*)     Anion Gap 26 (*)     Urea Nitrogen 11.2      Creatinine 0.47 (*)     Calcium 9.8      Glucose 116 (*)     Alkaline Phosphatase 328 (*)      (*)     ALT 61 (*)     Protein Total 8.4 (*)     Albumin 4.2      Bilirubin Total 0.9      GFR Estimate >90     CBC WITH PLATELETS AND DIFFERENTIAL - Abnormal    WBC Count 11.0      RBC Count 4.47      Hemoglobin 14.2      Hematocrit 41.1      MCV 92      MCH 31.8      MCHC 34.5      RDW 14.2      Platelet Count 157      % Neutrophils 76      % Lymphocytes 15      % Monocytes 8 "      % Eosinophils 0      % Basophils 1      % Immature Granulocytes 0      NRBCs per 100 WBC 0      Absolute Neutrophils 8.4 (*)     Absolute Lymphocytes 1.7      Absolute Monocytes 0.9      Absolute Eosinophils 0.0      Absolute Basophils 0.1      Absolute Immature Granulocytes 0.0      Absolute NRBCs 0.0     MAGNESIUM - Abnormal    Magnesium 1.5 (*)    KETONE BETA-HYDROXYBUTYRATE QUANTITATIVE, RAPID - Abnormal    Ketone (Beta-Hydroxybutyrate) Quantitative 5.90 (*)    PHOSPHORUS - Abnormal    Phosphorus 1.8 (*)    LIPASE - Normal    Lipase 37     LACTIC ACID WHOLE BLOOD - Normal    Lactic Acid 0.8     ETHYL ALCOHOL LEVEL - Normal    Alcohol ethyl <0.01       Treatments provided: monitoring, potassium replacement, pain meds, fluids  Family Comments: none present  OBS brochure/video discussed/provided to patient:  No  ED Medications:   Medications   lactated ringers BOLUS 500 mL (500 mLs Intravenous $New Bag 3/18/23 1720)   lactated ringers infusion (has no administration in time range)   potassium chloride 10 mEq in 100 mL sterile water infusion (10 mEq Intravenous $New Bag 3/18/23 1735)   melatonin tablet 1 mg (has no administration in time range)   ondansetron (ZOFRAN ODT) ODT tab 4 mg (4 mg Oral $Given 3/18/23 1729)     Or   ondansetron (ZOFRAN) injection 4 mg ( Intravenous See Alternative 3/18/23 1729)   acetaminophen (TYLENOL) tablet 650 mg (650 mg Oral $Given 3/18/23 1721)     Or   acetaminophen (TYLENOL) Suppository 650 mg ( Rectal See Alternative 3/18/23 1721)   prochlorperazine (COMPAZINE) injection 10 mg (has no administration in time range)     Or   prochlorperazine (COMPAZINE) tablet 10 mg (has no administration in time range)     Or   prochlorperazine (COMPAZINE) suppository 25 mg (has no administration in time range)   HYDROmorphone (PF) (DILAUDID) injection 0.5 mg (0.5 mg Intravenous $Given 3/18/23 1721)   potassium chloride (KAYCIEL) solution 40 mEq (20 mEq Oral $Given 3/18/23 1721)   potassium  chloride (KAYCIEL) solution 20 mEq (20 mEq Oral $Given 3/18/23 1045)     Drips infusing:  Yes  For the majority of the shift, the patient's behavior Green. Interventions performed were rounding.    Sepsis treatment initiated: No     Patient tested for COVID 19 prior to admission: YES    ED Nurse Name/Phone Number: Netta Gonzales RN,   6:16 PM   RECEIVING UNIT ED HANDOFF REVIEW    Above ED Nurse Handoff Report was reviewed: Yes  Reviewed by: Liliane Canales RN on March 18, 2023 at 6:26 PM

## 2023-03-18 NOTE — PHARMACY-ADMISSION MEDICATION HISTORY
Pharmacy Medication History  Admission medication history interview status for Audelia Comer admitted on 3/18/2023 is complete. See [facility] admission navigator for allergy information, prior to admission medications and immunization status.     Medication history interview done, indicate source(s): Patient  Medication history resources (including written lists, pill bottles, clinic record): Surescripts and chart review    Significant changes made to PTA medication list:  ADDED: None   MODIFIED:     Quetiapine 25-50 mg daily PRN -> 25 mg BID  REMOVED: None   Reported as Not Taking: None     Actions taken by pharmacist (provider contacted, etc): None     Additional medication history information: Pt reports no changes since last hospitalization earlier this year. Pt takes quetiapine 25mg morning and 25mg afternoon plus 100mg at bedtime. Pt does use Creon when she eats, but has not been eating regularly. Pt is not currently wearing nicotine patch from home.    Medication Affordability: Did not assess     Medication reconciliation/reorder completed by provider prior to medication history? No    Prior to Admission medications    Medication Sig Last Dose Taking? Auth Provider Long Term End Date   acetaminophen (TYLENOL) 325 MG tablet Take 2 tablets (650 mg) by mouth every 6 hours as needed for mild pain Do not take more than 2000 mg per day. Alternate with ibuprofen. Past Month at PRN Yes Jonathon Narvaez,      amylase-lipase-protease (CREON 36) [id number] units CPEP Take 1 capsule by mouth 3 times daily (with meals) DO NOT CRUSH. To be swallowed as whole; Not to be crushed, chewed or retained in mouth. For patients who are unable to swallow intact capsule, the contents may be sprinkled on soft acidic food. Past Week Yes Bi Andre MD     amylase-lipase-protease (CREON 36) [id number] units CPEP Take 1 capsule by mouth Take with snacks or supplements (with snacks) Past Week Yes Bi Andre MD      azelastine (ASTELIN) 0.1 % nasal spray Spray 1 spray into both nostrils daily as needed  Past Month at PRN Yes Unknown, Entered By History     calcium carbonate (TUMS) 500 MG chewable tablet Take 1 tablet (500 mg) by mouth 3 times daily as needed for heartburn Past Month at PRN Yes Bi Andre MD     cetirizine (ZYRTEC) 10 MG tablet Take 10 mg by mouth daily as needed  Past Month at PRN Yes Unknown, Entered By History     cholestyramine (QUESTRAN) 4 g packet Take 1 packet (4 g) by mouth daily Past Week Yes Bi Andre MD Yes    dicyclomine (BENTYL) 10 MG capsule Take 1 capsule (10 mg) by mouth 4 times daily Past Week Yes Bi Andre MD     fluticasone (FLONASE) 50 MCG/ACT nasal spray Spray 1 spray into both nostrils daily as needed  Past Month at PRN Yes Unknown, Entered By History     folic acid (FOLVITE) 1 MG tablet Take 1 tablet (1 mg) by mouth daily Past Week Yes Bi Andre MD     mirtazapine (REMERON) 15 MG tablet Take 30 mg by mouth At Bedtime 3/17/2023 at HS Yes Unknown, Entered By History Yes 4/19/23   nicotine (NICODERM CQ) 21 MG/24HR 24 hr patch Place 1 patch onto the skin daily Past Week Yes Bi Andre MD     ondansetron (ZOFRAN ODT) 4 MG ODT tab Take 1 tablet (4 mg) by mouth every 6 hours as needed for nausea Past Month at PRN Yes eNal Cabrera DO     pantoprazole (PROTONIX) 40 MG EC tablet Take 1 tablet (40 mg) by mouth 2 times daily 3/17/2023 Yes Bi Andre MD     QUEtiapine (SEROQUEL) 25 MG tablet Take 25 mg by mouth 2 times daily In the morning and afternoon 3/18/2023 at AM Yes Unknown, Entered By History Yes    QUEtiapine (SEROQUEL) 25 MG tablet Take 100 mg by mouth At Bedtime 3/17/2023 at HS Yes Unknown, Entered By History Yes    thiamine (B-1) 100 MG tablet Take 1 tablet (100 mg) by mouth daily Past Week Yes Jonathon Narvaez DO Kelly De Boom, RPH

## 2023-03-18 NOTE — ED TRIAGE NOTES
Pt reports that she has not been eating normally for the last 2 weeks. Pt reports that she is down to 85 lbs and has lost 10 lbs over the last 2 weeks. Pt brought in by ambulance from home. Pt alert and oriented.

## 2023-03-18 NOTE — H&P
[facility]    History and Physical - Hospitalist Service       Date of Admission:  3/18/2023    Assessment & Plan      Audelia Comer is a 57 year old female admitted on 3/18/2023. She has a PMH of repeated hospitalizatons, chr malnutrition, liver cirrhosis 2/2 alc abuse, recurrent and chr hyponatremia and electrolyte imbalances, bilateral mastectomy secondary to breast cancer chr pancreatitis with malabsorpton, tobacco abuse and bereavement syndrome.    1/.  Ketosis likely related to starvation, will place on D5 half normal saline with amp of bicarb given the anion gap.  2/.  Hypokalemia secondary to poor oral intake will replace aggressively with protocol.  3/.  Hypomagnesemia is again secondary to poor oral intake and other electrolyte imbalances  /.  Hypophosphatemia secondary poor oral intake contributing to generalized weakness will replace aggressively using oral Neutra-Phos.  5/.  Tobacco abuse replace with NicoDerm patch which she is supposed to be using.  6/.  Overall severe depression related to bereavement understandable however I think it is prudent and appropriate for patient to be evaluated by palliative care given the rather poor state of health and patient's poor insight into longtime health.  7/.  Logistics: Patient is being admitted under inpatient status to a medicine bed     Diet: Regular Diet Adult    DVT Prophylaxis: Enoxaparin (Lovenox) SQ  Patino Catheter: Not present  Lines: None     Cardiac Monitoring: None  Code Status: Full Code      Clinically Significant Risk Factors Present on Admission        # Hypokalemia: Lowest K = 2.7 mmol/L in last 2 days, will replace as needed  # Hyponatremia: Lowest Na = 127 mmol/L in last 2 days, will monitor as appropriate    # Hypomagnesemia: Lowest Mg = 1.5 mg/dL in last 2 days, will replace as needed  # Anion Gap Metabolic Acidosis: Highest Anion Gap = 26 mmol/L in last 2 days, will monitor and treat as appropriate                    Disposition Plan         Expected Discharge Date: 2023                  Carol Masters MD  Hospitalist Service  [facility]  Securely message with [employer] (more info)  Text page via [employer] Paging/Directory     ______________________________________________________________________    Chief Complaint   weakness    History is obtained from the patient  Also from EMR, ER provider    History of Present Illness   Audelia Cmoer is a 57 year old female who has a PMH of repeated hospitalizatons, chr malnutrition, liver cirrhosis 2/2 alc abuse, recurrent and chr hyponatremia and electrolyte imbalances, bilateral mastectomy secondary to breast cancer chr pancreatitis with malabsorpton, tobacco abuse and bereavement syndrome.   She was admitted for acute pancreatitis  in 10/2021, 2021, 2022, and again in 10/2022. Most recently, she was admitted to [facility] from 22 through 23 and then at [facility] in 2023  She is being admitted from the ER where she is boarding for the above issues.  According to the patient she has been dealing with severe depression since her daughter  late last year and since then she has been hospitalized multiple times, she has not been very diligent with her medications or food she has lost more than 30 pounds.  She felt exceptionally weak today hence she decided to call the medics and come to the emergency room  On presentation she was found to be tachycardic with borderline hypotension without hypoxia or fever  In the ER she is oriented x2 does not know the exact date but knows it is [place].  She denies any abdominal pain which is new, denies any diarrhea which is new and she tells me she just does not have any appetite.  She is noted to be hyponatremic, hypokalemic, has significant elevation of ketones with anion gap, venous gas has not been undertaken hence I cannot comment on the acidosis.  She also is noted to be hypomagnesemic  and hypophosphatemic  Patient does acknowledge smoking a pack a day      Past Medical History    Past Medical History:   Diagnosis Date     Alcoholic liver disease (H)      Anxiety      C. difficile colitis 2023     Chronic pancreatitis (H)      Depressive disorder      Invasive ductal carcinoma of breast, right (H)      PONV (postoperative nausea and vomiting)      SBO (small bowel obstruction) (H)        Past Surgical History   Past Surgical History:   Procedure Laterality Date     APPENDECTOMY       APPENDECTOMY  1989      SECTION  1984     ENDOSCOPIC ULTRASOUND UPPER GASTROINTESTINAL TRACT (GI) N/A 2023    Procedure: ENDOSCOPIC ULTRASOUND, ESOPHAGOSCOPY / UPPER GASTROINTESTINAL TRACT (GI);  Surgeon: Lg Dickinson MD;  Location:  GI     ESOPHAGOSCOPY, GASTROSCOPY, DUODENOSCOPY (EGD), COMBINED N/A 2023    Procedure: ESOPHAGOGASTRODUODENOSCOPY (EGD);  Surgeon: Lg Dickinson MD;  Location:  GI     GYN SURGERY      c section      MASTECTOMY, BILATERAL Bilateral     With reconstructive surgery     SOFT TISSUE SURGERY      breast implants       Prior to Admission Medications   Prior to Admission Medications   Prescriptions Last Dose Informant Patient Reported? Taking?   QUEtiapine (SEROQUEL) 25 MG tablet  Self Yes No   Sig: Take 25-50 mg by mouth daily as needed (anxiety) In the afternoon   QUEtiapine (SEROQUEL) 25 MG tablet  Self Yes No   Sig: Take 100 mg by mouth At Bedtime   QUEtiapine (SEROQUEL) 25 MG tablet  Self No No   Sig: Take 1 tablet (25 mg) by mouth daily   Patient taking differently: Take 25 mg by mouth every morning   acetaminophen (TYLENOL) 325 MG tablet  Self No No   Sig: Take 2 tablets (650 mg) by mouth every 6 hours as needed for mild pain Do not take more than 2000 mg per day. Alternate with ibuprofen.   amylase-lipase-protease (CREON 36) [id number] units CPEP   No No   Sig: Take 1 capsule by mouth 3 times daily (with meals) DO NOT  CRUSH. To be swallowed as whole; Not to be crushed, chewed or retained in mouth. For patients who are unable to swallow intact capsule, the contents may be sprinkled on soft acidic food.   amylase-lipase-protease (CREON 36) [id number] units CPEP   No No   Sig: Take 1 capsule by mouth Take with snacks or supplements (with snacks)   azelastine (ASTELIN) 0.1 % nasal spray  Self Yes No   Sig: Spray 1 spray into both nostrils daily as needed    calcium carbonate (TUMS) 500 MG chewable tablet   No No   Sig: Take 1 tablet (500 mg) by mouth 3 times daily as needed for heartburn   cetirizine (ZYRTEC) 10 MG tablet  Self Yes No   Sig: Take 10 mg by mouth daily as needed    cholestyramine (QUESTRAN) 4 g packet   No No   Sig: Take 1 packet (4 g) by mouth daily   dicyclomine (BENTYL) 10 MG capsule   No No   Sig: Take 1 capsule (10 mg) by mouth 4 times daily   fluticasone (FLONASE) 50 MCG/ACT nasal spray  Self Yes No   Sig: Spray 1 spray into both nostrils daily as needed    folic acid (FOLVITE) 1 MG tablet   No No   Sig: Take 1 tablet (1 mg) by mouth daily   mirtazapine (REMERON) 15 MG tablet  Self Yes No   Sig: Take 30 mg by mouth At Bedtime   nicotine (NICODERM CQ) 21 MG/24HR 24 hr patch   No No   Sig: Place 1 patch onto the skin daily   ondansetron (ZOFRAN ODT) 4 MG ODT tab  Self No No   Sig: Take 1 tablet (4 mg) by mouth every 6 hours as needed for nausea   pantoprazole (PROTONIX) 40 MG EC tablet   No No   Sig: Take 1 tablet (40 mg) by mouth 2 times daily   thiamine (B-1) 100 MG tablet  Self No No   Sig: Take 1 tablet (100 mg) by mouth daily      Facility-Administered Medications: None        Review of Systems    The 10 point Review of Systems is negative other than noted in the HPI or here.     Social History   I have reviewed this patient's social history and updated it with pertinent information if needed.  Social History     Tobacco Use     Smoking status: Every Day     Packs/day: 1.00     Types: Cigarettes      Smokeless tobacco: Never   Vaping Use     Vaping Use: Never used   Substance Use Topics     Alcohol use: Yes     Comment: Vague about intake.     Drug use: No       Family History   I have reviewed this patient's family history and updated it with pertinent information if needed.  Family History   Problem Relation Age of Onset     Colon Cancer Maternal Grandmother      Lung Cancer Paternal Grandmother      Breast Cancer Paternal Grandmother         Physical Exam   Vital Signs: Temp: 97.6  F (36.4  C) Temp src: Oral BP: 106/84 Pulse: 112   Resp: 16 SpO2: 100 %      Weight: 85 lbs 12.8 oz    General Appearance: Alert awake oriented x2 cachectic appears older than stated age  Respiratory: Clear to auscultation  Cardiovascular: S1-S2 tachycardia  GI: Nontender bowel sounds are present voluntary guarding  Skin: No rash or lesions  Other: No edema    Medical Decision Making       75 MINUTES SPENT BY ME on the date of service doing chart review, history, exam, documentation & further activities per the note.      Data   ------------------------- PAST 24 HR DATA REVIEWED -----------------------------------------------    I have personally reviewed the following data over the past 24 hrs:    11.0  \   14.2   / 157     127 (L) 84 (L) 11.2 /  116 (H)   2.7 (L) 17 (L) 0.47 (L) \       ALT: 61 (H) AST: 238 (H) AP: 328 (H) TBILI: 0.9   ALB: 4.2 TOT PROTEIN: 8.4 (H) LIPASE: 37       Procal: N/A CRP: N/A Lactic Acid: 0.8         Imaging results reviewed over the past 24 hrs:   No results found for this or any previous visit (from the past 24 hour(s)).

## 2023-03-19 LAB
ALBUMIN SERPL BCG-MCNC: 3.6 G/DL (ref 3.5–5.2)
ALP SERPL-CCNC: 263 U/L (ref 35–104)
ALT SERPL W P-5'-P-CCNC: 45 U/L (ref 10–35)
ANION GAP SERPL CALCULATED.3IONS-SCNC: 13 MMOL/L (ref 7–15)
AST SERPL W P-5'-P-CCNC: 148 U/L (ref 10–35)
B-OH-BUTYR SERPL-SCNC: <0.2 MMOL/L
BILIRUB SERPL-MCNC: 0.6 MG/DL
BUN SERPL-MCNC: 5.3 MG/DL (ref 8–23)
C DIFF TOX B STL QL: NEGATIVE
CALCIUM SERPL-MCNC: 8.6 MG/DL (ref 8.6–10)
CHLORIDE SERPL-SCNC: 94 MMOL/L (ref 98–107)
CREAT SERPL-MCNC: 0.43 MG/DL (ref 0.51–0.95)
DEPRECATED HCO3 PLAS-SCNC: 25 MMOL/L (ref 22–29)
GFR SERPL CREATININE-BSD FRML MDRD: >90 ML/MIN/1.73M2
GLUCOSE SERPL-MCNC: 117 MG/DL (ref 70–99)
MAGNESIUM SERPL-MCNC: 1.8 MG/DL (ref 1.7–2.3)
PHOSPHATE SERPL-MCNC: 1.4 MG/DL (ref 2.5–4.5)
POTASSIUM SERPL-SCNC: 2.9 MMOL/L (ref 3.4–5.3)
POTASSIUM SERPL-SCNC: 3.8 MMOL/L (ref 3.4–5.3)
PROT SERPL-MCNC: 7 G/DL (ref 6.4–8.3)
SODIUM SERPL-SCNC: 132 MMOL/L (ref 136–145)

## 2023-03-19 PROCEDURE — 82010 KETONE BODYS QUAN: CPT | Performed by: INTERNAL MEDICINE

## 2023-03-19 PROCEDURE — 250N000013 HC RX MED GY IP 250 OP 250 PS 637: Performed by: INTERNAL MEDICINE

## 2023-03-19 PROCEDURE — 84132 ASSAY OF SERUM POTASSIUM: CPT | Performed by: STUDENT IN AN ORGANIZED HEALTH CARE EDUCATION/TRAINING PROGRAM

## 2023-03-19 PROCEDURE — 83735 ASSAY OF MAGNESIUM: CPT | Performed by: STUDENT IN AN ORGANIZED HEALTH CARE EDUCATION/TRAINING PROGRAM

## 2023-03-19 PROCEDURE — 250N000011 HC RX IP 250 OP 636: Performed by: INTERNAL MEDICINE

## 2023-03-19 PROCEDURE — 87493 C DIFF AMPLIFIED PROBE: CPT | Performed by: INTERNAL MEDICINE

## 2023-03-19 PROCEDURE — 120N000001 HC R&B MED SURG/OB

## 2023-03-19 PROCEDURE — 36415 COLL VENOUS BLD VENIPUNCTURE: CPT | Performed by: INTERNAL MEDICINE

## 2023-03-19 PROCEDURE — 84100 ASSAY OF PHOSPHORUS: CPT | Performed by: STUDENT IN AN ORGANIZED HEALTH CARE EDUCATION/TRAINING PROGRAM

## 2023-03-19 PROCEDURE — 80053 COMPREHEN METABOLIC PANEL: CPT | Performed by: INTERNAL MEDICINE

## 2023-03-19 PROCEDURE — 258N000001 HC RX 258: Performed by: INTERNAL MEDICINE

## 2023-03-19 PROCEDURE — 258N000003 HC RX IP 258 OP 636: Performed by: INTERNAL MEDICINE

## 2023-03-19 PROCEDURE — 250N000009 HC RX 250: Performed by: INTERNAL MEDICINE

## 2023-03-19 PROCEDURE — 36415 COLL VENOUS BLD VENIPUNCTURE: CPT | Performed by: STUDENT IN AN ORGANIZED HEALTH CARE EDUCATION/TRAINING PROGRAM

## 2023-03-19 PROCEDURE — 250N000013 HC RX MED GY IP 250 OP 250 PS 637: Performed by: STUDENT IN AN ORGANIZED HEALTH CARE EDUCATION/TRAINING PROGRAM

## 2023-03-19 PROCEDURE — 99232 SBSQ HOSP IP/OBS MODERATE 35: CPT | Performed by: STUDENT IN AN ORGANIZED HEALTH CARE EDUCATION/TRAINING PROGRAM

## 2023-03-19 RX ORDER — HYDROXYZINE HYDROCHLORIDE 50 MG/1
50 TABLET, FILM COATED ORAL EVERY 6 HOURS PRN
Status: DISCONTINUED | OUTPATIENT
Start: 2023-03-19 | End: 2023-03-21 | Stop reason: HOSPADM

## 2023-03-19 RX ORDER — HYDROXYZINE HYDROCHLORIDE 25 MG/1
25 TABLET, FILM COATED ORAL EVERY 6 HOURS PRN
Status: DISCONTINUED | OUTPATIENT
Start: 2023-03-19 | End: 2023-03-21 | Stop reason: HOSPADM

## 2023-03-19 RX ORDER — HYDROMORPHONE HYDROCHLORIDE 1 MG/ML
0.5 INJECTION, SOLUTION INTRAMUSCULAR; INTRAVENOUS; SUBCUTANEOUS
Status: DISCONTINUED | OUTPATIENT
Start: 2023-03-19 | End: 2023-03-19

## 2023-03-19 RX ORDER — IBUPROFEN 200 MG
200 TABLET ORAL EVERY 6 HOURS PRN
Status: DISCONTINUED | OUTPATIENT
Start: 2023-03-19 | End: 2023-03-21 | Stop reason: HOSPADM

## 2023-03-19 RX ADMIN — THIAMINE HCL TAB 100 MG 100 MG: 100 TAB at 08:04

## 2023-03-19 RX ADMIN — PANTOPRAZOLE SODIUM 40 MG: 40 TABLET, DELAYED RELEASE ORAL at 08:04

## 2023-03-19 RX ADMIN — ACETAMINOPHEN 650 MG: 325 TABLET ORAL at 17:20

## 2023-03-19 RX ADMIN — HYDROMORPHONE HYDROCHLORIDE 0.5 MG: 1 INJECTION, SOLUTION INTRAMUSCULAR; INTRAVENOUS; SUBCUTANEOUS at 00:24

## 2023-03-19 RX ADMIN — HYDROMORPHONE HYDROCHLORIDE 0.5 MG: 1 INJECTION, SOLUTION INTRAMUSCULAR; INTRAVENOUS; SUBCUTANEOUS at 07:58

## 2023-03-19 RX ADMIN — QUETIAPINE FUMARATE 25 MG: 25 TABLET ORAL at 13:27

## 2023-03-19 RX ADMIN — PANCRELIPASE 1 CAPSULE: 36000; 180000; 114000 CAPSULE, DELAYED RELEASE PELLETS ORAL at 11:46

## 2023-03-19 RX ADMIN — PANCRELIPASE 1 CAPSULE: 36000; 180000; 114000 CAPSULE, DELAYED RELEASE PELLETS ORAL at 17:19

## 2023-03-19 RX ADMIN — POTASSIUM & SODIUM PHOSPHATES POWDER PACK 280-160-250 MG 2 PACKET: 280-160-250 PACK at 15:25

## 2023-03-19 RX ADMIN — PANCRELIPASE 1 CAPSULE: 36000; 180000; 114000 CAPSULE, DELAYED RELEASE PELLETS ORAL at 08:12

## 2023-03-19 RX ADMIN — FOLIC ACID 1 MG: 1 TABLET ORAL at 08:04

## 2023-03-19 RX ADMIN — ACETAMINOPHEN 650 MG: 325 TABLET ORAL at 11:46

## 2023-03-19 RX ADMIN — POTASSIUM & SODIUM PHOSPHATES POWDER PACK 280-160-250 MG 2 PACKET: 280-160-250 PACK at 20:34

## 2023-03-19 RX ADMIN — SODIUM PHOSPHATE, MONOBASIC, MONOHYDRATE AND SODIUM PHOSPHATE, DIBASIC, ANHYDROUS 15 MMOL: 142; 276 INJECTION, SOLUTION INTRAVENOUS at 17:13

## 2023-03-19 RX ADMIN — ENOXAPARIN SODIUM 30 MG: 30 INJECTION SUBCUTANEOUS at 00:24

## 2023-03-19 RX ADMIN — ENOXAPARIN SODIUM 30 MG: 30 INJECTION SUBCUTANEOUS at 22:48

## 2023-03-19 RX ADMIN — QUETIAPINE FUMARATE 100 MG: 50 TABLET ORAL at 22:48

## 2023-03-19 RX ADMIN — POTASSIUM & SODIUM PHOSPHATES POWDER PACK 280-160-250 MG 2 PACKET: 280-160-250 PACK at 08:04

## 2023-03-19 RX ADMIN — HYDROXYZINE HYDROCHLORIDE 50 MG: 50 TABLET, FILM COATED ORAL at 17:19

## 2023-03-19 RX ADMIN — QUETIAPINE FUMARATE 25 MG: 25 TABLET ORAL at 08:04

## 2023-03-19 RX ADMIN — PANTOPRAZOLE SODIUM 40 MG: 40 TABLET, DELAYED RELEASE ORAL at 20:34

## 2023-03-19 RX ADMIN — MIRTAZAPINE 30 MG: 15 TABLET, FILM COATED ORAL at 22:48

## 2023-03-19 RX ADMIN — NICOTINE 1 PATCH: 21 PATCH, EXTENDED RELEASE TRANSDERMAL at 08:10

## 2023-03-19 RX ADMIN — MAGNESIUM SULFATE HEPTAHYDRATE 4 G: 4 INJECTION, SOLUTION INTRAVENOUS at 00:37

## 2023-03-19 RX ADMIN — SODIUM BICARBONATE: 84 INJECTION, SOLUTION INTRAVENOUS at 10:59

## 2023-03-19 RX ADMIN — ACETAMINOPHEN 650 MG: 325 TABLET ORAL at 22:47

## 2023-03-19 RX ADMIN — POTASSIUM & SODIUM PHOSPHATES POWDER PACK 280-160-250 MG 2 PACKET: 280-160-250 PACK at 11:47

## 2023-03-19 RX ADMIN — HYDROXYZINE HYDROCHLORIDE 50 MG: 50 TABLET, FILM COATED ORAL at 22:48

## 2023-03-19 ASSESSMENT — ACTIVITIES OF DAILY LIVING (ADL)
ADLS_ACUITY_SCORE: 42
ADLS_ACUITY_SCORE: 41
ADLS_ACUITY_SCORE: 42
ADLS_ACUITY_SCORE: 41
ADLS_ACUITY_SCORE: 42
ADLS_ACUITY_SCORE: 42
ADLS_ACUITY_SCORE: 41

## 2023-03-19 NOTE — PLAN OF CARE
IN PATIENT NOTE: 1900-2300  Primary Problem: Metabolic Acidosis/Generalized Weakness    /81 (BP Location: Left arm)   Pulse 100   Temp 98.2  F (36.8  C) (Oral)   Resp 18   Wt 40.1 kg (88 lb 4.8 oz)   SpO2 99%   BMI 14.25 kg/m       Pt A & O X 4. On regular diet. Standby assist of one with cares and ambulation to bathroom. On Potassium and Magnesium replacement. IVF Dextrose 5 % and 0.45 % Nacl 1000 ml with Sodium Bicarbonate 50 mEq/L infusion at 75 ml/hr continuous. She was given boxed meal for dinner. She reported no pain when admitted to floor but later verbalized generalized body pains rated at 8/10. She requested Dilaudid. Web page sent to On call cross cover for request. She reports discomfort with IV Potassium chloride infusion. Rate decreased to 75 ml/hr. Limb Alert Right Arm, had lymph nodes removed. Plan: For Care Management/SW/Nutrition and Palliative care consults. Will continue to provide supportive care.

## 2023-03-19 NOTE — PLAN OF CARE
"Care from [id number]    Inpatient Progress Note:  For complete assessment see flow sheet documentation.    Vital signs:  Temp: 97.9  F (36.6  C) Temp src: Oral BP: 116/85 Pulse: 106   Resp: 18 SpO2: 100 % O2 Device: None (Room air)     Weight: 40.1 kg (88 lb 4.8 oz)  Estimated body mass index is 14.25 kg/m  as calculated from the following:    Height as of 1/26/23: 1.676 m (5' 6\").    Weight as of this encounter: 40.1 kg (88 lb 4.8 oz).    Orientation: A&O x 4  Neuro: WNL  Pain status: Complains of all over body pain. PRN Tylenol and Dilaudid given.    Activity: SBA   Resp: WNL  Cardiac: WNL  GI: 3 loose/watery stools since midnight. Some incontinence with each. Complains of abd pain.   :  WNL  LDA: PIV  Infusions: D5 with NS 75 ml/hr  Pertinent Labs: RN Potassium protocol. Finished replacing both mag and potassium during shift. Potassium recheck at 0600.   Diet: Regular  Consults: Palliative to see to discuss goals of care. Nutriton to see-severe malnutrition, and CC/SW   Discharge Plan: TBD    Will continue to monitor and provide cares.     Jackie Eckert RN   "

## 2023-03-19 NOTE — ED PROVIDER NOTES
History     Chief Complaint:  Weight Loss and Generalized Weakness       HPI   Audelia Comer is a 57 year old female with a history of chronic pancreatitis, c-diff, alcoholism who presents with generalized weakness, multiple falls, weight loss.  Patient reports that she feels as though she is just withering away.  She has been losing weight consistently over the last several months and weighed herself today and found herself to be 85 pounds.  Patient reports inability to eat or drink much due to nausea and abdominal pain.  She reports feeling very depressed after her daughter  last September.  She has been admitted several times for similar symptoms since then.      Independent Historian:   None - Patient Only    Review of External Notes: Reviewed last several notes including attempts at primary care out reach following last admission and discharge.    ROS:  Review of Systems  Positive for generalized weakness, abdominal pain, nausea/vomiting, depression  Negative for fever/chills.    Allergies:  No Known Allergies     Medications:    No current outpatient medications on file.      Past Medical History:    Past Medical History:   Diagnosis Date     Alcoholic liver disease (H)      Anxiety      C. difficile colitis 2023     Chronic pancreatitis (H)      Depressive disorder      Invasive ductal carcinoma of breast, right (H)      PONV (postoperative nausea and vomiting)      SBO (small bowel obstruction) (H)        Past Surgical History:    Past Surgical History:   Procedure Laterality Date     APPENDECTOMY       APPENDECTOMY  1989      SECTION  1984     ENDOSCOPIC ULTRASOUND UPPER GASTROINTESTINAL TRACT (GI) N/A 2023    Procedure: ENDOSCOPIC ULTRASOUND, ESOPHAGOSCOPY / UPPER GASTROINTESTINAL TRACT (GI);  Surgeon: Lg Dickinson MD;  Location:  GI     ESOPHAGOSCOPY, GASTROSCOPY, DUODENOSCOPY (EGD), COMBINED N/A 2023    Procedure: ESOPHAGOGASTRODUODENOSCOPY (EGD);   Surgeon: Lg Dickinson MD;  Location:  GI     GYN SURGERY      c section      MASTECTOMY, BILATERAL Bilateral     With reconstructive surgery     SOFT TISSUE SURGERY      breast implants        Family History:    family history includes Breast Cancer in her paternal grandmother; Colon Cancer in her maternal grandmother; Lung Cancer in her paternal grandmother.    Social History:   reports that she has been smoking cigarettes. She has been smoking an average of 1 pack per day. She has never used smokeless tobacco. She reports current alcohol use. She reports that she does not use drugs.  PCP: Randee Woods     Physical Exam     Patient Vitals for the past 24 hrs:   BP Temp Temp src Pulse Resp SpO2 Weight   03/18/23 2300 114/78 98  F (36.7  C) Oral 109 16 100 % --   03/18/23 1902 114/81 98.2  F (36.8  C) Oral 100 18 99 % 40.1 kg (88 lb 4.8 oz)   03/18/23 1830 104/83 -- -- 106 17 -- --   03/18/23 1814 113/83 -- -- 105 16 -- --   03/18/23 1759 116/89 -- -- 112 15 -- --   03/18/23 1748 -- -- -- -- -- 94 % --   03/18/23 1514 106/84 -- -- 112 16 100 % --   03/18/23 1443 -- -- -- -- -- -- 38.9 kg (85 lb 12.8 oz)   03/18/23 1420 122/83 -- -- 120 -- 100 % --   03/18/23 1419 -- 97.6  F (36.4  C) Oral 118 16 -- --   03/18/23 1417 122/83 -- -- 120 -- 100 % --        Physical Exam  Nursing note and vitals reviewed.  HENT:   Mouth/Throat: Moist mucous membranes.   Eyes: EOMI, nonicteric sclera  Cardiovascular: Tachycardic, regular rhythm, no murmurs, rubs, or gallops  Pulmonary/Chest: Effort normal and breath sounds normal. No respiratory distress. No wheezes. No rales.   Abdominal: Soft.  Left upper quadrant abdominal pain, nondistended, no guarding or rigidity.   Musculoskeletal: Normal range of motion.   Neurological: Alert. Moves all extremities spontaneously.   Skin: Skin is warm and dry. No rash noted.   Psychiatric: Depressed mood with congruent affect    Emergency Department Course     Laboratory:  Labs Ordered  and Resulted from Time of ED Arrival to Time of ED Departure   COMPREHENSIVE METABOLIC PANEL - Abnormal       Result Value    Sodium 127 (*)     Potassium 2.7 (*)     Chloride 84 (*)     Carbon Dioxide (CO2) 17 (*)     Anion Gap 26 (*)     Urea Nitrogen 11.2      Creatinine 0.47 (*)     Calcium 9.8      Glucose 116 (*)     Alkaline Phosphatase 328 (*)      (*)     ALT 61 (*)     Protein Total 8.4 (*)     Albumin 4.2      Bilirubin Total 0.9      GFR Estimate >90     CBC WITH PLATELETS AND DIFFERENTIAL - Abnormal    WBC Count 11.0      RBC Count 4.47      Hemoglobin 14.2      Hematocrit 41.1      MCV 92      MCH 31.8      MCHC 34.5      RDW 14.2      Platelet Count 157      % Neutrophils 76      % Lymphocytes 15      % Monocytes 8      % Eosinophils 0      % Basophils 1      % Immature Granulocytes 0      NRBCs per 100 WBC 0      Absolute Neutrophils 8.4 (*)     Absolute Lymphocytes 1.7      Absolute Monocytes 0.9      Absolute Eosinophils 0.0      Absolute Basophils 0.1      Absolute Immature Granulocytes 0.0      Absolute NRBCs 0.0     MAGNESIUM - Abnormal    Magnesium 1.5 (*)    KETONE BETA-HYDROXYBUTYRATE QUANTITATIVE, RAPID - Abnormal    Ketone (Beta-Hydroxybutyrate) Quantitative 5.90 (*)    PHOSPHORUS - Abnormal    Phosphorus 1.8 (*)    LIPASE - Normal    Lipase 37     LACTIC ACID WHOLE BLOOD - Normal    Lactic Acid 0.8     ETHYL ALCOHOL LEVEL - Normal    Alcohol ethyl <0.01              Emergency Department Course & Assessments:       Interventions:  Medications   potassium chloride 10 mEq in 100 mL sterile water infusion (10 mEq Intravenous $New Bag 3/18/23 1735)   lactated ringers BOLUS 500 mL (500 mLs Intravenous $New Bag 3/18/23 1720)   HYDROmorphone (PF) (DILAUDID) injection 0.5 mg (0.5 mg Intravenous $Given 3/18/23 1721)   potassium chloride (KAYCIEL) solution 40 mEq (20 mEq Oral $Given 3/18/23 1721)          Independent Interpretation (X-rays, CTs, rhythm strip):  None    Social Determinants of  Health affecting care:   Healthcare Access/Compliance, Stress/Adjustment Disorders and Social Connections/Isolation    Disposition:  The patient was admitted to the hospital under the care of Dr. Masters.     Impression & Plan        Medical Decision Making:  Patient presents with generalized weakness and significant weight loss.  This is most likely secondary to chronic pancreatitis and depression.  Electrolytes with significant derangements that we started to correct in the emergency department including hyponatremia and hypokalemia.  Patient also found to be mildly acidotic most likely due to starvation ketosis.  Admission indicated for further evaluation and treatment and I spoke with Dr. Masters who accepts.  Patient in agreement with the plan.  She is admitted in stable condition.    Diagnosis:    ICD-10-CM    1. Failure to thrive in adult  R62.7       2. Hyponatremia  E87.1       3. Hypochloremia  E87.8       4. Hypokalemia  E87.6       5. Metabolic acidosis  E87.20       6. Ketosis (H)  E88.89               Josh Borrego MD  03/19/23 0371

## 2023-03-19 NOTE — PROVIDER NOTIFICATION
Sent page to hospitalist regarding below:  Pt requesting further Dilaudid doses for all over pain. 8/10. Gave her prn Tylenol. Please advise if she can have any other PRNs prescribed for her pain. Thanks.

## 2023-03-19 NOTE — PLAN OF CARE
ROOM # 226    Living Situation (if not independent, order SW consult): Home  Facility name:  : Verenice, friend.    Activity level at baseline: Independent.  Activity level on admit: Standby assist of one.     Who will be transporting you at discharge: Verenice, janina.     Patient registered to observation; given Patient Bill of Rights; given the opportunity to ask questions about observation status and their plan of care. Patient has been oriented to the observation room, bathroom and call light is in place-Yes.    Discussed discharge goals and expectations with patient/family-Yes.

## 2023-03-19 NOTE — PLAN OF CARE
Inpatient Progress Note: [id number]    Primary Problem: Metabolic Acidosis       Vitals: /76 (BP Location: Right arm)   Pulse 104   Temp 98.3  F (36.8  C) (Oral)   Resp 20   Wt 40.1 kg (88 lb 4.8 oz)   SpO2 99%   BMI 14.25 kg/m      Neuro: A&O x4  Card:WDL  Resp:WDL  GI: Pt had one BM this shift  : WDL  Skin: Clean, dry and intact   Activity: Up with one assist   Diet: Regular diet, pt is encouraged to eat small  Frequent meals  PIV: SL  Pain: 7/10 Pt received IV dilaudid and tylenol and  Atarax.  Plan: Replace electrolyte. Stool sample collected . Consult Nutrition, Palliative and CM.     Goal Outcome Evaluation:

## 2023-03-19 NOTE — UTILIZATION REVIEW
Admission Status; Secondary Review Determination    Under the authority of the Utilization Management Committee, the utilization review process indicated a secondary review on the above patient. The review outcome is based on review of the medical records, discussions with staff, and applying clinical experience noted on the date of the review.    (x) Inpatient Status Appropriate - This patient's medical care is consistent with medical management for inpatient care and reasonable inpatient medical practice.    RATIONALE FOR DETERMINATION: 57-year-old female with history of multiple hospitalizations related to chronic malnutrition, history of cirrhosis of the liver secondary to alcohol abuse, recurrent hyponatremia and electrolyte imbalances, chronic pancreatitis with malabsorption, ongoing tobacco abuse and history of breast cancer.  Patient presents to hospital due to persistent progressive weight loss down to 85 pounds with a BMI of less than 15 with inability to eat or drink due to recurrent nausea and abdominal pain.  Patient thus requiring acute hospitalization with electrolyte repletion, careful administration of IV fluids as well as introduction of nutritional calories while monitoring for refeeding syndrome due to patient's high risk secondary to poor chronic oral intake and extremely low BMI.    At the time of admission with the information available to the attending physician more than 2 nights Hospital complex care was anticipated, based on patient risk of adverse outcome if treated as outpatient and complex care required. Inpatient admission is appropriate based on the Medicare guidelines.    This document was produced using voice recognition software    The information on this document is developed by the utilization review team in order for the business office to ensure compliance. This only denotes the appropriateness of proper admission status and does not reflect the quality of care  rendered.    The definitions of Inpatient Status and Observation Status used in making the determination above are those provided in the CMS Coverage Manual, Chapter 1 and Chapter 6, section 70.4.    Sincerely,    Marcello Fitzgerald MD  Utilization Review  Physician Advisor  [facility].

## 2023-03-19 NOTE — PROGRESS NOTES
[facility]  Medicine Progress Note - Hospitalist Service  Date of Admission:  3/18/2023    Assessment & Plan   Ms. Audelia Comer is a 57 year old female with a history of repeated hospitalizations, malnutrition, liver cirrhosis 2/2 alcohol abuse, recurrent hyponatremia and electrolyte imbalance, bilateral mastectomy secondary to breast cancer, pancreatitis with malabsorpton, tobacco abuse bereavement syndrome, in addition to mood disorders  admitted on 3/18/2023 with ketosis, likely related to starvation and failure to thrive. Pending numerous consults, assistance with GOC and dispo planning in addition to clinical improvement.      Ketosis likely related to starvation, resolved  Anion Gap Metabolic Acidosis: Highest Anion Gap = 26 mmol/L , resolved   At risk for refeeding   Poor PO intake   - discontinue D5 half normal saline with amp of bicarb given the anion gap has resolved   - encourage PO intake   - repeat CMP in AM   - replacement protocols as below   - close monitoring of electrolytes   - nutrition consult pending   - palliative consult pending     Hypokalemia: Lowest K = 2.7 mmol/L in last 2 days, will replace as needed  Hyponatremia: Lowest Na = 127 mmol/L in last 2 days, will monitor as   Hypomagnesemia: Lowest Mg = 1.5 mg/dL in last 2 days  - will replace as needed  - protocols in place     Elevated LFTs, improving   - CMP QAM     Tobacco abuse   - NicoDerm patch PRN     Mood disturbance    MDD  Bereavement   Overall severe depression related to bereavement understandable however I think it is prudent and appropriate for patient to be evaluated by palliative care given the rather poor state of health and patient's poor insight into longtime health.  - psych consult pending             Diet: Regular Diet Adult  Snacks/Supplements Adult: Ensure Enlive; Between Meals    DVT Prophylaxis: Enoxaparin (Lovenox) SQ  Patino Catheter: Not present  Lines: None     Cardiac Monitoring:  None  Code Status: Full Code      Clinically Significant Risk Factors Present on Admission           Disposition Plan     Expected Discharge Date: 03/20/2023                  Gloria Myers DO  Hospitalist Service  [facility]  Securely message with [employer] (more info)  Text page via [employer] Paging/Directory   ______________________________________________________________________    Interval History   Patient is feeling improved   Still quite concerned about her ongoing issues and recurrent hospitalizations   She reports no new symptoms   Denies any chest pain, pressure or shortness of breath   She reports no other new concerns or complaints today     Physical Exam   Vital Signs: Temp: 98.3  F (36.8  C) Temp src: Oral BP: 110/76 Pulse: 104   Resp: 20 SpO2: 99 % O2 Device: None (Room air)    Weight: 88 lbs 4.8 oz    Constitutional: awake, alert, cooperative, no apparent distress, and appears stated age   Cachetic appearing female   HEENT: Normocephalic, atraumatic  Respiratory: Normal work of breathing, good air exchange, clear to auscultation bilaterally, no crackles or wheezing noted   Cardiovascular: Regular rate and rhythm, no murmurs appreciated  GI: Soft and nontender to palpation, nondistended, no rebound or guarding  Skin: normal skin color, texture, turgor  Musculoskeletal: No deformities, no edema present  Neurologic: Alert and oriented, no focal deficits   Neuropsychiatric: General: normal, calm and normal eye contact     Data   ------------------------- PAST 24 HR DATA REVIEWED -----------------------------------------------    I have personally reviewed the following data over the past 24 hrs:    11.0  \   14.2   / 157     132 (L) 94 (L) 5.3 (L) /  117 (H)   2.9 (L) 25 0.43 (L) \       ALT: 45 (H) AST: 148 (H) AP: 263 (H) TBILI: 0.6   ALB: 3.6 TOT PROTEIN: 7.0 LIPASE: 37       Procal: N/A CRP: N/A Lactic Acid: 0.8         Imaging results reviewed over the past 24 hrs:   No results  found for this or any previous visit (from the past 24 hour(s)).

## 2023-03-19 NOTE — CONSULTS
Care Management Initial Consult    General Information  Assessment completed with: Patient,    Type of CM/SW Visit: Initial Assessment    Primary Care Provider verified and updated as needed: Yes   Readmission within the last 30 days:        Reason for Consult: discharge planning  Advance Care Planning:            Communication Assessment  Patient's communication style: spoken language (English or Bilingual)    Hearing Difficulty or Deaf: no   Wear Glasses or Blind: yes    Cognitive  Cognitive/Neuro/Behavioral: WDL                      Living Environment:   People in home: alone     Current living Arrangements: apartment      Able to return to prior arrangements:         Family/Social Support:  Care provided by: self  Provides care for: no one, unable/limited ability to care for self  Marital Status:   None          Description of Support System: Uninvolved    Support Assessment: Adequate family and caregiver support    Current Resources:   Patient receiving home care services: No     Community Resources: None  Equipment currently used at home: walker, rolling  Supplies currently used at home:      Employment/Financial:  Employment Status: disabled        Financial Concerns:  Pt is behind on rent and is unemployed with no income.     Lifestyle & Psychosocial Needs:  Social Determinants of Health     Tobacco Use: High Risk     Smoking Tobacco Use: Every Day     Smokeless Tobacco Use: Never     Passive Exposure: Not on file   Alcohol Use: Not on file   Financial Resource Strain: Not on file   Food Insecurity: Not on file   Transportation Needs: Not on file   Physical Activity: Not on file   Stress: Not on file   Social Connections: Not on file   Intimate Partner Violence: Not on file   Depression: Not on file   Housing Stability: Not on file       Additional Information:  SW met with pt at bedside. Pt stated she lives alone in an apartment and has lived there for almost five years.Pt stated she has no support  outside of her friend. She loss her daughter in September of 2021 after having breast cancer surgery in August of 2021. Pt states things have been difficult and she has a son but he is uninvolved and is still mourning the loss of his sister.    Pt stated she has rapidly been losing weight over the last number of months and is nervous. She is no longer able to care for herself and is behind in rent ($4100) due to not being able to work. She applied for ssi and is waiting to hear back. Pt stated she is unable to stay on top of her laundry, house keeping and is afraid to take a shower because she doesn't want to fall. Pt reported falling a few times and not being able to get up from the ground.     DENNIS completed. SW will be following.    RANDAL Jimenes, [facility]   Care Management

## 2023-03-19 NOTE — PROGRESS NOTES
CLINICAL NUTRITION SERVICES    Noted pt now inpatient status. RD unable to complete assessment d/t RD off site on Sundays.  Will follow up when on site as able.

## 2023-03-19 NOTE — PROVIDER NOTIFICATION
Sent page to cross cover regarding below:  Requesting a Cdiff test order. 3 watery/loose stools since midnight. Abd pain. Thanks.

## 2023-03-20 ENCOUNTER — APPOINTMENT (OUTPATIENT)
Dept: PHYSICAL THERAPY | Facility: CLINIC | Age: 58
End: 2023-03-20
Attending: STUDENT IN AN ORGANIZED HEALTH CARE EDUCATION/TRAINING PROGRAM
Payer: COMMERCIAL

## 2023-03-20 LAB
ALBUMIN SERPL BCG-MCNC: 3.1 G/DL (ref 3.5–5.2)
ALP SERPL-CCNC: 221 U/L (ref 35–104)
ALT SERPL W P-5'-P-CCNC: 40 U/L (ref 10–35)
ANION GAP SERPL CALCULATED.3IONS-SCNC: 12 MMOL/L (ref 7–15)
AST SERPL W P-5'-P-CCNC: 120 U/L (ref 10–35)
ATRIAL RATE - MUSE: 121 BPM
BILIRUB SERPL-MCNC: 0.3 MG/DL
BUN SERPL-MCNC: 4.3 MG/DL (ref 8–23)
CALCIUM SERPL-MCNC: 8.5 MG/DL (ref 8.6–10)
CHLORIDE SERPL-SCNC: 100 MMOL/L (ref 98–107)
CREAT SERPL-MCNC: 0.35 MG/DL (ref 0.51–0.95)
DEPRECATED HCO3 PLAS-SCNC: 26 MMOL/L (ref 22–29)
DIASTOLIC BLOOD PRESSURE - MUSE: NORMAL MMHG
FLUAV RNA SPEC QL NAA+PROBE: NEGATIVE
FLUBV RNA RESP QL NAA+PROBE: NEGATIVE
GFR SERPL CREATININE-BSD FRML MDRD: >90 ML/MIN/1.73M2
GLUCOSE SERPL-MCNC: 103 MG/DL (ref 70–99)
HOLD SPECIMEN: NORMAL
INTERPRETATION ECG - MUSE: NORMAL
MAGNESIUM SERPL-MCNC: 1.5 MG/DL (ref 1.7–2.3)
P AXIS - MUSE: 75 DEGREES
PHOSPHATE SERPL-MCNC: 2.3 MG/DL (ref 2.5–4.5)
POTASSIUM SERPL-SCNC: 3.4 MMOL/L (ref 3.4–5.3)
PR INTERVAL - MUSE: 134 MS
PROT SERPL-MCNC: 6 G/DL (ref 6.4–8.3)
QRS DURATION - MUSE: 78 MS
QT - MUSE: 336 MS
QTC - MUSE: 477 MS
R AXIS - MUSE: 85 DEGREES
RSV RNA SPEC NAA+PROBE: NEGATIVE
SARS-COV-2 RNA RESP QL NAA+PROBE: POSITIVE
SODIUM SERPL-SCNC: 138 MMOL/L (ref 136–145)
SYSTOLIC BLOOD PRESSURE - MUSE: NORMAL MMHG
T AXIS - MUSE: 74 DEGREES
VENTRICULAR RATE- MUSE: 121 BPM

## 2023-03-20 PROCEDURE — 80053 COMPREHEN METABOLIC PANEL: CPT | Performed by: STUDENT IN AN ORGANIZED HEALTH CARE EDUCATION/TRAINING PROGRAM

## 2023-03-20 PROCEDURE — 250N000011 HC RX IP 250 OP 636: Performed by: STUDENT IN AN ORGANIZED HEALTH CARE EDUCATION/TRAINING PROGRAM

## 2023-03-20 PROCEDURE — 250N000013 HC RX MED GY IP 250 OP 250 PS 637: Performed by: STUDENT IN AN ORGANIZED HEALTH CARE EDUCATION/TRAINING PROGRAM

## 2023-03-20 PROCEDURE — 120N000001 HC R&B MED SURG/OB

## 2023-03-20 PROCEDURE — 97530 THERAPEUTIC ACTIVITIES: CPT | Mod: GP | Performed by: PHYSICAL THERAPIST

## 2023-03-20 PROCEDURE — 84100 ASSAY OF PHOSPHORUS: CPT | Performed by: STUDENT IN AN ORGANIZED HEALTH CARE EDUCATION/TRAINING PROGRAM

## 2023-03-20 PROCEDURE — 250N000011 HC RX IP 250 OP 636: Performed by: INTERNAL MEDICINE

## 2023-03-20 PROCEDURE — 97161 PT EVAL LOW COMPLEX 20 MIN: CPT | Mod: GP | Performed by: PHYSICAL THERAPIST

## 2023-03-20 PROCEDURE — 250N000013 HC RX MED GY IP 250 OP 250 PS 637: Performed by: INTERNAL MEDICINE

## 2023-03-20 PROCEDURE — 83735 ASSAY OF MAGNESIUM: CPT | Performed by: STUDENT IN AN ORGANIZED HEALTH CARE EDUCATION/TRAINING PROGRAM

## 2023-03-20 PROCEDURE — 99232 SBSQ HOSP IP/OBS MODERATE 35: CPT | Performed by: STUDENT IN AN ORGANIZED HEALTH CARE EDUCATION/TRAINING PROGRAM

## 2023-03-20 PROCEDURE — 87637 SARSCOV2&INF A&B&RSV AMP PRB: CPT | Performed by: STUDENT IN AN ORGANIZED HEALTH CARE EDUCATION/TRAINING PROGRAM

## 2023-03-20 PROCEDURE — 36415 COLL VENOUS BLD VENIPUNCTURE: CPT | Performed by: STUDENT IN AN ORGANIZED HEALTH CARE EDUCATION/TRAINING PROGRAM

## 2023-03-20 RX ORDER — FLUTICASONE PROPIONATE 50 MCG
1 SPRAY, SUSPENSION (ML) NASAL DAILY PRN
Status: DISCONTINUED | OUTPATIENT
Start: 2023-03-20 | End: 2023-03-21 | Stop reason: HOSPADM

## 2023-03-20 RX ORDER — LANOLIN ALCOHOL/MO/W.PET/CERES
3 CREAM (GRAM) TOPICAL AT BEDTIME
Status: DISCONTINUED | OUTPATIENT
Start: 2023-03-20 | End: 2023-03-21 | Stop reason: HOSPADM

## 2023-03-20 RX ORDER — LOPERAMIDE HCL 2 MG
2 CAPSULE ORAL 3 TIMES DAILY PRN
Status: DISCONTINUED | OUTPATIENT
Start: 2023-03-20 | End: 2023-03-21 | Stop reason: HOSPADM

## 2023-03-20 RX ORDER — MAGNESIUM SULFATE HEPTAHYDRATE 40 MG/ML
2 INJECTION, SOLUTION INTRAVENOUS ONCE
Status: COMPLETED | OUTPATIENT
Start: 2023-03-20 | End: 2023-03-20

## 2023-03-20 RX ADMIN — PANTOPRAZOLE SODIUM 40 MG: 40 TABLET, DELAYED RELEASE ORAL at 08:48

## 2023-03-20 RX ADMIN — FOLIC ACID 1 MG: 1 TABLET ORAL at 08:48

## 2023-03-20 RX ADMIN — THIAMINE HCL TAB 100 MG 100 MG: 100 TAB at 08:49

## 2023-03-20 RX ADMIN — POTASSIUM & SODIUM PHOSPHATES POWDER PACK 280-160-250 MG 2 PACKET: 280-160-250 PACK at 20:03

## 2023-03-20 RX ADMIN — QUETIAPINE FUMARATE 25 MG: 25 TABLET ORAL at 14:27

## 2023-03-20 RX ADMIN — ACETAMINOPHEN 650 MG: 325 TABLET ORAL at 20:24

## 2023-03-20 RX ADMIN — POTASSIUM & SODIUM PHOSPHATES POWDER PACK 280-160-250 MG 2 PACKET: 280-160-250 PACK at 12:34

## 2023-03-20 RX ADMIN — ENOXAPARIN SODIUM 30 MG: 30 INJECTION SUBCUTANEOUS at 23:32

## 2023-03-20 RX ADMIN — MAGNESIUM SULFATE HEPTAHYDRATE 2 G: 2 INJECTION, SOLUTION INTRAVENOUS at 20:13

## 2023-03-20 RX ADMIN — PANCRELIPASE 1 CAPSULE: 36000; 180000; 114000 CAPSULE, DELAYED RELEASE PELLETS ORAL at 08:48

## 2023-03-20 RX ADMIN — MIRTAZAPINE 30 MG: 15 TABLET, FILM COATED ORAL at 21:22

## 2023-03-20 RX ADMIN — QUETIAPINE FUMARATE 25 MG: 25 TABLET ORAL at 08:48

## 2023-03-20 RX ADMIN — PANCRELIPASE 1 CAPSULE: 36000; 180000; 114000 CAPSULE, DELAYED RELEASE PELLETS ORAL at 17:36

## 2023-03-20 RX ADMIN — HYDROXYZINE HYDROCHLORIDE 25 MG: 25 TABLET ORAL at 23:32

## 2023-03-20 RX ADMIN — FLUTICASONE PROPIONATE 1 SPRAY: 50 SPRAY, METERED NASAL at 23:32

## 2023-03-20 RX ADMIN — LOPERAMIDE HYDROCHLORIDE 2 MG: 2 CAPSULE ORAL at 23:31

## 2023-03-20 RX ADMIN — PANCRELIPASE 1 CAPSULE: 36000; 180000; 114000 CAPSULE, DELAYED RELEASE PELLETS ORAL at 12:34

## 2023-03-20 RX ADMIN — HYDROXYZINE HYDROCHLORIDE 50 MG: 50 TABLET, FILM COATED ORAL at 17:43

## 2023-03-20 RX ADMIN — Medication 3 MG: at 21:22

## 2023-03-20 RX ADMIN — QUETIAPINE FUMARATE 100 MG: 50 TABLET ORAL at 21:22

## 2023-03-20 RX ADMIN — POTASSIUM & SODIUM PHOSPHATES POWDER PACK 280-160-250 MG 2 PACKET: 280-160-250 PACK at 17:36

## 2023-03-20 RX ADMIN — NICOTINE 1 PATCH: 21 PATCH, EXTENDED RELEASE TRANSDERMAL at 08:49

## 2023-03-20 RX ADMIN — HYDROXYZINE HYDROCHLORIDE 50 MG: 50 TABLET, FILM COATED ORAL at 11:07

## 2023-03-20 RX ADMIN — POTASSIUM & SODIUM PHOSPHATES POWDER PACK 280-160-250 MG 2 PACKET: 280-160-250 PACK at 08:49

## 2023-03-20 RX ADMIN — PANTOPRAZOLE SODIUM 40 MG: 40 TABLET, DELAYED RELEASE ORAL at 20:03

## 2023-03-20 ASSESSMENT — ACTIVITIES OF DAILY LIVING (ADL)
ADLS_ACUITY_SCORE: 42
ADLS_ACUITY_SCORE: 40
ADLS_ACUITY_SCORE: 42
ADLS_ACUITY_SCORE: 41
ADLS_ACUITY_SCORE: 42

## 2023-03-20 NOTE — DISCHARGE INSTRUCTIONS
The [facility] Housing Crisis Line can be contacted at [phone] for housing and eviction resources.      Your home care referral was sent to [facility]  If you haven't heard from them within the next 24-48 hours,  Please call them at [phone].

## 2023-03-20 NOTE — PROGRESS NOTES
"Vitals: /73 (BP Location: Right arm)   Pulse 109   Temp 98  F (36.7  C) (Oral)   Resp 18   Ht 1.676 m (5' 6\")   Wt 40.1 kg (88 lb 4.8 oz)   SpO2 98%   BMI 14.25 kg/m       Neuro: alert and oriented x4. Able to make needs known. Sad and tearful at times.   Cardiac: wdl. Placed on tele this afternoon running ST/HR 100s  Lungs: wdl ex for infrequent cough. Denies SOB, chest pain. Sat stable in RA.  GI: wdl ex for loose stools. Pt reported x2 loose stools today.   : wdl  Pain: generalized body aches. Atarax given.   IV: saline locked  Labs/Imaging: K+ 3.4. Mg 1.5. Phos 2.3. Phos 2.3. Covid Positive 3/20/2023  Diet: regular diet. Poor appetite.   Activity: stand by assist  Consult: PT, SW, Nutrition, Psych, Palliative   Plan: PT recommended home with HHPT/ OT/ HHA. SW have sent referrals out. Psych and palliative consulted. Hasn't been seen by them yet. Continue monitoring on tele. Electrolyte monitoring and replacement as needed. Pain control.     Will continue to monitor and provide supportive care.      "

## 2023-03-20 NOTE — PROGRESS NOTES
Pt swabbed for Covid, Influenza A&B, and RSV due to symptoms of body aches, stuffy nose, plugged ears, infrequent cough, and not able to sleep. Covid swab came back POSITIVE. Provider notified. Will continue to monitor and manage symptoms.

## 2023-03-20 NOTE — PLAN OF CARE
"Care from [id number]    Inpatient Progress Note:  For complete assessment see flow sheet documentation.    Vital signs:  Temp: 98  F (36.7  C) Temp src: Oral BP: 108/70 Pulse: 103   Resp: 16 SpO2: 98 % O2 Device: None (Room air)     Weight: 40.1 kg (88 lb 4.8 oz)  Estimated body mass index is 14.25 kg/m  as calculated from the following:    Height as of 1/26/23: 1.676 m (5' 6\").    Weight as of this encounter: 40.1 kg (88 lb 4.8 oz).    Orientation: A&O x 4  Neuro: WNL  Pain status: Complains of all over body pain. Mainly abd and back. PRN Tylenol and Atarax given.  Per patient, not really effective.   Activity: SBA   Resp: WNL  Cardiac: WNL  GI: Complains of abd pain. 1 episode of soft stool. C Diff came back negative.  :  WNL  LDA: PIV SL  Pertinent Labs: RN Potassium/Mag/Phosph managed. Recheck scheduled 3/20 0600  Diet: Regular  Consults: Palliative to see to discuss goals of care. Nutriton to see-severe malnutrition, Psych and CC/SW   Discharge Plan: TBD    Will continue to monitor and provide cares.     Jackie Eckert RN   "

## 2023-03-20 NOTE — PLAN OF CARE
Goal Outcome Evaluation:    Pt w/ significant unintended weight loss. Currently exceeding estimated needs. Continue supplements as ordered. Will provide coupons for Ensure.

## 2023-03-20 NOTE — CONSULTS
"CLINICAL NUTRITION SERVICES - ASSESSMENT NOTE     Nutrition Prescription    RECOMMENDATIONS FOR MDs/PROVIDERS TO ORDER:  Consider offering discharge prescription for Ensure    Malnutrition Status:    Unable to assess, needs NFPE    Recommendations already ordered by Registered Dietitian (RD):  Continue Ensure as ordered    Future/Additional Recommendations:  Adjust supplements pending PO intake/patient preference       REASON FOR ASSESSMENT  Audelia Comer is a/an 57 year old female assessed by the dietitian for Provider Order - severe malnutrition    Patient presents with Covid-19, metabolic acidosis, ketosis, tobacco abuse, MDD, bereavement syndrome.    NUTRITION HISTORY  Pt has been eating < 50% usual intake for > 1 year. Pt has been unintentionally losing weight for a total of 85lbs, 30lbs since daughter's death, and 10lbs in last 2 weeks. Stated UBW 130lbs. NKFA.     CURRENT NUTRITION ORDERS  Diet: Regular  Intake/Tolerance: Pt eating 25-50% of meals per nursing records and received 3947kcals and 138g protein yesterday.    LABS  Labs reviewed: UN 4.3, Cr 0.35, Mg 1.5, Phos 2.3, alb 3.1, tot pro 6.0, alk phos 221, ALT 40,     MEDICATIONS  Medications reviewed: folvite, creon, protonix, neutra-phos, sodium phosphate, thiamin     ANTHROPOMETRICS  Height: 167.6 cm (5' 6\")  Most Recent Weight: 40.1 kg (88 lb 4.8 oz)    IBW: 59.1 kg  BMI: Underweight BMI <18.5  Weight History:   Wt Readings from Last 30 Encounters:   03/18/23 40.1 kg (88 lb 4.8 oz)   02/01/23 46.3 kg (102 lb 1.6 oz)      13.4% wt loss in 1 month   01/01/23 43.8 kg (96 lb 9.6 oz)          8.4% wt loss in 3 months   10/04/22 51.4 kg (113 lb 6.4 oz)       22% wt loss in 6 months   06/28/22 44.5 kg (98 lb)   11/19/21 44.5 kg (98 lb)   10/29/21 47.9 kg (105 lb 8 oz)   01/18/21 50.9 kg (112 lb 4.8 oz)   08/16/20 51.5 kg (113 lb 9.6 oz)   07/28/20 54.3 kg (119 lb 11.4 oz)   12/27/18 59 kg (130 lb)   12/04/18 57.2 kg (126 lb)   09/06/18 55 kg (121 " lb 4 oz)   03/16/18 53.7 kg (118 lb 6.2 oz)   09/28/17 55.8 kg (123 lb)   07/17/17 57.6 kg (127 lb)   09/10/16 52.8 kg (116 lb 6.5 oz)   08/02/16 54.7 kg (120 lb 9.6 oz)       Dosing Weight: 40.1 kg    ASSESSED NUTRITION NEEDS  Estimated Energy Needs: 1198 kcals/day ([place] Jeor)  Justification: Repletion and Underweight  Estimated Protein Needs: 48-60 grams protein/day (1.2 - 1.5 grams of pro/kg)  Justification: Repletion  Estimated Fluid Needs: 1205 mL/day (30 mL/kg)   Justification: Maintenance    PHYSICAL FINDINGS  See malnutrition section below.  Abrasion/excoriation on knee  Dry skin  Poor skin turgor       MALNUTRITION:  % Weight Loss:  > 10% in 6 months (severe malnutrition)  % Intake:  Decreased intake does not meet criteria for malnutrition as pt has exceeded estimated energy needs with recent intake  Subcutaneous Fat Loss:  Unable to assess  Muscle Loss:  Unable to assess  Fluid Retention:  Unable to assess    Malnutrition Diagnosis: Unable to determine due to needs NFPE  In Context of:  Environmental or social circumstances    NUTRITION DIAGNOSIS  Unintended weight loss related to MDD and bereavement syndrome as evidenced by BMI 14.25%, prolonged poor intake of < 50% usual for > 1 year PTA, loss of 22% body weight in 6 months.       INTERVENTIONS  Implementation  Nutrition Education: Per provider order if indicated   Collaboration with other providers     Goals  Patient to consume % of nutritionally adequate meals three times per day, or the equivalent with supplements/snacks.  Total avg nutritional intake to meet a minimum of 1198 kcal/kg and 48 g PRO/kg daily (per dosing wt 40.1 kg).     Monitoring/Evaluation  Progress toward goals will be monitored and evaluated per protocol. PO intake, supplement tolerance, wt, labs

## 2023-03-20 NOTE — PROGRESS NOTES
03/20/23 144   Appointment Info   Signing Clinician's Name / Credentials (PT) Linda Anne DPT   Living Environment   People in Home alone   Current Living Arrangements apartment   Home Accessibility stairs to enter home   Number of Stairs, Main Entrance 8   Stair Railings, Main Entrance railings on both sides of stairs   Transportation Anticipated family or friend will provide   Living Environment Comments Pt reports always having Ax1 and using one rail when completing stairs to exit/enter apartment   Self-Care   Usual Activity Tolerance moderate   Current Activity Tolerance fair   Regular Exercise No   Equipment Currently Used at Home none   Fall history within last six months yes   Number of times patient has fallen within last six months 3   Activity/Exercise/Self-Care Comment Has a walker, but does not typically use   General Information   Onset of Illness/Injury or Date of Surgery 03/18/23   Referring Physician Gloria Myers,    Patient/Family Therapy Goals Statement (PT) Open to suggestions   Pertinent History of Current Problem (include personal factors and/or comorbidities that impact the POC) Ms. Audelia Comer is a 57 year old female with a history of repeated hospitalizations, malnutrition, liver cirrhosis 2/2 alcohol abuse, recurrent hyponatremia and electrolyte imbalance, bilateral mastectomy secondary to breast cancer, pancreatitis with malabsorpton, tobacco abuse bereavement syndrome, in addition to mood disorders  admitted on 3/18/2023 with ketosis, likely related to starvation and failure to thrive. Pending numerous consults, assistance with GOC and dispo planning in addition to clinical improvement. Pending resolution of electrolyte imbalances and ongoing clinical improvement.   Existing Precautions/Restrictions fall   Weight-Bearing Status - LLE full weight-bearing   Weight-Bearing Status - RLE full weight-bearing   Cognition   Affect/Mental Status (Cognition) WFL   Orientation  "Status (Cognition) oriented x 4   Follows Commands (Cognition) WFL   Pain Assessment   Patient Currently in Pain No   Posture    Posture Forward head position;Protracted shoulders   Range of Motion (ROM)   Range of Motion ROM is WFL   Strength (Manual Muscle Testing)   Strength (Manual Muscle Testing) Deficits observed during functional mobility   Strength Comments requires UE support for transfers   Bed Mobility   Comment, (Bed Mobility) sit<>supine with CGA   Transfers   Comment, (Transfers) CGA with FWW   Gait/Stairs (Locomotion)   Comment, (Gait/Stairs) CGA with no AD x 5'   Balance   Balance Comments intact balance with WBOS eyes open and eyes closed, increased postural sway when standing in narrow base of support-worsens with eyes closed. Pt endorses falls at home related to losing her balance   Sensory Examination   Sensory Perception Comments Patient reports baseline numbness to R LE due to history of Lspine \"issues\"   Coordination   Coordination no deficits were identified   Muscle Tone   Muscle Tone no deficits were identified   Clinical Impression   Criteria for Skilled Therapeutic Intervention Yes, treatment indicated   PT Diagnosis (PT) Impaired functional mobility   Influenced by the following impairments Generalized weakness, impaired balance, impaired activity tolerance   Functional limitations due to impairments Difficulty with bed mobility, transfers, ambulation, stairs   Clinical Presentation (PT Evaluation Complexity) Stable/Uncomplicated   Clinical Presentation Rationale progressing medically   Clinical Decision Making (Complexity) low complexity   Planned Therapy Interventions (PT) balance training;bed mobility training;gait training;stair training;strengthening;transfer training;progressive activity/exercise   Risk & Benefits of therapy have been explained evaluation/treatment results reviewed;care plan/treatment goals reviewed;risks/benefits reviewed;current/potential barriers " reviewed;participants voiced agreement with care plan;participants included;patient   PT Total Evaluation Time   PT Eval, Low Complexity Minutes (24164) 12   Physical Therapy Goals   PT Frequency Daily   PT Predicted Duration/Target Date for Goal Attainment 03/27/23   PT Goals Bed Mobility;Transfers;Gait;Stairs   PT: Bed Mobility Modified independent;Supine to/from sit   PT: Transfers Modified independent;Sit to/from stand   PT: Gait Modified independent;Rolling walker;150 feet   PT: Stairs Minimal assist;8 stairs;Rail on left   PT Discharge Planning   PT Discharge Recommendation (DC Rec) home with home care physical therapy;Leaving home requires significant taxing effort   PT Rationale for DC Rec Pt is able to demonstrate mobility skills required for safe discharge to home. Rec HHPT/OT/HHA to maximize indep with mobility, and ADLs in the home environment.   PT Brief overview of current status SBA with no AD   Total Session Time   Total Session Time (sum of timed and untimed services) 12

## 2023-03-20 NOTE — PROGRESS NOTES
[facility]  Medicine Progress Note - Hospitalist Service  Date of Admission:  3/18/2023    Assessment & Plan   Ms. Audelia Comer is a 57 year old female with a history of repeated hospitalizations, malnutrition, liver cirrhosis 2/2 alcohol abuse, recurrent hyponatremia and electrolyte imbalance, bilateral mastectomy secondary to breast cancer, pancreatitis with malabsorpton, tobacco abuse bereavement syndrome, in addition to mood disorders  admitted on 3/18/2023 with ketosis, likely related to starvation and failure to thrive. Pending numerous consults, assistance with GOC and dispo planning in addition to clinical improvement. Pending resolution of electrolyte imbalances and ongoing clinical improvement.     Refeeding syndrome   Ketosis likely related to starvation, resolved  Anion Gap Metabolic Acidosis: Highest Anion Gap = 26 mmol/L , resolved   Poor PO intake, severe malnutrition   Hypokalemia: Lowest K = 2.7 mmol/L in last 2 days, will replace as needed  Hyponatremia: Lowest Na = 127 mmol/L in last 2 days, will monitor as   Hypomagnesemia: Lowest Mg = 1.5 mg/dL in last 2 days  - cardiac monitoring given electrolyte abnormalities   - encourage PO intake as able   - repeat labs in AM   - close monitoring of electrolytes with replacement per protocols   - nutrition following for malnutrition   - ensures ordered between meals   - on Remeron for appetite    - palliative consult pending given recurrent hospitalizations and failure to thrive     COVID positive   She reports body aches, ear fullness, ongoing myalgias and difficulty sleeping. COVID swab not obtained on admission, returned positive 03/20/23. Not hypoxic.   -holding off on paxlovid given LFT elevations and minor symptoms   -COVID labs ordered     Elevated LFTs, improving   - CMP QAM     Tobacco abuse   - NicoDerm patch PRN     Mood disturbance    MDD  Bereavement   Overall severe depression related to bereavement understandable  however I think it is prudent and appropriate for patient to be evaluated by palliative care given the rather poor state of health and patient's poor insight into longtime health.  - psych consult pending to assist with any medication adjustments            Diet: Regular Diet Adult  Snacks/Supplements Adult: Ensure Enlive; Between Meals    DVT Prophylaxis: Enoxaparin (Lovenox) SQ  Patino Catheter: Not present  Lines: None     Cardiac Monitoring: ACTIVE order. Indication: Electrolyte Imbalance (24 hours)- Magnesium <1.3 mg/ml; Potassium < =2.8 or > 5.5 mg/ml  Code Status: Full Code      Clinically Significant Risk Factors           Disposition Plan      Expected Discharge Date: 03/21/2023                  Gloria Myers DO  Hospitalist Service    Securely message with [employer] (more info)  Text page via [employer] Paging/Directory   ______________________________________________________________________    Interval History   She reports body aches, ear fullness, ongoing myalgias and difficulty sleeping   Otherwise patient is feeling similar to previous   Still quite concerned about her ongoing issues and recurrent hospitalizations   She denies any chest pain, pressure or shortness of breath   She reports no other new concerns or complaints today     Physical Exam   Vital Signs: Temp: 98.3  F (36.8  C) Temp src: Oral BP: 96/68 Pulse: 106   Resp: 16 SpO2: 98 % O2 Device: None (Room air)    Weight: 88 lbs 4.8 oz    Constitutional: awake, alert, cooperative, no apparent distress, and appears stated age   Cachetic appearing female   HEENT: Normocephalic, atraumatic  Respiratory: Normal work of breathing, good air exchange, clear to auscultation bilaterally, no crackles or wheezing noted   Cardiovascular: Regular rate and rhythm, no murmurs appreciated  GI: Soft and nontender to palpation, nondistended, no rebound or guarding  Skin: normal skin color, texture, turgor  Musculoskeletal: No  deformities, no edema present  Neurologic: Alert and oriented, no focal deficits   Neuropsychiatric: General: normal, calm and normal eye contact     Data   ------------------------- PAST 24 HR DATA REVIEWED -----------------------------------------------    I have personally reviewed the following data over the past 24 hrs:    N/A  \   N/A   / N/A     138 100 4.3 (L) /  103 (H)   3.4 26 0.35 (L) \       ALT: 40 (H) AST: 120 (H) AP: 221 (H) TBILI: 0.3   ALB: 3.1 (L) TOT PROTEIN: 6.0 (L) LIPASE: N/A       Imaging results reviewed over the past 24 hrs:   No results found for this or any previous visit (from the past 24 hour(s)).

## 2023-03-21 ENCOUNTER — APPOINTMENT (OUTPATIENT)
Dept: PHYSICAL THERAPY | Facility: CLINIC | Age: 58
End: 2023-03-21
Payer: COMMERCIAL

## 2023-03-21 VITALS
OXYGEN SATURATION: 97 % | SYSTOLIC BLOOD PRESSURE: 97 MMHG | DIASTOLIC BLOOD PRESSURE: 66 MMHG | WEIGHT: 88.3 LBS | HEIGHT: 66 IN | RESPIRATION RATE: 17 BRPM | BODY MASS INDEX: 14.19 KG/M2 | HEART RATE: 98 BPM | TEMPERATURE: 97.8 F

## 2023-03-21 LAB
ALBUMIN SERPL BCG-MCNC: 2.9 G/DL (ref 3.5–5.2)
ALP SERPL-CCNC: 208 U/L (ref 35–104)
ALT SERPL W P-5'-P-CCNC: 33 U/L (ref 10–35)
ANION GAP SERPL CALCULATED.3IONS-SCNC: 13 MMOL/L (ref 7–15)
AST SERPL W P-5'-P-CCNC: 73 U/L (ref 10–35)
BILIRUB SERPL-MCNC: 0.3 MG/DL
BUN SERPL-MCNC: 7.7 MG/DL (ref 8–23)
CALCIUM SERPL-MCNC: 9.1 MG/DL (ref 8.6–10)
CHLORIDE SERPL-SCNC: 101 MMOL/L (ref 98–107)
CREAT SERPL-MCNC: 0.35 MG/DL (ref 0.51–0.95)
CRP SERPL-MCNC: 17.93 MG/L
D DIMER PPP FEU-MCNC: 0.45 UG/ML FEU (ref 0–0.5)
DEPRECATED HCO3 PLAS-SCNC: 26 MMOL/L (ref 22–29)
ERYTHROCYTE [DISTWIDTH] IN BLOOD BY AUTOMATED COUNT: 15.2 % (ref 10–15)
FIBRINOGEN PPP-MCNC: 334 MG/DL (ref 170–490)
GFR SERPL CREATININE-BSD FRML MDRD: >90 ML/MIN/1.73M2
GLUCOSE SERPL-MCNC: 110 MG/DL (ref 70–99)
HCT VFR BLD AUTO: 30.7 % (ref 35–47)
HGB BLD-MCNC: 9.9 G/DL (ref 11.7–15.7)
MAGNESIUM SERPL-MCNC: 1.9 MG/DL (ref 1.7–2.3)
MCH RBC QN AUTO: 31.1 PG (ref 26.5–33)
MCHC RBC AUTO-ENTMCNC: 32.2 G/DL (ref 31.5–36.5)
MCV RBC AUTO: 97 FL (ref 78–100)
PHOSPHATE SERPL-MCNC: 2.9 MG/DL (ref 2.5–4.5)
PLATELET # BLD AUTO: 134 10E3/UL (ref 150–450)
PLATELET # BLD AUTO: 135 10E3/UL (ref 150–450)
POTASSIUM SERPL-SCNC: 4 MMOL/L (ref 3.4–5.3)
PROT SERPL-MCNC: 6 G/DL (ref 6.4–8.3)
RBC # BLD AUTO: 3.18 10E6/UL (ref 3.8–5.2)
SODIUM SERPL-SCNC: 140 MMOL/L (ref 136–145)
WBC # BLD AUTO: 7 10E3/UL (ref 4–11)

## 2023-03-21 PROCEDURE — 84100 ASSAY OF PHOSPHORUS: CPT | Performed by: STUDENT IN AN ORGANIZED HEALTH CARE EDUCATION/TRAINING PROGRAM

## 2023-03-21 PROCEDURE — 36415 COLL VENOUS BLD VENIPUNCTURE: CPT | Performed by: INTERNAL MEDICINE

## 2023-03-21 PROCEDURE — 82374 ASSAY BLOOD CARBON DIOXIDE: CPT | Performed by: STUDENT IN AN ORGANIZED HEALTH CARE EDUCATION/TRAINING PROGRAM

## 2023-03-21 PROCEDURE — 250N000013 HC RX MED GY IP 250 OP 250 PS 637: Performed by: STUDENT IN AN ORGANIZED HEALTH CARE EDUCATION/TRAINING PROGRAM

## 2023-03-21 PROCEDURE — 97530 THERAPEUTIC ACTIVITIES: CPT | Mod: GP | Performed by: PHYSICAL THERAPIST

## 2023-03-21 PROCEDURE — 86140 C-REACTIVE PROTEIN: CPT | Performed by: STUDENT IN AN ORGANIZED HEALTH CARE EDUCATION/TRAINING PROGRAM

## 2023-03-21 PROCEDURE — 99232 SBSQ HOSP IP/OBS MODERATE 35: CPT | Mod: 25 | Performed by: PSYCHOLOGIST

## 2023-03-21 PROCEDURE — 85379 FIBRIN DEGRADATION QUANT: CPT | Performed by: STUDENT IN AN ORGANIZED HEALTH CARE EDUCATION/TRAINING PROGRAM

## 2023-03-21 PROCEDURE — 250N000013 HC RX MED GY IP 250 OP 250 PS 637: Performed by: INTERNAL MEDICINE

## 2023-03-21 PROCEDURE — 99254 IP/OBS CNSLTJ NEW/EST MOD 60: CPT | Performed by: NURSE PRACTITIONER

## 2023-03-21 PROCEDURE — 85049 AUTOMATED PLATELET COUNT: CPT | Performed by: STUDENT IN AN ORGANIZED HEALTH CARE EDUCATION/TRAINING PROGRAM

## 2023-03-21 PROCEDURE — 85049 AUTOMATED PLATELET COUNT: CPT | Performed by: INTERNAL MEDICINE

## 2023-03-21 PROCEDURE — 83735 ASSAY OF MAGNESIUM: CPT | Performed by: STUDENT IN AN ORGANIZED HEALTH CARE EDUCATION/TRAINING PROGRAM

## 2023-03-21 PROCEDURE — 85384 FIBRINOGEN ACTIVITY: CPT | Performed by: STUDENT IN AN ORGANIZED HEALTH CARE EDUCATION/TRAINING PROGRAM

## 2023-03-21 PROCEDURE — 99239 HOSP IP/OBS DSCHRG MGMT >30: CPT | Performed by: HOSPITALIST

## 2023-03-21 PROCEDURE — 36415 COLL VENOUS BLD VENIPUNCTURE: CPT | Performed by: STUDENT IN AN ORGANIZED HEALTH CARE EDUCATION/TRAINING PROGRAM

## 2023-03-21 RX ADMIN — FOLIC ACID 1 MG: 1 TABLET ORAL at 07:52

## 2023-03-21 RX ADMIN — POTASSIUM & SODIUM PHOSPHATES POWDER PACK 280-160-250 MG 2 PACKET: 280-160-250 PACK at 07:51

## 2023-03-21 RX ADMIN — PANCRELIPASE 1 CAPSULE: 36000; 180000; 114000 CAPSULE, DELAYED RELEASE PELLETS ORAL at 14:01

## 2023-03-21 RX ADMIN — PANTOPRAZOLE SODIUM 40 MG: 40 TABLET, DELAYED RELEASE ORAL at 07:52

## 2023-03-21 RX ADMIN — HYDROXYZINE HYDROCHLORIDE 50 MG: 50 TABLET, FILM COATED ORAL at 12:27

## 2023-03-21 RX ADMIN — QUETIAPINE FUMARATE 25 MG: 25 TABLET ORAL at 07:52

## 2023-03-21 RX ADMIN — THIAMINE HCL TAB 100 MG 100 MG: 100 TAB at 07:52

## 2023-03-21 RX ADMIN — QUETIAPINE FUMARATE 25 MG: 25 TABLET ORAL at 14:02

## 2023-03-21 RX ADMIN — FLUTICASONE PROPIONATE 1 SPRAY: 50 SPRAY, METERED NASAL at 12:26

## 2023-03-21 RX ADMIN — PANCRELIPASE 1 CAPSULE: 36000; 180000; 114000 CAPSULE, DELAYED RELEASE PELLETS ORAL at 07:51

## 2023-03-21 RX ADMIN — LOPERAMIDE HYDROCHLORIDE 2 MG: 2 CAPSULE ORAL at 12:27

## 2023-03-21 RX ADMIN — HYDROXYZINE HYDROCHLORIDE 25 MG: 25 TABLET ORAL at 06:05

## 2023-03-21 RX ADMIN — NICOTINE 1 PATCH: 21 PATCH, EXTENDED RELEASE TRANSDERMAL at 07:52

## 2023-03-21 RX ADMIN — POTASSIUM & SODIUM PHOSPHATES POWDER PACK 280-160-250 MG 2 PACKET: 280-160-250 PACK at 12:27

## 2023-03-21 ASSESSMENT — ACTIVITIES OF DAILY LIVING (ADL)
ADLS_ACUITY_SCORE: 40
ADLS_ACUITY_SCORE: 41
ADLS_ACUITY_SCORE: 40
ADLS_ACUITY_SCORE: 40

## 2023-03-21 NOTE — DISCHARGE SUMMARY
Patient's After Visit Summary was reviewed with patient.  Patient verbalized understanding of After Visit Summary, recommended follow up and was given an opportunity to ask questions-Yes.   Discharge medications sent home with patient/family: YES, given her home medication Seroquel 25 mg tab that was in the Pharmacy.  Discharged with other, picked by [facility].    Pt was discharged home after being given after visit summary and her discharge home medication (Seroquel). Vitals taken, Tele monitor and IV access removed. She was escorted downstairs by staff to [facility].

## 2023-03-21 NOTE — PROGRESS NOTES
Care Management Discharge Note    Discharge Date: 03/21/2023     Discharge Disposition:  Home    Discharge Services:  Home Care PT/OT/HHA    Discharge Transportation: Needs cab arranged    Private pay costs discussed: Not applicable    Patient/family educated on Medicare website which has current facility and service quality ratings:  Yes    Education Provided on the Discharge Plan:  Yes  Persons Notified of Discharge Plans: Patient  Patient/Family in Agreement with the Plan:  Yes    Handoff Referral Completed: No    Additional Information:  [facility] accepted referral for Home PT/OT/HHA. AVS updated. SW spoke with patient via phone. Updated her that Housing resources placed in AVS as well as [facility]  accepting. Pt will need a cab arranged at discharge. SW will continue to follow.     [id number]: NORBERT placed call to [employer], [phone], scheduled a cab ride for next available. They are aware of pt's COVID precautions. They will call patient's cell # when they are on the way/have arrived. RN notified.     RANDAL Bernal, [facility]   Inpatient Care Coordination  [facility]   [phone]

## 2023-03-21 NOTE — CONSULTS
[facility]    Palliative Care Consultation Note    Patient: Audelia Comer  Date of Admission  3/18/2023    Requesting provider/team: Carol Masters MD  Reason for consult: Goals of care    Recommendations for Goals of Care:  Advance Care Planning  - Discussed what an advance directive is and what her current code status is today. She would like time to think about what her wishes are.   - Patient has identified her sister, step mother, and best friend as people she trusts and who check in on her when needed.  - Goal is to regain strength and move home with help with daily activities  Plan: Continue to discuss health care directive choices and identification of an HCA due to patient living alone and high risk of injury.    Outpatient Referrals  - Current antipsychotic and antidepressant regimen  - Current symptom management needs: sadness/grief with loss of daughter/depression, anxiety, abdominal pain  Plan: Would recommend outpatient psych referral for ongoing counseling and medication management.              Would recommend outpatient palliative care for advanced care planning and symptom management needs to avoid continued hospitalizations when possible.    These recommendations have been discussed with the patient and care team. Thank you for the opportunity to participate in the care of this patient and family. Please feel free to contact the on-call Palliative provider with any urgent needs.    Assessment & Plan     Symptoms:    - Abdominal Pain RUQ tenderness at hernia site (not surgically repaired), painful bloating   - Chronic low back pain with bilateral leg weakness   - Generalized pain described as aching joints, worsened with COVID-19 diagnosis   - Anxiety due to financial worries and multiple chronic illnesses and COVID-19 diagnosis   - Depression complicated by grief, difficulty coping   - Pain partially relieved with tylenol and hydroxizine    Social:         Support system:  Patient lives alone and calls on step mother, sister, and best friend for support       Functional status: Decreased mobility due to weakness, pain, and depressive symptoms       Financial concerns: Has not been working due to weakness and pain, unable to make rent and facing eviction soon.        Substance use disorder (past / present): Denies illicit drug use, endorses past binge drinking, states she has not had alcohol in several months because anything by mouth does not agree with her.       Occupation: currently unemployed    Mental Health:         Endorses depression and anxiety. Had been treated by psychiatry and has seen mental health counselors off and on. She states the antipsychotic/antidepressant regimen is refilled by her primary provider.     Coping:          Breast cancer diagnosis, double mastectomy, job loss, and death of daughter in short time frame. Continues to grieve. Appears to cope by self isolation.     Advance Care Planning:        Decision making capacity: patient is able to make her wishes known and understand the consequences of her choices       Disease understanding: Has a basic understanding of her disease process and endorses she has had poor follow through for continued monitoring of her physical and mental health.       Goals of Care: She states she would like to get stronger and live as long as she can for her 7 year old granddaughter.       Health care directive: None on file. Discussed the benefits of creating one today. She states she will continue to think about this.       Health care agent: None identified. She states she will continue to think about this.       Code Status:  Full Code       POLST Physician orders for life-sustaining treatment (POLST) form is not completed    History of Present Illness   Sources of History: patient and electronic health record    Audelia Comer is a 57 year old female with a history of repeated hospitalizations, malnutrition, liver cirrhosis  "with history of alcohol abuse, recurrent hyponatremia and electrolyte imbalance, bilateral mastectomy secondary to breast cancer, pancreatitis with malabsorpton, tobacco abuse bereavement syndrome, in addition to mood disorders  admitted on 3/18/2023 with ketosis, likely related to starvation and failure to thrive. She is COVID-19 positive as of 3/20/23 with symptoms.    Chief symptoms are chronic abdominal pain, generalized pain, bilateral leg weakness, depression, anxiety, and reduced appetite. She states her reduced intake has contributed to her weakness and falls at home, which causes her to feel depressed. She states the last year and a half she has felt as though she was in a \"black hole\" and does not know how to get out. She feels her physical symptoms cause her sadness which compounds the grief of several personal losses within this time frame.     She states she has followed a psychiatrist in the past and her seroquel and mirtazapine are refilled by her primary doctor. She has not seen a mental health provider in several months.  She denies taking illicit drugs and states she has \"not felt like drinking in a long time\" due to feeling so ill. She does not see an oncologist for follow up for breast cancer, stating \"they got all of it with the double mastectomy\".    She states her goal is to get stronger and form healthier habits, but this is complicated by financial worries due to unemployment.     ROS:  Palliative Symptom Review (0=no symptom/no concern, 1=mild, 2=moderate, 3=severe):  Pain: 2  Fatigue: 2  Nausea: 0  Constipation: 0  Diarrhea: 1  Depressive Symptoms: 3  Anxiety: 3  Drowsiness: 0  Poor Appetite: 3  Shortness of Breath: 0  Insomnia: 1  Delirium: 0  Overall (0 good/no concerns, 3 very poor): 2    Past Medical History    I have reviewed this patient's medical history and updated it with pertinent information if needed.   Past Medical History:   Diagnosis Date     Alcoholic liver disease (H)      " Anxiety      C. difficile colitis 2023     Chronic pancreatitis (H)      Depressive disorder      Invasive ductal carcinoma of breast, right (H)      PONV (postoperative nausea and vomiting)      SBO (small bowel obstruction) (H)     *Patient denies all chronic pancreatitis stating she had once incidence of pancreatitis and has now resolved. Reference abd ultrasound from 2023.    Past Surgical History   I have reviewed this patient's surgical history and updated it with pertinent information if needed.  Past Surgical History:   Procedure Laterality Date     APPENDECTOMY       APPENDECTOMY  1989      SECTION  1984     ENDOSCOPIC ULTRASOUND UPPER GASTROINTESTINAL TRACT (GI) N/A 2023    Procedure: ENDOSCOPIC ULTRASOUND, ESOPHAGOSCOPY / UPPER GASTROINTESTINAL TRACT (GI);  Surgeon: Lg Dickinson MD;  Location:  GI     ESOPHAGOSCOPY, GASTROSCOPY, DUODENOSCOPY (EGD), COMBINED N/A 2023    Procedure: ESOPHAGOGASTRODUODENOSCOPY (EGD);  Surgeon: Lg Dickinson MD;  Location:  GI     GYN SURGERY      c section      MASTECTOMY, BILATERAL Bilateral     With reconstructive surgery     SOFT TISSUE SURGERY      breast implants        Social History   Social History     Tobacco Use     Smoking status: Every Day     Packs/day: 1.00     Types: Cigarettes     Smokeless tobacco: Never   Vaping Use     Vaping Use: Never used   Substance Use Topics     Alcohol use: Yes     Comment: Vague about intake.     Drug use: No       Family History   I have reviewed this patient's family history and updated it with pertinent information if needed.   Family History   Problem Relation Age of Onset     Colon Cancer Maternal Grandmother      Lung Cancer Paternal Grandmother      Breast Cancer Paternal Grandmother        Medications   I have reviewed this patient's medication profile and medications given in the past 24 hours.  Current Facility-Administered Medications   Medication      acetaminophen (TYLENOL) tablet 650 mg    Or     acetaminophen (TYLENOL) Suppository 650 mg     enoxaparin ANTICOAGULANT (LOVENOX) injection 30 mg     fluticasone (FLONASE) 50 MCG/ACT spray 1 spray     folic acid (FOLVITE) tablet 1 mg     hydrOXYzine (ATARAX) tablet 25 mg    Or     hydrOXYzine (ATARAX) tablet 50 mg     ibuprofen (ADVIL/MOTRIN) tablet 200 mg     lipase-protease-amylase (CREON 36) 71549-162419-784018 units per EC Capsule 1 capsule     lipase-protease-amylase (CREON 36) 93068-930965-201641 units per EC Capsule 1 capsule     loperamide (IMODIUM) capsule 2 mg     melatonin tablet 3 mg     mirtazapine (REMERON) tablet 30 mg     naloxone (NARCAN) injection 0.2 mg    Or     naloxone (NARCAN) injection 0.4 mg    Or     naloxone (NARCAN) injection 0.2 mg    Or     naloxone (NARCAN) injection 0.4 mg     nicotine (NICODERM CQ) 21 MG/24HR 24 hr patch 1 patch    And     nicotine Patch in Place     ondansetron (ZOFRAN ODT) ODT tab 4 mg    Or     ondansetron (ZOFRAN) injection 4 mg     pantoprazole (PROTONIX) EC tablet 40 mg     potassium & sodium phosphates (NEUTRA-PHOS) Packet 2 packet     prochlorperazine (COMPAZINE) injection 10 mg    Or     prochlorperazine (COMPAZINE) tablet 10 mg    Or     prochlorperazine (COMPAZINE) suppository 25 mg     QUEtiapine (SEROquel) tablet 100 mg     QUEtiapine (SEROquel) tablet 25 mg     thiamine (B-1) tablet 100 mg       Physical Exam   Vital Signs: Temp: 98.7  F (37.1  C) Temp src: Oral BP: 102/71 Pulse: 102   Resp: 16 SpO2: 98 % O2 Device: None (Room air)    Weight: 88 lbs 4.8 oz    Physical Exam:  Constitutional: Alert, cooperative, in no apparent distress. Cachectic female.  HEENT: Normocephalic, atraumatic. Nasal congestion, no rhinorrhea.  Respiratory: Breathing unlabored. Clear to auscultation bilaterally. Intermittent productive cough.   Cardiovascular: Regular rate and rhythm, no murmurs  GI: Tender to palpation RUQ, abdomen slightly distended. Bowel sounds active  throughout.  Skin: No rashes or ulcers to visible skin. Normal skin color and turgor  Musculoskeletal: No deformities, no edema  Neurologic: Alert and oriented, no focal deficits. Speech is fluid.  Psychiatric: Engaged and cooperative.    Data   Data reviewed:   Lab/imaging results review, current hospitalization documentation    Karuna Elias, Nurse Practitioner Student/GAVIN Isidro, CNP      This patient was seen along with a nurse practitioner student, The histories and reviews of systems were obtained by her and confirmed by myself. All objective, assessments and plans were completed by myself. I have personally performed the physical exam and medical decision making. I agree with the assessment and plan of care as documented in the note    Angie Mayes NP    Total unit/floor time 69 minutes, time consisted of the following, examination of the patient, reviewing the record and completing documentation. >50% of time spent in counseling and coordination of care, Bedside Nurse Mariana, Hospitalist Dr Montejo and  Susan.  Time spend counseling with patient and family consisted of the following topics, goals of care, education about diagnosis, care planning for discharge and symptom management.

## 2023-03-21 NOTE — PLAN OF CARE
"Care from [id number]    Inpatient Progress Note:  For complete assessment see flow sheet documentation.    /71 (BP Location: Right arm)   Pulse 102   Temp 98.7  F (37.1  C) (Oral)   Resp 16   Ht 1.676 m (5' 6\")   Wt 40.1 kg (88 lb 4.8 oz)   SpO2 98%   BMI 14.25 kg/m            Orientation: AxOx4  Neuro: WDL  Pain status: Denies  Activity: Independent in room  Resp: WDL  Cardiac: Per TELE: Sinus Tach, 101  GI: Reports 3 episodes of diarrhea this shift.  Received PRN anti-diarrheal  : WDL  LDA: PIV SL  Pertinent labs: RN managed Potassium/Mag/Phosphate  Diet: Consumed 75% of meal tray.  States she will consume more, but has a small stomach from periods of not eating  Discharge plan: Home with additional services    Pt requested PRN medication for anxiety.  Received 50 mg Hydroxyzine.  Voices multiple stressors.   Wonders if home is a safe place for her.  Discussed support she has available.          Will Continue to monitor and provide cares.    Shruti Sánchez RNGoal Outcome Evaluation:                        "

## 2023-03-21 NOTE — DISCHARGE SUMMARY
[facility]  Hospitalist Discharge Summary      Date of Admission:  3/18/2023  Date of Discharge:  3/21/2023  Discharging Provider: Gold Montejo MD  Discharge Service: Hospitalist Service    Discharge Diagnoses   Ketosis likely related to starvation  Refeeding syndrome  Severe malnutrition (low albumin, protein levels)  Hypokalemia   Hypomagnesemia   Hypophosphatemia    Follow-ups Needed After Discharge   Follow-up Appointments     Follow-up and recommended labs and tests       Follow up with primary care provider, Randee Woods, within 7 days for   hospital follow- up.  The following labs/tests are recommended: chem 7,   magnesium, phosphorus.  Please follow-up with your therapist           Unresulted Labs Ordered in the Past 30 Days of this Admission     No orders found from 2023 to 3/19/2023.      These results will be followed up by NA    Discharge Disposition   Discharged to home  Condition at discharge: Stable    Hospital Course   Audelia Comer is a 57 year old female who has a PMH of repeated hospitalizatons, chr malnutrition, liver cirrhosis 2/2 alc abuse, recurrent and chr hyponatremia and electrolyte imbalances, bilateral mastectomy secondary to breast cancer chr pancreatitis with malabsorpton, tobacco abuse and bereavement syndrome.   She was admitted for acute pancreatitis  in 10/2021, 2021, 2022, and again in 10/2022. Most recently, she was admitted to [facility] from 22 through 23 and then at [facility] in 2023  She is being admitted from the ER where she is boarding for the above issues.  According to the patient she has been dealing with severe depression since her daughter  late last year and since then she has been hospitalized multiple times, she has not been very diligent with her medications or food she has lost more than 30 pounds.  She felt exceptionally weak today hence she decided to call the medics and come to the emergency room  On  presentation she was found to be tachycardic with borderline hypotension without hypoxia or fever  In the ER she is oriented x2 does not know the exact date but knows it is sometime around Easter.  She denies any abdominal pain which is new, denies any diarrhea which is new and she tells me she just does not have any appetite.  She is noted to be hyponatremic, hypokalemic, has significant elevation of ketones with anion gap, venous gas has not been undertaken hence I cannot comment on the acidosis.  She also is noted to be hypomagnesemic and hypophosphatemic  Patient does acknowledge smoking a pack a day.    Refeeding syndrome   Ketosis likely related to starvation, resolved  Anion Gap Metabolic Acidosis: Highest Anion Gap = 26 mmol/L , resolved   Poor PO intake, severe malnutrition   Hypokalemia: Lowest K = 2.7 mmol/L in last 2 days, will replace as needed  Hyponatremia: Lowest Na = 127 mmol/L in last 2 days, will monitor as   Hypomagnesemia: Lowest Mg = 1.5 mg/dL in last 2 days  - cardiac monitoring given electrolyte abnormalities   - encourage PO intake as able   - repeat labs in AM   - close monitoring of electrolytes with replacement per protocols   - nutrition following for malnutrition   - ensures ordered between meals   - on Remeron for appetite    - palliative consult pending given recurrent hospitalizations and failure to thrive      COVID positive   She reports body aches, ear fullness, ongoing myalgias and difficulty sleeping. COVID swab not obtained on admission, returned positive 03/20/23. Not hypoxic.   -holding off on paxlovid given LFT elevations and minor symptoms   -COVID labs ordered      Elevated LFTs, improving   - CMP QAM      Tobacco abuse   - NicoDerm patch PRN      Mood disturbance    MDD  Bereavement   Overall severe depression related to bereavement understandable however I think it is prudent and appropriate for patient to be evaluated by palliative care given the rather poor state of  health and patient's poor insight into longtime health.  - psych consult pending to assist with any medication adjustments         I assumed care of patient today.  She was seen by psychology who recommended she follow-up with her original therapist.  She was seen by physical therapy yesterday who cleared her to go home with home services. She denies any chest pain or sob. She has had some loose stools today. She is eating and drinking normally and feels like she is able to keep up with her stool output. Her electrolytes are normal today. She is able to discharge home. I offered to call her family, but she declined.     Consultations This Hospital Stay   CARE MANAGEMENT / SOCIAL WORK IP CONSULT  NUTRITION SERVICES ADULT IP CONSULT  PALLIATIVE CARE ADULT IP CONSULT  PSYCHIATRY IP CONSULT  PHYSICAL THERAPY ADULT IP CONSULT    Code Status   Full Code    Time Spent on this Encounter   I, Gold Montejo MD, personally saw the patient today and spent greater than 30 minutes discharging this patient.       Gold Montejo MD  [facility] OBSERVATION DEPT  [address] [place] [address]  Phone: [phone]  ______________________________________________________________________    Physical Exam   Vital Signs: Temp: 98.7  F (37.1  C) Temp src: Oral BP: 102/71 Pulse: 102   Resp: 16 SpO2: 98 % O2 Device: None (Room air)    Weight: 88 lbs 4.8 oz  Constitutional: awake, alert, cooperative, no apparent distress, and appears stated age. Appears malnurished  Eyes: Lids and lashes normal, pupils equal, round and reactive to light, extra ocular muscles intact, sclera clear, conjunctiva normal  ENT: Normocephalic, without obvious abnormality, atraumatic, sinuses nontender on palpation, external ears without lesions, oral pharynx with moist mucous membranes, tonsils without erythema or exudates, gums normal and good dentition.  Respiratory: No increased work of breathing, good air exchange, clear to  auscultation bilaterally, no crackles or wheezing  Cardiovascular: Normal apical impulse, regular rate and rhythm, normal S1 and S2, no S3 or S4, and no murmur noted  GI: No scars, normal bowel sounds, soft, non-distended, non-tender, no masses palpated, no hepatosplenomegally  Skin: no bruising or bleeding       Primary Care Physician   Randee Woods    Discharge Orders      Home Care Referral      Reason for your hospital stay    Weakness  Electrolyte abnormalities  Covid 19 infection with diarrhea     Follow-up and recommended labs and tests     Follow up with primary care provider, Randee Woods, within 7 days for hospital follow- up.  The following labs/tests are recommended: chem 7, magnesium, phosphorus.  Please follow-up with your therapist     Activity    Your activity upon discharge: activity as tolerated     Diet    Follow this diet upon discharge: Orders Placed This Encounter      Snacks/Supplements Adult: Ensure Enlive; Between Meals      Regular Diet Adult       Significant Results and Procedures   Most Recent 3 CBC's:Recent Labs   Lab Test 03/21/23  0550 03/21/23  0055 03/18/23  1448 02/03/23  0901   WBC 7.0  --  11.0 8.6   HGB 9.9*  --  14.2 8.7*   MCV 97  --  92 95   * 135* 157 320     Most Recent 3 BMP's:Recent Labs   Lab Test 03/21/23  0550 03/20/23  0539 03/19/23  1518 03/19/23  0803    138  --  132*   POTASSIUM 4.0 3.4 3.8 2.9*   CHLORIDE 101 100  --  94*   CO2 26 26  --  25   BUN 7.7* 4.3*  --  5.3*   CR 0.35* 0.35*  --  0.43*   ANIONGAP 13 12  --  13   FRANCOISE 9.1 8.5*  --  8.6   * 103*  --  117*     Most Recent 2 LFT's:Recent Labs   Lab Test 03/21/23  0550 03/20/23  0539   AST 73* 120*   ALT 33 40*   ALKPHOS 208* 221*   BILITOTAL 0.3 0.3   ,   Results for orders placed or performed during the hospital encounter of 01/27/23   CT Abdomen Pelvis w Contrast    Narrative    EXAM: CT ABDOMEN PELVIS W CONTRAST  LOCATION: [facility]  DATE/TIME: 1/27/2023 3:50  AM    INDICATION: RUQ pain, possible hernia?  COMPARISON: CT chest, abdomen and pelvis with IV contrast 12/31/2022.  TECHNIQUE: CT scan of the abdomen and pelvis was performed following injection of IV contrast. Multiplanar reformats were obtained. Dose reduction techniques were used.  CONTRAST: 50 mL Isovue-370 IV.     FINDINGS:   LOWER CHEST: Minor dependent atelectasis in the posterior costophrenic angles identified today. No pleural effusion on either side. Normal cardiac size. No pericardial effusion.    HEPATOBILIARY: Heterogeneous hepatic parenchyma with lobulated contour and hypertrophy of the caudate lobe, representing cirrhosis. Geographic fatty sparing involving the hepatic parenchyma anteriorly extending inferiorly. Patent portal veins. Tiny   amount of adjacent ascites. No calcified gallstones or biliary dilatation.    PANCREAS: Parenchymal calcifications indicative of changes of chronic pancreatitis. No discrete mass.    SPLEEN: Normal.    ADRENAL GLANDS: Normal.    KIDNEYS/BLADDER: No urinary tract calculi. Both kidneys are well perfused without hydronephrosis or hydroureter. Normal urinary bladder.    BOWEL: Scattered colonic diverticulosis, more apparent distally, without acute inflammation. No mechanical obstruction or free gas.    LYMPH NODES: No suspicious abdominopelvic adenopathy.    VASCULATURE: Mildly atherosclerotic normal caliber abdominal aorta measuring 1.7 x 1.6 cm (image 93, series 3). Normal caliber IVC.    PELVIC ORGANS: Rectosigmoid diverticulosis. No adenopathy or free fluid. Phleboliths.    MUSCULOSKELETAL: Mild degenerative changes involving the spine and joints pelvis. Bilateral breast implants. Metallic ornament at the umbilicus.      Impression    IMPRESSION:   1.  Cirrhotic configuration of the liver with lobulated contour. Geographic fatty sparing involving the hepatic parenchyma anteriorly extending inferiorly. Tiny amount of ascites.    2.  Changes of chronic pancreatitis.  No discrete mass.    3.  Scattered colonic diverticulosis, more apparent distally, without acute inflammation. No mechanical obstruction or free gas.       US Abdomen Limited    Narrative    US ABDOMEN LIMITED 1/27/2023 9:24 AM    CLINICAL HISTORY: RUQ abdominal pain, history of cirrhosis and  pancreattitis  TECHNIQUE: Limited abdominal ultrasound.    COMPARISON: CT of the abdomen and pelvis at 3:45 AM today    FINDINGS:    GALLBLADDER: The gallbladder is distended. No stones or sludge. No  gallbladder wall thickening or pericholecystic fluid. Sonographic  Ribera sign is negative.    BILE DUCTS: No intrahepatic biliary ductal dilatation. The common duct  is mildly enlarged and measures 7mm. No obstructing calculi in the  visualized portions of the duct. No obstructing masses were placed on  the CT earlier today.    LIVER: Heterogeneous hepatic parenchyma with multiple echogenic and  hypoechoic areas, likely related to patchy hepatic steatosis seen on  the CT today. Coarsened hepatic parenchyma and lobulated hepatic  contour.    RIGHT KIDNEY: Mild prominence of the renal pelvis, stable since the  CT.    PANCREAS: The body and tail of the pancreas is obscured. Multiple  parenchymal calcifications in the pancreatic head seen on the CT  earlier today are not well seen by ultrasound.      Impression    IMPRESSION:  1.  Geographic echogenic and hypoechoic areas in the liver, likely  related to patchy hepatic steatosis, better assessed by the CT today.  Coarsened hepatic parenchyma with lobulated contour suggestive of  cirrhosis.  2.  Distended gallbladder. No cholelithiasis or acute cholecystitis.  3.  Mildly distended common hepatic duct measuring 7 mm, stable. No  obstructing calculi in the visualized duct. No obstructing calculi or  mass was visualized on the CT earlier today. If clinically indicated,  an MRCP can be considered.  4.  Sequela of chronic pancreatitis, better assessed by CT earlier.  5.  Stable mild  dilatation of the right renal pelvis, either due to  mild hydronephrosis or pelviectasis.    ANYA BROWN MD         SYSTEM ID:  [id number]   US Paracentesis without Albumin    Narrative    US PARACENTESIS WITHOUT ALBUMIN 1/30/2023 3:22 PM     HISTORY: High volume paracentesis with or without diagnostic fluid  analysis with labs to be drawn if ordered. Total paracentesis volume  as much as possible.    PROCEDURE: Ultrasound was used to evaluate for the presence and best  approach for paracentesis. Written and oral informed consent was  obtained. A pause for the cause procedure to verify the correct  patient and correct procedure. The skin overlying the right lower  quadrant was prepped and draped in the usual sterile fashion. The  subcutaneous tissues were anesthetized with 10 mL 1% lidocaine. A  catheter was advanced into the peritoneal space and 0.7 L of  straw  colored fluid was drained. There were no immediate complications.  Ultrasound images were permanently stored.  Patient left the  ultrasound suite in satisfactory condition.      Impression    IMPRESSION: Technically successful paracentesis without immediate  complications.     OCTAVIO MOMIN MD         SYSTEM ID:  [id number]       Discharge Medications   Current Discharge Medication List      CONTINUE these medications which have NOT CHANGED    Details   acetaminophen (TYLENOL) 325 MG tablet Take 2 tablets (650 mg) by mouth every 6 hours as needed for mild pain Do not take more than 2000 mg per day. Alternate with ibuprofen.  Qty: 30 tablet, Refills: 0    Associated Diagnoses: Toothache      !! amylase-lipase-protease (CREON 36) 718688-19956-502511 units CPEP Take 1 capsule by mouth 3 times daily (with meals) DO NOT CRUSH. To be swallowed as whole; Not to be crushed, chewed or retained in mouth. For patients who are unable to swallow intact capsule, the contents may be sprinkled on soft acidic food.  Qty: 100 capsule, Refills: 3    Associated Diagnoses:  Chronic pancreatitis, unspecified pancreatitis type (H)      !! amylase-lipase-protease (CREON 36) [id number] units CPEP Take 1 capsule by mouth Take with snacks or supplements (with snacks)  Qty: 100 capsule, Refills: 1    Associated Diagnoses: Chronic pancreatitis, unspecified pancreatitis type (H)      azelastine (ASTELIN) 0.1 % nasal spray Spray 1 spray into both nostrils daily as needed       calcium carbonate (TUMS) 500 MG chewable tablet Take 1 tablet (500 mg) by mouth 3 times daily as needed for heartburn  Qty: 100 tablet, Refills: 0    Associated Diagnoses: Chronic pancreatitis, unspecified pancreatitis type (H)      cetirizine (ZYRTEC) 10 MG tablet Take 10 mg by mouth daily as needed       cholestyramine (QUESTRAN) 4 g packet Take 1 packet (4 g) by mouth daily  Qty: 30 packet, Refills: 1    Associated Diagnoses: Chronic pancreatitis, unspecified pancreatitis type (H)      dicyclomine (BENTYL) 10 MG capsule Take 1 capsule (10 mg) by mouth 4 times daily  Qty: 120 capsule, Refills: 1    Associated Diagnoses: Chronic pancreatitis, unspecified pancreatitis type (H)      fluticasone (FLONASE) 50 MCG/ACT nasal spray Spray 1 spray into both nostrils daily as needed       folic acid (FOLVITE) 1 MG tablet Take 1 tablet (1 mg) by mouth daily  Qty: 30 tablet, Refills: 3    Associated Diagnoses: Alcohol use disorder, severe, in early remission (H); Alcohol-induced acute pancreatitis, unspecified complication status      mirtazapine (REMERON) 15 MG tablet Take 30 mg by mouth At Bedtime      nicotine (NICODERM CQ) 21 MG/24HR 24 hr patch Place 1 patch onto the skin daily  Qty: 14 patch, Refills: 1    Associated Diagnoses: Chronic pancreatitis, unspecified pancreatitis type (H)      ondansetron (ZOFRAN ODT) 4 MG ODT tab Take 1 tablet (4 mg) by mouth every 6 hours as needed for nausea  Qty: 5 tablet, Refills: 0    Associated Diagnoses: Alcohol-induced acute pancreatitis without infection or necrosis       pantoprazole (PROTONIX) 40 MG EC tablet Take 1 tablet (40 mg) by mouth 2 times daily  Qty: 60 tablet, Refills: 3    Associated Diagnoses: Nonspecific abdominal pain      !! QUEtiapine (SEROQUEL) 25 MG tablet Take 25 mg by mouth 2 times daily In the morning and afternoon      !! QUEtiapine (SEROQUEL) 25 MG tablet Take 100 mg by mouth At Bedtime      thiamine (B-1) 100 MG tablet Take 1 tablet (100 mg) by mouth daily  Qty: 20 tablet, Refills: 0    Associated Diagnoses: Alcohol-induced acute pancreatitis, unspecified complication status; Alcohol use disorder, severe, in early remission (H)       !! - Potential duplicate medications found. Please discuss with provider.        Allergies   No Known Allergies

## 2023-03-21 NOTE — PLAN OF CARE
BP 96/69  Pulse 102   Temp 97.5  F   Resp 18   SpO2 96%      Patient is alert and oriented x4, has pain any atarax and tylenol were administered. Patient is very depressed and anxious and due to that hasn't been eating or sleeping well. On all 3 protocols. Feeling much better tonight, was able to eat and slept much better. Has a productive cough due to COVID. SW, psych, palliative care, and nutrition following. Patient is getting evicted in June so very worried about going back home. Home with PT/OT/HHA is recommended and discharge is expected for today.

## 2023-03-21 NOTE — PLAN OF CARE
Physical Therapy Discharge Summary    Reason for therapy discharge:    Discharged to home with home therapy.    Progress towards therapy goal(s). [facility] for goal details.  Goals partially met.  Barriers to achieving goals:   Pt declined need to practice stairs prior to discharge.    Therapy recommendation(s):    Continued therapy is recommended.  Rationale/Recommendations:  HHPT/OT/HHA to maximize pt's safety and indep with mobility and ADL management in the home setting. .

## 2023-03-21 NOTE — CONSULTS
Consult Date: 03/21/2023    INPATIENT PSYCHIATRY CONSULTATION    BACKGROUND AND REFERRAL INFORMATION:  The patient is a 57-year-old female with a history of chronic pancreatitis, C. diff and currently second bout with COVID-19, entered Emergency Department at [facility] on 03/18/2023.  The patient's medical record shows a history of alcoholism; however, in discussion with the patient, she stated that she has never had treatment for alcohol, nor had any problems with alcohol in the past, and that her primary care physician has removed that diagnosis.  She reported her current average weight has been approximately 132 lb; current weight 85 lb,  and the patient did appear quite frail.    HISTORY OF PRESENT ILLNESS:  The patient reports 3 admissions to [facility] since the beginning of 2023, the most recent being approximately 6 weeks ago.  The patient is scheduled to be discharged with home healthcare today.  The patient reports a history of depression and anxiety and has received previous mental health treatment in the [facility], as well as when she lived in [place] a number of years ago.  The patient reports current psychiatric medications of Seroquel 25 mg twice daily and 50 mg at bed, mirtazapine 2 tablets at bedtime, the patient was unsure as to the dosing, and third medication was hydroxyzine, and the patient did not know that current dose as well.  The patient saw a psychiatrist when she was hospitalized in 2022 at this facility.  It is possible that the psychiatrist was Dr. Shahbaz Fatima, who prescribed these medications, which she has been on currently.  The patient also reports seeing a therapist, Uli Peres, through [facility], but virtual treatment.  The patient denies suicidal ideation, except for approximately 15 years ago, but did report a history of attempts using cutting or trying to overdose.  The patient currently reported today that her depression  "level was 8/10 and her anxiety level was 9/10.  The patient also stated that she does better when she goes home if she has in-home care, which she has not qualified for up to this point, and expectations in terms of what she is supposed to do daily from either physical therapy or other home health aides.    CHEMICAL DEPENDENCY:  The patient denies a history of alcohol problems.  She is unsure how that diagnosis came to her historically, but she reports that she does not have problems with alcohol, nor drugs, and has not for many years, and denied any concerns for chemical substance use or abuse.    SOCIAL/DEVELOPMENTAL:  The patient currently lives in an apartment in [place] by herself.  She has lived in this facility for approximately 4 years.  The patient reports having been  3 times in her life and had 2 children.  She has a son who is 38, lives in the [place] area, but \"is not doing well.\" Her daughter was age 29 when she passed away approximately 1 year ago.  The patient reported that her daughter had an enlarged heart, went out and partied with her friends one night and never woke up the next morning.    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  She is currently applying for social security disability and has waited approximately 1 year, but does have a hearing date in 06/2023.    MENTAL STATUS EXAMINATION:  The patient was oriented x 4 with good recent and remote memory.  She reported her depression 8/10 and her anxiety 9/10.  She was sitting up comfortably in the hospital bed.  She presented with good eye contact, smiles at times and good verbal skills.  Speech rate, volume and content WNL.  Insight and judgement deemed fair  The patient did appear quite frail and spent some time in the restroom prior to the interview.  The patient reported that she did eat a full meal today and she reported poor appetite and poor energy level, but " stated that she generally eats well when she is in the hospital because it does not require any energy for her to prepare food.  The patient's thought content and process were intact.  Fund of knowledge was deemed fair.    DIAGNOSTIC IMPRESSIONS:    1.  Major depressive disorder; generalized anxiety disorder.      PLAN:  Per , Susan Barnes, the patient is scheduled to be discharged, home care, PT, OT, HHA and [facility] has accepted a referral for the patient and the patient stated that she would probably be evicted from her current apartment and she was provided with housing resources from social work service, but stated that the energy required to make multiple phone calls and trying to work with staff at the various [facility] agencies is overwhelming to her and could use assistance.      I spoke with Dr. Montejo regarding the pt and the limitations of current status and treatment.    2.  The patient to be discharged later today.    3.  Would recommend that the patient reestablish with primary care physician to monitor her medications, current psychiatric, general health care, general medical and medications, as well as reconnecting with her therapist, Uli Peres at [facility].    Aden Warren, PhD, LP        D: 2023   T: 2023   MT: DEB    Name:     NICOLA PLAZA  MRN:      [id number]        Account:      [id number]   :      1965           Consult Date: 2023     Document: [id number]

## 2023-03-21 NOTE — PROGRESS NOTES
Page sent out- Patient would like a nasal spray for congestion. Was on fluticasone 50 MCG/ACT nasal spray at home and was wondering if she can take that. Also loperamide for loose stools.

## 2023-03-22 ENCOUNTER — PATIENT OUTREACH (OUTPATIENT)
Dept: CARE COORDINATION | Facility: CLINIC | Age: 58
End: 2023-03-22
Payer: COMMERCIAL

## 2023-03-22 NOTE — PROGRESS NOTES
[facility]    Background: Transitional Care Management program identified per system criteria and reviewed by [facility] Resource Center team for possible outreach.    Assessment: Upon chart review, CCR Team member will not proceed with patient outreach related to this episode of Transitional Care Management program due to reason below:    Patient declined to answer all  post hospital discharge questions with CCRC team member and disconnected call.    Plan: Transitional Care Management episode addressed appropriately per reason noted above.      Diana Oconnell MA  [facility] Resource [place], [facility]    *Connected Care Resource Team does NOT follow patient ongoing. Referrals are identified based on internal discharge reports and the outreach is to ensure patient has an understanding of their discharge instructions.

## 2023-05-06 NOTE — PLAN OF CARE
Goal Outcome Evaluation:            Observation goals  PRIOR TO DISCHARGE        Comments: -diagnostic tests and consults completed and resulted not met  -vital signs normal or at patient baseline not met  -tolerating oral intake to maintain hydration not met  -adequate pain control on oral analgesics not met  -returns to baseline functional status not mt  Nurse to notify provider when observation goals have been met and patient is ready for discharge.                   [FreeTextEntry1] : PT A&OX3 AND AMBULATING UNASSISTED INTO HYPERBARIC SUITE \par PT ORDER VERIFIED PRIOR TO TREATMENT\par PT CONTRAINDICATION LIST AND PRE CHECK LIST VERIFIED AND SIGNED BY TECH PRIOR TO TREATMENT\par PT VITALS WERE WITHIN PARAMETERS FOR HBOT\par PT IS ORN WITH NO WOUND\par PT DESCENDED TO [facility] VIA 9MIN COMPRESSION PROFILE IN CHAMBER #[id number]\par PT OBSERVED FOR FACIAL TWITCHING AND CHEST RISE BY HT SEATED CHAMBERSIDE\par \par PT TOLERATED AIR BREAKS \par PT ASCENDED FROM TX DEPTH OF 2.4 [facility] VIA 9MIN DECOMPRESSION PROIFLE\par PT TOLERATED TREATMENT\par PT EXITED HYPERBARIC SUITE SAFELY ACCOMPANIED BY HT\par \par \par \par \par \par

## 2023-07-26 ENCOUNTER — OFFICE VISIT (OUTPATIENT)
Dept: URGENT CARE | Facility: URGENT CARE | Age: 58
End: 2023-07-26
Payer: COMMERCIAL

## 2023-07-26 VITALS
RESPIRATION RATE: 16 BRPM | WEIGHT: 110 LBS | OXYGEN SATURATION: 98 % | BODY MASS INDEX: 17.75 KG/M2 | SYSTOLIC BLOOD PRESSURE: 123 MMHG | HEART RATE: 97 BPM | TEMPERATURE: 98.3 F | DIASTOLIC BLOOD PRESSURE: 76 MMHG

## 2023-07-26 DIAGNOSIS — R60.0 BILATERAL LEG EDEMA: Primary | ICD-10-CM

## 2023-07-26 DIAGNOSIS — R21 RASH: ICD-10-CM

## 2023-07-26 DIAGNOSIS — E61.1 DIETARY IRON DEFICIENCY: ICD-10-CM

## 2023-07-26 DIAGNOSIS — D64.9 ANEMIA, UNSPECIFIED TYPE: ICD-10-CM

## 2023-07-26 PROBLEM — K26.9 DUODENAL ULCER: Status: ACTIVE | Noted: 2023-03-05

## 2023-07-26 PROBLEM — C50.811 MALIGNANT NEOPLASM OF OVERLAPPING SITES OF RIGHT BREAST IN FEMALE, ESTROGEN RECEPTOR POSITIVE (H): Status: ACTIVE | Noted: 2021-05-27

## 2023-07-26 PROBLEM — M54.41 ACUTE RIGHT-SIDED LOW BACK PAIN WITH RIGHT-SIDED SCIATICA: Status: ACTIVE | Noted: 2018-10-12

## 2023-07-26 PROBLEM — E61.2 MAGNESIUM DEFICIENCY: Status: ACTIVE | Noted: 2022-10-18

## 2023-07-26 PROBLEM — Z17.0 MALIGNANT NEOPLASM OF OVERLAPPING SITES OF RIGHT BREAST IN FEMALE, ESTROGEN RECEPTOR POSITIVE (H): Status: ACTIVE | Noted: 2021-05-27

## 2023-07-26 PROBLEM — K86.2 PANCREATIC CYST: Status: ACTIVE | Noted: 2021-12-03

## 2023-07-26 PROBLEM — F41.9 ANXIETY: Status: ACTIVE | Noted: 2023-07-21

## 2023-07-26 PROBLEM — E83.39 SERUM PHOSPHORUS DECREASED: Status: ACTIVE | Noted: 2022-10-18

## 2023-07-26 PROBLEM — K58.9 IRRITABLE BOWEL SYNDROME: Status: ACTIVE | Noted: 2023-07-21

## 2023-07-26 PROBLEM — F10.10 ALCOHOL ABUSE: Status: ACTIVE | Noted: 2023-07-21

## 2023-07-26 PROBLEM — E78.5 HLD (HYPERLIPIDEMIA): Status: ACTIVE | Noted: 2019-03-12

## 2023-07-26 PROCEDURE — 84443 ASSAY THYROID STIM HORMONE: CPT | Performed by: NURSE PRACTITIONER

## 2023-07-26 PROCEDURE — 86038 ANTINUCLEAR ANTIBODIES: CPT | Performed by: NURSE PRACTITIONER

## 2023-07-26 PROCEDURE — 85025 COMPLETE CBC W/AUTO DIFF WBC: CPT | Performed by: NURSE PRACTITIONER

## 2023-07-26 PROCEDURE — 36415 COLL VENOUS BLD VENIPUNCTURE: CPT | Performed by: NURSE PRACTITIONER

## 2023-07-26 PROCEDURE — 99204 OFFICE O/P NEW MOD 45 MIN: CPT | Performed by: NURSE PRACTITIONER

## 2023-07-26 PROCEDURE — 80053 COMPREHEN METABOLIC PANEL: CPT | Performed by: NURSE PRACTITIONER

## 2023-07-26 RX ORDER — MULTIVIT-MIN/IRON/FOLIC ACID/K 18-600-40
1 CAPSULE ORAL DAILY
Qty: 30 TABLET | Refills: 0 | Status: SHIPPED | OUTPATIENT
Start: 2023-07-26 | End: 2023-08-25

## 2023-07-26 NOTE — PROGRESS NOTES
"Chief Complaint   Patient presents with     Urgent Care     Musculoskeletal Problem     Chronic Legs pain from 1 year ago. Last week rash on ankles, but leg burning and swelling. Pt was taking gabapentin yesterday was helping with the pain.      SUBJECTIVE:  Audelia Comer is a 57 year old female who presents to the clinic today with bilateral lower extremity swelling pain redness blotchy spiderweb-like appearance over the last week.  She has chronic leg pain and just restarted gabapentin recently.  She had erythematous discoloration to her inner calves earlier today, and that resolved by putting a salve on it.  The redness swelling tenderness extends from her feet up through her ankles.  There is no pitting edema bulging varicose vein DVT PE history.  She has not been wearing compression stockings or watching her salt in her diet. Patient request multivitamin with iron be sent to her mail order pharmacy. She has known ulcers, that are \"not bleeding\".    Past Medical History:   Diagnosis Date     Alcoholic liver disease (H)      Anxiety      C. difficile colitis 01/2023     Chronic pancreatitis (H)      Depressive disorder      Invasive ductal carcinoma of breast, right (H)      PONV (postoperative nausea and vomiting)      SBO (small bowel obstruction) (H)      pantoprazole (PROTONIX) 40 MG EC tablet, Take 1 tablet (40 mg) by mouth 2 times daily  QUEtiapine (SEROQUEL) 25 MG tablet, Take 25 mg by mouth 2 times daily In the morning and afternoon  QUEtiapine (SEROQUEL) 25 MG tablet, Take 100 mg by mouth At Bedtime  acetaminophen (TYLENOL) 325 MG tablet, Take 2 tablets (650 mg) by mouth every 6 hours as needed for mild pain Do not take more than 2000 mg per day. Alternate with ibuprofen.  amylase-lipase-protease (CREON 36) [id number] units CPEP, Take 1 capsule by mouth 3 times daily (with meals) DO NOT CRUSH. To be swallowed as whole; Not to be crushed, chewed or retained in mouth. For patients who are " unable to swallow intact capsule, the contents may be sprinkled on soft acidic food.  amylase-lipase-protease (CREON 36) [id number] units CPEP, Take 1 capsule by mouth Take with snacks or supplements (with snacks)  azelastine (ASTELIN) 0.1 % nasal spray, Spray 1 spray into both nostrils daily as needed   calcium carbonate (TUMS) 500 MG chewable tablet, Take 1 tablet (500 mg) by mouth 3 times daily as needed for heartburn  cetirizine (ZYRTEC) 10 MG tablet, Take 10 mg by mouth daily as needed   cholestyramine (QUESTRAN) 4 g packet, Take 1 packet (4 g) by mouth daily  dicyclomine (BENTYL) 10 MG capsule, Take 1 capsule (10 mg) by mouth 4 times daily  fluticasone (FLONASE) 50 MCG/ACT nasal spray, Spray 1 spray into both nostrils daily as needed   folic acid (FOLVITE) 1 MG tablet, Take 1 tablet (1 mg) by mouth daily  mirtazapine (REMERON) 15 MG tablet, Take 30 mg by mouth At Bedtime  nicotine (NICODERM CQ) 21 MG/24HR 24 hr patch, Place 1 patch onto the skin daily  ondansetron (ZOFRAN ODT) 4 MG ODT tab, Take 1 tablet (4 mg) by mouth every 6 hours as needed for nausea  thiamine (B-1) 100 MG tablet, Take 1 tablet (100 mg) by mouth daily    No current facility-administered medications on file prior to visit.    Social History     Tobacco Use     Smoking status: Every Day     Packs/day: 1.00     Types: Cigarettes     Smokeless tobacco: Never   Substance Use Topics     Alcohol use: Yes     Comment: Vague about intake.     No Known Allergies    Review of Systems   All systems negative except for those listed above in HPI.    EXAM:   /76   Pulse 97   Temp 98.3  F (36.8  C) (Temporal)   Resp 16   Wt 49.9 kg (110 lb)   SpO2 98%   BMI 17.75 kg/m      Physical Exam  Vitals reviewed.   Constitutional:       Appearance: Normal appearance.   HENT:      Head: Normocephalic and atraumatic.   Cardiovascular:      Rate and Rhythm: Normal rate.      Pulses: Normal pulses.   Pulmonary:      Effort: Pulmonary effort is  normal.   Musculoskeletal:         General: Tenderness present. Normal range of motion.      Right lower leg: Edema present.      Left lower leg: Edema present.   Skin:     General: Skin is warm and dry.      Findings: Erythema and rash present. No bruising.   Neurological:      General: No focal deficit present.      Mental Status: She is alert and oriented to person, place, and time.      Sensory: Sensory deficit present.   Psychiatric:         Mood and Affect: Mood normal.         Behavior: Behavior normal.        CBC with WBC 15.42 RBCs 3.43 hemoglobin 11 hematocrit 33.4 neutrophils 10.08 monocytes 1.66 and 10.8% discussed with patient.    Results for orders placed or performed in visit on 07/26/23   CBC with platelets and differential     Status: None (In process)    Narrative    The following orders were created for panel order CBC with platelets and differential.  Procedure                               Abnormality         Status                     ---------                               -----------         ------                     CBC with platelets and d...[[id number]]                      In process                   Please view results for these tests on the individual orders.     ASSESSMENT:    ICD-10-CM    1. Bilateral leg edema  R60.0 CBC with platelets and differential     Comprehensive metabolic panel (BMP + Alb, Alk Phos, ALT, AST, Total. Bili, TP)     TSH with free T4 reflex     Anti Nuclear Nissa IgG by IFA with Reflex     Compression Sleeve/Stocking Order for DME - ONLY FOR DME     CBC with platelets and differential     Comprehensive metabolic panel (BMP + Alb, Alk Phos, ALT, AST, Total. Bili, TP)     TSH with free T4 reflex     Anti Nuclear Nissa IgG by IFA with Reflex      2. Dietary iron deficiency  E61.1 Multiple Vitamins-Minerals (MULTI COMPLETE/IRON) TABS      3. Rash  R21       4. Anemia, unspecified type  D64.9         PLAN:    Discussed leg edema with patient  Differentials include kidney  liver thyroid dysfunction hypertension salt in diet sedentary lifestyle new medication autoimmune vasculitis  Gabapentin can cause edema and up to 8% of patients per up-to-date  Recommend rest ice compression stockings elevate  Discussed CBC discrepancies with patient - would favor inflammatory response rather than a true bacterial cellulitis, did not see this appearance in clinic  She also has anemia, that I advised follow-up repeat CBC with primary care in the next couple weeks  Monitor closely and reevaluation if lingering  ER if severe worsening pain redness bulging varicose vein cough shortness of breath bloody sputum fevers    Follow up with primary care provider with any problems, questions or concerns or if symptoms worsen or fail to improve. Patient agreed to plan and verbalized understanding.    Adrienne Benoit, NORMP-BC  [facility] [place]

## 2023-07-27 LAB
ALBUMIN SERPL BCG-MCNC: 4 G/DL (ref 3.5–5.2)
ALP SERPL-CCNC: 161 U/L (ref 35–104)
ALT SERPL W P-5'-P-CCNC: 18 U/L (ref 0–50)
ANION GAP SERPL CALCULATED.3IONS-SCNC: 12 MMOL/L (ref 7–15)
AST SERPL W P-5'-P-CCNC: 36 U/L (ref 0–45)
BASOPHILS # BLD AUTO: 0.1 10E3/UL (ref 0–0.2)
BASOPHILS NFR BLD AUTO: 1 %
BILIRUB SERPL-MCNC: 0.4 MG/DL
BUN SERPL-MCNC: 8 MG/DL (ref 8–23)
CALCIUM SERPL-MCNC: 9.5 MG/DL (ref 8.6–10)
CHLORIDE SERPL-SCNC: 98 MMOL/L (ref 98–107)
CREAT SERPL-MCNC: 0.55 MG/DL (ref 0.51–0.95)
DEPRECATED HCO3 PLAS-SCNC: 22 MMOL/L (ref 22–29)
EOSINOPHIL # BLD AUTO: 0.2 10E3/UL (ref 0–0.7)
EOSINOPHIL NFR BLD AUTO: 1 %
ERYTHROCYTE [DISTWIDTH] IN BLOOD BY AUTOMATED COUNT: 12.7 % (ref 10–15)
GFR SERPL CREATININE-BSD FRML MDRD: >90 ML/MIN/1.73M2
GLUCOSE SERPL-MCNC: 96 MG/DL (ref 70–99)
HCT VFR BLD AUTO: 34.3 % (ref 35–47)
HGB BLD-MCNC: 11.3 G/DL (ref 11.7–15.7)
IMM GRANULOCYTES # BLD: 0.1 10E3/UL
IMM GRANULOCYTES NFR BLD: 0 %
LYMPHOCYTES # BLD AUTO: 3.6 10E3/UL (ref 0.8–5.3)
LYMPHOCYTES NFR BLD AUTO: 21 %
MCH RBC QN AUTO: 32.8 PG (ref 26.5–33)
MCHC RBC AUTO-ENTMCNC: 32.9 G/DL (ref 31.5–36.5)
MCV RBC AUTO: 99 FL (ref 78–100)
MONOCYTES # BLD AUTO: 1.8 10E3/UL (ref 0–1.3)
MONOCYTES NFR BLD AUTO: 11 %
NEUTROPHILS # BLD AUTO: 10.9 10E3/UL (ref 1.6–8.3)
NEUTROPHILS NFR BLD AUTO: 66 %
NRBC # BLD AUTO: 0 10E3/UL
NRBC BLD AUTO-RTO: 0 /100
PLATELET # BLD AUTO: 369 10E3/UL (ref 150–450)
POTASSIUM SERPL-SCNC: 4.2 MMOL/L (ref 3.4–5.3)
PROT SERPL-MCNC: 7.4 G/DL (ref 6.4–8.3)
RBC # BLD AUTO: 3.45 10E6/UL (ref 3.8–5.2)
SODIUM SERPL-SCNC: 132 MMOL/L (ref 136–145)
TSH SERPL DL<=0.005 MIU/L-ACNC: 1.37 UIU/ML (ref 0.3–4.2)
WBC # BLD AUTO: 16.6 10E3/UL (ref 4–11)

## 2023-07-28 LAB — ANA SER QL IF: NEGATIVE

## 2023-08-01 ENCOUNTER — OFFICE VISIT (OUTPATIENT)
Dept: URGENT CARE | Facility: URGENT CARE | Age: 58
End: 2023-08-01
Payer: COMMERCIAL

## 2023-08-01 VITALS
WEIGHT: 110 LBS | DIASTOLIC BLOOD PRESSURE: 82 MMHG | HEART RATE: 104 BPM | BODY MASS INDEX: 17.75 KG/M2 | TEMPERATURE: 97.8 F | OXYGEN SATURATION: 98 % | SYSTOLIC BLOOD PRESSURE: 127 MMHG

## 2023-08-01 DIAGNOSIS — M79.604 PAIN OF RIGHT LOWER EXTREMITY: Primary | ICD-10-CM

## 2023-08-01 DIAGNOSIS — R60.0 LOWER EXTREMITY EDEMA: ICD-10-CM

## 2023-08-01 PROCEDURE — 99214 OFFICE O/P EST MOD 30 MIN: CPT | Performed by: PHYSICIAN ASSISTANT

## 2023-08-02 NOTE — PROGRESS NOTES
Patient presents with:  Urgent Care  Leg Swelling: C/O leg swollen for 2 weeks        (M79.604) Pain of right lower extremity  (primary encounter   (R60.0) Lower extremity edema  Comment: concern for possible DVT (blood clot)  Plan: to Emergency Department now for further evaluation with ultrasound.          SUBJECTIVE:   Audelia Comer is a 57 year old female with a complicated medical history, who presents today with bilateral lower extremity swelling, onset 2 weeks ago, worsening.  Right greater than left.  Now with right posterior lower extremity pain.  Nuys any trauma.  Recently started gabapentin about 2 weeks ago.  Was seen a week ago in the urgent care, does have some anemia, white count was slightly elevated, and sodium was slightly low.      Past Medical History:   Diagnosis Date    Alcoholic liver disease (H)     Anxiety     C. difficile colitis 01/2023    Chronic pancreatitis (H)     Depressive disorder     Invasive ductal carcinoma of breast, right (H)     PONV (postoperative nausea and vomiting)     SBO (small bowel obstruction) (H)          Current Outpatient Medications   Medication Sig Dispense Refill    Multiple Vitamins-Iron (DAILY-GUSTAVO/IRON/BETA-CAROTENE) TABS TAKE 1 TABLET BY MOUTH DAILY. (Patient not taking: Reported on 10/19/2020) 30 tablet 7     Social History     Tobacco Use    Smoking status: Never Smoker    Smokeless tobacco: Never Used   Substance Use Topics    Alcohol use: Not on file     Family History   Problem Relation Age of Onset    Diabetes Mother     Diabetes Father          ROS:    10 point ROS of systems including Constitutional, Eyes, Respiratory, Cardiovascular, Gastroenterology, Genitourinary, Integumentary, Muscularskeletal, Psychiatric ,neurological were all negative except for pertinent positives noted in my HPI       OBJECTIVE:  /82   Pulse 104   Temp 97.8  F (36.6  C) (Tympanic)   Wt 49.9 kg (110 lb)   SpO2 98%   BMI 17.75 kg/m    Physical Exam:  GENERAL  APPEARANCE: healthy, alert and no distress  Extremities: Bilateral lower extremity/pedal edema right greater than left.  Right foot is tender to palpation.  Positive Homans right calf with palpable varicosity.

## 2023-08-04 ENCOUNTER — TELEPHONE (OUTPATIENT)
Dept: URGENT CARE | Facility: URGENT CARE | Age: 58
End: 2023-08-04
Payer: COMMERCIAL

## 2023-08-04 NOTE — TELEPHONE ENCOUNTER
----- Message from Adrienne Benoit NP sent at 7/31/2023 10:15 AM CDT -----  Please let patient know she had low sodium and actually needs to increase her table salt intake. Per her primary care provider telephone encounter the day I saw her - she advised she schedule an ER follow-up to touch base on syncopal events, and correcting her electrolyte imbalances.    Thank you,  Adrienne Benoit NP    ----- Message -----  From: Trino Duran PA-C  Sent: 7/27/2023   8:35 PM CDT  To: SHREYAS Renteria. Can you please touch base with this patient? Her sodium was slightly decreased and her Alk phos was reduced from last check 4 months ago. I don't think she needs anything other than perhaps some electrolytes, PO fluids and a recheck but you can be the .

## 2023-12-01 NOTE — PROGRESS NOTES
[address]       Patient's Name/ Date of Birth/ Gender: Soledad Brady / 1979 (53 y.o.) / female     Attending physician: Saud Angel DO    CC: colorectal cancer screening    History of present Illness: Soledad Brady is a 53 y.o. female, presents today for colonoscopy for:    Active Hospital Problems    Diagnosis Date Noted    Colon cancer screening [Z12.11] 12/01/2023    Family history of colon cancer [Z80.0] 12/01/2023         Colonoscopy history: No prior cscope. Past Medical History:  has a past medical history of Anemia, Anxiety and depression, Anxiety state, Asthma, COPD (chronic obstructive pulmonary disease) ([address]), Epigastric pain, Essential hypertension, Folic acid deficiency, Hiatal hernia, Hiatal hernia with GERD, History of abnormal cervical Pap smear, History of nicotine use, Hyperlipidemia, Hypertension, Hypertensive emergency, Iron deficiency anemia, IUD (intrauterine device) in place, Menometrorrhagia, Migraine without aura and without status migrainosus, not intractable, Morbid obesity due to excess calories ([address]), Obesity (BMI 30-39.9), CORNELIA on CPAP, Postgastrectomy malabsorption, Recurrent major depressive disorder, in partial remission ([address]), and Vitamin D deficiency.     Past Surgical History:   Past Surgical History:   Procedure Laterality Date    BARIATRIC SURGERY  02/22/2016    GASTRIC SLEEVE (IN [place], [place])    BREAST LUMPECTOMY Right     CHOLECYSTECTOMY      COLPOSCOPY      HERNIA REPAIR      DONE DURING GASTRIC SLEEVE    INTRAUTERINE DEVICE INSERTION  08/04/2020    Mirena     LIPOMA RESECTION      lt shoulder/upper back    ADRIAN-EN-Y GASTRIC BYPASS N/A 07/21/2021    XI ROBOTIC LAPAROSCOPIC GASTRIC BYPASS ADRIAN-EN-Y,  EXTENSIVE LYSIS OF ADHESIONS, EGD, REPAIR OF RECURRENT HIATAL HERNIA , G TUBE INSERTION performed by Sugey Caceres DO at [address] [place] [address] N/A 04/01/2021    TRUNK LESION BIOPSY EXCISION RIGHT BACK X Care Management Discharge Note    Discharge Date: 02/03/2023       Received a call from [facility] stating they are unable to secure a home care agency for patient  yet. States they have attempted several agency and has been declined.  Writer called patient and left a VM regarding the situation.   Noted patient has been contacted by Clinic Coordination team since discharge.    Jeronimo Garvey RN CC  [phone]

## 2024-03-14 NOTE — PROGRESS NOTES
Care Management Follow Up    Expected Discharge Date: 03/21/2023     Concerns to be Addressed: Discharge planning      Patient plan of care discussed at interdisciplinary rounds: Yes    Anticipated Discharge Disposition:  Home     Anticipated Discharge Services:  Home Care PT/OT/HHA    Patient/family educated on Medicare website which has current facility and service quality ratings:  Yes  Education Provided on the Discharge Plan:  Yes  Patient/Family in Agreement with the Plan:  Yes    Referrals Placed by CM/SW:  Home Care  Private pay costs discussed: Not applicable    Additional Information:  SW following for discharge planning. Initial SW assessment completed 3/19. Pt is now COVID+. PT evaluated today and are recommending Home PT/OT/HHA. Initial HC referral sent via [facility] to work on obtaining a home care agency that accept's [facility]. [facility] resources/[facility] housing crisis line contact information placed in patient's AVS. SW will continue to follow.     RANDAL Bernal, [facility]   Inpatient Care Coordination  [facility]   [phone]     no concerns

## 2024-11-02 ENCOUNTER — APPOINTMENT (OUTPATIENT)
Dept: CT IMAGING | Facility: CLINIC | Age: 59
DRG: 439 | End: 2024-11-02
Attending: STUDENT IN AN ORGANIZED HEALTH CARE EDUCATION/TRAINING PROGRAM
Payer: MEDICARE

## 2024-11-02 ENCOUNTER — HOSPITAL ENCOUNTER (INPATIENT)
Facility: CLINIC | Age: 59
LOS: 1 days | Discharge: HOME | DRG: 439 | End: 2024-11-03
Attending: STUDENT IN AN ORGANIZED HEALTH CARE EDUCATION/TRAINING PROGRAM | Admitting: HOSPITALIST
Payer: MEDICARE

## 2024-11-02 DIAGNOSIS — K85.90 ACUTE PANCREATITIS, UNSPECIFIED COMPLICATION STATUS, UNSPECIFIED PANCREATITIS TYPE: ICD-10-CM

## 2024-11-02 DIAGNOSIS — E87.6 HYPOKALEMIA: ICD-10-CM

## 2024-11-02 LAB
ALBUMIN SERPL BCG-MCNC: 4.3 G/DL (ref 3.5–5.2)
ALP SERPL-CCNC: 237 U/L (ref 40–150)
ALT SERPL W P-5'-P-CCNC: 30 U/L (ref 0–50)
ANION GAP SERPL CALCULATED.3IONS-SCNC: 22 MMOL/L (ref 7–15)
AST SERPL W P-5'-P-CCNC: 48 U/L (ref 0–45)
BASOPHILS # BLD AUTO: 0 10E3/UL (ref 0–0.2)
BASOPHILS NFR BLD AUTO: 0 %
BILIRUB SERPL-MCNC: 1.1 MG/DL
BUN SERPL-MCNC: 5.7 MG/DL (ref 8–23)
CALCIUM SERPL-MCNC: 9.6 MG/DL (ref 8.8–10.4)
CHLORIDE SERPL-SCNC: 92 MMOL/L (ref 98–107)
CREAT SERPL-MCNC: 0.53 MG/DL (ref 0.51–0.95)
EGFRCR SERPLBLD CKD-EPI 2021: >90 ML/MIN/1.73M2
EOSINOPHIL # BLD AUTO: 0 10E3/UL (ref 0–0.7)
EOSINOPHIL NFR BLD AUTO: 0 %
ERYTHROCYTE [DISTWIDTH] IN BLOOD BY AUTOMATED COUNT: 12.1 % (ref 10–15)
GLUCOSE SERPL-MCNC: 86 MG/DL (ref 70–99)
HCO3 SERPL-SCNC: 18 MMOL/L (ref 22–29)
HCT VFR BLD AUTO: 38.6 % (ref 35–47)
HGB BLD-MCNC: 13.8 G/DL (ref 11.7–15.7)
HOLD SPECIMEN: NORMAL
HOLD SPECIMEN: NORMAL
IMM GRANULOCYTES # BLD: 0.1 10E3/UL
IMM GRANULOCYTES NFR BLD: 0 %
LIPASE SERPL-CCNC: 40 U/L (ref 13–60)
LYMPHOCYTES # BLD AUTO: 1.4 10E3/UL (ref 0.8–5.3)
LYMPHOCYTES NFR BLD AUTO: 10 %
MAGNESIUM SERPL-MCNC: 1.4 MG/DL (ref 1.7–2.3)
MCH RBC QN AUTO: 32.5 PG (ref 26.5–33)
MCHC RBC AUTO-ENTMCNC: 35.8 G/DL (ref 31.5–36.5)
MCV RBC AUTO: 91 FL (ref 78–100)
MONOCYTES # BLD AUTO: 1.4 10E3/UL (ref 0–1.3)
MONOCYTES NFR BLD AUTO: 10 %
NEUTROPHILS # BLD AUTO: 10.5 10E3/UL (ref 1.6–8.3)
NEUTROPHILS NFR BLD AUTO: 79 %
NRBC # BLD AUTO: 0 10E3/UL
NRBC BLD AUTO-RTO: 0 /100
PHOSPHATE SERPL-MCNC: 2.4 MG/DL (ref 2.5–4.5)
PLATELET # BLD AUTO: 237 10E3/UL (ref 150–450)
POTASSIUM SERPL-SCNC: 3 MMOL/L (ref 3.4–5.3)
PROT SERPL-MCNC: 8.4 G/DL (ref 6.4–8.3)
RBC # BLD AUTO: 4.24 10E6/UL (ref 3.8–5.2)
SODIUM SERPL-SCNC: 132 MMOL/L (ref 135–145)
WBC # BLD AUTO: 13.4 10E3/UL (ref 4–11)

## 2024-11-02 PROCEDURE — 250N000011 HC RX IP 250 OP 636: Performed by: STUDENT IN AN ORGANIZED HEALTH CARE EDUCATION/TRAINING PROGRAM

## 2024-11-02 PROCEDURE — 250N000013 HC RX MED GY IP 250 OP 250 PS 637: Performed by: HOSPITALIST

## 2024-11-02 PROCEDURE — 96374 THER/PROPH/DIAG INJ IV PUSH: CPT

## 2024-11-02 PROCEDURE — 99222 1ST HOSP IP/OBS MODERATE 55: CPT | Mod: AI | Performed by: HOSPITALIST

## 2024-11-02 PROCEDURE — 83690 ASSAY OF LIPASE: CPT | Performed by: STUDENT IN AN ORGANIZED HEALTH CARE EDUCATION/TRAINING PROGRAM

## 2024-11-02 PROCEDURE — 96361 HYDRATE IV INFUSION ADD-ON: CPT

## 2024-11-02 PROCEDURE — 99285 EMERGENCY DEPT VISIT HI MDM: CPT | Mod: 25

## 2024-11-02 PROCEDURE — 120N000001 HC R&B MED SURG/OB

## 2024-11-02 PROCEDURE — 82040 ASSAY OF SERUM ALBUMIN: CPT | Performed by: STUDENT IN AN ORGANIZED HEALTH CARE EDUCATION/TRAINING PROGRAM

## 2024-11-02 PROCEDURE — 85025 COMPLETE CBC W/AUTO DIFF WBC: CPT | Performed by: STUDENT IN AN ORGANIZED HEALTH CARE EDUCATION/TRAINING PROGRAM

## 2024-11-02 PROCEDURE — 96375 TX/PRO/DX INJ NEW DRUG ADDON: CPT

## 2024-11-02 PROCEDURE — 80053 COMPREHEN METABOLIC PANEL: CPT | Performed by: STUDENT IN AN ORGANIZED HEALTH CARE EDUCATION/TRAINING PROGRAM

## 2024-11-02 PROCEDURE — 36415 COLL VENOUS BLD VENIPUNCTURE: CPT | Performed by: HOSPITALIST

## 2024-11-02 PROCEDURE — 83735 ASSAY OF MAGNESIUM: CPT | Performed by: HOSPITALIST

## 2024-11-02 PROCEDURE — 258N000003 HC RX IP 258 OP 636: Performed by: STUDENT IN AN ORGANIZED HEALTH CARE EDUCATION/TRAINING PROGRAM

## 2024-11-02 PROCEDURE — 36415 COLL VENOUS BLD VENIPUNCTURE: CPT | Performed by: STUDENT IN AN ORGANIZED HEALTH CARE EDUCATION/TRAINING PROGRAM

## 2024-11-02 PROCEDURE — 258N000003 HC RX IP 258 OP 636: Performed by: HOSPITALIST

## 2024-11-02 PROCEDURE — 84100 ASSAY OF PHOSPHORUS: CPT | Performed by: HOSPITALIST

## 2024-11-02 PROCEDURE — 74177 CT ABD & PELVIS W/CONTRAST: CPT | Mod: MA

## 2024-11-02 RX ORDER — MORPHINE SULFATE 4 MG/ML
4 INJECTION, SOLUTION INTRAMUSCULAR; INTRAVENOUS ONCE
Status: COMPLETED | OUTPATIENT
Start: 2024-11-02 | End: 2024-11-02

## 2024-11-02 RX ORDER — ESCITALOPRAM OXALATE 10 MG/1
10 TABLET ORAL DAILY
Status: DISCONTINUED | OUTPATIENT
Start: 2024-11-03 | End: 2024-11-03 | Stop reason: HOSPADM

## 2024-11-02 RX ORDER — ESCITALOPRAM OXALATE 10 MG/1
10 TABLET ORAL DAILY
COMMUNITY
Start: 2024-10-04

## 2024-11-02 RX ORDER — QUETIAPINE FUMARATE 50 MG/1
100 TABLET, FILM COATED ORAL AT BEDTIME
Status: DISCONTINUED | OUTPATIENT
Start: 2024-11-02 | End: 2024-11-03 | Stop reason: HOSPADM

## 2024-11-02 RX ORDER — IBUPROFEN 600 MG/1
600 TABLET, FILM COATED ORAL EVERY 8 HOURS PRN
Status: DISCONTINUED | OUTPATIENT
Start: 2024-11-02 | End: 2024-11-03 | Stop reason: HOSPADM

## 2024-11-02 RX ORDER — IBUPROFEN 600 MG/1
600 TABLET, FILM COATED ORAL EVERY 8 HOURS PRN
COMMUNITY

## 2024-11-02 RX ORDER — ONDANSETRON 4 MG/1
4 TABLET, ORALLY DISINTEGRATING ORAL EVERY 6 HOURS PRN
Status: DISCONTINUED | OUTPATIENT
Start: 2024-11-02 | End: 2024-11-03 | Stop reason: HOSPADM

## 2024-11-02 RX ORDER — MIRTAZAPINE 15 MG/1
30 TABLET, FILM COATED ORAL AT BEDTIME
Status: DISCONTINUED | OUTPATIENT
Start: 2024-11-02 | End: 2024-11-03 | Stop reason: HOSPADM

## 2024-11-02 RX ORDER — AMOXICILLIN 250 MG
2 CAPSULE ORAL 2 TIMES DAILY PRN
Status: DISCONTINUED | OUTPATIENT
Start: 2024-11-02 | End: 2024-11-03 | Stop reason: HOSPADM

## 2024-11-02 RX ORDER — PANTOPRAZOLE SODIUM 40 MG/1
40 TABLET, DELAYED RELEASE ORAL 2 TIMES DAILY
Status: DISCONTINUED | OUTPATIENT
Start: 2024-11-02 | End: 2024-11-03 | Stop reason: HOSPADM

## 2024-11-02 RX ORDER — FOLIC ACID 1 MG/1
1 TABLET ORAL DAILY
Status: DISCONTINUED | OUTPATIENT
Start: 2024-11-03 | End: 2024-11-03 | Stop reason: HOSPADM

## 2024-11-02 RX ORDER — QUETIAPINE FUMARATE 50 MG/1
50 TABLET, FILM COATED ORAL 2 TIMES DAILY PRN
Status: DISCONTINUED | OUTPATIENT
Start: 2024-11-02 | End: 2024-11-03 | Stop reason: HOSPADM

## 2024-11-02 RX ORDER — HYDROMORPHONE HYDROCHLORIDE 2 MG/1
4 TABLET ORAL EVERY 4 HOURS
Status: COMPLETED | OUTPATIENT
Start: 2024-11-02 | End: 2024-11-03

## 2024-11-02 RX ORDER — LANOLIN ALCOHOL/MO/W.PET/CERES
1000 CREAM (GRAM) TOPICAL DAILY
COMMUNITY

## 2024-11-02 RX ORDER — IOPAMIDOL 755 MG/ML
500 INJECTION, SOLUTION INTRAVASCULAR ONCE
Status: COMPLETED | OUTPATIENT
Start: 2024-11-02 | End: 2024-11-02

## 2024-11-02 RX ORDER — NALOXONE HYDROCHLORIDE 0.4 MG/ML
0.2 INJECTION, SOLUTION INTRAMUSCULAR; INTRAVENOUS; SUBCUTANEOUS
Status: DISCONTINUED | OUTPATIENT
Start: 2024-11-02 | End: 2024-11-03 | Stop reason: HOSPADM

## 2024-11-02 RX ORDER — MAGNESIUM SULFATE HEPTAHYDRATE 40 MG/ML
4 INJECTION, SOLUTION INTRAVENOUS ONCE
Status: COMPLETED | OUTPATIENT
Start: 2024-11-03 | End: 2024-11-03

## 2024-11-02 RX ORDER — MIRTAZAPINE 30 MG/1
30 TABLET, FILM COATED ORAL AT BEDTIME
COMMUNITY
Start: 2024-08-07

## 2024-11-02 RX ORDER — ACETAMINOPHEN 325 MG/1
650 TABLET ORAL EVERY 6 HOURS PRN
Status: DISCONTINUED | OUTPATIENT
Start: 2024-11-02 | End: 2024-11-03 | Stop reason: HOSPADM

## 2024-11-02 RX ORDER — POTASSIUM CHLORIDE 1500 MG/1
20 TABLET, EXTENDED RELEASE ORAL ONCE
Status: COMPLETED | OUTPATIENT
Start: 2024-11-03 | End: 2024-11-03

## 2024-11-02 RX ORDER — ONDANSETRON 4 MG/1
4 TABLET, FILM COATED ORAL EVERY 6 HOURS PRN
Status: DISCONTINUED | OUTPATIENT
Start: 2024-11-02 | End: 2024-11-02

## 2024-11-02 RX ORDER — FLUTICASONE PROPIONATE 50 MCG
1 SPRAY, SUSPENSION (ML) NASAL DAILY PRN
Status: DISCONTINUED | OUTPATIENT
Start: 2024-11-02 | End: 2024-11-03 | Stop reason: HOSPADM

## 2024-11-02 RX ORDER — ONDANSETRON 2 MG/ML
4 INJECTION INTRAMUSCULAR; INTRAVENOUS ONCE
Status: COMPLETED | OUTPATIENT
Start: 2024-11-02 | End: 2024-11-02

## 2024-11-02 RX ORDER — AMOXICILLIN 250 MG
1 CAPSULE ORAL 2 TIMES DAILY PRN
Status: DISCONTINUED | OUTPATIENT
Start: 2024-11-02 | End: 2024-11-03 | Stop reason: HOSPADM

## 2024-11-02 RX ORDER — NALOXONE HYDROCHLORIDE 0.4 MG/ML
0.4 INJECTION, SOLUTION INTRAMUSCULAR; INTRAVENOUS; SUBCUTANEOUS
Status: DISCONTINUED | OUTPATIENT
Start: 2024-11-02 | End: 2024-11-03 | Stop reason: HOSPADM

## 2024-11-02 RX ORDER — POTASSIUM CHLORIDE 1500 MG/1
40 TABLET, EXTENDED RELEASE ORAL ONCE
Status: COMPLETED | OUTPATIENT
Start: 2024-11-02 | End: 2024-11-02

## 2024-11-02 RX ORDER — HYDROXYZINE HYDROCHLORIDE 50 MG/1
50-100 TABLET, FILM COATED ORAL AT BEDTIME
Status: DISCONTINUED | OUTPATIENT
Start: 2024-11-02 | End: 2024-11-03 | Stop reason: HOSPADM

## 2024-11-02 RX ORDER — AZELASTINE 1 MG/ML
1 SPRAY, METERED NASAL DAILY PRN
Status: DISCONTINUED | OUTPATIENT
Start: 2024-11-02 | End: 2024-11-03 | Stop reason: HOSPADM

## 2024-11-02 RX ORDER — POTASSIUM CHLORIDE 7.45 MG/ML
10 INJECTION INTRAVENOUS ONCE
Status: COMPLETED | OUTPATIENT
Start: 2024-11-02 | End: 2024-11-02

## 2024-11-02 RX ORDER — LANOLIN ALCOHOL/MO/W.PET/CERES
1000 CREAM (GRAM) TOPICAL DAILY
Status: DISCONTINUED | OUTPATIENT
Start: 2024-11-03 | End: 2024-11-03 | Stop reason: HOSPADM

## 2024-11-02 RX ORDER — NICOTINE 21 MG/24HR
1 PATCH, TRANSDERMAL 24 HOURS TRANSDERMAL DAILY
Status: DISCONTINUED | OUTPATIENT
Start: 2024-11-02 | End: 2024-11-03 | Stop reason: HOSPADM

## 2024-11-02 RX ORDER — HYDROXYZINE HYDROCHLORIDE 25 MG/1
2-4 TABLET, FILM COATED ORAL AT BEDTIME
Status: ON HOLD | COMMUNITY
Start: 2023-12-05 | End: 2024-11-03

## 2024-11-02 RX ORDER — SORBITOL SOLUTION 70 %
30 SOLUTION, ORAL MISCELLANEOUS DAILY
Status: DISCONTINUED | OUTPATIENT
Start: 2024-11-03 | End: 2024-11-03 | Stop reason: HOSPADM

## 2024-11-02 RX ADMIN — SODIUM CHLORIDE, POTASSIUM CHLORIDE, SODIUM LACTATE AND CALCIUM CHLORIDE 1000 ML: 600; 310; 30; 20 INJECTION, SOLUTION INTRAVENOUS at 20:03

## 2024-11-02 RX ADMIN — POTASSIUM CHLORIDE 40 MEQ: 1500 TABLET, EXTENDED RELEASE ORAL at 22:54

## 2024-11-02 RX ADMIN — POTASSIUM CHLORIDE 10 MEQ: 7.46 INJECTION, SOLUTION INTRAVENOUS at 18:41

## 2024-11-02 RX ADMIN — NICOTINE 1 PATCH: 21 PATCH, EXTENDED RELEASE TRANSDERMAL at 21:44

## 2024-11-02 RX ADMIN — SODIUM CHLORIDE 500 ML: 9 INJECTION, SOLUTION INTRAVENOUS at 22:58

## 2024-11-02 RX ADMIN — PANTOPRAZOLE SODIUM 40 MG: 40 TABLET, DELAYED RELEASE ORAL at 21:44

## 2024-11-02 RX ADMIN — MORPHINE SULFATE 4 MG: 4 INJECTION, SOLUTION INTRAMUSCULAR; INTRAVENOUS at 16:39

## 2024-11-02 RX ADMIN — SENNOSIDES AND DOCUSATE SODIUM 1 TABLET: 50; 8.6 TABLET ORAL at 21:44

## 2024-11-02 RX ADMIN — MIRTAZAPINE 30 MG: 15 TABLET, FILM COATED ORAL at 21:43

## 2024-11-02 RX ADMIN — IBUPROFEN 600 MG: 600 TABLET, FILM COATED ORAL at 21:44

## 2024-11-02 RX ADMIN — HYDROXYZINE HYDROCHLORIDE 50 MG: 50 TABLET, FILM COATED ORAL at 21:44

## 2024-11-02 RX ADMIN — MORPHINE SULFATE 4 MG: 4 INJECTION, SOLUTION INTRAMUSCULAR; INTRAVENOUS at 18:42

## 2024-11-02 RX ADMIN — SODIUM CHLORIDE 1000 ML: 9 INJECTION, SOLUTION INTRAVENOUS at 15:56

## 2024-11-02 RX ADMIN — IOPAMIDOL 55 ML: 755 INJECTION, SOLUTION INTRAVENOUS at 16:50

## 2024-11-02 RX ADMIN — ONDANSETRON 4 MG: 2 INJECTION INTRAMUSCULAR; INTRAVENOUS at 15:56

## 2024-11-02 RX ADMIN — QUETIAPINE FUMARATE 100 MG: 50 TABLET ORAL at 21:43

## 2024-11-02 ASSESSMENT — COLUMBIA-SUICIDE SEVERITY RATING SCALE - C-SSRS
2. HAVE YOU ACTUALLY HAD ANY THOUGHTS OF KILLING YOURSELF IN THE PAST MONTH?: NO
6. HAVE YOU EVER DONE ANYTHING, STARTED TO DO ANYTHING, OR PREPARED TO DO ANYTHING TO END YOUR LIFE?: NO
1. IN THE PAST MONTH, HAVE YOU WISHED YOU WERE DEAD OR WISHED YOU COULD GO TO SLEEP AND NOT WAKE UP?: NO
6. HAVE YOU EVER DONE ANYTHING, STARTED TO DO ANYTHING, OR PREPARED TO DO ANYTHING TO END YOUR LIFE?: NO
1. IN THE PAST MONTH, HAVE YOU WISHED YOU WERE DEAD OR WISHED YOU COULD GO TO SLEEP AND NOT WAKE UP?: NO
2. HAVE YOU ACTUALLY HAD ANY THOUGHTS OF KILLING YOURSELF IN THE PAST MONTH?: NO

## 2024-11-02 ASSESSMENT — ACTIVITIES OF DAILY LIVING (ADL)
ADLS_ACUITY_SCORE: 0

## 2024-11-02 NOTE — ED NOTES
Bed: ED17  Expected date: 11/2/24  Expected time:   Means of arrival:   Comments:  IN2, Abd pain, IV pain meds

## 2024-11-02 NOTE — ED NOTES
RECEIVING UNIT ED HANDOFF REVIEW    Above ED Nurse Handoff Report was reviewed: Yes  Reviewed by: Renetta Perez, RN on November 2, 2024 at 7:47 PM   [facility] called the ED to inform them the note was read: Gabrielle HOWELL [facility]  ED Nurse Handoff Report    ED Chief complaint: Abdominal Pain  . ED Diagnosis:   Final diagnoses:   Hypokalemia   Acute pancreatitis, unspecified complication status, unspecified pancreatitis type       Allergies:   Allergies   Allergen Reactions    Gabapentin Swelling       Code Status: Full Code    Activity level - Baseline/Home:  independent.  Activity Level - Current:   standby.   Lift room needed: No.   Bariatric: No   Needed: No   Isolation: No.   Infection: Not Applicable.     Respiratory status: Room air    Vital Signs (within 30 minutes):   Vitals:    11/02/24 1641 11/02/24 1646 11/02/24 1711 11/02/24 1712   BP:       Pulse:    109   Resp:   18    Temp:    97.7  F (36.5  C)   TempSrc:    Oral   SpO2: 100% 99%       Pain level:  abd pain getting morphine for pain  Patient confused: No.   Patient Falls Risk: patient and family education.   Elimination Status: Has voided      Focused Assessment:  GastrointestinalGastrointestinal WDL: all (PT COMES IN WITH N/V/CONSTIPATION/ABD BLOATING. PT STATES SHE HASnt had a BM in a week. pt is passing gas)GI Signs/Symptoms: abdominal discomfort; abdominal fullness; constipation; nausea; vomitingAbdominal Appearance:  (pt reports feeling bloated)All Quadrants Bowel Sounds: hypoactiveAdditional Documentation: Bowel Sounds (Row)     Abnormal Results:   Labs Ordered and Resulted from Time of ED Arrival to Time of ED Departure   COMPREHENSIVE METABOLIC PANEL - Abnormal       Result Value    Sodium 132 (*)     Potassium 3.0 (*)     Carbon Dioxide (CO2) 18 (*)     Anion Gap 22 (*)     Urea Nitrogen 5.7 (*)     Creatinine 0.53      GFR Estimate >90      Calcium 9.6      Chloride 92 (*)     Glucose 86      Alkaline  Phosphatase 237 (*)     AST 48 (*)     ALT 30      Protein Total 8.4 (*)     Albumin 4.3      Bilirubin Total 1.1     CBC WITH PLATELETS AND DIFFERENTIAL - Abnormal    WBC Count 13.4 (*)     RBC Count 4.24      Hemoglobin 13.8      Hematocrit 38.6      MCV 91      MCH 32.5      MCHC 35.8      RDW 12.1      Platelet Count 237      % Neutrophils 79      % Lymphocytes 10      % Monocytes 10      % Eosinophils 0      % Basophils 0      % Immature Granulocytes 0      NRBCs per 100 WBC 0      Absolute Neutrophils 10.5 (*)     Absolute Lymphocytes 1.4      Absolute Monocytes 1.4 (*)     Absolute Eosinophils 0.0      Absolute Basophils 0.0      Absolute Immature Granulocytes 0.1      Absolute NRBCs 0.0     LIPASE - Normal    Lipase 40          CT Abdomen Pelvis w Contrast   Final Result   IMPRESSION:    1.  Findings suggesting acute on chronic pancreatitis.   2.  Hepatic steatosis with cirrhotic morphology.             Treatments provided: PIV, LABS, MEDS, CT, PAINCONTROL, MONITORING,   Family Comments: NONE  OBS brochure/video discussed/provided to patient:  No  ED Medications:   Medications   potassium chloride 10 mEq in 100 mL sterile water infusion (10 mEq Intravenous $New Bag 11/2/24 1841)   lactated ringers BOLUS 1,000 mL (has no administration in time range)   sodium chloride 0.9% BOLUS 1,000 mL (0 mLs Intravenous Stopped 11/2/24 1712)   ondansetron (ZOFRAN) injection 4 mg (4 mg Intravenous $Given 11/2/24 1556)   morphine (PF) injection 4 mg (4 mg Intravenous $Given 11/2/24 1639)   sodium chloride (PF) 0.9% PF flush 100 mL (54 mLs Intravenous $Given 11/2/24 1650)   iopamidol (ISOVUE-370) solution 500 mL (55 mLs Intravenous $Given 11/2/24 1650)   morphine (PF) injection 4 mg (4 mg Intravenous $Given 11/2/24 1842)       Drips infusing:  Yes  For the majority of the shift this patient was Green.   Interventions performed were NONE.    Sepsis treatment initiated: No    Cares/treatment/interventions/medications to be  completed following ED care: PER FLOOR PROTOCOL, PAIN CONTROL, LABS, CLEARLIQUID DIET,    ED Nurse Name: Adithya Gautam RN  6:56 PM

## 2024-11-02 NOTE — ED TRIAGE NOTES
Pt c/o generalized abdominal pain and bloating for a week. Last BM was about a week ago. No problems urinating. Emesis last Saturday, and again on Monday after eating ice cream. Pt not sleeping d/t pain.      Triage Assessment (Adult)       Row Name 11/02/24 1519          Triage Assessment    Airway WDL WDL        Respiratory WDL    Respiratory WDL WDL        Skin Circulation/Temperature WDL    Skin Circulation/Temperature WDL WDL        Cardiac WDL    Cardiac WDL WDL        Peripheral/Neurovascular WDL    Peripheral Neurovascular WDL WDL        Cognitive/Neuro/Behavioral WDL    Cognitive/Neuro/Behavioral WDL WDL

## 2024-11-03 VITALS
WEIGHT: 293 LBS | BODY MASS INDEX: 47.09 KG/M2 | SYSTOLIC BLOOD PRESSURE: 94 MMHG | TEMPERATURE: 97.4 F | DIASTOLIC BLOOD PRESSURE: 66 MMHG | HEART RATE: 93 BPM | RESPIRATION RATE: 16 BRPM | HEIGHT: 66 IN | OXYGEN SATURATION: 91 %

## 2024-11-03 LAB
ALBUMIN SERPL BCG-MCNC: 3.7 G/DL (ref 3.5–5.2)
ALP SERPL-CCNC: 179 U/L (ref 40–150)
ALT SERPL W P-5'-P-CCNC: 22 U/L (ref 0–50)
ANION GAP SERPL CALCULATED.3IONS-SCNC: 11 MMOL/L (ref 7–15)
AST SERPL W P-5'-P-CCNC: 29 U/L (ref 0–45)
BASOPHILS # BLD AUTO: 0.1 10E3/UL (ref 0–0.2)
BASOPHILS NFR BLD AUTO: 1 %
BILIRUB SERPL-MCNC: 0.7 MG/DL
BUN SERPL-MCNC: 3.3 MG/DL (ref 8–23)
CALCIUM SERPL-MCNC: 8.3 MG/DL (ref 8.8–10.4)
CHLORIDE SERPL-SCNC: 99 MMOL/L (ref 98–107)
CREAT SERPL-MCNC: 0.49 MG/DL (ref 0.51–0.95)
EGFRCR SERPLBLD CKD-EPI 2021: >90 ML/MIN/1.73M2
EOSINOPHIL # BLD AUTO: 0.1 10E3/UL (ref 0–0.7)
EOSINOPHIL NFR BLD AUTO: 1 %
ERYTHROCYTE [DISTWIDTH] IN BLOOD BY AUTOMATED COUNT: 12.4 % (ref 10–15)
GLUCOSE SERPL-MCNC: 103 MG/DL (ref 70–99)
HCO3 SERPL-SCNC: 20 MMOL/L (ref 22–29)
HCT VFR BLD AUTO: 33.9 % (ref 35–47)
HGB BLD-MCNC: 11.7 G/DL (ref 11.7–15.7)
IMM GRANULOCYTES # BLD: 0 10E3/UL
IMM GRANULOCYTES NFR BLD: 0 %
LYMPHOCYTES # BLD AUTO: 1.5 10E3/UL (ref 0.8–5.3)
LYMPHOCYTES NFR BLD AUTO: 18 %
MAGNESIUM SERPL-MCNC: 2.4 MG/DL (ref 1.7–2.3)
MCH RBC QN AUTO: 32.5 PG (ref 26.5–33)
MCHC RBC AUTO-ENTMCNC: 34.5 G/DL (ref 31.5–36.5)
MCV RBC AUTO: 94 FL (ref 78–100)
MONOCYTES # BLD AUTO: 1.3 10E3/UL (ref 0–1.3)
MONOCYTES NFR BLD AUTO: 15 %
NEUTROPHILS # BLD AUTO: 5.6 10E3/UL (ref 1.6–8.3)
NEUTROPHILS NFR BLD AUTO: 65 %
NRBC # BLD AUTO: 0 10E3/UL
NRBC BLD AUTO-RTO: 0 /100
PHOSPHATE SERPL-MCNC: 2 MG/DL (ref 2.5–4.5)
PLATELET # BLD AUTO: 182 10E3/UL (ref 150–450)
POTASSIUM SERPL-SCNC: 3.9 MMOL/L (ref 3.4–5.3)
POTASSIUM SERPL-SCNC: 4 MMOL/L (ref 3.4–5.3)
PROT SERPL-MCNC: 6.9 G/DL (ref 6.4–8.3)
RBC # BLD AUTO: 3.6 10E6/UL (ref 3.8–5.2)
SODIUM SERPL-SCNC: 130 MMOL/L (ref 135–145)
WBC # BLD AUTO: 8.6 10E3/UL (ref 4–11)

## 2024-11-03 PROCEDURE — 250N000013 HC RX MED GY IP 250 OP 250 PS 637: Performed by: HOSPITALIST

## 2024-11-03 PROCEDURE — 82040 ASSAY OF SERUM ALBUMIN: CPT | Performed by: HOSPITALIST

## 2024-11-03 PROCEDURE — 84132 ASSAY OF SERUM POTASSIUM: CPT | Performed by: HOSPITALIST

## 2024-11-03 PROCEDURE — 84100 ASSAY OF PHOSPHORUS: CPT | Performed by: HOSPITALIST

## 2024-11-03 PROCEDURE — 85014 HEMATOCRIT: CPT | Performed by: HOSPITALIST

## 2024-11-03 PROCEDURE — 250N000011 HC RX IP 250 OP 636: Performed by: HOSPITALIST

## 2024-11-03 PROCEDURE — 36415 COLL VENOUS BLD VENIPUNCTURE: CPT | Performed by: HOSPITALIST

## 2024-11-03 PROCEDURE — 85004 AUTOMATED DIFF WBC COUNT: CPT | Performed by: HOSPITALIST

## 2024-11-03 PROCEDURE — 80053 COMPREHEN METABOLIC PANEL: CPT | Performed by: HOSPITALIST

## 2024-11-03 PROCEDURE — 80051 ELECTROLYTE PANEL: CPT | Performed by: HOSPITALIST

## 2024-11-03 PROCEDURE — 99239 HOSP IP/OBS DSCHRG MGMT >30: CPT | Performed by: INTERNAL MEDICINE

## 2024-11-03 PROCEDURE — 83735 ASSAY OF MAGNESIUM: CPT | Performed by: HOSPITALIST

## 2024-11-03 PROCEDURE — 250N000013 HC RX MED GY IP 250 OP 250 PS 637: Performed by: INTERNAL MEDICINE

## 2024-11-03 RX ORDER — HYDROXYZINE HYDROCHLORIDE 25 MG/1
25 TABLET, FILM COATED ORAL EVERY 6 HOURS PRN
Qty: 30 TABLET | Refills: 0 | Status: SHIPPED | OUTPATIENT
Start: 2024-11-03

## 2024-11-03 RX ORDER — HYDROMORPHONE HYDROCHLORIDE 2 MG/1
2 TABLET ORAL EVERY 4 HOURS PRN
Status: DISCONTINUED | OUTPATIENT
Start: 2024-11-03 | End: 2024-11-03 | Stop reason: HOSPADM

## 2024-11-03 RX ORDER — HYDROXYZINE HYDROCHLORIDE 25 MG/1
25 TABLET, FILM COATED ORAL EVERY 6 HOURS PRN
Status: DISCONTINUED | OUTPATIENT
Start: 2024-11-03 | End: 2024-11-03 | Stop reason: HOSPADM

## 2024-11-03 RX ORDER — HYDROMORPHONE HYDROCHLORIDE 2 MG/1
1 TABLET ORAL EVERY 6 HOURS PRN
Qty: 3 TABLET | Refills: 0 | Status: SHIPPED | OUTPATIENT
Start: 2024-11-03

## 2024-11-03 RX ADMIN — CYANOCOBALAMIN TAB 1000 MCG 1000 MCG: 1000 TAB at 07:20

## 2024-11-03 RX ADMIN — POTASSIUM & SODIUM PHOSPHATES POWDER PACK 280-160-250 MG 1 PACKET: 280-160-250 PACK at 13:52

## 2024-11-03 RX ADMIN — SORBITOL SOLUTION (BULK) 30 ML: 70 SOLUTION at 07:22

## 2024-11-03 RX ADMIN — HYDROMORPHONE HYDROCHLORIDE 4 MG: 2 TABLET ORAL at 04:02

## 2024-11-03 RX ADMIN — HYDROXYZINE HYDROCHLORIDE 25 MG: 25 TABLET, FILM COATED ORAL at 09:33

## 2024-11-03 RX ADMIN — MAGNESIUM SULFATE HEPTAHYDRATE 4 G: 4 INJECTION, SOLUTION INTRAVENOUS at 00:20

## 2024-11-03 RX ADMIN — PANTOPRAZOLE SODIUM 40 MG: 40 TABLET, DELAYED RELEASE ORAL at 07:21

## 2024-11-03 RX ADMIN — IBUPROFEN 600 MG: 600 TABLET, FILM COATED ORAL at 07:21

## 2024-11-03 RX ADMIN — POTASSIUM & SODIUM PHOSPHATES POWDER PACK 280-160-250 MG 1 PACKET: 280-160-250 PACK at 09:33

## 2024-11-03 RX ADMIN — POTASSIUM & SODIUM PHOSPHATES POWDER PACK 280-160-250 MG 1 PACKET: 280-160-250 PACK at 07:21

## 2024-11-03 RX ADMIN — THIAMINE HCL TAB 100 MG 100 MG: 100 TAB at 07:21

## 2024-11-03 RX ADMIN — HYDROMORPHONE HYDROCHLORIDE 2 MG: 2 TABLET ORAL at 09:33

## 2024-11-03 RX ADMIN — FOLIC ACID 1 MG: 1 TABLET ORAL at 07:21

## 2024-11-03 RX ADMIN — ESCITALOPRAM OXALATE 10 MG: 10 TABLET ORAL at 07:21

## 2024-11-03 RX ADMIN — HYDROMORPHONE HYDROCHLORIDE 2 MG: 2 TABLET ORAL at 13:52

## 2024-11-03 RX ADMIN — POTASSIUM & SODIUM PHOSPHATES POWDER PACK 280-160-250 MG 1 PACKET: 280-160-250 PACK at 04:02

## 2024-11-03 RX ADMIN — QUETIAPINE FUMARATE 50 MG: 50 TABLET ORAL at 09:33

## 2024-11-03 RX ADMIN — POTASSIUM CHLORIDE 20 MEQ: 1500 TABLET, EXTENDED RELEASE ORAL at 00:20

## 2024-11-03 RX ADMIN — POTASSIUM & SODIUM PHOSPHATES POWDER PACK 280-160-250 MG 1 PACKET: 280-160-250 PACK at 00:20

## 2024-11-03 RX ADMIN — HYDROMORPHONE HYDROCHLORIDE 4 MG: 2 TABLET ORAL at 00:20

## 2024-11-03 ASSESSMENT — ACTIVITIES OF DAILY LIVING (ADL)
ADLS_ACUITY_SCORE: 0
DEPENDENT_IADLS:: TRANSPORTATION
ADLS_ACUITY_SCORE: 0

## 2024-11-03 NOTE — PLAN OF CARE
"Goal Outcome Evaluation:      Plan of Care Reviewed With: patient    Overall Patient Progress: improvingOverall Patient Progress: improving    Outcome Evaluation: Pt AOx4. RA. SL. Q4VS. K,Mg,Phos replaced; to be rechecked this morning. Recieved 2 doses of PO dilaudid for a total of 8mg. SBA w/GB; Last BM 11/3. Discharge TBD.      Problem: Adult Inpatient Plan of Care  Goal: Plan of Care Review  Description: The Plan of Care Review/Shift note should be completed every shift.  The Outcome Evaluation is a brief statement about your assessment that the patient is improving, declining, or no change.  This information will be displayed automatically on your shift  note.  Outcome: Progressing  Flowsheets (Taken 11/3/2024 0726)  Outcome Evaluation:   Pt AOx4. RA. SL. Q4VS. K,Mg,Phos replaced   to be rechecked this morning. Recieved 2 doses of PO dilaudid for a total of 8mg. SBA w/GB   Last BM 11/3. Discharge TBD.  Plan of Care Reviewed With: patient  Overall Patient Progress: improving  Goal: Patient-Specific Goal (Individualized)  Description: You can add care plan individualizations to a care plan. Examples of Individualization might be:  \"Parent requests to be called daily at 9am for status\", \"I have a hard time hearing out of my right ear\", or \"Do not touch me to wake me up as it startles  me\".  Outcome: Progressing  Goal: Absence of Hospital-Acquired Illness or Injury  Outcome: Progressing  Intervention: Identify and Manage Fall Risk  Recent Flowsheet Documentation  Taken 11/3/2024 0100 by Sheela Villanueva, RN  Safety Promotion/Fall Prevention:   activity supervised   assistive device/personal items within reach   clutter free environment maintained   nonskid shoes/slippers when out of bed   room near nurse's station   safety round/check completed  Intervention: Prevent Skin Injury  Recent Flowsheet Documentation  Taken 11/3/2024 0100 by Sheela Villanueva, RN  Skin Protection: adhesive use " limited  Intervention: Prevent and Manage VTE (Venous Thromboembolism) Risk  Recent Flowsheet Documentation  Taken 11/3/2024 0100 by Sheela Villanueva RN  VTE Prevention/Management: SCDs off (sequential compression devices)  Intervention: Prevent Infection  Recent Flowsheet Documentation  Taken 11/3/2024 0100 by Sheela Villanueva RN  Infection Prevention:   single patient room provided   rest/sleep promoted   hand hygiene promoted  Goal: Optimal Comfort and Wellbeing  Outcome: Progressing  Intervention: Monitor Pain and Promote Comfort  Recent Flowsheet Documentation  Taken 11/3/2024 0020 by Sheela Villanueva RN  Pain Management Interventions: medication (see MAR)  Goal: Readiness for Transition of Care  Outcome: Progressing     Problem: Electrolyte Imbalance  Goal: Electrolyte Balance  Outcome: Progressing  Intervention: Monitor and Manage Electrolyte Imbalance  Recent Flowsheet Documentation  Taken 11/3/2024 0100 by Sheeal Villanueva RN  Fluid/Electrolyte Management:   fluids provided   electrolyte supplement initiated   electrolyte supplement adjusted

## 2024-11-03 NOTE — PLAN OF CARE
"Goal Outcome Evaluation:      Plan of Care Reviewed With: patient    Overall Patient Progress: no changeOverall Patient Progress: no change    Patient arrived on the unit at approximately 2040 from the ED. Is A&Ox4, atarax and ibuprofen used for pain management. Bolus running on arrival from ED, potassium replaced. On a clear liquid diet, SBA  walking to bathroom.   Problem: Adult Inpatient Plan of Care  Goal: Plan of Care Review  Description: The Plan of Care Review/Shift note should be completed every shift.  The Outcome Evaluation is a brief statement about your assessment that the patient is improving, declining, or no change.  This information will be displayed automatically on your shift  note.  Outcome: Not Progressing  Flowsheets (Taken 11/2/2024 2244)  Plan of Care Reviewed With: patient  Overall Patient Progress: no change  Goal: Patient-Specific Goal (Individualized)  Description: You can add care plan individualizations to a care plan. Examples of Individualization might be:  \"Parent requests to be called daily at 9am for status\", \"I have a hard time hearing out of my right ear\", or \"Do not touch me to wake me up as it startles  me\".  Outcome: Not Progressing  Goal: Absence of Hospital-Acquired Illness or Injury  Outcome: Not Progressing  Goal: Optimal Comfort and Wellbeing  Outcome: Not Progressing  Intervention: Monitor Pain and Promote Comfort  Recent Flowsheet Documentation  Taken 11/2/2024 2144 by Renetta Perez RN  Pain Management Interventions: medication (see MAR)  Goal: Readiness for Transition of Care  Outcome: Not Progressing     Problem: Electrolyte Imbalance  Goal: Electrolyte Balance  Outcome: Not Progressing              "

## 2024-11-03 NOTE — PHARMACY-ADMISSION MEDICATION HISTORY
Pharmacy Intern Admission Medication History    Admission medication history is complete. The information provided in this note is only as accurate as the sources available at the time of the update.    Information Source(s): Patient and CareEverywhere/SureScripts via in-person    Pertinent Information: None    Changes made to PTA medication list:  Added: ibuprofen, escitalopram, hydroxyzine, B-12  Deleted: dicyclomine, QUESTRAN, ZYRTEC, nicotine, TUMS, CREON 36  Changed:   Remeron 15->30  Seroquel 25->50    Allergies reviewed with patient and updates made in EHR: yes    Medication History Completed By: Onofre Capone 11/2/2024 7:05 PM    PTA Med List   Medication Sig Last Dose/Taking    acetaminophen (TYLENOL) 325 MG tablet Take 2 tablets (650 mg) by mouth every 6 hours as needed for mild pain Do not take more than 2000 mg per day. Alternate with ibuprofen. Taking As Needed    azelastine (ASTELIN) 0.1 % nasal spray Spray 1 spray into both nostrils daily as needed  Taking As Needed    cyanocobalamin (VITAMIN B-12) 1000 MCG tablet Take 1,000 mcg by mouth daily. 11/1/2024 Morning    escitalopram (LEXAPRO) 10 MG tablet Take 10 mg by mouth daily. Taking    fluticasone (FLONASE) 50 MCG/ACT nasal spray Spray 1 spray into both nostrils daily as needed  Taking As Needed    folic acid (FOLVITE) 1 MG tablet Take 1 tablet (1 mg) by mouth daily 11/1/2024 Morning    hydrOXYzine HCl (ATARAX) 25 MG tablet Take 2-4 tablets by mouth at bedtime. 11/1/2024 Bedtime    ibuprofen (ADVIL/MOTRIN) 600 MG tablet Take 600 mg by mouth every 8 hours as needed for moderate pain. 11/2/2024 Morning    mirtazapine (REMERON) 30 MG tablet Take 30 mg by mouth at bedtime. 11/1/2024 Bedtime    ondansetron (ZOFRAN ODT) 4 MG ODT tab Take 1 tablet (4 mg) by mouth every 6 hours as needed for nausea Taking As Needed    pantoprazole (PROTONIX) 40 MG EC tablet Take 1 tablet (40 mg) by mouth 2 times daily 11/2/2024 Morning    QUEtiapine (SEROQUEL) 50 MG tablet  Take 50 mg by mouth 2 times daily as needed (anxiety). 11/1/2024    QUEtiapine (SEROQUEL) 50 MG tablet Take 100 mg by mouth at bedtime. 11/1/2024 Bedtime    thiamine (B-1) 100 MG tablet Take 1 tablet (100 mg) by mouth daily 11/2/2024 Morning

## 2024-11-03 NOTE — DISCHARGE SUMMARY
"[facility]  Hospitalist Discharge Summary      Date of Admission:  11/2/2024  Date of Discharge:  11/3/2024  Discharging Provider: Angel Haji DO  Discharge Service: Hospitalist Service    Discharge Diagnoses   Acute on chronic pancreatitis.  Elevated anion gap metabolic acidosis.  Hyponatremia.  Hypokalemia.  Hypomagnesemia.  Hypophosphatemia.  Tobacco use disorder.  Depression.  GERD.      Clinically Significant Risk Factors     # Severe Obesity: Estimated body mass index is 47.54 kg/m  as calculated from the following:    Height as of this encounter: 1.676 m (5' 6\").    Weight as of this encounter: 133.6 kg (294 lb 8.6 oz).       Follow-ups Needed After Discharge   Follow-up Appointments       Follow-up and recommended labs and tests       Follow up with primary care provider, Randee Woods, within 8 days for hospital follow- up.                Discharge Disposition   Discharged to home  Condition at discharge: Stable    Hospital Course   Audelia Comer is a 59 year old female admitted on 11/2/2024. She presented with diffuse abdominal pain, complaining of constipation for a week, nausea and vomiting.  She was worried about possible small bowel obstruction because she has had in the past.  She also has a history of chronic pancreatitis.  She denies fever, urinary symptoms or respiratory symptoms.  CT of the abdomen report is almost totally within the normal limit except for presence of fat stranding around the pancreas suggestive of pancreatitis.  Placed in the hospital and started on a clear liquid diet.  Symptoms have resolved by day of discharge.  Tolerating a low fat diet.  Allowed to discharge home 11/3/2024.  Follow-up with primary care provider within 1 week.    Consultations This Hospital Stay   CARE MANAGEMENT / SOCIAL WORK IP CONSULT  PHYSICAL THERAPY ADULT IP CONSULT  OCCUPATIONAL THERAPY ADULT IP CONSULT    Code Status   Full Code    Time Spent on this Encounter   I " spent 40 minutes with Ms. Comer and working on discharge on 11/3/2024.       Angel Haji, [facility]  [address] [facility] [address]  Phone: [phone]  Fax: [phone]  ______________________________________________________________________    Physical Exam   Vital Signs: Temp: 97.4  F (36.3  C) Temp src: Temporal BP: 94/66 Pulse: 93   Resp: 16 SpO2: 91 % O2 Device: None (Room air)    Weight: 294 lbs 8.55 oz    Gen:  NAD, A&Ox3.  Eyes:  PERRL, sclera anicteric.  OP:  MMM, no lesions.  Neck:  Supple.  CV:  Regular, no murmurs.  Lung:  CTA b/l, normal effort.  Ab:  +BS, soft.  Skin:  Warm, dry to touch.  No rash.  Ext:  No pitting edema LE b/l.         Primary Care Physician   Randee Woods    Discharge Orders      Reason for your hospital stay    Acute on chronic pancreatitis.     Follow-up and recommended labs and tests     Follow up with primary care provider, Randee Woods, within 8 days for hospital follow- up.     Activity    Your activity upon discharge: activity as tolerated     Diet    Follow this diet upon discharge: Low fat           Discharge Medications   Current Discharge Medication List        START taking these medications    Details   HYDROmorphone (DILAUDID) 2 MG tablet Take 0.5 tablets (1 mg) by mouth every 6 hours as needed for moderate pain.  Qty: 3 tablet, Refills: 0    Associated Diagnoses: Acute pancreatitis, unspecified complication status, unspecified pancreatitis type           CONTINUE these medications which have CHANGED    Details   hydrOXYzine HCl (ATARAX) 25 MG tablet Take 1 tablet (25 mg) by mouth every 6 hours as needed for itching (pain).  Qty: 30 tablet, Refills: 0    Associated Diagnoses: Acute pancreatitis, unspecified complication status, unspecified pancreatitis type           CONTINUE these medications which have NOT CHANGED    Details   acetaminophen (TYLENOL) 325 MG tablet Take 2 tablets (650 mg) by mouth every 6 hours  as needed for mild pain Do not take more than 2000 mg per day. Alternate with ibuprofen.  Qty: 30 tablet, Refills: 0    Associated Diagnoses: Toothache      azelastine (ASTELIN) 0.1 % nasal spray Spray 1 spray into both nostrils daily as needed       cyanocobalamin (VITAMIN B-12) 1000 MCG tablet Take 1,000 mcg by mouth daily.      escitalopram (LEXAPRO) 10 MG tablet Take 10 mg by mouth daily.      fluticasone (FLONASE) 50 MCG/ACT nasal spray Spray 1 spray into both nostrils daily as needed       folic acid (FOLVITE) 1 MG tablet Take 1 tablet (1 mg) by mouth daily  Qty: 30 tablet, Refills: 3    Associated Diagnoses: Alcohol use disorder, severe, in early remission (H); Alcohol-induced acute pancreatitis, unspecified complication status      ibuprofen (ADVIL/MOTRIN) 600 MG tablet Take 600 mg by mouth every 8 hours as needed for moderate pain.      mirtazapine (REMERON) 30 MG tablet Take 30 mg by mouth at bedtime.      ondansetron (ZOFRAN ODT) 4 MG ODT tab Take 1 tablet (4 mg) by mouth every 6 hours as needed for nausea  Qty: 5 tablet, Refills: 0    Associated Diagnoses: Alcohol-induced acute pancreatitis without infection or necrosis      pantoprazole (PROTONIX) 40 MG EC tablet Take 1 tablet (40 mg) by mouth 2 times daily  Qty: 60 tablet, Refills: 3    Associated Diagnoses: Nonspecific abdominal pain      !! QUEtiapine (SEROQUEL) 50 MG tablet Take 50 mg by mouth 2 times daily as needed (anxiety).      !! QUEtiapine (SEROQUEL) 50 MG tablet Take 100 mg by mouth at bedtime.      thiamine (B-1) 100 MG tablet Take 1 tablet (100 mg) by mouth daily  Qty: 20 tablet, Refills: 0    Associated Diagnoses: Alcohol-induced acute pancreatitis, unspecified complication status; Alcohol use disorder, severe, in early remission (H)       !! - Potential duplicate medications found. Please discuss with provider.        Allergies   Allergies   Allergen Reactions    Gabapentin Swelling

## 2024-11-03 NOTE — PLAN OF CARE
Goal Outcome Evaluation:    A&Ox4, abdominal pain improved. PO dilaudid and atarax effective for pain. Pt had large loose BM. Diet advanced to low fat without return of symptoms. Pt discharged home and accompanied off the unit by wheelchair at 1500. All personal belongings and discharge meds sent with patient.

## 2024-11-03 NOTE — H&P
[facility]    History and Physical - Hospitalist Service       Date of Admission:  11/2/2024    Assessment & Plan      Audelia Comer is a 59 year old female admitted on 11/2/2024. She presented with diffuse abdominal pain, complaining of constipation for a week, nausea and vomiting.  She was worried about possible small bowel obstruction because she has had in the past.  She also has a history of chronic pancreatitis.  She denies fever, urinary symptoms or respiratory symptoms.  CT of the abdomen report is almost totally within the normal limit except for presence of fat stranding around the pancreas suggestive of pancreatitis.  The patient reported the pain she has been feeling is totally different than the pain she has experienced in the past with pancreatitis.    Diffuse abdominal pain.  Fat stranding around the pancreas seen on CT of the abdomen tonight, suspicious for acute pancreatitis.  History of chronic pancreatitis, atrophic pancreas on Creon.  Admit to observation.  Clear liquid diet and advance as tolerated.  Pain medication as needed, minimize use of opioid with current reports of constipation.  Normal saline 500 cc bolus.  Zofran for nausea or vomiting.  PCD's.  Resume all home medication as prior to admission.     Constipation lasting for one week.  Nausea and vomiting.  Patient came to the hospital thinking that she had a small bowel obstruction and CT of the abdomen has proven not bowel abnormalities, not even mention to stool in colon.  Incidentally she has been found with acute on chronic pancreatitis.  In the emergency department she started to ask for something to eat, she reports that this is the first time in several days.  She had clear liquid diet and tolerated.    Anion gap metabolic acidosis.  Suspect starvation ketosis.      Electrolyte derangement (hyponatremia, hypochloremia, hypokalemia)  Electrolyte replacement protocol.    Diet: Clear Liquid Diet    DVT  Prophylaxis: Pneumatic Compression Devices  Patino Catheter: Not present  Lines: None     Cardiac Monitoring: None  Code Status: Full Code      Clinically Significant Risk Factors Present on Admission        # Hypokalemia: Lowest K = 3 mmol/L in last 2 days, will replace as needed  # Hyponatremia: Lowest Na = 132 mmol/L in last 2 days, will monitor as appropriate  # Hypochloremia: Lowest Cl = 92 mmol/L in last 2 days, will monitor as appropriate     # Anion Gap Metabolic Acidosis: Highest Anion Gap = 22 mmol/L in last 2 days, will monitor and treat as appropriate                  # Financial/Environmental Concerns:    # Support System: poor social support noted in nursing assessment         Disposition Plan     Medically Ready for Discharge: Anticipated Tomorrow           Trey Busch MD  Hospitalist [facility]  Securely message with 3LM (more info)  Text page via [facility] Paging/Directory     ______________________________________________________________________    Chief Complaint   Abdominal pain, nausea and vomiting.  Not having bowel movement in 1 week    History is obtained from the patient, electronic health record, and emergency department physician    History of Present Illness   Audelia Comer is a 59 year old female who has a past medical history of alcoholic liver disease, chronic pancreatitis, small bowel obstruction, MDD/anxiety, C. difficile colitis and invasive ductal carcinoma of the right breast.  She presented after 1 week of constipation and nausea, vomiting with no oral intake.  She is reporting diffuse abdominal pain, she was worried about small bowel obstruction.  She does not find that the current pain is similar to pain she suffered with pancreatitis in the past.  She denies urinary, respiratory symptoms, chest pain or shortness of breath.  Her most significant finding in lab workup today have been presence of anion gap metabolic acidosis likely from  starvation ketosis, electrolytes abnormalities that correlate with her emetic syndrome, very mild elevation of AST with normal ALT, elevation of alkaline phosphatase, normal lipase.  CT of the abdomen and pelvis has reported presence of fat stranding around the pancreas suggesting acute on chronic pancreatitis.  Patient has been symptomatically treated in the emergency department with improvement.  At the moment of my visit she is hungry and asking to have something to eat.  Her pain is under control.  She denies nausea or vomiting.  I have discussed her case with Dr. Al and the emergency department physician who is requesting admission.        Past Medical History    Past Medical History:   Diagnosis Date    Alcoholic liver disease (H)     Anxiety     C. difficile colitis 2023    Chronic pancreatitis (H)     Depressive disorder     Invasive ductal carcinoma of breast, right (H)     PONV (postoperative nausea and vomiting)     SBO (small bowel obstruction) (H)        Past Surgical History   Past Surgical History:   Procedure Laterality Date    APPENDECTOMY      APPENDECTOMY  1989     SECTION  1984    ENDOSCOPIC ULTRASOUND UPPER GASTROINTESTINAL TRACT (GI) N/A 2023    Procedure: ENDOSCOPIC ULTRASOUND, ESOPHAGOSCOPY / UPPER GASTROINTESTINAL TRACT (GI);  Surgeon: Lg Dickinson MD;  Location:  GI    ESOPHAGOSCOPY, GASTROSCOPY, DUODENOSCOPY (EGD), COMBINED N/A 2023    Procedure: ESOPHAGOGASTRODUODENOSCOPY (EGD);  Surgeon: Lg Dickinson MD;  Location:  GI    GYN SURGERY      c section     MASTECTOMY, BILATERAL Bilateral     With reconstructive surgery    SOFT TISSUE SURGERY      breast implants       Prior to Admission Medications   Prior to Admission Medications   Prescriptions Last Dose Informant Patient Reported? Taking?   QUEtiapine (SEROQUEL) 50 MG tablet 2024 Self Yes Yes   Sig: Take 50 mg by mouth 2 times daily as needed (anxiety).   QUEtiapine  (SEROQUEL) 50 MG tablet 11/1/2024 Bedtime Self Yes Yes   Sig: Take 100 mg by mouth at bedtime.   acetaminophen (TYLENOL) 325 MG tablet  Self No Yes   Sig: Take 2 tablets (650 mg) by mouth every 6 hours as needed for mild pain Do not take more than 2000 mg per day. Alternate with ibuprofen.   azelastine (ASTELIN) 0.1 % nasal spray  Self Yes Yes   Sig: Spray 1 spray into both nostrils daily as needed    cyanocobalamin (VITAMIN B-12) 1000 MCG tablet 11/1/2024 Morning  Yes Yes   Sig: Take 1,000 mcg by mouth daily.   escitalopram (LEXAPRO) 10 MG tablet   Yes Yes   Sig: Take 10 mg by mouth daily.   fluticasone (FLONASE) 50 MCG/ACT nasal spray  Self Yes Yes   Sig: Spray 1 spray into both nostrils daily as needed    folic acid (FOLVITE) 1 MG tablet 11/1/2024 Morning Self No Yes   Sig: Take 1 tablet (1 mg) by mouth daily   hydrOXYzine HCl (ATARAX) 25 MG tablet 11/1/2024 Bedtime  Yes Yes   Sig: Take 2-4 tablets by mouth at bedtime.   ibuprofen (ADVIL/MOTRIN) 600 MG tablet 11/2/2024 Morning  Yes Yes   Sig: Take 600 mg by mouth every 8 hours as needed for moderate pain.   mirtazapine (REMERON) 30 MG tablet 11/1/2024 Bedtime  Yes Yes   Sig: Take 30 mg by mouth at bedtime.   ondansetron (ZOFRAN ODT) 4 MG ODT tab  Self No Yes   Sig: Take 1 tablet (4 mg) by mouth every 6 hours as needed for nausea   pantoprazole (PROTONIX) 40 MG EC tablet 11/2/2024 Morning Self No Yes   Sig: Take 1 tablet (40 mg) by mouth 2 times daily   thiamine (B-1) 100 MG tablet 11/2/2024 Morning Self No Yes   Sig: Take 1 tablet (100 mg) by mouth daily      Facility-Administered Medications: None        Review of Systems    The 10 point Review of Systems is negative other than noted in the HPI or here.       Physical Exam   Vital Signs: Temp: 97.4  F (36.3  C) Temp src: Temporal BP: (!) 163/93 Pulse: 101   Resp: 16 SpO2: 98 % O2 Device: None (Room air)    Weight: 0 lbs 0 oz    GEN:  Alert, oriented x 3, appears comfortable, NAD.  HEENT:   Normocephalic/atraumatic, no scleral icterus, no nasal discharge, mouth moist.  CV:  Regular rate and rhythm, no murmur or JVD.  S1 + S2 noted, no S3 or S4.  LUNGS:  Clear to auscultation bilaterally without rales/rhonchi/wheezing/retractions.  Symmetric chest rise on inhalation noted.  ABD:  Active bowel sounds, soft, non-tender/non-distended.  No rebound/guarding/rigidity.  EXT:  No edema or cyanosis.  No joint synovitis noted.  SKIN:  Dry to touch, no exanthems noted in the visualized areas.         Medical Decision Making       65 MINUTES SPENT BY ME on the date of service doing chart review, history, exam, documentation & further activities per the note.      Data     I have personally reviewed the following data over the past 24 hrs:    13.4 (H)  \   13.8   / 237     132 (L) 92 (L) 5.7 (L) /  86   3.0 (L) 18 (L) 0.53 \     ALT: 30 AST: 48 (H) AP: 237 (H) TBILI: 1.1   ALB: 4.3 TOT PROTEIN: 8.4 (H) LIPASE: 40       Imaging results reviewed over the past 24 hrs:   Recent Results (from the past 24 hours)   CT Abdomen Pelvis w Contrast    Narrative    EXAM: CT ABDOMEN PELVIS W CONTRAST  LOCATION: [facility]  DATE: 11/2/2024    INDICATION: epigastric pain hx of ascites and obstruction  COMPARISON: 1/27/2023  TECHNIQUE: CT scan of the abdomen and pelvis was performed following injection of IV contrast. Multiplanar reformats were obtained. Dose reduction techniques were used.  CONTRAST: 55 mL Isovue 370    FINDINGS:   LOWER CHEST: Normal.    HEPATOBILIARY: Cirrhotic morphology with heterogeneous steatosis.    PANCREAS: Parenchymal atrophy with extensive parenchymal calcifications.    Pancreatic fat stranding and free fluid. No main duct dilation or peripancreatic fluid collection.    SPLEEN: Normal.    ADRENAL GLANDS: Normal.    KIDNEYS/BLADDER: Normal.    BOWEL: Normal.    LYMPH NODES: Normal.    VASCULATURE: No abdominal aortic aneurysm. Mild atherosclerotic calcification of the abdominal  aorta and iliofemoral arteries.    PELVIC ORGANS: Hysterectomy.    MUSCULOSKELETAL: Bilateral breast implants.      Impression    IMPRESSION:   1.  Findings suggesting acute on chronic pancreatitis.  2.  Hepatic steatosis with cirrhotic morphology.

## 2024-11-03 NOTE — CONSULTS
Care Management Initial Consult    General Information  Assessment completed with: Patient,    Type of CM/SW Visit: Initial Assessment    Primary Care Provider verified and updated as needed:     Readmission within the last 30 days:        Reason for Consult: discharge planning  Advance Care Planning:            Communication Assessment  Patient's communication style: spoken language (English or Bilingual)    Hearing Difficulty or Deaf: no   Wear Glasses or Blind: yes    Cognitive  Cognitive/Neuro/Behavioral: WDL                      Living Environment:   People in home: alone     Current living Arrangements: town home      Able to return to prior arrangements: yes       Family/Social Support:  Care provided by: self, child(seamus)  Provides care for: no one     Support system: Children, Sibling(s), Friend          Description of Support System: Supportive, Involved         Current Resources:   Patient receiving home care services: No        Community Resources: [employer] Programs, Meals on Wheels, Other (see comment) (SNAP)  Equipment currently used at home: none  Supplies currently used at home:      Employment/Financial:  Employment Status: disabled        Financial Concerns:             Does the patient's insurance plan have a 3 day qualifying hospital stay waiver?  No    Lifestyle & Psychosocial Needs:  Social Drivers of Health     Food Insecurity: Low Risk  (11/2/2024)    Food Insecurity     Within the past 12 months, did you worry that your food would run out before you got money to buy more?: No     Within the past 12 months, did the food you bought just not last and you didn t have money to get more?: No   Depression: Not on file   Housing Stability: Low Risk  (11/2/2024)    Housing Stability     Do you have housing? : Yes     Are you worried about losing your housing?: No   Tobacco Use: High Risk (8/22/2023)    [facility]    Patient History     Smoking Tobacco Use:  Every Day     Smokeless Tobacco Use: Unknown     Passive Exposure: Not on file   Financial Resource Strain: Low Risk  (11/2/2024)    Financial Resource Strain     Within the past 12 months, have you or your family members you live with been unable to get utilities (heat, electricity) when it was really needed?: No   Alcohol Use: Not on file   Transportation Needs: Low Risk  (11/2/2024)    Transportation Needs     Within the past 12 months, has lack of transportation kept you from medical appointments, getting your medicines, non-medical meetings or appointments, work, or from getting things that you need?: No   Physical Activity: Not on file   Interpersonal Safety: Low Risk  (11/2/2024)    Interpersonal Safety     Do you feel physically and emotionally safe where you currently live?: Yes     Within the past 12 months, have you been hit, slapped, kicked or otherwise physically hurt by someone?: No     Within the past 12 months, have you been humiliated or emotionally abused in other ways by your partner or ex-partner?: No   Stress: Not on file   Social Connections: Unknown (8/22/2023)    [facility]    Social Connections     Frequency of Communication with Friends and Family: Not on file   Health Literacy: Not on file       Functional Status:  Prior to admission patient needed assistance:   Dependent ADLs:: Independent  Dependent IADLs:: Transportation       Mental Health Status:  Mental Health Status: No Current Concerns       Chemical Dependency Status:  Chemical Dependency Status: No Current Concerns             Values/Beliefs:  Spiritual, Cultural Beliefs, Yazdanism Practices, Values that affect care:                 Discussed  Partnership in Safe Discharge Planning  document with patient/family: No    Additional Information:  Consult received for discharge planning.     SW met with pt who reports she lives alone in a town house. She reports she has one son who comes  and stays with her on the weekends. She reports her dtr passed away in 2021. Her mom, step dad and brother live in [place]. She reports she has a few friends here that help with transportation. Pt reports she receives SNAP and MOW.  She has never had a TCU stay and denied any other services.  Pt denied any SW needs and reports she has transportation at discharge.     Next Steps: No concerns identified. Please send new consult if needs arise.     Jannet TEE, [facility]  Inpatient Care Coordination   [facility]   [phone]

## 2024-11-03 NOTE — ED NOTES
GastrointestinalGastrointestinal WDL: all (PT COMES IN WITH N/V/CONSTIPATION/ABD BLOATING. PT STATES SHE HASnt had a BM in a week. pt is passing gas)GI Signs/Symptoms: abdominal discomfort; abdominal fullness; constipation; nausea; vomitingAbdominal Appearance:  (pt reports feeling bloated)All Quadrants Bowel Sounds: hypoactiveAdditional Documentation: Bowel Sounds (Row)

## 2024-11-05 NOTE — PLAN OF CARE
[facility]  CARDIOLOGY  PROGRESS  NOTE:       Ragini Barcenas Patient Status:  Inpatient    1939 MRN [id number]   Location [address] TELEMETRY Attending Marcela Nix DO   Hosp Day # 4 PCP Maine Cotton MD     SUBJECTIVE:      CC:  Cardiology Consulted for:  STEPHENSON.  Lt Pleural Effusion.    More alert today.  Sitting up in bed.  Taking diet fair.     No chest pain, or dizziness.  Shortness of breath improving.  On 2L.  Orthopnea baseline.  No PND.     Ambulates at home with walker as needed.      ROS:  GEN:  No fever.  No chills.  Tires easily.    HENT:  No sore throat.  No nosebleed.      CV:  No chest pain.  No palpitations.  Peripheral edema improving.  No near syncope.  RESP:  No shortness of breath with rest.  Nonproductive cough.  No wheezing.  GI:  No nausea.  No vomiting.  No Abd pain.  No rectal bleeding.  (+) Diarrhea.  :  No dysuria.  No hematuria.  NEURO:  Alert.  No headache.  Remainder of 12 point systems reviewed and negative (or as mentioned in HPI)                                                      OBJECTIVE:   MEDICATIONS:  Current Facility-Administered Medications   Medication Dose Route Frequency Provider Last Rate Last Admin    magnesium oxide (MAG-OX) tablet 400 mg  400 mg Oral Once Marcela Nix DO        iron sucrose (VENOFER) injection 200 mg  200 mg Intravenous Daily Christen Feliz MD   200 mg at 24 0846    lidocaine 1 % injection 100 mg  10 mL Injection Once Brayden Jennings DO        VANCOMYCIN - PHARMACIST MONITORED Misc   Does not apply See Admin Instructions Brayden Jennings DO        piperacillin-tazobactam (ZOSYN) 4.5 g in sodium chloride 0.9 % 100 mL IVPB  4.5 g Intravenous 3 times per day Brayden Jennings DO 25 mL/hr at 24 0722 4.5 g at 24 0722    vancomycin (VANCOCIN) 500 mg in sodium chloride 0.9 % 100 mL IVPB  500 mg Intravenous 2 times per day Brayden Jennings  mL/hr at 24 0619 500 mg at 24 0619     Goal Outcome Evaluation:  diagnostic tests and consults completed and resulted  no  -vital signs normal or at patient baseline  Yes  -tolerating oral intake to maintain hydration No  -adequate pain control on oral analgesics  Yes  -returns to baseline functional status  No    Orientation/Cognitive: A&OX4  Observation Goals (Met/ Not Met): Not met  Mobility Level/Assist Equipment:  ind prior, now SBA, BP low  Fall Risk (Y/N): Yes  Behavior Concerns: None  Pain Management: Oxy and tums for abd pain and acid reflux, GI added creon and bentyl  Tele/VS/O2:  VSS on RA except for low BP and tachycardia  ABNL Lab/BG:  Procal/CRP/ WBC elevated, 1000 L fluid bolus given, K+, and Mg++ low, replacement in progress  Diet: Regular diet, poor PO intake, 400mL of bile like emesis this shift, zofran given  Bowel/Bladder: Continent, no BM this shift  Skin Concerns:  Mostly intact   Drains/Devices:  PIV  Tests/Procedures for next shift:  Paracentesis pending  Anticipated DC date & active delays:  Pending clinical progress.  Patient Stated Goal for Today: Pain management and to rest                         metoPROLOL succinate (TOPROL-XL) ER tablet 50 mg  50 mg Oral Daily Brian Lomax MD   50 mg at 11/05/24 0846    amLODIPine (NORVASC) tablet 10 mg  10 mg Oral Daily Nitza Faust PA-C   10 mg at 11/05/24 0846    fluticasone-vilanterol (BREO ELLIPTA) 200-25 MCG/ACT inhaler 1 puff  1 puff Inhalation Daily Resp Nitza Faust PA-C   1 puff at 11/05/24 0748    furosemide (LASIX) tablet 20 mg  20 mg Oral Daily Nitza Faust PA-C   20 mg at 11/05/24 0846    sodium chloride 0.9 % injection 2 mL  2 mL Intracatheter 2 times per day Nitza Faust PA-C   2 mL at 11/05/24 0847    Potassium Standard Replacement Protocol (Levels 3.5 and lower)   Does not apply See Admin Instructions Nitza Faust PA-C        Magnesium Standard Replacement Protocol   Does not apply See Admin Instructions Nitza Faust PA-C        spironolactone (ALDACTONE) tablet 25 mg  25 mg Oral Daily Brian Lomax MD   25 mg at 11/05/24 0846        ALLERGIES:  ALLERGIES:  No Known Allergies   PHYSICAL  EXAM:  Vital Last Value 24 Hour Range   Temperature 97.5 °F (36.4 °C) (11/05/24 0844) Temp  Min: 97.2 °F (36.2 °C)  Max: 97.7 °F (36.5 °C)   Pulse (!) 59 (11/05/24 0844) Pulse  Min: 59  Max: 77   Respiratory 16 (11/05/24 0844) Resp  Min: 16  Max: 20   Non-Invasive  Blood Pressure 125/70 (11/05/24 0844) BP  Min: 125/70  Max: 156/58   Pulse Oximetry 99 % (11/05/24 0844) SpO2  Min: 90 %  Max: 99 %   Arterial   Blood Pressure   No data recorded     TELE:  I personally reviewed Telemetry:  SR @ 70.  Occ PVCs, PACs.  No runs or pauses seen over night.  HR 59 to 70's.    INTAKE/ OUTPUT:    Intake/Output Summary (Last 24 hours) at 11/5/2024 1028  Last data filed at 11/5/2024 0800  Gross per 24 hour   Intake --   Output 81 ml   Net -81 ml     Weight    11/01/24 0600 11/02/24 0500 11/03/24 0414 11/04/24 0500   Weight: 47.8 kg (105 lb 6.1 oz) 47.2 kg (104 lb 0.9 oz) 47 kg (103 lb 9.9 oz) 47.7 kg (105 lb 2.6 oz)      GEN:  Awake.  Afebrile.  Appears comfortable.  Cachectic.     EYE:  Normal conjunctiva.    HENT:  Oral mucosa is moist.    NECK:  Supple.  Non-tender.  No JVD.    RESP:  Non-labored.  Diminished breath sounds in left base.  Right clear.  No crackles or wheezes.    Lt CT draining thick yellow ~ 1100 mL overnight.  No air leak.  CV:  Normal rate.  Regular rhythm.  S1  S2.  Systolic murmur.  No BLE edema.     GI:  Soft. No tenderness.  (+) Bowel sounds.    :  No costovertebral angle tenderness.    Musculoskeletal:   Unsteady gait.  Overall weakness.    SKIN:  Warm.  Dry.    NEURO:  Awake.  Follows commands.  Poor historian.    Cognition and Speech:   Speech clear and coherent.      PSYCH:  Cooperative.                                                      CLINICAL  DATA:   (PERSONALLY REVIEWED)    LABS:    CBC  Recent Labs   Lab 11/05/24 0619 11/04/24 0648 11/03/24 0432   WBC 6.0 6.4 8.0   HCT 32.2* 30.9* 33.4*   HGB 9.8* 9.3* 9.9*    172 199     CMP  Recent Labs   Lab 11/05/24 0619 11/04/24  1556 11/04/24 0648 11/03/24 0432 11/01/24  0622 10/31/24  1738   SODIUM 137  --  138 139   < > 139   POTASSIUM 4.2 4.2 3.4 3.6   < > 3.5   CHLORIDE 102  --  100 100   < > 101   CO2 30  --  31 30   < > 31   GLUCOSE 127*  --  131* 133*   < > 159*   BUN 12  --  8 10   < > 11   CREATININE 0.55  --  0.47* 0.50*   < > 0.60   CALCIUM 8.3*  --  8.2* 8.4   < > 8.4   TOTPROTEIN  --   --   --   --   --  6.7   ALBUMIN  --   --   --   --   --  2.1*   BILIRUBIN  --   --   --   --   --  0.9   AST  --   --   --   --   --  17   GPT  --   --   --   --   --  21   ALKPT  --   --   --   --   --  89    < > = values in this interval not displayed.     Cardiac Labs  Recent Labs   Lab 11/01/24  0622 10/31/24  1738   TSH 1.603  --    NTPROB  --  4,042*       Lipid Panel  Recent Labs   Lab 10/31/24  1738   CHOLESTEROL 131   HDL 27*   CALCLDL 87   TRIGLYCERIDE 86       Coags  Recent Labs   Lab 11/01/24  0623   INR 1.2   PTT 29       ABG  No results found  IMAGING:    ECG:   Encounter Date: 10/31/24    Electrocardiogram 12-Lead   Result Value    Ventricular Rate EKG/Min (BPM) 103    Atrial Rate (BPM) 104    VA-Interval (MSEC) 144    QRS-Interval (MSEC) 76    QT-Interval (MSEC) 374    QTc 490    P Axis (Degrees) 51    R Axis (Degrees) 6    T Axis (Degrees) 13    REPORT TEXT      Sinus tachycardia  with  premature supraventricular complexes  Moderate voltage criteria for LVH, may be normal variant  (  R in aVL  ,  Sokolow-Means  )  Possible  Inferior infarct  , age undetermined  Cannot rule out  Anterior infarct  , age undetermined  Abnormal ECG  No previous ECGs available  Confirmed by PATRICIO WOODY, [facility] ([id number]) on 11/2/2024 8:40:24 PM       11/01/2024  TTE:  SUMMARY:     1. Left ventricle: The cavity size is normal. Wall thickness is normal.     Systolic function is normal. The ejection fraction was measured by biplane     method of disks. Grade I diastolic dysfunction. The ejection fraction is 60%.  2. Aortic valve: There is mild regurgitation.  3. Left atrium: The atrium is mildly dilated.  4. Right ventricle: The cavity size is normal. Systolic function is normal.     Systolic pressure is moderately increased. The estimated peak pressure is     54mm Hg.  5. Tricuspid valve: There is moderate regurgitation     SORIN MPI:  No results found for this or any previous visit.    CARDIAC  CATH:   No results found for this or any previous visit.                                            ASSESSMENT and PLAN:      Acute Hypoxic Resp Failure/ Large Lt Loculated Pleural Effusion/ Lt Empyema  Chronic Bronchiectasis/ Suspected Interstitial Lung Disease  Abx per ID.  Pul consult.  11/04/2024  Lt Chest Tube placement per Pul.    Chronic HFpEF/ EF 60%/ pro BNP 4042  TSH 1.603  11/01/2024  TTE:  EF 60%.  DD grade 1.  Mild AR.  Mod TR.  RVSP 62 mmHg.    Continue Metoprolol XL, Norvasc, Lasix, and Aldactone.   Consider SGLT2 Inhibitor on discharge.    BLE Edema  11/01/2024  BLE venous duplex:  No DVTs.  Encouraged patient to keep  BLE elevated.  Encouraged patient to wear compression stockings.     Paroxysmal Atrial Tachycardia  No home palpitations, or syncope.   TSH 1.603  11/01/2024  TTE:  EF 60%.  Keep K+ > 4.0, and Mg++ > 2.2  On Metoprolol XL.     HTN Essential    Continue Metoprolol XL, Norvasc, Lasix, and Aldactone.     Hypokalemia - monitor  Hypomagnesemia - monitor      DM II - Tx Diet @ home  Hgb A1c  6.7%.  LDL 87.  HDL 27.  .  TRIG 86.    PAH/ RVSP 62 mmHg.  Continue current medicine regimen.     Valvular Dz/ Mild MR/ Mod Tricuspid Regurgitation   Clinical management.    Severe Protein-Calorie Malnutrition  Failure to Thrive    PLAN:  Replaced Mg++.  Ordered F/u labs.    ++ Follow-up with Dr Lomax on discharge.    D/w Nurse Tanja.    Thank you for allowing us to participate in this patient's care.  Please do not hesitate to call with any questions or concerns.    Shira Sims,  ANP-BC  ACNP-BC  Nurse Practitioner  [facility] [place]  11/5/2024 10:28 AM    Portions of this note may have been created using the Dragon voice recognition system.  Errors in content may be related to improper recognition of the system.  Effort to review and correct the note has been made but irregularities may still be present.

## 2024-11-06 NOTE — UTILIZATION REVIEW
Admission Status; Secondary Review Determination              As part of the [facility] Utilization review plan, a self-audit is done on Medicare inpatient admission with less than 2 midnights stay. The 2014 IPPS Final Rule allows outpatient billing in the event that a hospital determines that an inpatient admission was not medically necessary under utilization review process.                (x) Outpatient status would be Appropriate- Short Stay- Post discharge review.         RATIONALE FOR DETERMINATION    Audelia Comer is a 59 year old female admitted on 11/2/2024. She presented with diffuse abdominal pain, complaining of constipation for a week, nausea and vomiting.  She was worried about possible small bowel obstruction because she had had in the past.  She also has a history of chronic pancreatitis.  She denied fever, urinary symptoms or respiratory symptoms.  CT of the abdomen report was almost totally within the normal limit except for presence of fat stranding around the pancreas suggestive of pancreatitis.  She was admitted to the hospital and started on a clear liquid diet.  Symptoms resolved overnight and she discharged the next day.    This account and medical record number for a Medicare beneficiary was an inpatient short stay (<2 midnights) and didn't meet medical necessity for inpatient admission. We recommend billing outpatient services based on the severity of illness, intensity of service provided and the inpatient length of stay.  Please contact me within one week of receiving this letter only if you disagree with this determination. If you concur, no further action is needed.                   The information on this document is developed by the utilization review team in order for the business office to ensure compliance.  This only denotes the appropriateness of proper admission status and does not reflect the quality of care rendered.            The definitions of Inpatient Status and  Observation Status used in making the determination above are those provided in the CMS Coverage Manual, Chapter 1 and Chapter 6, section 70.4.         Sincerely,

## 2024-11-08 NOTE — ED PROVIDER NOTES
Emergency Department Note      History of Present Illness   Chief Complaint   Abdominal Pain    HPI   Audelia Comer is a 59 year old female with a history of alcoholic liver disease, C. Difficile colitis, pancreatitis, carcinoma of the right breast, SBO and IBS who presents for an evaluation of abdominal pain. The patient stated having abdominal pain for the past week. She added also feeling bloated, nauseous and vomiting. She stated having whole body aches, a low grade fever, sweating and then having chills and a pinching sensation on her left flank. She stated she has not had a bowel movement in a week. She added taking stool softeners that past few days and fiber gummies today. She stated having a history of a bowel obstruction and this feels similar. She denies having a history of cirrhosis. She also denies losing weight. She noted she doesn't drink alcohol very often and the last time was a month ago. She noted having breast cancer.     Independent Historian   None    Review of External Notes   None   Past Medical History   Medical History and Problem List   Alcoholic liver disease  Anxiety  C. Difficile colitis  Pancreatitis   Depression  Invasive ductal carcinoma of breast  SBO   Hepatitis   Hypokalemia  Tachycardia  Hyponatremia   Ketosis  Hyperlipidemia  IBS   Pancreatic cyst   GERD     Medications   Escitalopram oxalate   Prazosin hydrochloride   Mirtazapine   Quetiapine     Surgical History   Appendectomy   section  EGD  Mastectomy   Physical Exam   Patient Vitals for the past 24 hrs:   BP Temp Temp src Pulse Resp SpO2   24 -- 97.7  F (36.5  C) Oral 109 -- --   24 1711 -- -- -- -- 18 --   24 1646 -- -- -- -- -- 99 %   24 1641 -- -- -- -- -- 100 %   24 1631 (!) 153/85 -- -- -- -- --     Physical Exam  GENERAL: Patient well-appearing  HEAD: Atraumatic.  NECK: No rigidity  CV: RRR, no murmurs, rubs or gallops  PULM: CTAB with good aeration; no retractions,  rales, rhonchi, or wheezing  ABD: Soft, mild epigastric guarding. General abdominal tenderness mainly in the upper gastric region with mild distension.   DERM: No rash. Skin warm and dry  EXTREMITY: Moving all extremities without difficulty. No calf tenderness or peripheral edema     Diagnostics   Lab Results   Labs Ordered and Resulted from Time of ED Arrival to Time of ED Departure   COMPREHENSIVE METABOLIC PANEL - Abnormal       Result Value    Sodium 132 (*)     Potassium 3.0 (*)     Carbon Dioxide (CO2) 18 (*)     Anion Gap 22 (*)     Urea Nitrogen 5.7 (*)     Creatinine 0.53      GFR Estimate >90      Calcium 9.6      Chloride 92 (*)     Glucose 86      Alkaline Phosphatase 237 (*)     AST 48 (*)     ALT 30      Protein Total 8.4 (*)     Albumin 4.3      Bilirubin Total 1.1     CBC WITH PLATELETS AND DIFFERENTIAL - Abnormal    WBC Count 13.4 (*)     RBC Count 4.24      Hemoglobin 13.8      Hematocrit 38.6      MCV 91      MCH 32.5      MCHC 35.8      RDW 12.1      Platelet Count 237      % Neutrophils 79      % Lymphocytes 10      % Monocytes 10      % Eosinophils 0      % Basophils 0      % Immature Granulocytes 0      NRBCs per 100 WBC 0      Absolute Neutrophils 10.5 (*)     Absolute Lymphocytes 1.4      Absolute Monocytes 1.4 (*)     Absolute Eosinophils 0.0      Absolute Basophils 0.0      Absolute Immature Granulocytes 0.1      Absolute NRBCs 0.0     LIPASE - Normal    Lipase 40       Imaging   CT Abdomen Pelvis w Contrast   Final Result   IMPRESSION:    1.  Findings suggesting acute on chronic pancreatitis.   2.  Hepatic steatosis with cirrhotic morphology.           EKG   None     Independent Interpretation   None  ED Course    Medications Administered   Medications   potassium chloride 10 mEq in 100 mL sterile water infusion (10 mEq Intravenous $New Bag 11/2/24 9126)   lactated ringers BOLUS 1,000 mL (has no administration in time range)   sodium chloride 0.9% BOLUS 1,000 mL (0 mLs Intravenous Stopped  11/2/24 1712)   ondansetron (ZOFRAN) injection 4 mg (4 mg Intravenous $Given 11/2/24 1556)   morphine (PF) injection 4 mg (4 mg Intravenous $Given 11/2/24 1639)   sodium chloride (PF) 0.9% PF flush 100 mL (54 mLs Intravenous $Given 11/2/24 1650)   iopamidol (ISOVUE-370) solution 500 mL (55 mLs Intravenous $Given 11/2/24 1650)   morphine (PF) injection 4 mg (4 mg Intravenous $Given 11/2/24 1842)     Procedures   Procedures     Discussion of Management   Admitting Hospitalist, Dr. Busch     ED Course   ED Course as of 11/02/24 1843   Sat Nov 02, 2024   1538 I obtained history and examined the patient as noted above.    1833 I consulted with Dr. Busch.      Additional Documentation  None  Medical Decision Making / Diagnosis   CMS Diagnoses: None    MIPS       None    MDM   Audelia Comer is a 59 year old female     Symptoms appear most consistent with acute on chronic pancreatitis.   Chronic conditions complicating - alcohol abuse, prior pancreatitis  Differential diagnosis-considered cholecystitis, appendicitis, diverticulitis, mesenteric ischemia, sepsis, among others, however, evaluation does not appear consistent with these etiologies.  Vital signs with low grade tachycardia.   Labs anion gap of 22.  Mild hypokalemia and mild hyponatremia.  Leukocytosis of 13.  Lipase within normal limits.   CT abdomen/pelvis demonstrating acute pancreatitis.   Given IV morphine, with improvement in pain. Given additional morphine IV.  Given 2L IVF.  Discussed with hospitalist for admission. Patient to be admitted.      Disposition   The patient was admitted to the hospital.     Diagnosis     ICD-10-CM    1. Hypokalemia  E87.6       2. Acute pancreatitis, unspecified complication status, unspecified pancreatitis type  K85.90          Discharge Medications   New Prescriptions    No medications on file     Scribe Disclosure:  I, Lavern Becker, am serving as a scribe at 4:34 PM on 11/2/2024 to document services personally  performed by Tejas Patino MD based on my observations and the provider's statements to me.      Tejas Patino MD  11/02/24 8463     (3) no apparent problem

## 2025-01-27 NOTE — ED NOTES
Patient called in stating she would like to add her ankle and feet to the PT referral. She thinks that is her balance issue.   Patient placed on cardiac monitor for her critical potassium    Last drink was yesterday. Has been vomiting since yesterday. Drinks when she is stressed, out of her hydroxyzine

## 2025-05-16 ENCOUNTER — APPOINTMENT (OUTPATIENT)
Dept: CT IMAGING | Facility: CLINIC | Age: 60
End: 2025-05-16
Attending: EMERGENCY MEDICINE
Payer: COMMERCIAL

## 2025-05-16 ENCOUNTER — HOSPITAL ENCOUNTER (EMERGENCY)
Facility: CLINIC | Age: 60
Discharge: HOME | End: 2025-05-16
Attending: EMERGENCY MEDICINE | Admitting: EMERGENCY MEDICINE
Payer: COMMERCIAL

## 2025-05-16 VITALS
OXYGEN SATURATION: 97 % | BODY MASS INDEX: 20.09 KG/M2 | HEART RATE: 99 BPM | DIASTOLIC BLOOD PRESSURE: 67 MMHG | RESPIRATION RATE: 16 BRPM | TEMPERATURE: 97.9 F | SYSTOLIC BLOOD PRESSURE: 98 MMHG | WEIGHT: 125 LBS | HEIGHT: 66 IN

## 2025-05-16 DIAGNOSIS — R10.9 LEFT FLANK PAIN: ICD-10-CM

## 2025-05-16 DIAGNOSIS — R91.1 PULMONARY NODULE: ICD-10-CM

## 2025-05-16 DIAGNOSIS — S20.212A CHEST WALL CONTUSION, LEFT, INITIAL ENCOUNTER: ICD-10-CM

## 2025-05-16 DIAGNOSIS — K86.1 CHRONIC PANCREATITIS, UNSPECIFIED PANCREATITIS TYPE (H): ICD-10-CM

## 2025-05-16 DIAGNOSIS — R19.7 VOMITING AND DIARRHEA: ICD-10-CM

## 2025-05-16 DIAGNOSIS — R11.10 VOMITING AND DIARRHEA: ICD-10-CM

## 2025-05-16 LAB
ABO + RH BLD: NORMAL
ALBUMIN SERPL BCG-MCNC: 3.9 G/DL (ref 3.5–5.2)
ALP SERPL-CCNC: 360 U/L (ref 40–150)
ALT SERPL W P-5'-P-CCNC: 35 U/L (ref 0–50)
ANION GAP SERPL CALCULATED.3IONS-SCNC: 15 MMOL/L (ref 7–15)
AST SERPL W P-5'-P-CCNC: 86 U/L (ref 0–45)
BASOPHILS # BLD AUTO: 0.1 10E3/UL (ref 0–0.2)
BASOPHILS NFR BLD AUTO: 1 %
BILIRUB SERPL-MCNC: 1.7 MG/DL
BLD GP AB SCN SERPL QL: NEGATIVE
BUN SERPL-MCNC: 19.9 MG/DL (ref 8–23)
CALCIUM SERPL-MCNC: 9.5 MG/DL (ref 8.8–10.4)
CHLORIDE SERPL-SCNC: 92 MMOL/L (ref 98–107)
CREAT SERPL-MCNC: 0.64 MG/DL (ref 0.51–0.95)
EGFRCR SERPLBLD CKD-EPI 2021: >90 ML/MIN/1.73M2
EOSINOPHIL # BLD AUTO: 0.1 10E3/UL (ref 0–0.7)
EOSINOPHIL NFR BLD AUTO: 0 %
ERYTHROCYTE [DISTWIDTH] IN BLOOD BY AUTOMATED COUNT: 14.4 % (ref 10–15)
GLUCOSE SERPL-MCNC: 100 MG/DL (ref 70–99)
HCO3 SERPL-SCNC: 25 MMOL/L (ref 22–29)
HCT VFR BLD AUTO: 35.4 % (ref 35–47)
HEMOCCULT STL QL: POSITIVE
HGB BLD-MCNC: 12.3 G/DL (ref 11.7–15.7)
HOLD SPECIMEN: NORMAL
HOLD SPECIMEN: NORMAL
IMM GRANULOCYTES # BLD: 0.1 10E3/UL
IMM GRANULOCYTES NFR BLD: 0 %
LIPASE SERPL-CCNC: 7 U/L (ref 13–60)
LYMPHOCYTES # BLD AUTO: 3.6 10E3/UL (ref 0.8–5.3)
LYMPHOCYTES NFR BLD AUTO: 23 %
MCH RBC QN AUTO: 33.8 PG (ref 26.5–33)
MCHC RBC AUTO-ENTMCNC: 34.7 G/DL (ref 31.5–36.5)
MCV RBC AUTO: 97 FL (ref 78–100)
MONOCYTES # BLD AUTO: 1 10E3/UL (ref 0–1.3)
MONOCYTES NFR BLD AUTO: 7 %
NEUTROPHILS # BLD AUTO: 11.1 10E3/UL (ref 1.6–8.3)
NEUTROPHILS NFR BLD AUTO: 70 %
NRBC # BLD AUTO: 0 10E3/UL
NRBC BLD AUTO-RTO: 0 /100
PLATELET # BLD AUTO: 213 10E3/UL (ref 150–450)
POTASSIUM SERPL-SCNC: 3.2 MMOL/L (ref 3.4–5.3)
PROT SERPL-MCNC: 7.9 G/DL (ref 6.4–8.3)
RBC # BLD AUTO: 3.64 10E6/UL (ref 3.8–5.2)
SODIUM SERPL-SCNC: 132 MMOL/L (ref 135–145)
SPECIMEN EXP DATE BLD: NORMAL
WBC # BLD AUTO: 15.9 10E3/UL (ref 4–11)

## 2025-05-16 PROCEDURE — 99285 EMERGENCY DEPT VISIT HI MDM: CPT | Mod: 25

## 2025-05-16 PROCEDURE — 93005 ELECTROCARDIOGRAM TRACING: CPT

## 2025-05-16 PROCEDURE — 96374 THER/PROPH/DIAG INJ IV PUSH: CPT | Mod: 59

## 2025-05-16 PROCEDURE — 85025 COMPLETE CBC W/AUTO DIFF WBC: CPT | Performed by: EMERGENCY MEDICINE

## 2025-05-16 PROCEDURE — 82272 OCCULT BLD FECES 1-3 TESTS: CPT | Performed by: EMERGENCY MEDICINE

## 2025-05-16 PROCEDURE — 250N000009 HC RX 250: Performed by: EMERGENCY MEDICINE

## 2025-05-16 PROCEDURE — 74177 CT ABD & PELVIS W/CONTRAST: CPT

## 2025-05-16 PROCEDURE — 86900 BLOOD TYPING SEROLOGIC ABO: CPT | Performed by: EMERGENCY MEDICINE

## 2025-05-16 PROCEDURE — 83690 ASSAY OF LIPASE: CPT | Performed by: EMERGENCY MEDICINE

## 2025-05-16 PROCEDURE — 250N000011 HC RX IP 250 OP 636: Mod: JZ | Performed by: EMERGENCY MEDICINE

## 2025-05-16 PROCEDURE — 250N000011 HC RX IP 250 OP 636: Performed by: EMERGENCY MEDICINE

## 2025-05-16 PROCEDURE — 84155 ASSAY OF PROTEIN SERUM: CPT | Performed by: EMERGENCY MEDICINE

## 2025-05-16 PROCEDURE — 250N000013 HC RX MED GY IP 250 OP 250 PS 637: Performed by: EMERGENCY MEDICINE

## 2025-05-16 PROCEDURE — 36415 COLL VENOUS BLD VENIPUNCTURE: CPT | Performed by: EMERGENCY MEDICINE

## 2025-05-16 RX ORDER — PANTOPRAZOLE SODIUM 40 MG/1
40 TABLET, DELAYED RELEASE ORAL DAILY
Qty: 30 TABLET | Refills: 0 | Status: SHIPPED | OUTPATIENT
Start: 2025-05-16 | End: 2025-06-15

## 2025-05-16 RX ORDER — POTASSIUM CHLORIDE 1500 MG/1
40 TABLET, EXTENDED RELEASE ORAL ONCE
Status: COMPLETED | OUTPATIENT
Start: 2025-05-16 | End: 2025-05-16

## 2025-05-16 RX ORDER — HYDROMORPHONE HYDROCHLORIDE 1 MG/ML
0.5 INJECTION, SOLUTION INTRAMUSCULAR; INTRAVENOUS; SUBCUTANEOUS EVERY 30 MIN PRN
Refills: 0 | Status: DISCONTINUED | OUTPATIENT
Start: 2025-05-16 | End: 2025-05-17 | Stop reason: HOSPADM

## 2025-05-16 RX ORDER — OXYCODONE HYDROCHLORIDE 5 MG/1
5 TABLET ORAL ONCE
Refills: 0 | Status: DISCONTINUED | OUTPATIENT
Start: 2025-05-16 | End: 2025-05-17 | Stop reason: HOSPADM

## 2025-05-16 RX ORDER — ONDANSETRON 4 MG/1
4 TABLET, ORALLY DISINTEGRATING ORAL EVERY 8 HOURS PRN
Qty: 10 TABLET | Refills: 0 | Status: SHIPPED | OUTPATIENT
Start: 2025-05-16 | End: 2025-05-19

## 2025-05-16 RX ORDER — OXYCODONE HYDROCHLORIDE 5 MG/1
5 TABLET ORAL EVERY 6 HOURS PRN
Qty: 10 TABLET | Refills: 0 | Status: SHIPPED | OUTPATIENT
Start: 2025-05-16 | End: 2025-05-19

## 2025-05-16 RX ORDER — SENNA AND DOCUSATE SODIUM 50; 8.6 MG/1; MG/1
1 TABLET, FILM COATED ORAL AT BEDTIME
Qty: 5 TABLET | Refills: 0 | Status: SHIPPED | OUTPATIENT
Start: 2025-05-16 | End: 2025-05-21

## 2025-05-16 RX ORDER — IOPAMIDOL 755 MG/ML
500 INJECTION, SOLUTION INTRAVASCULAR ONCE
Status: COMPLETED | OUTPATIENT
Start: 2025-05-16 | End: 2025-05-16

## 2025-05-16 RX ADMIN — HYDROMORPHONE HYDROCHLORIDE 0.5 MG: 1 INJECTION, SOLUTION INTRAMUSCULAR; INTRAVENOUS; SUBCUTANEOUS at 20:16

## 2025-05-16 RX ADMIN — SODIUM CHLORIDE 56 ML: 9 INJECTION, SOLUTION INTRAVENOUS at 20:48

## 2025-05-16 RX ADMIN — IOPAMIDOL 63 ML: 755 INJECTION, SOLUTION INTRAVENOUS at 20:48

## 2025-05-16 RX ADMIN — POTASSIUM CHLORIDE 40 MEQ: 1500 TABLET, EXTENDED RELEASE ORAL at 21:03

## 2025-05-16 ASSESSMENT — ACTIVITIES OF DAILY LIVING (ADL)
ADLS_ACUITY_SCORE: 55

## 2025-05-16 ASSESSMENT — COLUMBIA-SUICIDE SEVERITY RATING SCALE - C-SSRS
2. HAVE YOU ACTUALLY HAD ANY THOUGHTS OF KILLING YOURSELF IN THE PAST MONTH?: NO
6. HAVE YOU EVER DONE ANYTHING, STARTED TO DO ANYTHING, OR PREPARED TO DO ANYTHING TO END YOUR LIFE?: NO
1. IN THE PAST MONTH, HAVE YOU WISHED YOU WERE DEAD OR WISHED YOU COULD GO TO SLEEP AND NOT WAKE UP?: NO

## 2025-05-16 NOTE — ED TRIAGE NOTES
Patient reports red vomit and black stools that started yesterday. Hx of alcoholism a few years ago. Patient denies any recent alcohol abuse. Patient is tachycardic in triage. No thinners.

## 2025-05-17 NOTE — DISCHARGE INSTRUCTIONS
In regards to your left flank pain, your workup today did not show any new broken ribs, bruising to your lungs, or injury to your internal organs.  Your CT scan does show signs of chronic inflammation in the pancreas, as well as old rib fractures.  You also were noted to have a small pulmonary nodule that has not changed since your last CT.  I recommend talking about this further with primary care doctor.  I recommend avoiding Tylenol, since we did see some mild inflammation in your liver test.  I also recommend avoiding ibuprofen, Aleve, Advil, naproxen, as these medications can cause further irritation to your stomach.  Your blood counts today appear stable, and I do not see any signs of continued severe bleeding in your intestines.  I am prescribing medication to help protect your stomach lining.  I have written a prescription for oxycodone for pain that is not controlled by the ibuprofen.  Return to the ED right away if you develop worsening symptoms, including vomiting especially with any blood in your vomit, blood in your stool, fever, increasing abdominal pain, increasing chest wall pain, or for any other concerns.

## 2025-05-17 NOTE — ED PROVIDER NOTES
"  Emergency Department Note      History of Present Illness     Chief Complaint   Melena      HPI   Audelia Comer is a 59 year old female with history of alcohol abuse,   who presents today with left flank pain, and vomiting.  She states that about a week ago, she was bending over to  some land, lost her balance, and fell onto a piece of furniture onto her left flank.  Since then, she has had severe pain, especially worse when she takes a deep breath.  She suspects that she broke her rib.  She did speak to her PCP 2 days ago, who recommended that she be seen in the hospital if she continued to have severe pain.  She has been taking acetaminophen and Excedrin for her pain.  She has also been using a lidocaine patch.  Despite this, she is continue to have severe left flank pain.  She is also had watery diarrhea, and is having difficulty with continence secondary to the frequency and loose stools that she is having.  She has been having episodes of vomiting, occasionally vomiting red blood with some coffee grounds.  She wonders if this could be related to some [place] with red sauce that she made for herself as well.  She does not complain of abdominal pain.  She states that she had alcohol last night, \"just a few drinks.\"  She has been taking 1-2 doses of 500 mg of acetaminophen.  After she ran out of this, 2 days ago she started taking Excedrin, as directed for her pain.    Independent Historian   None.    Review of External Notes   I reviewed discharge summary from 11/3/2024, Patient was admitted with acute on chronic pancreatitis, and Electrolyte abnormalities.    I reviewed upper GI endoscopy from 1/27/2023.  Patient had normal esophagus, hiatal hernia, nonbleeding duodenal ulcers, congested duodenal mucosa.    I performed a  search for this patient.  She does not have any recent's prescriptions for controlled substances including narcotics.    Past Medical History     Medical History and Problem List "   Past Medical History:   Diagnosis Date    Alcoholic liver disease     Anxiety     C. difficile colitis 2023    Chronic pancreatitis (H)     Depressive disorder     Invasive ductal carcinoma of breast, right (H)     PONV (postoperative nausea and vomiting)     SBO (small bowel obstruction) (H)        Medications   acetaminophen (TYLENOL) 325 MG tablet  azelastine (ASTELIN) 0.1 % nasal spray  cyanocobalamin (VITAMIN B-12) 1000 MCG tablet  escitalopram (LEXAPRO) 10 MG tablet  fluticasone (FLONASE) 50 MCG/ACT nasal spray  folic acid (FOLVITE) 1 MG tablet  HYDROmorphone (DILAUDID) 2 MG tablet  hydrOXYzine HCl (ATARAX) 25 MG tablet  ibuprofen (ADVIL/MOTRIN) 600 MG tablet  mirtazapine (REMERON) 30 MG tablet  ondansetron (ZOFRAN ODT) 4 MG ODT tab  pantoprazole (PROTONIX) 40 MG EC tablet  QUEtiapine (SEROQUEL) 50 MG tablet  QUEtiapine (SEROQUEL) 50 MG tablet  thiamine (B-1) 100 MG tablet        Surgical History   Past Surgical History:   Procedure Laterality Date    APPENDECTOMY      APPENDECTOMY  1989     SECTION  1984    ENDOSCOPIC ULTRASOUND UPPER GASTROINTESTINAL TRACT (GI) N/A 2023    Procedure: ENDOSCOPIC ULTRASOUND, ESOPHAGOSCOPY / UPPER GASTROINTESTINAL TRACT (GI);  Surgeon: Lg Dickinson MD;  Location:  GI    ESOPHAGOSCOPY, GASTROSCOPY, DUODENOSCOPY (EGD), COMBINED N/A 2023    Procedure: ESOPHAGOGASTRODUODENOSCOPY (EGD);  Surgeon: Lg Dickinson MD;  Location:  GI    GYN SURGERY      c section     MASTECTOMY, BILATERAL Bilateral     With reconstructive surgery    SOFT TISSUE SURGERY      breast implants       Physical Exam     Patient Vitals for the past 24 hrs:   BP Temp Temp src Pulse Resp SpO2   25 1855 (!) 163/88 -- -- -- -- --   25 1854 -- 97.9  F (36.6  C) Temporal (!) 126 16 99 %     Physical Exam  General: alert, in pain.  HENT: mucous membranes moist  CV: tachycardic rate, regular rhythm  Resp: normal effort, clear throughout, no crackles  or wheezing  GI: abdomen soft and nontender, no guarding  MSK: Patient is exquisitely tender to the left chest wall, even to very light touch.  She has a lidocaine patch overlying the area.  I removed this.  Underneath, no crepitus.  No bruising, laceration, or deformity.  Skin: appropriately warm and dry  Extremities: no edema, calves non-tender  Neuro: alert, clear speech, oriented  Psych: normal mood and affect      Diagnostics     Lab Results   Labs Ordered and Resulted from Time of ED Arrival to Time of ED Departure   COMPREHENSIVE METABOLIC PANEL - Abnormal       Result Value    Sodium 132 (*)     Potassium 3.2 (*)     Carbon Dioxide (CO2) 25      Anion Gap 15      Urea Nitrogen 19.9      Creatinine 0.64      GFR Estimate >90      Calcium 9.5      Chloride 92 (*)     Glucose 100 (*)     Alkaline Phosphatase 360 (*)     AST 86 (*)     ALT 35      Protein Total 7.9      Albumin 3.9      Bilirubin Total 1.7 (*)    LIPASE - Abnormal    Lipase 7 (*)    OCCULT BLOOD STOOL - Abnormal    Occult Blood Positive (*)    CBC WITH PLATELETS AND DIFFERENTIAL - Abnormal    WBC Count 15.9 (*)     RBC Count 3.64 (*)     Hemoglobin 12.3      Hematocrit 35.4      MCV 97      MCH 33.8 (*)     MCHC 34.7      RDW 14.4      Platelet Count 213      % Neutrophils 70      % Lymphocytes 23      % Monocytes 7      % Eosinophils 0      % Basophils 1      % Immature Granulocytes 0      NRBCs per 100 WBC 0      Absolute Neutrophils 11.1 (*)     Absolute Lymphocytes 3.6      Absolute Monocytes 1.0      Absolute Eosinophils 0.1      Absolute Basophils 0.1      Absolute Immature Granulocytes 0.1      Absolute NRBCs 0.0     TYPE AND SCREEN, ADULT    ABO/RH(D) A POS      Antibody Screen Negative      SPECIMEN EXPIRATION DATE [id number]     ABO/RH TYPE AND SCREEN       Imaging   CT Chest/Abdomen/Pelvis w Contrast   Final Result   IMPRESSION:    1.  No acute posttraumatic abnormality of the chest, abdomen, or pelvis.   2.  Hepatic steatosis and  cirrhosis.   3.  Chronic pancreatitis.   4.  Mild centrilobular emphysema, with superimposed RB-ILD.          EKG     ECG results from 05/16/25   EKG 12-lead, tracing only     Value    Systolic Blood Pressure     Diastolic Blood Pressure     Ventricular Rate 111    Atrial Rate 111    SD Interval 130    QRS Duration 70        QTc 473    P Axis 67    R AXIS 59    T Axis 81    Interpretation ECG      Sinus tachycardia  Nonspecific ST and T wave abnormality  Abnormal ECG  When compared with ECG of 18-Mar-2023 14:29,  Nonspecific T wave abnormality now evident in Inferior leads         Independent Interpretation   None    ED Course      Medications Administered   Medications   HYDROmorphone (PF) (DILAUDID) injection 0.5 mg (0.5 mg Intravenous $Given 5/16/25 2016)   potassium chloride meek ER (KLOR-CON M20) CR tablet 40 mEq (40 mEq Oral $Given 5/16/25 2103)   iopamidol (ISOVUE-370) solution 500 mL (63 mLs Intravenous $Given 5/16/25 2048)   sodium chloride 0.9 % bag for CT scan flush (56 mLs Intravenous $Given 5/16/25 2048)       Procedures   Procedures     Discussion of Management   None    ED Course   ED Course as of 05/16/25 2247   Fri May 16, 2025   2238 I updated the patient.  No additional episodes of vomiting in the ED.   2239 Patient ambulated to the bathroom without difficulty.       Additional Documentation  None    Medical Decision Making / Diagnosis     CMS Diagnoses: None    MIPS   None               [facility]   Audelia Comer is a 59 year old female 6 with a history of chronic pancreatitis, alcohol use, breast cancer in remission, presenting today with left chest wall pain, and concerns about melena and hematemesis.  On exam, the patient has mild tachycardia.  I cannot see any signs of trauma, including chest wall bruising, deformity, and I do not palpate any crepitus.  Given severe pain, CT chest abdomen pelvis was obtained.  Fortunately, this is negative for evidence of rib fracture, pulmonary contusion,  "splenic injury, or other traumatic injuries.  CT was obtained with contrast, and does not demonstrate any large pulmonary embolism.  Overall, low suspicion for this given that patient's symptoms seem to be related to a fall.  Patient also complained planed of hematemesis and melena.  \"Blood test was positive.  However, her hemoglobin is stable.  She has not had any episodes of melena or hematemesis in the emergency department, and is remained hemodynamically stable.  She did have endoscopy 2 years ago that did not demonstrate any esophageal varices, but did demonstrate gastric ulcerations.  I have recommended starting Protonix, and have placed a referral for follow-up with gastroenterology.  Given no change in hemoglobin, no history of varices, no evidence for active continued bleeding, I do not think she needed admission to the hospital.  Return to the ED for additional hematemesis, worsening chest wall pain, or for any other concerns.    Disposition   The patient was discharged.     Diagnosis     ICD-10-CM    1. Left flank pain  R10.9       2. Chest wall contusion, left, initial encounter  S20.212A       3. Pulmonary nodule  R91.1       4. Chronic pancreatitis, unspecified pancreatitis type (H)  K86.1       5. Vomiting and diarrhea  R11.10 Adult GI  Referral - Consult Only    R19.7 Adult GI  Referral - Procedure Only           Discharge Medications   Discharge Medication List as of 5/16/2025 11:04 PM        START taking these medications    Details   !! ondansetron (ZOFRAN ODT) 4 MG ODT tab Take 1 tablet (4 mg) by mouth every 8 hours as needed for nausea or vomiting., Disp-10 tablet, R-0, Local Print      oxyCODONE (ROXICODONE) 5 MG tablet Take 1 tablet (5 mg) by mouth every 6 hours as needed for pain., Disp-10 tablet, R-0, Local Print      !! pantoprazole (PROTONIX) 40 MG EC tablet Take 1 tablet (40 mg) by mouth daily for 30 doses., Disp-30 tablet, R-0, Local Print      SENNA-docusate sodium " (SENNA S) 8.6-50 MG tablet Take 1 tablet by mouth at bedtime for 5 days., Disp-5 tablet, R-0, Local Print       !! - Potential duplicate medications found. Please discuss with provider.            MD Arielle Doss Tracy Lynn, MD  05/17/25 5600       Mary Guzmán MD  05/17/25 5600

## 2025-05-19 ENCOUNTER — DOCUMENTATION ONLY (OUTPATIENT)
Dept: GASTROENTEROLOGY | Facility: CLINIC | Age: 60
End: 2025-05-19
Payer: COMMERCIAL

## 2025-05-19 LAB
ATRIAL RATE - MUSE: 111 BPM
DIASTOLIC BLOOD PRESSURE - MUSE: NORMAL MMHG
INTERPRETATION ECG - MUSE: NORMAL
P AXIS - MUSE: 67 DEGREES
PR INTERVAL - MUSE: 130 MS
QRS DURATION - MUSE: 70 MS
QT - MUSE: 348 MS
QTC - MUSE: 473 MS
R AXIS - MUSE: 59 DEGREES
SYSTOLIC BLOOD PRESSURE - MUSE: NORMAL MMHG
T AXIS - MUSE: 81 DEGREES
VENTRICULAR RATE- MUSE: 111 BPM

## 2025-05-20 ENCOUNTER — DOCUMENTATION ONLY (OUTPATIENT)
Dept: GASTROENTEROLOGY | Facility: CLINIC | Age: 60
End: 2025-05-20
Payer: COMMERCIAL

## 2025-06-09 ENCOUNTER — DOCUMENTATION ONLY (OUTPATIENT)
Dept: GASTROENTEROLOGY | Facility: CLINIC | Age: 60
End: 2025-06-09
Payer: COMMERCIAL

## 2025-06-17 ENCOUNTER — DOCUMENTATION ONLY (OUTPATIENT)
Dept: GASTROENTEROLOGY | Facility: CLINIC | Age: 60
End: 2025-06-17
Payer: COMMERCIAL

## 2025-07-07 ENCOUNTER — DOCUMENTATION ONLY (OUTPATIENT)
Dept: GASTROENTEROLOGY | Facility: CLINIC | Age: 60
End: 2025-07-07
Payer: COMMERCIAL

## 2025-07-07 NOTE — PROGRESS NOTES
Talked with agent from [facility], records department- the patient was never seen there.  Radha Carter MA  07/07/25

## (undated) RX ORDER — FENTANYL CITRATE 50 UG/ML
INJECTION, SOLUTION INTRAMUSCULAR; INTRAVENOUS
Status: DISPENSED
Start: 2023-01-29